# Patient Record
Sex: FEMALE | Race: WHITE | Employment: OTHER | URBAN - NONMETROPOLITAN AREA
[De-identification: names, ages, dates, MRNs, and addresses within clinical notes are randomized per-mention and may not be internally consistent; named-entity substitution may affect disease eponyms.]

---

## 2017-01-21 ENCOUNTER — HOSPITAL ENCOUNTER (OUTPATIENT)
Dept: LAB | Age: 82
Discharge: HOME | End: 2017-01-21
Attending: FAMILY MEDICINE | Admitting: FAMILY MEDICINE

## 2017-01-21 LAB
ALBUMIN SERPL-MCNC: 4.2 GM/DL (ref 3.4–5)
ALP BLD-CCNC: 53 IU/L (ref 40–129)
ALT SERPL-CCNC: 9 U/L (ref 10–40)
ANION GAP SERPL CALCULATED.3IONS-SCNC: 15 MMOL/L (ref 4–16)
AST SERPL-CCNC: 11 IU/L (ref 15–37)
BILIRUB SERPL-MCNC: 0.4 MG/DL (ref 0–1)
BUN BLDV-MCNC: 36 MG/DL (ref 6–23)
CALCIUM SERPL-MCNC: 10.2 MG/DL (ref 8.3–10.6)
CHLORIDE BLD-SCNC: 100 MMOL/L (ref 99–110)
CO2: 24 MMOL/L (ref 21–32)
CREAT SERPL-MCNC: 1.4 MG/DL (ref 0.6–1.1)
GFR AFRICAN AMERICAN: 44 ML/MIN/1.73M2
GFR NON-AFRICAN AMERICAN: 36 ML/MIN/1.73M2
GLUCOSE FASTING: 103 MG/DL (ref 70–99)
POTASSIUM SERPL-SCNC: 4.6 MMOL/L (ref 3.5–5.1)
SODIUM BLD-SCNC: 139 MMOL/L (ref 135–145)
TOTAL PROTEIN: 7.4 GM/DL (ref 6.4–8.2)

## 2017-01-22 LAB
ESTIMATED AVERAGE GLUCOSE: 103 MG/DL
FOLATE: 15.3 NG/ML (ref 3.1–17.5)
HBA1C MFR BLD: 5.2 % (ref 4.2–6.3)
VITAMIN B-12: 208.3 PG/ML (ref 211–911)

## 2017-03-08 PROBLEM — D63.8 ANEMIA OF CHRONIC DISEASE: Chronic | Status: ACTIVE | Noted: 2017-03-08

## 2017-03-08 PROBLEM — N18.30 CHRONIC RENAL IMPAIRMENT, STAGE 3 (MODERATE) (HCC): Chronic | Status: ACTIVE | Noted: 2017-03-08

## 2017-04-04 ENCOUNTER — HOSPITAL ENCOUNTER (OUTPATIENT)
Dept: LAB | Age: 82
Discharge: HOME | End: 2017-04-04
Attending: FAMILY MEDICINE | Admitting: FAMILY MEDICINE

## 2017-04-04 LAB
ALBUMIN SERPL-MCNC: 3.8 GM/DL (ref 3.4–5)
ALP BLD-CCNC: 51 IU/L (ref 40–129)
ALT SERPL-CCNC: 8 U/L (ref 10–40)
ANION GAP SERPL CALCULATED.3IONS-SCNC: 12 MMOL/L (ref 4–16)
AST SERPL-CCNC: 15 IU/L (ref 15–37)
BILIRUB SERPL-MCNC: 0.4 MG/DL (ref 0–1)
BUN BLDV-MCNC: 24 MG/DL (ref 6–23)
CALCIUM SERPL-MCNC: 9.6 MG/DL (ref 8.3–10.6)
CHLORIDE BLD-SCNC: 101 MMOL/L (ref 99–110)
CO2: 27 MMOL/L (ref 21–32)
CREAT SERPL-MCNC: 1.2 MG/DL (ref 0.6–1.1)
ESTIMATED AVERAGE GLUCOSE: 117 MG/DL
GFR AFRICAN AMERICAN: 52 ML/MIN/1.73M2
GFR NON-AFRICAN AMERICAN: 43 ML/MIN/1.73M2
GLUCOSE BLD-MCNC: 107 MG/DL (ref 70–140)
HBA1C MFR BLD: 5.7 % (ref 4.2–6.3)
POTASSIUM SERPL-SCNC: 4.2 MMOL/L (ref 3.5–5.1)
SODIUM BLD-SCNC: 140 MMOL/L (ref 135–145)
TOTAL PROTEIN: 6.9 GM/DL (ref 6.4–8.2)

## 2017-04-26 PROBLEM — E11.9 DM (DIABETES MELLITUS) (HCC): Status: ACTIVE | Noted: 2017-04-26

## 2017-04-26 PROBLEM — I27.81 COR PULMONALE (CHRONIC) (HCC): Status: ACTIVE | Noted: 2017-04-26

## 2017-04-26 PROBLEM — N18.31 CHRONIC KIDNEY DISEASE (CKD) STAGE G3A/A2, MODERATELY DECREASED GLOMERULAR FILTRATION RATE (GFR) BETWEEN 45-59 ML/MIN/1.73 SQUARE METER AND ALBUMINURIA CREATININE RATIO BETWEEN 30-299 MG/G (HCC): Status: ACTIVE | Noted: 2017-04-26

## 2017-06-28 ENCOUNTER — HOSPITAL ENCOUNTER (OUTPATIENT)
Dept: LAB | Age: 82
Discharge: HOME | End: 2017-06-28
Attending: FAMILY MEDICINE | Admitting: FAMILY MEDICINE

## 2017-06-28 LAB
ALBUMIN SERPL-MCNC: 4.1 GM/DL (ref 3.4–5)
ALP BLD-CCNC: 56 IU/L (ref 40–129)
ALT SERPL-CCNC: 7 U/L (ref 10–40)
ANION GAP SERPL CALCULATED.3IONS-SCNC: 11 MMOL/L (ref 4–16)
AST SERPL-CCNC: 13 IU/L (ref 15–37)
BILIRUB SERPL-MCNC: 0.6 MG/DL (ref 0–1)
BUN BLDV-MCNC: 22 MG/DL (ref 6–23)
CALCIUM SERPL-MCNC: 9.9 MG/DL (ref 8.3–10.6)
CHLORIDE BLD-SCNC: 99 MMOL/L (ref 99–110)
CHOLESTEROL, FASTING: 169 MG/DL
CO2: 27 MMOL/L (ref 21–32)
CREAT SERPL-MCNC: 1 MG/DL (ref 0.6–1.1)
ESTIMATED AVERAGE GLUCOSE: 140 MG/DL
GFR AFRICAN AMERICAN: >60 ML/MIN/1.73M2
GFR NON-AFRICAN AMERICAN: 53 ML/MIN/1.73M2
GLUCOSE FASTING: 124 MG/DL (ref 70–99)
HBA1C MFR BLD: 6.5 % (ref 4.2–6.3)
HDLC SERPL-MCNC: 50 MG/DL
LDL CHOLESTEROL DIRECT: 87 MG/DL
POTASSIUM SERPL-SCNC: 3.9 MMOL/L (ref 3.5–5.1)
SODIUM BLD-SCNC: 137 MMOL/L (ref 135–145)
TOTAL PROTEIN: 7 GM/DL (ref 6.4–8.2)
TRIGLYCERIDE, FASTING: 255 MG/DL
TSH HIGH SENSITIVITY: 2.27 UIU/ML (ref 0.27–4.2)

## 2017-07-18 PROBLEM — R07.2 PRECORDIAL CHEST PAIN: Status: ACTIVE | Noted: 2017-07-18

## 2017-07-20 PROBLEM — R07.2 PRECORDIAL CHEST PAIN: Status: RESOLVED | Noted: 2017-07-18 | Resolved: 2017-07-20

## 2017-08-30 PROBLEM — E87.70 FLUID OVERLOAD: Status: ACTIVE | Noted: 2017-08-30

## 2017-08-30 PROBLEM — I10 HTN (HYPERTENSION): Status: ACTIVE | Noted: 2017-08-30

## 2017-08-30 PROBLEM — N18.2 CHRONIC KIDNEY DISEASE (CKD) STAGE G2/A2, MILDLY DECREASED GLOMERULAR FILTRATION RATE (GFR) BETWEEN 60-89 ML/MIN/1.73 SQUARE METER AND ALBUMINURIA CREATININE RATIO BETWEEN 30-299 MG/G: Status: ACTIVE | Noted: 2017-08-30

## 2017-09-15 ENCOUNTER — HOSPITAL ENCOUNTER (OUTPATIENT)
Dept: LAB | Age: 82
Discharge: HOME | End: 2017-09-15
Attending: FAMILY MEDICINE | Admitting: FAMILY MEDICINE

## 2017-09-15 LAB
ALBUMIN SERPL-MCNC: 4 GM/DL (ref 3.4–5)
ALP BLD-CCNC: 70 IU/L (ref 40–129)
ALT SERPL-CCNC: 10 U/L (ref 10–40)
ANION GAP SERPL CALCULATED.3IONS-SCNC: 9 MMOL/L (ref 4–16)
AST SERPL-CCNC: 16 IU/L (ref 15–37)
BILIRUB SERPL-MCNC: 0.6 MG/DL (ref 0–1)
BUN BLDV-MCNC: 26 MG/DL (ref 6–23)
CALCIUM SERPL-MCNC: 9.3 MG/DL (ref 8.3–10.6)
CHLORIDE BLD-SCNC: 96 MMOL/L (ref 99–110)
CO2: 31 MMOL/L (ref 21–32)
CREAT SERPL-MCNC: 1.1 MG/DL (ref 0.6–1.1)
ESTIMATED AVERAGE GLUCOSE: 131 MG/DL
GFR AFRICAN AMERICAN: 58 ML/MIN/1.73M2
GFR NON-AFRICAN AMERICAN: 48 ML/MIN/1.73M2
GLUCOSE FASTING: 137 MG/DL (ref 70–99)
HBA1C MFR BLD: 6.2 % (ref 4.2–6.3)
POTASSIUM SERPL-SCNC: 3.3 MMOL/L (ref 3.5–5.1)
SODIUM BLD-SCNC: 136 MMOL/L (ref 135–145)
TOTAL PROTEIN: 7.2 GM/DL (ref 6.4–8.2)

## 2017-10-03 ENCOUNTER — HOSPITAL ENCOUNTER (OUTPATIENT)
Dept: LAB | Age: 82
Discharge: HOME | End: 2017-10-03
Attending: FAMILY MEDICINE | Admitting: FAMILY MEDICINE

## 2017-10-03 LAB
ANION GAP SERPL CALCULATED.3IONS-SCNC: 17 MMOL/L (ref 4–16)
BUN BLDV-MCNC: 28 MG/DL (ref 6–23)
CALCIUM SERPL-MCNC: 9.3 MG/DL (ref 8.3–10.6)
CHLORIDE BLD-SCNC: 97 MMOL/L (ref 99–110)
CO2: 26 MMOL/L (ref 21–32)
CREAT SERPL-MCNC: 1.2 MG/DL (ref 0.6–1.1)
GFR AFRICAN AMERICAN: 52 ML/MIN/1.73M2
GFR NON-AFRICAN AMERICAN: 43 ML/MIN/1.73M2
GLUCOSE BLD-MCNC: 233 MG/DL (ref 70–140)
POTASSIUM SERPL-SCNC: 4.2 MMOL/L (ref 3.5–5.1)
SODIUM BLD-SCNC: 140 MMOL/L (ref 135–145)

## 2017-10-27 ENCOUNTER — HOSPITAL ENCOUNTER (OUTPATIENT)
Dept: LAB | Age: 82
Discharge: HOME | End: 2017-10-27
Attending: NURSE PRACTITIONER | Admitting: NURSE PRACTITIONER

## 2017-10-27 LAB
ALBUMIN SERPL-MCNC: 4 GM/DL (ref 3.4–5)
ALP BLD-CCNC: 72 IU/L (ref 40–129)
ALT SERPL-CCNC: 11 U/L (ref 10–40)
ANION GAP SERPL CALCULATED.3IONS-SCNC: 11 MMOL/L (ref 4–16)
AST SERPL-CCNC: 12 IU/L (ref 15–37)
BILIRUB SERPL-MCNC: 0.4 MG/DL (ref 0–1)
BUN BLDV-MCNC: 29 MG/DL (ref 6–23)
CALCIUM SERPL-MCNC: 9.4 MG/DL (ref 8.3–10.6)
CHLORIDE BLD-SCNC: 96 MMOL/L (ref 99–110)
CO2: 36 MMOL/L (ref 21–32)
CREAT SERPL-MCNC: 1.2 MG/DL (ref 0.6–1.1)
ESTIMATED AVERAGE GLUCOSE: 157 MG/DL
GFR AFRICAN AMERICAN: 52 ML/MIN/1.73M2
GFR NON-AFRICAN AMERICAN: 43 ML/MIN/1.73M2
GLUCOSE FASTING: 183 MG/DL (ref 70–99)
HBA1C MFR BLD: 7.1 % (ref 4.2–6.3)
POTASSIUM SERPL-SCNC: 3.7 MMOL/L (ref 3.5–5.1)
SODIUM BLD-SCNC: 143 MMOL/L (ref 135–145)
TOTAL PROTEIN: 6.9 GM/DL (ref 6.4–8.2)

## 2017-12-29 ENCOUNTER — HOSPITAL ENCOUNTER (OUTPATIENT)
Dept: LAB | Age: 82
Discharge: HOME | End: 2017-12-29
Attending: INTERNAL MEDICINE | Admitting: INTERNAL MEDICINE

## 2017-12-29 LAB
ANION GAP SERPL CALCULATED.3IONS-SCNC: 12 MMOL/L (ref 4–16)
BUN BLDV-MCNC: 50 MG/DL (ref 6–23)
CALCIUM SERPL-MCNC: 9.9 MG/DL (ref 8.3–10.6)
CHLORIDE BLD-SCNC: 88 MMOL/L (ref 99–110)
CO2: 36 MMOL/L (ref 21–32)
CREAT SERPL-MCNC: 1.6 MG/DL (ref 0.6–1.1)
GFR AFRICAN AMERICAN: 38 ML/MIN/1.73M2
GFR NON-AFRICAN AMERICAN: 31 ML/MIN/1.73M2
GLUCOSE BLD-MCNC: 179 MG/DL (ref 70–99)
MAGNESIUM: 2.1 MG/DL (ref 1.8–2.4)
POTASSIUM SERPL-SCNC: 4 MMOL/L (ref 3.5–5.1)
SODIUM BLD-SCNC: 136 MMOL/L (ref 135–145)

## 2018-01-10 PROBLEM — N17.9 ACUTE KIDNEY INJURY (HCC): Status: ACTIVE | Noted: 2018-01-10

## 2018-03-17 ENCOUNTER — HOSPITAL ENCOUNTER (OUTPATIENT)
Dept: LAB | Age: 83
Discharge: HOME | End: 2018-03-17
Attending: INTERNAL MEDICINE | Admitting: INTERNAL MEDICINE

## 2018-03-17 LAB
ANION GAP SERPL CALCULATED.3IONS-SCNC: 10 MMOL/L (ref 4–16)
BUN BLDV-MCNC: 36 MG/DL (ref 6–23)
CALCIUM SERPL-MCNC: 9.7 MG/DL (ref 8.3–10.6)
CHLORIDE BLD-SCNC: 91 MMOL/L (ref 99–110)
CO2: 36 MMOL/L (ref 21–32)
CREAT SERPL-MCNC: 1.4 MG/DL (ref 0.6–1.1)
GFR AFRICAN AMERICAN: 44 ML/MIN/1.73M2
GFR NON-AFRICAN AMERICAN: 36 ML/MIN/1.73M2
GLUCOSE BLD-MCNC: 218 MG/DL (ref 70–99)
MAGNESIUM: 2.2 MG/DL (ref 1.8–2.4)
POTASSIUM SERPL-SCNC: 4 MMOL/L (ref 3.5–5.1)
SODIUM BLD-SCNC: 137 MMOL/L (ref 135–145)

## 2018-04-04 PROBLEM — M10.9 ACUTE GOUT: Status: ACTIVE | Noted: 2018-04-04

## 2018-04-17 ENCOUNTER — HOSPITAL ENCOUNTER (OUTPATIENT)
Dept: LAB | Age: 83
Discharge: HOME | End: 2018-04-17
Attending: INTERNAL MEDICINE | Admitting: INTERNAL MEDICINE

## 2018-04-17 LAB
ANION GAP SERPL CALCULATED.3IONS-SCNC: 9 MMOL/L (ref 4–16)
BUN BLDV-MCNC: 22 MG/DL (ref 6–23)
CALCIUM SERPL-MCNC: 9 MG/DL (ref 8.3–10.6)
CHLORIDE BLD-SCNC: 93 MMOL/L (ref 99–110)
CO2: 36 MMOL/L (ref 21–32)
CREAT SERPL-MCNC: 1.1 MG/DL (ref 0.6–1.1)
GFR AFRICAN AMERICAN: 58 ML/MIN/1.73M2
GFR NON-AFRICAN AMERICAN: 48 ML/MIN/1.73M2
GLUCOSE BLD-MCNC: 210 MG/DL (ref 70–99)
PHOSPHORUS: 3.7 MG/DL (ref 2.5–4.9)
POTASSIUM SERPL-SCNC: 3.6 MMOL/L (ref 3.5–5.1)
SODIUM BLD-SCNC: 138 MMOL/L (ref 135–145)
URIC ACID: 7.8 MG/DL (ref 2.6–6)

## 2018-08-21 ENCOUNTER — HOSPITAL ENCOUNTER (OUTPATIENT)
Dept: LAB | Age: 83
Discharge: HOME | End: 2018-08-21
Attending: INTERNAL MEDICINE | Admitting: INTERNAL MEDICINE

## 2018-08-21 LAB
ANION GAP SERPL CALCULATED.3IONS-SCNC: 22 MMOL/L (ref 4–16)
BACTERIA: ABNORMAL /HPF
BILIRUBIN URINE: NEGATIVE MG/DL
BLOOD, URINE: NEGATIVE
BUN BLDV-MCNC: 77 MG/DL (ref 6–23)
CALCIUM SERPL-MCNC: 9.8 MG/DL (ref 8.3–10.6)
CAST TYPE: ABNORMAL /HPF
CHLORIDE BLD-SCNC: 93 MMOL/L (ref 99–110)
CLARITY: CLEAR
CO2: 22 MMOL/L (ref 21–32)
COLOR: YELLOW
CREAT SERPL-MCNC: 2.6 MG/DL (ref 0.6–1.1)
CREATININE URINE: 27.7 MG/DL (ref 28–217)
CRYSTAL TYPE: ABNORMAL /HPF
EPITHELIAL CELLS, UA: ABNORMAL /HPF
GFR AFRICAN AMERICAN: 21 ML/MIN/1.73M2
GFR NON-AFRICAN AMERICAN: 18 ML/MIN/1.73M2
GLUCOSE BLD-MCNC: 166 MG/DL (ref 70–99)
GLUCOSE, URINE: NEGATIVE MG/DL
KETONES, URINE: NEGATIVE MG/DL
LEUKOCYTE ESTERASE, URINE: ABNORMAL
MAGNESIUM: 2.6 MG/DL (ref 1.8–2.4)
MUCUS: NEGATIVE HPF
NITRITE URINE, QUANTITATIVE: NEGATIVE
PH, URINE: 7 (ref 5–8)
POTASSIUM SERPL-SCNC: 4.8 MMOL/L (ref 3.5–5.1)
PROT/CREAT RATIO, UR: 0.1
PROTEIN UA: NEGATIVE MG/DL
RBC URINE: ABNORMAL /HPF (ref 0–6)
SODIUM BLD-SCNC: 137 MMOL/L (ref 135–145)
SPECIFIC GRAVITY UA: 1.01 (ref 1–1.03)
URINE TOTAL PROTEIN: 4 MG/DL
UROBILINOGEN, URINE: 0.2 MG/DL (ref 0.2–1)
VOLUME, (UVOL): 12 ML (ref 10–12)
WBC UA: ABNORMAL /HPF (ref 0–5)

## 2018-08-29 ENCOUNTER — HOSPITAL ENCOUNTER (OUTPATIENT)
Dept: LAB | Age: 83
Discharge: HOME | End: 2018-08-29
Attending: INTERNAL MEDICINE | Admitting: INTERNAL MEDICINE

## 2018-08-29 PROBLEM — N17.9 ACUTE KIDNEY INJURY (HCC): Status: ACTIVE | Noted: 2018-08-29

## 2018-08-29 LAB
ANION GAP SERPL CALCULATED.3IONS-SCNC: 9 MMOL/L (ref 4–16)
BUN BLDV-MCNC: 78 MG/DL (ref 6–23)
CALCIUM SERPL-MCNC: 9.9 MG/DL (ref 8.3–10.6)
CHLORIDE BLD-SCNC: 93 MMOL/L (ref 99–110)
CO2: 35 MMOL/L (ref 21–32)
CREAT SERPL-MCNC: 2.3 MG/DL (ref 0.6–1.1)
GFR AFRICAN AMERICAN: 25 ML/MIN/1.73M2
GFR NON-AFRICAN AMERICAN: 20 ML/MIN/1.73M2
GLUCOSE BLD-MCNC: 128 MG/DL (ref 70–99)
POTASSIUM SERPL-SCNC: 5 MMOL/L (ref 3.5–5.1)
SODIUM BLD-SCNC: 137 MMOL/L (ref 135–145)

## 2018-11-29 ENCOUNTER — HOSPITAL ENCOUNTER (OUTPATIENT)
Age: 83
Discharge: HOME | End: 2018-11-29
Payer: MEDICARE

## 2018-11-29 DIAGNOSIS — N17.9 ACUTE KIDNEY INJURY (HCC): ICD-10-CM

## 2018-11-29 LAB
ANION GAP SERPL CALCULATED.3IONS-SCNC: 12 MMOL/L (ref 4–16)
BUN BLDV-MCNC: 42 MG/DL (ref 6–23)
CALCIUM SERPL-MCNC: 9.9 MG/DL (ref 8.3–10.6)
CHLORIDE BLD-SCNC: 94 MMOL/L (ref 99–110)
CO2: 32 MMOL/L (ref 21–32)
CREAT SERPL-MCNC: 1.7 MG/DL (ref 0.6–1.1)
GFR AFRICAN AMERICAN: 35 ML/MIN/1.73M2
GFR NON-AFRICAN AMERICAN: 29 ML/MIN/1.73M2
GLUCOSE FASTING: 167 MG/DL (ref 70–99)
POTASSIUM SERPL-SCNC: 4 MMOL/L (ref 3.5–5.1)
SODIUM BLD-SCNC: 138 MMOL/L (ref 135–145)

## 2018-11-29 PROCEDURE — 80048 BASIC METABOLIC PNL TOTAL CA: CPT

## 2018-11-29 PROCEDURE — 36415 COLL VENOUS BLD VENIPUNCTURE: CPT

## 2019-02-16 ENCOUNTER — HOSPITAL ENCOUNTER (OUTPATIENT)
Age: 84
Discharge: HOME | End: 2019-02-16
Payer: MEDICARE

## 2019-02-16 DIAGNOSIS — N18.30 CHRONIC KIDNEY DISEASE, STAGE III (MODERATE) (HCC): ICD-10-CM

## 2019-02-16 LAB
ANION GAP SERPL CALCULATED.3IONS-SCNC: 10 MMOL/L (ref 4–16)
BUN BLDV-MCNC: 72 MG/DL (ref 6–23)
CALCIUM SERPL-MCNC: 9.6 MG/DL (ref 8.3–10.6)
CHLORIDE BLD-SCNC: 92 MMOL/L (ref 99–110)
CO2: 32 MMOL/L (ref 21–32)
CREAT SERPL-MCNC: 2.5 MG/DL (ref 0.6–1.1)
GFR AFRICAN AMERICAN: 22 ML/MIN/1.73M2
GFR NON-AFRICAN AMERICAN: 18 ML/MIN/1.73M2
GLUCOSE BLD-MCNC: 140 MG/DL (ref 70–99)
POTASSIUM SERPL-SCNC: 5.3 MMOL/L (ref 3.5–5.1)
SODIUM BLD-SCNC: 134 MMOL/L (ref 135–145)

## 2019-02-16 PROCEDURE — 36415 COLL VENOUS BLD VENIPUNCTURE: CPT

## 2019-02-16 PROCEDURE — 80048 BASIC METABOLIC PNL TOTAL CA: CPT

## 2019-03-26 ENCOUNTER — HOSPITAL ENCOUNTER (OUTPATIENT)
Age: 84
Discharge: HOME | End: 2019-03-26
Payer: MEDICARE

## 2019-03-26 LAB
CREATININE URINE: 31.4 MG/DL (ref 28–217)
PROT/CREAT RATIO, UR: NORMAL
URINE TOTAL PROTEIN: 4 MG/DL

## 2019-03-26 PROCEDURE — 84156 ASSAY OF PROTEIN URINE: CPT

## 2019-03-26 PROCEDURE — 82570 ASSAY OF URINE CREATININE: CPT

## 2019-03-27 ENCOUNTER — HOSPITAL ENCOUNTER (OUTPATIENT)
Age: 84
Discharge: HOME | End: 2019-03-27
Payer: MEDICARE

## 2019-03-27 LAB
ANION GAP SERPL CALCULATED.3IONS-SCNC: 12 MMOL/L (ref 4–16)
BACTERIA: ABNORMAL /HPF
BILIRUBIN URINE: NEGATIVE MG/DL
BLOOD, URINE: NEGATIVE
BUN BLDV-MCNC: 123 MG/DL (ref 6–23)
CALCIUM SERPL-MCNC: 9.5 MG/DL (ref 8.3–10.6)
CAST TYPE: NEGATIVE /HPF
CHLORIDE BLD-SCNC: 87 MMOL/L (ref 99–110)
CLARITY: ABNORMAL
CO2: 35 MMOL/L (ref 21–32)
COLOR: YELLOW
CREAT SERPL-MCNC: 3.6 MG/DL (ref 0.6–1.1)
CRYSTAL TYPE: NEGATIVE /HPF
EPITHELIAL CELLS, UA: ABNORMAL /HPF
GFR AFRICAN AMERICAN: 15 ML/MIN/1.73M2
GFR NON-AFRICAN AMERICAN: 12 ML/MIN/1.73M2
GLUCOSE BLD-MCNC: 132 MG/DL (ref 70–99)
GLUCOSE, URINE: NEGATIVE MG/DL
KETONES, URINE: NEGATIVE MG/DL
LEUKOCYTE ESTERASE, URINE: ABNORMAL
MAGNESIUM: 2.4 MG/DL (ref 1.8–2.4)
NITRITE URINE, QUANTITATIVE: NEGATIVE
PH, URINE: 7 (ref 5–8)
PHOSPHORUS: 5.6 MG/DL (ref 2.5–4.9)
POTASSIUM SERPL-SCNC: 4.6 MMOL/L (ref 3.5–5.1)
PROTEIN UA: NEGATIVE MG/DL
RBC URINE: NEGATIVE /HPF (ref 0–6)
SODIUM BLD-SCNC: 134 MMOL/L (ref 135–145)
SPECIFIC GRAVITY UA: 1.01 (ref 1–1.03)
UROBILINOGEN, URINE: 0.2 MG/DL (ref 0.2–1)
WBC UA: ABNORMAL /HPF (ref 0–5)

## 2019-03-27 PROCEDURE — 87086 URINE CULTURE/COLONY COUNT: CPT

## 2019-03-27 PROCEDURE — 84100 ASSAY OF PHOSPHORUS: CPT

## 2019-03-27 PROCEDURE — 81001 URINALYSIS AUTO W/SCOPE: CPT

## 2019-03-27 PROCEDURE — 83735 ASSAY OF MAGNESIUM: CPT

## 2019-03-27 PROCEDURE — 36415 COLL VENOUS BLD VENIPUNCTURE: CPT

## 2019-03-27 PROCEDURE — 80048 BASIC METABOLIC PNL TOTAL CA: CPT

## 2019-03-29 LAB
CULTURE: NORMAL
Lab: NORMAL
SPECIMEN: NORMAL

## 2019-04-03 PROBLEM — N17.9 ACUTE KIDNEY INJURY SUPERIMPOSED ON CHRONIC KIDNEY DISEASE (HCC): Status: ACTIVE | Noted: 2019-04-03

## 2019-04-03 PROBLEM — J06.9 URI (UPPER RESPIRATORY INFECTION): Status: ACTIVE | Noted: 2019-04-03

## 2019-04-03 PROBLEM — N18.9 ACUTE KIDNEY INJURY SUPERIMPOSED ON CHRONIC KIDNEY DISEASE (HCC): Status: ACTIVE | Noted: 2019-04-03

## 2019-04-08 ENCOUNTER — HOSPITAL ENCOUNTER (OUTPATIENT)
Age: 84
Discharge: HOME | End: 2019-04-08
Payer: MEDICARE

## 2019-04-08 LAB
ANION GAP SERPL CALCULATED.3IONS-SCNC: 22 MMOL/L (ref 4–16)
BUN BLDV-MCNC: 121 MG/DL (ref 6–23)
CALCIUM SERPL-MCNC: 9.2 MG/DL (ref 8.3–10.6)
CHLORIDE BLD-SCNC: 89 MMOL/L (ref 99–110)
CO2: 26 MMOL/L (ref 21–32)
CREAT SERPL-MCNC: 3.8 MG/DL (ref 0.6–1.1)
GFR AFRICAN AMERICAN: 14 ML/MIN/1.73M2
GFR NON-AFRICAN AMERICAN: 11 ML/MIN/1.73M2
GLUCOSE BLD-MCNC: 160 MG/DL (ref 70–99)
POTASSIUM SERPL-SCNC: 4.2 MMOL/L (ref 3.5–5.1)
SODIUM BLD-SCNC: 137 MMOL/L (ref 135–145)

## 2019-04-08 PROCEDURE — 36415 COLL VENOUS BLD VENIPUNCTURE: CPT

## 2019-04-08 PROCEDURE — 80048 BASIC METABOLIC PNL TOTAL CA: CPT

## 2019-04-15 ENCOUNTER — HOSPITAL ENCOUNTER (OUTPATIENT)
Age: 84
Discharge: HOME | End: 2019-04-15
Payer: MEDICARE

## 2019-04-15 LAB
ANION GAP SERPL CALCULATED.3IONS-SCNC: 9 MMOL/L (ref 4–16)
BUN BLDV-MCNC: 48 MG/DL (ref 6–23)
CALCIUM SERPL-MCNC: 9.8 MG/DL (ref 8.3–10.6)
CHLORIDE BLD-SCNC: 105 MMOL/L (ref 99–110)
CO2: 28 MMOL/L (ref 21–32)
CREAT SERPL-MCNC: 1.6 MG/DL (ref 0.6–1.1)
GFR AFRICAN AMERICAN: 37 ML/MIN/1.73M2
GFR NON-AFRICAN AMERICAN: 31 ML/MIN/1.73M2
GLUCOSE BLD-MCNC: 124 MG/DL (ref 70–99)
POTASSIUM SERPL-SCNC: 5 MMOL/L (ref 3.5–5.1)
SODIUM BLD-SCNC: 142 MMOL/L (ref 135–145)

## 2019-04-15 PROCEDURE — 80048 BASIC METABOLIC PNL TOTAL CA: CPT

## 2019-04-15 PROCEDURE — 36415 COLL VENOUS BLD VENIPUNCTURE: CPT

## 2019-05-03 PROBLEM — J06.9 URI (UPPER RESPIRATORY INFECTION): Status: RESOLVED | Noted: 2019-04-03 | Resolved: 2019-05-03

## 2019-07-12 ENCOUNTER — HOSPITAL ENCOUNTER (EMERGENCY)
Age: 84
Discharge: HOME | End: 2019-07-12
Attending: EMERGENCY MEDICINE
Payer: MEDICARE

## 2019-07-12 ENCOUNTER — APPOINTMENT (OUTPATIENT)
Dept: GENERAL RADIOLOGY | Age: 84
End: 2019-07-12
Payer: MEDICARE

## 2019-07-12 VITALS
RESPIRATION RATE: 20 BRPM | TEMPERATURE: 97.9 F | HEIGHT: 62 IN | WEIGHT: 200 LBS | DIASTOLIC BLOOD PRESSURE: 49 MMHG | OXYGEN SATURATION: 96 % | BODY MASS INDEX: 36.8 KG/M2 | SYSTOLIC BLOOD PRESSURE: 152 MMHG | HEART RATE: 79 BPM

## 2019-07-12 DIAGNOSIS — N28.9 RENAL INSUFFICIENCY: ICD-10-CM

## 2019-07-12 DIAGNOSIS — J44.1 COPD EXACERBATION (HCC): Primary | ICD-10-CM

## 2019-07-12 LAB
ALBUMIN SERPL-MCNC: 4.2 GM/DL (ref 3.4–5)
ALP BLD-CCNC: 71 IU/L (ref 40–129)
ALT SERPL-CCNC: 11 U/L (ref 10–40)
ANION GAP SERPL CALCULATED.3IONS-SCNC: 12 MMOL/L (ref 4–16)
AST SERPL-CCNC: 12 IU/L (ref 15–37)
BASOPHILS ABSOLUTE: 0 K/CU MM
BASOPHILS RELATIVE PERCENT: 0.2 % (ref 0–1)
BILIRUB SERPL-MCNC: 0.3 MG/DL (ref 0–1)
BUN BLDV-MCNC: 83 MG/DL (ref 6–23)
CALCIUM SERPL-MCNC: 10 MG/DL (ref 8.3–10.6)
CHLORIDE BLD-SCNC: 93 MMOL/L (ref 99–110)
CO2: 34 MMOL/L (ref 21–32)
CREAT SERPL-MCNC: 1.9 MG/DL (ref 0.6–1.1)
DIFFERENTIAL TYPE: ABNORMAL
EOSINOPHILS ABSOLUTE: 0.2 K/CU MM
EOSINOPHILS RELATIVE PERCENT: 2.4 % (ref 0–3)
GFR AFRICAN AMERICAN: 31 ML/MIN/1.73M2
GFR NON-AFRICAN AMERICAN: 25 ML/MIN/1.73M2
GLUCOSE BLD-MCNC: 170 MG/DL (ref 70–99)
HCT VFR BLD CALC: 36 % (ref 37–47)
HEMOGLOBIN: 12.1 GM/DL (ref 12.5–16)
IMMATURE NEUTROPHIL %: 0.4 % (ref 0–0.43)
LYMPHOCYTES ABSOLUTE: 1.7 K/CU MM
LYMPHOCYTES RELATIVE PERCENT: 17.5 % (ref 24–44)
MCH RBC QN AUTO: 32 PG (ref 27–31)
MCHC RBC AUTO-ENTMCNC: 33.6 % (ref 32–36)
MCV RBC AUTO: 95.2 FL (ref 78–100)
MONOCYTES ABSOLUTE: 0.9 K/CU MM
MONOCYTES RELATIVE PERCENT: 9.1 % (ref 0–4)
PDW BLD-RTO: 12 % (ref 11.7–14.9)
PLATELET # BLD: 308 K/CU MM (ref 140–440)
PMV BLD AUTO: 9.2 FL (ref 7.5–11.1)
POTASSIUM SERPL-SCNC: 3.9 MMOL/L (ref 3.5–5.1)
PRO-BNP: 232.1 PG/ML
RBC # BLD: 3.78 M/CU MM (ref 4.2–5.4)
SEGMENTED NEUTROPHILS ABSOLUTE COUNT: 7 K/CU MM
SEGMENTED NEUTROPHILS RELATIVE PERCENT: 70.4 % (ref 36–66)
SODIUM BLD-SCNC: 139 MMOL/L (ref 135–145)
TOTAL IMMATURE NEUTOROPHIL: 0.04 K/CU MM
TOTAL PROTEIN: 7 GM/DL (ref 6.4–8.2)
TROPONIN T: <0.01 NG/ML
WBC # BLD: 9.9 K/CU MM (ref 4–10.5)

## 2019-07-12 PROCEDURE — 6370000000 HC RX 637 (ALT 250 FOR IP): Performed by: EMERGENCY MEDICINE

## 2019-07-12 PROCEDURE — 94640 AIRWAY INHALATION TREATMENT: CPT

## 2019-07-12 PROCEDURE — 80053 COMPREHEN METABOLIC PANEL: CPT

## 2019-07-12 PROCEDURE — 71046 X-RAY EXAM CHEST 2 VIEWS: CPT

## 2019-07-12 PROCEDURE — 85025 COMPLETE CBC W/AUTO DIFF WBC: CPT

## 2019-07-12 PROCEDURE — 83880 ASSAY OF NATRIURETIC PEPTIDE: CPT

## 2019-07-12 PROCEDURE — 99285 EMERGENCY DEPT VISIT HI MDM: CPT

## 2019-07-12 PROCEDURE — 2700000000 HC OXYGEN THERAPY PER DAY

## 2019-07-12 PROCEDURE — 84484 ASSAY OF TROPONIN QUANT: CPT

## 2019-07-12 PROCEDURE — 93005 ELECTROCARDIOGRAM TRACING: CPT | Performed by: EMERGENCY MEDICINE

## 2019-07-12 RX ORDER — AZITHROMYCIN 250 MG/1
250 TABLET, FILM COATED ORAL SEE ADMIN INSTRUCTIONS
Qty: 6 TABLET | Refills: 0 | Status: SHIPPED | OUTPATIENT
Start: 2019-07-12 | End: 2019-07-17

## 2019-07-12 RX ORDER — IPRATROPIUM BROMIDE AND ALBUTEROL SULFATE 2.5; .5 MG/3ML; MG/3ML
1 SOLUTION RESPIRATORY (INHALATION) ONCE
Status: COMPLETED | OUTPATIENT
Start: 2019-07-12 | End: 2019-07-12

## 2019-07-12 RX ORDER — LEVOTHYROXINE SODIUM 0.05 MG/1
50 TABLET ORAL DAILY
COMMUNITY

## 2019-07-12 RX ADMIN — GUAIFENESIN, DEXTROMETHORPHAN HBR 1 TABLET: 600; 30 TABLET ORAL at 19:12

## 2019-07-12 RX ADMIN — IPRATROPIUM BROMIDE AND ALBUTEROL SULFATE 1 AMPULE: .5; 3 SOLUTION RESPIRATORY (INHALATION) at 18:53

## 2019-07-12 NOTE — ED PROVIDER NOTES
0 - 0.43 %    Total Immature Neutrophil 0.04 K/CU MM      Radiographs (if obtained):  [] The following radiograph was interpreted by myself in the absence of a radiologist:   [] Radiologist's Report Reviewed:  XR CHEST STANDARD (2 VW)    (Results Pending)   Pending on signout. EKG (if obtained): (All EKG's are interpreted by myself in the absence of a cardiologist)  12 lead EKG per my interpretation:  Pending on signout. Chart review shows recent radiographs:  No results found. MDM:  Pt presents as above. Emergent conditions considered. Presentation prompted initial labs, EKG and imaging. Patient arriving shortly before evening signout and work-up is pending at this time. Treatment was initiated with DuoNeb breathing treatment and Mucinex. Patient is hemodynamically stable on room air during initial assessment and more emergent interventions were not taken. Patient's labs, EKG and chest x-ray are pending and signed out to Dr. Judi Quinn and he will disposition patient. Clinical Impression:  1. COPD exacerbation ([address]    2. Renal insufficiency      Disposition referral (if applicable):  No follow-up provider specified. Disposition medications (if applicable):  New Prescriptions    No medications on file       Comment: Please note this report has been produced using speech recognition software and may contain errors related to that system including errors in grammar, punctuation, and spelling, as well as words and phrases that may be inappropriate. If there are any questions or concerns please feel free to contact the dictating provider for clarification.        Kristen Peralta MD  07/13/19 [id number]

## 2019-07-14 PROCEDURE — 93010 ELECTROCARDIOGRAM REPORT: CPT | Performed by: INTERNAL MEDICINE

## 2019-07-18 LAB
EKG ATRIAL RATE: 76 BPM
EKG DIAGNOSIS: NORMAL
EKG P AXIS: 49 DEGREES
EKG P-R INTERVAL: 216 MS
EKG Q-T INTERVAL: 436 MS
EKG QRS DURATION: 74 MS
EKG QTC CALCULATION (BAZETT): 490 MS
EKG R AXIS: 3 DEGREES
EKG T AXIS: 20 DEGREES
EKG VENTRICULAR RATE: 76 BPM

## 2019-08-22 ENCOUNTER — HOSPITAL ENCOUNTER (OUTPATIENT)
Age: 84
Discharge: HOME | End: 2019-08-22
Payer: MEDICARE

## 2019-08-22 LAB
ANION GAP SERPL CALCULATED.3IONS-SCNC: 16 MMOL/L (ref 4–16)
BUN BLDV-MCNC: 96 MG/DL (ref 6–23)
CALCIUM SERPL-MCNC: 9.6 MG/DL (ref 8.3–10.6)
CHLORIDE BLD-SCNC: 91 MMOL/L (ref 99–110)
CO2: 30 MMOL/L (ref 21–32)
CREAT SERPL-MCNC: 2.5 MG/DL (ref 0.6–1.1)
GFR AFRICAN AMERICAN: 22 ML/MIN/1.73M2
GFR NON-AFRICAN AMERICAN: 18 ML/MIN/1.73M2
GLUCOSE BLD-MCNC: 144 MG/DL (ref 70–99)
POTASSIUM SERPL-SCNC: 3.8 MMOL/L (ref 3.5–5.1)
SODIUM BLD-SCNC: 137 MMOL/L (ref 135–145)

## 2019-08-22 PROCEDURE — 36415 COLL VENOUS BLD VENIPUNCTURE: CPT

## 2019-08-22 PROCEDURE — 80048 BASIC METABOLIC PNL TOTAL CA: CPT

## 2019-09-11 ENCOUNTER — HOSPITAL ENCOUNTER (OUTPATIENT)
Age: 84
Discharge: HOME | End: 2019-09-11
Payer: MEDICARE

## 2019-09-11 LAB
ANION GAP SERPL CALCULATED.3IONS-SCNC: 13 MMOL/L (ref 4–16)
BUN BLDV-MCNC: 108 MG/DL (ref 6–23)
CALCIUM SERPL-MCNC: 9 MG/DL (ref 8.3–10.6)
CHLORIDE BLD-SCNC: 92 MMOL/L (ref 99–110)
CO2: 30 MMOL/L (ref 21–32)
CREAT SERPL-MCNC: 3.5 MG/DL (ref 0.6–1.1)
GFR AFRICAN AMERICAN: 15 ML/MIN/1.73M2
GFR NON-AFRICAN AMERICAN: 13 ML/MIN/1.73M2
GLUCOSE BLD-MCNC: 140 MG/DL (ref 70–99)
POTASSIUM SERPL-SCNC: 4.7 MMOL/L (ref 3.5–5.1)
SODIUM BLD-SCNC: 135 MMOL/L (ref 135–145)

## 2019-09-11 PROCEDURE — 80048 BASIC METABOLIC PNL TOTAL CA: CPT

## 2019-09-11 PROCEDURE — 36415 COLL VENOUS BLD VENIPUNCTURE: CPT

## 2019-09-25 ENCOUNTER — HOSPITAL ENCOUNTER (OUTPATIENT)
Age: 84
Discharge: HOME | End: 2019-09-25
Payer: MEDICARE

## 2019-09-25 DIAGNOSIS — N18.2 DIABETES MELLITUS DUE TO UNDERLYING CONDITION WITH STAGE 2 CHRONIC KIDNEY DISEASE, WITHOUT LONG-TERM CURRENT USE OF INSULIN (HCC): ICD-10-CM

## 2019-09-25 DIAGNOSIS — E08.22 DIABETES MELLITUS DUE TO UNDERLYING CONDITION WITH STAGE 2 CHRONIC KIDNEY DISEASE, WITHOUT LONG-TERM CURRENT USE OF INSULIN (HCC): ICD-10-CM

## 2019-09-25 DIAGNOSIS — N18.31 CHRONIC KIDNEY DISEASE (CKD) STAGE G3A/A2, MODERATELY DECREASED GLOMERULAR FILTRATION RATE (GFR) BETWEEN 45-59 ML/MIN/1.73 SQUARE METER AND ALBUMINURIA CREATININE RATIO BETWEEN 30-299 MG/G (HCC): ICD-10-CM

## 2019-09-25 DIAGNOSIS — I10 ESSENTIAL HYPERTENSION: ICD-10-CM

## 2019-09-25 LAB
ANION GAP SERPL CALCULATED.3IONS-SCNC: 13 MMOL/L (ref 4–16)
BUN BLDV-MCNC: 108 MG/DL (ref 6–23)
CALCIUM SERPL-MCNC: 8.7 MG/DL (ref 8.3–10.6)
CHLORIDE BLD-SCNC: 90 MMOL/L (ref 99–110)
CO2: 33 MMOL/L (ref 21–32)
CREAT SERPL-MCNC: 3.1 MG/DL (ref 0.6–1.1)
GFR AFRICAN AMERICAN: 17 ML/MIN/1.73M2
GFR NON-AFRICAN AMERICAN: 14 ML/MIN/1.73M2
GLUCOSE BLD-MCNC: 156 MG/DL (ref 70–99)
POTASSIUM SERPL-SCNC: 4.2 MMOL/L (ref 3.5–5.1)
SODIUM BLD-SCNC: 136 MMOL/L (ref 135–145)

## 2019-09-25 PROCEDURE — 36415 COLL VENOUS BLD VENIPUNCTURE: CPT

## 2019-09-25 PROCEDURE — 80048 BASIC METABOLIC PNL TOTAL CA: CPT

## 2019-10-09 ENCOUNTER — HOSPITAL ENCOUNTER (OUTPATIENT)
Age: 84
Discharge: HOME | End: 2019-10-09
Payer: MEDICARE

## 2019-10-09 DIAGNOSIS — N17.9 ACUTE KIDNEY INJURY (HCC): ICD-10-CM

## 2019-10-09 LAB
ANION GAP SERPL CALCULATED.3IONS-SCNC: 16 MMOL/L (ref 4–16)
BUN BLDV-MCNC: 104 MG/DL (ref 6–23)
CALCIUM SERPL-MCNC: 9.8 MG/DL (ref 8.3–10.6)
CHLORIDE BLD-SCNC: 86 MMOL/L (ref 99–110)
CO2: 35 MMOL/L (ref 21–32)
CREAT SERPL-MCNC: 2.7 MG/DL (ref 0.6–1.1)
GFR AFRICAN AMERICAN: 20 ML/MIN/1.73M2
GFR NON-AFRICAN AMERICAN: 17 ML/MIN/1.73M2
GLUCOSE BLD-MCNC: 183 MG/DL (ref 70–99)
POTASSIUM SERPL-SCNC: 3.3 MMOL/L (ref 3.5–5.1)
SODIUM BLD-SCNC: 137 MMOL/L (ref 135–145)

## 2019-10-09 PROCEDURE — 36415 COLL VENOUS BLD VENIPUNCTURE: CPT

## 2019-10-09 PROCEDURE — 80048 BASIC METABOLIC PNL TOTAL CA: CPT

## 2019-10-16 ENCOUNTER — HOSPITAL ENCOUNTER (OUTPATIENT)
Age: 84
Discharge: HOME | End: 2019-10-16
Payer: MEDICARE

## 2019-10-16 DIAGNOSIS — N18.2 CHRONIC KIDNEY DISEASE (CKD) STAGE G2/A2, MILDLY DECREASED GLOMERULAR FILTRATION RATE (GFR) BETWEEN 60-89 ML/MIN/1.73 SQUARE METER AND ALBUMINURIA CREATININE RATIO BETWEEN 30-299 MG/G: ICD-10-CM

## 2019-10-16 LAB
ANION GAP SERPL CALCULATED.3IONS-SCNC: 11 MMOL/L (ref 4–16)
BUN BLDV-MCNC: 89 MG/DL (ref 6–23)
CALCIUM SERPL-MCNC: 9.8 MG/DL (ref 8.3–10.6)
CHLORIDE BLD-SCNC: 84 MMOL/L (ref 99–110)
CO2: 39 MMOL/L (ref 21–32)
CREAT SERPL-MCNC: 2.4 MG/DL (ref 0.6–1.1)
GFR AFRICAN AMERICAN: 23 ML/MIN/1.73M2
GFR NON-AFRICAN AMERICAN: 19 ML/MIN/1.73M2
GLUCOSE BLD-MCNC: 179 MG/DL (ref 70–99)
POTASSIUM SERPL-SCNC: 3.1 MMOL/L (ref 3.5–5.1)
SODIUM BLD-SCNC: 134 MMOL/L (ref 135–145)

## 2019-10-16 PROCEDURE — 36415 COLL VENOUS BLD VENIPUNCTURE: CPT

## 2019-10-16 PROCEDURE — 80048 BASIC METABOLIC PNL TOTAL CA: CPT

## 2019-10-24 ENCOUNTER — HOSPITAL ENCOUNTER (OUTPATIENT)
Age: 84
Discharge: HOME | End: 2019-10-24
Payer: MEDICARE

## 2019-10-24 LAB
ANION GAP SERPL CALCULATED.3IONS-SCNC: 13 MMOL/L (ref 4–16)
BUN BLDV-MCNC: 75 MG/DL (ref 6–23)
CALCIUM SERPL-MCNC: 10 MG/DL (ref 8.3–10.6)
CHLORIDE BLD-SCNC: 90 MMOL/L (ref 99–110)
CO2: 35 MMOL/L (ref 21–32)
CREAT SERPL-MCNC: 2.1 MG/DL (ref 0.6–1.1)
GFR AFRICAN AMERICAN: 27 ML/MIN/1.73M2
GFR NON-AFRICAN AMERICAN: 23 ML/MIN/1.73M2
GLUCOSE BLD-MCNC: 144 MG/DL (ref 70–99)
POTASSIUM SERPL-SCNC: 3.7 MMOL/L (ref 3.5–5.1)
SODIUM BLD-SCNC: 138 MMOL/L (ref 135–145)

## 2019-10-24 PROCEDURE — 80048 BASIC METABOLIC PNL TOTAL CA: CPT

## 2019-10-24 PROCEDURE — 36415 COLL VENOUS BLD VENIPUNCTURE: CPT

## 2020-01-02 ENCOUNTER — HOSPITAL ENCOUNTER (OUTPATIENT)
Age: 85
Discharge: HOME | End: 2020-01-02
Payer: MEDICARE

## 2020-01-02 LAB
ANION GAP SERPL CALCULATED.3IONS-SCNC: 9 MMOL/L (ref 4–16)
BUN BLDV-MCNC: 87 MG/DL (ref 6–23)
CALCIUM SERPL-MCNC: 9.8 MG/DL (ref 8.3–10.6)
CHLORIDE BLD-SCNC: 88 MMOL/L (ref 99–110)
CO2: 40 MMOL/L (ref 21–32)
CREAT SERPL-MCNC: 2.1 MG/DL (ref 0.6–1.1)
GFR AFRICAN AMERICAN: 27 ML/MIN/1.73M2
GFR NON-AFRICAN AMERICAN: 23 ML/MIN/1.73M2
GLUCOSE BLD-MCNC: 158 MG/DL (ref 70–99)
POTASSIUM SERPL-SCNC: 3.1 MMOL/L (ref 3.5–5.1)
SODIUM BLD-SCNC: 137 MMOL/L (ref 135–145)

## 2020-01-02 PROCEDURE — 36415 COLL VENOUS BLD VENIPUNCTURE: CPT

## 2020-01-02 PROCEDURE — 80048 BASIC METABOLIC PNL TOTAL CA: CPT

## 2020-01-21 ENCOUNTER — HOSPITAL ENCOUNTER (OUTPATIENT)
Age: 85
Discharge: HOME | End: 2020-01-21
Payer: MEDICARE

## 2020-01-21 LAB
ANION GAP SERPL CALCULATED.3IONS-SCNC: 13 MMOL/L (ref 4–16)
BUN BLDV-MCNC: 76 MG/DL (ref 6–23)
CALCIUM SERPL-MCNC: 9.1 MG/DL (ref 8.3–10.6)
CHLORIDE BLD-SCNC: 88 MMOL/L (ref 99–110)
CO2: 36 MMOL/L (ref 21–32)
CREAT SERPL-MCNC: 1.9 MG/DL (ref 0.6–1.1)
GFR AFRICAN AMERICAN: 31 ML/MIN/1.73M2
GFR NON-AFRICAN AMERICAN: 25 ML/MIN/1.73M2
GLUCOSE BLD-MCNC: 256 MG/DL (ref 70–99)
POTASSIUM SERPL-SCNC: 3.5 MMOL/L (ref 3.5–5.1)
SODIUM BLD-SCNC: 137 MMOL/L (ref 135–145)

## 2020-01-21 PROCEDURE — 80048 BASIC METABOLIC PNL TOTAL CA: CPT

## 2020-01-21 PROCEDURE — 36415 COLL VENOUS BLD VENIPUNCTURE: CPT

## 2020-02-18 ENCOUNTER — HOSPITAL ENCOUNTER (OUTPATIENT)
Age: 85
Discharge: HOME | End: 2020-02-18
Payer: MEDICARE

## 2020-02-18 LAB
ANION GAP SERPL CALCULATED.3IONS-SCNC: 13 MMOL/L (ref 4–16)
BUN BLDV-MCNC: 98 MG/DL (ref 6–23)
CALCIUM SERPL-MCNC: 9.7 MG/DL (ref 8.3–10.6)
CHLORIDE BLD-SCNC: 86 MMOL/L (ref 99–110)
CO2: 34 MMOL/L (ref 21–32)
CREAT SERPL-MCNC: 2 MG/DL (ref 0.6–1.1)
GFR AFRICAN AMERICAN: 29 ML/MIN/1.73M2
GFR NON-AFRICAN AMERICAN: 24 ML/MIN/1.73M2
GLUCOSE BLD-MCNC: 214 MG/DL (ref 70–99)
POTASSIUM SERPL-SCNC: 3.5 MMOL/L (ref 3.5–5.1)
SODIUM BLD-SCNC: 133 MMOL/L (ref 135–145)

## 2020-02-18 PROCEDURE — 80048 BASIC METABOLIC PNL TOTAL CA: CPT

## 2020-02-18 PROCEDURE — 36415 COLL VENOUS BLD VENIPUNCTURE: CPT

## 2020-03-18 ENCOUNTER — HOSPITAL ENCOUNTER (OUTPATIENT)
Age: 85
Discharge: HOME | End: 2020-03-18
Payer: MEDICARE

## 2020-03-18 LAB
ANION GAP SERPL CALCULATED.3IONS-SCNC: 8 MMOL/L (ref 4–16)
BUN BLDV-MCNC: 91 MG/DL (ref 6–23)
CALCIUM SERPL-MCNC: 10 MG/DL (ref 8.3–10.6)
CHLORIDE BLD-SCNC: 83 MMOL/L (ref 99–110)
CO2: 42 MMOL/L (ref 21–32)
CREAT SERPL-MCNC: 2.1 MG/DL (ref 0.6–1.1)
GFR AFRICAN AMERICAN: 27 ML/MIN/1.73M2
GFR NON-AFRICAN AMERICAN: 23 ML/MIN/1.73M2
GLUCOSE BLD-MCNC: 192 MG/DL (ref 70–99)
POTASSIUM SERPL-SCNC: 3.3 MMOL/L (ref 3.5–5.1)
SODIUM BLD-SCNC: 133 MMOL/L (ref 135–145)

## 2020-03-18 PROCEDURE — 80048 BASIC METABOLIC PNL TOTAL CA: CPT

## 2020-03-18 PROCEDURE — 36415 COLL VENOUS BLD VENIPUNCTURE: CPT

## 2020-03-25 PROBLEM — N17.9 ACUTE KIDNEY INJURY (HCC): Status: RESOLVED | Noted: 2018-01-10 | Resolved: 2020-03-24

## 2020-04-15 ENCOUNTER — HOSPITAL ENCOUNTER (OUTPATIENT)
Age: 85
Discharge: HOME | End: 2020-04-15
Payer: MEDICARE

## 2020-04-15 LAB
ANION GAP SERPL CALCULATED.3IONS-SCNC: 14 MMOL/L (ref 4–16)
BUN BLDV-MCNC: 110 MG/DL (ref 6–23)
CALCIUM SERPL-MCNC: 9.7 MG/DL (ref 8.3–10.6)
CHLORIDE BLD-SCNC: 87 MMOL/L (ref 99–110)
CO2: 32 MMOL/L (ref 21–32)
CREAT SERPL-MCNC: 1.8 MG/DL (ref 0.6–1.1)
GFR AFRICAN AMERICAN: 33 ML/MIN/1.73M2
GFR NON-AFRICAN AMERICAN: 27 ML/MIN/1.73M2
GLUCOSE BLD-MCNC: 193 MG/DL (ref 70–99)
POTASSIUM SERPL-SCNC: 3.1 MMOL/L (ref 3.5–5.1)
SODIUM BLD-SCNC: 133 MMOL/L (ref 135–145)

## 2020-04-15 PROCEDURE — 36415 COLL VENOUS BLD VENIPUNCTURE: CPT

## 2020-04-15 PROCEDURE — 80048 BASIC METABOLIC PNL TOTAL CA: CPT

## 2020-04-29 ENCOUNTER — HOSPITAL ENCOUNTER (OUTPATIENT)
Age: 85
Discharge: HOME | End: 2020-04-29
Payer: MEDICARE

## 2020-04-29 LAB
ANION GAP SERPL CALCULATED.3IONS-SCNC: 18 MMOL/L (ref 4–16)
BACTERIA: ABNORMAL /HPF
BILIRUBIN URINE: NEGATIVE MG/DL
BLOOD, URINE: NEGATIVE
BUN BLDV-MCNC: 97 MG/DL (ref 6–23)
CALCIUM SERPL-MCNC: 9.4 MG/DL (ref 8.3–10.6)
CAST TYPE: NEGATIVE /HPF
CHLORIDE BLD-SCNC: 84 MMOL/L (ref 99–110)
CLARITY: ABNORMAL
CO2: 29 MMOL/L (ref 21–32)
COLOR: YELLOW
CREAT SERPL-MCNC: 2.2 MG/DL (ref 0.6–1.1)
CREATININE URINE: 37.3 MG/DL (ref 28–217)
CRYSTAL TYPE: NEGATIVE /HPF
EPITHELIAL CELLS, UA: ABNORMAL /HPF
GFR AFRICAN AMERICAN: 26 ML/MIN/1.73M2
GFR NON-AFRICAN AMERICAN: 21 ML/MIN/1.73M2
GLUCOSE BLD-MCNC: 170 MG/DL (ref 70–99)
GLUCOSE, URINE: NEGATIVE MG/DL
KETONES, URINE: NEGATIVE MG/DL
LEUKOCYTE ESTERASE, URINE: ABNORMAL
MAGNESIUM: 1.8 MG/DL (ref 1.8–2.4)
NITRITE URINE, QUANTITATIVE: NEGATIVE
PH, URINE: 7 (ref 5–8)
PHOSPHORUS: 5.7 MG/DL (ref 2.5–4.9)
POTASSIUM SERPL-SCNC: 3.6 MMOL/L (ref 3.5–5.1)
PROT/CREAT RATIO, UR: 0.2
PROTEIN UA: NEGATIVE MG/DL
RBC URINE: NEGATIVE /HPF (ref 0–6)
SODIUM BLD-SCNC: 131 MMOL/L (ref 135–145)
SPECIFIC GRAVITY UA: 1.01 (ref 1–1.03)
URINE TOTAL PROTEIN: 5.6 MG/DL
UROBILINOGEN, URINE: 0.2 MG/DL (ref 0.2–1)
WBC UA: ABNORMAL /HPF (ref 0–5)

## 2020-04-29 PROCEDURE — 84100 ASSAY OF PHOSPHORUS: CPT

## 2020-04-29 PROCEDURE — 80048 BASIC METABOLIC PNL TOTAL CA: CPT

## 2020-04-29 PROCEDURE — 84156 ASSAY OF PROTEIN URINE: CPT

## 2020-04-29 PROCEDURE — 83735 ASSAY OF MAGNESIUM: CPT

## 2020-04-29 PROCEDURE — 82570 ASSAY OF URINE CREATININE: CPT

## 2020-04-29 PROCEDURE — 36415 COLL VENOUS BLD VENIPUNCTURE: CPT

## 2020-04-29 PROCEDURE — 81001 URINALYSIS AUTO W/SCOPE: CPT

## 2020-08-19 ENCOUNTER — HOSPITAL ENCOUNTER (OUTPATIENT)
Age: 85
Discharge: HOME | End: 2020-08-19
Payer: MEDICARE

## 2020-08-19 LAB
ALBUMIN SERPL-MCNC: 4.3 GM/DL (ref 3.4–5)
ALP BLD-CCNC: 67 IU/L (ref 40–129)
ALT SERPL-CCNC: <5 U/L (ref 10–40)
ANION GAP SERPL CALCULATED.3IONS-SCNC: 18 MMOL/L (ref 4–16)
AST SERPL-CCNC: 10 IU/L (ref 15–37)
BACTERIA: ABNORMAL /HPF
BILIRUB SERPL-MCNC: 0.5 MG/DL (ref 0–1)
BILIRUBIN URINE: NEGATIVE MG/DL
BLOOD, URINE: NEGATIVE
BUN BLDV-MCNC: 102 MG/DL (ref 6–23)
CALCIUM SERPL-MCNC: 9.4 MG/DL (ref 8.3–10.6)
CAST TYPE: ABNORMAL /HPF
CHLORIDE BLD-SCNC: 92 MMOL/L (ref 99–110)
CLARITY: ABNORMAL
CO2: 29 MMOL/L (ref 21–32)
COLOR: YELLOW
CREAT SERPL-MCNC: 2.1 MG/DL (ref 0.6–1.1)
CRYSTAL TYPE: NEGATIVE /HPF
EPITHELIAL CELLS, UA: 3 /HPF
ESTIMATED AVERAGE GLUCOSE: 177 MG/DL
GFR AFRICAN AMERICAN: 27 ML/MIN/1.73M2
GFR NON-AFRICAN AMERICAN: 22 ML/MIN/1.73M2
GLUCOSE FASTING: 131 MG/DL (ref 70–99)
GLUCOSE, URINE: NEGATIVE MG/DL
HBA1C MFR BLD: 7.8 % (ref 4.2–6.3)
KETONES, URINE: NEGATIVE MG/DL
LEUKOCYTE ESTERASE, URINE: ABNORMAL
MAGNESIUM: 1.9 MG/DL (ref 1.8–2.4)
NITRITE URINE, QUANTITATIVE: NEGATIVE
PH, URINE: 7 (ref 5–8)
PHOSPHORUS: 4.3 MG/DL (ref 2.5–4.9)
POTASSIUM SERPL-SCNC: 3.4 MMOL/L (ref 3.5–5.1)
PROTEIN UA: NEGATIVE MG/DL
RBC URINE: 1 /HPF (ref 0–6)
SODIUM BLD-SCNC: 139 MMOL/L (ref 135–145)
SPECIFIC GRAVITY UA: 1.01 (ref 1–1.03)
TOTAL PROTEIN: 7.4 GM/DL (ref 6.4–8.2)
UROBILINOGEN, URINE: 0.2 MG/DL (ref 0.2–1)
WBC UA: 9 /HPF (ref 0–5)

## 2020-08-19 PROCEDURE — 80053 COMPREHEN METABOLIC PANEL: CPT

## 2020-08-19 PROCEDURE — 83036 HEMOGLOBIN GLYCOSYLATED A1C: CPT

## 2020-08-19 PROCEDURE — 36415 COLL VENOUS BLD VENIPUNCTURE: CPT

## 2020-08-19 PROCEDURE — 83735 ASSAY OF MAGNESIUM: CPT

## 2020-08-19 PROCEDURE — 81001 URINALYSIS AUTO W/SCOPE: CPT

## 2020-08-19 PROCEDURE — 84100 ASSAY OF PHOSPHORUS: CPT

## 2020-11-03 PROBLEM — I10 HYPERTENSION: Status: RESOLVED | Noted: 2020-11-03 | Resolved: 2020-11-03

## 2020-11-16 ENCOUNTER — APPOINTMENT (OUTPATIENT)
Dept: ULTRASOUND IMAGING | Age: 85
DRG: 242 | End: 2020-11-16
Attending: INTERNAL MEDICINE
Payer: MEDICARE

## 2020-11-16 ENCOUNTER — HOSPITAL ENCOUNTER (EMERGENCY)
Age: 85
Discharge: ANOTHER HOSPITAL | End: 2020-11-16
Attending: EMERGENCY MEDICINE
Payer: MEDICARE

## 2020-11-16 ENCOUNTER — HOSPITAL ENCOUNTER (INPATIENT)
Age: 85
LOS: 4 days | Discharge: NURSING, REHAB OR LONG-TERM CARE | DRG: 242 | End: 2020-11-20
Attending: INTERNAL MEDICINE | Admitting: INTERNAL MEDICINE
Payer: MEDICARE

## 2020-11-16 ENCOUNTER — APPOINTMENT (OUTPATIENT)
Dept: GENERAL RADIOLOGY | Age: 85
End: 2020-11-16
Payer: MEDICARE

## 2020-11-16 VITALS
TEMPERATURE: 98.4 F | OXYGEN SATURATION: 98 % | SYSTOLIC BLOOD PRESSURE: 136 MMHG | HEART RATE: 115 BPM | BODY MASS INDEX: 31.09 KG/M2 | RESPIRATION RATE: 20 BRPM | DIASTOLIC BLOOD PRESSURE: 70 MMHG | WEIGHT: 170 LBS

## 2020-11-16 PROBLEM — I44.2 THIRD DEGREE HEART BLOCK (HCC): Status: ACTIVE | Noted: 2020-11-16

## 2020-11-16 PROBLEM — R00.1 SINUS BRADYCARDIA: Status: ACTIVE | Noted: 2020-11-16

## 2020-11-16 LAB
ANION GAP SERPL CALCULATED.3IONS-SCNC: 18 MMOL/L (ref 4–16)
ANION GAP SERPL CALCULATED.3IONS-SCNC: 19 MMOL/L (ref 4–16)
APTT: 34.9 SECONDS (ref 25.1–37.1)
BACTERIA: ABNORMAL /HPF
BASE EXCESS MIXED: 3.2 (ref 0–2.3)
BASE EXCESS: ABNORMAL (ref 0–2.4)
BASOPHILS ABSOLUTE: 0 K/CU MM
BASOPHILS RELATIVE PERCENT: 0.3 % (ref 0–1)
BILIRUBIN URINE: NEGATIVE MG/DL
BLOOD, URINE: NEGATIVE
BUN BLDV-MCNC: 120 MG/DL (ref 6–23)
BUN BLDV-MCNC: 128 MG/DL (ref 6–23)
CALCIUM SERPL-MCNC: 8.4 MG/DL (ref 8.3–10.6)
CALCIUM SERPL-MCNC: 8.6 MG/DL (ref 8.3–10.6)
CHLORIDE BLD-SCNC: 88 MMOL/L (ref 99–110)
CHLORIDE BLD-SCNC: 88 MMOL/L (ref 99–110)
CLARITY: ABNORMAL
CO2: 20 MMOL/L (ref 21–32)
CO2: 21 MMOL/L (ref 21–32)
CO2: 24 MMOL/L (ref 21–32)
COLOR: ABNORMAL
CREAT SERPL-MCNC: 3.7 MG/DL (ref 0.6–1.1)
CREAT SERPL-MCNC: 3.9 MG/DL (ref 0.6–1.1)
CREATININE URINE: 88.9 MG/DL (ref 28–217)
DIFFERENTIAL TYPE: ABNORMAL
EOSINOPHILS ABSOLUTE: 0 K/CU MM
EOSINOPHILS RELATIVE PERCENT: 0.2 % (ref 0–3)
GFR AFRICAN AMERICAN: 13 ML/MIN/1.73M2
GFR AFRICAN AMERICAN: 14 ML/MIN/1.73M2
GFR NON-AFRICAN AMERICAN: 11 ML/MIN/1.73M2
GFR NON-AFRICAN AMERICAN: 12 ML/MIN/1.73M2
GLUCOSE BLD-MCNC: 156 MG/DL (ref 70–99)
GLUCOSE BLD-MCNC: 182 MG/DL (ref 70–99)
GLUCOSE BLD-MCNC: 223 MG/DL (ref 70–99)
GLUCOSE BLD-MCNC: 270 MG/DL (ref 70–99)
GLUCOSE, URINE: NEGATIVE MG/DL
HCO3 ARTERIAL: 19.7 MMOL/L (ref 18–23)
HCT VFR BLD CALC: 32.4 % (ref 37–47)
HEMOGLOBIN: 10.4 GM/DL (ref 12.5–16)
HYALINE CASTS: 0 /LPF
IMMATURE NEUTROPHIL %: 0.6 % (ref 0–0.43)
INR BLD: 1.04 INDEX
KETONES, URINE: NEGATIVE MG/DL
LEUKOCYTE ESTERASE, URINE: ABNORMAL
LYMPHOCYTES ABSOLUTE: 0.7 K/CU MM
LYMPHOCYTES RELATIVE PERCENT: 6.2 % (ref 24–44)
MAGNESIUM: 2.5 MG/DL (ref 1.8–2.4)
MCH RBC QN AUTO: 30.7 PG (ref 27–31)
MCHC RBC AUTO-ENTMCNC: 32.1 % (ref 32–36)
MCV RBC AUTO: 95.6 FL (ref 78–100)
MONOCYTES ABSOLUTE: 1 K/CU MM
MONOCYTES RELATIVE PERCENT: 8.9 % (ref 0–4)
NITRITE URINE, QUANTITATIVE: NEGATIVE
O2 SATURATION: 80.1 % (ref 96–97)
PCO2 ARTERIAL: 28.2 MMHG (ref 32–45)
PDW BLD-RTO: 12.2 % (ref 11.7–14.9)
PH BLOOD: 7.45 (ref 7.34–7.45)
PH, URINE: 5 (ref 5–8)
PLATELET # BLD: 243 K/CU MM (ref 140–440)
PMV BLD AUTO: 10.1 FL (ref 7.5–11.1)
PO2 ARTERIAL: 41.3 MMHG (ref 75–100)
POC CHLORIDE: 93 MMOL/L (ref 98–109)
POTASSIUM SERPL-SCNC: 3.8 MMOL/L (ref 3.5–4.5)
POTASSIUM SERPL-SCNC: 4.1 MMOL/L (ref 3.5–5.1)
POTASSIUM SERPL-SCNC: 4.2 MMOL/L (ref 3.5–5.1)
PRO-BNP: ABNORMAL PG/ML
PROT/CREAT RATIO, UR: 0.3
PROTEIN UA: NEGATIVE MG/DL
PROTHROMBIN TIME: 12.6 SECONDS (ref 11.7–14.5)
RBC # BLD: 3.39 M/CU MM (ref 4.2–5.4)
RBC URINE: ABNORMAL /HPF (ref 0–6)
SARS-COV-2, NAAT: NOT DETECTED
SEGMENTED NEUTROPHILS ABSOLUTE COUNT: 9.2 K/CU MM
SEGMENTED NEUTROPHILS RELATIVE PERCENT: 83.8 % (ref 36–66)
SODIUM BLD-SCNC: 124 MMOL/L (ref 138–146)
SODIUM BLD-SCNC: 127 MMOL/L (ref 135–145)
SODIUM BLD-SCNC: 130 MMOL/L (ref 135–145)
SODIUM URINE: 13 MMOL/L (ref 35–167)
SOURCE, BLOOD GAS: ABNORMAL
SOURCE: NORMAL
SPECIFIC GRAVITY UA: 1.01 (ref 1–1.03)
SQUAMOUS EPITHELIAL: 2 /HPF
T4 FREE: 1.35 NG/DL (ref 0.9–1.8)
TOTAL IMMATURE NEUTOROPHIL: 0.07 K/CU MM
TRICHOMONAS: ABNORMAL /HPF
TROPONIN T: 0.11 NG/ML
TROPONIN T: 0.16 NG/ML
TSH HIGH SENSITIVITY: 1.45 UIU/ML (ref 0.27–4.2)
URINE TOTAL PROTEIN: 26.3 MG/DL
UROBILINOGEN, URINE: NORMAL MG/DL (ref 0.2–1)
WBC # BLD: 11 K/CU MM (ref 4–10.5)
WBC UA: 18 /HPF (ref 0–5)

## 2020-11-16 PROCEDURE — 84484 ASSAY OF TROPONIN QUANT: CPT

## 2020-11-16 PROCEDURE — 96374 THER/PROPH/DIAG INJ IV PUSH: CPT

## 2020-11-16 PROCEDURE — 94770 HC ETCO2 MONITOR DAILY: CPT

## 2020-11-16 PROCEDURE — 82962 GLUCOSE BLOOD TEST: CPT

## 2020-11-16 PROCEDURE — 84443 ASSAY THYROID STIM HORMONE: CPT

## 2020-11-16 PROCEDURE — 85730 THROMBOPLASTIN TIME PARTIAL: CPT

## 2020-11-16 PROCEDURE — 93005 ELECTROCARDIOGRAM TRACING: CPT | Performed by: EMERGENCY MEDICINE

## 2020-11-16 PROCEDURE — 6370000000 HC RX 637 (ALT 250 FOR IP): Performed by: EMERGENCY MEDICINE

## 2020-11-16 PROCEDURE — 6360000002 HC RX W HCPCS: Performed by: EMERGENCY MEDICINE

## 2020-11-16 PROCEDURE — 36415 COLL VENOUS BLD VENIPUNCTURE: CPT

## 2020-11-16 PROCEDURE — 94761 N-INVAS EAR/PLS OXIMETRY MLT: CPT

## 2020-11-16 PROCEDURE — 85610 PROTHROMBIN TIME: CPT

## 2020-11-16 PROCEDURE — 2580000003 HC RX 258: Performed by: INTERNAL MEDICINE

## 2020-11-16 PROCEDURE — 85025 COMPLETE CBC W/AUTO DIFF WBC: CPT

## 2020-11-16 PROCEDURE — 2140000000 HC CCU INTERMEDIATE R&B

## 2020-11-16 PROCEDURE — 6360000002 HC RX W HCPCS: Performed by: INTERNAL MEDICINE

## 2020-11-16 PROCEDURE — 6370000000 HC RX 637 (ALT 250 FOR IP): Performed by: INTERNAL MEDICINE

## 2020-11-16 PROCEDURE — 71045 X-RAY EXAM CHEST 1 VIEW: CPT

## 2020-11-16 PROCEDURE — 81001 URINALYSIS AUTO W/SCOPE: CPT

## 2020-11-16 PROCEDURE — U0002 COVID-19 LAB TEST NON-CDC: HCPCS

## 2020-11-16 PROCEDURE — 2580000003 HC RX 258: Performed by: EMERGENCY MEDICINE

## 2020-11-16 PROCEDURE — 83735 ASSAY OF MAGNESIUM: CPT

## 2020-11-16 PROCEDURE — 2700000000 HC OXYGEN THERAPY PER DAY

## 2020-11-16 PROCEDURE — 84439 ASSAY OF FREE THYROXINE: CPT

## 2020-11-16 PROCEDURE — 82570 ASSAY OF URINE CREATININE: CPT

## 2020-11-16 PROCEDURE — 84300 ASSAY OF URINE SODIUM: CPT

## 2020-11-16 PROCEDURE — 80048 BASIC METABOLIC PNL TOTAL CA: CPT

## 2020-11-16 PROCEDURE — 99291 CRITICAL CARE FIRST HOUR: CPT

## 2020-11-16 PROCEDURE — 83880 ASSAY OF NATRIURETIC PEPTIDE: CPT

## 2020-11-16 PROCEDURE — 84156 ASSAY OF PROTEIN URINE: CPT

## 2020-11-16 PROCEDURE — 76775 US EXAM ABDO BACK WALL LIM: CPT

## 2020-11-16 RX ORDER — DOPAMINE HYDROCHLORIDE 160 MG/100ML
5 INJECTION, SOLUTION INTRAVENOUS CONTINUOUS
Status: DISCONTINUED | OUTPATIENT
Start: 2020-11-16 | End: 2020-11-18

## 2020-11-16 RX ORDER — CALCIUM CARBONATE 500(1250)
1 TABLET ORAL DAILY
Status: DISCONTINUED | OUTPATIENT
Start: 2020-11-16 | End: 2020-11-20 | Stop reason: HOSPADM

## 2020-11-16 RX ORDER — SODIUM CHLORIDE 9 MG/ML
1000 INJECTION, SOLUTION INTRAVENOUS CONTINUOUS
Status: DISCONTINUED | OUTPATIENT
Start: 2020-11-16 | End: 2020-11-16 | Stop reason: HOSPADM

## 2020-11-16 RX ORDER — SODIUM CHLORIDE 9 MG/ML
INJECTION, SOLUTION INTRAVENOUS CONTINUOUS
Status: DISCONTINUED | OUTPATIENT
Start: 2020-11-16 | End: 2020-11-17 | Stop reason: SDUPTHER

## 2020-11-16 RX ORDER — SODIUM CHLORIDE 0.9 % (FLUSH) 0.9 %
10 SYRINGE (ML) INJECTION EVERY 12 HOURS SCHEDULED
Status: DISCONTINUED | OUTPATIENT
Start: 2020-11-16 | End: 2020-11-17 | Stop reason: SDUPTHER

## 2020-11-16 RX ORDER — FERROUS SULFATE 325(65) MG
325 TABLET ORAL 2 TIMES DAILY WITH MEALS
Status: DISCONTINUED | OUTPATIENT
Start: 2020-11-16 | End: 2020-11-20 | Stop reason: HOSPADM

## 2020-11-16 RX ORDER — PROMETHAZINE HYDROCHLORIDE 25 MG/1
12.5 TABLET ORAL EVERY 6 HOURS PRN
Status: DISCONTINUED | OUTPATIENT
Start: 2020-11-16 | End: 2020-11-20 | Stop reason: HOSPADM

## 2020-11-16 RX ORDER — HEPARIN SODIUM 5000 [USP'U]/ML
5000 INJECTION, SOLUTION INTRAVENOUS; SUBCUTANEOUS 3 TIMES DAILY
Status: DISCONTINUED | OUTPATIENT
Start: 2020-11-16 | End: 2020-11-20 | Stop reason: HOSPADM

## 2020-11-16 RX ORDER — POTASSIUM CHLORIDE 7.45 MG/ML
10 INJECTION INTRAVENOUS PRN
Status: DISCONTINUED | OUTPATIENT
Start: 2020-11-16 | End: 2020-11-16

## 2020-11-16 RX ORDER — FEBUXOSTAT 40 MG/1
40 TABLET, FILM COATED ORAL DAILY
Status: DISCONTINUED | OUTPATIENT
Start: 2020-11-16 | End: 2020-11-16 | Stop reason: CLARIF

## 2020-11-16 RX ORDER — ZOLPIDEM TARTRATE 5 MG/1
5 TABLET ORAL NIGHTLY PRN
Status: DISCONTINUED | OUTPATIENT
Start: 2020-11-16 | End: 2020-11-18

## 2020-11-16 RX ORDER — ACETAMINOPHEN 650 MG/1
650 SUPPOSITORY RECTAL EVERY 6 HOURS PRN
Status: DISCONTINUED | OUTPATIENT
Start: 2020-11-16 | End: 2020-11-20 | Stop reason: HOSPADM

## 2020-11-16 RX ORDER — ONDANSETRON 2 MG/ML
4 INJECTION INTRAMUSCULAR; INTRAVENOUS EVERY 6 HOURS PRN
Status: DISCONTINUED | OUTPATIENT
Start: 2020-11-16 | End: 2020-11-20 | Stop reason: HOSPADM

## 2020-11-16 RX ORDER — NICOTINE POLACRILEX 4 MG
15 LOZENGE BUCCAL PRN
Status: DISCONTINUED | OUTPATIENT
Start: 2020-11-16 | End: 2020-11-20 | Stop reason: HOSPADM

## 2020-11-16 RX ORDER — DEXTROSE MONOHYDRATE 50 MG/ML
100 INJECTION, SOLUTION INTRAVENOUS PRN
Status: DISCONTINUED | OUTPATIENT
Start: 2020-11-16 | End: 2020-11-20 | Stop reason: HOSPADM

## 2020-11-16 RX ORDER — ACETAMINOPHEN 325 MG/1
650 TABLET ORAL EVERY 6 HOURS PRN
Status: DISCONTINUED | OUTPATIENT
Start: 2020-11-16 | End: 2020-11-20 | Stop reason: HOSPADM

## 2020-11-16 RX ORDER — DEXTROSE MONOHYDRATE 25 G/50ML
12.5 INJECTION, SOLUTION INTRAVENOUS PRN
Status: DISCONTINUED | OUTPATIENT
Start: 2020-11-16 | End: 2020-11-20 | Stop reason: HOSPADM

## 2020-11-16 RX ORDER — SODIUM CHLORIDE 0.9 % (FLUSH) 0.9 %
10 SYRINGE (ML) INJECTION PRN
Status: DISCONTINUED | OUTPATIENT
Start: 2020-11-16 | End: 2020-11-17 | Stop reason: SDUPTHER

## 2020-11-16 RX ORDER — LORAZEPAM 1 MG/1
1 TABLET ORAL EVERY 12 HOURS PRN
Status: DISCONTINUED | OUTPATIENT
Start: 2020-11-16 | End: 2020-11-18

## 2020-11-16 RX ORDER — FLUTICASONE PROPIONATE 50 MCG
2 SPRAY, SUSPENSION (ML) NASAL DAILY
Status: DISCONTINUED | OUTPATIENT
Start: 2020-11-16 | End: 2020-11-20 | Stop reason: HOSPADM

## 2020-11-16 RX ORDER — HYDROXYZINE HYDROCHLORIDE 25 MG/1
25 TABLET, FILM COATED ORAL 4 TIMES DAILY PRN
Status: DISCONTINUED | OUTPATIENT
Start: 2020-11-16 | End: 2020-11-18

## 2020-11-16 RX ORDER — ALBUTEROL SULFATE 2.5 MG/3ML
2.5 SOLUTION RESPIRATORY (INHALATION) EVERY 4 HOURS PRN
Status: DISCONTINUED | OUTPATIENT
Start: 2020-11-16 | End: 2020-11-18

## 2020-11-16 RX ORDER — ATROPINE SULFATE 0.1 MG/ML
1 INJECTION INTRAVENOUS ONCE
Status: COMPLETED | OUTPATIENT
Start: 2020-11-16 | End: 2020-11-16

## 2020-11-16 RX ORDER — POLYETHYLENE GLYCOL 3350 17 G/17G
17 POWDER, FOR SOLUTION ORAL DAILY PRN
Status: DISCONTINUED | OUTPATIENT
Start: 2020-11-16 | End: 2020-11-20 | Stop reason: HOSPADM

## 2020-11-16 RX ORDER — FEBUXOSTAT 40 MG/1
40 TABLET, FILM COATED ORAL DAILY
Status: DISCONTINUED | OUTPATIENT
Start: 2020-11-16 | End: 2020-11-20 | Stop reason: HOSPADM

## 2020-11-16 RX ORDER — LEVOTHYROXINE SODIUM 0.05 MG/1
50 TABLET ORAL DAILY
Status: DISCONTINUED | OUTPATIENT
Start: 2020-11-16 | End: 2020-11-20 | Stop reason: HOSPADM

## 2020-11-16 RX ORDER — ASPIRIN 81 MG/1
324 TABLET, CHEWABLE ORAL ONCE
Status: COMPLETED | OUTPATIENT
Start: 2020-11-16 | End: 2020-11-16

## 2020-11-16 RX ORDER — METHYLPREDNISOLONE SODIUM SUCCINATE 40 MG/ML
40 INJECTION, POWDER, LYOPHILIZED, FOR SOLUTION INTRAMUSCULAR; INTRAVENOUS EVERY 8 HOURS
Status: DISCONTINUED | OUTPATIENT
Start: 2020-11-16 | End: 2020-11-19

## 2020-11-16 RX ORDER — FLUTICASONE PROPIONATE 110 UG/1
1 AEROSOL, METERED RESPIRATORY (INHALATION) 2 TIMES DAILY
Status: DISCONTINUED | OUTPATIENT
Start: 2020-11-16 | End: 2020-11-20 | Stop reason: HOSPADM

## 2020-11-16 RX ADMIN — LORAZEPAM 1 MG: 1 TABLET ORAL at 22:49

## 2020-11-16 RX ADMIN — SODIUM CHLORIDE 1000 ML: 9 INJECTION, SOLUTION INTRAVENOUS at 13:33

## 2020-11-16 RX ADMIN — FERROUS SULFATE TAB 325 MG (65 MG ELEMENTAL FE) 325 MG: 325 (65 FE) TAB at 17:18

## 2020-11-16 RX ADMIN — ATROPINE SULFATE 1 MG: 0.1 INJECTION PARENTERAL at 11:45

## 2020-11-16 RX ADMIN — FLUTICASONE PROPIONATE 2 SPRAY: 50 SPRAY, METERED NASAL at 20:30

## 2020-11-16 RX ADMIN — INSULIN LISPRO 3 UNITS: 100 INJECTION, SOLUTION INTRAVENOUS; SUBCUTANEOUS at 18:29

## 2020-11-16 RX ADMIN — METHYLPREDNISOLONE SODIUM SUCCINATE 40 MG: 40 INJECTION, POWDER, FOR SOLUTION INTRAMUSCULAR; INTRAVENOUS at 18:54

## 2020-11-16 RX ADMIN — FEBUXOSTAT 40 MG: 40 TABLET ORAL at 22:49

## 2020-11-16 RX ADMIN — ASPIRIN 324 MG: 81 TABLET, CHEWABLE ORAL at 13:31

## 2020-11-16 RX ADMIN — CALCIUM 500 MG: 500 TABLET ORAL at 17:18

## 2020-11-16 RX ADMIN — DOPAMINE HYDROCHLORIDE 5 MCG/KG/MIN: 160 INJECTION, SOLUTION INTRAVENOUS at 17:18

## 2020-11-16 RX ADMIN — SODIUM CHLORIDE: 9 INJECTION, SOLUTION INTRAVENOUS at 17:20

## 2020-11-16 RX ADMIN — ZOLPIDEM TARTRATE 5 MG: 5 TABLET ORAL at 22:49

## 2020-11-16 ASSESSMENT — ENCOUNTER SYMPTOMS: SHORTNESS OF BREATH: 1

## 2020-11-16 NOTE — H&P
file     Minutes per session: Not on file    Stress: Not on file   Relationships    Social connections     Talks on phone: Not on file     Gets together: Not on file     Attends Sikhism service: Not on file     Active member of club or organization: Not on file     Attends meetings of clubs or organizations: Not on file     Relationship status: Not on file    Intimate partner violence     Fear of current or ex partner: Not on file     Emotionally abused: Not on file     Physically abused: Not on file     Forced sexual activity: Not on file   Other Topics Concern    Not on file   Social History Narrative    Not on file       Medications:   Medications:    Infusions:   PRN Meds:     Labs:     CBC:   Lab Results   Component Value Date    WBC 11.0 11/16/2020    RBC 3.39 11/16/2020    HGB 10.4 11/16/2020    HCT 32.4 11/16/2020     11/16/2020    MCV 95.6 11/16/2020     BMP:    Lab Results   Component Value Date     11/16/2020    K 4.2 11/16/2020    CL 88 11/16/2020    CO2 24 11/16/2020     11/16/2020    CREATININE 3.7 11/16/2020    GLUCOSE 223 11/16/2020    CALCIUM 8.6 11/16/2020     Hepatic Function Panel:    Lab Results   Component Value Date    ALKPHOS 67 08/19/2020    AST 10 08/19/2020    ALT <5 08/19/2020    PROT 7.4 08/19/2020    PROT 7.7 12/23/2010    LABALBU 4.3 08/19/2020    BILITOT 0.5 08/19/2020     Magnesium:    Lab Results   Component Value Date    MG 1.9 08/19/2020     Cardiac Enzymes:   Lab Results   Component Value Date    CKTOTAL 61 03/08/2017    CKTOTAL 52 08/11/2016    CKTOTAL 686 (H) 08/05/2016     LDH:  No results found for: LDH  PT/INR:    Lab Results   Component Value Date    PROTIME 11.1 06/11/2018    INR 0.97 06/11/2018     U/A:   Lab Results   Component Value Date    LEUKOCYTESUR SMALL NUMBER OR AMOUNT OBSERVED 08/19/2020    WBCUA 9 08/19/2020    RBCUA 1 08/19/2020    BACTERIA FEW 08/19/2020    SPECGRAV 1.015 08/19/2020    BLOODU NEGATIVE 08/19/2020     ABG:    Lab Results   Component Value Date    TJB3OPV 41.0 08/06/2016    PO2ART 108 08/06/2016    USI4UXP 29.2 08/06/2016     TSH:  No results found for: TSH  Cardiac Enzymes:   Lab Results   Component Value Date    CKTOTAL 61 03/08/2017    CKTOTAL 52 08/11/2016    CKTOTAL 686 (H) 08/05/2016       Assessment and Plan:   Michelle Mckeon is a 82 y.o.  female  who presents with Dizziness    Dizziness/Pre syncope  Symptomatic bradycardia  Third degree heart block  Currently BP is stable  Watch closely, if BP drops, needs Dopamine  Trend troponin  Cardiology consult placed  Last ECHO was is 7/2017 with LVH but preserved EF 50-55%, DD  ECHO ordered  Noted stress test in 2017, negative for ischemia    KAREEM on CKD stage IV  Likely pre renal/cardiogenic  Started on NS @ 75 cc/hr  Monitor BMP closely  Holding diuretics for now  Nephrology consult placed     Hypothyroidism  Continue Synthroid  TSH order noted    Parkinsonism  Holding Sinamet due to Bradycardia     Type 2 DM  Added low dose ss insulin  Hypoglycemia precautions    Anxiety  Ativan as needed     Chronic pain syndrome  Holding pain medications due to sena cardia    Leukocytosis  Likely reactive  Monitor for signs of infection  COVID test negative    Anemia of chronic disorders  Likely from renal disease    Diet Diet NPO, After Midnight  DIET CARDIAC;   DVT Prophylaxis [] Lovenox, [x]  Heparin, [] SCDs, [] Ambulation   GI Prophylaxis [] PPI,  [] H2 Blocker,  [] Carafate,  [x] Diet/Tube Feeds   Code Status Full Code   Disposition Patient requires continued admission due to Bradycardia/HB   MDM [] Low, [x] Moderate,[]  High       Electronically signed by Nelia Santillan MD on 11/16/2020 at 5:00 PM

## 2020-11-16 NOTE — ED PROVIDER NOTES
Triage Chief Complaint:   Shortness of Breath (pt has been sob with exertion pt has a 3rd degree heart block according to ems)    [place]:  Sienna Shelton is a 82 y.o. female that presents today with a concern of a heart block. Patient is a 82-year-old female past history of CKD not currently on dialysis had shortness of breath. She was found to have a bradycardic rhythm narrow complex. When I saw her she was awake and alert blood pressure is normal heart rate of 36. She denies any pain pressure tightness in her chest she denies being on Lanoxin. Denies being on potassium. She denies any fever chills cough or sputum production.     Past Medical History:   Diagnosis Date    Asthma     Chronic kidney disease     acute kidney failure    Chronic ulcer of left leg with fat layer exposed ([address] 3/7/2016    Chronic ulcer of right leg with fat layer exposed ([address] 3/7/2016    Diabetes type 2, controlled ([address]     History of asthma     Hypertension     Lymphedema of both lower extremities 3/7/2016    Osteoarthritis     Pneumonia     Thyroid disease     Venous stasis of both lower extremities 3/7/2016    WD-Non-pressure chronic ulcer right lower leg, 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 ([address] 4/21/2016     Past Surgical History:   Procedure Laterality Date    APPENDECTOMY      CHOLECYSTECTOMY      CYSTOSCOPY      EYE SURGERY      bilateral implants    HYSTERECTOMY      JOINT REPLACEMENT Right     knee    URETHRA SURGERY       Family History   Problem Relation Age of Onset    Heart Disease Father      Social History     Socioeconomic History    Marital status:      Spouse name: Not on file    Number of children: Not on file    Years of education: Not on file    Highest education level: Not on file   Occupational History    Not on file   Social Needs    Financial resource strain: Not on file    Food insecurity     Worry: Not on file     Inability: Not on file    Transportation needs     Medical: Not on file     Non-medical: Not on file   Tobacco Use    Smoking status: Never Smoker    Smokeless tobacco: Never Used   Substance and Sexual Activity    Alcohol use: No    Drug use: No    Sexual activity: Yes     Partners: Male   Lifestyle    Physical activity     Days per week: Not on file     Minutes per session: Not on file    Stress: Not on file   Relationships    Social connections     Talks on phone: Not on file     Gets together: Not on file     Attends Yarsani service: Not on file     Active member of club or organization: Not on file     Attends meetings of clubs or organizations: Not on file     Relationship status: Not on file    Intimate partner violence     Fear of current or ex partner: Not on file     Emotionally abused: Not on file     Physically abused: Not on file     Forced sexual activity: Not on file   Other Topics Concern    Not on file   Social History Narrative    Not on file     Current Facility-Administered Medications   Medication Dose Route Frequency Provider Last Rate Last Dose    0.9 % sodium chloride infusion  1,000 mL Intravenous Continuous Nicole Vikki,  mL/hr at 11/16/20 1333 1,000 mL at 11/16/20 1333     Current Outpatient Medications   Medication Sig Dispense Refill    silver sulfADIAZINE (SSD) 1 % cream APPLY  CREAM TOPICALLY DAILY 240 g 0    metOLazone (ZAROXOLYN) 5 MG tablet Take 1 tablet by mouth daily 30 tablet 3    febuxostat (ULORIC) 40 MG TABS tablet Take 1 tablet by mouth daily 30 tablet 5    famotidine (PEPCID) 20 MG tablet Take 1 tablet by mouth 2 times daily 60 tablet 5    acetaZOLAMIDE (DIAMOX) 250 MG tablet Take 1 tablet by mouth 2 times daily 90 tablet 3    levothyroxine (SYNTHROID) 50 MCG tablet Take 50 mcg by mouth Daily      Polyethylene Glycol 3350 (MIRALAX PO) Take by mouth      triamcinolone (NASACORT ALLERGY 24HR) 55 MCG/ACT nasal inhaler 2 sprays by Nasal route 2 times daily      fluticasone (FLONASE) 50 MCG/ACT nasal spray       Hernia: No hernia is present. Musculoskeletal: Normal range of motion. General: No tenderness or deformity. Right lower leg: She exhibits no tenderness. No edema. Left lower leg: She exhibits no tenderness. No edema. Lymphadenopathy:      Cervical: No cervical adenopathy. Skin:     General: Skin is warm and dry. Coloration: Skin is not pale. Findings: No erythema or rash. Neurological:      Mental Status: She is alert and oriented to person, place, and time. Cranial Nerves: No cranial nerve deficit. Sensory: No sensory deficit. Deep Tendon Reflexes: Reflexes are normal and symmetric. Reflexes normal.   Psychiatric:         Speech: Speech normal.         Behavior: Behavior normal.         Thought Content:  Thought content normal.         Judgment: Judgment normal.         I have reviewed and interpreted all of the currently available lab results from this visit (ifapplicable):  Results for orders placed or performed during the hospital encounter of 11/16/20   CBC Auto Differential   Result Value Ref Range    WBC 11.0 (H) 4.0 - 10.5 K/CU MM    RBC 3.39 (L) 4.2 - 5.4 M/CU MM    Hemoglobin 10.4 (L) 12.5 - 16.0 GM/DL    Hematocrit 32.4 (L) 37 - 47 %    MCV 95.6 78 - 100 FL    MCH 30.7 27 - 31 PG    MCHC 32.1 32.0 - 36.0 %    RDW 12.2 11.7 - 14.9 %    Platelets 284 670 - 656 K/CU MM    MPV 10.1 7.5 - 11.1 FL    Differential Type AUTOMATED DIFFERENTIAL     Segs Relative 83.8 (H) 36 - 66 %    Lymphocytes % 6.2 (L) 24 - 44 %    Monocytes % 8.9 (H) 0 - 4 %    Eosinophils % 0.2 0 - 3 %    Basophils % 0.3 0 - 1 %    Segs Absolute 9.2 K/CU MM    Lymphocytes Absolute 0.7 K/CU MM    Monocytes Absolute 1.0 K/CU MM    Eosinophils Absolute 0.0 K/CU MM    Basophils Absolute 0.0 K/CU MM    Immature Neutrophil % 0.6 (H) 0 - 0.43 %    Total Immature Neutrophil 0.07 K/CU MM   Basic Metabolic Panel w/ Reflex to MG   Result Value Ref Range    Sodium 130 (L) 135 - 145 MMOL/L    Potassium 4.2

## 2020-11-16 NOTE — CONSULTS
Will dictate full note  Discussed with nurse  Start on Dopamine for now     Dictated --[id number]  Cath and pacer in am  Keep on Dopamine and fluids for now\  Discussed with patient/ and Yvonne Toribio

## 2020-11-17 ENCOUNTER — ANESTHESIA (OUTPATIENT)
Dept: OPERATING ROOM | Age: 85
DRG: 242 | End: 2020-11-17
Payer: MEDICARE

## 2020-11-17 ENCOUNTER — APPOINTMENT (OUTPATIENT)
Dept: GENERAL RADIOLOGY | Age: 85
DRG: 242 | End: 2020-11-17
Attending: INTERNAL MEDICINE
Payer: MEDICARE

## 2020-11-17 ENCOUNTER — ANESTHESIA EVENT (OUTPATIENT)
Dept: OPERATING ROOM | Age: 85
DRG: 242 | End: 2020-11-17
Payer: MEDICARE

## 2020-11-17 VITALS
OXYGEN SATURATION: 99 % | DIASTOLIC BLOOD PRESSURE: 57 MMHG | RESPIRATION RATE: 18 BRPM | SYSTOLIC BLOOD PRESSURE: 104 MMHG

## 2020-11-17 LAB
ALBUMIN SERPL-MCNC: 3.4 GM/DL (ref 3.4–5)
ALP BLD-CCNC: 136 IU/L (ref 40–128)
ALT SERPL-CCNC: 13 U/L (ref 10–40)
ANION GAP SERPL CALCULATED.3IONS-SCNC: 18 MMOL/L (ref 4–16)
AST SERPL-CCNC: 26 IU/L (ref 15–37)
BASE EXCESS: 7 (ref 0–2.4)
BASOPHILS ABSOLUTE: 0 K/CU MM
BASOPHILS RELATIVE PERCENT: 0.1 % (ref 0–1)
BILIRUB SERPL-MCNC: 0.5 MG/DL (ref 0–1)
BUN BLDV-MCNC: 133 MG/DL (ref 6–23)
CALCIUM SERPL-MCNC: 8.7 MG/DL (ref 8.3–10.6)
CARBON MONOXIDE, BLOOD: 0.4 % (ref 0–5)
CHLORIDE BLD-SCNC: 91 MMOL/L (ref 99–110)
CO2 CONTENT: 18.1 MMOL/L (ref 19–24)
CO2: 20 MMOL/L (ref 21–32)
COMMENT: ABNORMAL
CREAT SERPL-MCNC: 3.6 MG/DL (ref 0.6–1.1)
DIFFERENTIAL TYPE: ABNORMAL
EOSINOPHILS ABSOLUTE: 0 K/CU MM
EOSINOPHILS RELATIVE PERCENT: 0 % (ref 0–3)
GFR AFRICAN AMERICAN: 15 ML/MIN/1.73M2
GFR NON-AFRICAN AMERICAN: 12 ML/MIN/1.73M2
GLUCOSE BLD-MCNC: 221 MG/DL (ref 70–99)
GLUCOSE BLD-MCNC: 221 MG/DL (ref 70–99)
GLUCOSE BLD-MCNC: 251 MG/DL (ref 70–99)
HCO3 ARTERIAL: 17.2 MMOL/L (ref 18–23)
HCT VFR BLD CALC: 32.7 % (ref 37–47)
HEMOGLOBIN: 10.4 GM/DL (ref 12.5–16)
IMMATURE NEUTROPHIL %: 0.7 % (ref 0–0.43)
LV EF: 53 %
LVEF MODALITY: NORMAL
LYMPHOCYTES ABSOLUTE: 0.5 K/CU MM
LYMPHOCYTES RELATIVE PERCENT: 5.3 % (ref 24–44)
MAGNESIUM: 2.7 MG/DL (ref 1.8–2.4)
MCH RBC QN AUTO: 31 PG (ref 27–31)
MCHC RBC AUTO-ENTMCNC: 31.8 % (ref 32–36)
MCV RBC AUTO: 97.6 FL (ref 78–100)
METHEMOGLOBIN ARTERIAL: 0.4 %
MONOCYTES ABSOLUTE: 0.4 K/CU MM
MONOCYTES RELATIVE PERCENT: 4.6 % (ref 0–4)
NUCLEATED RBC %: 0 %
O2 SATURATION: 92.6 % (ref 96–97)
PCO2 ARTERIAL: 29 MMHG (ref 32–45)
PDW BLD-RTO: 12.3 % (ref 11.7–14.9)
PH BLOOD: 7.38 (ref 7.34–7.45)
PHOSPHORUS: 6.8 MG/DL (ref 2.5–4.9)
PLATELET # BLD: 267 K/CU MM (ref 140–440)
PMV BLD AUTO: 10.1 FL (ref 7.5–11.1)
PO2 ARTERIAL: 66 MMHG (ref 75–100)
POTASSIUM SERPL-SCNC: 4.5 MMOL/L (ref 3.5–5.1)
RBC # BLD: 3.35 M/CU MM (ref 4.2–5.4)
SEGMENTED NEUTROPHILS ABSOLUTE COUNT: 8.5 K/CU MM
SEGMENTED NEUTROPHILS RELATIVE PERCENT: 89.3 % (ref 36–66)
SODIUM BLD-SCNC: 129 MMOL/L (ref 135–145)
TOTAL IMMATURE NEUTOROPHIL: 0.07 K/CU MM
TOTAL NUCLEATED RBC: 0 K/CU MM
TOTAL PROTEIN: 5.7 GM/DL (ref 6.4–8.2)
WBC # BLD: 9.5 K/CU MM (ref 4–10.5)

## 2020-11-17 PROCEDURE — 6370000000 HC RX 637 (ALT 250 FOR IP): Performed by: INTERNAL MEDICINE

## 2020-11-17 PROCEDURE — 2700000000 HC OXYGEN THERAPY PER DAY

## 2020-11-17 PROCEDURE — 2580000003 HC RX 258: Performed by: INTERNAL MEDICINE

## 2020-11-17 PROCEDURE — 6360000002 HC RX W HCPCS: Performed by: NURSE ANESTHETIST, CERTIFIED REGISTERED

## 2020-11-17 PROCEDURE — 3700000001 HC ADD 15 MINUTES (ANESTHESIA): Performed by: SURGERY

## 2020-11-17 PROCEDURE — 94640 AIRWAY INHALATION TREATMENT: CPT

## 2020-11-17 PROCEDURE — 6360000004 HC RX CONTRAST MEDICATION

## 2020-11-17 PROCEDURE — 3600000003 HC SURGERY LEVEL 3 BASE: Performed by: SURGERY

## 2020-11-17 PROCEDURE — 3700000000 HC ANESTHESIA ATTENDED CARE: Performed by: SURGERY

## 2020-11-17 PROCEDURE — 71045 X-RAY EXAM CHEST 1 VIEW: CPT

## 2020-11-17 PROCEDURE — 92953 TEMPORARY EXTERNAL PACING: CPT

## 2020-11-17 PROCEDURE — 0JH606Z INSERTION OF PACEMAKER, DUAL CHAMBER INTO CHEST SUBCUTANEOUS TISSUE AND FASCIA, OPEN APPROACH: ICD-10-PCS | Performed by: SURGERY

## 2020-11-17 PROCEDURE — C1894 INTRO/SHEATH, NON-LASER: HCPCS

## 2020-11-17 PROCEDURE — 2580000003 HC RX 258: Performed by: PHYSICIAN ASSISTANT

## 2020-11-17 PROCEDURE — 2720000010 HC SURG SUPPLY STERILE

## 2020-11-17 PROCEDURE — 3600000013 HC SURGERY LEVEL 3 ADDTL 15MIN: Performed by: SURGERY

## 2020-11-17 PROCEDURE — 36415 COLL VENOUS BLD VENIPUNCTURE: CPT

## 2020-11-17 PROCEDURE — C1892 INTRO/SHEATH,FIXED,PEEL-AWAY: HCPCS

## 2020-11-17 PROCEDURE — 83735 ASSAY OF MAGNESIUM: CPT

## 2020-11-17 PROCEDURE — 82803 BLOOD GASES ANY COMBINATION: CPT

## 2020-11-17 PROCEDURE — 93460 R&L HRT ART/VENTRICLE ANGIO: CPT

## 2020-11-17 PROCEDURE — C1892 INTRO/SHEATH,FIXED,PEEL-AWAY: HCPCS | Performed by: SURGERY

## 2020-11-17 PROCEDURE — 2000000000 HC ICU R&B

## 2020-11-17 PROCEDURE — 93306 TTE W/DOPPLER COMPLETE: CPT

## 2020-11-17 PROCEDURE — C1898 LEAD, PMKR, OTHER THAN TRANS: HCPCS | Performed by: SURGERY

## 2020-11-17 PROCEDURE — C1785 PMKR, DUAL, RATE-RESP: HCPCS | Performed by: SURGERY

## 2020-11-17 PROCEDURE — 84100 ASSAY OF PHOSPHORUS: CPT

## 2020-11-17 PROCEDURE — 80053 COMPREHEN METABOLIC PANEL: CPT

## 2020-11-17 PROCEDURE — 2709999900 HC NON-CHARGEABLE SUPPLY

## 2020-11-17 PROCEDURE — 76000 FLUOROSCOPY <1 HR PHYS/QHP: CPT

## 2020-11-17 PROCEDURE — 2580000003 HC RX 258: Performed by: SURGERY

## 2020-11-17 PROCEDURE — 4A023N8 MEASUREMENT OF CARDIAC SAMPLING AND PRESSURE, BILATERAL, PERCUTANEOUS APPROACH: ICD-10-PCS | Performed by: INTERNAL MEDICINE

## 2020-11-17 PROCEDURE — 2500000003 HC RX 250 WO HCPCS: Performed by: SURGERY

## 2020-11-17 PROCEDURE — 6360000002 HC RX W HCPCS: Performed by: PHYSICIAN ASSISTANT

## 2020-11-17 PROCEDURE — 6360000002 HC RX W HCPCS: Performed by: SURGERY

## 2020-11-17 PROCEDURE — 2709999900 HC NON-CHARGEABLE SUPPLY: Performed by: SURGERY

## 2020-11-17 PROCEDURE — 02H63JZ INSERTION OF PACEMAKER LEAD INTO RIGHT ATRIUM, PERCUTANEOUS APPROACH: ICD-10-PCS | Performed by: SURGERY

## 2020-11-17 PROCEDURE — B2111ZZ FLUOROSCOPY OF MULTIPLE CORONARY ARTERIES USING LOW OSMOLAR CONTRAST: ICD-10-PCS | Performed by: INTERNAL MEDICINE

## 2020-11-17 PROCEDURE — 6360000002 HC RX W HCPCS

## 2020-11-17 PROCEDURE — 6360000002 HC RX W HCPCS: Performed by: INTERNAL MEDICINE

## 2020-11-17 PROCEDURE — 94761 N-INVAS EAR/PLS OXIMETRY MLT: CPT

## 2020-11-17 PROCEDURE — B2151ZZ FLUOROSCOPY OF LEFT HEART USING LOW OSMOLAR CONTRAST: ICD-10-PCS | Performed by: INTERNAL MEDICINE

## 2020-11-17 PROCEDURE — 93010 ELECTROCARDIOGRAM REPORT: CPT | Performed by: INTERNAL MEDICINE

## 2020-11-17 PROCEDURE — 02HK3JZ INSERTION OF PACEMAKER LEAD INTO RIGHT VENTRICLE, PERCUTANEOUS APPROACH: ICD-10-PCS | Performed by: SURGERY

## 2020-11-17 PROCEDURE — 85025 COMPLETE CBC W/AUTO DIFF WBC: CPT

## 2020-11-17 PROCEDURE — 2500000003 HC RX 250 WO HCPCS

## 2020-11-17 PROCEDURE — 6370000000 HC RX 637 (ALT 250 FOR IP): Performed by: SURGERY

## 2020-11-17 DEVICE — PACEMAKER CARD 22.5GM W50.8XH46.6MM D7.4MM TI POLYUR SIL: Type: IMPLANTABLE DEVICE | Site: CHEST | Status: FUNCTIONAL

## 2020-11-17 DEVICE — IMPLANTABLE DEVICE: Type: IMPLANTABLE DEVICE | Site: CHEST | Status: FUNCTIONAL

## 2020-11-17 RX ORDER — ATROPINE SULFATE 0.4 MG/ML
0.5 AMPUL (ML) INJECTION
Status: ACTIVE | OUTPATIENT
Start: 2020-11-17 | End: 2020-11-17

## 2020-11-17 RX ORDER — ATROPINE SULFATE 0.4 MG/ML
0.5 AMPUL (ML) INJECTION
Status: DISCONTINUED | OUTPATIENT
Start: 2020-11-17 | End: 2020-11-17 | Stop reason: SDUPTHER

## 2020-11-17 RX ORDER — ONDANSETRON 2 MG/ML
INJECTION INTRAMUSCULAR; INTRAVENOUS PRN
Status: DISCONTINUED | OUTPATIENT
Start: 2020-11-17 | End: 2020-11-17 | Stop reason: SDUPTHER

## 2020-11-17 RX ORDER — SODIUM CHLORIDE 0.9 % (FLUSH) 0.9 %
10 SYRINGE (ML) INJECTION EVERY 12 HOURS SCHEDULED
Status: DISCONTINUED | OUTPATIENT
Start: 2020-11-17 | End: 2020-11-20 | Stop reason: HOSPADM

## 2020-11-17 RX ORDER — SODIUM CHLORIDE 0.9 % (FLUSH) 0.9 %
10 SYRINGE (ML) INJECTION PRN
Status: DISCONTINUED | OUTPATIENT
Start: 2020-11-17 | End: 2020-11-17 | Stop reason: SDUPTHER

## 2020-11-17 RX ORDER — MORPHINE SULFATE 2 MG/ML
1 INJECTION, SOLUTION INTRAMUSCULAR; INTRAVENOUS
Status: ACTIVE | OUTPATIENT
Start: 2020-11-17 | End: 2020-11-17

## 2020-11-17 RX ORDER — METOPROLOL SUCCINATE 25 MG/1
25 TABLET, EXTENDED RELEASE ORAL DAILY
Status: DISCONTINUED | OUTPATIENT
Start: 2020-11-17 | End: 2020-11-20 | Stop reason: HOSPADM

## 2020-11-17 RX ORDER — PROPOFOL 10 MG/ML
INJECTION, EMULSION INTRAVENOUS PRN
Status: DISCONTINUED | OUTPATIENT
Start: 2020-11-17 | End: 2020-11-17 | Stop reason: SDUPTHER

## 2020-11-17 RX ORDER — SODIUM CHLORIDE 0.9 % (FLUSH) 0.9 %
10 SYRINGE (ML) INJECTION EVERY 12 HOURS SCHEDULED
Status: DISCONTINUED | OUTPATIENT
Start: 2020-11-17 | End: 2020-11-17 | Stop reason: SDUPTHER

## 2020-11-17 RX ORDER — SODIUM CHLORIDE 0.9 % (FLUSH) 0.9 %
10 SYRINGE (ML) INJECTION PRN
Status: DISCONTINUED | OUTPATIENT
Start: 2020-11-17 | End: 2020-11-20 | Stop reason: HOSPADM

## 2020-11-17 RX ORDER — FENTANYL CITRATE 50 UG/ML
INJECTION, SOLUTION INTRAMUSCULAR; INTRAVENOUS PRN
Status: DISCONTINUED | OUTPATIENT
Start: 2020-11-17 | End: 2020-11-17 | Stop reason: SDUPTHER

## 2020-11-17 RX ORDER — SODIUM CHLORIDE 9 MG/ML
INJECTION, SOLUTION INTRAVENOUS CONTINUOUS
Status: DISCONTINUED | OUTPATIENT
Start: 2020-11-17 | End: 2020-11-19

## 2020-11-17 RX ORDER — ACETAMINOPHEN 325 MG/1
650 TABLET ORAL EVERY 4 HOURS PRN
Status: DISCONTINUED | OUTPATIENT
Start: 2020-11-17 | End: 2020-11-20 | Stop reason: HOSPADM

## 2020-11-17 RX ORDER — ALBUTEROL SULFATE 90 UG/1
4 AEROSOL, METERED RESPIRATORY (INHALATION) ONCE
Status: COMPLETED | OUTPATIENT
Start: 2020-11-17 | End: 2020-11-17

## 2020-11-17 RX ORDER — MORPHINE SULFATE 2 MG/ML
1 INJECTION, SOLUTION INTRAMUSCULAR; INTRAVENOUS
Status: DISCONTINUED | OUTPATIENT
Start: 2020-11-17 | End: 2020-11-17 | Stop reason: SDUPTHER

## 2020-11-17 RX ORDER — SODIUM CHLORIDE 9 MG/ML
INJECTION, SOLUTION INTRAVENOUS CONTINUOUS
Status: DISCONTINUED | OUTPATIENT
Start: 2020-11-17 | End: 2020-11-17 | Stop reason: SDUPTHER

## 2020-11-17 RX ORDER — ACETAMINOPHEN 325 MG/1
650 TABLET ORAL EVERY 4 HOURS PRN
Status: DISCONTINUED | OUTPATIENT
Start: 2020-11-17 | End: 2020-11-17 | Stop reason: SDUPTHER

## 2020-11-17 RX ORDER — LIDOCAINE HYDROCHLORIDE 20 MG/ML
INJECTION, SOLUTION INTRAVENOUS PRN
Status: DISCONTINUED | OUTPATIENT
Start: 2020-11-17 | End: 2020-11-17 | Stop reason: SDUPTHER

## 2020-11-17 RX ORDER — LIDOCAINE HYDROCHLORIDE 10 MG/ML
INJECTION, SOLUTION INFILTRATION; PERINEURAL
Status: COMPLETED | OUTPATIENT
Start: 2020-11-17 | End: 2020-11-17

## 2020-11-17 RX ORDER — DEXAMETHASONE SODIUM PHOSPHATE 4 MG/ML
INJECTION, SOLUTION INTRA-ARTICULAR; INTRALESIONAL; INTRAMUSCULAR; INTRAVENOUS; SOFT TISSUE PRN
Status: DISCONTINUED | OUTPATIENT
Start: 2020-11-17 | End: 2020-11-17 | Stop reason: SDUPTHER

## 2020-11-17 RX ORDER — DOPAMINE HYDROCHLORIDE 160 MG/100ML
INJECTION, SOLUTION INTRAVENOUS CONTINUOUS PRN
Status: DISCONTINUED | OUTPATIENT
Start: 2020-11-17 | End: 2020-11-17 | Stop reason: SDUPTHER

## 2020-11-17 RX ADMIN — FENTANYL CITRATE 25 MCG: 50 INJECTION INTRAMUSCULAR; INTRAVENOUS at 11:22

## 2020-11-17 RX ADMIN — Medication 1 PUFF: at 21:25

## 2020-11-17 RX ADMIN — PROPOFOL 20 MG: 10 INJECTION, EMULSION INTRAVENOUS at 11:19

## 2020-11-17 RX ADMIN — SODIUM CHLORIDE: 9 INJECTION, SOLUTION INTRAVENOUS at 14:52

## 2020-11-17 RX ADMIN — DEXTROSE MONOHYDRATE 2 G: 50 INJECTION, SOLUTION INTRAVENOUS at 11:24

## 2020-11-17 RX ADMIN — LORAZEPAM 1 MG: 1 TABLET ORAL at 23:21

## 2020-11-17 RX ADMIN — METHYLPREDNISOLONE SODIUM SUCCINATE 40 MG: 40 INJECTION, POWDER, FOR SOLUTION INTRAMUSCULAR; INTRAVENOUS at 03:03

## 2020-11-17 RX ADMIN — PROPOFOL 20 MG: 10 INJECTION, EMULSION INTRAVENOUS at 11:23

## 2020-11-17 RX ADMIN — FEBUXOSTAT 40 MG: 40 TABLET ORAL at 14:54

## 2020-11-17 RX ADMIN — PROPOFOL 20 MG: 10 INJECTION, EMULSION INTRAVENOUS at 11:26

## 2020-11-17 RX ADMIN — PROPOFOL 20 MG: 10 INJECTION, EMULSION INTRAVENOUS at 11:20

## 2020-11-17 RX ADMIN — DEXAMETHASONE SODIUM PHOSPHATE 8 MG: 4 INJECTION, SOLUTION INTRAMUSCULAR; INTRAVENOUS at 11:24

## 2020-11-17 RX ADMIN — SODIUM CHLORIDE, PRESERVATIVE FREE 10 ML: 5 INJECTION INTRAVENOUS at 14:49

## 2020-11-17 RX ADMIN — PROPOFOL 20 MG: 10 INJECTION, EMULSION INTRAVENOUS at 11:34

## 2020-11-17 RX ADMIN — ONDANSETRON 4 MG: 2 INJECTION INTRAMUSCULAR; INTRAVENOUS at 11:42

## 2020-11-17 RX ADMIN — FENTANYL CITRATE 25 MCG: 50 INJECTION INTRAMUSCULAR; INTRAVENOUS at 11:36

## 2020-11-17 RX ADMIN — HYDROXYZINE HYDROCHLORIDE 25 MG: 25 TABLET, FILM COATED ORAL at 14:48

## 2020-11-17 RX ADMIN — METHYLPREDNISOLONE SODIUM SUCCINATE 40 MG: 40 INJECTION, POWDER, FOR SOLUTION INTRAMUSCULAR; INTRAVENOUS at 19:05

## 2020-11-17 RX ADMIN — METHYLPREDNISOLONE SODIUM SUCCINATE 40 MG: 40 INJECTION, POWDER, FOR SOLUTION INTRAMUSCULAR; INTRAVENOUS at 14:47

## 2020-11-17 RX ADMIN — ALBUTEROL SULFATE 4 PUFF: 90 AEROSOL, METERED RESPIRATORY (INHALATION) at 10:30

## 2020-11-17 RX ADMIN — CEFAZOLIN 1 G: 1 INJECTION, POWDER, FOR SOLUTION INTRAMUSCULAR; INTRAVENOUS at 19:05

## 2020-11-17 RX ADMIN — PROPOFOL 20 MG: 10 INJECTION, EMULSION INTRAVENOUS at 11:30

## 2020-11-17 RX ADMIN — FENTANYL CITRATE 50 MCG: 50 INJECTION INTRAMUSCULAR; INTRAVENOUS at 11:18

## 2020-11-17 RX ADMIN — FLUTICASONE PROPIONATE 2 SPRAY: 50 SPRAY, METERED NASAL at 14:48

## 2020-11-17 RX ADMIN — METOPROLOL SUCCINATE 25 MG: 25 TABLET, EXTENDED RELEASE ORAL at 14:48

## 2020-11-17 RX ADMIN — HYDROXYZINE HYDROCHLORIDE 25 MG: 25 TABLET, FILM COATED ORAL at 21:50

## 2020-11-17 RX ADMIN — SODIUM CHLORIDE, PRESERVATIVE FREE 10 ML: 5 INJECTION INTRAVENOUS at 19:59

## 2020-11-17 RX ADMIN — SODIUM CHLORIDE, PRESERVATIVE FREE 10 ML: 5 INJECTION INTRAVENOUS at 20:00

## 2020-11-17 RX ADMIN — PROPOFOL 20 MG: 10 INJECTION, EMULSION INTRAVENOUS at 11:17

## 2020-11-17 RX ADMIN — SODIUM CHLORIDE: 9 INJECTION, SOLUTION INTRAVENOUS at 03:06

## 2020-11-17 RX ADMIN — LIDOCAINE HYDROCHLORIDE 60 MG: 20 INJECTION, SOLUTION INTRAVENOUS at 11:17

## 2020-11-17 RX ADMIN — ZOLPIDEM TARTRATE 5 MG: 5 TABLET ORAL at 20:05

## 2020-11-17 RX ADMIN — DOPAMINE HYDROCHLORIDE 5 MCG/KG/MIN: 160 INJECTION, SOLUTION INTRAVENOUS at 08:20

## 2020-11-17 RX ADMIN — SODIUM CHLORIDE: 9 INJECTION, SOLUTION INTRAVENOUS at 11:09

## 2020-11-17 RX ADMIN — DOPAMINE HYDROCHLORIDE 5 MCG/KG/MIN: 160 INJECTION, SOLUTION INTRAVENOUS at 11:09

## 2020-11-17 RX ADMIN — HEPARIN SODIUM 5000 UNITS: 5000 INJECTION INTRAVENOUS; SUBCUTANEOUS at 14:50

## 2020-11-17 RX ADMIN — HEPARIN SODIUM 5000 UNITS: 5000 INJECTION INTRAVENOUS; SUBCUTANEOUS at 20:05

## 2020-11-17 ASSESSMENT — PULMONARY FUNCTION TESTS
PIF_VALUE: 1

## 2020-11-17 NOTE — PROGRESS NOTES
Belongings received from [facility] include glasses, pants, underwear and shirt.

## 2020-11-17 NOTE — PROGRESS NOTES
Spouse called and updated. He states that he is aware of the 4 decubitus ulcers and has been trying for weeks to self treat them.

## 2020-11-17 NOTE — CONSULTS
Department of Cardiovascular & Thoracic Surgery   Consult Note    Reason for Consult: Severe bradycardia temporary pacer dependent  Patient seen upon I urgent request from cardiac Cath Lab for Dr. Ollie Amin at    Requesting Physician: Dr. Nicci Vazquez    Date of Consult: 11/17/20      History Obtained From:  Patient EMR Dr. Enriqueta Cuellar and patient's      HISTORY OF PRESENT ILLNESS:    The patient is a 82 y.o. female who presents with complaints of multiple dizzy spells and near loss of consciousness upon arrival and evaluation by cardiology including cardiac catheterization patient noted to be in heart block with bradycardia  Accordingly temporary pacemaker was placed patient has remained somewhat confused and moving around a lot  Cording request is made for consideration of urgent permanent pacemaker placement.       Past Medical History:        Diagnosis Date    Asthma     Chronic kidney disease     acute kidney failure    Chronic ulcer of left leg with fat layer exposed ([address] 3/7/2016    Chronic ulcer of right leg with fat layer exposed ([address] 3/7/2016    Diabetes type 2, controlled ([address]     History of asthma     Hypertension     Lymphedema of both lower extremities 3/7/2016    Osteoarthritis     Pneumonia     Thyroid disease     Venous stasis of both lower extremities 3/7/2016    WD-Non-pressure chronic ulcer right lower leg, 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 ([address] 4/21/2016     Past Surgical History:        Procedure Laterality Date    APPENDECTOMY      CHOLECYSTECTOMY      CYSTOSCOPY      EYE SURGERY      bilateral implants    HYSTERECTOMY      JOINT REPLACEMENT Right     knee    URETHRA SURGERY       Current Medications:   Current Facility-Administered Medications: 0.9 % sodium chloride infusion, , Intravenous, Continuous  sodium chloride flush 0.9 % injection 10 mL, 10 mL, Intravenous, 2 times per day  sodium chloride flush 0.9 % injection 10 mL, 10 mL, Intravenous, PRN  acetaminophen (TYLENOL) tablet 650 mg, 650 mg, Oral, Q4H PRN  atropine injection 0.5 mg, 0.5 mg, Intravenous, Once PRN  morphine (PF) injection 1 mg, 1 mg, Intravenous, Once PRN  0.9 % sodium chloride infusion, , Intravenous, Continuous  sodium chloride flush 0.9 % injection 10 mL, 10 mL, Intravenous, 2 times per day  sodium chloride flush 0.9 % injection 10 mL, 10 mL, Intravenous, PRN  acetaminophen (TYLENOL) tablet 650 mg, 650 mg, Oral, Q4H PRN  atropine injection 0.5 mg, 0.5 mg, Intravenous, Once PRN  morphine (PF) injection 1 mg, 1 mg, Intravenous, Once PRN  albuterol (PROVENTIL) nebulizer solution 2.5 mg, 2.5 mg, Nebulization, Q4H PRN  calcium elemental (OSCAL) tablet 500 mg, 1 tablet, Oral, Daily  ferrous sulfate (IRON 325) tablet 325 mg, 325 mg, Oral, BID WC  fluticasone (FLONASE) 50 MCG/ACT nasal spray 2 spray, 2 spray, Nasal, Daily  fluticasone (FLOVENT HFA) 110 MCG/ACT inhaler 1 puff, 1 puff, Inhalation, BID  hydrOXYzine (ATARAX) tablet 25 mg, 25 mg, Oral, 4x Daily PRN  levothyroxine (SYNTHROID) tablet 50 mcg, 50 mcg, Oral, Daily  LORazepam (ATIVAN) tablet 1 mg, 1 mg, Oral, Q12H PRN  zolpidem (AMBIEN) tablet 5 mg, 5 mg, Oral, Nightly PRN  sodium chloride flush 0.9 % injection 10 mL, 10 mL, Intravenous, 2 times per day  sodium chloride flush 0.9 % injection 10 mL, 10 mL, Intravenous, PRN  acetaminophen (TYLENOL) tablet 650 mg, 650 mg, Oral, Q6H PRN **OR** acetaminophen (TYLENOL) suppository 650 mg, 650 mg, Rectal, Q6H PRN  polyethylene glycol (GLYCOLAX) packet 17 g, 17 g, Oral, Daily PRN  promethazine (PHENERGAN) tablet 12.5 mg, 12.5 mg, Oral, Q6H PRN **OR** ondansetron (ZOFRAN) injection 4 mg, 4 mg, Intravenous, Q6H PRN  heparin (porcine) injection 5,000 Units, 5,000 Units, Subcutaneous, TID  0.9 % sodium chloride infusion, , Intravenous, Continuous  DOPamine (INTROPIN) 400 mg in dextrose 5 % 250 mL infusion, 5 mcg/kg/min, Intravenous, Continuous  insulin lispro (HUMALOG) injection vial 0-6 Units, 0-6 Units, Subcutaneous, TID WC  insulin lispro (HUMALOG) injection vial 0-3 Units, 0-3 Units, Subcutaneous, Nightly  glucose (GLUTOSE) 40 % oral gel 15 g, 15 g, Oral, PRN  dextrose 50 % IV solution, 12.5 g, Intravenous, PRN  glucagon (rDNA) injection 1 mg, 1 mg, Intramuscular, PRN  dextrose 5 % solution, 100 mL/hr, Intravenous, PRN  influenza quadrivalent split vaccine (FLUZONE;FLUARIX;FLULAVAL;AFLURIA) injection 0.5 mL, 0.5 mL, Intramuscular, Prior to discharge  methylPREDNISolone sodium (SOLU-MEDROL) injection 40 mg, 40 mg, Intravenous, Q8H  febuxostat (ULORIC) tablet 40 mg, 40 mg, Oral, Daily  Allergies:     Allergies   Allergen Reactions    Latex Rash    Dye [Iodides] Anaphylaxis    Iodine Anaphylaxis    Dyazide [Hydrochlorothiazide W-Triamterene] Rash    Lasix [Furosemide] Rash    Procardia [Nifedipine] Other (See Comments)     Not for sure if rash occurred or rash    Doxycycline Dermatitis    Edecrin [Ethacrynic Acid]     Levaquin [Levofloxacin] Other (See Comments)     unknown    Mobic [Meloxicam]     Vioxx [Rofecoxib]     Celebrex [Celecoxib] Rash    Lexapro [Escitalopram Oxalate] Rash    Sulfa Antibiotics Hives       Social History:   Social History     Socioeconomic History    Marital status:      Spouse name: Not on file    Number of children: Not on file    Years of education: Not on file    Highest education level: Not on file   Occupational History    Not on file   Social Needs    Financial resource strain: Not on file    Food insecurity     Worry: Not on file     Inability: Not on file    Transportation needs     Medical: Not on file     Non-medical: Not on file   Tobacco Use    Smoking status: Never Smoker    Smokeless tobacco: Never Used   Substance and Sexual Activity    Alcohol use: No    Drug use: No    Sexual activity: Yes     Partners: Male   Lifestyle    Physical activity     Days per week: Not on file     Minutes per session: Not on file    Stress: Not on file   Relationships    Social connections     Talks on phone: Not on file     Gets together: Not on file     Attends Caodaism service: Not on file     Active member of club or organization: Not on file     Attends meetings of clubs or organizations: Not on file     Relationship status: Not on file    Intimate partner violence     Fear of current or ex partner: Not on file     Emotionally abused: Not on file     Physically abused: Not on file     Forced sexual activity: Not on file   Other Topics Concern    Not on file   Social History Narrative    Not on file       Family History:        Problem Relation Age of Onset    Heart Disease Father        REVIEW OF SYSTEMS:  Constitutional: - fatigue, - fever, - chills, - night sweats  Eyes: - vision loss  Cardiovascular: -  chest pain, - palpitations, - leg swelling, - leg pain   Respiratory: - cough, - shortness of breath, - wheezing   GI: - nausea, - vomiting, - abdominal pain, - constipation, - diarrhea   : - dysuria   MSK: - joint pain, - muscle pain  Integument: - rash, - skin color change   Heme: - easy bruising or bleeding  Neurologic: - headache, - weakness, - dizziness, - paresthesias       EXAM:  Constitutional: Blood pressure 125/73, pulse 81, temperature 97.4 °F (36.3 °C), temperature source Oral, resp. rate 18, height 5' 2.01\" (1.575 m), weight 170 lb (77.1 kg), SpO2 97 %, not currently breastfeeding. Neurologic: Confused but follows commands, no focal weakness noted   Lungs: Good respiratory effort.  Clear to auscultation,     CV: Regular paced rhythm, no peripheral edema, feet warm and well perfused  GI: Soft, non-tender in all four quadrants, non-distended, + bowel sounds, liver and spleen no palpable masses  : bladder nondistended   MSK: no obvious deformity   Skin: warm, pink and dry   Left foot healing cellulitis and a healing ulcer  DATA:  Chest x-ray reveals's congestive features with small effusions  EKG rhythm strips reviewed findings are, multiple episodes heart block noted  Lab data BUN is 133 creatinine 3.6 and blood sugar is 220  Hemoglobin is at 10.4  IMPRESSION  Patient Active Problem List   Diagnosis    Diabetes type 2, controlled ([address]    Osteoarthritis    History of asthma    Cellulitis of right lower extremity    Sepsis ([address]    Gram positive sepsis (HCC)    Gram-positive bacteremia    Cellulitis of left lower extremity    Non-pressure chronic ulcer of left lower leg, limited to breakdown of skin ([address]    Chronic ulcer of right leg with fat layer exposed ([address]    Lymphedema of both lower extremities with cellulitis    WD-Idiopathic chronic venous hypertension of both lower extremities with ulcer and inflammation (HCC)    WD-Non-pressure chronic ulcer right lower leg, 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 ([address]    Rhabdomyolysis    Hyperkalemia    Morbid obesity with BMI of 45.0-49.9, adult ([address]    Encephalopathy in sepsis    Toxic shock syndrome (HCC)    Chronic renal impairment, stage 3 (moderate)    Anemia of chronic disease    Chronic kidney disease (CKD) stage G3a/A2, moderately decreased glomerular filtration rate (GFR) between 45-59 mL/min/1.73 square meter and albuminuria creatinine ratio between  mg/g    Cor pulmonale, chronic (HCC)    DM (diabetes mellitus) (HCC)    Fluid overload    HTN (hypertension)    Chronic kidney disease (CKD) stage G2/A2, mildly decreased glomerular filtration rate (GFR) between 60-89 mL/min/1.73 square meter and albuminuria creatinine ratio between  mg/g    Acute gout    Acute kidney injury ([address]    Acute kidney injury superimposed on chronic kidney disease ([address]    Third degree heart block (HCC)             RECOMMENDATIONS:  Per review and discussions with Dr. Aleksander Santillan agree with the patient needs urgent permanent pacemaker.   She is using a temporary placement all the time and she is confused and cooperative and very likely to pull that groin line where the pacer wire is  Accordingly discussed with the patient's  and discussed the procedure and expectations with him and consent has been obtained  Surgery time requested

## 2020-11-17 NOTE — CONSULTS
[address], [address] [facility]                                  CONSULTATION    PATIENT NAME: Arti Barrientos                    :        1935  MED REC NO:   [id number]                          ROOM:       3118  ACCOUNT NO:   [de-identified]                           ADMIT DATE: 2020  PROVIDER:     Cheryl Whitfield MD    CONSULT DATE:  2020    INDICATION:  Near syncopal episode with significant bradycardia. HISTORY OF PRESENT ILLNESS:  This is an 82-year-old female patient who  came in with having significant bradycardia. Heart rate was in 30s and  near syncopal episode present. I spoke to the  and the patient  herself. I have seen her in the past also. This is her third episode  of near syncopal episode in the last two to three weeks. She was doing  relatively well prior to that. Now she is found to have sinus  bradycardia with 2:1 or 3:1 block present at the rate of 30%. She is  also having chest pain and chest pressure present. PAST MEDICAL HISTORY:  The patient has a history of having hypertension  present; history of renal insufficiency stage IV, not on dialysis;  diabetes; hyperlipidemia; venous ulcers; and thyroid disease present. PAST SURGICAL HISTORY:  Appendectomy, gallbladder surgery, bilateral I  think cataract implants, and hysterectomy done. SOCIAL HISTORY:  Does not smoke, does not drink. ALLERGIES:  LATEX DYE, IVP DYE, IODINE, LASIX, PROCARDIA, DOXYCYCLINE,  and MOBIC. MEDICATIONS AT HOME:  She is on Zaroxolyn, _____ acid, Pepcid, and  Synthroid, but no beta blockers. See the rest of the list.    PHYSICAL EXAMINATION:  GENERAL:  The patient is awake, alert, and answering questions. VITAL SIGNS:  Temperature is afebrile, pulse is 60, blood pressure  124/93. HEENT:  Head is normocephalic and atraumatic. Pupils are equal and  reactive to light.   CHEST:  Equal expansion. LUNGS:  Clear to auscultation. No wheezing or rhonchi. HEART:  Regular rate and rhythm. ABDOMEN:  Soft and nontender. Bowel sounds are present. No  hepatosplenomegaly or guarding appreciated. EXTREMITIES:  No cyanosis or clubbing noted. NEUROLOGIC:  Cranial nerves II through XII are grossly intact. EKG shows sinus bradycardia with 3:1 block present. LABORATORY DATA:  Her BUN is 120, creatinine is 3.7. Troponin is  elevated. CBC:  Hemoglobin is 10.4 and platelet count is [phone]. The patient had a stress test done back in 2017. Stress test was  negative for ischemia. LV function was preserved at that time. IMPRESSION:  1. This is an 29-year-old female patient who comes with near syncopal  episode, significant bradycardia with 3:1 block with heart rate in the  30s present. Symptomatic bradycardia present. Plan is I think the  patient will need a permanent pacemaker. At this time, I will place her  on dopamine and we will control the heart rate and blood pressure. 2.  She has acute-on-chronic renal insufficiency present. BUN is  elevated. We will hydrate her. 3.  I spoke with her  and also Dr. Nick Scanlon the risk with contrast  needing dialysis. She has allergy to dye also. I will start her on  steroids. Overall prognosis is guarded. We will make further  recommendations based on the hospital course.         Yaritza Yeung MD    D: 11/16/2020 18:15:21       T: 11/16/2020 21:37:32     [email or web address]  Job#: [id number]     Doc#: [id number]    CC:

## 2020-11-17 NOTE — CONSULTS
Pt seen ,examined,interviewed and chart reviewed. Please see the dictated consult for details     Imp :   1. KAREEM- CKD stage 3b/4 - urine  does not have much active sediment and US ok  , likely from bradycardia induced low CO-->  low BP  Resulting in low renal perfusion - she  Had  been off diuretics for a week and minimal LE edema likely as she  Was not ambulating much   2. ? Heart block  and potential underlying CAD luba RCA  - I have d/w Dr Jacob Little - she likely will need pacer and cardiac cath  3. High pro  bnp- likely RHF as she elvin pulm htn/ and less likely pulm edema   4. underlying DM  5. Low na potential etiology relative IVV depletion / recent diureitcs - but she can easily flip in to hypervolemia     Plan:  1. Her BUN still very high - so watch for occult b;eeding - also watch for fluid  Status   2. She is still little confused - I will  d/w her    3. Pacer and cath potentially today   4. Will coordinate with rest of the  Team   5.  Follow clinically and bio chemically       Thanks for the consult    #[id number]

## 2020-11-17 NOTE — CONSULTS
[address] [facility]               [address] [facility], [address] [facility]                                  CONSULTATION    PATIENT NAME: Chiquis Lei                    :        1935  MED REC NO:   [id number]                          ROOM:       2130  ACCOUNT NO:   [de-identified]                           ADMIT DATE: 2020  PROVIDER:     Samuel Farnsworth MD    CONSULT DATE:  2020    CONSULT REQUESTED BY:  Carmine Drew MD    REASON FOR CONSULT:  Acute kidney injury with underlying CKD stage  IIIB/IV. BRIEF HISTORY:  The patient is an 21-year-old female who was sent to the  emergency room by my advice as she has had several symptoms which  include syncopal episode, fatigue, tiredness, unable to ambulate, etc.   Me and my office have been dealing with it for about a week or 10 days. I think her symptoms started about a week ago or so when she has  dizziness, lightheadedness, etc.  As she was on significant amount of  diuretics, we slowly weaned the diuretics off as she did not have much  lower extremity edema or pulmonary edema. Despite of that, her symptoms  did not improve. The plan was to see her in my office today in  [facility] since they are from [facility], but the  called us back  that she is so weak and tired that they could not get the blood work  done as that is what I wanted to make sure that she does not have any  kidney injury or other biochemical abnormality that might have explained  the symptoms. At that point, the squad was called and she was brought  to the emergency room in [facility] initially and subsequently transported  here for further evaluation. Looks like on ER presentation, she had bradycardia, potential heart  block, and increased BUN and creatinine, and high troponin. She was  subsequently transferred here for further evaluation.     Since transfer here, she was initially on dopamine, seen by Dr. Ashlyn Diaz  from probably need to do the cardiac  cath and pacemaker. I will coordinate with the rest of the team.  Watch  for any iatrogenic nosocomial complication. Follow clinically and  biochemically.         April MD Elizabeth    D: 11/17/2020 5:39:50       T: 11/17/2020 6:58:31     [email or web address]  Job#: [id number]     Doc#: [id number]    CC:

## 2020-11-17 NOTE — PROGRESS NOTES
Upon arrival to cath lab, patient is noted to have stage 4 pressure ulcer on right and left buttocks and and a stage 4 pressure ulcer on left upper thigh. All were open to air. Pt was also laying in stool. Manager made aware, Safe care to be placed.

## 2020-11-17 NOTE — PROGRESS NOTES
Pt arrived from OR and report received at bedside. Perm pacer on left chest DI. Perfect serve send to Dr. Cordell Desai regarding sheath pulls. Dr. Tl Chavez ordered for him to be called before removing them. Temp pacer out.

## 2020-11-17 NOTE — ANESTHESIA POSTPROCEDURE EVALUATION
Department of Anesthesiology  Postprocedure Note    Patient: Krissy Pham  MRN: [id number]  YOB: 1935  Date of evaluation: 11/17/2020  Time:  12:16 PM     Procedure Summary     Date:  11/17/20 Room / Location:  [place] [address] / [facility]    Anesthesia Start:  1109 Anesthesia Stop:      Procedure:  PACEMAKER INSERTION PERMANENT REMOVAL OF TEMPORARY PACEMAKER (N/A Chest) Diagnosis:  (sick sinus syndrome)    Surgeon:  Ashleigh Machuca MD Responsible Provider:  TREVON Rodriguez CRNA    Anesthesia Type:  MAC, general ASA Status:  4          Anesthesia Type: MAC, general    Gagan Phase I:      Gagan Phase II:      Last vitals: Reviewed and per EMR flowsheets.        Anesthesia Post Evaluation    Patient location during evaluation: bedside  Patient participation: complete - patient participated  Level of consciousness: sleepy but conscious  Airway patency: patent  Nausea & Vomiting: no vomiting  Complications: no  Cardiovascular status: blood pressure returned to baseline and hemodynamically stable  Respiratory status: acceptable, face mask and spontaneous ventilation  Hydration status: euvolemic

## 2020-11-17 NOTE — PROGRESS NOTES
Received report from Tip FAROOQ at bedside. Pt came from cath lab with 0.9 at 75 ml/hr and dopamine infusing at 5 mcg/kg/min. Temp pacer and art line in right groin. Permanent pacer in and set at 80 bpm. CVSS. Pt is very confused but will follow any commands noted. Unaware if this is baseline. Pacer to be done at 1030 am today. Doppler PP bilat. Full assessment to follow. Right groin wnl.

## 2020-11-17 NOTE — PROGRESS NOTES
Left groin arterial line DC'D and pressure held for 20 min. Pt tolerated well. Venous sheath then removed per order. Pt does not remember instructions. Many verbal prompts given to not pull at things and to be still. Pt is more oriented than this am but continues to be restless, irritable, and confused at times. Bed alarm on for safety.

## 2020-11-17 NOTE — PROGRESS NOTES
Daily Progress Note    Doing better post pacer  Paced rhythm  For PPM this am  Complete heart block   Renal insuffiencey     Pt. Awake, confused, in restraints  BP low but stable, paced rhythm in the 70s    Near syncope, CHB    Noted on EKG yesterday- rate was in the 30s    On dopamine drip    Taken for [facility] and temporary pacer today    [facility] neg. Currently paced all the time    CTS following now and to go for PPM today    Renal following for KAREEM on CKD  Echo pending today  Will cont. To follow    [facility]-11/17/20  LEFT MAIN PATENT  LAD/LCX AND RCA MILD DX  LVEDP 20-25  PA-61/29/28  RV-64/14/29  TEMP PACER PLACED  RATE OF 80-PACED  SHEATHS SUTURED IN PLACE  PLAN FOR A PERMANENT PACER  DISCUSSED WITH DR. Yang Hargrove     PAST MEDICAL HISTORY:  The patient has a history of having hypertension  present; history of renal insufficiency stage IV, not on dialysis;  diabetes; hyperlipidemia; venous ulcers; and thyroid disease present.     PAST SURGICAL HISTORY:  Appendectomy, gallbladder surgery, bilateral I  think cataract implants, and hysterectomy done.     SOCIAL HISTORY:  Does not smoke, does not drink.     ALLERGIES:  LATEX DYE, IVP DYE, IODINE, LASIX, PROCARDIA, DOXYCYCLINE,  and MOBIC.     MEDICATIONS AT HOME:  She is on Zaroxolyn, _____ acid, Pepcid, and  Synthroid, but no beta blockers.   See the rest of the list.    Objective:   BP 97/74   Pulse 75   Temp 97.4 °F (36.3 °C) (Oral)   Resp 22   Ht 5' 2.01\" (1.575 m)   Wt 170 lb (77.1 kg)   SpO2 98%   BMI 31.09 kg/m²   No intake or output data in the 24 hours ending 11/17/20 0911    Medications:   Scheduled Meds:   sodium chloride flush  10 mL Intravenous 2 times per day    calcium elemental  1 tablet Oral Daily    ferrous sulfate  325 mg Oral BID WC    fluticasone  2 spray Nasal Daily    fluticasone  1 puff Inhalation BID    levothyroxine  50 mcg Oral Daily    heparin (porcine)  5,000 Units Subcutaneous TID    insulin lispro  0-6 Units Subcutaneous TID WC chronic kidney disease ([address] 04/03/2019    Acute kidney injury ([address] 08/29/2018    Acute gout 04/04/2018    Fluid overload 08/30/2017    HTN (hypertension) 08/30/2017    Chronic kidney disease (CKD) stage G2/A2, mildly decreased glomerular filtration rate (GFR) between 60-89 mL/min/1.73 square meter and albuminuria creatinine ratio between  mg/g 08/30/2017    Chronic kidney disease (CKD) stage G3a/A2, moderately decreased glomerular filtration rate (GFR) between 45-59 mL/min/1.73 square meter and albuminuria creatinine ratio between  mg/g 04/26/2017    Cor pulmonale, chronic ([address] 04/26/2017    DM (diabetes mellitus) ([address] 04/26/2017    Chronic renal impairment, stage 3 (moderate) 03/08/2017    Anemia of chronic disease 03/08/2017    Toxic shock syndrome ([address] 08/14/2016    Rhabdomyolysis 08/01/2016    Hyperkalemia 08/01/2016    Morbid obesity with BMI of 45.0-49.9, adult ([address] 08/01/2016    Encephalopathy in sepsis 08/01/2016    WD-Non-pressure chronic ulcer right lower leg, 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 ([address] 04/21/2016    Non-pressure chronic ulcer of left lower leg, limited to breakdown of skin ([address] 03/07/2016    Chronic ulcer of right leg with fat layer exposed ([address] 03/07/2016    Lymphedema of both lower extremities with cellulitis 03/07/2016    WD-Idiopathic chronic venous hypertension of both lower extremities with ulcer and inflammation ([address] 03/07/2016    Cellulitis of left lower extremity 09/08/2014    Cellulitis of right lower extremity 09/05/2014    Diabetes type 2, controlled ([address]     Osteoarthritis     History of asthma        Electronically signed by Kavita Roman PA-C on 11/17/2020 at 9:11 AM

## 2020-11-17 NOTE — PROCEDURES
[address] [place] [address], [address] [place] [address]                            CARDIAC CATHETERIZATION    PATIENT NAME: Onur Lemus                    :        1935  MED REC NO:   [id number]                          ROOM:       2130  ACCOUNT NO:   [de-identified]                           ADMIT DATE: 2020  PROVIDER:     Bety Carrizales MD    DATE OF PROCEDURE:  2020    INDICATION:  Complete heart block and elevated troponin. DESCRIPTION OF PROCEDURE:  This is an 82-year-old female patient brought  to cath lab today. Informed consent was obtained from the patient. The  patient was prepped and draped in a sterile fashion. The patient was  injected with 20 mL of 2% lidocaine in the left femoral artery region. Using a micropuncture needle, left femoral artery was canalized and a  4-Tajik sheath was placed in the left femoral artery and a 6-Tajik  venous sheath was placed in the left femoral vein. Right heart catheterization was performed and left heart catheterization  was performed. A temporary pacer wire was placed. Using AR MOD catheter, right coronary angiogram was performed. Right  coronary angiogram revealed the right coronary artery is a dominant  vessel. It gives off the PD and PL branches. Right coronary artery has  mild disease present. Left main is patent. It divides into the LAD and circumflex artery. LAD is a medium-sized vessel, reaches and wraps the apex. Gives off  small diagonal branches, D1 and D2. There is mild disease noted. Circ  is a medium-sized vessel and gives off a medium-sized OM branch. There  is mild disease present. EDP is around 20 to 25 mmHg present. Right heart catheterization was performed. Aortic pressure is 145/59  with a mean of 96 present. LV is mean around 25 mmHg present. RV  pressure was 64/14 with a mean of 29. PA was 61/29 with a mean of 28  present.   Wedge was not measured. A multipurpose catheter was used. IMPRESSION:  1. Moderately elevated right heart pressure present. RV pressure was  64/14 with a mean of 29 and PA pressure was 61/29 with a mean of 28.  2.  EDP was around 20 to 25 mmHg present. 3.  Right coronary artery has mild disease noted. 4.  Left main is patent. LAD and circ has mild disease noted. The patient has nonobstructive coronary artery disease present. Moderately elevated right heart pressure present. The patient has complete heart block. A temporary pacer wire was placed  and a _____ in place. The patient is pacing at the rate of 80. The plan is for permanent pacemaker. The patient tolerated the  procedure well. No complications noted. Sheath was sutured in place.         Chantell Wan MD    D: 11/17/2020 7:47:16       T: 11/17/2020 8:32:43     [email or web address]  Job#: [id number]     Doc#: [id number]    CC:

## 2020-11-17 NOTE — ANESTHESIA PRE PROCEDURE
Department of Anesthesiology  Preprocedure Note       Name:  Michelle Mckeon   Age:  82 y.o.  :  1935                                          MRN:  [id number]         Date:  2020      Surgeon: Brandyn Walton):  Mc Gray MD    Procedure: Procedure(s):  PACEMAKER INSERTION PERMANENT    Medications prior to admission:   Prior to Admission medications    Medication Sig Start Date End Date Taking? Authorizing Provider   metOLazone (ZAROXOLYN) 5 MG tablet Take 1 tablet by mouth daily 20  Yes Eileen Castanon MD   febuxostat (ULORIC) 40 MG TABS tablet Take 1 tablet by mouth daily 20  Yes Eileen Castanon MD   famotidine (PEPCID) 20 MG tablet Take 1 tablet by mouth 2 times daily 19  Yes Eileen Castanon MD   levothyroxine (SYNTHROID) 50 MCG tablet Take 50 mcg by mouth Daily   Yes Historical Provider, MD   Polyethylene Glycol 3350 (MIRALAX PO) Take by mouth   Yes Historical Provider, MD   fluticasone [facility]) 50 MCG/ACT nasal spray  17  Yes Historical Provider, MD   LORazepam (ATIVAN) 1 MG tablet  3/13/17  Yes Historical Provider, MD   hydrOXYzine (ATARAX) 25 MG tablet Take 25 mg by mouth 4 times daily as needed for Itching   Yes Historical Provider, MD   latanoprost (XALATAN) 0.005 % ophthalmic solution Place 1 drop into both eyes nightly   Yes Historical Provider, MD   fluticasone (FLOVENT HFA) 110 MCG/ACT inhaler Inhale 1 puff into the lungs 2 times daily. Yes Historical Provider, MD   Multiple Vitamins-Minerals (ICAPS) CAPS Take  by mouth. Yes Historical Provider, MD   traMADol (ULTRAM) 50 MG tablet Take 50 mg by mouth every 6 hours as needed for Pain. Yes Historical Provider, MD   acetaminophen (TYLENOL) 500 MG tablet Take 500 mg by mouth every 6 hours as needed for Pain. Yes Historical Provider, MD   calcium carbonate 600 MG TABS tablet Take 1 tablet by mouth daily.    Yes Historical Provider, MD   silver sulfADIAZINE (SSD) 1 % cream APPLY  CREAM TOPICALLY DAILY 8/26/20   Nickie Gayle MD   acetaZOLAMIDE (DIAMOX) 250 MG tablet Take 1 tablet by mouth 2 times daily 10/2/19   Ncikie Gayle MD   triamcinolone (NASACORT ALLERGY 24HR) 55 MCG/ACT nasal inhaler 2 sprays by Nasal route 2 times daily    Historical Provider, MD   carbidopa-levodopa (SINEMET)  MG per tablet Take 2 tablets by mouth nightly 3/12/17   Kellen Cortés MD   clotrimazole-betamethasone (LOTRISONE) 1-0.05 % cream Apply topically 2 times daily.  3/12/17   Kellen Cortés MD   Loratadine (CLARITIN) 10 MG CAPS Take 10 mg by mouth daily    Historical Provider, MD   ferrous sulfate 325 (65 FE) MG tablet Take 1 tablet by mouth 2 times daily (with meals) 8/12/16   Dayanna Mcgraw MD   OXYGEN Inhale 2 L/min into the lungs nightly    Historical Provider, MD       Current medications:    Current Facility-Administered Medications   Medication Dose Route Frequency Provider Last Rate Last Dose    0.9 % sodium chloride infusion   Intravenous Continuous Daniela León MD        sodium chloride flush 0.9 % injection 10 mL  10 mL Intravenous 2 times per day Daniela León MD        sodium chloride flush 0.9 % injection 10 mL  10 mL Intravenous PRN Daniela León MD        acetaminophen (TYLENOL) tablet 650 mg  650 mg Oral Q4H PRN Daniela León MD        atropine injection 0.5 mg  0.5 mg Intravenous Once PRN Daniela León MD        morphine (PF) injection 1 mg  1 mg Intravenous Once PRN Daniela León MD        albuterol (PROVENTIL) nebulizer solution 2.5 mg  2.5 mg Nebulization Q4H PRN Daniela León MD        calcium elemental (OSCAL) tablet 500 mg  1 tablet Oral Daily Daniela León MD   500 mg at 11/16/20 1718    ferrous sulfate (IRON 325) tablet 325 mg  325 mg Oral BID  Maria Hardin MD   325 mg at 11/16/20 1718    fluticasone (FLONASE) 50 MCG/ACT nasal spray 2 spray  2 spray Nasal Daily Daniela León MD   2 spray at 11/16/20 2030    fluticasone (FLOVENT HFA) 110 MCG/ACT inhaler 1 puff 1 puff Inhalation BID Sonam John MD        hydrOXYzine (ATARAX) tablet 25 mg  25 mg Oral 4x Daily PRN Sonam John MD        levothyroxine (SYNTHROID) tablet 50 mcg  50 mcg Oral Daily Sonam John MD        LORazepam (ATIVAN) tablet 1 mg  1 mg Oral Q12H PRN Sonam John MD   1 mg at 11/16/20 2249    zolpidem (AMBIEN) tablet 5 mg  5 mg Oral Nightly PRN Sonam John MD   5 mg at 11/16/20 2249    acetaminophen (TYLENOL) tablet 650 mg  650 mg Oral Q6H PRN Sonam John MD        Or   Enriquez acetaminophen (TYLENOL) suppository 650 mg  650 mg Rectal Q6H PRN Sonam John MD        polyethylene glycol (GLYCOLAX) packet 17 g  17 g Oral Daily PRN Sonam John MD        promethazine (PHENERGAN) tablet 12.5 mg  12.5 mg Oral Q6H PRN Sonam John MD        Or    ondansetron (ZOFRAN) injection 4 mg  4 mg Intravenous Q6H PRN Sonam John MD        heparin (porcine) injection 5,000 Units  5,000 Units Subcutaneous TID Sonam John MD        DOPamine (INTROPIN) 400 mg in dextrose 5 % 250 mL infusion  5 mcg/kg/min Intravenous Continuous Sonam John MD 14.5 mL/hr at 11/17/20 0820 5 mcg/kg/min at 11/17/20 0820    insulin lispro (HUMALOG) injection vial 0-6 Units  0-6 Units Subcutaneous TID WC Sonam John MD   3 Units at 11/16/20 1829    insulin lispro (HUMALOG) injection vial 0-3 Units  0-3 Units Subcutaneous Nightly Sonam John MD        glucose (GLUTOSE) 40 % oral gel 15 g  15 g Oral PRN Sonam John MD        dextrose 50 % IV solution  12.5 g Intravenous PRN Sonam John MD        glucagon (rDNA) injection 1 mg  1 mg Intramuscular PRN Sonam John MD        dextrose 5 % solution  100 mL/hr Intravenous PRN Sonam John MD        influenza quadrivalent split vaccine (FLUZONE;FLUARIX;FLULAVAL;AFLURIA) injection 0.5 mL  0.5 mL Intramuscular Prior to discharge Sonam John MD        methylPREDNISolone sodium (SOLU-MEDROL) injection 40 mg  40 mg Intravenous Q8H Sonam John MD   40 mg at 11/17/20 9555 (+) pneumonia:  asthma:                           ROS comment: Home O2   Cardiovascular:  Exercise tolerance: poor (<4 METS),   (+) hypertension:, pacemaker: pacemaker,       ECG reviewed  Rhythm: irregular  Rate: normal           Beta Blocker:  Not on Beta Blocker      ROS comment:  Procedure Summary   [id number]   LEFT MAIN PATENT   LAD/LCX AND RCA MILD DX   LVEDP 20-25   PA-61/29/28   RV-64/14/29   TEMP PACER PLACED   RATE OF 80-PACED   SHEATHS SUTURED IN PLACE   PLAN FOR A PERMANENT PACER   DISCUSSED WITH DR. Gaby Ravi    Marked sinus bradycardia   Rightward axis   Incomplete right bundle branch block   Septal infarct , age undetermined   Abnormal ECG   When compared with ECG of 12-JUL-2019 20:40,   Vent. rate has decreased BY  40 BPM   Incomplete right bundle branch block is now present   Septal infarct is now present     Neuro/Psych:               GI/Hepatic/Renal:   (+) renal disease: CRI,           Endo/Other:    (+) DiabetesType II DM, using insulin, hypothyroidism, blood dyscrasia: anemia, arthritis:., electrolyte abnormalities, . Abdominal:   (+) obese,         Vascular:                                    Anesthesia Plan      MAC and general     ASA 4     (Chart review only.)  Induction: intravenous. MIPS: Prophylactic antiemetics administered. Anesthetic plan and risks discussed with patient. TREVON Guzman CRNA   11/17/2020    Pre Anesthesia Evaluation complete. Anesthesia plan, risks, benefits, alternatives, and personnel discussed with patient and/or legal guardian. Patient and/or legal guardian verbalized an understanding and agreed to proceed. Anesthesia plan discussed with care team members and agreed upon.   TREVON Kang CRNA  11/17/2020

## 2020-11-17 NOTE — PROGRESS NOTES
Pacer rep here. Third degree av block underlying temp pacer. Pt is more oriented at this time. mouth care done for comfort. MAGDALENA Morales Hair here and ordered pre-op antibiotic of Ancef 2 grams.

## 2020-11-17 NOTE — PROGRESS NOTES
[address]    Hospitalist Progress Note      Name:  Billie Morrison /Age/Sex: 1935  (82 y.o. female)   MRN & CSN:  [id number] & [id number] Admission Date/Time: 2020  3:50 PM   Location:  -A PCP: Cat Broussard 112 Day: 2    Assessment and Plan:   Billie Morrison is a 82 y.o.  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    Likely pre renal/cardiogenic, CR 3.7 on admit, baseline around 2  Continue with IV hydration, Monitor BMP closely  Holding diuretics for now  Nephrology consulted     Hypothyroidism - Continue Synthroid, TSH- 1.45    Parkinsonism -we will resume Sinamet       Type 2 DM  Added low dose ss insulin  Hypoglycemia precautions     Anxiety  Ativan as needed      Chronic pain syndrome  Holding pain medications due to sena cardia     Leukocytosis  Likely reactive  Monitor for signs of infection  COVID test negative     Anemia of chronic disorders  Likely from renal disease      Diet DIET CARDIAC;   DVT Prophylaxis [] Lovenox, []  Heparin, [] SCDs, []No VTE prophylaxis, patient ambulating   GI Prophylaxis [] PPI, [] H2 Blocker, [] No GI prophylaxis, patient is receiving diet/Tube Feeds   Code Status Full Code   Disposition Patient requires continued admission due to third-degree AV block   MDM [] Low, [] Moderate,[x]  High     History of Present Illness: Subjective     Patient Seen & Examined at the bedside        Ten point ROS reviewed negative, unless as noted above    Objective:        Intake/Output Summary (Last 24 hours) at 2020 1347  Last data filed at 2020 1239  Gross per 24 hour   Intake 200 ml   Output 520 ml   Net -320 ml      Vitals: Vitals:    11/17/20 1238   BP: (!) 142/54   Pulse: 87   Resp: 19   Temp:    SpO2: 98%     Physical Exam:    GEN Awake female, resting in bed in no apparent distress. Appears given age. HENT Mucous membranes are moist.   RESP Clear to auscultation, no wheezes, rales or rhonchi. CARDIO/VASC -S1/S2 auscultated. Regular rate without appreciable murmurs, rubs, or gallops. Peripheral pulses equal bilaterally and palpable. No peripheral edema. GI Abdomen is soft without significant tenderness, masses, or guarding. Bowel sounds are normoactive. Rectal exam deferred.  Raphael catheter is not present. MSK No gross joint deformities. Spontaneous movement of all extremities  SKIN Normal coloration, warm, dry.     Medications:   Medications:    sodium chloride flush  10 mL Intravenous 2 times per day    metoprolol succinate  25 mg Oral Daily    sodium chloride flush  10 mL Intravenous 2 times per day    ceFAZolin (ANCEF) IVPB  1 g Intravenous Q8H    calcium elemental  1 tablet Oral Daily    ferrous sulfate  325 mg Oral BID WC    fluticasone  2 spray Nasal Daily    fluticasone  1 puff Inhalation BID    levothyroxine  50 mcg Oral Daily    heparin (porcine)  5,000 Units Subcutaneous TID    insulin lispro  0-6 Units Subcutaneous TID WC    insulin lispro  0-3 Units Subcutaneous Nightly    influenza virus vaccine  0.5 mL Intramuscular Prior to discharge    methylPREDNISolone  40 mg Intravenous Q8H    febuxostat  40 mg Oral Daily      Infusions:    sodium chloride      DOPamine 3 mcg/kg/min (11/17/20 1316)    dextrose       PRN Meds: sodium chloride flush, 10 mL, PRN  acetaminophen, 650 mg, Q4H PRN  atropine, 0.5 mg, Once PRN  morphine, 1 mg, Once PRN  sodium chloride flush, 10 mL, PRN  albuterol, 2.5 mg, Q4H PRN  hydrOXYzine, 25 mg, 4x Daily PRN  LORazepam, 1 mg, Q12H PRN  zolpidem, 5 mg, Nightly PRN  acetaminophen, 650 mg, Q6H PRN    Or  acetaminophen, 650 mg, Q6H PRN  polyethylene glycol, 17 g, Daily PRN  promethazine, 12.5 mg, Q6H PRN    Or  ondansetron, 4 mg, Q6H PRN  glucose, 15 g, PRN  dextrose, 12.5 g, PRN  glucagon (rDNA), 1 mg, PRN  dextrose, 100 mL/hr, PRN          Electronically signed by Shannan Torres MD on 11/17/2020 at 1:47 PM

## 2020-11-18 ENCOUNTER — APPOINTMENT (OUTPATIENT)
Dept: CT IMAGING | Age: 85
DRG: 242 | End: 2020-11-18
Attending: INTERNAL MEDICINE
Payer: MEDICARE

## 2020-11-18 LAB
ANION GAP SERPL CALCULATED.3IONS-SCNC: 18 MMOL/L (ref 4–16)
BUN BLDV-MCNC: 104 MG/DL (ref 6–23)
CALCIUM SERPL-MCNC: 9 MG/DL (ref 8.3–10.6)
CHLORIDE BLD-SCNC: 103 MMOL/L (ref 99–110)
CO2: 19 MMOL/L (ref 21–32)
CREAT SERPL-MCNC: 2.7 MG/DL (ref 0.6–1.1)
EKG ATRIAL RATE: 93 BPM
EKG DIAGNOSIS: NORMAL
EKG P AXIS: 52 DEGREES
EKG P-R INTERVAL: 192 MS
EKG Q-T INTERVAL: 658 MS
EKG QRS DURATION: 102 MS
EKG QTC CALCULATION (BAZETT): 508 MS
EKG R AXIS: 94 DEGREES
EKG T AXIS: 34 DEGREES
EKG VENTRICULAR RATE: 36 BPM
GFR AFRICAN AMERICAN: 20 ML/MIN/1.73M2
GFR NON-AFRICAN AMERICAN: 17 ML/MIN/1.73M2
GLUCOSE BLD-MCNC: 180 MG/DL (ref 70–99)
GLUCOSE BLD-MCNC: 194 MG/DL (ref 70–99)
GLUCOSE BLD-MCNC: 201 MG/DL (ref 70–99)
GLUCOSE BLD-MCNC: 221 MG/DL (ref 70–99)
HCT VFR BLD CALC: 31.9 % (ref 37–47)
HEMOGLOBIN: 10.4 GM/DL (ref 12.5–16)
MCH RBC QN AUTO: 31.7 PG (ref 27–31)
MCHC RBC AUTO-ENTMCNC: 32.6 % (ref 32–36)
MCV RBC AUTO: 97.3 FL (ref 78–100)
PDW BLD-RTO: 12.2 % (ref 11.7–14.9)
PLATELET # BLD: 286 K/CU MM (ref 140–440)
PMV BLD AUTO: 9.4 FL (ref 7.5–11.1)
POTASSIUM SERPL-SCNC: 3.9 MMOL/L (ref 3.5–5.1)
RBC # BLD: 3.28 M/CU MM (ref 4.2–5.4)
SODIUM BLD-SCNC: 140 MMOL/L (ref 135–145)
WBC # BLD: 9.8 K/CU MM (ref 4–10.5)

## 2020-11-18 PROCEDURE — 97530 THERAPEUTIC ACTIVITIES: CPT

## 2020-11-18 PROCEDURE — 2580000003 HC RX 258: Performed by: INTERNAL MEDICINE

## 2020-11-18 PROCEDURE — 6370000000 HC RX 637 (ALT 250 FOR IP): Performed by: INTERNAL MEDICINE

## 2020-11-18 PROCEDURE — 6360000002 HC RX W HCPCS: Performed by: SURGERY

## 2020-11-18 PROCEDURE — 82962 GLUCOSE BLOOD TEST: CPT

## 2020-11-18 PROCEDURE — 85027 COMPLETE CBC AUTOMATED: CPT

## 2020-11-18 PROCEDURE — 70450 CT HEAD/BRAIN W/O DYE: CPT

## 2020-11-18 PROCEDURE — 80048 BASIC METABOLIC PNL TOTAL CA: CPT

## 2020-11-18 PROCEDURE — 97162 PT EVAL MOD COMPLEX 30 MIN: CPT

## 2020-11-18 PROCEDURE — 87086 URINE CULTURE/COLONY COUNT: CPT

## 2020-11-18 PROCEDURE — 2580000003 HC RX 258: Performed by: SURGERY

## 2020-11-18 PROCEDURE — 99213 OFFICE O/P EST LOW 20 MIN: CPT

## 2020-11-18 PROCEDURE — 97166 OT EVAL MOD COMPLEX 45 MIN: CPT

## 2020-11-18 PROCEDURE — 6360000002 HC RX W HCPCS: Performed by: INTERNAL MEDICINE

## 2020-11-18 PROCEDURE — 1200000000 HC SEMI PRIVATE

## 2020-11-18 PROCEDURE — 97116 GAIT TRAINING THERAPY: CPT

## 2020-11-18 PROCEDURE — 2500000003 HC RX 250 WO HCPCS: Performed by: INTERNAL MEDICINE

## 2020-11-18 PROCEDURE — 94640 AIRWAY INHALATION TREATMENT: CPT

## 2020-11-18 RX ORDER — ALBUTEROL SULFATE 90 UG/1
2 AEROSOL, METERED RESPIRATORY (INHALATION) EVERY 6 HOURS PRN
Status: DISCONTINUED | OUTPATIENT
Start: 2020-11-18 | End: 2020-11-20 | Stop reason: HOSPADM

## 2020-11-18 RX ADMIN — METHYLPREDNISOLONE SODIUM SUCCINATE 40 MG: 40 INJECTION, POWDER, FOR SOLUTION INTRAMUSCULAR; INTRAVENOUS at 02:37

## 2020-11-18 RX ADMIN — INSULIN LISPRO 1 UNITS: 100 INJECTION, SOLUTION INTRAVENOUS; SUBCUTANEOUS at 09:26

## 2020-11-18 RX ADMIN — CALCIUM 500 MG: 500 TABLET ORAL at 09:34

## 2020-11-18 RX ADMIN — HEPARIN SODIUM 5000 UNITS: 5000 INJECTION INTRAVENOUS; SUBCUTANEOUS at 15:01

## 2020-11-18 RX ADMIN — CEFAZOLIN 1 G: 1 INJECTION, POWDER, FOR SOLUTION INTRAMUSCULAR; INTRAVENOUS at 02:33

## 2020-11-18 RX ADMIN — LEVOTHYROXINE SODIUM 50 MCG: 50 TABLET ORAL at 06:19

## 2020-11-18 RX ADMIN — SODIUM CHLORIDE, PRESERVATIVE FREE 10 ML: 5 INJECTION INTRAVENOUS at 21:49

## 2020-11-18 RX ADMIN — MICONAZOLE NITRATE: 20 POWDER TOPICAL at 21:41

## 2020-11-18 RX ADMIN — METHYLPREDNISOLONE SODIUM SUCCINATE 40 MG: 40 INJECTION, POWDER, FOR SOLUTION INTRAMUSCULAR; INTRAVENOUS at 18:58

## 2020-11-18 RX ADMIN — ALBUTEROL SULFATE 2 PUFF: 90 AEROSOL, METERED RESPIRATORY (INHALATION) at 14:45

## 2020-11-18 RX ADMIN — SODIUM CHLORIDE: 9 INJECTION, SOLUTION INTRAVENOUS at 06:23

## 2020-11-18 RX ADMIN — HEPARIN SODIUM 5000 UNITS: 5000 INJECTION INTRAVENOUS; SUBCUTANEOUS at 21:36

## 2020-11-18 RX ADMIN — SODIUM CHLORIDE, PRESERVATIVE FREE 10 ML: 5 INJECTION INTRAVENOUS at 21:36

## 2020-11-18 RX ADMIN — FEBUXOSTAT 40 MG: 40 TABLET ORAL at 07:36

## 2020-11-18 RX ADMIN — METOPROLOL SUCCINATE 25 MG: 25 TABLET, EXTENDED RELEASE ORAL at 07:36

## 2020-11-18 RX ADMIN — CEFTRIAXONE 1 G: 1 INJECTION, POWDER, FOR SOLUTION INTRAMUSCULAR; INTRAVENOUS at 09:34

## 2020-11-18 RX ADMIN — METHYLPREDNISOLONE SODIUM SUCCINATE 40 MG: 40 INJECTION, POWDER, FOR SOLUTION INTRAMUSCULAR; INTRAVENOUS at 09:34

## 2020-11-18 RX ADMIN — Medication 1 PUFF: at 14:37

## 2020-11-18 RX ADMIN — INSULIN LISPRO 2 UNITS: 100 INJECTION, SOLUTION INTRAVENOUS; SUBCUTANEOUS at 17:30

## 2020-11-18 RX ADMIN — SODIUM CHLORIDE, PRESERVATIVE FREE 10 ML: 5 INJECTION INTRAVENOUS at 07:37

## 2020-11-18 RX ADMIN — HEPARIN SODIUM 5000 UNITS: 5000 INJECTION INTRAVENOUS; SUBCUTANEOUS at 07:37

## 2020-11-18 NOTE — PROGRESS NOTES
Pt continues to hallucinate, disoriented to place time and situation. Multiple attempts to reorient failed. Restraints loosened for pt comfort but still tied.   Will continue to monitor

## 2020-11-18 NOTE — CONSULTS
[address] [id number] Continence Nurse  Consult Note       Uziel Weathers  AGE: 82 y.o.    GENDER: female  : 1935  TODAY'S DATE:  2020    Subjective:     Reason for  Evaluation and Assessment: wound assessment      Uziel Weathers is a 82 y.o. female referred by:   [x] Physician  [] Nursing  [] Other:     Wound Identification:  Wound Type: venous, pressure and MASD-incontinence associated, possible candidiasis  Contributing Factors: diabetes, chronic pressure, decreased mobility, obesity and incontinence of stool        PAST MEDICAL HISTORY        Diagnosis Date    Asthma     Chronic kidney disease     acute kidney failure    Chronic ulcer of left leg with fat layer exposed ([address] 3/7/2016    Chronic ulcer of right leg with fat layer exposed ([address] 3/7/2016    Diabetes type 2, controlled ([address]     History of asthma     Hypertension     Lymphedema of both lower extremities 3/7/2016    Osteoarthritis     Pneumonia     Thyroid disease     Venous stasis of both lower extremities 3/7/2016    WD-Non-pressure chronic ulcer right lower leg, 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 ([address] 2016       PAST SURGICAL HISTORY    Past Surgical History:   Procedure Laterality Date    APPENDECTOMY      CHOLECYSTECTOMY      CYSTOSCOPY      EYE SURGERY      bilateral implants    HYSTERECTOMY      JOINT REPLACEMENT Right     knee    URETHRA SURGERY         FAMILY HISTORY    Family History   Problem Relation Age of Onset    Heart Disease Father        SOCIAL HISTORY    Social History     Tobacco Use    Smoking status: Never Smoker    Smokeless tobacco: Never Used   Substance Use Topics    Alcohol use: No    Drug use: No       ALLERGIES    Allergies   Allergen Reactions    Latex Rash    Dye [Iodides] Anaphylaxis    Iodine Anaphylaxis    Dyazide [Hydrochlorothiazide W-Triamterene] Rash    Lasix [Furosemide] Rash    Procardia [Nifedipine] Other (See Comments)     Not for sure if rash occurred or rash    Doxycycline Dermatitis    Edecrin [Ethacrynic Acid]     Levaquin [Levofloxacin] Other (See Comments)     unknown    Mobic [Meloxicam]     Vioxx [Rofecoxib]     Celebrex [Celecoxib] Rash    Lexapro [Escitalopram Oxalate] Rash    Sulfa Antibiotics Hives       MEDICATIONS    No current facility-administered medications on file prior to encounter. Current Outpatient Medications on File Prior to Encounter   Medication Sig Dispense Refill    metOLazone (ZAROXOLYN) 5 MG tablet Take 1 tablet by mouth daily 30 tablet 3    febuxostat (ULORIC) 40 MG TABS tablet Take 1 tablet by mouth daily 30 tablet 5    famotidine (PEPCID) 20 MG tablet Take 1 tablet by mouth 2 times daily 60 tablet 5    levothyroxine (SYNTHROID) 50 MCG tablet Take 50 mcg by mouth Daily      Polyethylene Glycol 3350 (MIRALAX PO) Take by mouth      fluticasone (FLONASE) 50 MCG/ACT nasal spray       LORazepam (ATIVAN) 1 MG tablet       hydrOXYzine (ATARAX) 25 MG tablet Take 25 mg by mouth 4 times daily as needed for Itching      latanoprost (XALATAN) 0.005 % ophthalmic solution Place 1 drop into both eyes nightly      fluticasone (FLOVENT HFA) 110 MCG/ACT inhaler Inhale 1 puff into the lungs 2 times daily.  Multiple Vitamins-Minerals (ICAPS) CAPS Take  by mouth.  traMADol (ULTRAM) 50 MG tablet Take 50 mg by mouth every 6 hours as needed for Pain.  acetaminophen (TYLENOL) 500 MG tablet Take 500 mg by mouth every 6 hours as needed for Pain.  calcium carbonate 600 MG TABS tablet Take 1 tablet by mouth daily.       silver sulfADIAZINE (SSD) 1 % cream APPLY  CREAM TOPICALLY DAILY 240 g 0    acetaZOLAMIDE (DIAMOX) 250 MG tablet Take 1 tablet by mouth 2 times daily 90 tablet 3    triamcinolone (NASACORT ALLERGY 24HR) 55 MCG/ACT nasal inhaler 2 sprays by Nasal route 2 times daily      carbidopa-levodopa (SINEMET)  MG per tablet Take 2 tablets by mouth nightly 60 tablet 0    clotrimazole-betamethasone (LOTRISONE) 1-0.05 cellulitis    WD-Idiopathic chronic venous hypertension of both lower extremities with ulcer and inflammation (HCC)    WD-Non-pressure chronic ulcer right lower leg, 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 (HCC)    Rhabdomyolysis    Hyperkalemia    Morbid obesity with BMI of 45.0-49.9, adult (HCC)    Encephalopathy in sepsis    Toxic shock syndrome (HCC)    Chronic renal impairment, stage 3 (moderate)    Anemia of chronic disease    Chronic kidney disease (CKD) stage G3a/A2, moderately decreased glomerular filtration rate (GFR) between 45-59 mL/min/1.73 square meter and albuminuria creatinine ratio between  mg/g    Cor pulmonale, chronic (HCC)    DM (diabetes mellitus) (HCC)    Fluid overload    HTN (hypertension)    Chronic kidney disease (CKD) stage G2/A2, mildly decreased glomerular filtration rate (GFR) between 60-89 mL/min/1.73 square meter and albuminuria creatinine ratio between  mg/g    Acute gout    Acute kidney injury [facility]    Acute kidney injury superimposed on chronic kidney disease (HCC)    Third degree heart block ([facility])       Measurements:  Wound 11/18/20 Buttocks Right (Active)   Wound Etiology Pressure Stage  2 11/18/20 0930   Dressing Status New dressing applied 11/18/20 0930   Wound Cleansed Cleansed with saline 11/18/20 0930   Dressing/Treatment Collagen;Silicone border 04/57/16 0930   Wound Length (cm) 2 cm 11/18/20 0930   Wound Width (cm) 1.8 cm 11/18/20 0930   Wound Depth (cm) 0.1 cm 11/18/20 0930   Wound Surface Area (cm^2) 3.6 cm^2 11/18/20 0930   Wound Volume (cm^3) 0.36 cm^3 11/18/20 0930   Distance Tunneling (cm) 0 cm 11/18/20 0930   Tunneling Position ___ O'Clock 0 11/18/20 0930   Undermining Starts ___ O'Clock 0 11/18/20 0930   Undermining Ends___ O'Clock 0 11/18/20 0930   Undermining Maxium Distance (cm) 0 11/18/20 0930   Wound Assessment Pink/red 11/18/20 0930   Drainage Amount Scant 11/18/20 0930   Drainage Description Serous 11/18/20 0930   Odor None 11/18/20 0930 Leatha-wound Assessment Blanchable erythema 11/18/20 0930   Margins Defined edges 11/18/20 0930   Wound Thickness Description not for Pressure Injury Partial thickness 11/18/20 0930   Number of days: 0       Wound 11/18/20 Buttocks Left (Active)   Wound Etiology Pressure Stage  2 11/18/20 0930   Dressing Status New dressing applied 11/18/20 0930   Wound Cleansed Cleansed with saline 11/18/20 0930   Dressing/Treatment Collagen;Silicone border 02/21/87 0930   Wound Length (cm) 3 cm 11/18/20 0930   Wound Width (cm) 1.5 cm 11/18/20 0930   Wound Depth (cm) 0.1 cm 11/18/20 0930   Wound Surface Area (cm^2) 4.5 cm^2 11/18/20 0930   Wound Volume (cm^3) 0.45 cm^3 11/18/20 0930   Distance Tunneling (cm) 0 cm 11/18/20 0930   Tunneling Position ___ O'Clock 0 11/18/20 0930   Undermining Starts ___ O'Clock 0 11/18/20 0930   Undermining Ends___ O'Clock 00 11/18/20 0930   Undermining Maxium Distance (cm) 0 11/18/20 0930   Wound Assessment Pink/red 11/18/20 0930   Drainage Amount Scant 11/18/20 0930   Drainage Description Serous 11/18/20 0930   Odor None 11/18/20 0930   Leatha-wound Assessment Blanchable erythema 11/18/20 0930   Margins Defined edges 11/18/20 0930   Wound Thickness Description not for Pressure Injury Partial thickness 11/18/20 0930   Number of days: 0       Wound 11/18/20 Thigh Proximal;Right;Posterior (Active)   Wound Etiology Pressure Unstageable 11/18/20 0930   Dressing Status New dressing applied 11/18/20 0930   Wound Cleansed Cleansed with saline 11/18/20 0930   Dressing/Treatment Silicone border 62/14/84 0930   Wound Length (cm) 1 cm 11/18/20 0930   Wound Width (cm) 1 cm 11/18/20 0930   Wound Depth (cm) 0.1 cm 11/18/20 0930   Wound Surface Area (cm^2) 1 cm^2 11/18/20 0930   Wound Volume (cm^3) 0.1 cm^3 11/18/20 0930   Distance Tunneling (cm) 0 cm 11/18/20 0930   Tunneling Position ___ O'Clock 0 11/18/20 0930   Undermining Starts ___ O'Clock 0 11/18/20 0930   Undermining Ends___ O'Clock 0 11/18/20 0930 Undermining Maxium Distance (cm) 0 11/18/20 0930   Wound Assessment Dry;Eschar dry 11/18/20 0930   Drainage Amount None 11/18/20 0930   Odor None 11/18/20 0930   Leatha-wound Assessment Intact 11/18/20 0930   Margins Attached edges 11/18/20 0930   Wound Thickness Description not for Pressure Injury Partial thickness 11/18/20 0930   Number of days: 0       Wound 11/18/20 Thigh Left;Proximal;Posterior (Active)   Wound Etiology Pressure Stage  2 11/18/20 0930   Dressing Status New dressing applied 11/18/20 0930   Wound Cleansed Cleansed with saline 11/18/20 0930   Dressing/Treatment Collagen;Silicone border 93/78/44 0930   Wound Length (cm) 1 cm 11/18/20 0930   Wound Width (cm) 1 cm 11/18/20 0930   Wound Depth (cm) 0.1 cm 11/18/20 0930   Wound Surface Area (cm^2) 1 cm^2 11/18/20 0930   Wound Volume (cm^3) 0.1 cm^3 11/18/20 0930   Distance Tunneling (cm) 0 cm 11/18/20 0930   Tunneling Position ___ O'Clock 0 11/18/20 0930   Undermining Starts ___ O'Clock 0 11/18/20 0930   Undermining Ends___ O'Clock 0 11/18/20 0930   Undermining Maxium Distance (cm) 0 11/18/20 0930   Wound Assessment Pink/red 11/18/20 0930   Drainage Amount Scant 11/18/20 0930   Drainage Description Serous 11/18/20 0930   Odor None 11/18/20 0930   Leatha-wound Assessment Blanchable erythema 11/18/20 0930   Margins Defined edges 11/18/20 0930   Wound Thickness Description not for Pressure Injury Partial thickness 11/18/20 0930   Number of days: 0       Wound 11/18/20 Tibial Right;Posterior (Active)   Wound Etiology Venous 11/18/20 0930   Dressing Status New dressing applied 11/18/20 0930   Wound Cleansed Cleansed with saline 11/18/20 0930   Dressing/Treatment Collagen;Silicone border 18/77/13 0930   Wound Length (cm) 1 cm 11/18/20 0930   Wound Width (cm) 0.6 cm 11/18/20 0930   Wound Depth (cm) 0.1 cm 11/18/20 0930   Wound Surface Area (cm^2) 0.6 cm^2 11/18/20 0930   Wound Volume (cm^3) 0.06 cm^3 11/18/20 0930   Distance Tunneling (cm) 0 cm 11/18/20 0930 Tunneling Position ___ O'Clock 0 11/18/20 0930   Undermining Starts ___ O'Clock 0 11/18/20 0930   Undermining Ends___ O'Clock 0 11/18/20 0930   Undermining Maxium Distance (cm) 0 11/18/20 0930   Wound Assessment Pink/red 11/18/20 0930   Drainage Amount Small 11/18/20 0930   Drainage Description Serosanguinous 11/18/20 0930   Odor None 11/18/20 0930   Leatha-wound Assessment Dry/flaky 11/18/20 0930   Margins Defined edges 11/18/20 0930   Wound Thickness Description not for Pressure Injury Full thickness 11/18/20 0930   Number of days: 0       Wound 11/18/20 Sacrum intergluteal cleft (Active)   Wound Etiology Pressure Stage  2 11/18/20 0930   Dressing Status Other (Comment) 11/18/20 0930   Wound Cleansed Cleansed with saline 11/18/20 0930   Wound Length (cm) 3 cm 11/18/20 0930   Wound Width (cm) 0.2 cm 11/18/20 0930   Wound Depth (cm) 0.1 cm 11/18/20 0930   Wound Surface Area (cm^2) 0.6 cm^2 11/18/20 0930   Wound Volume (cm^3) 0.06 cm^3 11/18/20 0930   Distance Tunneling (cm) 0 cm 11/18/20 0930   Tunneling Position ___ O'Clock 0 11/18/20 0930   Undermining Starts ___ O'Clock 0 11/18/20 0930   Undermining Ends___ O'Clock 0 11/18/20 0930   Undermining Maxium Distance (cm) 0 11/18/20 0930   Wound Assessment Pink/red 11/18/20 0930   Drainage Amount Small 11/18/20 0930   Drainage Description Serous 11/18/20 0930   Odor None 11/18/20 0930   Leatha-wound Assessment Blanchable erythema 11/18/20 0930   Margins Defined edges 11/18/20 0930   Wound Thickness Description not for Pressure Injury Partial thickness 11/18/20 0930   Number of days: 0       Response to treatment:  With complaints of pain.      Pain Assessment:  Severity:  mild  Quality of pain: sharp  Wound Pain Timing/Severity: legs prn repositioning  Premedicated: no    Plan:     Plan of Care: Wound 11/18/20 Buttocks Right-Dressing/Treatment: Collagen, Silicone border  Wound 11/18/20 Buttocks Left-Dressing/Treatment: Collagen, Silicone border  Wound 11/18/20 Thigh Proximal;Right;Posterior-Dressing/Treatment: Silicone border  Wound 11/18/20 Thigh Left;Proximal;Posterior-Dressing/Treatment: Collagen, Silicone border  Wound 11/18/20 Tibial Right;Posterior-Dressing/Treatment: Collagen, Silicone border  Wound 11/18/20 Sacrum intergluteal cleft-Dressing/Treatment: (calazime)     Pt in bed. Nurse in room. Had frequent incontinent stools last night and unable to maintain dressing. Per nurse report, sat in recliner for significant period of time at home. Pt agreeable to wound assessment. Wounds noted to bilateral buttock/bilateral posterior thighs/intergluteal cleft. Pressure/MASD-incontinence associated. Pictures and measurements taken. Collagen/silicone border dressings applied. If unable to maintain dressings due to incontinence, to apply moisture barrier. Heels intact. Bilateral lower legs dry. Possibly venous wound noted to posterior lower right leg. Picture and measurements taken. Treatment applied as above. Dry eschar noted to several toes. Right abdominal fold with erythema with macular papular lesions probable candidiasis. Recommend Micotin powder. Relayed to Dr. Kiera Otto. Bed pad changed under pt. Positioned to right side with heels floated with pillow support. Pt is at mild risk for skin breakdown AEB Elier. Follow Elier orders. Specialty Bed Required : yes  [] Low Air Loss   [x] Pressure Redistribution  [] Fluid Immersion  [] Bariatric  [] Total Pressure Relief  [] Other:     Discharge Plan:  Placement for patient upon discharge: tbd  Hospice Care: no  Patient appropriate for Outpatient [address]: yes-referral sent    Patient/Caregiver Teaching:  Level of patient/caregiver understanding able to:   No evidence of learning.         Electronically signed by Justyna Sumner RN,  on 11/18/2020 at 11:15 AM

## 2020-11-18 NOTE — PROGRESS NOTES
Nephrology Progress Note  11/18/2020 7:00 AM        Subjective:   Admit Date: 11/16/2020  PCP: Tuan Fuller, APRN - CNP    Interval History: underwent cath and [pacer - Lt chest wall     Diet: none     ROS:  Remains  delirious , excellent UOP , dopamine     Data:     Current meds:    sodium chloride flush  10 mL Intravenous 2 times per day    metoprolol succinate  25 mg Oral Daily    sodium chloride flush  10 mL Intravenous 2 times per day    calcium elemental  1 tablet Oral Daily    ferrous sulfate  325 mg Oral BID WC    fluticasone  2 spray Nasal Daily    fluticasone  1 puff Inhalation BID    levothyroxine  50 mcg Oral Daily    heparin (porcine)  5,000 Units Subcutaneous TID    insulin lispro  0-6 Units Subcutaneous TID WC    insulin lispro  0-3 Units Subcutaneous Nightly    influenza virus vaccine  0.5 mL Intramuscular Prior to discharge    methylPREDNISolone  40 mg Intravenous Q8H    febuxostat  40 mg Oral Daily      sodium chloride 75 mL/hr at 11/18/20 0623    DOPamine 1 mcg/kg/min (11/17/20 1905)    dextrose           I/O last 3 completed shifts:   In: 529 [P.O.:90; I.V.:439]  Out: 1270 [Urine:1250; Blood:20]    CBC:   Recent Labs     11/16/20  1120 11/17/20  0346 11/18/20  0340   WBC 11.0* 9.5 9.8   HGB 10.4* 10.4* 10.4*    267 286          Recent Labs     11/16/20  1705 11/17/20  0346 11/18/20  0340   * 129* 140   K 4.1 4.5 3.9   CL 88* 91* 103   CO2 20* 20* 19*   * 133* 104*   CREATININE 3.9* 3.6* 2.7*   GLUCOSE 156* 221* 194*       Lab Results   Component Value Date    CALCIUM 9.0 11/18/2020    PHOS 6.8 (H) 11/17/2020       Objective:     Vitals: BP (!) 125/95   Pulse 86   Temp 97.4 °F (36.3 °C) (Oral)   Resp 24   Ht 5' 2.01\" (1.575 m)   Wt 170 lb (77.1 kg)   SpO2 96%   BMI 31.09 kg/m²     General appearance:  Delirious -   HEENT:  No conj pallor, with O2 per NC  Neck:  supple  Lungs:  Limited exam , few rhonchi , no gross crackles  Heart:  Seems RRR   Abdomen:

## 2020-11-18 NOTE — PROGRESS NOTES
Pt Oriented but still restless and hallucinating. Dr. Jenkins Rust here and updated. Orders received and read back to do a CT head, wound care consult for ulcers, and for PT/OT. Pt unable to walk at home per the spouse Alejandro Pierson. Pt will not keep pulse ox on.

## 2020-11-18 NOTE — CONSULTS
[address] [facility] 1935, 2130/2130-A, 11/18/2020    Discharge Recommendation: [facility]      History:  [place]:  There were no encounter diagnoses. Subjective:  Patient states: Agreeable to therapy. Pain: Pt dreports pain at surgical site and in BLE, does not give numerical rating. Communication with other providers: PT, RN  Restrictions: General Precautions, Fall Risk, Pacemaker Precautions, Restraints non-violent or non-self destructive    Home Setup/Prior level of function:  Social/Functional History  Lives With: Spouse  Type of Home: House  Home Layout: Two level, Able to Live on Main level with bedroom/bathroom  Bathroom Shower/Tub: Tub/Shower unit, Shower chair with back  Bathroom Accessibility: Accessible  Home Equipment: Lift chair, Rolling walker  Receives Help From: Family  ADL Assistance: Independent  Homemaking Assistance: Independent  Homemaking Responsibilities: Yes  Ambulation Assistance: Independent  Transfer Assistance: Independent  Active : Yes  Mode of Transportation: Car    Examination:  · Observation: Supine in bed upon arrival  · Vision: Wears glasses. · Hearing: [employer]  · Vitals: Stable vitals throughout session    Body Systems and functions:  · ROM: WFL   · Strength: WFL  · Sensation: WFL  · Tone: Normal  · Coordination: WFL  · Perception: WNL    Activities of Daily Living (ADLs):  · Feeding: Pura (setup and increased time)  · Grooming: Pamella (anticipate pt could complete in standing with assist for balancing, intermittent rest breaks d/t decreased activity tolerance). · UB bathing: Pamella   · LB bathing: Pamella  · UB dressing: Pamella (assist d/t pacemaker precautions limiting use of LUE). · LB dressing: Pamella  · Toileting: Pamella (anticipate assist with toilet transfers, micha care, and LB clothing manipulation in standing).      Cognitive and Psychosocial Functioning:  · Overall cognitive status: placement, awareness, and balance. Activities performed today included bed mobility training, sup-sit, sit-stand, ambulation. Safety Measures: Gait belt used, Left in chair, Alarm in place    Assessment:  Assessment  Performance deficits / Impairments: Decreased functional mobility , Decreased balance, Decreased high-level IADLs, Decreased ADL status, Decreased strength  Treatment Diagnosis: Third degree heart block  Prognosis: Good  Decision Making: Medium Complexity  REQUIRES OT FOLLOW UP: Yes  Discharge Recommendations: [address]    Pt is an 82year old F admitted for third degree heart block. Pt underwent placement of permanent pacemaker on 11/17/2020. Pt reports that prior to admission she was independent in ADLs, IADLs, and Pura for functional mobility. This date, pt demo decreased strength, endurance, and activity tolerance impacting ADL status. Pt is currently functioning below baseline and would benefit from skilled OT services at SNF. Plan:  Plan  Times per week: 2+  Times per day: Daily      Goals:  1. Pt will complete all aspects of bed mobility for EOB/OOB ADLs with Pamella. 2. Pt will complete UB/LB bathing with SBA and AE as needed. 3. Pt will complete all aspects of LB dressing with SBA and AE as needed. 4. Pt will complete all functional transfers to and from bed, chair, toilet, shower chair with CGA and AD as needed. 5. Pt will ambulate HH distance to bathroom for toileting with CGA and AD as needed. 6. Pt will complete all aspects of toileting task with CGA. 7. Pt will complete oral hygiene/grooming routine in standing at sink with CGA demo good dynamic standing balance for approx 8 minutes. 8. Pt will complete ther ex/ther act with focus on UB strengthening. Time:   Time in: 1047  Time out: 1120  Timed treatment minutes: 23  Total time: 33      Electronically signed by:       ADRYAN Gilbert/L, [place], [id number]

## 2020-11-18 NOTE — PROGRESS NOTES
Pt helped back to bed with 2 person assist. Pt tearful with moving. No c/o pain though. Daughter called and updated. Awaiting PT/OT notes. Pt may need rehab and daughter informed. Pt eating fair only. Pt sleeping a lot today since she was up all night last night. support given to pt and bed alarm set for safety.

## 2020-11-18 NOTE — CONSULTS
[facility], 1935, 2130/2130-A, 11/18/2020    History  [place]:  There were no encounter diagnoses. 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 Patient  has a past surgical history that includes Urethra surgery; Appendectomy; Hysterectomy; Cholecystectomy; Cystoscopy; eye surgery; and joint replacement (Right). Subjective:  Patient states:  \"What's happening? \". Pain:  No pain behaviors noted. Communication with other providers:  Handoff to RN, co-eval with OT. Restrictions: Fall risk, pacer precautions. Home Setup/Prior level of function  Patient is a poor historian, per CM charting was from home with spouse and ambulatory, per patient she ambulates with RW. Examination of body systems (includes body structures/functions, activity/participation limitations):  · Observation:  Supine in bed upon arrival  · [place]  [facility] [place]  · [facility]  · Cardiopulmonary:  No O2 needs  · Cognition: impaired, confused, see OT/SLP note for further evaluation. Musculoskeletal  · ROM R/L:  WFL. · Strength R/L:  4/5, weakness in function and endurance. · Neuro:  No focal deficits noted    · Gait pattern: decreased stride and olivier with mod instability. Mobility:  · Supine to sit: Max A  · Transfers: Min A  · Sitting balance:    Mod-min A.    · Standing balance:  Min A.    · Gait: Min-mod A    [facility] 6 Clicks Inpatient Mobility:  AM-PAC Inpatient Mobility Raw Score : 11    Treatment:  Provided tactile cues throughout to maintain pacer precautions, patient unable to comprehend or follow precautions. Sup to sit: patient unable to follow commands initially, max A for LE management and trunk boost, provided verbal and tactile cues throughout. Sitting balance: retropulsive with min-mod A for balance, cues for anterior weight shift and attempted to encourage WB through feet, patient remained minimally retropulsive. Transfers: STS from bed and chair (elevated seat height with blankets), performed with HHA min-mod A with instability in standing. Standing balance: mod instability with ankle sway, cues for balance. Gait: ambulated x10ft, increased time to complete, poor stride, mod instability with min-mod A for balance. Safety: patient left in chair with chair alarm, call light within reach, RN notified, gait belt used. Assessment:  Patient is a 83 yo female who presents with PPM 11/17. Patient demonstrates impaired mobility and cognition, unable to understand or maintain pacer precautions and requiring min-mod A to transfer and ambulate x10ft. Patient would benefit from d/c to SNF. Complexity: Moderate  Prognosis: Good, no significant barriers to participation at this time. Plan Times per week: 3-4/week, 1 week,      Equipment: TBD    Goals:  Short term goals  Time Frame for Short term goals: 1 week  Short term goal 1: Patient will perform supine to sit with mod A. Short term goal 2: Patient will perform STS with CGA while maintaining precautions. Short term goal 3: Patient will ambulate x25ft with CGA while maintaining precautions. Treatment plan:  Bed mobility, transfers, balance, gait, TA, TX.     Recommendations for NURSING mobility:  Stand pivot, 2 person assist.    Time:   Time in: 1045  Time out: 1120  Timed treatment minutes: 23  Total time: 35    Electronically signed by:    Jack Biggs, PT  11/18/2020, 4:12 PM

## 2020-11-18 NOTE — PROGRESS NOTES
Fellow RN called this nurse to notify of pt legs over siderail, gown, tele wires, BP cuff off, pt attempting to \"go home\". This nurse arrived as fellow RN was attempting to clean up pt who was covered in BM. Pt cleaned up, attempted to reorient, pt cursing at this nurse. Restraints reapplied for pt safety. Bed alarm on call light in lap. Will continue to monitor.

## 2020-11-18 NOTE — PROGRESS NOTES
Spoke with spouse Hitesh Mayorga again today and updated on CT results, hallucinating, and he stated that the last 2 weeks she has been hallucinating also at home. PT/OT helped pt to the chair with 2 person assist. Chair alarm on for safety and chair reclined. When hospitalist rounds will ask for transfer order. VSS and on RA.

## 2020-11-18 NOTE — PROGRESS NOTES
[address]    Hospitalist Progress Note      Name:  Edin Salazar /Age/Sex: 1935  (82 y.o. female)   MRN & CSN:  [id number] & [id number] Admission Date/Time: 2020  3:50 PM   Location:  -A PCP: Cat Bullock [facility] Day: 3    Assessment and Plan:   Edin Salazar is a 82 y.o.  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    Likely pre renal/cardiogenic, CR 3.7 on admit, baseline around 2  Continue with IV hydration, Monitor BMP closely  Holding diuretics for now  Nephrology consulted    Acute metabolic encephalopathy     Baseline dementia - worsened by UTI  Treat UTI - IV rocephin pending culture, off restrains today       Type 2 DM - low dose ss insulin, Hypoglycemia precautions     Chronic medical conditions :    Anxiety  Chronic pain syndrome  Anemia of chronic disorders  Parkinsonism   Hypothyroidism   Stage II pressure ulcer - sacral     Diet DIET CARDIAC;   DVT Prophylaxis [] Lovenox, []  Heparin, [] SCDs, []No VTE prophylaxis, patient ambulating   GI Prophylaxis [] PPI, [] H2 Blocker, [] No GI prophylaxis, patient is receiving diet/Tube Feeds   Code Status Full Code   Disposition Patient requires continued admission due to third-degree AV block   MDM [] Low, [] Moderate,[x]  High     History of Present Illness: Subjective     Patient Seen & Examined at the bedside      Sleepy but wakes up easily  More coherent and able to tell where she is and why she came   Afebrile     Ten point ROS reviewed negative, unless as noted above    Objective:        Intake/Output Summary (Last 24 hours) at 2020 1700  Last data filed at 2020 1451  Gross per 24 hour   Intake 240 ml   Output 2575 ml   Net -2335 ml      Vitals:   Vitals:    11/18/20 1602   BP: (!) 132/52   Pulse: 70   Resp: 16   Temp:    SpO2:      Physical Exam:    GEN Awake female, resting in bed in no apparent distress. Appears given age. HENT Mucous membranes are moist.   RESP Clear to auscultation, no wheezes, rales or rhonchi. CARDIO/VASC -S1/S2 auscultated. Regular rate without appreciable murmurs, rubs, or gallops. Peripheral pulses equal bilaterally and palpable. No peripheral edema. GI Abdomen is soft without significant tenderness, masses, or guarding. Bowel sounds are normoactive. Rectal exam deferred.  Raphael catheter is not present.     Medications:   Medications:    cefTRIAXone (ROCEPHIN) IV  1 g Intravenous Q24H    miconazole   Topical BID    sodium chloride flush  10 mL Intravenous 2 times per day    metoprolol succinate  25 mg Oral Daily    sodium chloride flush  10 mL Intravenous 2 times per day    calcium elemental  1 tablet Oral Daily    ferrous sulfate  325 mg Oral BID WC    fluticasone  2 spray Nasal Daily    fluticasone  1 puff Inhalation BID    levothyroxine  50 mcg Oral Daily    heparin (porcine)  5,000 Units Subcutaneous TID    insulin lispro  0-6 Units Subcutaneous TID WC    insulin lispro  0-3 Units Subcutaneous Nightly    influenza virus vaccine  0.5 mL Intramuscular Prior to discharge    methylPREDNISolone  40 mg Intravenous Q8H    febuxostat  40 mg Oral Daily      Infusions:    sodium chloride 75 mL/hr at 11/18/20 0623    dextrose       PRN Meds: albuterol sulfate HFA, 2 puff, Q6H PRN  sodium chloride flush, 10 mL, PRN  acetaminophen, 650 mg, Q4H PRN  sodium chloride flush, 10 mL, PRN  acetaminophen, 650 mg, Q6H PRN    Or  acetaminophen, 650 mg, Q6H PRN  polyethylene glycol, 17 g, Daily PRN  promethazine, 12.5 mg, Q6H PRN    Or  ondansetron, 4 mg, Q6H PRN  glucose, 15 g, PRN  dextrose, 12.5 g, PRN  glucagon (rDNA), 1 mg, PRN  dextrose, 100 mL/hr, PRN          Electronically signed by Andrzej Diaz MD on 11/18/2020 at 5:00 PM

## 2020-11-18 NOTE — PROGRESS NOTES
Pt has started hallucinating, tells this nurse that her grandson is in the room and wants to take a bag of candy with him. When asked neruo assessment questions pt is alert to self, believes she is in the ER at [facility] and the date is November 16th. Then pt goes back to talking to someone in the corner of room. Pt increasingly restless. Restraints are on wrists but not tied, pt is not pulling at devices but it constantly pinching gown, scratching head and arms. Hydroxyzine given pr MAR. Will continue to monitor.

## 2020-11-18 NOTE — PROGRESS NOTES
PATIENT NAME: Artur Harding DATE: 11/18/20    SUBJECTIVE:    Pt is POD # 1 s/p PPM placment. Pt has been hallucinating. She denies pain. OBJECTIVE:   VITALS:    Vitals:    11/18/20 0935   BP: 97/72   Pulse: 75   Resp: 20   Temp:    SpO2: 90%     INTAKE/OUTPUT:    Date 11/18/20 0000 - 11/18/20 2359   Shift 2912-4259 9889-3856 5655-3794 24 Hour Total   INTAKE   P.O. 60   60   Shift Total(mL/kg) 60(0.8)   60(0.8)   OUTPUT   Urine(mL/kg/hr) 8807(8.2) 100  2175   Shift Total(mL/kg) 1656(93.0) 100(1.3)  0953(37.6)   Weight (kg) 77.1 77.1 77.1 77.1      Patient Vitals for the past 96 hrs (Last 3 readings):   Weight   11/16/20 1630 170 lb (77.1 kg)       EXAM:  Blood pressure 97/72, pulse 75, temperature 97.4 °F (36.3 °C), temperature source Oral, resp. rate 20, height 5' 2.01\" (1.575 m), weight 170 lb (77.1 kg), SpO2 90 %, not currently breastfeeding. General appearance: No apparent distress, appears stated age and cooperative. Skin: unremarkable  HEENT Normocephalic, atraumatic without obvious deformity. Neck: Supple, Trachea midline   Lungs: Good respiratory effort.  Clear to auscultation, bilaterally  Heart: Regular rate/ rhythm inc c/d/i no hematoma noted  Abdomen: Soft, non-tender or non-distended   Extremities: min edema warm well perfused  Neurologic: Alert, grossly intact  Mental status: normal affect      Data:  CBC:   Recent Labs     11/16/20  1120 11/17/20  0346 11/18/20  0340   WBC 11.0* 9.5 9.8   HGB 10.4* 10.4* 10.4*   HCT 32.4* 32.7* 31.9*    267 286     BMP:    Recent Labs     11/16/20  1705 11/17/20  0346 11/18/20  0340   * 129* 140   K 4.1 4.5 3.9   CL 88* 91* 103   CO2 20* 20* 19*   * 133* 104*   CREATININE 3.9* 3.6* 2.7*   GLUCOSE 156* 221* 194*     Hepatic:   Recent Labs     11/17/20  0346   AST 26   ALT 13   BILITOT 0.5   ALKPHOS 136*     Mag:      Recent Labs     11/16/20  1705 11/17/20  0346   MG 2.5* 2.7*      Phos:     Recent Labs     11/17/20  0346   PHOS 6.8*      INR:   Recent Labs     11/16/20  1705   INR 1.04       Radiology Review:  CXR leads in good position, no PTX      ASSESSMENT AND PLAN:    Patient Active Problem List   Diagnosis    Diabetes type 2, controlled ([address]    Osteoarthritis    History of asthma    Cellulitis of right lower extremity    Sepsis ([address]    Gram positive sepsis ([address]    Gram-positive bacteremia    Cellulitis of left lower extremity    Non-pressure chronic ulcer of left lower leg, limited to breakdown of skin ([address]    Chronic ulcer of right leg with fat layer exposed ([address]    Lymphedema of both lower extremities with cellulitis    WD-Idiopathic chronic venous hypertension of both lower extremities with ulcer and inflammation ([address]    WD-Non-pressure chronic ulcer right lower leg, 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 ([address]    Rhabdomyolysis    Hyperkalemia    Morbid obesity with BMI of 45.0-49.9, adult ([address]    Encephalopathy in sepsis    Toxic shock syndrome (HCC)    Chronic renal impairment, stage 3 (moderate)    Anemia of chronic disease    Chronic kidney disease (CKD) stage G3a/A2, moderately decreased glomerular filtration rate (GFR) between 45-59 mL/min/1.73 square meter and albuminuria creatinine ratio between  mg/g    Cor pulmonale, chronic (HCC)    DM (diabetes mellitus) (HCC)    Fluid overload    HTN (hypertension)    Chronic kidney disease (CKD) stage G2/A2, mildly decreased glomerular filtration rate (GFR) between 60-89 mL/min/1.73 square meter and albuminuria creatinine ratio between  mg/g    Acute gout    Acute kidney injury ([address]    Acute kidney injury superimposed on chronic kidney disease ([address]    Third degree heart block ([address]       S/P PPM placement     Pacer check okay. Incision C/D/I without hematoma. Going for CT head. F/u 1 mo for wound check.      Shahbaz Gaines PA-C

## 2020-11-19 LAB
ALBUMIN SERPL-MCNC: 3.1 GM/DL (ref 3.4–5)
ALP BLD-CCNC: 90 IU/L (ref 40–129)
ALT SERPL-CCNC: 10 U/L (ref 10–40)
ANION GAP SERPL CALCULATED.3IONS-SCNC: 15 MMOL/L (ref 4–16)
AST SERPL-CCNC: 14 IU/L (ref 15–37)
BASOPHILS ABSOLUTE: 0 K/CU MM
BASOPHILS RELATIVE PERCENT: 0 % (ref 0–1)
BILIRUB SERPL-MCNC: 0.3 MG/DL (ref 0–1)
BUN BLDV-MCNC: 81 MG/DL (ref 6–23)
CALCIUM SERPL-MCNC: 8.9 MG/DL (ref 8.3–10.6)
CHLORIDE BLD-SCNC: 108 MMOL/L (ref 99–110)
CO2: 24 MMOL/L (ref 21–32)
CREAT SERPL-MCNC: 1.9 MG/DL (ref 0.6–1.1)
CULTURE: NORMAL
DIFFERENTIAL TYPE: ABNORMAL
EOSINOPHILS ABSOLUTE: 0 K/CU MM
EOSINOPHILS RELATIVE PERCENT: 0 % (ref 0–3)
GFR AFRICAN AMERICAN: 31 ML/MIN/1.73M2
GFR NON-AFRICAN AMERICAN: 25 ML/MIN/1.73M2
GLUCOSE BLD-MCNC: 179 MG/DL (ref 70–99)
GLUCOSE BLD-MCNC: 199 MG/DL (ref 70–99)
GLUCOSE BLD-MCNC: 227 MG/DL (ref 70–99)
GLUCOSE BLD-MCNC: 250 MG/DL (ref 70–99)
GLUCOSE BLD-MCNC: 284 MG/DL (ref 70–99)
HCT VFR BLD CALC: 34.5 % (ref 37–47)
HEMOGLOBIN: 10.5 GM/DL (ref 12.5–16)
IMMATURE NEUTROPHIL %: 1.6 % (ref 0–0.43)
LYMPHOCYTES ABSOLUTE: 0.4 K/CU MM
LYMPHOCYTES RELATIVE PERCENT: 5.2 % (ref 24–44)
Lab: NORMAL
MAGNESIUM: 2.6 MG/DL (ref 1.8–2.4)
MCH RBC QN AUTO: 30.3 PG (ref 27–31)
MCHC RBC AUTO-ENTMCNC: 30.4 % (ref 32–36)
MCV RBC AUTO: 99.4 FL (ref 78–100)
MONOCYTES ABSOLUTE: 0.4 K/CU MM
MONOCYTES RELATIVE PERCENT: 4.9 % (ref 0–4)
NUCLEATED RBC %: 0 %
PDW BLD-RTO: 12.6 % (ref 11.7–14.9)
PHOSPHORUS: 3.2 MG/DL (ref 2.5–4.9)
PLATELET # BLD: 288 K/CU MM (ref 140–440)
PMV BLD AUTO: 9.1 FL (ref 7.5–11.1)
POTASSIUM SERPL-SCNC: 3.8 MMOL/L (ref 3.5–5.1)
PRO-BNP: ABNORMAL PG/ML
RBC # BLD: 3.47 M/CU MM (ref 4.2–5.4)
SEGMENTED NEUTROPHILS ABSOLUTE COUNT: 6.5 K/CU MM
SEGMENTED NEUTROPHILS RELATIVE PERCENT: 88.3 % (ref 36–66)
SODIUM BLD-SCNC: 147 MMOL/L (ref 135–145)
SPECIMEN: NORMAL
TOTAL IMMATURE NEUTOROPHIL: 0.12 K/CU MM
TOTAL NUCLEATED RBC: 0 K/CU MM
TOTAL PROTEIN: 5.2 GM/DL (ref 6.4–8.2)
WBC # BLD: 7.4 K/CU MM (ref 4–10.5)

## 2020-11-19 PROCEDURE — 6370000000 HC RX 637 (ALT 250 FOR IP): Performed by: INTERNAL MEDICINE

## 2020-11-19 PROCEDURE — 97530 THERAPEUTIC ACTIVITIES: CPT

## 2020-11-19 PROCEDURE — 6360000002 HC RX W HCPCS: Performed by: INTERNAL MEDICINE

## 2020-11-19 PROCEDURE — 85025 COMPLETE CBC W/AUTO DIFF WBC: CPT

## 2020-11-19 PROCEDURE — 36415 COLL VENOUS BLD VENIPUNCTURE: CPT

## 2020-11-19 PROCEDURE — 83880 ASSAY OF NATRIURETIC PEPTIDE: CPT

## 2020-11-19 PROCEDURE — 80053 COMPREHEN METABOLIC PANEL: CPT

## 2020-11-19 PROCEDURE — 2580000003 HC RX 258: Performed by: INTERNAL MEDICINE

## 2020-11-19 PROCEDURE — 83735 ASSAY OF MAGNESIUM: CPT

## 2020-11-19 PROCEDURE — 84100 ASSAY OF PHOSPHORUS: CPT

## 2020-11-19 PROCEDURE — 82962 GLUCOSE BLOOD TEST: CPT

## 2020-11-19 PROCEDURE — 94640 AIRWAY INHALATION TREATMENT: CPT

## 2020-11-19 PROCEDURE — 2580000003 HC RX 258: Performed by: SURGERY

## 2020-11-19 PROCEDURE — 94761 N-INVAS EAR/PLS OXIMETRY MLT: CPT

## 2020-11-19 PROCEDURE — 2700000000 HC OXYGEN THERAPY PER DAY

## 2020-11-19 PROCEDURE — 92610 EVALUATE SWALLOWING FUNCTION: CPT

## 2020-11-19 PROCEDURE — 1200000000 HC SEMI PRIVATE

## 2020-11-19 RX ORDER — TRAMADOL HYDROCHLORIDE 50 MG/1
50 TABLET ORAL EVERY 6 HOURS PRN
Status: DISCONTINUED | OUTPATIENT
Start: 2020-11-19 | End: 2020-11-20 | Stop reason: HOSPADM

## 2020-11-19 RX ORDER — ACETAZOLAMIDE 250 MG/1
250 TABLET ORAL DAILY
Status: DISCONTINUED | OUTPATIENT
Start: 2020-11-19 | End: 2020-11-20

## 2020-11-19 RX ORDER — METOLAZONE 5 MG/1
5 TABLET ORAL DAILY
Status: DISCONTINUED | OUTPATIENT
Start: 2020-11-19 | End: 2020-11-20

## 2020-11-19 RX ADMIN — FERROUS SULFATE TAB 325 MG (65 MG ELEMENTAL FE) 325 MG: 325 (65 FE) TAB at 09:29

## 2020-11-19 RX ADMIN — MICONAZOLE NITRATE: 20 POWDER TOPICAL at 22:39

## 2020-11-19 RX ADMIN — HEPARIN SODIUM 5000 UNITS: 5000 INJECTION INTRAVENOUS; SUBCUTANEOUS at 09:23

## 2020-11-19 RX ADMIN — CALCIUM 500 MG: 500 TABLET ORAL at 09:29

## 2020-11-19 RX ADMIN — FEBUXOSTAT 40 MG: 40 TABLET ORAL at 09:30

## 2020-11-19 RX ADMIN — INSULIN LISPRO 2 UNITS: 100 INJECTION, SOLUTION INTRAVENOUS; SUBCUTANEOUS at 17:10

## 2020-11-19 RX ADMIN — FLUTICASONE PROPIONATE 2 SPRAY: 50 SPRAY, METERED NASAL at 09:31

## 2020-11-19 RX ADMIN — METOLAZONE 5 MG: 5 TABLET ORAL at 17:13

## 2020-11-19 RX ADMIN — ACETAMINOPHEN 650 MG: 325 TABLET ORAL at 09:30

## 2020-11-19 RX ADMIN — FERROUS SULFATE TAB 325 MG (65 MG ELEMENTAL FE) 325 MG: 325 (65 FE) TAB at 17:10

## 2020-11-19 RX ADMIN — TRAMADOL HYDROCHLORIDE 50 MG: 50 TABLET, FILM COATED ORAL at 17:10

## 2020-11-19 RX ADMIN — METHYLPREDNISOLONE SODIUM SUCCINATE 40 MG: 40 INJECTION, POWDER, FOR SOLUTION INTRAMUSCULAR; INTRAVENOUS at 04:30

## 2020-11-19 RX ADMIN — CARBIDOPA AND LEVODOPA 2 TABLET: 10; 100 TABLET ORAL at 22:40

## 2020-11-19 RX ADMIN — METOPROLOL SUCCINATE 25 MG: 25 TABLET, EXTENDED RELEASE ORAL at 09:30

## 2020-11-19 RX ADMIN — Medication 1 PUFF: at 19:49

## 2020-11-19 RX ADMIN — HEPARIN SODIUM 5000 UNITS: 5000 INJECTION INTRAVENOUS; SUBCUTANEOUS at 22:40

## 2020-11-19 RX ADMIN — INSULIN LISPRO 3 UNITS: 100 INJECTION, SOLUTION INTRAVENOUS; SUBCUTANEOUS at 12:08

## 2020-11-19 RX ADMIN — Medication 1 PUFF: at 12:32

## 2020-11-19 RX ADMIN — HEPARIN SODIUM 5000 UNITS: 5000 INJECTION INTRAVENOUS; SUBCUTANEOUS at 14:16

## 2020-11-19 RX ADMIN — INSULIN LISPRO 1 UNITS: 100 INJECTION, SOLUTION INTRAVENOUS; SUBCUTANEOUS at 09:37

## 2020-11-19 RX ADMIN — SODIUM CHLORIDE, PRESERVATIVE FREE 10 ML: 5 INJECTION INTRAVENOUS at 09:30

## 2020-11-19 RX ADMIN — MICONAZOLE NITRATE: 20 POWDER TOPICAL at 09:31

## 2020-11-19 RX ADMIN — ACETAMINOPHEN 650 MG: 325 TABLET ORAL at 22:46

## 2020-11-19 RX ADMIN — ACETAZOLAMIDE 250 MG: 250 TABLET ORAL at 17:13

## 2020-11-19 RX ADMIN — CEFTRIAXONE 1 G: 1 INJECTION, POWDER, FOR SOLUTION INTRAMUSCULAR; INTRAVENOUS at 09:30

## 2020-11-19 RX ADMIN — LEVOTHYROXINE SODIUM 50 MCG: 50 TABLET ORAL at 06:24

## 2020-11-19 ASSESSMENT — PAIN DESCRIPTION - LOCATION
LOCATION: COCCYX
LOCATION: GENERALIZED
LOCATION: COCCYX

## 2020-11-19 ASSESSMENT — PAIN SCALES - GENERAL
PAINLEVEL_OUTOF10: 9
PAINLEVEL_OUTOF10: 8
PAINLEVEL_OUTOF10: 9

## 2020-11-19 ASSESSMENT — PAIN DESCRIPTION - ONSET
ONSET: ON-GOING

## 2020-11-19 ASSESSMENT — PAIN DESCRIPTION - FREQUENCY
FREQUENCY: CONTINUOUS

## 2020-11-19 ASSESSMENT — PAIN DESCRIPTION - PAIN TYPE
TYPE: ACUTE PAIN

## 2020-11-19 ASSESSMENT — PAIN DESCRIPTION - PROGRESSION
CLINICAL_PROGRESSION: NOT CHANGED
CLINICAL_PROGRESSION: NOT CHANGED
CLINICAL_PROGRESSION: GRADUALLY WORSENING

## 2020-11-19 ASSESSMENT — PAIN DESCRIPTION - DESCRIPTORS
DESCRIPTORS: BURNING
DESCRIPTORS: ACHING;SORE
DESCRIPTORS: ACHING

## 2020-11-19 ASSESSMENT — PAIN - FUNCTIONAL ASSESSMENT: PAIN_FUNCTIONAL_ASSESSMENT: PREVENTS OR INTERFERES SOME ACTIVE ACTIVITIES AND ADLS

## 2020-11-19 NOTE — PLAN OF CARE
Problem: Falls - Risk of:  Goal: Will remain free from falls  Description: Will remain free from falls  11/19/2020 1606 by Chris Larsen RN  Outcome: Met This Shift  11/19/2020 0431 by Andreas Patel RN  Outcome: Ongoing  Goal: Absence of physical injury  Description: Absence of physical injury  11/19/2020 1606 by Chris Larsen RN  Outcome: Met This Shift  11/19/2020 0431 by Andreas Patel RN  Outcome: Ongoing

## 2020-11-19 NOTE — PROGRESS NOTES
Speech Language Pathology  Facility/Department: [address] [place] [address]   CLINICAL BEDSIDE SWALLOW EVALUATION    NAME: Sienna Shelton  : 1935  MRN: [id number]    ADMISSION DATE: 2020  ADMITTING DIAGNOSIS: has Diabetes type 2, controlled ([address] Osteoarthritis; History of asthma; Cellulitis of right lower extremity; Sepsis ([address] Gram positive sepsis ([address] Gram-positive bacteremia; Cellulitis of left lower extremity; Non-pressure chronic ulcer of left lower leg, limited to breakdown of skin ([address] Chronic ulcer of right leg with fat layer exposed ([address] Lymphedema of both lower extremities with cellulitis; WD-Idiopathic chronic venous hypertension of both lower extremities with ulcer and inflammation ([address] WD-Non-pressure chronic ulcer right lower leg, 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 ([address] Rhabdomyolysis; Hyperkalemia; Morbid obesity with BMI of 45.0-49.9, adult ([address] Encephalopathy in sepsis; Toxic shock syndrome ([address] Chronic renal impairment, stage 3 (moderate); Anemia of chronic disease; Chronic kidney disease (CKD) stage G3a/A2, moderately decreased glomerular filtration rate (GFR) between 45-59 mL/min/1.73 square meter and albuminuria creatinine ratio between  mg/g; Cor pulmonale, chronic (HCC); DM (diabetes mellitus) ([address] Fluid overload; HTN (hypertension); Chronic kidney disease (CKD) stage G2/A2, mildly decreased glomerular filtration rate (GFR) between 60-89 mL/min/1.73 square meter and albuminuria creatinine ratio between  mg/g; Acute gout; Acute kidney injury ([address] Acute kidney injury superimposed on chronic kidney disease ([address] and Third degree heart block ([address] on their problem list.  ONSET DATE: this admission    Impression: Sienna Shelton was seen for a bedside swallowing evaluation after being admitted to [facility] with concerns of a heart block. Relevant medical hx includes thyroid disease, diabetes, and asthma. Pt was asleep upon entering the room.  She was woken up and positioned Treatment Plan  Requires SLP Intervention: No  Duration/Frequency of Treatment: N/A          Recommended Diet and Intervention  Diet Solids Recommendation: Regular  Liquid Consistency Recommendation: Thin  Recommended Form of Meds: PO          Compensatory Swallowing Strategies  Compensatory Swallowing Strategies: Upright as possible for all oral intake;Small bites/sips    Treatment/Goals       General  Chart Reviewed: Yes  Behavior/Cognition: Cooperative;Lethargic  Respiratory Status: Room air  O2 Device: None (Room air)  Communication Observation: Dysarthria  Follows Directions: Simple  Dentition: Dentures top  Patient Positioning: Upright in bed  Baseline Vocal Quality: Normal  Consistencies Administered: Reg solid; Thin - cup; Thin - straw           Vision/Hearing       Oral Motor Deficits  Oral/Motor  Oral Motor: Within functional limits    Oral Phase Dysfunction  Oral Phase  Oral Phase: Exceptions  Oral Phase Dysfunction  Impaired Mastication: Reg Solid  Lingual/Palatal Residue: Reg solid     Indicators of Pharyngeal Phase Dysfunction   Pharyngeal Phase  Pharyngeal Phase: WFL    Prognosis  Prognosis  Prognosis for safe diet advancement: good  Individuals consulted  Consulted and agree with results and recommendations: Patient    Education  Patient Education: Recommendations/POC  Patient Education Response: Demonstrated understanding  Safety Devices in place: Yes  Type of devices:  All fall risk precautions in place       Therapy Time  SLP Individual Minutes  Time In: 1140  Time Out: 1200  Minutes: 525 [facility] Speech Therapy Student      Student supervisor: Edith Zaragoza 87, CCC-SLP, 11/19/2020

## 2020-11-19 NOTE — PROGRESS NOTES
Daily Progress Note    Doing much better  Heart rate and BP stable  S/p PPM--pacer is stable -site and check  Complete heart block leading to syncope  Mild CAD  D/c when ok with primary team  Labs pending   Keep on low dose Toprol       Pt. awake, more alert today  BP stable, paced rhythm in the 70s     Near syncope, CHB    Noted on EKG- rate was in the 35s    Taken for LHC and temporary pacer  [facility] neg.    S/p PPM     CXR stable, PPM check stable     Renal following for KAREEM on CKD  Echo reassuring  Hallucinations so CT head done but non acute-has UTI and treated by primary  Decubitus ulcer  Will cont. To follow      Echo-11/17/20  Summary   Technically difficult examination.   Left ventricular systolic function is normal.   Ejection fraction is visually estimated at 50-55%.   Sclerotic, but non-stenotic aortic valve.   Moderate mitral regurgitation.   Mild tricuspid regurgitation.  RVSP is 52 mmHg.   No evidence of any pericardial effusion.   Temporary pacing wire is visualized within the IVC and extends to the apex   of the right heart.     LHC-11/17/20  LEFT MAIN PATENT  LAD/LCX AND RCA MILD DX  LVEDP 20-25  PA-61/29/28  RV-64/14/29  TEMP PACER PLACED  RATE OF 80-PACED  SHEATHS SUTURED IN PLACE  PLAN FOR A PERMANENT PACER  DISCUSSED WITH DR. Richelle Ratliff     PAST MEDICAL HISTORY:  The patient has a history of having hypertension  present; history of renal insufficiency stage IV, not on dialysis;  diabetes; hyperlipidemia; venous ulcers; and thyroid disease present.     PAST SURGICAL HISTORY:  Appendectomy, gallbladder surgery, bilateral I  think cataract implants, and hysterectomy done.     SOCIAL HISTORY:  Does not smoke, does not drink.     ALLERGIES:  LATEX DYE, IVP DYE, IODINE, LASIX, PROCARDIA, DOXYCYCLINE,  and MOBIC.     MEDICATIONS AT HOME:  She is on Zaroxolyn, _____ acid, Pepcid, and  Synthroid, but no beta blockers.  See the rest of the list.      Objective:   BP (!) 137/53   Pulse 70   Temp 98.2 °F (36.8 °C) (Oral)   Resp 16   Ht 5' 2\" (1.575 m)   Wt 181 lb 8 oz (82.3 kg)   SpO2 100%   BMI 33.20 kg/m²       Intake/Output Summary (Last 24 hours) at 11/19/2020 0911  Last data filed at 11/19/2020 2582  Gross per 24 hour   Intake 2301.5 ml   Output 1100 ml   Net 1201.5 ml       Medications:   Scheduled Meds:   cefTRIAXone (ROCEPHIN) IV  1 g Intravenous Q24H    miconazole   Topical BID    sodium chloride flush  10 mL Intravenous 2 times per day    metoprolol succinate  25 mg Oral Daily    sodium chloride flush  10 mL Intravenous 2 times per day    calcium elemental  1 tablet Oral Daily    ferrous sulfate  325 mg Oral BID WC    fluticasone  2 spray Nasal Daily    fluticasone  1 puff Inhalation BID    levothyroxine  50 mcg Oral Daily    heparin (porcine)  5,000 Units Subcutaneous TID    insulin lispro  0-6 Units Subcutaneous TID WC    insulin lispro  0-3 Units Subcutaneous Nightly    influenza virus vaccine  0.5 mL Intramuscular Prior to discharge    febuxostat  40 mg Oral Daily      Infusions:   dextrose        PRN Meds:  albuterol sulfate HFA, sodium chloride flush, acetaminophen, sodium chloride flush, acetaminophen **OR** acetaminophen, polyethylene glycol, promethazine **OR** ondansetron, glucose, dextrose, glucagon (rDNA), dextrose       Physical Exam:  Vitals:    11/19/20 0845   BP: (!) 137/53   Pulse: 70   Resp: 16   Temp: 98.2 °F (36.8 °C)   SpO2: 100%        General: AAO, NAD  Chest: Nontender  Cardiac: First and Second Heart Sounds are Normal, No Murmurs or Gallops noted  Lungs:Clear to auscultation and percussion. Abdomen: Soft, NT, ND, +BS  Extremities: No clubbing, no edema  Vascular:  Equal 2+ peripheral pulses.         Lab Data:  CBC:   Recent Labs     11/16/20  1120 11/17/20  0346 11/18/20  0340   WBC 11.0* 9.5 9.8   HGB 10.4* 10.4* 10.4*   HCT 32.4* 32.7* 31.9*   MCV 95.6 97.6 97.3    267 286     BMP:   Recent Labs     11/16/20  1705 11/17/20  0346 11/18/20  0340   NA 127* 129* 140   K 4.1 4.5 3.9   CL 88* 91* 103   CO2 20* 20* 19*   PHOS  --  6.8*  --    * 133* 104*   CREATININE 3.9* 3.6* 2.7*     LIVER PROFILE:   Recent Labs     11/17/20  0346   AST 26   ALT 13   BILITOT 0.5   ALKPHOS 136*     PT/INR:   Recent Labs     11/16/20  1705   PROTIME 12.6   INR 1.04     APTT:   Recent Labs     11/16/20  1705   APTT 34.9     BNP:  No results for input(s): BNP in the last 72 hours.       Assessment:  Patient Active Problem List    Diagnosis Date Noted    Gram positive sepsis ([facility] 09/06/2014     Priority: High    Gram-positive bacteremia 09/06/2014     Priority: High    Sepsis ([facility] 09/05/2014     Priority: High    Third degree heart block ([facility] 11/16/2020    Acute kidney injury superimposed on chronic kidney disease ([facility] 04/03/2019    Acute kidney injury ([facility] 08/29/2018    Acute gout 04/04/2018    Fluid overload 08/30/2017    HTN (hypertension) 08/30/2017    Chronic kidney disease (CKD) stage G2/A2, mildly decreased glomerular filtration rate (GFR) between 60-89 mL/min/1.73 square meter and albuminuria creatinine ratio between  mg/g 08/30/2017    Chronic kidney disease (CKD) stage G3a/A2, moderately decreased glomerular filtration rate (GFR) between 45-59 mL/min/1.73 square meter and albuminuria creatinine ratio between  mg/g 04/26/2017    Cor pulmonale, chronic ([facility] 04/26/2017    DM (diabetes mellitus) ([facility] 04/26/2017    Chronic renal impairment, stage 3 (moderate) 03/08/2017    Anemia of chronic disease 03/08/2017    Toxic shock syndrome ([facility] 08/14/2016    Rhabdomyolysis 08/01/2016    Hyperkalemia 08/01/2016    Morbid obesity with BMI of 45.0-49.9, adult ([facility] 08/01/2016    Encephalopathy in sepsis 08/01/2016    WD-Non-pressure chronic ulcer right lower leg, 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 ([facility] 04/21/2016    Non-pressure chronic ulcer of left lower leg, limited to breakdown of skin ([facility] 03/07/2016    Chronic ulcer of right leg with fat layer

## 2020-11-19 NOTE — CARE COORDINATION
Spoke with patient for discharge planning to discuss SNF and she she requested CM speak with her daughter/Lyly. CM called [facility]/[phone] to discuss SNF and explained importance of rehab to both she and her father on speakerphone. They requested [facility]. Called [facility] and spoke with Bi Gonzalez who states they are not accepting admissions at this time. Called [facility] back and they are agreeable to referral to [facility]. Called referral to [facility] with message left with referral information.

## 2020-11-19 NOTE — PROGRESS NOTES
chest wall subcutaneous cardiac devoce - tender   Heart:  Seems RRR  Abdomen: soft  Extremities:  No edema   Some bruises and toe lesion       Problem List :         Impression :     1. KAREEM- CKD stage  3 B/ 4 A1- UOP dropped a bit - recovering from ATI  2. delirium likely from med's / ac illness/ ? Infection  etc - much better   3. No significant  CAD- heart block and S/p pacer  4. Underlying DM / pulmo HTN and likely cor pulmonale     Recommendation/Plan  :     1. D/C IVF - she has no over  sob- watch for sx   2. Po food / fluid   3. ? D/C steroid- was for ? contrast allergy   4. Avoid any non essential med's  5. Follow clinically and bio chemically  6.  She has pressures spore- need wound care and ? therapy      Jus Craft MD

## 2020-11-19 NOTE — PROGRESS NOTES
[address]    Hospitalist Progress Note      Name:  Jennifer Chery /Age/Sex: 1935  (82 y.o. female)   MRN & CSN:  [id number] & [id number] Admission Date/Time: 2020  3:50 PM   Location:  [address] [place] [address] PCP: Cat Turcios Day: 4    Assessment and Plan:   Jennifer Chery is a 82 y.o.  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    Likely pre renal/cardiogenic, CR 3.7 on admit, >2.7>1.9   Continue with IV hydration, Monitor BMP closely  Holding diuretics for now  Nephrology following     Acute metabolic encephalopathy -resolving    Baseline dementia - worsened by UTI  Continue with IV rocephin pending culture, off restrains today and more alert and oriented     Type 2 DM - low dose ss insulin, Hypoglycemia precautions     Chronic medical conditions :    Anxiety  Chronic pain syndrome  Anemia of chronic disorders  Parkinsonism   Hypothyroidism   Stage II pressure ulcer - sacral     Diet DIET CARB CONTROL;   Dietary Nutrition Supplements: Wound Healing Oral Supplement  Dietary Nutrition Supplements: Low Calorie High Protein Supplement   DVT Prophylaxis [] Lovenox, []  Heparin, [] SCDs, []No VTE prophylaxis, patient ambulating   GI Prophylaxis [] PPI, [] H2 Blocker, [] No GI prophylaxis, patient is receiving diet/Tube Feeds   Code Status Full Code   Disposition Patient requires continued admission due to third-degree AV block   MDM [] Low, [] Moderate,[x]  High     History of Present Illness: Subjective     Patient Seen & Examined at the bedside      Patient is resting in bed with no distress while on room air  She is awake, alert and fully oriented     Ten point ROS reviewed PRN  glucagon (rDNA), 1 mg, PRN  dextrose, 100 mL/hr, PRN          Electronically signed by Makenzie Daniel MD on 11/19/2020 at 1:21 PM

## 2020-11-19 NOTE — PROGRESS NOTES
SCDs not placed on patient due to wounds and swelling to bilateral lower ext. Patient on heparin subq.

## 2020-11-19 NOTE — CONSULTS
Comprehensive Nutrition Assessment    Type and Reason for Visit:  Consult, Initial(Nutrition Assessment for Elier Score)    Nutrition Recommendations/Plan:   · Modify Cardiac Diet to Carb Control 4 2/2 mild CAD, A1C 7.8 with wounds and sub optimal intake here  · Begin wound healing oral nutrition supplement bid  · Begin low sherri high protein oral nutrition supplement daily to optimize intake    Nutrition Assessment:  Pt is POD 1 s/p PPM placment. Fair intake on Cardiac Diet, consuming less than half of meals. Is more alert today. Has lost some wt over years per history, recent actual wt history n/a. Has stage II pressure injury present. Will order oral supplement to optimize intake for healing dos. Malnutrition Assessment:  Malnutrition Status: At risk for malnutrition   Context:  Chronic Illness       Estimated Daily Nutrient Needs:  Energy (kcal):  3397-1323 ([place]-St Jeor); Weight Used for Energy Requirements:  Current     Protein (g):   (1.2-1.4 g/kg); Weight Used for Protein Requirements:  Current        Fluid (ml/day):  5393-9300 (1 ml/kcal); Method Used for Fluid Requirements:  1 ml/kcal      Nutrition Related Findings:  Elier nutrition score 3, A1C 7.8      Wounds:  Multiple, Pressure Injury, Stage II, Wound Consult Pending       Current Nutrition Therapies:    DIET CARDIAC    Anthropometric Measures:  · Height: 5' 2\" (157.5 cm)  · Current Body Weight: 181 lb 8 oz (82.3 kg)   · Admission Body Weight: 186 lb 5 oz (84.5 kg)    · Usual Body Weight: (n/a)     · Ideal Body Weight: 110 lbs; % Ideal Body Weight 165 %   · BMI: 33.2  · BMI Categories: Obese Class 1 (BMI 30.0-34. 9)       Nutrition Diagnosis:   · Inadequate energy intake related to increase demand for energy/nutrients as evidenced by intake 0-25%, intake 26-50%, wounds    Nutrition Interventions:   Food and/or Nutrient Delivery:  Continue Current Diet, Start Oral Nutrition Supplement  Nutrition Education/Counseling:  No recommendation at this time   Coordination of Nutrition Care:  Continue to monitor while inpatient    Goals:  Pt will consume greater than half of her meals and supplements       Nutrition Monitoring and Evaluation:   Behavioral-Environmental Outcomes:  None Identified   Food/Nutrient Intake Outcomes:  Food and Nutrient Intake, Supplement Intake  Physical Signs/Symptoms Outcomes:  Biochemical Data, Skin, Weight     Discharge Planning:     Too soon to determine     Electronically signed by Bhaskar Blackburn RD, LD on 11/19/20 at 10:53 AM EST    Contact: [phone]

## 2020-11-19 NOTE — PROGRESS NOTES
Physical Therapy    Physical Therapy Treatment Note  Name: Daisy Davidson MRN: [id number] :   1935   Date:  2020   Admission Date: 2020 Room:  [address] [place], [place] [address]   Restrictions/Precautions:        pt has wounds on inner thighs and sacrom  Communication with other providers:  Per nurse ok to tx and called during tx due to pt incontinent of BM and bandages were very soiled. Per nurse ok to remove them and she would redress later. Also pt rating pain 10. Subjective:  Patient states:  Pt agreeable to tx and stated she was unable to feel that she was having BM. Pain:   Location, Type, Intensity (0/10 to 10/10):  Rates wound pain 10  Objective:    Observation:  Alert and oriented to self  Treatment, including education/measures:  Sup to sit max assist and cues using bed rail with HOB up needing increased time and effort. Pt needing mod assist when first up in sitting but then able to scoot to get feet on floor with max assist and sitting balance improved sba. Sit<=>stand from bed min assist and cues form chair mod assist and cues. Pt was able to take 6 steps to chair with rw and min assist. Pt incontinent of BM. Turned chair to face bed rail and pt was able to stand at rail x 4 minutes with cga and cues. Safety  Patient left safely in the chair, with call light/phone in reach with alarm applied. Gait belt and mask were used for transfers and gait. Assessment / Impression:       Patient's tolerance of treatment:  fair  Adverse Reaction:na  Significant change in status and impact:  na  Barriers to improvement:  Pain in wounds, incontinent of BM with activity, strength, and safety  Plan for Next Session:    Cont.  POC  Time in:  1430  Time out:  1515  Timed treatment minutes:  45  Total treatment time:  39    Previously filed items:  Social/Functional History  Lives With: Spouse  Type of Home: House  Home Layout: Two level, Able to Live on Main level with bedroom/bathroom  Bathroom Shower/Tub: Tub/Shower unit, Shower chair with back  Bathroom Accessibility: Accessible  Home Equipment: Lift chair, Rolling walker  Receives Help From: Family  ADL Assistance: Independent  Homemaking Assistance: Independent  Homemaking Responsibilities: Yes  Ambulation Assistance: Independent  Transfer Assistance: Independent  Active : Yes  Mode of Transportation: Car  Short term goals  Time Frame for Short term goals: 1 week  Short term goal 1: Patient will perform supine to sit with mod A. Short term goal 2: Patient will perform STS with CGA while maintaining precautions. Short term goal 3: Patient will ambulate x25ft with CGA while maintaining precautions.        Electronically signed by:    Jodee Gomez PTA  11/19/2020, 8:33 AM

## 2020-11-20 ENCOUNTER — HOSPITAL ENCOUNTER (INPATIENT)
Age: 85
LOS: 11 days | Discharge: HOME HEALTH | DRG: 947 | End: 2020-12-01
Attending: INTERNAL MEDICINE | Admitting: INTERNAL MEDICINE
Payer: MEDICARE

## 2020-11-20 VITALS
BODY MASS INDEX: 33.4 KG/M2 | DIASTOLIC BLOOD PRESSURE: 52 MMHG | SYSTOLIC BLOOD PRESSURE: 137 MMHG | HEART RATE: 70 BPM | OXYGEN SATURATION: 96 % | RESPIRATION RATE: 17 BRPM | TEMPERATURE: 97.9 F | HEIGHT: 62 IN | WEIGHT: 181.5 LBS

## 2020-11-20 PROBLEM — R53.1 WEAKNESS: Status: ACTIVE | Noted: 2020-11-20

## 2020-11-20 LAB
GLUCOSE BLD-MCNC: 142 MG/DL (ref 70–99)
GLUCOSE BLD-MCNC: 144 MG/DL (ref 70–99)
GLUCOSE BLD-MCNC: 163 MG/DL (ref 70–99)
GLUCOSE BLD-MCNC: 195 MG/DL (ref 70–99)
SARS-COV-2, NAAT: NOT DETECTED
SOURCE: NORMAL

## 2020-11-20 PROCEDURE — 6370000000 HC RX 637 (ALT 250 FOR IP): Performed by: INTERNAL MEDICINE

## 2020-11-20 PROCEDURE — 6360000002 HC RX W HCPCS: Performed by: INTERNAL MEDICINE

## 2020-11-20 PROCEDURE — G0008 ADMIN INFLUENZA VIRUS VAC: HCPCS | Performed by: INTERNAL MEDICINE

## 2020-11-20 PROCEDURE — 82962 GLUCOSE BLOOD TEST: CPT

## 2020-11-20 PROCEDURE — 94761 N-INVAS EAR/PLS OXIMETRY MLT: CPT

## 2020-11-20 PROCEDURE — 2500000003 HC RX 250 WO HCPCS: Performed by: INTERNAL MEDICINE

## 2020-11-20 PROCEDURE — 2580000003 HC RX 258: Performed by: INTERNAL MEDICINE

## 2020-11-20 PROCEDURE — U0002 COVID-19 LAB TEST NON-CDC: HCPCS

## 2020-11-20 PROCEDURE — 97530 THERAPEUTIC ACTIVITIES: CPT

## 2020-11-20 PROCEDURE — 90686 IIV4 VACC NO PRSV 0.5 ML IM: CPT | Performed by: INTERNAL MEDICINE

## 2020-11-20 PROCEDURE — 1200000002 HC SEMI PRIVATE SWING BED

## 2020-11-20 PROCEDURE — 97116 GAIT TRAINING THERAPY: CPT

## 2020-11-20 PROCEDURE — 97110 THERAPEUTIC EXERCISES: CPT

## 2020-11-20 RX ORDER — ACETAMINOPHEN 650 MG/1
650 SUPPOSITORY RECTAL EVERY 6 HOURS PRN
Status: DISCONTINUED | OUTPATIENT
Start: 2020-11-20 | End: 2020-12-01 | Stop reason: HOSPADM

## 2020-11-20 RX ORDER — SODIUM CHLORIDE 0.9 % (FLUSH) 0.9 %
10 SYRINGE (ML) INJECTION EVERY 12 HOURS SCHEDULED
Status: DISCONTINUED | OUTPATIENT
Start: 2020-11-20 | End: 2020-11-25

## 2020-11-20 RX ORDER — NICOTINE POLACRILEX 4 MG
15 LOZENGE BUCCAL PRN
Status: DISCONTINUED | OUTPATIENT
Start: 2020-11-20 | End: 2020-11-20 | Stop reason: SDUPTHER

## 2020-11-20 RX ORDER — FLUTICASONE PROPIONATE 50 MCG
2 SPRAY, SUSPENSION (ML) NASAL DAILY
Status: CANCELLED | OUTPATIENT
Start: 2020-11-21

## 2020-11-20 RX ORDER — FLUTICASONE PROPIONATE 50 MCG
2 SPRAY, SUSPENSION (ML) NASAL DAILY
Status: DISCONTINUED | OUTPATIENT
Start: 2020-11-21 | End: 2020-12-01 | Stop reason: HOSPADM

## 2020-11-20 RX ORDER — CALCIUM CARBONATE 500(1250)
1 TABLET ORAL DAILY
Status: CANCELLED | OUTPATIENT
Start: 2020-11-21

## 2020-11-20 RX ORDER — HEPARIN SODIUM 5000 [USP'U]/ML
5000 INJECTION, SOLUTION INTRAVENOUS; SUBCUTANEOUS 3 TIMES DAILY
Status: DISCONTINUED | OUTPATIENT
Start: 2020-11-20 | End: 2020-12-01 | Stop reason: HOSPADM

## 2020-11-20 RX ORDER — CEFDINIR 300 MG/1
300 CAPSULE ORAL DAILY
Status: DISPENSED | OUTPATIENT
Start: 2020-11-20 | End: 2020-11-25

## 2020-11-20 RX ORDER — FERROUS SULFATE 325(65) MG
325 TABLET ORAL 2 TIMES DAILY WITH MEALS
Status: CANCELLED | OUTPATIENT
Start: 2020-11-20

## 2020-11-20 RX ORDER — ACETAMINOPHEN 325 MG/1
650 TABLET ORAL EVERY 6 HOURS PRN
Status: CANCELLED | OUTPATIENT
Start: 2020-11-20

## 2020-11-20 RX ORDER — ONDANSETRON 2 MG/ML
4 INJECTION INTRAMUSCULAR; INTRAVENOUS EVERY 6 HOURS PRN
Status: DISCONTINUED | OUTPATIENT
Start: 2020-11-20 | End: 2020-11-21 | Stop reason: ALTCHOICE

## 2020-11-20 RX ORDER — ROPINIROLE 0.25 MG/1
0.5 TABLET, FILM COATED ORAL 3 TIMES DAILY
Status: DISCONTINUED | OUTPATIENT
Start: 2020-11-20 | End: 2020-12-01 | Stop reason: HOSPADM

## 2020-11-20 RX ORDER — ACETAMINOPHEN 325 MG/1
650 TABLET ORAL EVERY 6 HOURS PRN
Status: DISCONTINUED | OUTPATIENT
Start: 2020-11-20 | End: 2020-12-01 | Stop reason: HOSPADM

## 2020-11-20 RX ORDER — LEVOTHYROXINE SODIUM 0.05 MG/1
50 TABLET ORAL DAILY
Status: CANCELLED | OUTPATIENT
Start: 2020-11-21

## 2020-11-20 RX ORDER — ACETAMINOPHEN 325 MG/1
650 TABLET ORAL EVERY 4 HOURS PRN
Status: DISCONTINUED | OUTPATIENT
Start: 2020-11-20 | End: 2020-11-20 | Stop reason: SDUPTHER

## 2020-11-20 RX ORDER — DEXTROSE MONOHYDRATE 25 G/50ML
12.5 INJECTION, SOLUTION INTRAVENOUS PRN
Status: DISCONTINUED | OUTPATIENT
Start: 2020-11-20 | End: 2020-11-20 | Stop reason: SDUPTHER

## 2020-11-20 RX ORDER — CEFDINIR 300 MG/1
300 CAPSULE ORAL DAILY
Qty: 5 CAPSULE | Refills: 0 | Status: ON HOLD | OUTPATIENT
Start: 2020-11-20 | End: 2020-12-01 | Stop reason: HOSPADM

## 2020-11-20 RX ORDER — DEXTROSE MONOHYDRATE 25 G/50ML
12.5 INJECTION, SOLUTION INTRAVENOUS PRN
Status: DISCONTINUED | OUTPATIENT
Start: 2020-11-20 | End: 2020-11-25

## 2020-11-20 RX ORDER — DEXTROSE MONOHYDRATE 50 MG/ML
100 INJECTION, SOLUTION INTRAVENOUS PRN
Status: DISCONTINUED | OUTPATIENT
Start: 2020-11-20 | End: 2020-11-20 | Stop reason: SDUPTHER

## 2020-11-20 RX ORDER — ALBUTEROL SULFATE 90 UG/1
2 AEROSOL, METERED RESPIRATORY (INHALATION) EVERY 6 HOURS PRN
Status: DISCONTINUED | OUTPATIENT
Start: 2020-11-20 | End: 2020-12-01 | Stop reason: HOSPADM

## 2020-11-20 RX ORDER — DEXTROSE MONOHYDRATE 25 G/50ML
12.5 INJECTION, SOLUTION INTRAVENOUS PRN
Status: CANCELLED | OUTPATIENT
Start: 2020-11-20

## 2020-11-20 RX ORDER — LEVOTHYROXINE SODIUM 0.05 MG/1
50 TABLET ORAL DAILY
Status: DISCONTINUED | OUTPATIENT
Start: 2020-11-21 | End: 2020-12-01 | Stop reason: HOSPADM

## 2020-11-20 RX ORDER — FERROUS SULFATE 325(65) MG
325 TABLET ORAL 2 TIMES DAILY WITH MEALS
Status: DISCONTINUED | OUTPATIENT
Start: 2020-11-20 | End: 2020-12-01 | Stop reason: HOSPADM

## 2020-11-20 RX ORDER — NICOTINE POLACRILEX 4 MG
15 LOZENGE BUCCAL PRN
Status: CANCELLED | OUTPATIENT
Start: 2020-11-20

## 2020-11-20 RX ORDER — ONDANSETRON 2 MG/ML
4 INJECTION INTRAMUSCULAR; INTRAVENOUS EVERY 6 HOURS PRN
Status: CANCELLED | OUTPATIENT
Start: 2020-11-20

## 2020-11-20 RX ORDER — FLUTICASONE PROPIONATE 110 UG/1
1 AEROSOL, METERED RESPIRATORY (INHALATION) 2 TIMES DAILY
Status: CANCELLED | OUTPATIENT
Start: 2020-11-20

## 2020-11-20 RX ORDER — ACETAMINOPHEN 325 MG/1
650 TABLET ORAL EVERY 4 HOURS PRN
Status: CANCELLED | OUTPATIENT
Start: 2020-11-20

## 2020-11-20 RX ORDER — BUMETANIDE 1 MG/1
1 TABLET ORAL 2 TIMES DAILY
Status: DISCONTINUED | OUTPATIENT
Start: 2020-11-20 | End: 2020-11-20 | Stop reason: HOSPADM

## 2020-11-20 RX ORDER — SODIUM CHLORIDE 0.9 % (FLUSH) 0.9 %
10 SYRINGE (ML) INJECTION PRN
Status: DISCONTINUED | OUTPATIENT
Start: 2020-11-20 | End: 2020-11-25

## 2020-11-20 RX ORDER — TRAMADOL HYDROCHLORIDE 50 MG/1
50 TABLET ORAL EVERY 6 HOURS PRN
Qty: 10 TABLET | Refills: 0 | Status: ON HOLD | OUTPATIENT
Start: 2020-11-20 | End: 2020-12-01 | Stop reason: HOSPADM

## 2020-11-20 RX ORDER — BUMETANIDE 1 MG/1
1 TABLET ORAL 2 TIMES DAILY
Status: CANCELLED | OUTPATIENT
Start: 2020-11-20

## 2020-11-20 RX ORDER — ALBUTEROL SULFATE 90 UG/1
2 AEROSOL, METERED RESPIRATORY (INHALATION) EVERY 6 HOURS PRN
Status: CANCELLED | OUTPATIENT
Start: 2020-11-20

## 2020-11-20 RX ORDER — FEBUXOSTAT 40 MG/1
40 TABLET, FILM COATED ORAL DAILY
Status: DISCONTINUED | OUTPATIENT
Start: 2020-11-21 | End: 2020-12-01 | Stop reason: HOSPADM

## 2020-11-20 RX ORDER — FEBUXOSTAT 40 MG/1
40 TABLET, FILM COATED ORAL DAILY
Status: CANCELLED | OUTPATIENT
Start: 2020-11-21

## 2020-11-20 RX ORDER — CALCIUM CARBONATE 500(1250)
1 TABLET ORAL DAILY
Status: DISCONTINUED | OUTPATIENT
Start: 2020-11-21 | End: 2020-12-01 | Stop reason: HOSPADM

## 2020-11-20 RX ORDER — PROMETHAZINE HYDROCHLORIDE 12.5 MG/1
12.5 TABLET ORAL EVERY 6 HOURS PRN
Status: DISCONTINUED | OUTPATIENT
Start: 2020-11-20 | End: 2020-12-01 | Stop reason: HOSPADM

## 2020-11-20 RX ORDER — METOPROLOL SUCCINATE 25 MG/1
25 TABLET, EXTENDED RELEASE ORAL DAILY
Status: DISCONTINUED | OUTPATIENT
Start: 2020-11-21 | End: 2020-12-01 | Stop reason: HOSPADM

## 2020-11-20 RX ORDER — ACETAMINOPHEN 650 MG/1
650 SUPPOSITORY RECTAL EVERY 6 HOURS PRN
Status: CANCELLED | OUTPATIENT
Start: 2020-11-20

## 2020-11-20 RX ORDER — PROMETHAZINE HYDROCHLORIDE 25 MG/1
12.5 TABLET ORAL EVERY 6 HOURS PRN
Status: CANCELLED | OUTPATIENT
Start: 2020-11-20

## 2020-11-20 RX ORDER — CEFDINIR 300 MG/1
300 CAPSULE ORAL DAILY
Status: CANCELLED | OUTPATIENT
Start: 2020-11-20 | End: 2020-11-25

## 2020-11-20 RX ORDER — TRAMADOL HYDROCHLORIDE 50 MG/1
50 TABLET ORAL EVERY 6 HOURS PRN
Status: CANCELLED | OUTPATIENT
Start: 2020-11-20

## 2020-11-20 RX ORDER — SODIUM CHLORIDE 0.9 % (FLUSH) 0.9 %
10 SYRINGE (ML) INJECTION EVERY 12 HOURS SCHEDULED
Status: CANCELLED | OUTPATIENT
Start: 2020-11-20

## 2020-11-20 RX ORDER — CILOSTAZOL 50 MG/1
50 TABLET ORAL 2 TIMES DAILY
Status: DISCONTINUED | OUTPATIENT
Start: 2020-11-20 | End: 2020-12-01 | Stop reason: HOSPADM

## 2020-11-20 RX ORDER — DEXTROSE MONOHYDRATE 50 MG/ML
100 INJECTION, SOLUTION INTRAVENOUS PRN
Status: DISCONTINUED | OUTPATIENT
Start: 2020-11-20 | End: 2020-11-25

## 2020-11-20 RX ORDER — METOPROLOL SUCCINATE 25 MG/1
25 TABLET, EXTENDED RELEASE ORAL DAILY
Status: CANCELLED | OUTPATIENT
Start: 2020-11-21

## 2020-11-20 RX ORDER — NICOTINE POLACRILEX 4 MG
15 LOZENGE BUCCAL PRN
Status: DISCONTINUED | OUTPATIENT
Start: 2020-11-20 | End: 2020-12-01 | Stop reason: HOSPADM

## 2020-11-20 RX ORDER — BUMETANIDE 1 MG/1
1 TABLET ORAL 2 TIMES DAILY
Status: DISCONTINUED | OUTPATIENT
Start: 2020-11-20 | End: 2020-12-01 | Stop reason: HOSPADM

## 2020-11-20 RX ORDER — CEPHALEXIN 250 MG/1
250 CAPSULE ORAL EVERY 12 HOURS SCHEDULED
Status: DISCONTINUED | OUTPATIENT
Start: 2020-11-20 | End: 2020-11-20

## 2020-11-20 RX ORDER — FLUTICASONE PROPIONATE 110 UG/1
1 AEROSOL, METERED RESPIRATORY (INHALATION) 2 TIMES DAILY
Status: DISCONTINUED | OUTPATIENT
Start: 2020-11-20 | End: 2020-11-21

## 2020-11-20 RX ORDER — DEXTROSE MONOHYDRATE 50 MG/ML
100 INJECTION, SOLUTION INTRAVENOUS PRN
Status: CANCELLED | OUTPATIENT
Start: 2020-11-20

## 2020-11-20 RX ORDER — SODIUM CHLORIDE 0.9 % (FLUSH) 0.9 %
10 SYRINGE (ML) INJECTION PRN
Status: CANCELLED | OUTPATIENT
Start: 2020-11-20

## 2020-11-20 RX ORDER — TRAMADOL HYDROCHLORIDE 50 MG/1
50 TABLET ORAL EVERY 6 HOURS PRN
Status: DISCONTINUED | OUTPATIENT
Start: 2020-11-20 | End: 2020-12-01 | Stop reason: HOSPADM

## 2020-11-20 RX ORDER — METOPROLOL SUCCINATE 25 MG/1
25 TABLET, EXTENDED RELEASE ORAL DAILY
Qty: 30 TABLET | Refills: 3 | Status: SHIPPED | OUTPATIENT
Start: 2020-11-21 | End: 2022-03-18

## 2020-11-20 RX ORDER — HEPARIN SODIUM 5000 [USP'U]/ML
5000 INJECTION, SOLUTION INTRAVENOUS; SUBCUTANEOUS 3 TIMES DAILY
Status: CANCELLED | OUTPATIENT
Start: 2020-11-20

## 2020-11-20 RX ADMIN — CILOSTAZOL 50 MG: 50 TABLET ORAL at 20:53

## 2020-11-20 RX ADMIN — MICONAZOLE NITRATE: 20 POWDER TOPICAL at 08:29

## 2020-11-20 RX ADMIN — CARBIDOPA AND LEVODOPA 2 TABLET: 10; 100 TABLET ORAL at 20:53

## 2020-11-20 RX ADMIN — FERROUS SULFATE TAB 325 MG (65 MG ELEMENTAL FE) 325 MG: 325 (65 FE) TAB at 08:17

## 2020-11-20 RX ADMIN — FERROUS SULFATE TAB 325 MG (65 MG ELEMENTAL FE) 325 MG: 325 (65 FE) TAB at 17:42

## 2020-11-20 RX ADMIN — FLUTICASONE PROPIONATE 1 PUFF: 110 AEROSOL, METERED RESPIRATORY (INHALATION) at 20:52

## 2020-11-20 RX ADMIN — INSULIN LISPRO 1 UNITS: 100 INJECTION, SOLUTION INTRAVENOUS; SUBCUTANEOUS at 11:51

## 2020-11-20 RX ADMIN — ROPINIROLE HYDROCHLORIDE 0.5 MG: 0.25 TABLET, FILM COATED ORAL at 20:53

## 2020-11-20 RX ADMIN — HEPARIN SODIUM 5000 UNITS: 5000 INJECTION INTRAVENOUS; SUBCUTANEOUS at 08:17

## 2020-11-20 RX ADMIN — TRAMADOL HYDROCHLORIDE 50 MG: 50 TABLET, FILM COATED ORAL at 05:59

## 2020-11-20 RX ADMIN — LEVOTHYROXINE SODIUM 50 MCG: 50 TABLET ORAL at 05:59

## 2020-11-20 RX ADMIN — CEFTRIAXONE 1 G: 1 INJECTION, POWDER, FOR SOLUTION INTRAMUSCULAR; INTRAVENOUS at 08:17

## 2020-11-20 RX ADMIN — METOLAZONE 5 MG: 5 TABLET ORAL at 08:17

## 2020-11-20 RX ADMIN — INSULIN LISPRO 1 UNITS: 100 INJECTION, SOLUTION INTRAVENOUS; SUBCUTANEOUS at 17:47

## 2020-11-20 RX ADMIN — BUMETANIDE 1 MG: 1 TABLET ORAL at 20:53

## 2020-11-20 RX ADMIN — TRAMADOL HYDROCHLORIDE 50 MG: 50 TABLET ORAL at 17:42

## 2020-11-20 RX ADMIN — METOPROLOL SUCCINATE 25 MG: 25 TABLET, EXTENDED RELEASE ORAL at 08:17

## 2020-11-20 RX ADMIN — ANTI-FUNGAL POWDER MICONAZOLE NITRATE TALC FREE: 1.42 POWDER TOPICAL at 20:52

## 2020-11-20 RX ADMIN — INFLUENZA A VIRUS A/VICTORIA/2454/2019 IVR-207 (H1N1) ANTIGEN (PROPIOLACTONE INACTIVATED), INFLUENZA A VIRUS A/HONG KONG/2671/2019 IVR-208 (H3N2) ANTIGEN (PROPIOLACTONE INACTIVATED), INFLUENZA B VIRUS B/VICTORIA/705/2018 BVR-11 ANTIGEN (PROPIOLACTONE INACTIVATED), INFLUENZA B VIRUS B/PHUKET/3073/2013 BVR-1B ANTIGEN (PROPIOLACTONE INACTIVATED) 0.5 ML: 15; 15; 15; 15 INJECTION, SUSPENSION INTRAMUSCULAR at 11:40

## 2020-11-20 RX ADMIN — CALCIUM 500 MG: 500 TABLET ORAL at 08:17

## 2020-11-20 RX ADMIN — HEPARIN SODIUM 5000 UNITS: 5000 INJECTION INTRAVENOUS; SUBCUTANEOUS at 20:53

## 2020-11-20 ASSESSMENT — PAIN DESCRIPTION - FREQUENCY: FREQUENCY: CONTINUOUS

## 2020-11-20 ASSESSMENT — PAIN DESCRIPTION - LOCATION: LOCATION: BUTTOCKS;GENERALIZED

## 2020-11-20 ASSESSMENT — PAIN DESCRIPTION - DESCRIPTORS: DESCRIPTORS: BURNING;ACHING

## 2020-11-20 ASSESSMENT — PAIN SCALES - GENERAL
PAINLEVEL_OUTOF10: 5
PAINLEVEL_OUTOF10: 8
PAINLEVEL_OUTOF10: 0
PAINLEVEL_OUTOF10: 0

## 2020-11-20 ASSESSMENT — PAIN DESCRIPTION - PAIN TYPE: TYPE: ACUTE PAIN

## 2020-11-20 ASSESSMENT — PAIN DESCRIPTION - ONSET: ONSET: ON-GOING

## 2020-11-20 ASSESSMENT — PAIN - FUNCTIONAL ASSESSMENT: PAIN_FUNCTIONAL_ASSESSMENT: ACTIVITIES ARE NOT PREVENTED

## 2020-11-20 ASSESSMENT — PAIN DESCRIPTION - PROGRESSION: CLINICAL_PROGRESSION: GRADUALLY WORSENING

## 2020-11-20 NOTE — PROGRESS NOTES
Nephrology Progress Note  11/20/2020 12:49 PM        Subjective:   Admit Date: 11/16/2020  PCP: Keyonna Mendoza, APRN - CNP    Interval History:  Mentally better - back to nl     Diet: better    ROS:  No sob , but has leg edema  Now -     Data:     Current meds:    carbidopa-levodopa  2 tablet Oral Nightly    metOLazone  5 mg Oral Daily    cefTRIAXone (ROCEPHIN) IV  1 g Intravenous Q24H    miconazole   Topical BID    sodium chloride flush  10 mL Intravenous 2 times per day    metoprolol succinate  25 mg Oral Daily    sodium chloride flush  10 mL Intravenous 2 times per day    calcium elemental  1 tablet Oral Daily    ferrous sulfate  325 mg Oral BID WC    fluticasone  2 spray Nasal Daily    fluticasone  1 puff Inhalation BID    levothyroxine  50 mcg Oral Daily    heparin (porcine)  5,000 Units Subcutaneous TID    insulin lispro  0-6 Units Subcutaneous TID WC    insulin lispro  0-3 Units Subcutaneous Nightly    febuxostat  40 mg Oral Daily      dextrose           I/O last 3 completed shifts:  In: -   Out: 550 [Urine:550]    CBC:   Recent Labs     11/18/20  0340 11/19/20  0955   WBC 9.8 7.4   HGB 10.4* 10.5*    288          Recent Labs     11/18/20  0340 11/19/20  0955    147*   K 3.9 3.8    108   CO2 19* 24   * 81*   CREATININE 2.7* 1.9*   GLUCOSE 194* 250*       Lab Results   Component Value Date    CALCIUM 8.9 11/19/2020    PHOS 3.2 11/19/2020       Objective:     Vitals: BP (!) 137/52   Pulse 70   Temp 97.9 °F (36.6 °C) (Oral)   Resp 17   Ht 5' 2\" (1.575 m)   Wt 181 lb 8 oz (82.3 kg)   SpO2 96%   BMI 33.20 kg/m²     General appearance:  No ac ditress  HEENT:  + conj pallor  Neck:  Supple- Lt chest wall subcutaneous pacer - some surrounding edema   Lungs:  + adv Bs  Heart:  RRR  Abdomen: soft  Extremities:  + LE edema now     Problem List :         Impression :     1. KAREEM- CKD stage 4 - recovering   2.  Leg edema - she likely has cor pulmonale / pulm HTN- may need loop in the  Renal  future   3. Heart block s/p pacer underlying DM   4. Resolved delirium  5. Mild high K she is eating and drinking should go down     Recommendation/Plan  :     1. Start half of her home loop now - bumex 1 mg po BID and adjust slowly   2. Good glycemic control  3. Low salt  4. Daily wt  5.  She is waiting for placement   6. follow clinically and bio chemically       Evelio Treviño MD

## 2020-11-20 NOTE — PROGRESS NOTES
Daily Progress Note    More awake alert  AV paced rate is stable  Stable from cardiac stand  Keep on low dose Toprol       Pt. awake, more alert today  BP stable, paced rhythm in the 70s  No CP, SOB     Near syncope, CHB    Noted on EKG- rate was in the 30s    Taken for LHC and temporary pacer  [facility] neg.    S/p PPM     CXR stable, PPM check stable    Site healing well     HFpEF    EF 50-55%    BNP elevated yesterday    Started on diuretics per primary    Good UOP    Will follow BNP now    Renal following for KAREEM on CKD  Echo reassuring  Hallucinations so CT head done but non acute-has UTI and treated by primary  Decubitus ulcer  Will follow     Echo-11/17/20  Summary   Technically difficult examination.   Left ventricular systolic function is normal.   Ejection fraction is visually estimated at 50-55%.   Sclerotic, but non-stenotic aortic valve.   Moderate mitral regurgitation.   Mild tricuspid regurgitation.  RVSP is 52 mmHg.   No evidence of any pericardial effusion.   Temporary pacing wire is visualized within the IVC and extends to the apex   of the right heart.     LHC-11/17/20  LEFT MAIN PATENT  LAD/LCX AND RCA MILD DX  LVEDP 20-25  PA-61/29/28  RV-64/14/29  TEMP PACER PLACED  RATE OF 80-PACED  SHEATHS SUTURED IN PLACE  PLAN FOR A PERMANENT PACER  DISCUSSED WITH DR. Yolanda Lyle     PAST MEDICAL HISTORY:  The patient has a history of having hypertension  present; history of renal insufficiency stage IV, not on dialysis;  diabetes; hyperlipidemia; venous ulcers; and thyroid disease present.     PAST SURGICAL HISTORY:  Appendectomy, gallbladder surgery, bilateral I  think cataract implants, and hysterectomy done.     SOCIAL HISTORY:  Does not smoke, does not drink.     ALLERGIES:  LATEX DYE, IVP DYE, IODINE, LASIX, PROCARDIA, DOXYCYCLINE,  and MOBIC.     MEDICATIONS AT HOME:  She is on Zaroxolyn, _____ acid, Pepcid, and  Synthroid, but no beta blockers.  See the rest of the list.      Objective:   BP (!) 137/52 Pulse 70   Temp 97.9 °F (36.6 °C) (Oral)   Resp 17   Ht 5' 2\" (1.575 m)   Wt 181 lb 8 oz (82.3 kg)   SpO2 96%   BMI 33.20 kg/m²       Intake/Output Summary (Last 24 hours) at 11/20/2020 0843  Last data filed at 11/20/2020 0338  Gross per 24 hour   Intake --   Output 1250 ml   Net -1250 ml       Medications:   Scheduled Meds:   carbidopa-levodopa  2 tablet Oral Nightly    metOLazone  5 mg Oral Daily    cefTRIAXone (ROCEPHIN) IV  1 g Intravenous Q24H    miconazole   Topical BID    sodium chloride flush  10 mL Intravenous 2 times per day    metoprolol succinate  25 mg Oral Daily    sodium chloride flush  10 mL Intravenous 2 times per day    calcium elemental  1 tablet Oral Daily    ferrous sulfate  325 mg Oral BID WC    fluticasone  2 spray Nasal Daily    fluticasone  1 puff Inhalation BID    levothyroxine  50 mcg Oral Daily    heparin (porcine)  5,000 Units Subcutaneous TID    insulin lispro  0-6 Units Subcutaneous TID WC    insulin lispro  0-3 Units Subcutaneous Nightly    influenza virus vaccine  0.5 mL Intramuscular Prior to discharge    febuxostat  40 mg Oral Daily      Infusions:   dextrose        PRN Meds:  traMADol, albuterol sulfate HFA, sodium chloride flush, acetaminophen, sodium chloride flush, acetaminophen **OR** acetaminophen, polyethylene glycol, promethazine **OR** ondansetron, glucose, dextrose, glucagon (rDNA), dextrose       Physical Exam:  Vitals:    11/20/20 0600   BP: (!) 137/52   Pulse: 70   Resp: 17   Temp: 97.9 °F (36.6 °C)   SpO2: 96%        General: AAO, NAD  Chest: Nontender  Cardiac: First and Second Heart Sounds are Normal, No Murmurs or Gallops noted  Lungs:Clear to auscultation and percussion. Abdomen: Soft, NT, ND, +BS  Extremities: No clubbing, no edema  Vascular:  Equal 2+ peripheral pulses.         Lab Data:  CBC:   Recent Labs     11/18/20  0340 11/19/20  0955   WBC 9.8 7.4   HGB 10.4* 10.5*   HCT 31.9* 34.5*   MCV 97.3 99.4    288     BMP: Recent Labs     11/18/20  0340 11/19/20  0955    147*   K 3.9 3.8    108   CO2 19* 24   PHOS  --  3.2   * 81*   CREATININE 2.7* 1.9*     LIVER PROFILE:   Recent Labs     11/19/20  0955   AST 14*   ALT 10   BILITOT 0.3   ALKPHOS 90     PT/INR: No results for input(s): PROTIME, INR in the last 72 hours. APTT: No results for input(s): APTT in the last 72 hours. BNP:  No results for input(s): BNP in the last 72 hours.       Assessment:  Patient Active Problem List    Diagnosis Date Noted    Gram positive sepsis ([facility] 09/06/2014     Priority: High    Gram-positive bacteremia 09/06/2014     Priority: High    Sepsis ([facility] 09/05/2014     Priority: High    Third degree heart block ([facility] 11/16/2020    Acute kidney injury superimposed on chronic kidney disease ([facility] 04/03/2019    Acute kidney injury ([facility] 08/29/2018    Acute gout 04/04/2018    Fluid overload 08/30/2017    HTN (hypertension) 08/30/2017    Chronic kidney disease (CKD) stage G2/A2, mildly decreased glomerular filtration rate (GFR) between 60-89 mL/min/1.73 square meter and albuminuria creatinine ratio between  mg/g 08/30/2017    Chronic kidney disease (CKD) stage G3a/A2, moderately decreased glomerular filtration rate (GFR) between 45-59 mL/min/1.73 square meter and albuminuria creatinine ratio between  mg/g 04/26/2017    Cor pulmonale, chronic ([facility] 04/26/2017    DM (diabetes mellitus) ([facility] 04/26/2017    Chronic renal impairment, stage 3 (moderate) 03/08/2017    Anemia of chronic disease 03/08/2017    Toxic shock syndrome ([facility] 08/14/2016    Rhabdomyolysis 08/01/2016    Hyperkalemia 08/01/2016    Morbid obesity with BMI of 45.0-49.9, adult ([facility] 08/01/2016    Encephalopathy in sepsis 08/01/2016    WD-Non-pressure chronic ulcer right lower leg, 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 ([facility] 04/21/2016    Non-pressure chronic ulcer of left lower leg, limited to breakdown of skin ([facility] 03/07/2016    Chronic ulcer of right leg with fat layer exposed ([facility] 03/07/2016    Lymphedema of both lower extremities with cellulitis 03/07/2016    WD-Idiopathic chronic venous hypertension of both lower extremities with ulcer and inflammation ([facility] 03/07/2016    Cellulitis of left lower extremity 09/08/2014    Cellulitis of right lower extremity 09/05/2014    Diabetes type 2, controlled ([facility]     Osteoarthritis     History of asthma        Electronically signed by Kavita Roman PA-C on 11/20/2020 at 8:43 AM

## 2020-11-20 NOTE — H&P
History and Physical  Daria Roberts MD    11/20/2020  Vaughn Mahmood  1935    PCP: TREVON Winn - CNP    ASSESSMENT AND PLAN:      1. Weakness and deblity  - ptot    2. 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 KAREEM on CKD stage IV - KAREEM resolved   Likely pre renal/cardiogenic, CR 3.7 on admit, >2.7>1.9 (back to baseline)  Monitor BMP weekly , next on 11/25  bumex bid okayed by nephrology  Nephrology follow up as OP     4. Acute metabolic encephalopathy -resolved - now at baseline      Baseline dementia - worsened by UTI  IV rocephin switched to LANGE RIC on DC   Has 5 more days of cefdinir left     5. Type 2 DM - low dose ss insulin, Hypoglycemia precautions     6. Chronic medical conditions : resume as Prescribed unless contraindicated     Anxiety  Chronic pain syndrome  Anemia of chronic disorders  Parkinsonism   Hypothyroidism   Stage II pressure ulcer - sacral     Cc: weakness    HPI: Vaughn Mahmood is a 82 y.o.  female who presents with recently treated third degree AV block at [facility] who is now sp PPM 11/17. She presents now to [facility] for rehab needs following stay there where she has secondary weakness from being hospitalized with limited mobility. She has been working with [facility] and will need one more week of therapy. Patient has no light headedness sob cough chest pain nausea or vomiting.          PMHX:   Past Medical History:   Diagnosis Date    Asthma     Chronic kidney disease     acute kidney failure    Chronic ulcer of left leg with fat layer exposed ([address] 3/7/2016    Chronic ulcer of right leg with fat layer exposed ([address] 3/7/2016    Diabetes type 2, controlled ([address]     History of asthma     Hypertension     Lymphedema of both lower extremities 3/7/2016    Osteoarthritis     Pneumonia     Thyroid disease     Venous stasis of both lower extremities 3/7/2016    WD-Non-pressure chronic ulcer right lower leg, 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 ([address] 4/21/2016     PSHX:  has a past surgical history that includes Urethra surgery; Appendectomy; Hysterectomy; Cholecystectomy; Cystoscopy; eye surgery; and joint replacement (Right). Meds - list of home medications reviewed in electronic chart and confirmed  Allergies: Allergies   Allergen Reactions    Latex Rash    Dye [Iodides] Anaphylaxis    Iodine Anaphylaxis    Dyazide [Hydrochlorothiazide W-Triamterene] Rash    Lasix [Furosemide] Rash    Procardia [Nifedipine] Other (See Comments)     Not for sure if rash occurred or rash    Doxycycline Dermatitis    Edecrin [Ethacrynic Acid]     Levaquin [Levofloxacin] Other (See Comments)     unknown    Mobic [Meloxicam]     Vioxx [Rofecoxib]     Celebrex [Celecoxib] Rash    Lexapro [Escitalopram Oxalate] Rash    Sulfa Antibiotics Hives       FAM HX: family history includes Heart Disease in her father.   Soc HX:   Social History     Socioeconomic History    Marital status:      Spouse name: None    Number of children: None    Years of education: None    Highest education level: None   Occupational History    None   Social Needs    Financial resource strain: None    Food insecurity     Worry: None     Inability: None    Transportation needs     Medical: None     Non-medical: None   Tobacco Use    Smoking status: Never Smoker    Smokeless tobacco: Never Used   Substance and Sexual Activity    Alcohol use: No    Drug use: No    Sexual activity: Yes     Partners: Male   Lifestyle    Physical activity     Days per week: None     Minutes per session: None    Stress: None   Relationships    Social connections     Talks on phone: None     Gets together: None     Attends Jewish service: None     Active member of club or organization: None     Attends meetings of clubs or organizations: None     Relationship status: None    Intimate partner violence     Fear of current or ex partner: None     Emotionally abused: None     Physically abused: None     Forced sexual activity: None   Other Topics Concern    None   Social History Narrative    None       ROS:  A ten point ros with pertinent positives and negatives in hpi, otherwise negative. EXAM:  Blood pressure (!) 145/62, pulse 68, temperature 97.9 °F (36.6 °C), temperature source Oral, resp. rate 18, height 5' 2\" (1.575 m), weight 178 lb 14.4 oz (81.1 kg), SpO2 100 %, not currently breastfeeding. Gen:  awake, alert, cooperative, no apparent distress   EYES:  Lids and lashes normal, pupils equal, round and reactive to light, extra ocular muscles intact, sclera clear, conjunctiva normal  ENT:  Normocephalic, oral pharynx with moist mucus membranes, tonsils without erythema or exudates,  NECK:  Supple, symmetrical, trachea midline, no adenopathy,  LUNGS:  Clear to auscultate bilaterally, no rales ronchi or wheezing noted. CARDIOVASCULAR:  regular rate and rhythm, normal S1 and S2,no murmur/gallop/rub  ABDOMEN: Normal BS, Non tender, non distended, no HSM noted. MUSCULOSKELETAL:  ROM of all extremities grossly wnl  NEUROLOGIC: AOx 3,  Cranial nerves II-XII are grossly intact. Motor is 5 out of 5 bilaterally. Sensory is intact  SKIN:  Redness on legs; bruising on arm        LABS in ER reviewed    Ct Head Wo Contrast    Result Date: 11/18/2020  EXAMINATION: CT OF THE HEAD WITHOUT CONTRAST  11/18/2020 9:09 am TECHNIQUE: CT of the head was performed without the administration of intravenous contrast. Dose modulation, iterative reconstruction, and/or weight based adjustment of the mA/kV was utilized to reduce the radiation dose to as low as reasonably achievable. COMPARISON: None.  HISTORY: ORDERING SYSTEM PROVIDED HISTORY: rule out CVA TECHNOLOGIST PROVIDED HISTORY: Reason for exam:->rule out CVA Has a \"code stroke\" or \"stroke alert\" been called? ->No Reason for Exam: r/o CVA Acuity: Unknown Type of Exam: Unknown Confusion. FINDINGS: Evaluation is limited due to patient marked obliquity within the scanner. BRAIN/VENTRICLES: No gross evidence of an acute intracranial hemorrhage. There is no evidence of mass effect or midline shift. The gray-white differentiation appears grossly maintained. There are areas of hypoattenuation in the periventricular and subcortical white matter, which is nonspecific, but may represent chronic microvasvular ischemic change. There is prominence of the ventricles and sulci due to global parenchymal volume loss. Atherosclerosis of the intracranial vasculature is noted. ORBITS: The visualized portion of the orbits demonstrate no acute abnormality. SINUSES: The visualized paranasal sinuses and mastoid air cells demonstrate no acute abnormality. SOFT TISSUES/SKULL:  No acute abnormality of the visualized skull or soft tissues. 1. Evaluation is limited due to patient obliquity. 2. No gross evidence of an acute intracranial abnormality. 3. Global parenchymal volume loss with chronic microvascular ischemic changes. 4. Atherosclerosis of the intracranial vasculature. Fl Less Than 1 Hour    Result Date: 11/17/2020  Radiology exam is complete. No Radiologist dictation. Please follow up with ordering provider. Xr Chest Portable    Result Date: 11/18/2020  EXAMINATION: ONE XRAY VIEW OF THE CHEST 11/17/2020 1:22 pm COMPARISON: 11/16/2020 HISTORY: ORDERING SYSTEM PROVIDED HISTORY: Pacemaker placement and rule out pneumothorax TECHNOLOGIST PROVIDED HISTORY: Reason for exam:->Pacemaker placement and rule out pneumothorax Reason for Exam: Pacemaker placement and rule out pneumothorax FINDINGS: There is a left subclavian approach pacemaker with leads in the right atrial appendage and right ventricular apex. There is no pneumothorax. Increased interstitial markings throughout the lungs are suggestive of pulmonary edema. The cardiac size is unchanged. No pneumothorax. Possible pulmonary edema. Xr Chest Portable    Result Date: 11/16/2020  EXAMINATION: ONE XRAY VIEW OF THE CHEST 11/16/2020 11:41 am COMPARISON: 07/12/2019 HISTORY: ORDERING SYSTEM PROVIDED HISTORY: 3 rd degree AVB TECHNOLOGIST PROVIDED HISTORY: Reason for exam:->3 rd degree AVB Reason for Exam: sob Acuity: Acute Type of Exam: Initial Additional signs and symptoms: sob Relevant Medical/Surgical History: sob FINDINGS: The cardiac silhouette is enlarged. Vascular calcifications are noted along the aortic arch. There is pulmonary vascular congestion which has increased. There may be small bilateral pleural effusions with associated bibasilar atelectasis. No pneumothorax. The visualized osseous structures are unremarkable. 1. Pulmonary vascular congestion, slightly increased. There are probable small bilateral pleural effusions with associated bibasilar atelectasis. Us Retroperitoneal Limited    Result Date: 11/16/2020  EXAMINATION: ULTRASOUND OF THE KIDNEYS 11/16/2020 8:32 pm COMPARISON: Renal ultrasound 07/31/2016. HISTORY: ORDERING SYSTEM PROVIDED HISTORY: Renal failure TECHNOLOGIST PROVIDED HISTORY: Reason for exam:->Renal failure Reason for Exam: ARF; abnormal labs Type of Exam: Initial FINDINGS: The right kidney measures 9.9 cm in length and the left kidney measures 10.3 cm in length. Kidneys demonstrate normal cortical echogenicity. No hydronephrosis or intrarenal stones. No focal lesions.      Unremarkable ultrasound of the kidneys.   -  Patient Active Problem List   Diagnosis    Diabetes type 2, controlled ([address]    Osteoarthritis    History of asthma    Cellulitis of right lower extremity    Sepsis ([address]    Gram positive sepsis ([address]    Gram-positive bacteremia    Cellulitis of left lower extremity    Non-pressure chronic ulcer of left lower leg, limited to breakdown of skin ([address]    Chronic ulcer of right leg with fat layer exposed ([facility]    Lymphedema of both lower extremities with cellulitis    WD-Idiopathic chronic venous hypertension of both lower extremities with ulcer and inflammation (HCC)    WD-Non-pressure chronic ulcer right lower leg, 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 (HCC)    Rhabdomyolysis    Hyperkalemia    Morbid obesity with BMI of 45.0-49.9, adult (HCC)    Encephalopathy in sepsis    Toxic shock syndrome (HCC)    Chronic renal impairment, stage 3 (moderate)    Anemia of chronic disease    Chronic kidney disease (CKD) stage G3a/A2, moderately decreased glomerular filtration rate (GFR) between 45-59 mL/min/1.73 square meter and albuminuria creatinine ratio between  mg/g    Cor pulmonale, chronic (HCC)    DM (diabetes mellitus) ([facility])    Fluid overload    HTN (hypertension)    Chronic kidney disease (CKD) stage G2/A2, mildly decreased glomerular filtration rate (GFR) between 60-89 mL/min/1.73 square meter and albuminuria creatinine ratio between  mg/g    Acute gout    Acute kidney injury ([facility]    Acute kidney injury superimposed on chronic kidney disease ([facility]    Third degree heart block (HCC)    Weakness     ______________________________________________________________    Electronically signed by Irving Galvez MD on 11/20/2020 at 4:19 PM

## 2020-11-20 NOTE — CARE COORDINATION
Received message from Isi/admissions at [address] who states patient has been approved for admission today and needs a new COVID. PS to Dr. Jose Alejandro Prescott to update and updated Lemuel Navarro. 10:58 AM  Called and updated patient daughter/Lyly of acceptance in Swing Bed and on discharge for later today. Called [facility] with stretcher secured for 2:45pm and face sheet faxed. Updated patient, daughter/Lyly, Amber FAROOQ and Ev Healy with Swing Bed of transportation timing. Also pending COVID results.        Swing Bed report: [phone]

## 2020-11-20 NOTE — DISCHARGE SUMMARY
[address] Hospitalist     Discharge Summary    Name:  Ene Richardson /Age/Sex: 1935  (82 y.o. female)   MRN & CSN:  [id number] & [id number] Admission Date/Time: 2020  3:50 PM   Attending:  Krista Guaman MD Discharging Physician: Krista Guaman MD     HPI:     Please, see admission HPI in [address] and patient's hospital course below    Hospital Course:   Ene Richardson is a 82 y.o.  female  who presents with with dizziness, symptomatic sena and the following assessments reflect patient's hospital course      Thrid degree HB - S/p PPM        Predsented with Dizziness/Pre syncope  Was on temp pacer after admission    Last ECHO was is 2017 with LVH but preserved EF 50-55%, DD  [facility]  - left main patent - mild disease LAD/LCx, RCA  Cardiology, Cardio-thoracic follow up as OP       KAREEM on CKD stage IV - KAREEM resolved      Likely pre renal/cardiogenic, CR 3.7 on admit, >2.7>1.9 (back to baseline)  Monitor BMP weekly   Restarted diuretics on DC  Nephrology follow up as OP     Acute metabolic encephalopathy -resolved - now at baseline      Baseline dementia - worsened by UTI  IV rocephin switched to LANGE RIC on DC   Culture was no growth as it was taken after start of IV AB     Type 2 DM - low dose ss insulin, Hypoglycemia precautions     Chronic medical conditions : resume as Prescribed unless contraindicated     Anxiety  Chronic pain syndrome  Anemia of chronic disorders  Parkinsonism   Hypothyroidism   Stage II pressure ulcer - sacral      Patient is hemodynamically stable for DC to swing bed    Attempted to reach patient's daughter, Lissy Long, over the phone with [phone] on 2 separate occasions bed was unable to. Left voicemail message    The patient expressed appropriate understanding of and agreement with the discharge recommendations, medications, and plan.      Consults this admission:  IP CONSULT TO CARDIOLOGY  IP CONSULT TO NEPHROLOGY  IP CONSULT TO DIETITIAN    Discharge Instruction: sulfADIAZINE (SSD) 1 % cream  APPLY  CREAM TOPICALLY DAILY             traMADol (ULTRAM) 50 MG tablet  Take 1 tablet by mouth every 6 hours as needed for Pain for up to 3 days. triamcinolone (NASACORT ALLERGY 24HR) 55 MCG/ACT nasal inhaler  2 sprays by Nasal route 2 times daily                 Subjective _     Objective Findings at Discharge:   BP (!) 137/52   Pulse 70   Temp 97.9 °F (36.6 °C) (Oral)   Resp 17   Ht 5' 2\" (1.575 m)   Wt 181 lb 8 oz (82.3 kg)   SpO2 96%   BMI 33.20 kg/m²            PHYSICAL EXAM   GEN Awake female, resting in bed in no apparent distress. Appears given age. RESP Clear to auscultation, no wheezes, rales or rhonchi. CARDIO/VAS - S1/S2 auscultated. Regular rate without appreciable murmurs, rubs, or gallops. Peripheral pulses equal bilaterally and palpable. No peripheral edema. GI Abdomen is soft without significant tenderness, masses, or guarding. Bowel sounds are normoactive  MSK No gross joint deformities. Spontaneous movement of all extremities  SKIN Normal coloration, warm, dry. NEURO Cranial nerves appear grossly intact, normal speech, no lateralizing weakness.     BMP/CBC  Recent Labs     11/18/20  0340 11/19/20  0955    147*   K 3.9 3.8    108   CO2 19* 24   * 81*   CREATININE 2.7* 1.9*   WBC 9.8 7.4   HCT 31.9* 34.5*    288         Discharge Time of 36 minutes    Electronically signed by Selwyn Pulliam MD on 11/20/2020 at 10:50 AM

## 2020-11-20 NOTE — PROGRESS NOTES
Physical Therapy    Physical Therapy Treatment Note  Name: Mamadou Beard MRN: [id number] :   1935   Date:  2020   Admission Date: 2020 [facility]   Restrictions/Precautions:           pt has wounds on inner thighs and sacrom  Communication with other providers:  Per nurse ok to tx  Subjective:  Patient states:  Pt agreeable to tx. Pain:   Location, Type, Intensity (0/10 to 10/10):  Rates pain at end of tx 6  Objective:    Observation:  Alert and oriented  Treatment, including education/measures:  Sup to sit with HOB up and using bed rail max assist and cues. Pt was able to scoot to EOB with min assist and cues but needing increased time and effort. Sit<=>stand from bed and chair mod assist and cues for hand placement. Pt amb with rw cga and cues 6 steps x 1 bed to chair and then 10' x 1 followed with chair for safety. Ex in sup and sitting:  10 reps aps  10 reps quad and glut sets  10 reps heel slides  10 reps abd/add  10 reps x 2 saq  10 reps laqs  Safety  Patient left safely in the chair, with call light/phone in reach with alarm applied. Gait belt and mask were used for transfers and gait. Assessment / Impression:       Patient's tolerance of treatment:  good   Adverse Reaction: na  Significant change in status and impact:  na  Barriers to improvement:  Strength and safety  Plan for Next Session:    Cont.  POC  Time in:  1030  Time out: 1115  Timed treatment minutes:  45  Total treatment time:  39    Previously filed items:  Social/Functional History  Lives With: Spouse  Type of Home: House  Home Layout: Two level, Able to Live on Main level with bedroom/bathroom  Bathroom Shower/Tub: Tub/Shower unit, Shower chair with back  [place] Accessibility: Accessible  Home Equipment: Lift chair, Rolling walker  Receives Help From: Family  ADL Assistance: [facility]: Independent  Homemaking Responsibilities: Yes  Ambulation Assistance: Independent  Transfer Assistance: Independent  Active : Yes  Mode of Transportation: Car  Short term goals  Time Frame for Short term goals: 1 week  Short term goal 1: Patient will perform supine to sit with mod A. Short term goal 2: Patient will perform STS with CGA while maintaining precautions. Short term goal 3: Patient will ambulate x25ft with CGA while maintaining precautions.        Electronically signed by:    Naveed King PTA  11/20/2020, 8:31 AM

## 2020-11-20 NOTE — DISCHARGE INSTR - COC
Continuity of Care Form    Patient Name: Shaq Gibbs   :  1935  MRN:  [id number]    Admit date:  2020  Discharge date:  ***    Code Status Order: Full Code   Advance Directives:   [facility] Documentation       Date/Time Healthcare Directive Type of Healthcare Directive Copy in [facility] [address] Agent's Name Healthcare Agent's Phone Number    [phone]  Unknown, patient unable to respond due to medical condition -- -- -- -- --            Admitting Physician:  Francisca Sin MD  PCP: Tuan Fuller, APRN - CNP    Discharging Nurse: [facility] Unit/Room#: 3004/3004-A  Discharging Unit Phone Number: ***    Emergency Contact:   Extended Emergency Contact Information  Primary Emergency Contact: Jeni Palmer  Address: [address] Phone: [phone]  Mobile Phone: [phone]  Relation: Spouse  Secondary Emergency Contact: [address] [place] [address] Phone: [phone]  Mobile Phone: [phone]  Relation: Step Child    Past Surgical History:  Past Surgical History:   Procedure Laterality Date    APPENDECTOMY      CHOLECYSTECTOMY      CYSTOSCOPY      EYE SURGERY      bilateral implants    HYSTERECTOMY      JOINT REPLACEMENT Right     knee    URETHRA SURGERY         Immunization History:   Immunization History   Administered Date(s) Administered    Influenza Vaccine, unspecified formulation 10/15/2016    Pneumococcal Conjugate 13-valent Jus Peterson) 2014       Active Problems:  Patient Active Problem List   Diagnosis Code    Diabetes type 2, controlled ([address] E11.9    Osteoarthritis M19.90    History of asthma Z87.09    Cellulitis of right lower extremity L03.115    Sepsis ([address] A41.9    Gram positive sepsis (HCC) A41.89    Gram-positive bacteremia R78.81    Cellulitis of left lower extremity L03.116    Non-pressure chronic ulcer of left lower leg, limited to breakdown of skin ([address] L97.921 Chronic ulcer of right leg with fat layer exposed ([facility] L97.912    Lymphedema of both lower extremities with cellulitis I89.0    WD-Idiopathic chronic venous hypertension of both lower extremities with ulcer and inflammation ([facility]) I87.333, L97.919, L97.929    WD-Non-pressure chronic ulcer right lower leg, 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 ([facility] L97.911    Rhabdomyolysis M62.82    Hyperkalemia E87.5    Morbid obesity with BMI of 45.0-49.9, adult ([facility]) E66.01, Z68.42    Encephalopathy in sepsis G93.41    Toxic shock syndrome ([facility] A48. 3    Chronic renal impairment, stage 3 (moderate) N18.30    Anemia of chronic disease D63.8    Chronic kidney disease (CKD) stage G3a/A2, moderately decreased glomerular filtration rate (GFR) between 45-59 mL/min/1.73 square meter and albuminuria creatinine ratio between  mg/g N18.31    Cor pulmonale, chronic ([facility]) I27.81    DM (diabetes mellitus) ([facility]) E11.9    Fluid overload E87.70    HTN (hypertension) I10    Chronic kidney disease (CKD) stage G2/A2, mildly decreased glomerular filtration rate (GFR) between 60-89 mL/min/1.73 square meter and albuminuria creatinine ratio between  mg/g N18.2    Acute gout M10.9    Acute kidney injury ([facility] N17.9    Acute kidney injury superimposed on chronic kidney disease ([facility]) N17.9, N18.9    Third degree heart block ([facility]) I44.2       Isolation/Infection:   Isolation            No Isolation          Patient Infection Status       Infection Onset Added Last Indicated Last Indicated By Review Planned Expiration Resolved Resolved By    None active    Resolved    COVID-19 Rule Out 11/16/20 11/16/20 11/16/20 COVID-19 (Ordered)   11/16/20 Rule-Out Test Resulted            Nurse Assessment:  Last Vital Signs: BP (!) 137/52   Pulse 70   Temp 97.9 °F (36.6 °C) (Oral)   Resp 17   Ht 5' 2\" (1.575 m)   Wt 181 lb 8 oz (82.3 kg)   SpO2 96%   BMI 33.20 kg/m²     Last documented pain score (0-10 scale): Pain Level: 5  Last Weight:   Wt Readings from Last 1 Encounters:   11/19/20 181 lb 8 oz (82.3 kg)     Mental Status:  {IP PT MENTAL STATUS:20030:::0}    IV Access:  { WEI IV ACCESS:695845780:::0}    Nursing Mobility/ADLs:  Walking   {CHP DME ADLs:743350056:::0}  Transfer  {CHP DME ADLs:289255099:::0}  Bathing  {CHP DME ADLs:799629843:::0}  Dressing  {CHP DME ADLs:665623121:::0}  Toileting  {CHP DME ADLs:277637714:::0}  Feeding  {CHP DME ADLs:636113560:::0}  Med Admin  {CHP DME ADLs:373405796:::0}  Med Delivery   { WEI MED Delivery:340561823:::0}    Wound Care Documentation and Therapy:  Wound 11/18/20 Buttocks Right (Active)   Wound Etiology Pressure Stage  2 11/19/20 2300   Dressing Status New dressing applied 11/19/20 2300   Wound Cleansed Cleansed with saline 11/18/20 0930   Dressing/Treatment Other (comment) 11/19/20 2300   Wound Length (cm) 2 cm 11/18/20 0930   Wound Width (cm) 1.8 cm 11/18/20 0930   Wound Depth (cm) 0.1 cm 11/18/20 0930   Wound Surface Area (cm^2) 3.6 cm^2 11/18/20 0930   Wound Volume (cm^3) 0.36 cm^3 11/18/20 0930   Distance Tunneling (cm) 0 cm 11/18/20 0930   Tunneling Position ___ O'Clock 0 11/18/20 0930   Undermining Starts ___ O'Clock 0 11/18/20 0930   Undermining Ends___ O'Clock 0 11/18/20 0930   Undermining Maxium Distance (cm) 0 11/18/20 0930   Wound Assessment Pink/red 11/18/20 0930   Drainage Amount Scant 11/18/20 0930   Drainage Description Serous 11/18/20 0930   Odor None 11/18/20 0930   Leatha-wound Assessment Blanchable erythema 11/18/20 0930   Margins Defined edges 11/18/20 0930   Wound Thickness Description not for Pressure Injury Partial thickness 11/18/20 0930   Number of days: 2       Wound 11/18/20 Buttocks Left (Active)   Wound Etiology Pressure Stage  2 11/19/20 2300   Dressing Status New dressing applied 11/19/20 2300   Wound Cleansed Cleansed with saline 11/18/20 0930   Dressing/Treatment Other (comment) 11/19/20 2300   Wound Length (cm) 3 cm 11/18/20 0930   Wound Width (cm) 1.5 cm 11/18/20 0930   Wound Depth 0930   Dressing/Treatment Other (comment) 11/19/20 2300   Wound Length (cm) 1 cm 11/18/20 0930   Wound Width (cm) 1 cm 11/18/20 0930   Wound Depth (cm) 0.1 cm 11/18/20 0930   Wound Surface Area (cm^2) 1 cm^2 11/18/20 0930   Wound Volume (cm^3) 0.1 cm^3 11/18/20 0930   Distance Tunneling (cm) 0 cm 11/18/20 0930   Tunneling Position ___ O'Clock 0 11/18/20 0930   Undermining Starts ___ O'Clock 0 11/18/20 0930   Undermining Ends___ O'Clock 0 11/18/20 0930   Undermining Maxium Distance (cm) 0 11/18/20 0930   Wound Assessment Pink/red 11/18/20 0930   Drainage Amount Scant 11/18/20 0930   Drainage Description Serous 11/18/20 0930   Odor None 11/18/20 0930   Leatha-wound Assessment Blanchable erythema 11/18/20 0930   Margins Defined edges 11/18/20 0930   Wound Thickness Description not for Pressure Injury Partial thickness 11/18/20 0930   Number of days: 2       Wound 11/18/20 Tibial Right;Posterior (Active)   Wound Etiology Venous 11/19/20 0908   Dressing Status New dressing applied 11/18/20 0930   Wound Cleansed Cleansed with saline 11/18/20 0930   Dressing/Treatment Collagen;Silicone border 93/40/22 0930   Wound Length (cm) 1 cm 11/18/20 0930   Wound Width (cm) 0.6 cm 11/18/20 0930   Wound Depth (cm) 0.1 cm 11/18/20 0930   Wound Surface Area (cm^2) 0.6 cm^2 11/18/20 0930   Wound Volume (cm^3) 0.06 cm^3 11/18/20 0930   Distance Tunneling (cm) 0 cm 11/18/20 0930   Tunneling Position ___ O'Clock 0 11/18/20 0930   Undermining Starts ___ O'Clock 0 11/18/20 0930   Undermining Ends___ O'Clock 0 11/18/20 0930   Undermining Maxium Distance (cm) 0 11/18/20 0930   Wound Assessment Pink/red 11/18/20 0930   Drainage Amount Small 11/18/20 0930   Drainage Description Serosanguinous 11/18/20 0930   Odor None 11/18/20 0930   Leatha-wound Assessment Dry/flaky 11/18/20 0930   Margins Defined edges 11/18/20 0930   Wound Thickness Description not for Pressure Injury Full thickness 11/18/20 0930   Number of days: 2       Wound 11/18/20 Sacrum intergluteal cleft (Active)   Wound Etiology Pressure Stage  2 11/19/20 2300   Dressing Status Other (Comment) 11/19/20 2300   Wound Cleansed Cleansed with saline 11/19/20 2300   Wound Length (cm) 3 cm 11/18/20 0930   Wound Width (cm) 0.2 cm 11/18/20 0930   Wound Depth (cm) 0.1 cm 11/18/20 0930   Wound Surface Area (cm^2) 0.6 cm^2 11/18/20 0930   Wound Volume (cm^3) 0.06 cm^3 11/18/20 0930   Distance Tunneling (cm) 0 cm 11/18/20 0930   Tunneling Position ___ O'Clock 0 11/18/20 0930   Undermining Starts ___ O'Clock 0 11/18/20 0930   Undermining Ends___ O'Clock 0 11/18/20 0930   Undermining Maxium Distance (cm) 0 11/18/20 0930   Wound Assessment Pink/red 11/18/20 0930   Drainage Amount Small 11/18/20 0930   Drainage Description Serous 11/18/20 0930   Odor None 11/18/20 0930   Leatha-wound Assessment Blanchable erythema 11/18/20 0930   Margins Defined edges 11/18/20 0930   Wound Thickness Description not for Pressure Injury Partial thickness 11/18/20 0930   Number of days: 2        Elimination:  Continence:    Bowel: {YES / SI:03713}  Bladder: {YES / RC:65735}  Urinary Catheter: {Urinary Catheter:920928908:::0}   Colostomy/Ileostomy/Ileal Conduit: {YES / LM:47901}       Date of Last BM: ***    Intake/Output Summary (Last 24 hours) at 11/20/2020 1057  Last data filed at 11/20/2020 3987  Gross per 24 hour   Intake --   Output 1250 ml   Net -1250 ml     I/O last 3 completed shifts:  In: -   Out: 550 [Urine:550]    Safety Concerns:     [employer] Safety Concerns:498123228:::0}    Impairments/Disabilities:      [employer] Impairments/Disabilities:149969239:::0}    Nutrition Therapy:  Current Nutrition Therapy:   [employer] Diet List:343366841:::0}    Routes of Feeding: {CHP DME Other Feedings:261767223:::0}  Liquids: {Slp liquid thickness:37324}  Daily Fluid Restriction: {CHP DME Yes amt example:935283713:::0}  Last Modified Barium Swallow with Video (Video Swallowing Test): {Done Not Done QWCZ:375513714:::1}    Treatments at the Time

## 2020-11-20 NOTE — CARE COORDINATION
SWING BED PROGRAM PRE-ADMISSION ASSESSMENT  (TRADITIONAL MEDICARE PATIENTS)  Patient Name: Donnie Jaeger      : 1935  (66 y.o.) Gender: female   Inpatient Admission Dater:   2020  Room: 3004/3004-A MRN: [id number]    Home Phone #:  [phone]  Emergency Contact and Phone Number:        Inpatient Admission Date: 2020 Date & Time of Referral: 2020          Referred By: Alexandra Sims MD Referred from:  [] [place]   [x] Other:     # of Skilled Care Days Used in Last 60 days: 0 Insurance: [x]  Medicare                      []  Secondary - Type:     Present Condition/Diagnosis:    Sinus bradycardia [R00.1]      Previous Medical History:   Past Medical History:   Diagnosis Date    Asthma     Chronic kidney disease     acute kidney failure    Chronic ulcer of left leg with fat layer exposed ([address] 3/7/2016    Chronic ulcer of right leg with fat layer exposed ([address] 3/7/2016    Diabetes type 2, controlled ([address]     History of asthma     Hypertension     Lymphedema of both lower extremities 3/7/2016    Osteoarthritis     Pneumonia     Thyroid disease     Venous stasis of both lower extremities 3/7/2016    WD-Non-pressure chronic ulcer right lower leg, 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 ([address] 2016        COGNITIVE/BEHAVIORAL  Change in cognitive status in last 90 days:  ( )no change  ( ) improved  ( ) deteriorated  Behavioral changes in last seven days:  ( ) wandering  ( ) verbally abusive  ( )phys.  Abusive                                                                         ( ) socially inappropriate  ( ) resists care  ADL Performance Last Seven (7) Days (Please Score)   Patients performance over all shifts during last seven (7) days   0 = Independent - No help or oversight  1 = Supervision - Oversight, encouragement or cueing provided  2 = Limited Assist - Receives physical help in guided maneuvering of limbs or other non-weight bearing assistance  3 = Extensive Assist - Patient flat  [] Fever w/at least one (1) of the following: [] dehydration [] pneumonia [] vomiting [] weight loss  [] feeding tube  [] Septicemia  [] Coma (not awake & completely dependent in ADL)  [] Diabetes and injections seven (7) days and Dr. order change two (2) or more days.   [] Parenteral/IV feeding  [] respiratory therapy for seven (7) days   SPECIAL CARE LOW:   [] Respiratory Therapy  [] Ulcers (2+sites all stages) w/treatment  [] Multiple Sclerosis  [] Cerebral Palsy  [] Parkinsons Disease  [] Oxygen Therapy  [] Extensive care services w/ADL less than 6  [] Fever w/at least one (1) of the following: [] dehydration [] pneumonia [] vomiting [] weight loss  [] Foot Infection or open lesions on the foot with treatment  [] tube-feeding - calories greater than or equal to 51% or tube feeding with total calories greater than or equal to 26% and fluid parental or enteral intake of greater than or equal to 50 ml per day   CLINICALLY COMPLEX   CURRENTLY:  [] Pneumonia  [] Hemiplegics with ADL with ADL Sum greater than or equal to 10  [] IV medications delivered post admission in the SNF  [] Surgical wounds or open lesions w/treatment      EVALUATORS SIGNATURE:Isi Wooten DATE: 11/20/2020       [x] ACCEPTED FOR ADMISSION ON:  11/19/2020/                             JIMENA:  [x] 1-2wks  [] 2-3wks  [] 3-4wks   [] ADMISSION DENIED BASED ON:    [] [address] [place] [address] Rd COORDINATOR

## 2020-11-21 LAB
GLUCOSE BLD-MCNC: 107 MG/DL (ref 70–99)
GLUCOSE BLD-MCNC: 157 MG/DL (ref 70–99)
GLUCOSE BLD-MCNC: 210 MG/DL (ref 70–99)

## 2020-11-21 PROCEDURE — 94761 N-INVAS EAR/PLS OXIMETRY MLT: CPT

## 2020-11-21 PROCEDURE — 6370000000 HC RX 637 (ALT 250 FOR IP): Performed by: INTERNAL MEDICINE

## 2020-11-21 PROCEDURE — 1200000002 HC SEMI PRIVATE SWING BED

## 2020-11-21 PROCEDURE — 97116 GAIT TRAINING THERAPY: CPT

## 2020-11-21 PROCEDURE — 82962 GLUCOSE BLOOD TEST: CPT

## 2020-11-21 PROCEDURE — 6360000002 HC RX W HCPCS: Performed by: INTERNAL MEDICINE

## 2020-11-21 PROCEDURE — 94640 AIRWAY INHALATION TREATMENT: CPT

## 2020-11-21 PROCEDURE — 97162 PT EVAL MOD COMPLEX 30 MIN: CPT

## 2020-11-21 RX ORDER — FLUTICASONE PROPIONATE 110 UG/1
2 AEROSOL, METERED RESPIRATORY (INHALATION) 2 TIMES DAILY
Status: DISCONTINUED | OUTPATIENT
Start: 2020-11-21 | End: 2020-12-01 | Stop reason: HOSPADM

## 2020-11-21 RX ADMIN — CILOSTAZOL 50 MG: 50 TABLET ORAL at 20:24

## 2020-11-21 RX ADMIN — CALCIUM 500 MG: 500 TABLET ORAL at 10:33

## 2020-11-21 RX ADMIN — ROPINIROLE HYDROCHLORIDE 0.5 MG: 0.25 TABLET, FILM COATED ORAL at 10:33

## 2020-11-21 RX ADMIN — FLUTICASONE PROPIONATE 2 PUFF: 110 AEROSOL, METERED RESPIRATORY (INHALATION) at 10:10

## 2020-11-21 RX ADMIN — BUMETANIDE 1 MG: 1 TABLET ORAL at 10:32

## 2020-11-21 RX ADMIN — FLUTICASONE PROPIONATE 2 SPRAY: 50 SPRAY, METERED NASAL at 12:13

## 2020-11-21 RX ADMIN — HEPARIN SODIUM 5000 UNITS: 5000 INJECTION INTRAVENOUS; SUBCUTANEOUS at 17:56

## 2020-11-21 RX ADMIN — METOPROLOL SUCCINATE 25 MG: 25 TABLET, EXTENDED RELEASE ORAL at 10:32

## 2020-11-21 RX ADMIN — INSULIN LISPRO 1 UNITS: 100 INJECTION, SOLUTION INTRAVENOUS; SUBCUTANEOUS at 17:56

## 2020-11-21 RX ADMIN — ROPINIROLE HYDROCHLORIDE 0.5 MG: 0.25 TABLET, FILM COATED ORAL at 17:58

## 2020-11-21 RX ADMIN — FLUTICASONE PROPIONATE 2 PUFF: 110 AEROSOL, METERED RESPIRATORY (INHALATION) at 20:24

## 2020-11-21 RX ADMIN — FERROUS SULFATE TAB 325 MG (65 MG ELEMENTAL FE) 325 MG: 325 (65 FE) TAB at 10:32

## 2020-11-21 RX ADMIN — HEPARIN SODIUM 5000 UNITS: 5000 INJECTION INTRAVENOUS; SUBCUTANEOUS at 10:33

## 2020-11-21 RX ADMIN — ROPINIROLE HYDROCHLORIDE 0.5 MG: 0.25 TABLET, FILM COATED ORAL at 20:24

## 2020-11-21 RX ADMIN — TRAMADOL HYDROCHLORIDE 50 MG: 50 TABLET ORAL at 03:50

## 2020-11-21 RX ADMIN — HEPARIN SODIUM 5000 UNITS: 5000 INJECTION INTRAVENOUS; SUBCUTANEOUS at 20:24

## 2020-11-21 RX ADMIN — CARBIDOPA AND LEVODOPA 2 TABLET: 10; 100 TABLET ORAL at 20:24

## 2020-11-21 RX ADMIN — ANTI-FUNGAL POWDER MICONAZOLE NITRATE TALC FREE: 1.42 POWDER TOPICAL at 10:42

## 2020-11-21 RX ADMIN — CEFDINIR 300 MG: 300 CAPSULE ORAL at 10:32

## 2020-11-21 RX ADMIN — BUMETANIDE 1 MG: 1 TABLET ORAL at 17:58

## 2020-11-21 RX ADMIN — FEBUXOSTAT 40 MG: 40 TABLET, FILM COATED ORAL at 10:32

## 2020-11-21 RX ADMIN — FERROUS SULFATE TAB 325 MG (65 MG ELEMENTAL FE) 325 MG: 325 (65 FE) TAB at 17:58

## 2020-11-21 RX ADMIN — CILOSTAZOL 50 MG: 50 TABLET ORAL at 10:32

## 2020-11-21 RX ADMIN — TRAMADOL HYDROCHLORIDE 50 MG: 50 TABLET ORAL at 17:58

## 2020-11-21 RX ADMIN — LEVOTHYROXINE SODIUM 50 MCG: 50 TABLET ORAL at 06:06

## 2020-11-21 RX ADMIN — ANTI-FUNGAL POWDER MICONAZOLE NITRATE TALC FREE: 1.42 POWDER TOPICAL at 20:24

## 2020-11-21 ASSESSMENT — PAIN SCALES - GENERAL
PAINLEVEL_OUTOF10: 0
PAINLEVEL_OUTOF10: 7
PAINLEVEL_OUTOF10: 7
PAINLEVEL_OUTOF10: 0
PAINLEVEL_OUTOF10: 0
PAINLEVEL_OUTOF10: 2
PAINLEVEL_OUTOF10: 9
PAINLEVEL_OUTOF10: 0

## 2020-11-21 ASSESSMENT — PAIN DESCRIPTION - ONSET
ONSET: GRADUAL
ONSET: ON-GOING

## 2020-11-21 ASSESSMENT — PAIN - FUNCTIONAL ASSESSMENT
PAIN_FUNCTIONAL_ASSESSMENT: ACTIVITIES ARE NOT PREVENTED
PAIN_FUNCTIONAL_ASSESSMENT: ACTIVITIES ARE NOT PREVENTED

## 2020-11-21 ASSESSMENT — PAIN DESCRIPTION - PAIN TYPE
TYPE: CHRONIC PAIN
TYPE: CHRONIC PAIN

## 2020-11-21 ASSESSMENT — PAIN DESCRIPTION - LOCATION
LOCATION: COCCYX
LOCATION: GENERALIZED

## 2020-11-21 ASSESSMENT — PAIN DESCRIPTION - PROGRESSION
CLINICAL_PROGRESSION: GRADUALLY WORSENING
CLINICAL_PROGRESSION: GRADUALLY WORSENING

## 2020-11-21 ASSESSMENT — PAIN DESCRIPTION - FREQUENCY
FREQUENCY: CONTINUOUS
FREQUENCY: CONTINUOUS

## 2020-11-21 ASSESSMENT — PAIN DESCRIPTION - DESCRIPTORS
DESCRIPTORS: ACHING
DESCRIPTORS: BURNING

## 2020-11-21 NOTE — PLAN OF CARE
Problem: IP MOBILITY  Goal: LTG - patient will demonstrate kitchen mobility  Outcome: Ongoing  Goal: LTG - patient will ambulate community distance  Outcome: Ongoing  Goal: LTG - patient will ambulate household distance  Outcome: Ongoing  Goal: LTG - patient will demonstrate safe mobility requirements  Outcome: Ongoing  Goal: STG - Patient will ambulate  Outcome: Ongoing     Problem: Skin Integrity:  Goal: Will show no infection signs and symptoms  Description: Will show no infection signs and symptoms  11/21/2020 1054 by Tuan Nj RN  Outcome: Ongoing  11/20/2020 2236 by Sonia Ratliff RN  Outcome: Ongoing  Goal: Absence of new skin breakdown  Description: Absence of new skin breakdown  11/21/2020 1054 by Tuan Nj RN  Outcome: Ongoing  11/20/2020 2236 by Sonia Ratliff RN  Outcome: Ongoing     Problem: Falls - Risk of:  Goal: Will remain free from falls  Description: Will remain free from falls  11/21/2020 1054 by Tuan Nj RN  Outcome: Ongoing  11/20/2020 2236 by Sonia Ratliff RN  Outcome: Ongoing  Goal: Absence of physical injury  Description: Absence of physical injury  11/21/2020 1054 by Tuan Nj RN  Outcome: Ongoing  11/20/2020 2236 by Sonia Ratliff RN  Outcome: Ongoing     Problem: Pain:  Description: Pain management should include both nonpharmacologic and pharmacologic interventions.   Goal: Pain level will decrease  Description: Pain level will decrease  11/21/2020 1054 by Tuan Nj RN  Outcome: Ongoing  11/20/2020 2236 by Sonia Ratliff RN  Outcome: Ongoing  Goal: Control of acute pain  Description: Control of acute pain  11/21/2020 1054 by Tuan Nj RN  Outcome: Ongoing  11/20/2020 2236 by Sonia Ratliff RN  Outcome: Ongoing  Goal: Control of chronic pain  Description: Control of chronic pain  11/21/2020 1054 by Tuan Nj RN  Outcome: Ongoing  11/20/2020 2236 by Sonia Ratliff RN  Outcome: Ongoing

## 2020-11-21 NOTE — PROGRESS NOTES
Patient arrived to room 15 on stretcher by medics, skin assessment complete, bilateral legs red with small scabbed sores scattered, right calf with cluster of stage 2 pressure ulcer, pannus slightly red, under left breast slightly red, right breast extremely excoriated, right buttocks stage 2 cluster, right thigh stage 2 ulcer dry and scabbed, left buttocks large stage 2, left thigh stage 2, left chest incision with dressing, swollen and bruised, scattered bruises over upper extremities, oriented to room and call light, educated on hospital policies and bed alarm.

## 2020-11-21 NOTE — PROGRESS NOTES
From: Ashu Amato  To: Paresh Kohler MD  Sent: 2/23/2018 11:51 AM CST  Subject: Medical Records    I was wondering if you had information on my medical records concerning my treatments for Crohn's Disease and Depression, as well as a note of when my last infusion of Remicade was. I would need a paper copy sent to my address ([address] [address]). I'm not really sure who I should ask about this so I started here.   Physical Therapy    Facility/Department: [facility] UNIT  Initial Assessment    NAME: Kanika Rivers  : 1935  MRN: [id number]    Date of Service: 2020    Discharge Recommendations:  Home with assist PRN, Patient would benefit from continued therapy after discharge   PT Equipment Recommendations  Equipment Needed: No    Assessment   Body structures, Functions, Activity limitations: Decreased functional mobility ; Decreased strength;Decreased endurance;Decreased ADL status; Decreased high-level IADLs;Decreased balance  Assessment: Patient is pleasant 83 y/o female s/p surgery with deconditioning and limited functional mobility and strength limiting ability to return home with  and family assist safely. Patient would benefit from IP PT to address functional strength and mobility for return home safely with continued PT. Treatment Diagnosis: deconditioning. Specific instructions for Next Treatment: progress ambulation tolerance and B LE strength  Prognosis: Good  Decision Making: Medium Complexity  History: S/p surgery. Clinical Presentation: evolving  PT Education: Energy Conservation;Gait Training;Transfer Training  Barriers to Learning: none  REQUIRES PT FOLLOW UP: Yes  Activity Tolerance  Activity Tolerance: Patient Tolerated treatment well;Patient limited by fatigue       Patient Diagnosis(es): There were no encounter diagnoses. 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 has a past surgical history that includes Urethra surgery; Appendectomy; Hysterectomy;  Cholecystectomy; Cystoscopy; eye surgery; and joint replacement (Right). Restrictions     Vision/Hearing  Vision: Impaired  Vision Exceptions: Wears glasses for reading  Hearing: Within functional limits     Subjective  General  Patient assessed for rehabilitation services?: Yes  Response To Previous Treatment: Not applicable  Family / Caregiver Present: No  Referring Practitioner: Jose  Referral Date : 11/20/20  Diagnosis: weakness s/p surgery  Follows Commands: Within Functional Limits  Subjective  Subjective: Patient in bed upon arrival eating breakfast and agreeable to PT evaluation.   Pain Screening  Patient Currently in Pain: No  Pain Assessment  Pain Assessment: 0-10  Vital Signs  Patient Currently in Pain: No       Orientation  Orientation  Overall Orientation Status: Within Functional Limits  Social/Functional History  Social/Functional History  Lives With: Spouse  Type of Home: House  Home Layout: Two level, Able to Live on Main level with bedroom/bathroom  Bathroom Shower/Tub: Tub/Shower unit, Shower chair with back  Bathroom Accessibility: Accessible  Home Equipment: Lift chair, Rolling walker  Receives Help From: Family  ADL Assistance: Independent  Homemaking Assistance: Independent  Homemaking Responsibilities: Yes  Ambulation Assistance: Independent  Transfer Assistance: Independent  Active : Yes  Mode of Transportation: Car  Occupation: Retired  Type of occupation:   Cognition        Objective     Observation/Palpation  Palpation: pain upon palpation B lower legs  Edema: mild  Scar: multiple abrasions noted B LE    AROM RLE (degrees)  RLE AROM: WFL  AROM LLE (degrees)  LLE AROM : WFL  AROM RUE (degrees)  RUE AROM : WFL  AROM LUE (degrees)  LUE AROM : WFL  Strength RLE  Strength RLE: WFL  Strength LLE  Strength LLE: WFL  Tone RLE  RLE Tone: Normotonic  Tone LLE  LLE Tone: Normotonic  Motor Control  Gross Motor?: WFL     Bed mobility  Supine to Sit: Minimal assistance(with HOB elevated and use of HR)  Transfers  Sit to Stand: Contact guard assistance  Stand to sit: Contact guard assistance  Comment: cues for proper technique and hand placement  Ambulation  Ambulation?: Yes  WB Status: WBAT  Ambulation 1  Surface: level tile  Device: Rolling Walker  Assistance: Contact guard assistance  Gait Deviations: Slow Mariana; Shuffles;Decreased step length  Distance: 7ft +30ft with gait belt  Stairs/Curb  Stairs?: No        Exercises  Hip Flexion: seated marching 10x B  Hip Abduction: seated 10x B  Knee Long Arc Quad: 10x B  Ankle Pumps: 20x B     Plan   Plan  Times per week: 4+  Times per day: Daily  Plan weeks: 2 weeks or upon discharge  Specific instructions for Next Treatment: progress ambulation tolerance and B LE strength  Current Treatment Recommendations: Strengthening, Functional Mobility Training, Neuromuscular Re-education, Home Exercise Program, Transfer Training, Gait Training, Safety Education & Training, Balance Training, Endurance Training  Safety Devices  Type of devices: All fall risk precautions in place, Gait belt, Left in chair, Call light within reach    Goals  Short term goals  Time Frame for Short term goals: 2 weeks or upon discharge  Short term goal 1: Patient will be able to perform bed mobility and transfers mod I. Short term goal 2: Patient will be able to perform dynamic balance activities >5' without LOB to complete ADls. Short term goal 3: Patient will be able to ambulate >150 ft with FWW mod I. Short term goal 4: Patient will be able to negotiate ramp mod I with FWW for return home. Patient Goals   Patient goals :  To return home       Therapy Time   Individual Concurrent Group Co-treatment   Time In 0848         Time Out 0920         Minutes 32         Timed Code Treatment Minutes: [address], [address] [id number], LI, AT, ROSALIE, DAYAMI  11/21/2020  9:22 AM

## 2020-11-22 LAB
GLUCOSE BLD-MCNC: 119 MG/DL (ref 70–99)
GLUCOSE BLD-MCNC: 154 MG/DL (ref 70–99)
GLUCOSE BLD-MCNC: 157 MG/DL (ref 70–99)
GLUCOSE BLD-MCNC: 206 MG/DL (ref 70–99)

## 2020-11-22 PROCEDURE — 6370000000 HC RX 637 (ALT 250 FOR IP): Performed by: INTERNAL MEDICINE

## 2020-11-22 PROCEDURE — 94640 AIRWAY INHALATION TREATMENT: CPT

## 2020-11-22 PROCEDURE — 1200000002 HC SEMI PRIVATE SWING BED

## 2020-11-22 PROCEDURE — 82962 GLUCOSE BLOOD TEST: CPT

## 2020-11-22 PROCEDURE — 6360000002 HC RX W HCPCS: Performed by: INTERNAL MEDICINE

## 2020-11-22 RX ADMIN — ANTI-FUNGAL POWDER MICONAZOLE NITRATE TALC FREE: 1.42 POWDER TOPICAL at 20:41

## 2020-11-22 RX ADMIN — CALCIUM 500 MG: 500 TABLET ORAL at 08:57

## 2020-11-22 RX ADMIN — FLUTICASONE PROPIONATE 2 SPRAY: 50 SPRAY, METERED NASAL at 08:58

## 2020-11-22 RX ADMIN — FLUTICASONE PROPIONATE 2 PUFF: 110 AEROSOL, METERED RESPIRATORY (INHALATION) at 20:37

## 2020-11-22 RX ADMIN — ROPINIROLE HYDROCHLORIDE 0.5 MG: 0.25 TABLET, FILM COATED ORAL at 18:12

## 2020-11-22 RX ADMIN — FERROUS SULFATE TAB 325 MG (65 MG ELEMENTAL FE) 325 MG: 325 (65 FE) TAB at 08:56

## 2020-11-22 RX ADMIN — CEFDINIR 300 MG: 300 CAPSULE ORAL at 08:57

## 2020-11-22 RX ADMIN — FEBUXOSTAT 40 MG: 40 TABLET, FILM COATED ORAL at 08:56

## 2020-11-22 RX ADMIN — TRAMADOL HYDROCHLORIDE 50 MG: 50 TABLET ORAL at 04:09

## 2020-11-22 RX ADMIN — CILOSTAZOL 50 MG: 50 TABLET ORAL at 08:57

## 2020-11-22 RX ADMIN — TRAMADOL HYDROCHLORIDE 50 MG: 50 TABLET ORAL at 20:37

## 2020-11-22 RX ADMIN — LEVOTHYROXINE SODIUM 50 MCG: 50 TABLET ORAL at 05:33

## 2020-11-22 RX ADMIN — ANTI-FUNGAL POWDER MICONAZOLE NITRATE TALC FREE: 1.42 POWDER TOPICAL at 09:01

## 2020-11-22 RX ADMIN — BUMETANIDE 1 MG: 1 TABLET ORAL at 18:12

## 2020-11-22 RX ADMIN — INSULIN LISPRO 1 UNITS: 100 INJECTION, SOLUTION INTRAVENOUS; SUBCUTANEOUS at 18:13

## 2020-11-22 RX ADMIN — ROPINIROLE HYDROCHLORIDE 0.5 MG: 0.25 TABLET, FILM COATED ORAL at 20:36

## 2020-11-22 RX ADMIN — ROPINIROLE HYDROCHLORIDE 0.5 MG: 0.25 TABLET, FILM COATED ORAL at 08:57

## 2020-11-22 RX ADMIN — FLUTICASONE PROPIONATE 2 PUFF: 110 AEROSOL, METERED RESPIRATORY (INHALATION) at 07:53

## 2020-11-22 RX ADMIN — CILOSTAZOL 50 MG: 50 TABLET ORAL at 20:37

## 2020-11-22 RX ADMIN — BUMETANIDE 1 MG: 1 TABLET ORAL at 08:57

## 2020-11-22 RX ADMIN — INSULIN LISPRO 1 UNITS: 100 INJECTION, SOLUTION INTRAVENOUS; SUBCUTANEOUS at 12:08

## 2020-11-22 RX ADMIN — METOPROLOL SUCCINATE 25 MG: 25 TABLET, EXTENDED RELEASE ORAL at 08:57

## 2020-11-22 RX ADMIN — HEPARIN SODIUM 5000 UNITS: 5000 INJECTION INTRAVENOUS; SUBCUTANEOUS at 18:12

## 2020-11-22 RX ADMIN — FERROUS SULFATE TAB 325 MG (65 MG ELEMENTAL FE) 325 MG: 325 (65 FE) TAB at 18:11

## 2020-11-22 RX ADMIN — CARBIDOPA AND LEVODOPA 2 TABLET: 10; 100 TABLET ORAL at 20:36

## 2020-11-22 RX ADMIN — HEPARIN SODIUM 5000 UNITS: 5000 INJECTION INTRAVENOUS; SUBCUTANEOUS at 08:57

## 2020-11-22 RX ADMIN — FLUTICASONE PROPIONATE 2 PUFF: 110 AEROSOL, METERED RESPIRATORY (INHALATION) at 18:01

## 2020-11-22 ASSESSMENT — PAIN SCALES - GENERAL
PAINLEVEL_OUTOF10: 0
PAINLEVEL_OUTOF10: 4
PAINLEVEL_OUTOF10: 0
PAINLEVEL_OUTOF10: 4
PAINLEVEL_OUTOF10: 0
PAINLEVEL_OUTOF10: 0
PAINLEVEL_OUTOF10: 7
PAINLEVEL_OUTOF10: 0
PAINLEVEL_OUTOF10: 0
PAINLEVEL_OUTOF10: 5
PAINLEVEL_OUTOF10: 2
PAINLEVEL_OUTOF10: 0

## 2020-11-22 ASSESSMENT — PAIN DESCRIPTION - PAIN TYPE: TYPE: ACUTE PAIN

## 2020-11-22 NOTE — PLAN OF CARE
2213 by Aiden Herzog RN  Outcome: Ongoing  Goal: Control of acute pain  Description: Control of acute pain  11/22/2020 0933 by Nell Gunderson RN  Outcome: Ongoing  11/21/2020 2213 by Aiden Herzog RN  Outcome: Ongoing  Goal: Control of chronic pain  Description: Control of chronic pain  11/22/2020 0933 by Nell Gunderson RN  Outcome: Ongoing  11/21/2020 2213 by Aiden Herzog RN  Outcome: Ongoing

## 2020-11-22 NOTE — PLAN OF CARE
Problem: Pain:  Goal: Pain level will decrease  Description: Pain level will decrease  11/21/2020 2213 by Alexandra Stahl RN  Outcome: Ongoing  11/21/2020 1054 by Ariel Hodge RN  Outcome: Ongoing  Goal: Control of acute pain  Description: Control of acute pain  11/21/2020 2213 by Alexandra Stahl RN  Outcome: Ongoing  11/21/2020 1054 by Ariel Hodge RN  Outcome: Ongoing  Goal: Control of chronic pain  Description: Control of chronic pain  11/21/2020 2213 by Alexandra Stahl RN  Outcome: Ongoing  11/21/2020 1054 by Ariel Hodge RN  Outcome: Ongoing     Problem: Falls - Risk of:  Goal: Will remain free from falls  Description: Will remain free from falls  11/21/2020 2213 by Alexandra Stahl RN  Outcome: Ongoing  11/21/2020 1054 by Ariel Hodge RN  Outcome: Ongoing  Goal: Absence of physical injury  Description: Absence of physical injury  11/21/2020 2213 by Alexandra Stahl RN  Outcome: Ongoing  11/21/2020 1054 by Ariel Hodge RN  Outcome: Ongoing     Problem: Skin Integrity:  Goal: Will show no infection signs and symptoms  Description: Will show no infection signs and symptoms  11/21/2020 2213 by Alexandra Stahl RN  Outcome: Ongoing  11/21/2020 1054 by Ariel Hodge RN  Outcome: Ongoing  Goal: Absence of new skin breakdown  Description: Absence of new skin breakdown  11/21/2020 2213 by Alexandra Stahl RN  Outcome: Ongoing  11/21/2020 1054 by Ariel Hodge RN  Outcome: Ongoing     Problem: IP MOBILITY  Goal: LTG - patient will demonstrate kitchen mobility  11/21/2020 2213 by Alexandra Stahl RN  Outcome: Ongoing  11/21/2020 1054 by Ariel Hodge RN  Outcome: Ongoing  Goal: LTG - patient will ambulate community distance  11/21/2020 2213 by Alexandra Stahl RN  Outcome: Ongoing  11/21/2020 1054 by Ariel Hodge RN  Outcome: Ongoing  Goal: LTG - patient will ambulate household distance  11/21/2020 2213 by Alexandra Stahl RN  Outcome: Ongoing  11/21/2020 1054 by Ariel Hodge RN  Outcome: Ongoing  Goal: LTG - patient will demonstrate safe mobility requirements  11/21/2020 2213 by Jevon Figueroa RN  Outcome: Ongoing  11/21/2020 1054 by Rica Baum RN  Outcome: Ongoing  Goal: STG - Patient will ambulate  11/21/2020 2213 by Jevon Figueroa RN  Outcome: Ongoing  11/21/2020 1054 by Rica Baum RN  Outcome: Ongoing

## 2020-11-22 NOTE — PROGRESS NOTES
Patient got up from chair and went to toilet on his own, did not make it, had explosive diarrhea all over the floor and himself, patient and room cleaned up, returned to chair.

## 2020-11-22 NOTE — PROGRESS NOTES
Pt currently on oral supplement and tolerating. Reviewed menu selections with patient to increase protein along with high protein ensure. Will monitor tolerance with full assessment to follow.

## 2020-11-23 LAB
GLUCOSE BLD-MCNC: 119 MG/DL (ref 70–99)
GLUCOSE BLD-MCNC: 163 MG/DL (ref 70–99)
GLUCOSE BLD-MCNC: 179 MG/DL (ref 70–99)
GLUCOSE BLD-MCNC: 212 MG/DL (ref 70–99)

## 2020-11-23 PROCEDURE — 97166 OT EVAL MOD COMPLEX 45 MIN: CPT

## 2020-11-23 PROCEDURE — 94761 N-INVAS EAR/PLS OXIMETRY MLT: CPT

## 2020-11-23 PROCEDURE — 82962 GLUCOSE BLOOD TEST: CPT

## 2020-11-23 PROCEDURE — 99211 OFF/OP EST MAY X REQ PHY/QHP: CPT

## 2020-11-23 PROCEDURE — 2700000000 HC OXYGEN THERAPY PER DAY

## 2020-11-23 PROCEDURE — 6360000002 HC RX W HCPCS: Performed by: INTERNAL MEDICINE

## 2020-11-23 PROCEDURE — 97110 THERAPEUTIC EXERCISES: CPT

## 2020-11-23 PROCEDURE — 1200000002 HC SEMI PRIVATE SWING BED

## 2020-11-23 PROCEDURE — 6370000000 HC RX 637 (ALT 250 FOR IP): Performed by: INTERNAL MEDICINE

## 2020-11-23 PROCEDURE — 97116 GAIT TRAINING THERAPY: CPT

## 2020-11-23 PROCEDURE — 97535 SELF CARE MNGMENT TRAINING: CPT

## 2020-11-23 PROCEDURE — 94640 AIRWAY INHALATION TREATMENT: CPT

## 2020-11-23 RX ADMIN — HEPARIN SODIUM 5000 UNITS: 5000 INJECTION INTRAVENOUS; SUBCUTANEOUS at 08:38

## 2020-11-23 RX ADMIN — BUMETANIDE 1 MG: 1 TABLET ORAL at 17:15

## 2020-11-23 RX ADMIN — BUMETANIDE 1 MG: 1 TABLET ORAL at 08:38

## 2020-11-23 RX ADMIN — ANTI-FUNGAL POWDER MICONAZOLE NITRATE TALC FREE: 1.42 POWDER TOPICAL at 23:28

## 2020-11-23 RX ADMIN — LEVOTHYROXINE SODIUM 50 MCG: 50 TABLET ORAL at 06:14

## 2020-11-23 RX ADMIN — TRAMADOL HYDROCHLORIDE 50 MG: 50 TABLET ORAL at 04:34

## 2020-11-23 RX ADMIN — INSULIN LISPRO 1 UNITS: 100 INJECTION, SOLUTION INTRAVENOUS; SUBCUTANEOUS at 12:26

## 2020-11-23 RX ADMIN — ROPINIROLE HYDROCHLORIDE 0.5 MG: 0.25 TABLET, FILM COATED ORAL at 21:26

## 2020-11-23 RX ADMIN — FLUTICASONE PROPIONATE 2 PUFF: 110 AEROSOL, METERED RESPIRATORY (INHALATION) at 07:23

## 2020-11-23 RX ADMIN — ANTI-FUNGAL POWDER MICONAZOLE NITRATE TALC FREE: 1.42 POWDER TOPICAL at 08:39

## 2020-11-23 RX ADMIN — ROPINIROLE HYDROCHLORIDE 0.5 MG: 0.25 TABLET, FILM COATED ORAL at 14:32

## 2020-11-23 RX ADMIN — METOPROLOL SUCCINATE 25 MG: 25 TABLET, EXTENDED RELEASE ORAL at 08:38

## 2020-11-23 RX ADMIN — ROPINIROLE HYDROCHLORIDE 0.5 MG: 0.25 TABLET, FILM COATED ORAL at 08:37

## 2020-11-23 RX ADMIN — HEPARIN SODIUM 5000 UNITS: 5000 INJECTION INTRAVENOUS; SUBCUTANEOUS at 14:32

## 2020-11-23 RX ADMIN — CEFDINIR 300 MG: 300 CAPSULE ORAL at 08:37

## 2020-11-23 RX ADMIN — FLUTICASONE PROPIONATE 2 SPRAY: 50 SPRAY, METERED NASAL at 08:43

## 2020-11-23 RX ADMIN — INSULIN LISPRO 1 UNITS: 100 INJECTION, SOLUTION INTRAVENOUS; SUBCUTANEOUS at 17:14

## 2020-11-23 RX ADMIN — FERROUS SULFATE TAB 325 MG (65 MG ELEMENTAL FE) 325 MG: 325 (65 FE) TAB at 17:15

## 2020-11-23 RX ADMIN — CILOSTAZOL 50 MG: 50 TABLET ORAL at 08:37

## 2020-11-23 RX ADMIN — HEPARIN SODIUM 5000 UNITS: 5000 INJECTION INTRAVENOUS; SUBCUTANEOUS at 23:27

## 2020-11-23 RX ADMIN — CARBIDOPA AND LEVODOPA 2 TABLET: 10; 100 TABLET ORAL at 21:26

## 2020-11-23 RX ADMIN — FLUTICASONE PROPIONATE 2 PUFF: 110 AEROSOL, METERED RESPIRATORY (INHALATION) at 17:59

## 2020-11-23 RX ADMIN — FERROUS SULFATE TAB 325 MG (65 MG ELEMENTAL FE) 325 MG: 325 (65 FE) TAB at 08:37

## 2020-11-23 RX ADMIN — FEBUXOSTAT 40 MG: 40 TABLET, FILM COATED ORAL at 08:37

## 2020-11-23 RX ADMIN — CILOSTAZOL 50 MG: 50 TABLET ORAL at 21:26

## 2020-11-23 RX ADMIN — CALCIUM 500 MG: 500 TABLET ORAL at 08:38

## 2020-11-23 RX ADMIN — TRAMADOL HYDROCHLORIDE 50 MG: 50 TABLET ORAL at 20:17

## 2020-11-23 ASSESSMENT — PAIN SCALES - GENERAL
PAINLEVEL_OUTOF10: 2
PAINLEVEL_OUTOF10: 6
PAINLEVEL_OUTOF10: 2
PAINLEVEL_OUTOF10: 6
PAINLEVEL_OUTOF10: 0
PAINLEVEL_OUTOF10: 6
PAINLEVEL_OUTOF10: 6

## 2020-11-23 ASSESSMENT — PAIN DESCRIPTION - PAIN TYPE
TYPE: ACUTE PAIN
TYPE: ACUTE PAIN

## 2020-11-23 ASSESSMENT — PAIN DESCRIPTION - LOCATION: LOCATION: COCCYX

## 2020-11-23 NOTE — PROGRESS NOTES
Comprehensive Nutrition Assessment    Type and Reason for Visit:  Initial, Positive Nutrition Screen(Swing Bed)    Nutrition Recommendations/Plan: Rec: vitamin C, Zinc and MVI    Nutrition Assessment:  Pt is on a Carb controlled diet with A1c 7.8, intakes variable with c/o lack of appetite, no chewing or swallowing, no N/V/D, high protein ensure added for skin healing- will recommend MVI, Vitamin C and Zinc    Malnutrition Assessment:  Malnutrition Status:  Mild malnutrition    Context:  Chronic Illness     Findings of the 6 clinical characteristics of malnutrition:      Estimated Daily Nutrient Needs:  Energy (kcal):  8425-4938; Weight Used for Energy Requirements:  Ideal     Protein (g):  ; Weight Used for Protein Requirements:  Current(1.2-1.5)        Fluid (ml/day):  4958-5677; Method Used for Fluid Requirements:  1 ml/kcal      Nutrition Related Findings:  A1c 7.8, weight loss over the past month      Wounds:  Multiple, Pressure Injury, Stage II, Wound Consult Pending       Current Nutrition Therapies:    DIET CARB CONTROL; Dietary Nutrition Supplements: Low Calorie High Protein Supplement    Anthropometric Measures:  · Height: 5' 2\" (157.5 cm)  · Current Body Weight: 172 lb (78 kg)   · Admission Body Weight: 178 lb (80.7 kg)    · Usual Body Weight: 186 lb (84.4 kg)(prior encounter)     · Ideal Body Weight: 110 lbs; % Ideal Body Weight 156.4 %   · BMI: 31.5  · Adjusted Body Weight:  ; No Adjustment   · BMI Categories: Obese Class 1 (BMI 30.0-34. 9)       Nutrition Diagnosis:   · Inadequate protein-energy intake, In context of acute illness or injury related to increase demand for energy/nutrients as evidenced by intake 26-50%, intake 51-75%, wounds      Nutrition Interventions:   Food and/or Nutrient Delivery:  Continue Current Diet, Continue Oral Nutrition Supplement, Mineral Supplement, Vitamin Supplement  Nutrition Education/Counseling:  Education initiated   Coordination of Nutrition Care: Continue to monitor while inpatient    Goals:  Pt's intakes will be % of meals and supplements during her stay       Nutrition Monitoring and Evaluation:   Behavioral-Environmental Outcomes:  None Identified   Food/Nutrient Intake Outcomes:  Food and Nutrient Intake, Supplement Intake  Physical Signs/Symptoms Outcomes:  Biochemical Data, Skin, Weight     Discharge Planning:    Continue Oral Nutrition Supplement, Continue current diet     Electronically signed by Chino Leone RD, KYM on 11/23/20 at 1:09 PM EST    Contact: [phone]

## 2020-11-23 NOTE — CONSULTS
Lasix [Furosemide] Rash    Procardia [Nifedipine] Other (See Comments)     Not for sure if rash occurred or rash    Doxycycline Dermatitis    Edecrin [Ethacrynic Acid]     Levaquin [Levofloxacin] Other (See Comments)     unknown    Mobic [Meloxicam]     Vioxx [Rofecoxib]     Celebrex [Celecoxib] Rash    Lexapro [Escitalopram Oxalate] Rash    Sulfa Antibiotics Hives       MEDICATIONS    No current facility-administered medications on file prior to encounter. Current Outpatient Medications on File Prior to Encounter   Medication Sig Dispense Refill    traMADol (ULTRAM) 50 MG tablet Take 1 tablet by mouth every 6 hours as needed for Pain for up to 3 days. 10 tablet 0    metoprolol succinate (TOPROL XL) 25 MG extended release tablet Take 1 tablet by mouth daily 30 tablet 3    cefdinir (OMNICEF) 300 MG capsule Take 1 capsule by mouth daily for 5 days 5 capsule 0    silver sulfADIAZINE (SSD) 1 % cream APPLY  CREAM TOPICALLY DAILY 240 g 0    metOLazone (ZAROXOLYN) 5 MG tablet Take 1 tablet by mouth daily 30 tablet 3    febuxostat (ULORIC) 40 MG TABS tablet Take 1 tablet by mouth daily 30 tablet 5    famotidine (PEPCID) 20 MG tablet Take 1 tablet by mouth 2 times daily 60 tablet 5    acetaZOLAMIDE (DIAMOX) 250 MG tablet Take 1 tablet by mouth 2 times daily 90 tablet 3    levothyroxine (SYNTHROID) 50 MCG tablet Take 50 mcg by mouth Daily      Polyethylene Glycol 3350 (MIRALAX PO) Take by mouth      triamcinolone (NASACORT ALLERGY 24HR) 55 MCG/ACT nasal inhaler 2 sprays by Nasal route 2 times daily      fluticasone (FLONASE) 50 MCG/ACT nasal spray       carbidopa-levodopa (SINEMET)  MG per tablet Take 2 tablets by mouth nightly 60 tablet 0    clotrimazole-betamethasone (LOTRISONE) 1-0.05 % cream Apply topically 2 times daily.  1 Tube 0    Loratadine (CLARITIN) 10 MG CAPS Take 10 mg by mouth daily      ferrous sulfate 325 (65 FE) MG tablet Take 1 tablet by mouth 2 times daily (with meals) 30 tablet 3    latanoprost (XALATAN) 0.005 % ophthalmic solution Place 1 drop into both eyes nightly      OXYGEN Inhale 2 L/min into the lungs nightly      Multiple Vitamins-Minerals (ICAPS) CAPS Take  by mouth.  acetaminophen (TYLENOL) 500 MG tablet Take 500 mg by mouth every 6 hours as needed for Pain.  calcium carbonate 600 MG TABS tablet Take 1 tablet by mouth daily.            Objective:      BP (!) 113/55   Pulse 70   Temp 96.2 °F (35.7 °C) (Oral)   Resp 16   Ht 5' 2\" (1.575 m)   Wt 172 lb (78 kg)   SpO2 96%   BMI 31.46 kg/m²   Elier Risk Score: Elier Scale Score: 19    LABS    CBC:   Lab Results   Component Value Date    WBC 7.4 11/19/2020    RBC 3.47 11/19/2020    HGB 10.5 11/19/2020    HCT 34.5 11/19/2020    MCV 99.4 11/19/2020    MCH 30.3 11/19/2020    MCHC 30.4 11/19/2020    RDW 12.6 11/19/2020     11/19/2020    MPV 9.1 11/19/2020     CMP:    Lab Results   Component Value Date     11/19/2020    K 3.8 11/19/2020     11/19/2020    CO2 24 11/19/2020    BUN 81 11/19/2020    CREATININE 1.9 11/19/2020    GFRAA 31 11/19/2020    LABGLOM 25 11/19/2020    GLUCOSE 250 11/19/2020    PROT 5.2 11/19/2020    PROT 7.7 12/23/2010    LABALBU 3.1 11/19/2020    CALCIUM 8.9 11/19/2020    BILITOT 0.3 11/19/2020    ALKPHOS 90 11/19/2020    AST 14 11/19/2020    ALT 10 11/19/2020     Albumin:    Lab Results   Component Value Date    LABALBU 3.1 11/19/2020     PT/INR:    Lab Results   Component Value Date    PROTIME 12.6 11/16/2020    INR 1.04 11/16/2020     HgBA1c:    Lab Results   Component Value Date    LABA1C 7.8 08/19/2020         Assessment:     Patient Active Problem List   Diagnosis    Diabetes type 2, controlled ([facility] [facility]    Osteoarthritis    History of asthma    Cellulitis of right lower extremity    Sepsis ([address]    [facility]    Gram-positive bacteremia    Cellulitis of left lower extremity    Non-pressure chronic ulcer of left lower leg, limited to breakdown of skin [facility]    Chronic ulcer of right leg with fat layer exposed ([facility]    Lymphedema of both lower extremities with cellulitis    WD-Idiopathic chronic venous hypertension of both lower extremities with ulcer and inflammation (HCC)    WD-Non-pressure chronic ulcer right lower leg, 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 ([facility] [address] [facility]    Rhabdomyolysis    Hyperkalemia    Morbid obesity with BMI of 45.0-49.9, adult ([facility])    Encephalopathy in sepsis    Toxic shock syndrome ([facility])    Chronic renal impairment, stage 3 (moderate)    Anemia of chronic disease    Chronic kidney disease (CKD) stage G3a/A2, moderately decreased glomerular filtration rate (GFR) between 45-59 mL/min/1.73 square meter and albuminuria creatinine ratio between  mg/g    Cor pulmonale, chronic (HCC)    DM (diabetes mellitus) ([facility])    Fluid overload    HTN (hypertension)    Chronic kidney disease (CKD) stage G2/A2, mildly decreased glomerular filtration rate (GFR) between 60-89 mL/min/1.73 square meter and albuminuria creatinine ratio between  mg/g    Acute gout    Acute kidney injury ([facility]    Acute kidney injury superimposed on chronic kidney disease ([facility])    Third degree heart block ([facility])    Weakness       Measurements:  Wound 11/18/20 Buttocks Right (Active)   Wound Etiology Pressure Stage  2 11/23/20 0800   Dressing Status New dressing applied 11/23/20 0800   Wound Cleansed Cleansed with saline 11/23/20 0800   Dressing/Treatment Collagen 11/23/20 0800   Dressing Change Due 11/23/20 11/20/20 1540   Wound Length (cm) 3.5 cm 11/23/20 0800   Wound Width (cm) 3.4 cm 11/23/20 0800   Wound Depth (cm) 0.1 cm 11/23/20 0800   Wound Surface Area (cm^2) 11.9 cm^2 11/23/20 0800   Change in Wound Size % (l*w) -230.56 11/23/20 0800   Wound Volume (cm^3) 1.19 cm^3 11/23/20 0800   Wound Healing % -231 11/23/20 0800   Distance Tunneling (cm) 0 cm 11/23/20 0800   Tunneling Position ___ O'Clock 0 11/23/20 0800   Undermining Starts ___ O'Clock 0 11/23/20 0800   Undermining Ends___ O'Clock 0 11/23/20 0800   Undermining Maxium Distance (cm) 0 11/23/20 0800   Wound Assessment Pink/red 11/23/20 0800   Drainage Amount Moderate 11/23/20 0800   Drainage Description Serosanguinous 11/23/20 0800   Odor None 11/23/20 0800   Leatha-wound Assessment Dry/flaky 11/23/20 0800   Margins Defined edges 11/23/20 0800   Wound Thickness Description not for Pressure Injury Full thickness 11/23/20 0800   Number of days: 5       Wound 11/18/20 Buttocks Left (Active)   Wound Etiology Pressure Stage  2 11/23/20 0800   Dressing Status New dressing applied 11/23/20 0800   Wound Cleansed Cleansed with saline 11/23/20 0800   Dressing/Treatment Collagen 11/23/20 0800   Wound Length (cm) 2.1 cm 11/23/20 0800   Wound Width (cm) 2 cm 11/23/20 0800   Wound Depth (cm) 0.1 cm 11/23/20 0800   Wound Surface Area (cm^2) 4.2 cm^2 11/23/20 0800   Change in Wound Size % (l*w) 6.67 11/23/20 0800   Wound Volume (cm^3) 0.42 cm^3 11/23/20 0800   Wound Healing % 7 11/23/20 0800   Distance Tunneling (cm) 0 cm 11/23/20 0800   Tunneling Position ___ O'Clock 0 11/23/20 0800   Undermining Starts ___ O'Clock 0 11/23/20 0800   Undermining Ends___ O'Clock 0 11/23/20 0800   Undermining Maxium Distance (cm) 0 11/23/20 0800   Wound Assessment Pink/red 11/23/20 0800   Drainage Amount Moderate 11/23/20 0800   Drainage Description Serosanguinous 11/23/20 0800   Odor None 11/23/20 0800   Leatha-wound Assessment Intact 11/23/20 0800   Margins Defined edges 11/23/20 0800   Wound Thickness Description not for Pressure Injury Full thickness 11/23/20 0800   Number of days: 5       Wound 11/18/20 Thigh Proximal;Right;Posterior (Active)   Wound Etiology Other 11/23/20 0800   Dressing Status New dressing applied 11/19/20 2300   Wound Cleansed Soap and water 11/21/20 0933   Dressing/Treatment Calmoseptine/zinc oxide with menthol 11/23/20 0437   Wound Length (cm) 0 cm 11/23/20 0800   Wound Width (cm) 0 cm 11/23/20 0800   Wound Depth (cm) 0 cm 11/23/20 0800   Wound Surface Area (cm^2) 0 cm^2 11/23/20 0800   Change in Wound Size % (l*w) 100 11/23/20 0800   Wound Volume (cm^3) 0 cm^3 11/23/20 0800   Wound Healing % 100 11/23/20 0800   Distance Tunneling (cm) 0 cm 11/18/20 0930   Tunneling Position ___ O'Clock 0 11/18/20 0930   Undermining Starts ___ O'Clock 0 11/18/20 0930   Undermining Ends___ O'Clock 0 11/18/20 0930   Undermining Maxium Distance (cm) 0 11/18/20 0930   Wound Assessment Dry;Slough 11/23/20 0437   Drainage Amount None 11/22/20 0758   Odor None 11/22/20 0758   Leatha-wound Assessment Intact 11/18/20 0930   Margins Attached edges 11/18/20 0930   Wound Thickness Description not for Pressure Injury Partial thickness 11/18/20 0930   Number of days: 5       Wound 11/18/20 Thigh Left;Proximal;Posterior (Active)   Wound Etiology Other 11/23/20 0800   Dressing Status New dressing applied 11/23/20 0800   Wound Cleansed Cleansed with saline 11/23/20 0800   Dressing/Treatment Collagen 11/23/20 0800   Wound Length (cm) 0.7 cm 11/23/20 0800   Wound Width (cm) 1 cm 11/23/20 0800   Wound Depth (cm) 0.1 cm 11/23/20 0800   Wound Surface Area (cm^2) 0.7 cm^2 11/23/20 0800   Change in Wound Size % (l*w) 30 11/23/20 0800   Wound Volume (cm^3) 0.07 cm^3 11/23/20 0800   Wound Healing % 30 11/23/20 0800   Distance Tunneling (cm) 0 cm 11/18/20 0930   Tunneling Position ___ O'Clock 0 11/23/20 0800   Undermining Starts ___ O'Clock 0 11/23/20 0800   Undermining Ends___ O'Clock 0 11/23/20 0800   Undermining Maxium Distance (cm) 0 11/23/20 0800   Wound Assessment Pink/red 11/23/20 0800   Drainage Amount Small 11/23/20 0800   Drainage Description Serosanguinous 11/23/20 0800   Odor None 11/23/20 0800   Leatha-wound Assessment Dry/flaky 11/23/20 0800   Margins Defined edges 11/23/20 0800   Wound Thickness Description not for Pressure Injury Full thickness 11/23/20 0800   Number of days: 5       Wound 11/18/20 Tibial Right;Posterior cluster (Active)   Wound Etiology Venous 11/23/20 0800   Dressing Status New dressing applied 11/23/20 0800   Wound Cleansed Cleansed with saline 11/23/20 0800   Dressing/Treatment Collagen 11/23/20 0800   Wound Length (cm) 7 cm 11/23/20 0800   Wound Width (cm) 9 cm 11/23/20 0800   Wound Depth (cm) 0.1 cm 11/23/20 0800   Wound Surface Area (cm^2) 63 cm^2 11/23/20 0800   Change in Wound Size % (l*w) -35955 11/23/20 0800   Wound Volume (cm^3) 6.3 cm^3 11/23/20 0800   Wound Healing % -78300 11/23/20 0800   Distance Tunneling (cm) 0 cm 11/23/20 0800   Tunneling Position ___ O'Clock 0 11/23/20 0800   Undermining Starts ___ O'Clock 0 11/23/20 0800   Undermining Ends___ O'Clock 0 11/23/20 0800   Undermining Maxium Distance (cm) 0 11/23/20 0800   Wound Assessment Pink/red 11/23/20 0800   Drainage Amount Moderate 11/23/20 0800   Drainage Description Serosanguinous; Yellow 11/23/20 0800   Odor None 11/23/20 0800   Leatha-wound Assessment Dry/flaky 11/23/20 0800   Margins Defined edges 11/23/20 0800   Wound Thickness Description not for Pressure Injury Full thickness 11/23/20 0800   Number of days: 5       Wound 11/18/20 Sacrum intergluteal cleft (Active)   Wound Etiology Pressure Stage  2 11/23/20 0800   Dressing Status New dressing applied 11/23/20 0800   Wound Cleansed Cleansed with saline 11/23/20 0800   Dressing/Treatment Collagen 11/23/20 0800   Wound Length (cm) 2.5 cm 11/23/20 0800   Wound Width (cm) 0.4 cm 11/23/20 0800   Wound Depth (cm) 0.1 cm 11/23/20 0800   Wound Surface Area (cm^2) 1 cm^2 11/23/20 0800   Change in Wound Size % (l*w) -66.67 11/23/20 0800   Wound Volume (cm^3) 0.1 cm^3 11/23/20 0800   Wound Healing % -67 11/23/20 0800   Distance Tunneling (cm) 0 cm 11/23/20 0800   Tunneling Position ___ O'Clock 0 11/23/20 0800   Undermining Starts ___ O'Clock 0 11/23/20 0800   Undermining Ends___ O'Clock 0 11/23/20 0800   Undermining Maxium Distance (cm) 0 11/23/20 0800   Wound Assessment Pink/red 11/23/20 0800   Drainage Amount Small 11/23/20 0800 Drainage Description Serosanguinous 11/23/20 0800   Odor None 11/23/20 0800   Leatha-wound Assessment Dry/flaky 11/23/20 0800   Margins Defined edges 11/23/20 0800   Wound Thickness Description not for Pressure Injury Full thickness 11/23/20 0800   Number of days: 5       Response to treatment:  With complaints of pain. Pain Assessment:  Severity:  Moderate   Quality of pain: sore  Wound Pain Timing/Severity: dressing change. Premedicated:  no    Plan:     Plan of Care: Wound 11/18/20 Buttocks Right-Dressing/Treatment: Collagen(sacral border)  Wound 11/18/20 Buttocks Left-Dressing/Treatment: Collagen(sacral border)  Wound 11/18/20 Thigh Proximal;Right;Posterior-Dressing/Treatment: Calmoseptine/zinc oxide with menthol  Wound 11/18/20 Thigh Left;Proximal;Posterior-Dressing/Treatment: Collagen(opt border)  Wound 11/18/20 Tibial Right;Posterior cluster-Dressing/Treatment: Collagen  Wound 11/18/20 Sacrum intergluteal cleft-Dressing/Treatment: Collagen(sacral border)     Pt sitting in chair agreeable to wound care eval. Assisted pt to bed for wound assessment with use of her walker. Rt buttocks, lt buttock, left posterior thigh and sacrum intergluteal wounds cleansed with NS measured and pictured. Dressings as above. Rt posterior thigh dry not open. Heels intact. Rt posterior leg wound cleansed with NS measured and pictured is now a cluster applied new dressing as above. Pt wanted to sit back in the chair again. Pt sat up and reported feeling dizzy advised pt to sit for a few minutes at the side of the bed and I let her nurse know above. Pt is at mild risk for skin breakdown AEB  maribel score. Observe maribel orders.      Specialty Bed Required :  yes  [] Low Air Loss   [x] Pressure Redistribution  [] Fluid Immersion  [] Bariatric  [] Total Pressure Relief  [] Other:     Discharge Plan:  Placement for patient upon discharge: tbd  Hospice Care: no  Patient appropriate for [address]: tbd    Patient/Caregiver Teaching:  Level of patient/caregiver understanding able to:  Cares explained as given. Electronically signed by Kika Campos RN,  on 11/23/2020 at 4:35 PM

## 2020-11-23 NOTE — PLAN OF CARE
Problem: Pain:  Goal: Pain level will decrease  Description: Pain level will decrease  Outcome: Ongoing  Goal: Control of acute pain  Description: Control of acute pain  Outcome: Ongoing  Goal: Control of chronic pain  Description: Control of chronic pain  Outcome: Ongoing     Problem: Falls - Risk of:  Goal: Will remain free from falls  Description: Will remain free from falls  Outcome: Ongoing  Goal: Absence of physical injury  Description: Absence of physical injury  Outcome: Ongoing     Problem: Skin Integrity:  Goal: Will show no infection signs and symptoms  Description: Will show no infection signs and symptoms  Outcome: Ongoing  Goal: Absence of new skin breakdown  Description: Absence of new skin breakdown  Outcome: Ongoing     Problem: IP MOBILITY  Goal: LTG - patient will demonstrate kitchen mobility  Outcome: Ongoing  Goal: LTG - patient will ambulate community distance  Outcome: Ongoing  Goal: LTG - patient will ambulate household distance  Outcome: Ongoing  Goal: LTG - patient will demonstrate safe mobility requirements  Outcome: Ongoing  Goal: STG - Patient will ambulate  Outcome: Ongoing

## 2020-11-23 NOTE — PROGRESS NOTES
Occupational Therapy   Occupational Therapy Initial Assessment  Date: 2020   Patient Name: Letty Beckham  MRN: [id number]     : 1935    Date of Service: 2020    Discharge Recommendations:  Home with assist PRN, Continue to assess pending progress  OT Equipment Recommendations  Equipment Needed: No    Assessment   Performance deficits / Impairments: Decreased functional mobility ; Decreased balance;Decreased high-level IADLs;Decreased ADL status; Decreased strength;Decreased endurance  Assessment: Patient is pleasant 83 y/o female s/p surgery with deconditioning who is admitted to [facility] unit for therapy. She will benefit from occupational therapy to address strengthening, improving endurance and maximize indep with ADLs. Treatment Diagnosis: Weakness  Prognosis: Good  Decision Making: Medium Complexity  OT Education: OT Role;Plan of Care;Precautions;Transfer Training  Barriers to Learning: None  REQUIRES OT FOLLOW UP: Yes  Activity Tolerance  Activity Tolerance: Patient Tolerated treatment well  Activity Tolerance: Pt reported she was tired following ADls  Safety Devices  Safety Devices in place: Yes  Type of devices: All fall risk precautions in place;Gait belt;Patient at risk for falls;Call light within reach; Left in chair;Nurse notified  Restraints  Initially in place: No           Patient Diagnosis(es): There were no encounter diagnoses. 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 has a past surgical history that includes Urethra surgery; Appendectomy; Hysterectomy;  Cholecystectomy; Cystoscopy; eye surgery; and joint replacement (Right).     Treatment Diagnosis: Weakness      Restrictions  Restrictions/Precautions  Restrictions/Precautions: Fall Risk, General Precautions  Required Braces or Orthoses?: No  Position Activity Restriction  Other position/activity restrictions: O2 at night    Subjective   General  Patient assessed for rehabilitation services?: Yes  Family / Caregiver Present: No  Subjective  Subjective: Pt reports she feels pretty good  General Comment  Comments: Pt presents awake supine in bed finishing breakfast  Patient Currently in Pain: No  Vital Signs  Patient Currently in Pain: No  Social/Functional History  Social/Functional History  Lives With: Spouse  Type of Home: House  Home Layout: Two level, Able to Live on Main level with bedroom/bathroom  Home Access: Ramped entrance  Bathroom Shower/Tub: Tub/Shower unit, Shower chair with back  Bathroom Toilet: Standard  Bathroom Equipment: Grab bars in shower, Hand-held shower, Toilet raiser, Grab bars around toilet  Bathroom Accessibility: Accessible  Home Equipment: Lift chair, Rolling walker, 4 wheeled walker, Oxygen, Reacher, Sock aid(Wears 2L of O2 at night)  Receives Help From: Family  ADL Assistance: Needs assistance(Pt reports sometimes her  helps her dress if they are in a hurry otherwise she does it herself)  Bath: Minimal assistance(Spouse helps her with places she can't reach)  Dressing: Modified independent  Grooming: Independent  Feeding: Independent  Homemaking Assistance: Independent  Homemaking Responsibilities: Yes  Meal Prep Responsibility: Secondary(Pt and spouse cook together)  Laundry Responsibility: Primary  Cleaning Responsibility: Secondary( does most cleaning or her daughter in law comes to clean)  Shopping Responsibility: Secondary(Goes to the store with spouse or he goes)  Ambulation Assistance: Needs assistance(Uses a rolator or 2WW walker around the house depending on how she feels)  Transfer Assistance: Independent  Active : No  Mode of Transportation: Car  Occupation: Retired  Type of occupation:   Leisure & Hobbies: Pt and spouse travel in their motorhome  Additional Comments: Pt reports she sleeps in her recliner       Objective   Vision: Impaired  Vision Exceptions: Wears glasses for reading  Hearing: Within functional limits    Orientation  Overall Orientation Status: Within Functional Limits  Observation/Palpation  Observation: Pt awae supine in bed HOB elevated  Balance  Sitting Balance: Independent  Standing Balance: Contact guard assistance(SBA-CGA)  Standing Balance  Activity: Pt amb from bed into bathroom, to sink then across room to sit in chair, using RW with SBA/CGA w/o LOB  Toilet Transfers  Toilet - Technique: Ambulating  Equipment Used: Raised toilet seat with rails  Toilet Transfer: Stand by assistance  ADL  Feeding: Modified independent ;Setup  Grooming: Supervision(Seated in chair at sink to comb hair)  UE Bathing: Minimal assistance(Assist with back)  LE Bathing:  Moderate assistance(assist with buttocks, lower portion of BLE, Pt stood up with CGA to wash micha)  UE Dressing: Stand by assistance  LE Dressing: Maximum assistance(max A socks)  Toileting: Stand by assistance  Additional Comments: Pt completed sponge bath seated at sink and stood up to wash micha, Pt amb into bathroom for toileting with SBA  Coordination  Movements Are Fluid And Coordinated: Yes     Bed mobility  Supine to Sit: Minimal assistance(HOB elevated, handrail)  Transfers  Stand Step Transfers: Contact guard assistance  Sit to stand: Contact guard assistance  Stand to sit: Contact guard assistance  Vision - Basic Assessment  Prior Vision: Wears glasses only for reading           Sensation  Overall Sensation Status: WNL(per Pt report)        LUE AROM (degrees)  LUE AROM : WFL  RUE AROM (degrees)  RUE AROM : WFL  LUE Strength  Gross LUE Strength: WFL  RUE Strength  Gross RUE Strength: WFL                   Plan   Plan  Times per week: 4+  Current Treatment Recommendations: Strengthening, Balance Training, Functional Mobility Training, Endurance Training, Patient/Caregiver Education & Training, Equipment Evaluation, Education, & procurement, Self-Care / ADL, Safety Education & Training, Home Management Training    G-Code     OutComes Score                                                  AM-PAC Score             Goals  Short term goals  Time Frame for Short term goals: Until DC or goals met  Short term goal 1: Pt will complete trfs mod I using walker  Short term goal 2: Pt will complete UE bathing/dressing mod I  Short term goal 3: Pt will complete LE bathing/dressing min A  Short term goal 4: Pt will perform 5+ min  dynamic standing/functional mobility to perform ADLs and simple homemaking tasks  Short term goal 5: Pt will complete light BUE ther ex 10+ min to increase strength/endurance for functional mobility and ADLs  Patient Goals   Patient goals : To get home       Therapy Time   Individual Concurrent Group Co-treatment   Time In 0910         Time Out 0957         Minutes 47         Timed Code Treatment Minutes: Carolin Barker [address]  Angle Sequeira, OTR/L [id number]

## 2020-11-23 NOTE — FLOWSHEET NOTE
Physical Therapy DailyTreatment Note   Date: 2020 Room: [unfilled]   Name: Edin Salazar : 1935   MRN: [id number]   Admission Date:2020        Rehabilitation Diagnosis: Debility [R53.81]  Weakness [R53.1]    Objective                                                                                    Goals:  (Update in navigator)  Short term goals  Time Frame for Short term goals: 2 weeks or upon discharge  Short term goal 1: Patient will be able to perform bed mobility and transfers mod I. Short term goal 2: Patient will be able to perform dynamic balance activities >5' without LOB to complete ADls. Short term goal 3: Patient will be able to ambulate >150 ft with FWW mod I. Short term goal 4: Patient will be able to negotiate ramp mod I with FWW for return home.:   :        Plan of Care                                                                              Times per week: 4+ days per week for a minimum of 15 minutes/day  Treatment to include Current Treatment Recommendations: Strengthening, Functional Mobility Training, Neuromuscular Re-education, Home Exercise Program, Transfer Training, Gait Training, Safety Education & Training, Balance Training, Endurance Training    Date  2020   TIMES IN/OUT      Restrictions/Precautions Restrictions/Precautions: Fall Risk, General Precautions         Current Diet/Swallowing Issues DIET CARB CONTROL; Dietary Nutrition Supplements: Low Calorie High Protein Supplement   Communication with other providers: [x] Ok to see per nursing at morning huddle  [] Medical hold and reason  [] Spoke with (team    member) regarding   Subjective observations: Patient states that she is feeling pretty good. Was dizzy when she stood up this morning, but not having any now.     [x]   Gait belt used during tx session   Pain level/location: Pre-tx 0/10  Post-tx 0/10   Bed Mobility   Not performed     Transfers Sit to stand CGA assist  Stand to sit CGA assist  Commode CGA assist   Standing Tolerance    Amb/Locomotion: AD/Distance/Assist Pt ambulated 60 feet with CGA assist using FWW            Steps (#)/Assist/Rails/AD Not today   Ramp:AD/Assist Not today   Curb:height AD/Assist Not today   Uneven:type AD/Assist Not today   W/C distance/Assist   N/A   Strategies that improved performance: Rest breaks   Barriers to progress/participation: Fatigue, pain   Alarm placed/where?   Fall Risk  [ ] left in bed  [x ] left in chair [x ] call light within reach  [ ] bed alarm on  [ ] personal alarm on [ ] [de-identified]  [ ] other staff present:   Discharge recommendations  Anticipated discharge date:  TBA   Destination: []home alone   []home alone with assist prn  []Continuous supervision  []SNF  [] Assisted living   Continued therapy: []HHC PT  []OUTPATIENT  PT   [] No Further PT  Equipment needs: x     Therapeutic activities/exercises completed this date: Patient performed toileting activities with CGA-SBA for safety    Modality/intervention used:   [x ] Therapeutic Exercise    [ ] Modalities:   [x ] Therapeutic Activity    [ ] Ultrasound   [ ] Elec Stim   [x ] Gait Training     [ ] Cervical Traction  [ ] Lumbar Traction   [ ] Neuromuscular Re-education   [ ] Cold/hotpack  [ ] Iontophoresis   [ ] Instruction in HEP     [ ] [place] Doll   [ ] Manual Therapy   [ ] Aquatic Therapy     Patient/Caregiver Education and Training:nothing new    Exercise/Equipment/Modalities this date   Ankle pumps x20   LAQ x 10   Marching x10              Treatment Plan for Next Session:Continue per POC    Assessment / Impression                                                          Treatment/Activity Tolerance:   [x] Tolerated Treatment well:     [] Patient limited by fatigue/pain:       [] Patient limited by medical complications:    [] Adverse Reaction to Tx:   [] Significant change in status    Plan:   [x ] Continue per plan of care [ ] Néstor Albert current plan   [ ] Plan of care initiated [ ] Hold pending MD visit [ ] Discharged    Billing:  Billed units: 1 gait, 1 TE      Signed: Jeramy Ang PTA  11/23/2020, 2:19 PM

## 2020-11-24 LAB
GLUCOSE BLD-MCNC: 109 MG/DL (ref 70–99)
GLUCOSE BLD-MCNC: 166 MG/DL (ref 70–99)
GLUCOSE BLD-MCNC: 168 MG/DL (ref 70–99)
GLUCOSE BLD-MCNC: 170 MG/DL (ref 70–99)

## 2020-11-24 PROCEDURE — 97530 THERAPEUTIC ACTIVITIES: CPT

## 2020-11-24 PROCEDURE — 82962 GLUCOSE BLOOD TEST: CPT

## 2020-11-24 PROCEDURE — 1200000002 HC SEMI PRIVATE SWING BED

## 2020-11-24 PROCEDURE — 6360000002 HC RX W HCPCS: Performed by: INTERNAL MEDICINE

## 2020-11-24 PROCEDURE — 2700000000 HC OXYGEN THERAPY PER DAY

## 2020-11-24 PROCEDURE — 94761 N-INVAS EAR/PLS OXIMETRY MLT: CPT

## 2020-11-24 PROCEDURE — 97535 SELF CARE MNGMENT TRAINING: CPT

## 2020-11-24 PROCEDURE — 94640 AIRWAY INHALATION TREATMENT: CPT

## 2020-11-24 PROCEDURE — 6370000000 HC RX 637 (ALT 250 FOR IP): Performed by: INTERNAL MEDICINE

## 2020-11-24 PROCEDURE — 97116 GAIT TRAINING THERAPY: CPT

## 2020-11-24 RX ORDER — BISACODYL 10 MG
10 SUPPOSITORY, RECTAL RECTAL DAILY PRN
Status: DISCONTINUED | OUTPATIENT
Start: 2020-11-24 | End: 2020-12-01 | Stop reason: HOSPADM

## 2020-11-24 RX ADMIN — ROPINIROLE HYDROCHLORIDE 0.5 MG: 0.25 TABLET, FILM COATED ORAL at 07:55

## 2020-11-24 RX ADMIN — CILOSTAZOL 50 MG: 50 TABLET ORAL at 07:54

## 2020-11-24 RX ADMIN — HEPARIN SODIUM 5000 UNITS: 5000 INJECTION INTRAVENOUS; SUBCUTANEOUS at 14:19

## 2020-11-24 RX ADMIN — FLUTICASONE PROPIONATE 2 SPRAY: 50 SPRAY, METERED NASAL at 07:55

## 2020-11-24 RX ADMIN — CILOSTAZOL 50 MG: 50 TABLET ORAL at 21:21

## 2020-11-24 RX ADMIN — FERROUS SULFATE TAB 325 MG (65 MG ELEMENTAL FE) 325 MG: 325 (65 FE) TAB at 16:55

## 2020-11-24 RX ADMIN — BUMETANIDE 1 MG: 1 TABLET ORAL at 16:55

## 2020-11-24 RX ADMIN — ROPINIROLE HYDROCHLORIDE 0.5 MG: 0.25 TABLET, FILM COATED ORAL at 14:19

## 2020-11-24 RX ADMIN — METOPROLOL SUCCINATE 25 MG: 25 TABLET, EXTENDED RELEASE ORAL at 07:55

## 2020-11-24 RX ADMIN — BUMETANIDE 1 MG: 1 TABLET ORAL at 07:54

## 2020-11-24 RX ADMIN — CARBIDOPA AND LEVODOPA 2 TABLET: 10; 100 TABLET ORAL at 21:21

## 2020-11-24 RX ADMIN — ALBUTEROL SULFATE 2 PUFF: 90 AEROSOL, METERED RESPIRATORY (INHALATION) at 21:03

## 2020-11-24 RX ADMIN — HEPARIN SODIUM 5000 UNITS: 5000 INJECTION INTRAVENOUS; SUBCUTANEOUS at 21:21

## 2020-11-24 RX ADMIN — FLUTICASONE PROPIONATE 2 PUFF: 110 AEROSOL, METERED RESPIRATORY (INHALATION) at 21:02

## 2020-11-24 RX ADMIN — HEPARIN SODIUM 5000 UNITS: 5000 INJECTION INTRAVENOUS; SUBCUTANEOUS at 07:59

## 2020-11-24 RX ADMIN — INSULIN LISPRO 1 UNITS: 100 INJECTION, SOLUTION INTRAVENOUS; SUBCUTANEOUS at 13:13

## 2020-11-24 RX ADMIN — FLUTICASONE PROPIONATE 2 PUFF: 110 AEROSOL, METERED RESPIRATORY (INHALATION) at 07:12

## 2020-11-24 RX ADMIN — ANTI-FUNGAL POWDER MICONAZOLE NITRATE TALC FREE: 1.42 POWDER TOPICAL at 21:34

## 2020-11-24 RX ADMIN — LEVOTHYROXINE SODIUM 50 MCG: 50 TABLET ORAL at 06:24

## 2020-11-24 RX ADMIN — FERROUS SULFATE TAB 325 MG (65 MG ELEMENTAL FE) 325 MG: 325 (65 FE) TAB at 07:55

## 2020-11-24 RX ADMIN — CALCIUM 500 MG: 500 TABLET ORAL at 07:55

## 2020-11-24 RX ADMIN — ANTI-FUNGAL POWDER MICONAZOLE NITRATE TALC FREE: 1.42 POWDER TOPICAL at 07:59

## 2020-11-24 RX ADMIN — ROPINIROLE HYDROCHLORIDE 0.5 MG: 0.25 TABLET, FILM COATED ORAL at 21:22

## 2020-11-24 RX ADMIN — CEFDINIR 300 MG: 300 CAPSULE ORAL at 07:55

## 2020-11-24 RX ADMIN — INSULIN LISPRO 1 UNITS: 100 INJECTION, SOLUTION INTRAVENOUS; SUBCUTANEOUS at 18:04

## 2020-11-24 RX ADMIN — TRAMADOL HYDROCHLORIDE 50 MG: 50 TABLET ORAL at 21:22

## 2020-11-24 RX ADMIN — FEBUXOSTAT 40 MG: 40 TABLET, FILM COATED ORAL at 07:55

## 2020-11-24 ASSESSMENT — PAIN SCALES - GENERAL
PAINLEVEL_OUTOF10: 7
PAINLEVEL_OUTOF10: 0

## 2020-11-24 NOTE — PROGRESS NOTES
Occupational Therapy    Occupational Therapy Treatment Note  Name: Marlee Guillen MRN: [id number] :   1935   Date:  2020   Admission Date: 2020 Room:  [address] [place] [address]-   Restrictions/Precautions:  Restrictions/Precautions  Restrictions/Precautions: Fall Risk, General Precautions  Required Braces or Orthoses?: No     Communication with other providers:   Cleared for treatment by RN. Subjective:  Patient states:  \"I feel a little dizzy\" when standing. Pain:   Location, Type, Intensity (0/10 to 10/10): None noted    Objective:    Observation:  Pt alert and oriented. Pt up in chair upon arrival to room    Treatment, including education:  Transfers    Sit to stand :SBA  Stand to sit :SBA  SPT:SBA    Toilet:SBA    Self Care Training:   Cues were given for safety, sequence, UE/LE placement, visual cues, and balance. Activities performed today included dressing, toileting, hand hygiene, and grooming. Toileting  Sup with clothing management and toilet hygiene. Grooming  Sup with brushing teeth and hair. Bathing   Min A for buttocks. UB Dress  Donned gown. LB Dress  Mod A required assistance to thread brief over feet and was able to pull up brief. Therapeutic Activity Training:   Therapeutic activity training was instructed today. Cues were given for safety, sequence, UE/LE placement, awareness, and balance. Activities performed today included bed mobility training, sup-sit, sit-stand, SPT. Pt completed functional mobility x 50' with RW with SBA. Safety Measures: Gait belt used, Left in bed, Pull/Bed Alarm activated and call light left in reach          Assessment / Impression:        Patient's tolerance of treatment: Good   Adverse Reaction: None  Significant change in status and impact:  None  Barriers to improvement:  None    Plan for Next Session:    Continue with POC.     Time in:  934  Time out:  1012  Timed treatment minutes:  38  Total treatment time: 38  Electronically signed by:    ROCHELLE Tate 1990 11/24/2020, 10:15 AM    Previously filed values:  Patient Goals   Patient goals :  To get home  Short term goals  Time Frame for Short term goals: Until DC or goals met  Short term goal 1: Pt will complete trfs mod I using walker  Short term goal 2: Pt will complete UE bathing/dressing mod I  Short term goal 3: Pt will complete LE bathing/dressing min A  Short term goal 4: Pt will perform 5+ min  dynamic standing/functional mobility to perform ADLs and simple homemaking tasks  Short term goal 5: Pt will complete light BUE ther ex 10+ min to increase strength/endurance for functional mobility and ADLs

## 2020-11-24 NOTE — CARE COORDINATION
CM met with the patient for discharge planning, patient sitting in recliner watching TV. Patient lives in a 2 story home (ramped entrance, stays on main level) with her , has insurance with Rx coverage & PCP, stated that she was mostly independent with ADL's prior to admission ( helped with baths prior to admission), and no longer drives. Patient stated that she uses a walker at home and wears oxygen at bedtime (2 l/nc) due to obstructive sleep apnea and inability to tolerate a CPAP mask. Patient feels that she is getting stronger and feels she may be ready to return home in a week or possibly less. Patient stated that she would be agreeable to [facility] upon discharge if recommended. Patient is unable to identify any other needs at this time. CM will follow.

## 2020-11-24 NOTE — PATIENT CARE CONFERENCE
SWING BED WEEKLY TEAM SHEET      Melba Banks   11/24/2020             WEEK# 1    PHYSICAL THERAPY       Ambulation: Distance:  79'    Device: RW     Assist:  CGA    Wt bearing:full      W/C skills:  Not performed   Stairs:  Not performed    Pain: none     Transfers:   Sit to stand: CGA   Stand to sit:   CGA            Bed Mobility:  :     Supine to sit:  Min A     Sit to supine:          Strength/ROM: WFL     Balance:     Static sitting    [] P  [] F-   [] F    [] F+    [] G               Dynamic Sitting           [] P  [] F-   [] F    [] F+    [] G                     Static standing            [] P  [] F-   [x] F    [] F+    [] G   []  With  [] Without device Dynamic standing       [] P  [] F-   [x] F    [] F+    [] G   []  With  [] Without device  (P= poor   F= fair   G= good)    Issues to be resolved before discharge gait, impaired activity tolerance    Goals of previous week  [x] 1st team   [] met   [] partially met    [] not met                                          Why the goals were not met fatigue    Goals:   Time Frame for Short term goals: 2 weeks or upon discharge  Short term goal 1: Patient will be able to perform bed mobility and transfers mod I. Short term goal 2: Patient will be able to perform dynamic balance activities >5' without LOB to complete ADls. Short term goal 3: Patient will be able to ambulate >150 ft with FWW mod I.   Short term goal 4: Patient will be able to negotiate ramp mod I with FWW for return home.:         Physical Therapy Signature:  Yovany Root PT

## 2020-11-24 NOTE — FLOWSHEET NOTE
Standing Tolerance    Amb/Locomotion: AD/Distance/Assist Pt ambulated 70 feet with CGA assist using FWW            Steps (#)/Assist/Rails/AD Not today   Ramp:AD/Assist Not today   Curb:height AD/Assist Not today   Uneven:type AD/Assist Not today   W/C distance/Assist   N/A   Strategies that improved performance: Rest breaks   Barriers to progress/participation: Fatigue, pain   Alarm placed/where? Fall Risk  [ ] left in bed  [x ] left in chair [x ] call light within reach  [ ] bed alarm on  [ ] personal alarm on [ ] [de-identified]  [ ] other staff present:   Discharge recommendations  Anticipated discharge date:  TBA   Destination: []home alone   []home alone with assist prn  []Continuous supervision  []SNF  [] Assisted living   Continued therapy: []HHC PT  []OUTPATIENT  PT   [] No Further PT  Equipment needs: x     Therapeutic activities/exercises completed this date: Patient performed toileting and bathing activities with CGA-SBA for safety    Modality/intervention used:   [x ] Therapeutic Exercise    [ ] Modalities:   [x ] Therapeutic Activity    [ ] Ultrasound   [ ] Elec Stim   [x ] Gait Training     [ ] Cervical Traction  [ ] Lumbar Traction   [ ] Neuromuscular Re-education   [ ] Cold/hotpack  [ ] Iontophoresis   [ ] Instruction in HEP     [ ] Lockhart Prima   [ ] Manual Therapy   [ ] Aquatic Therapy     Patient/Caregiver Education and Training:nothing new    Exercise/Equipment/Modalities this date                       Treatment Plan for Next Session:Continue per POC    Assessment / Impression   Patient reported increased dizziness upon first standing and requested to sit back down so that she would not fall.   Very mild dizziness noted with ambulation                                                       Treatment/Activity Tolerance:   [x] Tolerated Treatment well:     [] Patient limited by fatigue/pain:       [] Patient limited by medical complications:    [] Adverse Reaction to Tx:   [] Significant change in status    Plan:   [x ] Continue per plan of care [ ] Fabiana Hollingsworth current plan   [ ] Plan of care initiated [ ] Hold pending MD visit [ ] Discharged    Billing:  Billed units: 1 gait, 2 TA      Signed: Cherelle Poag, PTA  11/24/2020, 11:22 AM

## 2020-11-24 NOTE — CARE COORDINATION
SWING BED WEEKLY TEAM SHEET     Daisy Davidson   11/24/2020 WEEK #1    Care Management    Issues to be resolved before discharge: Increased strength, balance, and mobility    Family Education: Patient/staff to update     Discharge Plan: Home with [facility]  Patient/Family Adjustment: N/A     Goals of previous week:   [] 1st team   [] met   [] partially met    [x] not met             Why goals were not met: Continued weakness     Skilled Level of Care Remains   [x] yes   [] no    Estimated length of stay: 7-10 days   Patient/Family Concerns/input: None at this time    Patient/Family  Signature _________________  11/24/2020       Care Management Signature:  Herbert Paige RN

## 2020-11-25 LAB
ANION GAP SERPL CALCULATED.3IONS-SCNC: 12 MMOL/L (ref 4–16)
BUN BLDV-MCNC: 36 MG/DL (ref 6–23)
CALCIUM SERPL-MCNC: 9 MG/DL (ref 8.3–10.6)
CHLORIDE BLD-SCNC: 88 MMOL/L (ref 99–110)
CO2: 31 MMOL/L (ref 21–32)
CREAT SERPL-MCNC: 1.5 MG/DL (ref 0.6–1.1)
GFR AFRICAN AMERICAN: 40 ML/MIN/1.73M2
GFR NON-AFRICAN AMERICAN: 33 ML/MIN/1.73M2
GLUCOSE BLD-MCNC: 130 MG/DL (ref 70–99)
GLUCOSE BLD-MCNC: 130 MG/DL (ref 70–99)
GLUCOSE BLD-MCNC: 147 MG/DL (ref 70–99)
GLUCOSE BLD-MCNC: 152 MG/DL (ref 70–99)
GLUCOSE BLD-MCNC: 179 MG/DL (ref 70–99)
MAGNESIUM: 1.5 MG/DL (ref 1.8–2.4)
POTASSIUM SERPL-SCNC: ABNORMAL MMOL/L (ref 3.5–5.1)
SODIUM BLD-SCNC: 131 MMOL/L (ref 135–145)

## 2020-11-25 PROCEDURE — 82962 GLUCOSE BLOOD TEST: CPT

## 2020-11-25 PROCEDURE — 6370000000 HC RX 637 (ALT 250 FOR IP): Performed by: INTERNAL MEDICINE

## 2020-11-25 PROCEDURE — 6360000002 HC RX W HCPCS: Performed by: INTERNAL MEDICINE

## 2020-11-25 PROCEDURE — 36415 COLL VENOUS BLD VENIPUNCTURE: CPT

## 2020-11-25 PROCEDURE — 94640 AIRWAY INHALATION TREATMENT: CPT

## 2020-11-25 PROCEDURE — 80048 BASIC METABOLIC PNL TOTAL CA: CPT

## 2020-11-25 PROCEDURE — 97162 PT EVAL MOD COMPLEX 30 MIN: CPT

## 2020-11-25 PROCEDURE — 94761 N-INVAS EAR/PLS OXIMETRY MLT: CPT

## 2020-11-25 PROCEDURE — 97110 THERAPEUTIC EXERCISES: CPT

## 2020-11-25 PROCEDURE — 83735 ASSAY OF MAGNESIUM: CPT

## 2020-11-25 PROCEDURE — 6370000000 HC RX 637 (ALT 250 FOR IP): Performed by: NURSE PRACTITIONER

## 2020-11-25 PROCEDURE — 1200000002 HC SEMI PRIVATE SWING BED

## 2020-11-25 PROCEDURE — 97116 GAIT TRAINING THERAPY: CPT

## 2020-11-25 RX ORDER — POTASSIUM CHLORIDE 20 MEQ/1
40 TABLET, EXTENDED RELEASE ORAL 2 TIMES DAILY WITH MEALS
Status: DISCONTINUED | OUTPATIENT
Start: 2020-11-25 | End: 2020-12-01 | Stop reason: HOSPADM

## 2020-11-25 RX ORDER — LANOLIN ALCOHOL/MO/W.PET/CERES
400 CREAM (GRAM) TOPICAL DAILY
Status: DISCONTINUED | OUTPATIENT
Start: 2020-11-25 | End: 2020-11-30

## 2020-11-25 RX ADMIN — ALBUTEROL SULFATE 2 PUFF: 90 AEROSOL, METERED RESPIRATORY (INHALATION) at 14:45

## 2020-11-25 RX ADMIN — LEVOTHYROXINE SODIUM 50 MCG: 50 TABLET ORAL at 06:11

## 2020-11-25 RX ADMIN — METOPROLOL SUCCINATE 25 MG: 25 TABLET, EXTENDED RELEASE ORAL at 09:53

## 2020-11-25 RX ADMIN — INSULIN LISPRO 1 UNITS: 100 INJECTION, SOLUTION INTRAVENOUS; SUBCUTANEOUS at 17:15

## 2020-11-25 RX ADMIN — FEBUXOSTAT 40 MG: 40 TABLET, FILM COATED ORAL at 09:53

## 2020-11-25 RX ADMIN — FERROUS SULFATE TAB 325 MG (65 MG ELEMENTAL FE) 325 MG: 325 (65 FE) TAB at 17:15

## 2020-11-25 RX ADMIN — FLUTICASONE PROPIONATE 2 PUFF: 110 AEROSOL, METERED RESPIRATORY (INHALATION) at 19:51

## 2020-11-25 RX ADMIN — ALBUTEROL SULFATE 2 PUFF: 90 AEROSOL, METERED RESPIRATORY (INHALATION) at 19:52

## 2020-11-25 RX ADMIN — FERROUS SULFATE TAB 325 MG (65 MG ELEMENTAL FE) 325 MG: 325 (65 FE) TAB at 09:53

## 2020-11-25 RX ADMIN — CILOSTAZOL 50 MG: 50 TABLET ORAL at 09:53

## 2020-11-25 RX ADMIN — CILOSTAZOL 50 MG: 50 TABLET ORAL at 20:53

## 2020-11-25 RX ADMIN — ANTI-FUNGAL POWDER MICONAZOLE NITRATE TALC FREE: 1.42 POWDER TOPICAL at 20:53

## 2020-11-25 RX ADMIN — ROPINIROLE HYDROCHLORIDE 0.5 MG: 0.25 TABLET, FILM COATED ORAL at 09:53

## 2020-11-25 RX ADMIN — BUMETANIDE 1 MG: 1 TABLET ORAL at 17:15

## 2020-11-25 RX ADMIN — CALCIUM 500 MG: 500 TABLET ORAL at 09:53

## 2020-11-25 RX ADMIN — POTASSIUM CHLORIDE 40 MEQ: 1500 TABLET, EXTENDED RELEASE ORAL at 17:15

## 2020-11-25 RX ADMIN — ROPINIROLE HYDROCHLORIDE 0.5 MG: 0.25 TABLET, FILM COATED ORAL at 20:53

## 2020-11-25 RX ADMIN — Medication 2 MG: at 22:56

## 2020-11-25 RX ADMIN — ANTI-FUNGAL POWDER MICONAZOLE NITRATE TALC FREE: 1.42 POWDER TOPICAL at 10:00

## 2020-11-25 RX ADMIN — BUMETANIDE 1 MG: 1 TABLET ORAL at 09:53

## 2020-11-25 RX ADMIN — HEPARIN SODIUM 5000 UNITS: 5000 INJECTION INTRAVENOUS; SUBCUTANEOUS at 14:42

## 2020-11-25 RX ADMIN — FLUTICASONE PROPIONATE 2 PUFF: 110 AEROSOL, METERED RESPIRATORY (INHALATION) at 07:50

## 2020-11-25 RX ADMIN — TRAMADOL HYDROCHLORIDE 50 MG: 50 TABLET ORAL at 18:34

## 2020-11-25 RX ADMIN — TRAMADOL HYDROCHLORIDE 50 MG: 50 TABLET ORAL at 03:40

## 2020-11-25 RX ADMIN — CARBIDOPA AND LEVODOPA 2 TABLET: 10; 100 TABLET ORAL at 20:53

## 2020-11-25 RX ADMIN — HEPARIN SODIUM 5000 UNITS: 5000 INJECTION INTRAVENOUS; SUBCUTANEOUS at 09:52

## 2020-11-25 RX ADMIN — Medication 400 MG: at 18:34

## 2020-11-25 RX ADMIN — HEPARIN SODIUM 5000 UNITS: 5000 INJECTION INTRAVENOUS; SUBCUTANEOUS at 20:53

## 2020-11-25 RX ADMIN — ROPINIROLE HYDROCHLORIDE 0.5 MG: 0.25 TABLET, FILM COATED ORAL at 14:42

## 2020-11-25 RX ADMIN — FLUTICASONE PROPIONATE 2 SPRAY: 50 SPRAY, METERED NASAL at 09:53

## 2020-11-25 ASSESSMENT — PAIN SCALES - GENERAL
PAINLEVEL_OUTOF10: 0
PAINLEVEL_OUTOF10: 0
PAINLEVEL_OUTOF10: 3
PAINLEVEL_OUTOF10: 0
PAINLEVEL_OUTOF10: 7
PAINLEVEL_OUTOF10: 0
PAINLEVEL_OUTOF10: 0
PAINLEVEL_OUTOF10: 7
PAINLEVEL_OUTOF10: 7

## 2020-11-25 ASSESSMENT — PAIN DESCRIPTION - LOCATION: LOCATION: COCCYX;LEG

## 2020-11-25 ASSESSMENT — PAIN DESCRIPTION - PAIN TYPE: TYPE: ACUTE PAIN

## 2020-11-25 NOTE — PROGRESS NOTES
Physical Therapy    Physical Therapy Treatment Note  Name: Daisy Davidson MRN: [id number] :   1935   Date:  2020   Admission Date: 2020 Room:  [address] [place] [address]   Restrictions/Precautions:  Restrictions/Precautions  Restrictions/Precautions: Fall Risk, General Precautions  Required Braces or Orthoses?: No     Communication with other providers:  Updated progress with team  Subjective:  Patient states:  P agreeable to working with therapy  Pain:   Location, Type, Intensity (0/10 to 10/10):  denies  Objective:    Observation:  P sitting up in chair  Treatment, including education/measures:  Orthostatic BP assessment-p with no reports of symptoms and no significant change in BP with changing positions: sitting-125/56 HR 69; standing 122/48 HR 71  There. Ex: seated marching x 12 reps, LAQ x 2 sets x 10 with 1 lb weight, ankle pumps; standing lateral step out x 10 reps, backwards step x 10 reps, mini squat x 10 reps with cueing. P requires BUE support on RW to perform standing activity  Gait: P ambulates with RW x 15' x 2 bouts, 61' with CGA. P cued to increase step length  Transfers: sit <> stand to RW with CGA from chair  P educated on condition information and progress  P left sitting up in chair with chair alarm on and call light within reach  Assessment / Impression:    P with improved gait and LE strength.  Recommend continued skilled pT to address deficits and maximize functional potential.   Patient's tolerance of treatment:  good   Adverse Reaction: none  Significant change in status and impact:  Improved strength  Barriers to improvement:  none  Plan for Next Session:    Gait, balance  Time in:  1030  Time out:  1109  Timed treatment minutes:  39  Total treatment time:  39    Previously filed items:  Social/Functional History  Lives With: Spouse  Type of Home: House  Home Layout: Two level, Able to Live on Main level with bedroom/bathroom  Home Access: Ramped entrance  Bathroom Shower/Tub: Tub/Shower unit, Shower chair with back  [employer]: Standard  Bathroom Equipment: Grab bars in shower, Hand-held shower, Toilet raiser, Grab bars around toilet  Bathroom Accessibility: Accessible  Home Equipment: Lift chair, Rolling walker, 4 wheeled walker, Oxygen, Reacher, Sock aid(Wears 2L of O2 at night)  Receives Help From: Family  ADL Assistance: Needs assistance(Pt reports sometimes her  helps her dress if they are in a hurry otherwise she does it herself)  Bath: Minimal assistance(Spouse helps her with places she can't reach)  Dressing: Modified independent  Grooming: Independent  Feeding: Independent  Homemaking Assistance: Independent  Homemaking Responsibilities: Yes  Meal Prep Responsibility: Secondary(Pt and spouse cook together)  Laundry Responsibility: Primary  Cleaning Responsibility: Secondary( does most cleaning or her daughter in law comes to clean)  Shopping Responsibility: Secondary(Goes to the store with spouse or he goes)  Ambulation Assistance: Needs assistance(Uses a rolator or 2WW walker around the house depending on how she feels)  Transfer Assistance: Independent  Active : No  Mode of Transportation: Car  Occupation: Retired  Type of occupation:   Leisure & Hobbies: Pt and spouse travel in their motorhome  Additional Comments: Pt reports she sleeps in her recliner  Short term goals  Time Frame for Short term goals: 2 weeks or upon discharge  Short term goal 1: Patient will be able to perform bed mobility and transfers mod I. Short term goal 2: Patient will be able to perform dynamic balance activities >5' without LOB to complete ADls. Short term goal 3: Patient will be able to ambulate >150 ft with FWW mod I. Short term goal 4: Patient will be able to negotiate ramp mod I with FWW for return home.        Electronically signed by:    Maya Jimenez, PT  11/25/2020, 1:17 PM

## 2020-11-25 NOTE — PROGRESS NOTES
Hospitalist Progress Note      Name:  Shasta Sanchez /Age/Sex: 1935  (82 y.o. female)   MRN & CSN:  [id number] & [id number] Admission Date/Time: 2020  3:35 PM   Location:   PCP: Cat Wesley 112 Day: 6    Assessment and Plan:       1. Weakness and debility: Continue PT/OT. Patient continues to make reasonable gains. 2.  Third-degree heart block: Status post pacemaker placement . Patient not having dizziness or presyncopal episodes over last couple days reported. Previous echo with LVH preserved EF 50-55%. Left heart cath  left main patent mild disease LAD/LCX, RCA. Cardiothoracic follow-up as outpatient. No chest pain noted    3. KAREEM on CKD. Resolved. Creatinine 3.7 on admission. 1.5. Continue to monitor avoid nephrotoxic medication. 4.  Acute metabolic encephalopathy resolved currently at baseline. 5.  UTI: Worsened baseline dementia but improved today. Patient markedly improved discussing family missing Thanksgiving. Patient completed cefdinir total 5 days. 6.  DM type II: Low-dose sliding scale at this time. 7.  Sacral pressure ulcer: Stage II. Wound care managing appreciate same. 8.  Hypomagnesemia: 1.5. Will initiate oral supplementation at this time. Continue to monitor    Diet DIET CARB CONTROL; Dietary Nutrition Supplements: Low Calorie High Protein Supplement   DVT Prophylaxis [] Lovenox, []  Heparin, [] SCDs, []No VTE prophylaxis, patient ambulating   GI Prophylaxis [] PPI, [] H2 Blocker, [] No GI prophylaxis, patient is receiving diet/Tube Feeds   Code Status Full Code   Disposition Patient requires continued admission due to continued swing bed   MDM [] Low, [x] Moderate,[]  High  Patient's risk as above due to as above     History of Present Illness:     Pt S&E. No acute events overnight. Patient resting company. Denies any headache blurred vision dizziness.   Denies any chest pain palpitation shortness of breath. [place] any fever chills nausea or vomiting or abdominal pain. Patient mildly depressed when discussing family. Patient saddened because she will not be at home for Thanksgiving with family. However, after continued discussion of life in general patient improved disposition smiling. Just missing family at this time. 10-14 point ROS reviewed negative, unless as noted above    Objective: Intake/Output Summary (Last 24 hours) at 11/25/2020 1758  Last data filed at 11/25/2020 1432  Gross per 24 hour   Intake 600 ml   Output --   Net 600 ml      Vitals:   Vitals:    11/25/20 0750   BP:    Pulse:    Resp:    Temp:    SpO2: 97%     Physical Exam:    GEN Awake female, sitting upright in bed in no apparent distress. Appears given age. EYES Pupils are equally round. No scleral erythema, discharge, or conjunctivitis. HENT Mucous membranes are moist.   NECK No apparent thyromegaly or masses. RESP Clear to auscultation, no wheezes, rales or rhonchi. Symmetric chest movement while on room air. CARDIO/VASC S1/S2 auscultated. Regular rate without appreciable murmurs, rubs, or gallops. Peripheral pulses equal bilaterally and palpable. No peripheral edema. GI Abdomen is soft without significant tenderness, masses, or guarding. Bowel sounds are normoactive. Rectal exam deferred.  Raphael catheter is not present. HEME/LYMPH No petechiae or ecchymoses. MSK No gross joint deformities. Spontaneous movement of all extremities  SKIN Normal coloration, warm, dry. Pacemaker insertion site secured bandage over without erythema calor or drainage or tenderness. NEURO Cranial nerves appear grossly intact, normal speech, no lateralizing weakness. PSYCH Awake, alert, oriented x 4. Affect appropriate.     Medications:   Medications:    potassium chloride  40 mEq Oral BID WC    fluticasone  2 puff Inhalation BID    bumetanide  1 mg Oral BID    calcium elemental  1 tablet Oral Daily   

## 2020-11-25 NOTE — PROGRESS NOTES
Occupational Therapy    Occupational Therapy Treatment Note  Name: Mayo Reyna MRN: [id number] :   1935   Date:  2020   Admission Date: 2020 Room:  015/015-01   Restrictions/Precautions:  Restrictions/Precautions  Restrictions/Precautions: Fall Risk, General Precautions  Required Braces or Orthoses?: No     Communication with other providers:   Cleared for treatment by RN. Informed nursing pt was tearful. Subjective:  Patient states:  \"I'm alright\" pt was tearful upon entering the room. Pain:   Location, Type, Intensity (0/10 to 10/10):  0/10    Objective:    Observation:  Pt alert and oriented. Treatment, including education:      Therapeutic Exercise:  Cues were given for technique, safety, recruitment, and rationale. Cues were verbal and/or tactile. For BUE strengthening for ADL & functional mobility Indep pt performed BUE strengthening HEP c 1# hand weights x 10 reps x 6 exercises with Minimal difficulty  For BUE strengthening for ADL & functional mobility Indep pt performed BUE strengthening HEP using Medium strength theraband  X 5 exer for R/L UE x 15 repsc minimal rest breaks. Therapeutic Activity Training:   Therapeutic activity training was instructed today. Cues were given for safety, sequence, UE/LE placement, awareness, and balance. Activities performed today included bed mobility training, sup-sit, sit-stand, SPT. Safety Measures: Gait belt used, Left in bed, Pull/Bed Alarm activated and call light left in reach          Assessment / Impression:        Patient's tolerance of treatment: Good   Adverse Reaction: None  Significant change in status and impact:  None  Barriers to improvement:  Dec strength and endurance. Plan for Next Session:    Continue with POC.     Time in: 1457  Time out:  1521  Timed treatment minutes:  24  Total treatment time:  24  Electronically signed by:    Diana [address]    2020, 3:27 PM    Previously filed values:  Patient Goals   Patient goals :  To get home  Short term goals  Time Frame for Short term goals: Until DC or goals met  Short term goal 1: Pt will complete trfs mod I using walker  Short term goal 2: Pt will complete UE bathing/dressing mod I  Short term goal 3: Pt will complete LE bathing/dressing min A  Short term goal 4: Pt will perform 5+ min  dynamic standing/functional mobility to perform ADLs and simple homemaking tasks  Short term goal 5: Pt will complete light BUE ther ex 10+ min to increase strength/endurance for functional mobility and ADLs

## 2020-11-26 LAB
GLUCOSE BLD-MCNC: 122 MG/DL (ref 70–99)
GLUCOSE BLD-MCNC: 168 MG/DL (ref 70–99)
GLUCOSE BLD-MCNC: 186 MG/DL (ref 70–99)
GLUCOSE BLD-MCNC: 205 MG/DL (ref 70–99)

## 2020-11-26 PROCEDURE — 1200000002 HC SEMI PRIVATE SWING BED

## 2020-11-26 PROCEDURE — 94761 N-INVAS EAR/PLS OXIMETRY MLT: CPT

## 2020-11-26 PROCEDURE — 6370000000 HC RX 637 (ALT 250 FOR IP): Performed by: INTERNAL MEDICINE

## 2020-11-26 PROCEDURE — 6360000002 HC RX W HCPCS: Performed by: INTERNAL MEDICINE

## 2020-11-26 PROCEDURE — 97116 GAIT TRAINING THERAPY: CPT

## 2020-11-26 PROCEDURE — 82962 GLUCOSE BLOOD TEST: CPT

## 2020-11-26 PROCEDURE — 97110 THERAPEUTIC EXERCISES: CPT

## 2020-11-26 PROCEDURE — 94640 AIRWAY INHALATION TREATMENT: CPT

## 2020-11-26 RX ADMIN — BUMETANIDE 1 MG: 1 TABLET ORAL at 09:24

## 2020-11-26 RX ADMIN — CILOSTAZOL 50 MG: 50 TABLET ORAL at 09:24

## 2020-11-26 RX ADMIN — FERROUS SULFATE TAB 325 MG (65 MG ELEMENTAL FE) 325 MG: 325 (65 FE) TAB at 17:30

## 2020-11-26 RX ADMIN — ANTI-FUNGAL POWDER MICONAZOLE NITRATE TALC FREE: 1.42 POWDER TOPICAL at 20:49

## 2020-11-26 RX ADMIN — INSULIN LISPRO 2 UNITS: 100 INJECTION, SOLUTION INTRAVENOUS; SUBCUTANEOUS at 12:22

## 2020-11-26 RX ADMIN — FLUTICASONE PROPIONATE 2 PUFF: 110 AEROSOL, METERED RESPIRATORY (INHALATION) at 07:35

## 2020-11-26 RX ADMIN — POTASSIUM CHLORIDE 40 MEQ: 1500 TABLET, EXTENDED RELEASE ORAL at 17:30

## 2020-11-26 RX ADMIN — CILOSTAZOL 50 MG: 50 TABLET ORAL at 20:45

## 2020-11-26 RX ADMIN — ROPINIROLE HYDROCHLORIDE 0.5 MG: 0.25 TABLET, FILM COATED ORAL at 20:44

## 2020-11-26 RX ADMIN — LEVOTHYROXINE SODIUM 50 MCG: 50 TABLET ORAL at 06:09

## 2020-11-26 RX ADMIN — CARBIDOPA AND LEVODOPA 2 TABLET: 10; 100 TABLET ORAL at 20:44

## 2020-11-26 RX ADMIN — ROPINIROLE HYDROCHLORIDE 0.5 MG: 0.25 TABLET, FILM COATED ORAL at 14:10

## 2020-11-26 RX ADMIN — ALBUTEROL SULFATE 2 PUFF: 90 AEROSOL, METERED RESPIRATORY (INHALATION) at 07:40

## 2020-11-26 RX ADMIN — METOPROLOL SUCCINATE 25 MG: 25 TABLET, EXTENDED RELEASE ORAL at 09:24

## 2020-11-26 RX ADMIN — FERROUS SULFATE TAB 325 MG (65 MG ELEMENTAL FE) 325 MG: 325 (65 FE) TAB at 09:28

## 2020-11-26 RX ADMIN — FEBUXOSTAT 40 MG: 40 TABLET, FILM COATED ORAL at 09:24

## 2020-11-26 RX ADMIN — Medication 400 MG: at 09:24

## 2020-11-26 RX ADMIN — POTASSIUM CHLORIDE 40 MEQ: 1500 TABLET, EXTENDED RELEASE ORAL at 09:28

## 2020-11-26 RX ADMIN — HEPARIN SODIUM 5000 UNITS: 5000 INJECTION INTRAVENOUS; SUBCUTANEOUS at 14:09

## 2020-11-26 RX ADMIN — ALBUTEROL SULFATE 2 PUFF: 90 AEROSOL, METERED RESPIRATORY (INHALATION) at 20:20

## 2020-11-26 RX ADMIN — FLUTICASONE PROPIONATE 2 SPRAY: 50 SPRAY, METERED NASAL at 09:24

## 2020-11-26 RX ADMIN — ROPINIROLE HYDROCHLORIDE 0.5 MG: 0.25 TABLET, FILM COATED ORAL at 09:24

## 2020-11-26 RX ADMIN — CALCIUM 500 MG: 500 TABLET ORAL at 09:24

## 2020-11-26 RX ADMIN — INSULIN LISPRO 1 UNITS: 100 INJECTION, SOLUTION INTRAVENOUS; SUBCUTANEOUS at 17:29

## 2020-11-26 RX ADMIN — HEPARIN SODIUM 5000 UNITS: 5000 INJECTION INTRAVENOUS; SUBCUTANEOUS at 20:45

## 2020-11-26 RX ADMIN — BUMETANIDE 1 MG: 1 TABLET ORAL at 17:30

## 2020-11-26 RX ADMIN — FLUTICASONE PROPIONATE 2 PUFF: 110 AEROSOL, METERED RESPIRATORY (INHALATION) at 20:20

## 2020-11-26 RX ADMIN — HEPARIN SODIUM 5000 UNITS: 5000 INJECTION INTRAVENOUS; SUBCUTANEOUS at 09:28

## 2020-11-26 ASSESSMENT — PAIN SCALES - GENERAL
PAINLEVEL_OUTOF10: 0
PAINLEVEL_OUTOF10: 2
PAINLEVEL_OUTOF10: 0
PAINLEVEL_OUTOF10: 0

## 2020-11-26 NOTE — PLAN OF CARE
Problem: Skin Integrity:  Goal: Will show no infection signs and symptoms  Description: Will show no infection signs and symptoms  Outcome: Met This Shift  Goal: Absence of new skin breakdown  Description: Absence of new skin breakdown  Outcome: Met This Shift     Problem: Pain:  Goal: Pain level will decrease  Description: Pain level will decrease  Outcome: Ongoing  Goal: Control of acute pain  Description: Control of acute pain  Outcome: Ongoing  Goal: Control of chronic pain  Description: Control of chronic pain  Outcome: Ongoing     Problem: Falls - Risk of:  Goal: Will remain free from falls  Description: Will remain free from falls  Outcome: Ongoing  Goal: Absence of physical injury  Description: Absence of physical injury  Outcome: Ongoing     Problem: IP MOBILITY  Goal: LTG - patient will demonstrate kitchen mobility  Outcome: Ongoing  Goal: LTG - patient will ambulate community distance  Outcome: Ongoing  Goal: LTG - patient will ambulate household distance  Outcome: Ongoing  Goal: LTG - patient will demonstrate safe mobility requirements  Outcome: Ongoing  Goal: STG - Patient will ambulate  Outcome: OngoinUp to chair with walker and gait belt

## 2020-11-26 NOTE — PROGRESS NOTES
Nutrition Assessment     Type and Reason for Visit: Reassess(Swing Bed)    Nutrition Recommendations/Plan: continue with supplements, recommend vitamin C and zinc     Nutrition Assessment:  Meal intakes remain variable with 0-75% of meals, tries to drink the ensure, encouraged good sources of protein. Malnutrition Assessment:  Malnutrition Status: Mild malnutrition    Estimated Daily Nutrient Needs:  Energy (kcal): 0601-6674; Weight Used for Energy Requirements:  Ideal     Protein (g): ; Weight Used for Protein Requirements:  Current(1.2-1.5)        Fluid (ml/day): 8871-9533; Weight Used for Fluid Requirements:  1 ml/kcal      Nutrition Related Findings: A1c 7.8, weight loss over the past month      Current Nutrition Therapies:    DIET CARB CONTROL;   Dietary Nutrition Supplements: Low Calorie High Protein Supplement    Anthropometric Measures:  · Height: 5' 2\" (157.5 cm)  · Current Body Wt: 167 lb (75.8 kg)   · BMI: 30.5    Nutrition Diagnosis:   · Inadequate protein-energy intake, In context of acute illness or injury related to increase demand for energy/nutrients as evidenced by intake 26-50%, intake 51-75%, wounds      Nutrition Interventions:   Food and/or Nutrient Delivery:  Continue Current Diet, Continue Oral Nutrition Supplement  Nutrition Education/Counseling:  Education initiated   Coordination of Nutrition Care:  Continue to monitor while inpatient    Goals:  Pt's intakes will be % of meals and supplements during her stay       Nutrition Monitoring and Evaluation:   Behavioral-Environmental Outcomes:  None Identified   Food/Nutrient Intake Outcomes:  Food and Nutrient Intake, Supplement Intake  Physical Signs/Symptoms Outcomes:  Biochemical Data, Skin, Weight     Discharge Planning:    Continue Oral Nutrition Supplement, Continue current diet     Electronically signed by Daily Olivera RD, LD on 11/26/20 at 1:56 PM EST    Contact: [phone]

## 2020-11-26 NOTE — PROGRESS NOTES
Physical Therapy    Physical Therapy Treatment Note  Name: Melba Banks MRN: [id number] :   1935   Date:  2020   Admission Date: 2020 Room:  [address] [place] [address]   Restrictions/Precautions:  Restrictions/Precautions  Restrictions/Precautions: Fall Risk, General Precautions  Required Braces or Orthoses?: No     Communication with other providers:  Updated progress with team  Subjective:  Patient states:  P agreeable to working with therapy  Pain:   Location, Type, Intensity (0/10 to 10/10):  denies  Objective:    Observation:  P sitting up in chair  Treatment, including education/measures:  OThere. Ex: seated marching x 15 reps, LAQ x 3sets x 10, ankle pumps; standing lateral step out x 10 reps, standing SLR x 10 reps,. P requires BUE support on RW to perform standing activity  Gait: P ambulates with RW  61' with CGA. P cued to increase step length  Transfers: sit <> stand to RW with CGA from chair  P educated on condition information and progress  P left sitting up in chair with chair alarm on and call light within reach  Assessment / Impression:    P with improved gait and LE strength.  Recommend continued skilled pT to address deficits and maximize functional potential.   Patient's tolerance of treatment:  good   Adverse Reaction: none  Significant change in status and impact:  Improved strength  Barriers to improvement:  none  Plan for Next Session:    Gait, balance  Time in:  1200  Time out:  1225  Timed treatment minutes:  25  Total treatment time:  25    Previously filed items:  Social/Functional History  Lives With: Spouse  Type of Home: House  Home Layout: Two level, Able to Live on Main level with bedroom/bathroom  Home Access: Ramped entrance  Bathroom Shower/Tub: Tub/Shower unit, Shower chair with back  [employer]: Standard  Bathroom Equipment: Grab bars in shower, Hand-held shower, Toilet raiser, Grab bars around toilet  Bathroom Accessibility: Accessible  Home Equipment: Lift chair, Rolling walker, 4 wheeled walker, Oxygen, Reacher, Sock aid(Wears 2L of O2 at night)  Receives Help From: Family  ADL Assistance: Needs assistance(Pt reports sometimes her  helps her dress if they are in a hurry otherwise she does it herself)  Bath: Minimal assistance(Spouse helps her with places she can't reach)  Dressing: Modified independent  Grooming: Independent  Feeding: Independent  Homemaking Assistance: Independent  Homemaking Responsibilities: Yes  Meal Prep Responsibility: Secondary(Pt and spouse cook together)  Laundry Responsibility: Primary  Cleaning Responsibility: Secondary( does most cleaning or her daughter in law comes to clean)  Shopping Responsibility: Secondary(Goes to the store with spouse or he goes)  Ambulation Assistance: Needs assistance(Uses a rolator or 2WW walker around the house depending on how she feels)  Transfer Assistance: Independent  Active : No  Mode of Transportation: Car  Occupation: Retired  Type of occupation:   Leisure & Hobbies: Pt and spouse travel in their motorhome  Additional Comments: Pt reports she sleeps in her recliner  Short term goals  Time Frame for Short term goals: 2 weeks or upon discharge  Short term goal 1: Patient will be able to perform bed mobility and transfers mod I. Short term goal 2: Patient will be able to perform dynamic balance activities >5' without LOB to complete ADls. Short term goal 3: Patient will be able to ambulate >150 ft with FWW mod I. Short term goal 4: Patient will be able to negotiate ramp mod I with FWW for return home.        Electronically signed by:    Sunil Guerrier PT  11/26/2020, 1:36 PM

## 2020-11-26 NOTE — PLAN OF CARE
Problem: Skin Integrity:  Goal: Will show no infection signs and symptoms  Description: Will show no infection signs and symptoms  11/26/2020 1610 by Irma Loera RN  Outcome: Ongoing  11/26/2020 1546 by Irma Loera RN  Outcome: Met This Shift  Goal: Absence of new skin breakdown  Description: Absence of new skin breakdown  11/26/2020 1610 by Irma Loera RN  Outcome: Ongoing  11/26/2020 1546 by Irma Loera RN  Outcome: Met This Shift     Problem: IP MOBILITY  Goal: LTG - patient will demonstrate kitchen mobility  11/26/2020 1610 by Irma Loera RN  Outcome: Ongoing  11/26/2020 1546 by Irma Loera RN  Outcome: Ongoing  Goal: LTG - patient will ambulate community distance  11/26/2020 1610 by Irma Loera RN  Outcome: Ongoing  11/26/2020 1546 by Irma Loera RN  Outcome: Ongoing  Goal: LTG - patient will ambulate household distance  11/26/2020 1610 by Irma Loera RN  Outcome: Ongoing  11/26/2020 1546 by Irma Loera RN  Outcome: Ongoing  Goal: LTG - patient will demonstrate safe mobility requirements  11/26/2020 1610 by Irma Loera RN  Outcome: Ongoing  11/26/2020 1546 by Irma Loera RN  Outcome: Ongoing  Goal: STG - Patient will ambulate  11/26/2020 1610 by Irma Loera RN  Outcome: Ongoing  11/26/2020 1546 by Irma Loera RN  Outcome: Ongoing  Up to chair, amb to bathroom with one assist and walker

## 2020-11-27 LAB
GLUCOSE BLD-MCNC: 133 MG/DL (ref 70–99)
GLUCOSE BLD-MCNC: 140 MG/DL (ref 70–99)
GLUCOSE BLD-MCNC: 146 MG/DL (ref 70–99)
GLUCOSE BLD-MCNC: 188 MG/DL (ref 70–99)

## 2020-11-27 PROCEDURE — 6370000000 HC RX 637 (ALT 250 FOR IP): Performed by: INTERNAL MEDICINE

## 2020-11-27 PROCEDURE — 6370000000 HC RX 637 (ALT 250 FOR IP): Performed by: NURSE PRACTITIONER

## 2020-11-27 PROCEDURE — 97535 SELF CARE MNGMENT TRAINING: CPT

## 2020-11-27 PROCEDURE — 1200000002 HC SEMI PRIVATE SWING BED

## 2020-11-27 PROCEDURE — 6360000002 HC RX W HCPCS: Performed by: INTERNAL MEDICINE

## 2020-11-27 PROCEDURE — 94761 N-INVAS EAR/PLS OXIMETRY MLT: CPT

## 2020-11-27 PROCEDURE — 82962 GLUCOSE BLOOD TEST: CPT

## 2020-11-27 PROCEDURE — 97110 THERAPEUTIC EXERCISES: CPT

## 2020-11-27 PROCEDURE — 97116 GAIT TRAINING THERAPY: CPT

## 2020-11-27 PROCEDURE — 94640 AIRWAY INHALATION TREATMENT: CPT

## 2020-11-27 RX ADMIN — FLUTICASONE PROPIONATE 2 PUFF: 110 AEROSOL, METERED RESPIRATORY (INHALATION) at 21:03

## 2020-11-27 RX ADMIN — FERROUS SULFATE TAB 325 MG (65 MG ELEMENTAL FE) 325 MG: 325 (65 FE) TAB at 16:20

## 2020-11-27 RX ADMIN — ROPINIROLE HYDROCHLORIDE 0.5 MG: 0.25 TABLET, FILM COATED ORAL at 15:06

## 2020-11-27 RX ADMIN — Medication 2 MG: at 22:47

## 2020-11-27 RX ADMIN — FERROUS SULFATE TAB 325 MG (65 MG ELEMENTAL FE) 325 MG: 325 (65 FE) TAB at 08:59

## 2020-11-27 RX ADMIN — POTASSIUM CHLORIDE 40 MEQ: 1500 TABLET, EXTENDED RELEASE ORAL at 16:19

## 2020-11-27 RX ADMIN — CARBIDOPA AND LEVODOPA 2 TABLET: 10; 100 TABLET ORAL at 21:02

## 2020-11-27 RX ADMIN — ANTI-FUNGAL POWDER MICONAZOLE NITRATE TALC FREE: 1.42 POWDER TOPICAL at 21:12

## 2020-11-27 RX ADMIN — ROPINIROLE HYDROCHLORIDE 0.5 MG: 0.25 TABLET, FILM COATED ORAL at 08:59

## 2020-11-27 RX ADMIN — HEPARIN SODIUM 5000 UNITS: 5000 INJECTION INTRAVENOUS; SUBCUTANEOUS at 21:03

## 2020-11-27 RX ADMIN — CALCIUM 500 MG: 500 TABLET ORAL at 09:00

## 2020-11-27 RX ADMIN — ANTI-FUNGAL POWDER MICONAZOLE NITRATE TALC FREE: 1.42 POWDER TOPICAL at 08:59

## 2020-11-27 RX ADMIN — FLUTICASONE PROPIONATE 2 SPRAY: 50 SPRAY, METERED NASAL at 09:02

## 2020-11-27 RX ADMIN — BUMETANIDE 1 MG: 1 TABLET ORAL at 09:00

## 2020-11-27 RX ADMIN — BUMETANIDE 1 MG: 1 TABLET ORAL at 16:19

## 2020-11-27 RX ADMIN — ROPINIROLE HYDROCHLORIDE 0.5 MG: 0.25 TABLET, FILM COATED ORAL at 21:02

## 2020-11-27 RX ADMIN — TRAMADOL HYDROCHLORIDE 50 MG: 50 TABLET ORAL at 16:20

## 2020-11-27 RX ADMIN — HEPARIN SODIUM 5000 UNITS: 5000 INJECTION INTRAVENOUS; SUBCUTANEOUS at 09:01

## 2020-11-27 RX ADMIN — Medication 400 MG: at 08:59

## 2020-11-27 RX ADMIN — CILOSTAZOL 50 MG: 50 TABLET ORAL at 21:02

## 2020-11-27 RX ADMIN — METOPROLOL SUCCINATE 25 MG: 25 TABLET, EXTENDED RELEASE ORAL at 09:01

## 2020-11-27 RX ADMIN — HEPARIN SODIUM 5000 UNITS: 5000 INJECTION INTRAVENOUS; SUBCUTANEOUS at 15:06

## 2020-11-27 RX ADMIN — POTASSIUM CHLORIDE 40 MEQ: 1500 TABLET, EXTENDED RELEASE ORAL at 09:01

## 2020-11-27 RX ADMIN — FLUTICASONE PROPIONATE 2 PUFF: 110 AEROSOL, METERED RESPIRATORY (INHALATION) at 07:35

## 2020-11-27 RX ADMIN — INSULIN LISPRO 1 UNITS: 100 INJECTION, SOLUTION INTRAVENOUS; SUBCUTANEOUS at 17:18

## 2020-11-27 RX ADMIN — INSULIN LISPRO 1 UNITS: 100 INJECTION, SOLUTION INTRAVENOUS; SUBCUTANEOUS at 12:53

## 2020-11-27 RX ADMIN — LEVOTHYROXINE SODIUM 50 MCG: 50 TABLET ORAL at 06:14

## 2020-11-27 RX ADMIN — TRAMADOL HYDROCHLORIDE 50 MG: 50 TABLET ORAL at 02:27

## 2020-11-27 RX ADMIN — FEBUXOSTAT 40 MG: 40 TABLET, FILM COATED ORAL at 09:00

## 2020-11-27 RX ADMIN — TRAMADOL HYDROCHLORIDE 50 MG: 50 TABLET ORAL at 22:47

## 2020-11-27 RX ADMIN — CILOSTAZOL 50 MG: 50 TABLET ORAL at 09:00

## 2020-11-27 ASSESSMENT — PAIN DESCRIPTION - DESCRIPTORS
DESCRIPTORS: ACHING
DESCRIPTORS: ACHING

## 2020-11-27 ASSESSMENT — PAIN SCALES - GENERAL
PAINLEVEL_OUTOF10: 0
PAINLEVEL_OUTOF10: 7
PAINLEVEL_OUTOF10: 4
PAINLEVEL_OUTOF10: 6

## 2020-11-27 ASSESSMENT — PAIN DESCRIPTION - PAIN TYPE
TYPE: CHRONIC PAIN
TYPE: CHRONIC PAIN

## 2020-11-27 ASSESSMENT — PAIN DESCRIPTION - LOCATION
LOCATION: GENERALIZED
LOCATION: GENERALIZED

## 2020-11-27 NOTE — PROGRESS NOTES
Dressing removed from pacemaker site. Site well approximated. Minimal ecchymosis to area. No drainage. Pt tolerated well.

## 2020-11-27 NOTE — PROGRESS NOTES
Physical Therapy    Physical Therapy Treatment Note  Name: Homer Salgado MRN: [id number] :   1935   Date:  2020   Admission Date: 2020 Room:  [address] [place] [address]   Restrictions/Precautions:  Restrictions/Precautions  Restrictions/Precautions: Fall Risk, General Precautions  Required Braces or Orthoses?: No     Communication with other providers:  Updated progress with team  Subjective:  Patient states:  P agreeable to working with therapy  Pain:   Location, Type, Intensity (0/10 to 10/10):  denies  Objective:    Observation:  P sitting up in chair  Treatment, including education/measures:  Gait: P ambulates with RW  150' with CGA. P cued to increase step length- ambulated up/down ramp on // bars x 3  Transfers: sit <> stand to RW with CGA from chair  P educated on condition information and progress  P left sitting up in chair with chair alarm on and call light within reach  Assessment / Impression:    P with improved gait and LE strength.  Recommend continued skilled pT to address deficits and maximize functional potential.   Patient's tolerance of treatment:  good   Adverse Reaction: none  Significant change in status and impact:  Improved gait endurance - safely ambulated up/down inclined surface  Barriers to improvement:  none  Plan for Next Session:    Gait, balance  Time in:  1425  Time out:  1440  Timed treatment minutes: 15  Total treatment time:  15    Previously filed items:  Social/Functional History  Lives With: Spouse  Type of Home: House  Home Layout: Two level, Able to Live on Main level with bedroom/bathroom  Home Access: Ramped entrance  Bathroom Shower/Tub: Tub/Shower unit, Shower chair with back  [employer]: Standard  Bathroom Equipment: Grab bars in shower, Hand-held shower, Toilet raiser, Grab bars around toilet  Bathroom Accessibility: Accessible  Home Equipment: Lift chair, Rolling walker, 4 wheeled walker, Oxygen, Reacher, Sock aid(Wears 2L of O2 at night)  Receives Help From: Family  ADL Assistance: Needs assistance(Pt reports sometimes her  helps her dress if they are in a hurry otherwise she does it herself)  Bath: Minimal assistance(Spouse helps her with places she can't reach)  Dressing: Modified independent  Grooming: Independent  Feeding: Independent  Homemaking Assistance: Independent  Homemaking Responsibilities: Yes  Meal Prep Responsibility: Secondary(Pt and spouse cook together)  Laundry Responsibility: Primary  Cleaning Responsibility: Secondary( does most cleaning or her daughter in law comes to clean)  Shopping Responsibility: Secondary(Goes to the store with spouse or he goes)  Ambulation Assistance: Needs assistance(Uses a rolator or 2WW walker around the house depending on how she feels)  Transfer Assistance: Independent  Active : No  Mode of Transportation: Car  Occupation: Retired  Type of occupation:   Leisure & Hobbies: Pt and spouse travel in their motorhome  Additional Comments: Pt reports she sleeps in her recliner  Short term goals  Time Frame for Short term goals: 2 weeks or upon discharge  Short term goal 1: Patient will be able to perform bed mobility and transfers mod I. Short term goal 2: Patient will be able to perform dynamic balance activities >5' without LOB to complete ADls. Short term goal 3: Patient will be able to ambulate >150 ft with FWW mod I. Short term goal 4: Patient will be able to negotiate ramp mod I with FWW for return home.        Electronically signed by:    Princess Up, PT  11/27/2020, 2:46 PM

## 2020-11-27 NOTE — PROGRESS NOTES
Occupational Therapy    Occupational Therapy Treatment Note  Name: Mariella Gibbs MRN: [id number] :   1935   Date:  2020   Admission Date: 2020 Room:  015/015-01   Restrictions/Precautions:  Restrictions/Precautions  Restrictions/Precautions: Fall Risk, General Precautions  Required Braces or Orthoses?: No     Communication with other providers:   Cleared for treatment by  RN. Subjective:  Patient states:  \"my daughter sent me some  Day food yesterday\"  Pain:   Location, Type, Intensity (0/10 to 10/10): None noted    Objective:    Observation:  Pt alert and oriented. Treatment, including education:  Transfers    Sit to stand :Sup  Stand to sit :SUp      Self Care Training:   Cues were given for safety, sequence, UE/LE placement, visual cues, and balance. Activities performed today included dressing, toileting, hand hygiene, and grooming. Grooming  Mod I to brush teeth and comb hair. Bathing   Min A for B feet and buttocks. UB Dress  Donned gown    LB Dress  Dep for socks, unable to wear brief or underpants due to wounds on buttocks. Therapeutic Exercise:  Cues were given for technique, safety, recruitment, and rationale. Cues were verbal and/or tactile. For BUE strengthening for ADL & functional mobility Indep pt performed BUE strengthening HEP c 1# hand weights x 15 reps x 6 exercises with Minimal difficulty. Therapeutic Activity Training:   Therapeutic activity training was instructed today. Cues were given for safety, sequence, UE/LE placement, awareness, and balance. Activities performed today included bed mobility training, sup-sit, sit-stand, SPT.       Safety Measures: Gait belt used, Left in bed, Pull/Bed Alarm activated and call light left in reach          Assessment / Impression:        Patient's tolerance of treatment: Good   Adverse Reaction: None  Significant change in status and impact:  None  Barriers to improvement:  Dec strength and endurance. Plan for Next Session:    Continue with POC. Time in:  4492   0006  Time out:  1973 0476  Timed treatment minutes:  30 + 23  Total treatment time:  48  Electronically signed by:    Porfirio Myles, [address]    11/27/2020, 8:29 AM    Previously filed values:  Patient Goals   Patient goals :  To get home  Short term goals  Time Frame for Short term goals: Until DC or goals met  Short term goal 1: Pt will complete trfs mod I using walker  Short term goal 2: Pt will complete UE bathing/dressing mod I  Short term goal 3: Pt will complete LE bathing/dressing min A  Short term goal 4: Pt will perform 5+ min  dynamic standing/functional mobility to perform ADLs and simple homemaking tasks  Short term goal 5: Pt will complete light BUE ther ex 10+ min to increase strength/endurance for functional mobility and ADLs

## 2020-11-27 NOTE — PLAN OF CARE
Problem: Pain:  Goal: Pain level will decrease  Description: Pain level will decrease  11/26/2020 2006 by Mathieu Dunham RN  Outcome: Ongoing  11/26/2020 1610 by Elridge Mcardle, RN  Outcome: Met This Shift  11/26/2020 1546 by Elridge Mcardle, RN  Outcome: Ongoing  Goal: Control of acute pain  Description: Control of acute pain  11/26/2020 2006 by Mathieu Dunham RN  Outcome: Ongoing  11/26/2020 1610 by Elridge Mcardle, RN  Outcome: Met This Shift  11/26/2020 1546 by Elridge Mcardle, RN  Outcome: Ongoing  Goal: Control of chronic pain  Description: Control of chronic pain  11/26/2020 2006 by Mathieu Dunham RN  Outcome: Ongoing  11/26/2020 1610 by Elridge Mcardle, RN  Outcome: Met This Shift  11/26/2020 1546 by Elridge Mcardle, RN  Outcome: Ongoing     Problem: Falls - Risk of:  Goal: Will remain free from falls  Description: Will remain free from falls  11/26/2020 2006 by Mathieu Dunham RN  Outcome: Ongoing  11/26/2020 1610 by Elridge Mcardle, RN  Outcome: Met This Shift  11/26/2020 1546 by Elridge Mcardle, RN  Outcome: Ongoing  Goal: Absence of physical injury  Description: Absence of physical injury  11/26/2020 2006 by Mathieu Dunham RN  Outcome: Ongoing  11/26/2020 1610 by Elridge Mcardle, RN  Outcome: Met This Shift  11/26/2020 1546 by Elridge Mcardle, RN  Outcome: Ongoing     Problem: Skin Integrity:  Goal: Will show no infection signs and symptoms  Description: Will show no infection signs and symptoms  11/26/2020 2006 by Mathieu Dunham RN  Outcome: Ongoing  11/26/2020 1610 by Elridge Mcardle, RN  Outcome: Ongoing  11/26/2020 1546 by Elridge Mcardle, RN  Outcome: Met This Shift  Goal: Absence of new skin breakdown  Description: Absence of new skin breakdown  11/26/2020 2006 by Mathieu Dunham RN  Outcome: Ongoing  11/26/2020 1610 by Elridge Mcardle, RN  Outcome: Ongoing  11/26/2020 1546 by Elridge Mcardle, RN  Outcome: Met This Shift     Problem: IP MOBILITY  Goal: LTG - patient will demonstrate kitchen mobility  11/26/2020 2006 by Kane Allen RN  Outcome: Ongoing  11/26/2020 1610 by Hilario Dao RN  Outcome: Ongoing  11/26/2020 1546 by Hilario Dao RN  Outcome: Ongoing  Goal: LTG - patient will ambulate community distance  11/26/2020 2006 by Kane Allen RN  Outcome: Ongoing  11/26/2020 1610 by Hilario Dao RN  Outcome: Ongoing  11/26/2020 1546 by Hilario Dao RN  Outcome: Ongoing  Goal: LTG - patient will ambulate household distance  11/26/2020 2006 by Kane Allen RN  Outcome: Ongoing  11/26/2020 1610 by Hilario Dao RN  Outcome: Ongoing  11/26/2020 1546 by Hilario Dao RN  Outcome: Ongoing  Goal: LTG - patient will demonstrate safe mobility requirements  11/26/2020 2006 by Kane Allen RN  Outcome: Ongoing  11/26/2020 1610 by Hilario Dao RN  Outcome: Ongoing  11/26/2020 1546 by Hilario Dao RN  Outcome: Ongoing  Goal: STG - Patient will ambulate  11/26/2020 2006 by Kane Allen RN  Outcome: Ongoing  11/26/2020 1610 by Hilario Dao RN  Outcome: Ongoing  11/26/2020 1546 by Hilario Dao RN  Outcome: Ongoing

## 2020-11-28 LAB
GLUCOSE BLD-MCNC: 120 MG/DL (ref 70–99)
GLUCOSE BLD-MCNC: 154 MG/DL (ref 70–99)
GLUCOSE BLD-MCNC: 164 MG/DL (ref 70–99)
GLUCOSE BLD-MCNC: 196 MG/DL (ref 70–99)

## 2020-11-28 PROCEDURE — 6360000002 HC RX W HCPCS: Performed by: INTERNAL MEDICINE

## 2020-11-28 PROCEDURE — 97116 GAIT TRAINING THERAPY: CPT

## 2020-11-28 PROCEDURE — 6370000000 HC RX 637 (ALT 250 FOR IP): Performed by: INTERNAL MEDICINE

## 2020-11-28 PROCEDURE — 1200000002 HC SEMI PRIVATE SWING BED

## 2020-11-28 PROCEDURE — 6370000000 HC RX 637 (ALT 250 FOR IP): Performed by: NURSE PRACTITIONER

## 2020-11-28 PROCEDURE — 94761 N-INVAS EAR/PLS OXIMETRY MLT: CPT

## 2020-11-28 PROCEDURE — 82962 GLUCOSE BLOOD TEST: CPT

## 2020-11-28 PROCEDURE — 94640 AIRWAY INHALATION TREATMENT: CPT

## 2020-11-28 RX ADMIN — CALCIUM 500 MG: 500 TABLET ORAL at 08:34

## 2020-11-28 RX ADMIN — BUMETANIDE 1 MG: 1 TABLET ORAL at 08:34

## 2020-11-28 RX ADMIN — HEPARIN SODIUM 5000 UNITS: 5000 INJECTION INTRAVENOUS; SUBCUTANEOUS at 20:33

## 2020-11-28 RX ADMIN — METOPROLOL SUCCINATE 25 MG: 25 TABLET, EXTENDED RELEASE ORAL at 08:34

## 2020-11-28 RX ADMIN — BUMETANIDE 1 MG: 1 TABLET ORAL at 16:59

## 2020-11-28 RX ADMIN — ANTI-FUNGAL POWDER MICONAZOLE NITRATE TALC FREE: 1.42 POWDER TOPICAL at 22:11

## 2020-11-28 RX ADMIN — ROPINIROLE HYDROCHLORIDE 0.5 MG: 0.25 TABLET, FILM COATED ORAL at 20:34

## 2020-11-28 RX ADMIN — HEPARIN SODIUM 5000 UNITS: 5000 INJECTION INTRAVENOUS; SUBCUTANEOUS at 14:48

## 2020-11-28 RX ADMIN — FLUTICASONE PROPIONATE 2 PUFF: 110 AEROSOL, METERED RESPIRATORY (INHALATION) at 18:24

## 2020-11-28 RX ADMIN — Medication 2 MG: at 22:47

## 2020-11-28 RX ADMIN — ROPINIROLE HYDROCHLORIDE 0.5 MG: 0.25 TABLET, FILM COATED ORAL at 08:34

## 2020-11-28 RX ADMIN — FERROUS SULFATE TAB 325 MG (65 MG ELEMENTAL FE) 325 MG: 325 (65 FE) TAB at 08:34

## 2020-11-28 RX ADMIN — CARBIDOPA AND LEVODOPA 2 TABLET: 10; 100 TABLET ORAL at 20:34

## 2020-11-28 RX ADMIN — LEVOTHYROXINE SODIUM 50 MCG: 50 TABLET ORAL at 06:36

## 2020-11-28 RX ADMIN — CILOSTAZOL 50 MG: 50 TABLET ORAL at 20:34

## 2020-11-28 RX ADMIN — TRAMADOL HYDROCHLORIDE 50 MG: 50 TABLET ORAL at 20:45

## 2020-11-28 RX ADMIN — FEBUXOSTAT 40 MG: 40 TABLET, FILM COATED ORAL at 08:34

## 2020-11-28 RX ADMIN — INSULIN LISPRO 1 UNITS: 100 INJECTION, SOLUTION INTRAVENOUS; SUBCUTANEOUS at 16:59

## 2020-11-28 RX ADMIN — ANTI-FUNGAL POWDER MICONAZOLE NITRATE TALC FREE: 1.42 POWDER TOPICAL at 08:33

## 2020-11-28 RX ADMIN — ROPINIROLE HYDROCHLORIDE 0.5 MG: 0.25 TABLET, FILM COATED ORAL at 14:48

## 2020-11-28 RX ADMIN — INSULIN LISPRO 1 UNITS: 100 INJECTION, SOLUTION INTRAVENOUS; SUBCUTANEOUS at 12:17

## 2020-11-28 RX ADMIN — FLUTICASONE PROPIONATE 2 SPRAY: 50 SPRAY, METERED NASAL at 08:33

## 2020-11-28 RX ADMIN — FLUTICASONE PROPIONATE 2 PUFF: 110 AEROSOL, METERED RESPIRATORY (INHALATION) at 08:49

## 2020-11-28 RX ADMIN — POTASSIUM CHLORIDE 40 MEQ: 1500 TABLET, EXTENDED RELEASE ORAL at 17:00

## 2020-11-28 RX ADMIN — POTASSIUM CHLORIDE 40 MEQ: 1500 TABLET, EXTENDED RELEASE ORAL at 08:34

## 2020-11-28 RX ADMIN — HEPARIN SODIUM 5000 UNITS: 5000 INJECTION INTRAVENOUS; SUBCUTANEOUS at 08:33

## 2020-11-28 RX ADMIN — Medication 400 MG: at 08:34

## 2020-11-28 RX ADMIN — FERROUS SULFATE TAB 325 MG (65 MG ELEMENTAL FE) 325 MG: 325 (65 FE) TAB at 17:00

## 2020-11-28 RX ADMIN — CILOSTAZOL 50 MG: 50 TABLET ORAL at 08:34

## 2020-11-28 ASSESSMENT — PAIN SCALES - GENERAL
PAINLEVEL_OUTOF10: 0
PAINLEVEL_OUTOF10: 1
PAINLEVEL_OUTOF10: 0
PAINLEVEL_OUTOF10: 5

## 2020-11-28 NOTE — PLAN OF CARE
Problem: Pain:  Goal: Pain level will decrease  Description: Pain level will decrease  11/28/2020 0048 by Chela Griffiths RN  Outcome: Ongoing  11/27/2020 1305 by Sunny Guston  Outcome: Ongoing  Goal: Control of acute pain  Description: Control of acute pain  11/28/2020 0048 by Chela Griffiths RN  Outcome: Ongoing  11/27/2020 1305 by Sunny Guston  Outcome: Ongoing  Goal: Control of chronic pain  Description: Control of chronic pain  11/28/2020 0048 by Chela Griffiths RN  Outcome: Ongoing  11/27/2020 1305 by Sunny Guston  Outcome: Ongoing     Problem: Falls - Risk of:  Goal: Will remain free from falls  Description: Will remain free from falls  11/28/2020 0048 by Chela Griffiths RN  Outcome: Ongoing  11/27/2020 1305 by Sunny Guston  Outcome: Ongoing  Goal: Absence of physical injury  Description: Absence of physical injury  11/28/2020 0048 by Chela Griffiths RN  Outcome: Ongoing  11/27/2020 1305 by Sunny Guston  Outcome: Ongoing     Problem: Skin Integrity:  Goal: Will show no infection signs and symptoms  Description: Will show no infection signs and symptoms  11/28/2020 0048 by hCela Griffiths RN  Outcome: Ongoing  11/27/2020 1305 by Sunny Guston  Outcome: Ongoing  Goal: Absence of new skin breakdown  Description: Absence of new skin breakdown  11/28/2020 0048 by Chela Griffiths RN  Outcome: Ongoing  11/27/2020 1305 by Sunny Guston  Outcome: Ongoing     Problem: IP MOBILITY  Goal: LTG - patient will demonstrate kitchen mobility  11/28/2020 0048 by Chela Griffiths RN  Outcome: Ongoing  11/27/2020 1305 by Sunny Guston  Outcome: Ongoing  Goal: LTG - patient will ambulate community distance  11/28/2020 0048 by Chela Griffiths RN  Outcome: Ongoing  11/27/2020 1305 by Sunny Guston  Outcome: Ongoing  Goal: LTG - patient will ambulate household distance  11/28/2020 0048 by Chela Griffiths RN  Outcome: Ongoing  11/27/2020 1305 by Sunny Guston  Outcome: Ongoing  Goal: LTG - patient will demonstrate safe mobility requirements  11/28/2020 0048 by Khalif Bates RN  Outcome: Ongoing  11/27/2020 1305 by Hiram Hutchison  Outcome: Ongoing  Goal: STG - Patient will ambulate  11/28/2020 0048 by Khalif Bates RN  Outcome: Ongoing  11/27/2020 1305 by Hiram Hutchison  Outcome: Ongoing

## 2020-11-28 NOTE — PROGRESS NOTES
Family  ADL Assistance: Needs assistance(Pt reports sometimes her  helps her dress if they are in a hurry otherwise she does it herself)  Bath: Minimal assistance(Spouse helps her with places she can't reach)  Dressing: Modified independent  Grooming: Independent  Feeding: Independent  Homemaking Assistance: Independent  Homemaking Responsibilities: Yes  Meal Prep Responsibility: Secondary(Pt and spouse cook together)  Laundry Responsibility: Primary  Cleaning Responsibility: Secondary( does most cleaning or her daughter in law comes to clean)  Shopping Responsibility: Secondary(Goes to the store with spouse or he goes)  Ambulation Assistance: Needs assistance(Uses a rolator or 2WW walker around the house depending on how she feels)  Transfer Assistance: Independent  Active : No  Mode of Transportation: Car  Occupation: Retired  Type of occupation:   Leisure & Hobbies: Pt and spouse travel in their motorhome  Additional Comments: Pt reports she sleeps in her recliner  Short term goals  Time Frame for Short term goals: 2 weeks or upon discharge  Short term goal 1: Patient will be able to perform bed mobility and transfers mod I. Short term goal 2: Patient will be able to perform dynamic balance activities >5' without LOB to complete ADls. Short term goal 3: Patient will be able to ambulate >150 ft with FWW mod I. Short term goal 4: Patient will be able to negotiate ramp mod I with FWW for return home.        Electronically signed by:    Paula Lu, PT  11/28/2020, 12:34 PM

## 2020-11-29 LAB
GLUCOSE BLD-MCNC: 127 MG/DL (ref 70–99)
GLUCOSE BLD-MCNC: 158 MG/DL (ref 70–99)
GLUCOSE BLD-MCNC: 215 MG/DL (ref 70–99)
GLUCOSE BLD-MCNC: 238 MG/DL (ref 70–99)

## 2020-11-29 PROCEDURE — 6360000002 HC RX W HCPCS: Performed by: INTERNAL MEDICINE

## 2020-11-29 PROCEDURE — 82962 GLUCOSE BLOOD TEST: CPT

## 2020-11-29 PROCEDURE — 2700000000 HC OXYGEN THERAPY PER DAY

## 2020-11-29 PROCEDURE — 6370000000 HC RX 637 (ALT 250 FOR IP): Performed by: INTERNAL MEDICINE

## 2020-11-29 PROCEDURE — 94640 AIRWAY INHALATION TREATMENT: CPT

## 2020-11-29 PROCEDURE — 1200000002 HC SEMI PRIVATE SWING BED

## 2020-11-29 PROCEDURE — 6370000000 HC RX 637 (ALT 250 FOR IP): Performed by: NURSE PRACTITIONER

## 2020-11-29 RX ADMIN — HEPARIN SODIUM 5000 UNITS: 5000 INJECTION INTRAVENOUS; SUBCUTANEOUS at 09:00

## 2020-11-29 RX ADMIN — HEPARIN SODIUM 5000 UNITS: 5000 INJECTION INTRAVENOUS; SUBCUTANEOUS at 14:14

## 2020-11-29 RX ADMIN — CILOSTAZOL 50 MG: 50 TABLET ORAL at 09:00

## 2020-11-29 RX ADMIN — ROPINIROLE HYDROCHLORIDE 0.5 MG: 0.25 TABLET, FILM COATED ORAL at 14:14

## 2020-11-29 RX ADMIN — FERROUS SULFATE TAB 325 MG (65 MG ELEMENTAL FE) 325 MG: 325 (65 FE) TAB at 09:00

## 2020-11-29 RX ADMIN — ROPINIROLE HYDROCHLORIDE 0.5 MG: 0.25 TABLET, FILM COATED ORAL at 20:35

## 2020-11-29 RX ADMIN — HEPARIN SODIUM 5000 UNITS: 5000 INJECTION INTRAVENOUS; SUBCUTANEOUS at 20:35

## 2020-11-29 RX ADMIN — Medication 2 MG: at 23:29

## 2020-11-29 RX ADMIN — METOPROLOL SUCCINATE 25 MG: 25 TABLET, EXTENDED RELEASE ORAL at 09:00

## 2020-11-29 RX ADMIN — ROPINIROLE HYDROCHLORIDE 0.5 MG: 0.25 TABLET, FILM COATED ORAL at 09:00

## 2020-11-29 RX ADMIN — FLUTICASONE PROPIONATE 2 PUFF: 110 AEROSOL, METERED RESPIRATORY (INHALATION) at 20:03

## 2020-11-29 RX ADMIN — BUMETANIDE 1 MG: 1 TABLET ORAL at 17:18

## 2020-11-29 RX ADMIN — POTASSIUM CHLORIDE 40 MEQ: 1500 TABLET, EXTENDED RELEASE ORAL at 09:00

## 2020-11-29 RX ADMIN — POTASSIUM CHLORIDE 40 MEQ: 1500 TABLET, EXTENDED RELEASE ORAL at 17:18

## 2020-11-29 RX ADMIN — INSULIN LISPRO 1 UNITS: 100 INJECTION, SOLUTION INTRAVENOUS; SUBCUTANEOUS at 17:18

## 2020-11-29 RX ADMIN — TRAMADOL HYDROCHLORIDE 50 MG: 50 TABLET ORAL at 20:35

## 2020-11-29 RX ADMIN — FLUTICASONE PROPIONATE 2 PUFF: 110 AEROSOL, METERED RESPIRATORY (INHALATION) at 07:35

## 2020-11-29 RX ADMIN — FERROUS SULFATE TAB 325 MG (65 MG ELEMENTAL FE) 325 MG: 325 (65 FE) TAB at 17:18

## 2020-11-29 RX ADMIN — LEVOTHYROXINE SODIUM 50 MCG: 50 TABLET ORAL at 05:42

## 2020-11-29 RX ADMIN — BUMETANIDE 1 MG: 1 TABLET ORAL at 09:00

## 2020-11-29 RX ADMIN — CILOSTAZOL 50 MG: 50 TABLET ORAL at 20:35

## 2020-11-29 RX ADMIN — ANTI-FUNGAL POWDER MICONAZOLE NITRATE TALC FREE: 1.42 POWDER TOPICAL at 20:35

## 2020-11-29 RX ADMIN — Medication 400 MG: at 09:00

## 2020-11-29 RX ADMIN — INSULIN LISPRO 2 UNITS: 100 INJECTION, SOLUTION INTRAVENOUS; SUBCUTANEOUS at 12:13

## 2020-11-29 RX ADMIN — FEBUXOSTAT 40 MG: 40 TABLET, FILM COATED ORAL at 09:00

## 2020-11-29 RX ADMIN — CALCIUM 500 MG: 500 TABLET ORAL at 09:00

## 2020-11-29 RX ADMIN — FLUTICASONE PROPIONATE 2 SPRAY: 50 SPRAY, METERED NASAL at 09:00

## 2020-11-29 RX ADMIN — CARBIDOPA AND LEVODOPA 2 TABLET: 10; 100 TABLET ORAL at 20:35

## 2020-11-29 ASSESSMENT — PAIN SCALES - GENERAL
PAINLEVEL_OUTOF10: 0
PAINLEVEL_OUTOF10: 4
PAINLEVEL_OUTOF10: 0
PAINLEVEL_OUTOF10: 1

## 2020-11-29 ASSESSMENT — PAIN DESCRIPTION - LOCATION: LOCATION: GENERALIZED

## 2020-11-29 ASSESSMENT — PAIN DESCRIPTION - PAIN TYPE: TYPE: CHRONIC PAIN

## 2020-11-29 NOTE — PLAN OF CARE
Problem: Falls - Risk of:  Goal: Will remain free from falls  Description: Will remain free from falls  11/29/2020 1005 by Trisha Guevara RN  Outcome: Ongoing  11/28/2020 2053 by Rod Herman RN  Outcome: Ongoing  Goal: Absence of physical injury  Description: Absence of physical injury  11/29/2020 1005 by Trisha Guevara RN  Outcome: Ongoing  11/28/2020 2053 by Rod Herman RN  Outcome: Ongoing     Problem: Skin Integrity:  Goal: Will show no infection signs and symptoms  Description: Will show no infection signs and symptoms  11/29/2020 1005 by Trisha Guevara RN  Outcome: Ongoing  11/28/2020 2053 by Rod Herman RN  Outcome: Ongoing  Goal: Absence of new skin breakdown  Description: Absence of new skin breakdown  11/29/2020 1005 by Trisha Guevara RN  Outcome: Ongoing  11/28/2020 2053 by Rod Herman RN  Outcome: Ongoing     Problem: IP MOBILITY  Goal: LTG - patient will demonstrate kitchen mobility  11/29/2020 1005 by Trisha Guevara RN  Outcome: Ongoing  11/28/2020 2053 by Rod Herman RN  Outcome: Ongoing  Goal: LTG - patient will ambulate community distance  11/29/2020 1005 by Trisha Guevara RN  Outcome: Ongoing  11/28/2020 2053 by Rod Herman RN  Outcome: Ongoing  Goal: LTG - patient will ambulate household distance  11/29/2020 1005 by Trisha Guevara RN  Outcome: Ongoing  11/28/2020 2053 by Rod Herman RN  Outcome: Ongoing  Goal: LTG - patient will demonstrate safe mobility requirements  11/29/2020 1005 by Trisha Guevara RN  Outcome: Ongoing  11/28/2020 2053 by Rod Herman RN  Outcome: Ongoing  Goal: STG - Patient will ambulate  11/29/2020 1005 by Trisha Guevara RN  Outcome: Ongoing  11/28/2020 2053 by Rod Herman RN  Outcome: Ongoing   Fall precautions in place. Denies pain or discomfort, care plan completed.  Continued dressing changes on stage 2 ulcers on bilateral posterior thighs

## 2020-11-29 NOTE — PROGRESS NOTES
Hospitalist Progress Note      Name:  Vaughn Mahmood /Age/Sex: 1935  (82 y.o. female)   MRN & CSN:  [id number] & [id number] Admission Date/Time: 2020  3:35 PM   Location:  015 PCP: Marcille Curling, [address] Day: 10    Assessment and Plan:     1. Weakness and debility: Patient recently had pacemaker placed in early November for third-degree heart block as well as left heart cath. Patient working with physical therapy gaining strength to return home. Making progress increased endurance. Asymptomatic from sitting to standing no longer dizzy. Continue to work with PT/OT until meeting goals for discharge. 2.  Third-degree heart block: Status post pacemaker placement . Asymptomatic at this time. Left heart cath on  with left main patent mild disease LAD/LCx, RCA. Most recent echo preserved EF 50 to 55% with LVH noted. Follow-up as an outpatient. No chest pain noted since admission. Continue bumetanide 1 mg twice daily and metoprolol succinate 25 mg daily    3. CKD 3B: Creatinine 1.5. Repeat BMP in the a.m. Avoiding nephrotoxic medications as appropriate. 4.  Insulin-dependent diabetes: Continue low-dose sliding scale at this time. Sugars relatively controlled. 5.  Sacral ulcer stage II: Wound care managing same. Also with lower extremity wounds per history. By review looks like possibly PAD noted. Patient currently on cilostazol. Continue to monitor    6. Hypomagnesemia/Hypokalemia: 1.5, 3.0 respectively, previously. Patient placed on magnesium oxide and potassium oral supplement daily. Repeat magnesium/BMP in a.m.    7.  Hypothyroidism: Stable continue levothyroxine 50 mcg daily    8. Restless leg: Continue ropinirole 0.5 mg 3 times daily    Diet DIET CARB CONTROL;   Dietary Nutrition Supplements: Low Calorie High Protein Supplement   DVT Prophylaxis [] Lovenox, []  Heparin, [] SCDs, []No VTE prophylaxis, patient ambulating   GI Prophylaxis [] PPI, [] H2 Blocker, [] No GI prophylaxis, patient is receiving diet/Tube Feeds   Code Status Full Code   Disposition Patient requires continued admission due to continue with physical therapy   MDM [] Low, [x] Moderate,[]  High  Patient's risk as above due to as above     History of Present Illness:     Pt S&E. No acute events overnight. Patient resting comfortably. Denies any headache blurred vision dizziness. [place] any chest pain palpitations or shortness of breath. Denies any fever chills nausea or vomiting. Patient states working with physical therapy walking farther daily with increased endurance. Denies any abdominal pain dysuria or pain with defecation. Denies any myalgias or joint pain. 10-14 point ROS reviewed negative, unless as noted above    Objective: Intake/Output Summary (Last 24 hours) at 11/29/2020 1759  Last data filed at 11/29/2020 1334  Gross per 24 hour   Intake 480 ml   Output --   Net 480 ml      Vitals:   Vitals:    11/29/20 0737   BP: (!) 145/63   Pulse: 68   Resp: 16   Temp: 97.1 °F (36.2 °C)   SpO2: 96%     Physical Exam:    GEN Awake female, sitting upright in bed in no apparent distress. Appears given age. EYES Pupils are equally round. No scleral erythema, discharge, or conjunctivitis. HENT Mucous membranes are moist.   NECK No apparent thyromegaly or masses. RESP Clear to auscultation, no wheezes, rales or rhonchi. Symmetric chest movement while on room air. CARDIO/VASC S1/S2 auscultated. Regular rate without appreciable murmurs, rubs, or gallops. Peripheral pulses equal bilaterally and palpable. No peripheral edema. GI Abdomen is soft without significant tenderness, masses, or guarding. Bowel sounds are normoactive. Rectal exam deferred.  Raphael catheter is not present. HEME/LYMPH No petechiae or ecchymoses. MSK No gross joint deformities. Spontaneous movement of all extremities  SKIN Normal coloration, warm, dry.   Pacemaker insertion site well-healed no drainage calor or erythema  NEURO Cranial nerves appear grossly intact, normal speech, no lateralizing weakness. PSYCH Awake, alert, oriented x 4. Affect appropriate.     Medications:   Medications:    potassium chloride  40 mEq Oral BID WC    magnesium oxide  400 mg Oral Daily    fluticasone  2 puff Inhalation BID    bumetanide  1 mg Oral BID    calcium elemental  1 tablet Oral Daily    carbidopa-levodopa  2 tablet Oral Nightly    febuxostat  40 mg Oral Daily    ferrous sulfate  325 mg Oral BID WC    fluticasone  2 spray Nasal Daily    heparin (porcine)  5,000 Units Subcutaneous TID    insulin lispro  0-6 Units Subcutaneous TID WC    insulin lispro  0-3 Units Subcutaneous Nightly    levothyroxine  50 mcg Oral Daily    metoprolol succinate  25 mg Oral Daily    miconazole   Topical BID    cilostazol  50 mg Oral BID    rOPINIRole  0.5 mg Oral TID      Infusions:   PRN Meds: melatonin ER, 2 mg, Nightly PRN  bisacodyl, 10 mg, Daily PRN  bisacodyl, 5 mg, Daily PRN  acetaminophen, 650 mg, Q6H PRN    Or  acetaminophen, 650 mg, Q6H PRN  promethazine, 12.5 mg, Q6H PRN  albuterol sulfate HFA, 2 puff, Q6H PRN  traMADol, 50 mg, Q6H PRN  glucose, 15 g, PRN  glucagon (rDNA), 1 mg, PRN          Electronically signed by Malgorzata Ritter MD on 11/29/2020 at 5:59 PM

## 2020-11-30 LAB
ANION GAP SERPL CALCULATED.3IONS-SCNC: 5 MMOL/L (ref 4–16)
BUN BLDV-MCNC: 31 MG/DL (ref 6–23)
CALCIUM SERPL-MCNC: 8.8 MG/DL (ref 8.3–10.6)
CHLORIDE BLD-SCNC: 97 MMOL/L (ref 99–110)
CO2: 34 MMOL/L (ref 21–32)
CREAT SERPL-MCNC: 1.4 MG/DL (ref 0.6–1.1)
GFR AFRICAN AMERICAN: 43 ML/MIN/1.73M2
GFR NON-AFRICAN AMERICAN: 36 ML/MIN/1.73M2
GLUCOSE BLD-MCNC: 122 MG/DL (ref 70–99)
GLUCOSE BLD-MCNC: 128 MG/DL (ref 70–99)
GLUCOSE BLD-MCNC: 129 MG/DL (ref 70–99)
GLUCOSE BLD-MCNC: 138 MG/DL (ref 70–99)
GLUCOSE BLD-MCNC: 192 MG/DL (ref 70–99)
MAGNESIUM: 1.3 MG/DL (ref 1.8–2.4)
POTASSIUM SERPL-SCNC: 4.3 MMOL/L (ref 3.5–5.1)
SODIUM BLD-SCNC: 136 MMOL/L (ref 135–145)

## 2020-11-30 PROCEDURE — 6370000000 HC RX 637 (ALT 250 FOR IP): Performed by: INTERNAL MEDICINE

## 2020-11-30 PROCEDURE — 97530 THERAPEUTIC ACTIVITIES: CPT

## 2020-11-30 PROCEDURE — 94640 AIRWAY INHALATION TREATMENT: CPT

## 2020-11-30 PROCEDURE — 36415 COLL VENOUS BLD VENIPUNCTURE: CPT

## 2020-11-30 PROCEDURE — 80048 BASIC METABOLIC PNL TOTAL CA: CPT

## 2020-11-30 PROCEDURE — 6360000002 HC RX W HCPCS: Performed by: INTERNAL MEDICINE

## 2020-11-30 PROCEDURE — 1200000002 HC SEMI PRIVATE SWING BED

## 2020-11-30 PROCEDURE — 97535 SELF CARE MNGMENT TRAINING: CPT

## 2020-11-30 PROCEDURE — 83735 ASSAY OF MAGNESIUM: CPT

## 2020-11-30 PROCEDURE — 82962 GLUCOSE BLOOD TEST: CPT

## 2020-11-30 PROCEDURE — 94761 N-INVAS EAR/PLS OXIMETRY MLT: CPT

## 2020-11-30 PROCEDURE — 6370000000 HC RX 637 (ALT 250 FOR IP): Performed by: NURSE PRACTITIONER

## 2020-11-30 RX ORDER — LATANOPROST 50 UG/ML
1 SOLUTION/ DROPS OPHTHALMIC NIGHTLY
Status: DISCONTINUED | OUTPATIENT
Start: 2020-11-30 | End: 2020-12-01 | Stop reason: HOSPADM

## 2020-11-30 RX ORDER — MAGNESIUM SULFATE IN WATER 40 MG/ML
4 INJECTION, SOLUTION INTRAVENOUS ONCE
Status: COMPLETED | OUTPATIENT
Start: 2020-11-30 | End: 2020-11-30

## 2020-11-30 RX ADMIN — TRAMADOL HYDROCHLORIDE 50 MG: 50 TABLET ORAL at 03:35

## 2020-11-30 RX ADMIN — FERROUS SULFATE TAB 325 MG (65 MG ELEMENTAL FE) 325 MG: 325 (65 FE) TAB at 10:18

## 2020-11-30 RX ADMIN — CARBIDOPA AND LEVODOPA 2 TABLET: 10; 100 TABLET ORAL at 21:03

## 2020-11-30 RX ADMIN — LEVOTHYROXINE SODIUM 50 MCG: 50 TABLET ORAL at 06:38

## 2020-11-30 RX ADMIN — FEBUXOSTAT 40 MG: 40 TABLET, FILM COATED ORAL at 10:17

## 2020-11-30 RX ADMIN — ROPINIROLE HYDROCHLORIDE 0.5 MG: 0.25 TABLET, FILM COATED ORAL at 10:17

## 2020-11-30 RX ADMIN — HEPARIN SODIUM 5000 UNITS: 5000 INJECTION INTRAVENOUS; SUBCUTANEOUS at 10:20

## 2020-11-30 RX ADMIN — POTASSIUM CHLORIDE 40 MEQ: 1500 TABLET, EXTENDED RELEASE ORAL at 10:18

## 2020-11-30 RX ADMIN — BUMETANIDE 1 MG: 1 TABLET ORAL at 17:53

## 2020-11-30 RX ADMIN — CALCIUM 500 MG: 500 TABLET ORAL at 10:18

## 2020-11-30 RX ADMIN — ANTI-FUNGAL POWDER MICONAZOLE NITRATE TALC FREE: 1.42 POWDER TOPICAL at 21:08

## 2020-11-30 RX ADMIN — TRAMADOL HYDROCHLORIDE 50 MG: 50 TABLET ORAL at 20:05

## 2020-11-30 RX ADMIN — FLUTICASONE PROPIONATE 2 PUFF: 110 AEROSOL, METERED RESPIRATORY (INHALATION) at 18:25

## 2020-11-30 RX ADMIN — BUMETANIDE 1 MG: 1 TABLET ORAL at 10:18

## 2020-11-30 RX ADMIN — CILOSTAZOL 50 MG: 50 TABLET ORAL at 10:18

## 2020-11-30 RX ADMIN — HEPARIN SODIUM 5000 UNITS: 5000 INJECTION INTRAVENOUS; SUBCUTANEOUS at 13:37

## 2020-11-30 RX ADMIN — ROPINIROLE HYDROCHLORIDE 0.5 MG: 0.25 TABLET, FILM COATED ORAL at 13:37

## 2020-11-30 RX ADMIN — ANTI-FUNGAL POWDER MICONAZOLE NITRATE TALC FREE: 1.42 POWDER TOPICAL at 10:20

## 2020-11-30 RX ADMIN — HEPARIN SODIUM 5000 UNITS: 5000 INJECTION INTRAVENOUS; SUBCUTANEOUS at 21:03

## 2020-11-30 RX ADMIN — FLUTICASONE PROPIONATE 2 PUFF: 110 AEROSOL, METERED RESPIRATORY (INHALATION) at 07:57

## 2020-11-30 RX ADMIN — POTASSIUM CHLORIDE 40 MEQ: 1500 TABLET, EXTENDED RELEASE ORAL at 17:53

## 2020-11-30 RX ADMIN — Medication 2 MG: at 23:12

## 2020-11-30 RX ADMIN — ROPINIROLE HYDROCHLORIDE 0.5 MG: 0.25 TABLET, FILM COATED ORAL at 21:03

## 2020-11-30 RX ADMIN — LATANOPROST 1 DROP: 50 SOLUTION OPHTHALMIC at 21:03

## 2020-11-30 RX ADMIN — CILOSTAZOL 50 MG: 50 TABLET ORAL at 21:03

## 2020-11-30 RX ADMIN — MAGNESIUM SULFATE IN WATER 4 G: 40 INJECTION, SOLUTION INTRAVENOUS at 10:36

## 2020-11-30 RX ADMIN — METOPROLOL SUCCINATE 25 MG: 25 TABLET, EXTENDED RELEASE ORAL at 10:18

## 2020-11-30 RX ADMIN — FERROUS SULFATE TAB 325 MG (65 MG ELEMENTAL FE) 325 MG: 325 (65 FE) TAB at 17:53

## 2020-11-30 ASSESSMENT — PAIN SCALES - GENERAL
PAINLEVEL_OUTOF10: 0
PAINLEVEL_OUTOF10: 6
PAINLEVEL_OUTOF10: 0
PAINLEVEL_OUTOF10: 5

## 2020-11-30 ASSESSMENT — PAIN DESCRIPTION - LOCATION: LOCATION: GENERALIZED

## 2020-11-30 ASSESSMENT — PAIN DESCRIPTION - PAIN TYPE: TYPE: CHRONIC PAIN

## 2020-11-30 NOTE — PLAN OF CARE
Patient able to ambulate household distance, will continue to work with therapy.    Problem: IP MOBILITY  Goal: LTG - patient will ambulate household distance  Outcome: Ongoing

## 2020-11-30 NOTE — PROGRESS NOTES
Occupational Therapy    Occupational Therapy Treatment Note  Name: Alice Myles MRN: [id number] :   1935   Date:  2020   Admission Date: 2020 Room:  015-01   Restrictions/Precautions:  Restrictions/Precautions  Restrictions/Precautions: Fall Risk, General Precautions  Required Braces or Orthoses?: No   Communication with other providers: ok to see per nurse  Subjective:  Patient states: I just walked  Pain:   Location, Type, Intensity (0/10 to 10/10):  0/10  Objective:    Observation:  Pt was up in chair  Objective Measures:  Pt see for transfers, toileting, LB dressing  Treatment, including education:  Pt was SBA sit to stand from chair, ambulated to bathroom SBA with rolling walker, SBA for toilet transfer and wiping. She then ambulated to sink with RW and washed her hands SBA. She then ambulated back to chair  With RW SBA. back to the chair. SBA stand to sit. Pt donned panties min A with use of reacher and doffed them min A .panties got stuck on the socks initially and pt had a little difficulty getting them over the heel of her foot. Pt donned socks with sock aide SBA. Pt was left in chair, feet elevated with call light  Assessment / Impression:        Patient's tolerance of treatment:  good  Adverse Reaction: no  Significant change in status and impact: improving  Barriers to improvement: fatigue  Plan for Next Session:    adls , transfers  Time in: 1455  Time out:  1519  Timed treatment minutes:24  Total treatment time:  24  Electronically signed by:    Adelaida Cedeño,   2020, 4:55 PM    Previously filed values:  Patient Goals   Patient goals :  To get home  Short term goals  Time Frame for Short term goals: Until DC or goals met  Short term goal 1: Pt will complete trfs mod I using walker  Short term goal 2: Pt will complete UE bathing/dressing mod I  Short term goal 3: Pt will complete LE bathing/dressing min A  Short term goal 4: Pt will perform 5+ min  dynamic standing/functional mobility to perform ADLs and simple homemaking tasks  Short term goal 5: Pt will complete light BUE ther ex 10+ min to increase strength/endurance for functional mobility and ADLs

## 2020-11-30 NOTE — PROGRESS NOTES
Physical Therapy    Physical Therapy Treatment Note  Name: Uziel Weathers MRN: [id number] :   1935   Date:  2020   Admission Date: 2020 Room:  [address] [place] [address]   Restrictions/Precautions:  Restrictions/Precautions  Restrictions/Precautions: Fall Risk, General Precautions  Required Braces or Orthoses?: No      Communication with other providers:  Spoke to OT regarding progress  Subjective:  Patient states: P agreeable to working with therapy  Pain:   Location, Type, Intensity (0/10 to 10/10): Does not report  Objective:    Observation:  P sitting up in chair  Treatment, including education/measures:  Transfers: sit <> stand to RW with supervision  Gait: Ambulates with RW x 70' x 2 bouts with CGA progressing to supervision. P cued to increase step height/length on RLE. P ascends/descends ramp with 1\" lip x 10' x 2 bouts with supervision and RW. There. Ex: standing step taps to 3 balance stones x 6 reps each LE progress to double tap x 2 reps. Mini-squats x 10 reps, hip abd x 10 reps with BUE support, heel raises x 10 reps  P with improved standing balance with 1 to no UE support with supervision. P reports she has trouble getting in/out of bed still, but sleeps in a recliner at home. P's  wants her to try her hospital bed and plan to work on techniques at next session  P left sitting up in chair with call light within reach  Assessment / Impression:    P with improved balance and gait. p continues to demonstrate deficits in gait and strength. Recommend continued skilled PT with discharge home with home health within 1 week.    Patient's tolerance of treatment:  Good    Adverse Reaction: none  Significant change in status and impact:  Improved gait  Barriers to improvement:  weakness  Plan for Next Session:    Bed mobility  Time in:  1432  Time out:  1453  Timed treatment minutes:  21  Total treatment time:  21    Previously filed items:  Social/Functional History  Lives With: Spouse  Type of Home: House  Home Layout: Two level, Able to Live on Main level with bedroom/bathroom  Home Access: Ramped entrance  Bathroom Shower/Tub: Tub/Shower unit, Shower chair with back  Bathroom Toilet: Standard  Bathroom Equipment: Grab bars in shower, Hand-held shower, Toilet raiser, Grab bars around toilet  Bathroom Accessibility: Jefferson Melvin: Lift chair, Rolling walker, 4 wheeled walker, Oxygen, Reacher, Sock aid(Wears 2L of O2 at night)  Receives Help From: Family  ADL Assistance: Needs assistance(Pt reports sometimes her  helps her dress if they are in a hurry otherwise she does it herself)  Bath: Minimal assistance(Spouse helps her with places she can't reach)  Dressing: Modified independent  Grooming: Independent  Feeding: Independent  Homemaking Assistance: Independent  Homemaking Responsibilities: Yes  Meal Prep Responsibility: Secondary(Pt and spouse cook together)  Laundry Responsibility: Primary  Cleaning Responsibility: Secondary( does most cleaning or her daughter in law comes to clean)  Shopping Responsibility: Secondary(Goes to the store with spouse or he goes)  Ambulation Assistance: Needs assistance(Uses a rolator or 2WW walker around the house depending on how she feels)  Transfer Assistance: Independent  Active : No  Mode of Transportation: Car  Occupation: Retired  Type of occupation:   Leisure & Hobbies: Pt and spouse travel in their motorhome  Additional Comments: Pt reports she sleeps in her recliner  Short term goals  Time Frame for Short term goals: 2 weeks or upon discharge  Short term goal 1: Patient will be able to perform bed mobility and transfers mod I. Short term goal 2: Patient will be able to perform dynamic balance activities >5' without LOB to complete ADls. Short term goal 3: Patient will be able to ambulate >150 ft with FWW mod I. Short term goal 4: Patient will be able to negotiate ramp mod I with FWW for return home. Electronically signed by:    Maya Jimenez, PT  11/30/2020, 3:00 PM

## 2020-11-30 NOTE — PLAN OF CARE
Problem: Falls - Risk of:  Goal: Will remain free from falls  Description: Will remain free from falls  11/29/2020 2218 by Gabriella Austin RN  Outcome: Ongoing  11/29/2020 1005 by Varun Gutierrez RN  Outcome: Ongoing  Goal: Absence of physical injury  Description: Absence of physical injury  11/29/2020 2218 by Gabriella Austin RN  Outcome: Ongoing  11/29/2020 1005 by Varun Gutierrez RN  Outcome: Ongoing     Problem: Skin Integrity:  Goal: Will show no infection signs and symptoms  Description: Will show no infection signs and symptoms  11/29/2020 2218 by Gabriella Austin RN  Outcome: Ongoing  11/29/2020 1005 by Varun Gutierrez RN  Outcome: Ongoing  Goal: Absence of new skin breakdown  Description: Absence of new skin breakdown  11/29/2020 2218 by Gabriella Austin RN  Outcome: Ongoing  11/29/2020 1005 by Varun Gutierrez RN  Outcome: Ongoing     Problem: IP MOBILITY  Goal: LTG - patient will demonstrate kitchen mobility  11/29/2020 2218 by Gabriella Austin RN  Outcome: Ongoing  11/29/2020 1005 by Varun Gutierrez RN  Outcome: Ongoing  Goal: LTG - patient will ambulate community distance  11/29/2020 2218 by Gabriella Austin RN  Outcome: Ongoing  11/29/2020 1005 by Varun Gutierrez RN  Outcome: Ongoing  Goal: LTG - patient will ambulate household distance  11/29/2020 2218 by Gabriella Austin RN  Outcome: Ongoing  11/29/2020 1005 by Varun Gutierrez RN  Outcome: Ongoing  Goal: LTG - patient will demonstrate safe mobility requirements  11/29/2020 2218 by Gabriella Austin RN  Outcome: Ongoing  11/29/2020 1005 by Varun Gutierrez RN  Outcome: Ongoing  Goal: STG - Patient will ambulate  11/29/2020 2218 by Gabriella Austin RN  Outcome: Ongoing  11/29/2020 1005 by Varun Gutierrez RN  Outcome: Ongoing

## 2020-12-01 VITALS
HEART RATE: 69 BPM | RESPIRATION RATE: 16 BRPM | OXYGEN SATURATION: 97 % | TEMPERATURE: 96 F | WEIGHT: 169.4 LBS | HEIGHT: 62 IN | SYSTOLIC BLOOD PRESSURE: 109 MMHG | BODY MASS INDEX: 31.17 KG/M2 | DIASTOLIC BLOOD PRESSURE: 52 MMHG

## 2020-12-01 LAB
ANION GAP SERPL CALCULATED.3IONS-SCNC: 9 MMOL/L (ref 4–16)
BUN BLDV-MCNC: 33 MG/DL (ref 6–23)
CALCIUM SERPL-MCNC: 9.1 MG/DL (ref 8.3–10.6)
CHLORIDE BLD-SCNC: 97 MMOL/L (ref 99–110)
CO2: 30 MMOL/L (ref 21–32)
CREAT SERPL-MCNC: 1.5 MG/DL (ref 0.6–1.1)
GFR AFRICAN AMERICAN: 40 ML/MIN/1.73M2
GFR NON-AFRICAN AMERICAN: 33 ML/MIN/1.73M2
GLUCOSE BLD-MCNC: 119 MG/DL (ref 70–99)
GLUCOSE BLD-MCNC: 130 MG/DL (ref 70–99)
GLUCOSE BLD-MCNC: 151 MG/DL (ref 70–99)
MAGNESIUM: 2 MG/DL (ref 1.8–2.4)
POTASSIUM SERPL-SCNC: 4.3 MMOL/L (ref 3.5–5.1)
SODIUM BLD-SCNC: 136 MMOL/L (ref 135–145)

## 2020-12-01 PROCEDURE — 80048 BASIC METABOLIC PNL TOTAL CA: CPT

## 2020-12-01 PROCEDURE — 36415 COLL VENOUS BLD VENIPUNCTURE: CPT

## 2020-12-01 PROCEDURE — 6370000000 HC RX 637 (ALT 250 FOR IP): Performed by: INTERNAL MEDICINE

## 2020-12-01 PROCEDURE — 83735 ASSAY OF MAGNESIUM: CPT

## 2020-12-01 PROCEDURE — 6360000002 HC RX W HCPCS: Performed by: INTERNAL MEDICINE

## 2020-12-01 PROCEDURE — 97110 THERAPEUTIC EXERCISES: CPT

## 2020-12-01 PROCEDURE — 82962 GLUCOSE BLOOD TEST: CPT

## 2020-12-01 PROCEDURE — 94640 AIRWAY INHALATION TREATMENT: CPT

## 2020-12-01 PROCEDURE — 94761 N-INVAS EAR/PLS OXIMETRY MLT: CPT

## 2020-12-01 PROCEDURE — 97535 SELF CARE MNGMENT TRAINING: CPT

## 2020-12-01 RX ORDER — POTASSIUM CHLORIDE 20 MEQ/1
20 TABLET, EXTENDED RELEASE ORAL 2 TIMES DAILY
Qty: 180 TABLET | Refills: 0 | Status: SHIPPED | OUTPATIENT
Start: 2020-12-01 | End: 2021-06-23

## 2020-12-01 RX ORDER — CILOSTAZOL 50 MG/1
50 TABLET ORAL 2 TIMES DAILY
Qty: 60 TABLET | Refills: 3 | Status: SHIPPED | OUTPATIENT
Start: 2020-12-01 | End: 2022-07-29

## 2020-12-01 RX ORDER — BUMETANIDE 1 MG/1
1 TABLET ORAL 2 TIMES DAILY
Qty: 30 TABLET | Refills: 3 | Status: SHIPPED | OUTPATIENT
Start: 2020-12-01 | End: 2021-06-07 | Stop reason: SDUPTHER

## 2020-12-01 RX ORDER — ROPINIROLE 0.5 MG/1
0.5 TABLET, FILM COATED ORAL 3 TIMES DAILY
Qty: 90 TABLET | Refills: 3 | Status: SHIPPED | OUTPATIENT
Start: 2020-12-01

## 2020-12-01 RX ADMIN — CALCIUM 500 MG: 500 TABLET ORAL at 09:40

## 2020-12-01 RX ADMIN — FEBUXOSTAT 40 MG: 40 TABLET, FILM COATED ORAL at 09:40

## 2020-12-01 RX ADMIN — FLUTICASONE PROPIONATE 2 PUFF: 110 AEROSOL, METERED RESPIRATORY (INHALATION) at 07:35

## 2020-12-01 RX ADMIN — LEVOTHYROXINE SODIUM 50 MCG: 50 TABLET ORAL at 05:57

## 2020-12-01 RX ADMIN — METOPROLOL SUCCINATE 25 MG: 25 TABLET, EXTENDED RELEASE ORAL at 09:40

## 2020-12-01 RX ADMIN — HEPARIN SODIUM 5000 UNITS: 5000 INJECTION INTRAVENOUS; SUBCUTANEOUS at 09:40

## 2020-12-01 RX ADMIN — ANTI-FUNGAL POWDER MICONAZOLE NITRATE TALC FREE: 1.42 POWDER TOPICAL at 11:55

## 2020-12-01 RX ADMIN — CILOSTAZOL 50 MG: 50 TABLET ORAL at 09:40

## 2020-12-01 RX ADMIN — BUMETANIDE 1 MG: 1 TABLET ORAL at 09:40

## 2020-12-01 RX ADMIN — FERROUS SULFATE TAB 325 MG (65 MG ELEMENTAL FE) 325 MG: 325 (65 FE) TAB at 09:40

## 2020-12-01 RX ADMIN — ROPINIROLE HYDROCHLORIDE 0.5 MG: 0.25 TABLET, FILM COATED ORAL at 14:36

## 2020-12-01 RX ADMIN — INSULIN LISPRO 1 UNITS: 100 INJECTION, SOLUTION INTRAVENOUS; SUBCUTANEOUS at 11:57

## 2020-12-01 RX ADMIN — ROPINIROLE HYDROCHLORIDE 0.5 MG: 0.25 TABLET, FILM COATED ORAL at 09:40

## 2020-12-01 RX ADMIN — FLUTICASONE PROPIONATE 2 SPRAY: 50 SPRAY, METERED NASAL at 11:55

## 2020-12-01 RX ADMIN — POTASSIUM CHLORIDE 40 MEQ: 1500 TABLET, EXTENDED RELEASE ORAL at 09:40

## 2020-12-01 ASSESSMENT — PAIN SCALES - GENERAL: PAINLEVEL_OUTOF10: 0

## 2020-12-01 NOTE — PROGRESS NOTES
Occupational Therapy    Occupational Therapy Treatment Note  Name: Fran Shields MRN: [id number] :   1935   Date:  2020   Admission Date: 2020 Room:  015/015-01   Restrictions/Precautions:  Restrictions/Precautions  Restrictions/Precautions: Fall Risk, General Precautions  Required Braces or Orthoses?: No     Communication with other providers:   Cleared for treatment by RN. Subjective:  Patient states:  \"I would love to get washed up today\"  Pain:   Location, Type, Intensity (0/10 to 10/10):  0/10    Objective:    Observation:  Pt alert and oriented. Treatment, including education:  Transfers    Sit to stand :Sup  Stand to sit :SUP      Self Care Training:   Cues were given for safety, sequence, UE/LE placement, visual cues, and balance. Activities performed today included dressing, hand hygiene, and grooming. Grooming  Mod I with combing hair and wash/dry face. Pt reports \"already brushed dentures today\"    Bathing  Min A for B feet and buttocks. UB Dress  Donned gown. LB Dress  Dep for socks. Therapeutic Exercise:  Cues were given for technique, safety, recruitment, and rationale. Cues were verbal and/or tactile. For BUE strengthening for ADL & functional mobility Indep pt performed BUE strengthening HEP c 2# hand weights x 10 reps x 6 exercises with Minimal difficulty. Therapeutic Activity Training:   Therapeutic activity training was instructed today. Cues were given for safety, sequence, UE/LE placement, awareness, and balance. Activities performed today included bed mobility training, sup-sit, sit-stand, SPT. Safety Measures: Gait belt used, Left in bed, Pull/Bed Alarm activated and call light left in reach          Assessment / Impression:        Patient's tolerance of treatment: Good   Adverse Reaction: None  Significant change in status and impact:  None  Barriers to improvement:  Dec strength and endurance.     Plan for Next Session:    Continue with POC. Time in: 0900  Time out:  0940  Timed treatment minutes:  40  Total treatment time:  40  Electronically signed by:    Yulisa Gonzalez, [address]    12/1/2020, 9:44 AM    Previously filed values:  Patient Goals   Patient goals :  To get home  Short term goals  Time Frame for Short term goals: Until DC or goals met  Short term goal 1: Pt will complete trfs mod I using walker  Short term goal 2: Pt will complete UE bathing/dressing mod I  Short term goal 3: Pt will complete LE bathing/dressing min A  Short term goal 4: Pt will perform 5+ min  dynamic standing/functional mobility to perform ADLs and simple homemaking tasks  Short term goal 5: Pt will complete light BUE ther ex 10+ min to increase strength/endurance for functional mobility and ADLs

## 2020-12-01 NOTE — PROGRESS NOTES
Pt discharged with skin issues to bilateral folds of buttocks and to L lower leg. Documents in [address] [place] [address]. Pt will receive HHC for these wounds. Patient verbalized that she will call Dr. Gulshan Wade on discharge and will schedule follow-up appointment.

## 2020-12-01 NOTE — CARE COORDINATION
Patient states she is ready to go home. She states she does not have a preference in [address] so information was faxed to [address] she has wounds on her leg and bottom that needs changed every other day. She states that she lives at home with her spouse and she has teachable family that can help with the wounds. [facility]. [facility] Nurse updated.

## 2020-12-01 NOTE — DISCHARGE SUMMARY
Flo Brown 1935 [id number]  PCP:  TREVON Zhou - CNP    Admit date: 11/20/2020  Admitting Physician: Marilu Sawant MD    Discharge date: 12/1/2020 Discharge Physician: Javier Ghotra MD         Discharge Diagnoses. As per below    Hospital Course:  History of present illness at admission: As per H&P  Subsequent Hospital Course:     1. Weakness and debility: Patient recently had pacemaker placed in early November for third-degree   heart block as well as left heart cath. Patient improved physically with PT/OT support.     2. Third-degree heart block: Status post pacemaker placement 11/17. Asymptomatic at this time. Left heart cath on 11/17 with left main patent mild disease LAD/LCx, RCA. Most recent echo preserved EF 50 to 55% with LVH noted. Follow-up as an outpatient. No chest pain noted since admission. Continue bumetanide 1 mg twice daily and metoprolol succinate 25 mg daily     3. CKD 3B: Creatinine 1.5. Stable renal function throughout     4. Insulin-dependent diabetes: Stable blood sugars.     5. Sacral ulcer stage II: Wound care managing same. Also with lower extremity wounds per history. By review looks like possibly PAD noted. Patient currently on cilostazol.       6. Hypomagnesemia/Hypokalemia: 1.5, 3.0 respectively, previously. Patient placed on magnesium oxide and potassium oral supplement daily.      7.  Hypothyroidism: Stable continue levothyroxine 50 mcg daily     8. Restless leg: Continue ropinirole 0.5 mg 3 times daily    On the day of discharge, pt felt better. No new complaints.     Pertinent Exam Findings on Day of Discharge:  General Appearance:    Alert, cooperative, no distress, appears stated age  Head:    Normocephalic, without obvious abnormality, atraumatic  Eyes:    PERRL, conjunctiva/corneas clear, EOM's intact  Lungs:    Clear to auscultation bilaterally, respirations unlabored   Heart:    Regular rate and rhythm, S1 and S2 normal, no murmur, rub or gallop  Abdomen:     Soft, non-tender, bowel sounds active, no masses, no organomegaly  Extremities:   Extremities normal, atraumatic, no cyanosis or edema    Consults:  IP CONSULT TO HOME CARE NEEDS    Patient Instructions:   Richa Olivo   Home Medication Instructions [id number]    Printed on:12/01/20 5955   Medication Information                      acetaminophen (TYLENOL) 500 MG tablet  Take 500 mg by mouth every 6 hours as needed for Pain. bumetanide (BUMEX) 1 MG tablet  Take 1 tablet by mouth 2 times daily             calcium carbonate 600 MG TABS tablet  Take 1 tablet by mouth daily. carbidopa-levodopa (SINEMET)  MG per tablet  Take 2 tablets by mouth nightly             cilostazol (PLETAL) 50 MG tablet  Take 1 tablet by mouth 2 times daily             clotrimazole-betamethasone (LOTRISONE) 1-0.05 % cream  Apply topically 2 times daily. famotidine (PEPCID) 20 MG tablet  Take 1 tablet by mouth 2 times daily             febuxostat (ULORIC) 40 MG TABS tablet  Take 1 tablet by mouth daily             ferrous sulfate 325 (65 FE) MG tablet  Take 1 tablet by mouth 2 times daily (with meals)             fluticasone (FLONASE) 50 MCG/ACT nasal spray               latanoprost (XALATAN) 0.005 % ophthalmic solution  Place 1 drop into both eyes nightly             levothyroxine (SYNTHROID) 50 MCG tablet  Take 50 mcg by mouth Daily             Loratadine (CLARITIN) 10 MG CAPS  Take 10 mg by mouth daily             metoprolol succinate (TOPROL XL) 25 MG extended release tablet  Take 1 tablet by mouth daily             Multiple Vitamins-Minerals (ICAPS) CAPS  Take  by mouth.              OXYGEN  Inhale 2 L/min into the lungs nightly             Polyethylene Glycol 3350 (MIRALAX PO)  Take by mouth             potassium chloride (KLOR-CON M) 20 MEQ extended release tablet  Take 1 tablet by mouth 2 times daily             rOPINIRole (REQUIP) 0.5 MG tablet  Take 1 tablet by mouth 3 times daily             silver sulfADIAZINE (SSD) 1 % cream  APPLY  CREAM TOPICALLY DAILY             triamcinolone (NASACORT ALLERGY 24HR) 55 MCG/ACT nasal inhaler  2 sprays by Nasal route 2 times daily                   Diet:  DIET CARB CONTROL;   Dietary Nutrition Supplements: Low Calorie High Protein Supplement    Activity:   activity as tolerated     Discharge Condition:   good    Disposition:   home with home care    Follow-up    TREVON Harrington - CNP  Schedule an appointment as soon as possible for a visit  Medical Management  [address] [place] [address]   Alina Cordova MD  Go to  Medical Management of kidney disease  [facility] [address] [facility]     Nikhil Cooley MD  Go in 2 weeks  Follow-up for cardiac issues  [address] [facility], [address] [place] [address]  [phone]     Total time spent 35 minutes      Discharge Physician Signed: Alix To

## 2020-12-01 NOTE — PLAN OF CARE
Problem: Falls - Risk of:  Goal: Will remain free from falls  Description: Will remain free from falls  11/30/2020 2306 by Matilda Henao RN  Outcome: Ongoing  11/30/2020 1528 by Nelly Cheatham RN  Outcome: Ongoing  Goal: Absence of physical injury  Description: Absence of physical injury  11/30/2020 2306 by Matilda Henao RN  Outcome: Ongoing  11/30/2020 1528 by Nelly Cheatham RN  Outcome: Ongoing     Problem: Skin Integrity:  Goal: Will show no infection signs and symptoms  Description: Will show no infection signs and symptoms  11/30/2020 2306 by Matilda Henao RN  Outcome: Ongoing  11/30/2020 1528 by Nelly Cheatham RN  Outcome: Ongoing  Goal: Absence of new skin breakdown  Description: Absence of new skin breakdown  11/30/2020 2306 by Matilda Henao RN  Outcome: Ongoing  11/30/2020 1528 by Nelly Cheatham RN  Outcome: Ongoing     Problem: IP MOBILITY  Goal: LTG - patient will demonstrate kitchen mobility  11/30/2020 2306 by Matilda Henao RN  Outcome: Ongoing  11/30/2020 1528 by Nlely Cheatham RN  Outcome: Ongoing  Goal: LTG - patient will ambulate community distance  11/30/2020 2306 by Matilda Henao RN  Outcome: Ongoing  11/30/2020 1528 by Nelly Cheatham RN  Outcome: Ongoing  Goal: LTG - patient will ambulate household distance  11/30/2020 2306 by Matilda Henao RN  Outcome: Ongoing  11/30/2020 1528 by Nelly Cheatham RN  Outcome: Ongoing  Goal: LTG - patient will demonstrate safe mobility requirements  11/30/2020 2306 by Matilda Henao RN  Outcome: Ongoing  11/30/2020 1528 by Nelly Cheatham RN  Outcome: Ongoing  Goal: STG - Patient will ambulate  11/30/2020 2306 by Matilda Henao RN  Outcome: Ongoing  11/30/2020 1528 by Nelly Cheatham RN  Outcome: Ongoing

## 2020-12-01 NOTE — DISCHARGE SUMMARY
daily (with meals)             fluticasone (FLONASE) 50 MCG/ACT nasal spray               latanoprost (XALATAN) 0.005 % ophthalmic solution  Place 1 drop into both eyes nightly             levothyroxine (SYNTHROID) 50 MCG tablet  Take 50 mcg by mouth Daily             Loratadine (CLARITIN) 10 MG CAPS  Take 10 mg by mouth daily             metoprolol succinate (TOPROL XL) 25 MG extended release tablet  Take 1 tablet by mouth daily             Multiple Vitamins-Minerals (ICAPS) CAPS  Take  by mouth. OXYGEN  Inhale 2 L/min into the lungs nightly             Polyethylene Glycol 3350 (MIRALAX PO)  Take by mouth             silver sulfADIAZINE (SSD) 1 % cream  APPLY  CREAM TOPICALLY DAILY             triamcinolone (NASACORT ALLERGY 24HR) 55 MCG/ACT nasal inhaler  2 sprays by Nasal route 2 times daily                   Diet:  DIET CARB CONTROL;   Dietary Nutrition Supplements: Low Calorie High Protein Supplement    Activity:   {discharge activity:33519}     Discharge Condition:   {condition:32787}    Disposition:   {disposition:88473}    Follow-up      Time spent on discharge in the examination, evaluation, counseling and review of medications and discharge plan: >30 minutes      Discharge Physician Signed: Juvencio Cowan

## 2020-12-02 ENCOUNTER — FOLLOWUP TELEPHONE ENCOUNTER (OUTPATIENT)
Dept: CASE MANAGEMENT | Age: 85
End: 2020-12-02

## 2020-12-02 NOTE — CARE COORDINATION
Received a call from the patient's  who is worried he either hung up or missed the call from Marbin Zhang. CM gave the  [facility]  phone number and this CM called them and they will call the patient.

## 2020-12-08 ENCOUNTER — HOSPITAL ENCOUNTER (OUTPATIENT)
Dept: WOUND CARE | Age: 85
Discharge: HOME | End: 2020-12-08

## 2020-12-12 ENCOUNTER — HOSPITAL ENCOUNTER (OUTPATIENT)
Age: 85
Setting detail: SPECIMEN
Discharge: HOME | End: 2020-12-12
Payer: MEDICARE

## 2020-12-12 LAB
ALBUMIN SERPL-MCNC: 3.5 GM/DL (ref 3.4–5)
ALP BLD-CCNC: 79 IU/L (ref 40–129)
ALT SERPL-CCNC: 14 U/L (ref 10–40)
ANION GAP SERPL CALCULATED.3IONS-SCNC: 10 MMOL/L (ref 4–16)
AST SERPL-CCNC: 14 IU/L (ref 15–37)
BILIRUB SERPL-MCNC: 0.5 MG/DL (ref 0–1)
BUN BLDV-MCNC: 24 MG/DL (ref 6–23)
CALCIUM SERPL-MCNC: 8.3 MG/DL (ref 8.3–10.6)
CHLORIDE BLD-SCNC: 95 MMOL/L (ref 99–110)
CO2: 32 MMOL/L (ref 21–32)
CREAT SERPL-MCNC: 1.5 MG/DL (ref 0.6–1.1)
GFR AFRICAN AMERICAN: 40 ML/MIN/1.73M2
GFR NON-AFRICAN AMERICAN: 33 ML/MIN/1.73M2
GLUCOSE BLD-MCNC: 159 MG/DL (ref 70–99)
POTASSIUM SERPL-SCNC: 3.7 MMOL/L (ref 3.5–5.1)
PRO-BNP: 1084 PG/ML
SODIUM BLD-SCNC: 137 MMOL/L (ref 135–145)
TOTAL PROTEIN: 5.9 GM/DL (ref 6.4–8.2)

## 2020-12-12 PROCEDURE — 80053 COMPREHEN METABOLIC PANEL: CPT

## 2020-12-12 PROCEDURE — 83880 ASSAY OF NATRIURETIC PEPTIDE: CPT

## 2020-12-22 ENCOUNTER — HOSPITAL ENCOUNTER (OUTPATIENT)
Dept: WOUND CARE | Age: 85
Discharge: HOME | End: 2020-12-22
Payer: MEDICARE

## 2020-12-22 VITALS
HEART RATE: 91 BPM | TEMPERATURE: 97.1 F | RESPIRATION RATE: 16 BRPM | DIASTOLIC BLOOD PRESSURE: 64 MMHG | SYSTOLIC BLOOD PRESSURE: 132 MMHG

## 2020-12-22 PROBLEM — I73.9 PVD (PERIPHERAL VASCULAR DISEASE) (HCC): Status: ACTIVE | Noted: 2020-12-22

## 2020-12-22 PROBLEM — I87.303 VENOUS HYPERTENSION OF BOTH LOWER EXTREMITIES: Status: ACTIVE | Noted: 2020-12-22

## 2020-12-22 PROCEDURE — 87075 CULTR BACTERIA EXCEPT BLOOD: CPT

## 2020-12-22 PROCEDURE — 11042 DBRDMT SUBQ TIS 1ST 20SQCM/<: CPT | Performed by: NURSE PRACTITIONER

## 2020-12-22 PROCEDURE — 11042 DBRDMT SUBQ TIS 1ST 20SQCM/<: CPT

## 2020-12-22 PROCEDURE — 87186 SC STD MICRODIL/AGAR DIL: CPT

## 2020-12-22 PROCEDURE — 87070 CULTURE OTHR SPECIMN AEROBIC: CPT

## 2020-12-22 PROCEDURE — 99213 OFFICE O/P EST LOW 20 MIN: CPT

## 2020-12-22 PROCEDURE — 87077 CULTURE AEROBIC IDENTIFY: CPT

## 2020-12-22 RX ORDER — LIDOCAINE HYDROCHLORIDE 20 MG/ML
JELLY TOPICAL ONCE
Status: CANCELLED | OUTPATIENT
Start: 2020-12-22 | End: 2020-12-22

## 2020-12-22 RX ORDER — CLOBETASOL PROPIONATE 0.5 MG/G
OINTMENT TOPICAL ONCE
Status: CANCELLED | OUTPATIENT
Start: 2020-12-22 | End: 2020-12-22

## 2020-12-22 RX ORDER — LIDOCAINE 40 MG/G
CREAM TOPICAL ONCE
Status: CANCELLED | OUTPATIENT
Start: 2020-12-22 | End: 2020-12-22

## 2020-12-22 RX ORDER — GINSENG 100 MG
CAPSULE ORAL ONCE
Status: CANCELLED | OUTPATIENT
Start: 2020-12-22 | End: 2020-12-22

## 2020-12-22 RX ORDER — BACITRACIN, NEOMYCIN, POLYMYXIN B 400; 3.5; 5 [USP'U]/G; MG/G; [USP'U]/G
OINTMENT TOPICAL ONCE
Status: CANCELLED | OUTPATIENT
Start: 2020-12-22 | End: 2020-12-22

## 2020-12-22 RX ORDER — GENTAMICIN SULFATE 1 MG/G
OINTMENT TOPICAL
Qty: 30 G | Refills: 1 | Status: SHIPPED | OUTPATIENT
Start: 2020-12-22 | End: 2020-12-29

## 2020-12-22 RX ORDER — BETAMETHASONE DIPROPIONATE 0.05 %
OINTMENT (GRAM) TOPICAL ONCE
Status: CANCELLED | OUTPATIENT
Start: 2020-12-22 | End: 2020-12-22

## 2020-12-22 RX ORDER — BACITRACIN ZINC AND POLYMYXIN B SULFATE 500; 1000 [USP'U]/G; [USP'U]/G
OINTMENT TOPICAL ONCE
Status: CANCELLED | OUTPATIENT
Start: 2020-12-22 | End: 2020-12-22

## 2020-12-22 RX ORDER — LIDOCAINE 50 MG/G
OINTMENT TOPICAL ONCE
Status: CANCELLED | OUTPATIENT
Start: 2020-12-22 | End: 2020-12-22

## 2020-12-22 RX ORDER — GENTAMICIN SULFATE 1 MG/G
OINTMENT TOPICAL ONCE
Status: DISCONTINUED | OUTPATIENT
Start: 2020-12-22 | End: 2020-12-23 | Stop reason: HOSPADM

## 2020-12-22 RX ORDER — LIDOCAINE HYDROCHLORIDE 40 MG/ML
SOLUTION TOPICAL ONCE
Status: CANCELLED | OUTPATIENT
Start: 2020-12-22 | End: 2020-12-22

## 2020-12-22 RX ORDER — GENTAMICIN SULFATE 1 MG/G
OINTMENT TOPICAL ONCE
Status: CANCELLED | OUTPATIENT
Start: 2020-12-22 | End: 2020-12-22

## 2020-12-22 NOTE — PROGRESS NOTES
 History of blood transfusion 07/2015    Hypertension     Lymphedema of both lower extremities 3/7/2016    Osteoarthritis     Pneumonia     Thyroid disease     Venous stasis of both lower extremities 3/7/2016    WD-Non-pressure chronic ulcer right lower leg, 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 ([address] 4/21/2016       PAST SURGICAL HISTORY    Past Surgical History:   Procedure Laterality Date    APPENDECTOMY      CHOLECYSTECTOMY      CYSTOSCOPY      EYE SURGERY      bilateral implants    HYSTERECTOMY      JOINT REPLACEMENT Right     knee    PACEMAKER INSERTION N/A 11/17/2020    PACEMAKER INSERTION PERMANENT REMOVAL OF TEMPORARY PACEMAKER performed by Radha Keen MD at [facility]    Family History   Problem Relation Age of Onset    Heart Disease Father        SOCIAL HISTORY    Social History     Tobacco Use    Smoking status: Never Smoker    Smokeless tobacco: Never Used   Substance Use Topics    Alcohol use: No    Drug use: No       ALLERGIES    Allergies   Allergen Reactions    Latex Rash    Dye [Iodides] Anaphylaxis    Iodine Anaphylaxis    Dyazide [Hydrochlorothiazide W-Triamterene] Rash    Lasix [Furosemide] Rash    Procardia [Nifedipine] Other (See Comments)     Not for sure if rash occurred or rash    Doxycycline Dermatitis    Edecrin [Ethacrynic Acid]     Levaquin [Levofloxacin] Other (See Comments)     unknown    Mobic [Meloxicam]     Vioxx [Rofecoxib]     Celebrex [Celecoxib] Rash    Lexapro [Escitalopram Oxalate] Rash    Sulfa Antibiotics Hives       MEDICATIONS    Current Outpatient Medications on File Prior to Encounter   Medication Sig Dispense Refill    cephALEXin (KEFLEX) 500 MG capsule Take 1 capsule by mouth 2 times daily 20 capsule 0    docusate sodium (COLACE) 100 MG capsule Take 1 capsule by mouth daily 30 capsule 3    torsemide (DEMADEX) 20 MG tablet Take 1 tablet by mouth 2 times daily 60 tablet 3  bumetanide (BUMEX) 1 MG tablet Take 1 tablet by mouth 2 times daily 30 tablet 3    cilostazol (PLETAL) 50 MG tablet Take 1 tablet by mouth 2 times daily 60 tablet 3    rOPINIRole (REQUIP) 0.5 MG tablet Take 1 tablet by mouth 3 times daily 90 tablet 3    potassium chloride (KLOR-CON M) 20 MEQ extended release tablet Take 1 tablet by mouth 2 times daily 180 tablet 0    metoprolol succinate (TOPROL XL) 25 MG extended release tablet Take 1 tablet by mouth daily 30 tablet 3    silver sulfADIAZINE (SSD) 1 % cream APPLY  CREAM TOPICALLY DAILY 240 g 0    febuxostat (ULORIC) 40 MG TABS tablet Take 1 tablet by mouth daily 30 tablet 5    famotidine (PEPCID) 20 MG tablet Take 1 tablet by mouth 2 times daily 60 tablet 5    levothyroxine (SYNTHROID) 50 MCG tablet Take 50 mcg by mouth Daily      Polyethylene Glycol 3350 (MIRALAX PO) Take by mouth      triamcinolone (NASACORT ALLERGY 24HR) 55 MCG/ACT nasal inhaler 2 sprays by Nasal route 2 times daily      fluticasone (FLONASE) 50 MCG/ACT nasal spray       Loratadine (CLARITIN) 10 MG CAPS Take 10 mg by mouth daily      ferrous sulfate 325 (65 FE) MG tablet Take 1 tablet by mouth 2 times daily (with meals) 30 tablet 3    latanoprost (XALATAN) 0.005 % ophthalmic solution Place 1 drop into both eyes nightly      OXYGEN Inhale 2 L/min into the lungs nightly      Multiple Vitamins-Minerals (ICAPS) CAPS Take  by mouth.  acetaminophen (TYLENOL) 500 MG tablet Take 500 mg by mouth every 6 hours as needed for Pain.  calcium carbonate 600 MG TABS tablet Take 1 tablet by mouth daily.  clotrimazole-betamethasone (LOTRISONE) 1-0.05 % cream Apply topically 2 times daily. 1 Tube 0     No current facility-administered medications on file prior to encounter.         PROBLEM LIST    Patient Active Problem List   Diagnosis    Diabetes type 2, controlled ([address]    Osteoarthritis    History of asthma    Cellulitis of right lower extremity    Sepsis ([address]  Gram positive sepsis (HCC)    Gram-positive bacteremia    Cellulitis of left lower extremity    Non-pressure chronic ulcer of left lower leg, limited to breakdown of skin (HCC)    Chronic ulcer of right leg with fat layer exposed [facility]    Lymphedema of both lower extremities with cellulitis    WD-Idiopathic chronic venous hypertension of both lower extremities with ulcer and inflammation (HCC)    WD-Non-pressure chronic ulcer right lower leg, 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 ([facility])    Rhabdomyolysis    Hyperkalemia    Morbid obesity with BMI of 45.0-49.9, adult ([facility])    Encephalopathy in sepsis    Toxic shock syndrome ([facility])    Chronic renal impairment, stage 3 (moderate)    Anemia of chronic disease    Chronic kidney disease (CKD) stage G3a/A2, moderately decreased glomerular filtration rate (GFR) between 45-59 mL/min/1.73 square meter and albuminuria creatinine ratio between  mg/g    Cor pulmonale, chronic (HCC)    DM (diabetes mellitus) ([facility])    Fluid overload    HTN (hypertension)    Chronic kidney disease (CKD) stage G2/A2, mildly decreased glomerular filtration rate (GFR) between 60-89 mL/min/1.73 square meter and albuminuria creatinine ratio between  mg/g    Acute gout    Acute kidney injury ([facility]    Acute kidney injury superimposed on chronic kidney disease ([facility])    Third degree heart block ([facility])    Weakness    WD-Arterial insufficiency of lower extremity ([facility])    WD-Venous hypertension of both lower extremities    WD-PVD (peripheral vascular disease) ([facility])       REVIEW OF SYSTEMS    Constitutional: negative for anorexia, chills, fatigue, fevers, malaise, sweats and weight loss  Respiratory: negative for cough, hemoptysis, pneumonia, shortness of breath, sputum, stridor and wheezing  Cardiovascular: negative for chest pain, irregular heart beat, near-syncope, palpitations, syncope and tachypnea  Integument/breast: positive for skin lesion(s)      Objective: /64   Pulse 91   Temp 97.1 °F (36.2 °C) (Temporal)   Resp 16     PHYSICAL EXAM  General Appearance: alert and oriented to person, place and time, well-developed and well-nourished, in no acute distress  Pulmonary/Chest: clear to auscultation bilaterally- no wheezes, rales or rhonchi, normal air movement, no respiratory distress  Cardiovascular: normal rate, normal S1 and S2, no gallops, intact distal pulses and no carotid bruits  Dermatologic exam: Visual inspection of the periwound reveals the skin to be moist, hot and edematous  Wound exam: see wound description below in procedure note      Assessment:       Jassi Mendez  appears to have a non-healing wound of the lower extremities and left buttock. The etiology of the wound is felt to be venous, arterial, diabetic, pressure and lymphedema. There are multiple complicating factors including edema, venous stasis, diabetes, chronic pressure, decreased mobility, obesity and arterial insufficiency. A comprehensive wound management program would be helpful to heal this wound. Assessments completed include fall risk and nutritional, functional,and psychological status. At this time appropriate care would include: periodic debridement and wound care as below. Problem List Items Addressed This Visit     WD-Idiopathic chronic venous hypertension of both lower extremities with ulcer and inflammation ([address] - Primary    WD-Arterial insufficiency of lower extremity ([address]    WD-Venous hypertension of both lower extremities            Procedure Note    Indications:  Based on my examination of this patient's wound(s) today, sharp excision into necrotic subcutaneous tissue is required to promote healing and evaluate the extent of previous healing.     Performed by: TREVON Jones - CNP    Consent obtained: Yes    Time out taken:  Yes    Pain Control: N/A       Debridement:Excisional Debridement Using curette the wound(s) was/were sharply debrided down through and including the removal of subcutaneous tissue. Devitalized Tissue Debrided:  fibrin, biofilm, slough, exudate and callus    Pre Debridement Measurements:  Are located in the Wound Documentation Flow Sheet    All active wounds listed below with today's date are evaluated  Wound(s)    debrided this date include # : 2 and 3     Post  Debridement Measurements:  Wound 12/22/20 Buttocks Left;Medial #1 (onset 2 months) Left Medial Buttock (Active)   Wound Cleansed Cleansed with saline 12/22/20 1557   Wound Length (cm) 1 cm 12/22/20 1557   Wound Width (cm) 0.9 cm 12/22/20 1557   Wound Depth (cm) 0.1 cm 12/22/20 1557   Wound Surface Area (cm^2) 0.9 cm^2 12/22/20 1557   Wound Volume (cm^3) 0.09 cm^3 12/22/20 1557   Distance Tunneling (cm) 0 cm 12/22/20 1557   Tunneling Position ___ O'Clock 0 12/22/20 1557   Undermining Starts ___ O'Clock 0 12/22/20 1557   Undermining Ends___ O'Clock 0 12/22/20 1557   Undermining Maxium Distance (cm) 0 12/22/20 1557   Wound Assessment Granulation tissue;Pink/red 12/22/20 1557   Drainage Amount Large 12/22/20 1557   Drainage Description Yellow;Green 12/22/20 1557   Odor None 12/22/20 1557   Leatha-wound Assessment Fragile 12/22/20 1557   Margins Defined edges; Unattached edges 12/22/20 1557   Wound Thickness Description not for Pressure Injury Full thickness 12/22/20 1557   Number of days: 0       Wound 12/22/20 Leg Right; Lower #2 (onset 6 weeks) Right Lower Leg (Active)   Wound Image   12/22/20 1557   Wound Cleansed Soap and water 12/22/20 1557   Wound Length (cm) 0.3 cm 12/22/20 1557   Wound Width (cm) 0.3 cm 12/22/20 1557   Wound Depth (cm) 0.1 cm 12/22/20 1557   Wound Surface Area (cm^2) 0.09 cm^2 12/22/20 1557   Wound Volume (cm^3) 0.01 cm^3 12/22/20 1557   Post-Procedure Length (cm) 0.3 cm 12/22/20 1613   Post-Procedure Width (cm) 0.3 cm 12/22/20 1613   Post-Procedure Depth (cm) 0.1 cm 12/22/20 1613 Post-Procedure Surface Area (cm^2) 0.09 cm^2 12/22/20 1613   Post-Procedure Volume (cm^3) 0.01 cm^3 12/22/20 1613   Distance Tunneling (cm) 0 cm 12/22/20 1557   Tunneling Position ___ O'Clock 0 12/22/20 1557   Undermining Starts ___ O'Clock 0 12/22/20 1557   Undermining Ends___ O'Clock 0 12/22/20 1557   Undermining Maxium Distance (cm) 0 12/22/20 1557   Wound Assessment Pink/red 12/22/20 1557   Drainage Amount Other (Comment) 12/22/20 1557   Drainage Description Other (Comment) 12/22/20 1557   Odor None 12/22/20 1557   Leatha-wound Assessment Edematous 12/22/20 1557   Margins Defined edges; Unattached edges 12/22/20 1557   Wound Thickness Description not for Pressure Injury Full thickness 12/22/20 1557   Number of days: 0       Wound 12/22/20 Leg Left; Lower #3 (onset 6 weeks) Left Lower Leg (Active)   Wound Image   12/22/20 1557   Wound Cleansed Soap and water 12/22/20 1557   Wound Length (cm) 6.5 cm 12/22/20 1557   Wound Width (cm) 6.5 cm 12/22/20 1557   Wound Depth (cm) 0.1 cm 12/22/20 1557   Wound Surface Area (cm^2) 42.25 cm^2 12/22/20 1557   Wound Volume (cm^3) 4.22 cm^3 12/22/20 1557   Post-Procedure Length (cm) 6.5 cm 12/22/20 1613   Post-Procedure Width (cm) 6.5 cm 12/22/20 1613   Post-Procedure Depth (cm) 0.1 cm 12/22/20 1613   Post-Procedure Surface Area (cm^2) 42.25 cm^2 12/22/20 1613   Post-Procedure Volume (cm^3) 4.22 cm^3 12/22/20 1613   Distance Tunneling (cm) 0 cm 12/22/20 1557   Tunneling Position ___ O'Clock 0 12/22/20 1557   Undermining Starts ___ O'Clock 0 12/22/20 1557   Undermining Ends___ O'Clock 0 12/22/20 1557   Undermining Maxium Distance (cm) 0 12/22/20 1557   Wound Assessment Pink/red 12/22/20 1557   Drainage Amount Large 12/22/20 1557   Drainage Description Clear 12/22/20 1557   Odor None 12/22/20 1557   Leatha-wound Assessment Hyperpigmented 12/22/20 1557   Margins Unattached edges; Undefined edges 12/22/20 1557 Wound Thickness Description not for Pressure Injury Full thickness 12/22/20 1557   Number of days: 0           Total  Area  Debrided:  10 sq cm     Bleeding:  Minimal    Hemostasis Achieved:  by pressure    Procedural Pain:  0  / 10     Post Procedural Pain:  0 / 10     Response to treatment:  Well tolerated by patient. We will focus on keeping wounds clean and dry, and aim to have patient worked up for venous and arterial studies. We will first order a VL scan on nino LEs, so we can work her up for compression or surgical interventions. She is known to Dr. Griggs Wheeler office. Left buttock wound is stable and clean. This is from a previous admission and is almost healed. Instructed to continue to cover with a border and encouraged offloading techniques. Culture obtained. We will contact patient with results if needed. Eileen Necessary and bactroban ointments to open lesions for infection prevention, add absorptive dressings and Tubi  for now. Patient and  educated on anatomy, POC, and all questions answered. Plan:     Discharge Instructions       PHYSICIAN ORDERS AND DISCHARGE INSTRUCTIONS    NOTE: Upon discharge from the [address], you will receive a patient experience survey. We would be grateful if you would take the time to fill this survey out. Wound care order history:     CYNTHIA's   Right       Left    Date    Vascular studies:  Ordered 12/22/20    Cultures:  12/22/20              Antibiotics:                HbA1c:                 Compression/Lymph Pumps:              Grafts:       Continuing wound care orders and information:              Residence:               Continue home health care with: [address] [place] [address]   Your wound-care supplies will be provided by: . [facility]       Wound cleansing:      Do not scrub or use excessive force. Wash hands with soap and water before and after dressing changes.     Prior to applying a clean dressing, cleanse wound with normal saline,

## 2020-12-28 LAB
CULTURE: ABNORMAL
CULTURE: ABNORMAL
Lab: ABNORMAL
SPECIMEN: ABNORMAL

## 2021-01-12 ENCOUNTER — HOSPITAL ENCOUNTER (OUTPATIENT)
Dept: WOUND CARE | Age: 86
Discharge: HOME | End: 2021-01-12
Payer: MEDICARE

## 2021-01-12 VITALS
HEART RATE: 86 BPM | TEMPERATURE: 96.1 F | DIASTOLIC BLOOD PRESSURE: 71 MMHG | RESPIRATION RATE: 16 BRPM | SYSTOLIC BLOOD PRESSURE: 150 MMHG

## 2021-01-12 DIAGNOSIS — L97.929 IDIOPATHIC CHRONIC VENOUS HYPERTENSION OF BOTH LOWER EXTREMITIES WITH ULCER AND INFLAMMATION (HCC): ICD-10-CM

## 2021-01-12 DIAGNOSIS — L97.919 IDIOPATHIC CHRONIC VENOUS HYPERTENSION OF BOTH LOWER EXTREMITIES WITH ULCER AND INFLAMMATION (HCC): ICD-10-CM

## 2021-01-12 DIAGNOSIS — I73.9 PVD (PERIPHERAL VASCULAR DISEASE) (HCC): ICD-10-CM

## 2021-01-12 DIAGNOSIS — I73.9 ARTERIAL INSUFFICIENCY OF LOWER EXTREMITY (HCC): Primary | ICD-10-CM

## 2021-01-12 DIAGNOSIS — I87.303 VENOUS HYPERTENSION OF BOTH LOWER EXTREMITIES: ICD-10-CM

## 2021-01-12 DIAGNOSIS — I87.333 IDIOPATHIC CHRONIC VENOUS HYPERTENSION OF BOTH LOWER EXTREMITIES WITH ULCER AND INFLAMMATION (HCC): ICD-10-CM

## 2021-01-12 DIAGNOSIS — L97.921 NON-PRESSURE CHRONIC ULCER OF LEFT LOWER LEG, LIMITED TO BREAKDOWN OF SKIN (HCC): ICD-10-CM

## 2021-01-12 DIAGNOSIS — L97.911 NON-PRESSURE CHRONIC ULCER RIGHT LOWER LEG, LIMITED TO BREAKDOWN SKIN (HCC): ICD-10-CM

## 2021-01-12 DIAGNOSIS — I73.9 ARTERIAL INSUFFICIENCY OF LOWER EXTREMITY (HCC): ICD-10-CM

## 2021-01-12 PROCEDURE — 99213 OFFICE O/P EST LOW 20 MIN: CPT | Performed by: NURSE PRACTITIONER

## 2021-01-12 PROCEDURE — 87077 CULTURE AEROBIC IDENTIFY: CPT

## 2021-01-12 PROCEDURE — 87186 SC STD MICRODIL/AGAR DIL: CPT

## 2021-01-12 PROCEDURE — 87070 CULTURE OTHR SPECIMN AEROBIC: CPT

## 2021-01-12 PROCEDURE — 87075 CULTR BACTERIA EXCEPT BLOOD: CPT

## 2021-01-12 PROCEDURE — 99213 OFFICE O/P EST LOW 20 MIN: CPT

## 2021-01-12 RX ORDER — LIDOCAINE HYDROCHLORIDE 40 MG/ML
SOLUTION TOPICAL ONCE
Status: CANCELLED | OUTPATIENT
Start: 2021-01-12 | End: 2021-01-12

## 2021-01-12 RX ORDER — GENTAMICIN SULFATE 1 MG/G
OINTMENT TOPICAL ONCE
Status: CANCELLED | OUTPATIENT
Start: 2021-01-12 | End: 2021-01-12

## 2021-01-12 RX ORDER — CLOBETASOL PROPIONATE 0.5 MG/G
OINTMENT TOPICAL ONCE
Status: CANCELLED | OUTPATIENT
Start: 2021-01-12 | End: 2021-01-12

## 2021-01-12 RX ORDER — LIDOCAINE 40 MG/G
CREAM TOPICAL ONCE
Status: CANCELLED | OUTPATIENT
Start: 2021-01-12 | End: 2021-01-12

## 2021-01-12 RX ORDER — LIDOCAINE 50 MG/G
OINTMENT TOPICAL ONCE
Status: CANCELLED | OUTPATIENT
Start: 2021-01-12 | End: 2021-01-12

## 2021-01-12 RX ORDER — GENTAMICIN SULFATE 1 MG/G
OINTMENT TOPICAL ONCE
Status: DISCONTINUED | OUTPATIENT
Start: 2021-01-12 | End: 2021-01-13 | Stop reason: HOSPADM

## 2021-01-12 RX ORDER — LIDOCAINE HYDROCHLORIDE 40 MG/ML
SOLUTION TOPICAL ONCE
Status: DISCONTINUED | OUTPATIENT
Start: 2021-01-12 | End: 2021-01-13 | Stop reason: HOSPADM

## 2021-01-12 RX ORDER — GINSENG 100 MG
CAPSULE ORAL ONCE
Status: CANCELLED | OUTPATIENT
Start: 2021-01-12 | End: 2021-01-12

## 2021-01-12 RX ORDER — LIDOCAINE HYDROCHLORIDE 20 MG/ML
JELLY TOPICAL ONCE
Status: CANCELLED | OUTPATIENT
Start: 2021-01-12 | End: 2021-01-12

## 2021-01-12 RX ORDER — BACITRACIN ZINC AND POLYMYXIN B SULFATE 500; 1000 [USP'U]/G; [USP'U]/G
OINTMENT TOPICAL ONCE
Status: CANCELLED | OUTPATIENT
Start: 2021-01-12 | End: 2021-01-12

## 2021-01-12 RX ORDER — BACITRACIN, NEOMYCIN, POLYMYXIN B 400; 3.5; 5 [USP'U]/G; MG/G; [USP'U]/G
OINTMENT TOPICAL ONCE
Status: CANCELLED | OUTPATIENT
Start: 2021-01-12 | End: 2021-01-12

## 2021-01-12 RX ORDER — BETAMETHASONE DIPROPIONATE 0.05 %
OINTMENT (GRAM) TOPICAL ONCE
Status: CANCELLED | OUTPATIENT
Start: 2021-01-12 | End: 2021-01-12

## 2021-01-12 ASSESSMENT — PAIN SCALES - GENERAL: PAINLEVEL_OUTOF10: 5

## 2021-01-12 NOTE — PROGRESS NOTES
Wound Care Center Progress Note       Karen Reyna  AGE: 82 y.o. GENDER: female  : 1935  TODAY'S DATE:  2021        Subjective:     Chief Complaint   Patient presents with    Wound Check     BLE          HISTORY of PRESENT ILLNESS     Karen Reyna is a 82 y.o. female who presents to the [address] [place] [address] for a visit for evaluation and treatment of Chronic venous, arterial, pressure and lymphedema  ulcer(s) of  R lower leg anterior, L lower leg lateral, Buttocks L gluteal.  The condition is of moderate severity. The ulcer has been present for 2 years. The underlying cause is thought to be PVD, pressure. The patients care to date has included home wound care with ointments, padding, and wraps. This was ordered by the hospital. The patient has significant underlying medical conditions as below. Patient has significant renal and heart history, and has had swelling and wounds on legs for 2 years. She is a diabetic, but takes no medication and does not check her BG. She only watches her diet. She is not on a blood thinner, and she is not a smoker. She currently gets home wound care through [address] [place] [address]. She has a history of cellulitis. She is currently on a 10 day keflex course, and finishes tomorrow. They report improvement with abx.    Wound Pain Timing/Severity: waxing and waning  Quality of pain: aching, burning, tender, pressure  Severity of pain:   10   Modifying Factors: edema, venous stasis, lymphedema, diabetes, chronic pressure, decreased mobility, obesity and arterial insufficiency  Associated Signs/Symptoms: edema, erythema, drainage and pain        PAST MEDICAL HISTORY        Diagnosis Date    Asthma     Chronic kidney disease     acute kidney failure    Chronic ulcer of left leg with fat layer exposed ([address] 3/7/2016    Chronic ulcer of right leg with fat layer exposed ([address] 3/7/2016    Diabetes type 2, controlled ([address]     History of asthma     History of blood transfusion 2015  Hypertension     Lymphedema of both lower extremities 3/7/2016    Osteoarthritis     Pneumonia     Thyroid disease     Venous stasis of both lower extremities 3/7/2016    WD-Non-pressure chronic ulcer right lower leg, 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 ([address] 4/21/2016       PAST SURGICAL HISTORY    Past Surgical History:   Procedure Laterality Date    APPENDECTOMY      CHOLECYSTECTOMY      CYSTOSCOPY      EYE SURGERY      bilateral implants    HYSTERECTOMY      JOINT REPLACEMENT Right     knee    PACEMAKER INSERTION N/A 11/17/2020    PACEMAKER INSERTION PERMANENT REMOVAL OF TEMPORARY PACEMAKER performed by Zeyad Caicedo MD at [address]    Family History   Problem Relation Age of Onset    Heart Disease Father        SOCIAL HISTORY    Social History     Tobacco Use    Smoking status: Never Smoker    Smokeless tobacco: Never Used   Substance Use Topics    Alcohol use: No    Drug use: No       ALLERGIES    Allergies   Allergen Reactions    Latex Rash    Dye [Iodides] Anaphylaxis    Iodine Anaphylaxis    Dyazide [Hydrochlorothiazide W-Triamterene] Rash    Lasix [Furosemide] Rash    Procardia [Nifedipine] Other (See Comments)     Not for sure if rash occurred or rash    Doxycycline Dermatitis    Edecrin [Ethacrynic Acid]     Levaquin [Levofloxacin] Other (See Comments)     unknown    Mobic [Meloxicam]     Vioxx [Rofecoxib]     Celebrex [Celecoxib] Rash    Lexapro [Escitalopram Oxalate] Rash    Sulfa Antibiotics Hives       MEDICATIONS    Current Outpatient Medications on File Prior to Encounter   Medication Sig Dispense Refill    febuxostat (ULORIC) 40 MG TABS tablet Take 1 tablet by mouth daily 30 tablet 5    torsemide (DEMADEX) 20 MG tablet Take 1 tablet by mouth 3 times daily Pt to take 40 mg in am and 20 mg in the afternoon 90 tablet 3    cephALEXin (KEFLEX) 500 MG capsule Take 1 capsule by mouth 2 times daily 20 capsule 0  docusate sodium (COLACE) 100 MG capsule Take 1 capsule by mouth daily 30 capsule 3    bumetanide (BUMEX) 1 MG tablet Take 1 tablet by mouth 2 times daily 30 tablet 3    cilostazol (PLETAL) 50 MG tablet Take 1 tablet by mouth 2 times daily 60 tablet 3    rOPINIRole (REQUIP) 0.5 MG tablet Take 1 tablet by mouth 3 times daily 90 tablet 3    potassium chloride (KLOR-CON M) 20 MEQ extended release tablet Take 1 tablet by mouth 2 times daily 180 tablet 0    metoprolol succinate (TOPROL XL) 25 MG extended release tablet Take 1 tablet by mouth daily 30 tablet 3    silver sulfADIAZINE (SSD) 1 % cream APPLY  CREAM TOPICALLY DAILY 240 g 0    famotidine (PEPCID) 20 MG tablet Take 1 tablet by mouth 2 times daily 60 tablet 5    levothyroxine (SYNTHROID) 50 MCG tablet Take 50 mcg by mouth Daily      Polyethylene Glycol 3350 (MIRALAX PO) Take by mouth      triamcinolone (NASACORT ALLERGY 24HR) 55 MCG/ACT nasal inhaler 2 sprays by Nasal route 2 times daily      fluticasone (FLONASE) 50 MCG/ACT nasal spray       clotrimazole-betamethasone (LOTRISONE) 1-0.05 % cream Apply topically 2 times daily. 1 Tube 0    Loratadine (CLARITIN) 10 MG CAPS Take 10 mg by mouth daily      ferrous sulfate 325 (65 FE) MG tablet Take 1 tablet by mouth 2 times daily (with meals) 30 tablet 3    latanoprost (XALATAN) 0.005 % ophthalmic solution Place 1 drop into both eyes nightly      OXYGEN Inhale 2 L/min into the lungs nightly      Multiple Vitamins-Minerals (ICAPS) CAPS Take  by mouth.  acetaminophen (TYLENOL) 500 MG tablet Take 500 mg by mouth every 6 hours as needed for Pain.  calcium carbonate 600 MG TABS tablet Take 1 tablet by mouth daily. No current facility-administered medications on file prior to encounter. REVIEW OF SYSTEMS    Pertinent items are noted in HPI. Constitutional: Negative for systemic symptoms including fever, chills and malaise.     Objective: BP (!) 150/71   Pulse 86   Temp 96.1 °F (35.6 °C) (Temporal)   Resp 16     PHYSICAL EXAM      General: The patient is in no acute distress. Mental status:  Patient is appropriate, is  oriented to place and plan of care. Dermatologic exam: Visual inspection of the periwound reveals the skin to be dry, coarse, scaly and edematous. Wound exam:  see wound description below     All active wounds listed below with today's date are evaluated      Wound 12/22/20 Buttocks Left;Medial #1 (onset 2 months) Left Medial Buttock (Active)   Wound Image   01/12/21 1358   Dressing Status New dressing applied 12/22/20 1632   Wound Cleansed Cleansed with saline 12/22/20 1557   Wound Length (cm) 1.3 cm 01/12/21 1358   Wound Width (cm) 1 cm 01/12/21 1358   Wound Depth (cm) 0.1 cm 01/12/21 1358   Wound Surface Area (cm^2) 1.3 cm^2 01/12/21 1358   Change in Wound Size % (l*w) -44.44 01/12/21 1358   Wound Volume (cm^3) 0.13 cm^3 01/12/21 1358   Wound Healing % -44 01/12/21 1358   Distance Tunneling (cm) 0 cm 01/12/21 1358   Tunneling Position ___ O'Clock 0 01/12/21 1358   Undermining Starts ___ O'Clock 0 01/12/21 1358   Undermining Ends___ O'Clock 0 01/12/21 1358   Undermining Maxium Distance (cm) 0 01/12/21 1358   Wound Assessment Granulation tissue;Pink/red 01/12/21 1358   Drainage Amount Large 01/12/21 1358   Drainage Description Serosanguinous 01/12/21 1358   Odor None 01/12/21 1358   Leatha-wound Assessment Fragile 01/12/21 1358   Margins Defined edges; Unattached edges 01/12/21 1358   Wound Thickness Description not for Pressure Injury Full thickness 01/12/21 1358   Number of days: 20       Wound 12/22/20 Leg Right; Lower #2 (onset 6 weeks) Right Lower Leg (Active)   Wound Image   01/12/21 1358   Dressing Status New dressing applied 12/22/20 1632   Wound Cleansed Soap and water 01/12/21 1358   Wound Length (cm) 1.8 cm 01/12/21 1358   Wound Width (cm) 1.2 cm 01/12/21 1358   Wound Depth (cm) 0.1 cm 01/12/21 1358 Wound Surface Area (cm^2) 2.16 cm^2 01/12/21 1358   Change in Wound Size % (l*w) -2300 01/12/21 1358   Wound Volume (cm^3) 0.22 cm^3 01/12/21 1358   Wound Healing % -2100 01/12/21 1358   Post-Procedure Length (cm) 0.3 cm 12/22/20 1613   Post-Procedure Width (cm) 0.3 cm 12/22/20 1613   Post-Procedure Depth (cm) 0.1 cm 12/22/20 1613   Post-Procedure Surface Area (cm^2) 0.09 cm^2 12/22/20 1613   Post-Procedure Volume (cm^3) 0.01 cm^3 12/22/20 1613   Distance Tunneling (cm) 0 cm 01/12/21 1358   Tunneling Position ___ O'Clock 0 01/12/21 1358   Undermining Starts ___ O'Clock 0 01/12/21 1358   Undermining Ends___ O'Clock 0 01/12/21 1358   Undermining Maxium Distance (cm) 0 01/12/21 1358   Wound Assessment Pink/red 01/12/21 1358   Drainage Amount Moderate 01/12/21 1358   Drainage Description Green;Serosanguinous 01/12/21 1358   Odor None 01/12/21 1358   Leatha-wound Assessment Edematous 01/12/21 1358   Margins Defined edges; Unattached edges 01/12/21 1358   Wound Thickness Description not for Pressure Injury Full thickness 01/12/21 1358   Number of days: 20       Wound 12/22/20 Leg Left; Lower #3 (onset 6 weeks) Left Lower Leg (Active)   Wound Image   01/12/21 1358   Dressing Status New dressing applied 12/22/20 1632   Wound Cleansed Soap and water 01/12/21 1358   Wound Length (cm) 10 cm 01/12/21 1358   Wound Width (cm) 8.9 cm 01/12/21 1358   Wound Depth (cm) 0.1 cm 01/12/21 1358   Wound Surface Area (cm^2) 89 cm^2 01/12/21 1358   Change in Wound Size % (l*w) -110.65 01/12/21 1358   Wound Volume (cm^3) 8.9 cm^3 01/12/21 1358   Wound Healing % -111 01/12/21 1358   Post-Procedure Length (cm) 10 cm 01/12/21 1433   Post-Procedure Width (cm) 8.9 cm 01/12/21 1433   Post-Procedure Depth (cm) 0.1 cm 01/12/21 1433   Post-Procedure Surface Area (cm^2) 89 cm^2 01/12/21 1433   Post-Procedure Volume (cm^3) 8.9 cm^3 01/12/21 1433   Distance Tunneling (cm) 0 cm 01/12/21 1358   Tunneling Position ___ O'Clock 0 01/12/21 1358 Undermining Starts ___ O'Clock 0 01/12/21 1358   Undermining Ends___ O'Clock 0 01/12/21 1358   Undermining Maxium Distance (cm) 0 01/12/21 1358   Wound Assessment Pink/red 01/12/21 1358   Drainage Amount Large 01/12/21 1358   Drainage Description Clear 01/12/21 1358   Odor None 01/12/21 1358   Leatha-wound Assessment Hyperpigmented 01/12/21 1358   Margins Unattached edges; Undefined edges 01/12/21 1358   Wound Thickness Description not for Pressure Injury Full thickness 01/12/21 1358   Number of days: 20       Assessment:       Problem List Items Addressed This Visit     WD-Idiopathic chronic venous hypertension of both lower extremities with ulcer and inflammation (HCC)    Relevant Medications    lidocaine (XYLOCAINE) 4 % external solution    gentamicin (GARAMYCIN) 0.1 % ointment (Start on 1/12/2021  3:00 PM)    mupirocin (BACTROBAN) 2 % ointment (Start on 1/12/2021  3:00 PM)    Other Relevant Orders    Supply: Wound Cleanser    Supply: Wound Dressings    Supply: Edema Control    WD-Non-pressure chronic ulcer right lower leg, 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 (HCC)    Relevant Medications    lidocaine (XYLOCAINE) 4 % external solution    gentamicin (GARAMYCIN) 0.1 % ointment (Start on 1/12/2021  3:00 PM)    mupirocin (BACTROBAN) 2 % ointment (Start on 1/12/2021  3:00 PM)    Other Relevant Orders    Supply: Wound Cleanser    Supply: Wound Dressings    Supply: Edema Control    WD-Arterial insufficiency of lower extremity (HCC) - Primary    Relevant Medications    lidocaine (XYLOCAINE) 4 % external solution    gentamicin (GARAMYCIN) 0.1 % ointment (Start on 1/12/2021  3:00 PM)    mupirocin (BACTROBAN) 2 % ointment (Start on 1/12/2021  3:00 PM)    Other Relevant Orders    Supply: Wound Cleanser    Supply: Wound Dressings    Supply: Edema Control    WD-Venous hypertension of both lower extremities    Relevant Medications    lidocaine (XYLOCAINE) 4 % external solution Continuing wound care orders and information:              Residence:               Continue home health care with: [facility] [place] [facility]              Your wound-care supplies will be provided by: . [facility]                            Wound cleansing:                           Do not scrub or use excessive force. Wash hands with soap and water before and after dressing changes. Prior to applying a clean dressing, cleanse wound with normal saline,                          wound cleanser, or mild soap and water. Ask your physician or nurse before getting the wound(s) wet in the shower. Daily Wound management:                          Keep weight off wounds and reposition every 2 hours. Avoid standing for long periods of time. Apply wraps/stockings in AM and remove at bedtime. Elevate legs to the level of the heart or above for 30 minutes 4-5 times a day and/or when sitting. When taking antibiotics take entire prescription as ordered by MD do not stop taking until medicine is all gone.                                                                 Orders for this week (1/12/21): Bilateral lower legs- Wash with mild soap and water. Wash with alpha bath A&D to intact skin. Apply bactroban and gentamycin to wound bed. Cover draining areas with calcium alginate, and Sofsorb, wrap with conform and secure with tape. Wrap with ace bandages from toes to knees. .  Change Every other day. Buttock wound- Wash with mild soap and water. Apply Mepilex border, change every 3 days. Or as needed.      Oral antibiotic sent to [facility]- please  and begin taking as soon as possible.     Follow up with Jenny/Robbie for arterial and venous workup as scheduled    Follow up with Bharati Valentine CNP in 1 weeks in the wound care center  Call [phone] for any questions or concerns. Date__________   Time____________        Treatment Note Wound 12/22/20 Leg Right; Lower #2 (onset 6 weeks) Right Lower Leg-Dressing/Treatment: (a&d int.skin,bact/gent to bed, ca alg,softsorb,conform,ace )  Wound 12/22/20 Leg Left; Lower #3 (onset 6 weeks) Left Lower Leg-Dressing/Treatment: (a&d int.skin,bact/gent to bed, ca alg,softsorb,conform,ace )  Wound 12/22/20 Buttocks Left;Medial #1 (onset 2 months) Left Medial Buttock-Dressing/Treatment: (meplix border )    Written Patient Dismissal Instructions Given            Electronically signed by TREVON Pearl CNP on 1/12/2021 at 2:57 PM

## 2021-01-17 LAB
CULTURE: ABNORMAL
CULTURE: ABNORMAL
Lab: ABNORMAL
SPECIMEN: ABNORMAL

## 2021-01-19 ENCOUNTER — HOSPITAL ENCOUNTER (OUTPATIENT)
Dept: WOUND CARE | Age: 86
Discharge: HOME | End: 2021-01-19
Payer: MEDICARE

## 2021-01-19 VITALS
SYSTOLIC BLOOD PRESSURE: 126 MMHG | TEMPERATURE: 96.5 F | RESPIRATION RATE: 15 BRPM | DIASTOLIC BLOOD PRESSURE: 51 MMHG | HEART RATE: 89 BPM

## 2021-01-19 DIAGNOSIS — I73.9 PVD (PERIPHERAL VASCULAR DISEASE) (HCC): ICD-10-CM

## 2021-01-19 DIAGNOSIS — L97.929 IDIOPATHIC CHRONIC VENOUS HYPERTENSION OF BOTH LOWER EXTREMITIES WITH ULCER AND INFLAMMATION (HCC): ICD-10-CM

## 2021-01-19 DIAGNOSIS — I87.303 VENOUS HYPERTENSION OF BOTH LOWER EXTREMITIES: Primary | ICD-10-CM

## 2021-01-19 DIAGNOSIS — L97.911 NON-PRESSURE CHRONIC ULCER RIGHT LOWER LEG, LIMITED TO BREAKDOWN SKIN (HCC): ICD-10-CM

## 2021-01-19 DIAGNOSIS — I87.333 IDIOPATHIC CHRONIC VENOUS HYPERTENSION OF BOTH LOWER EXTREMITIES WITH ULCER AND INFLAMMATION (HCC): ICD-10-CM

## 2021-01-19 DIAGNOSIS — L97.919 IDIOPATHIC CHRONIC VENOUS HYPERTENSION OF BOTH LOWER EXTREMITIES WITH ULCER AND INFLAMMATION (HCC): ICD-10-CM

## 2021-01-19 PROCEDURE — 11042 DBRDMT SUBQ TIS 1ST 20SQCM/<: CPT

## 2021-01-19 PROCEDURE — 11042 DBRDMT SUBQ TIS 1ST 20SQCM/<: CPT | Performed by: NURSE PRACTITIONER

## 2021-01-19 RX ORDER — LIDOCAINE HYDROCHLORIDE 20 MG/ML
JELLY TOPICAL ONCE
Status: CANCELLED | OUTPATIENT
Start: 2021-01-19 | End: 2021-01-19

## 2021-01-19 RX ORDER — LIDOCAINE 50 MG/G
OINTMENT TOPICAL ONCE
Status: CANCELLED | OUTPATIENT
Start: 2021-01-19 | End: 2021-01-19

## 2021-01-19 RX ORDER — LIDOCAINE 40 MG/G
CREAM TOPICAL ONCE
Status: CANCELLED | OUTPATIENT
Start: 2021-01-19 | End: 2021-01-19

## 2021-01-19 RX ORDER — GINSENG 100 MG
CAPSULE ORAL ONCE
Status: CANCELLED | OUTPATIENT
Start: 2021-01-19 | End: 2021-01-19

## 2021-01-19 RX ORDER — BETAMETHASONE DIPROPIONATE 0.05 %
OINTMENT (GRAM) TOPICAL ONCE
Status: CANCELLED | OUTPATIENT
Start: 2021-01-19 | End: 2021-01-19

## 2021-01-19 RX ORDER — GENTAMICIN SULFATE 1 MG/G
OINTMENT TOPICAL ONCE
Status: DISCONTINUED | OUTPATIENT
Start: 2021-01-19 | End: 2021-01-20 | Stop reason: HOSPADM

## 2021-01-19 RX ORDER — BACITRACIN ZINC AND POLYMYXIN B SULFATE 500; 1000 [USP'U]/G; [USP'U]/G
OINTMENT TOPICAL ONCE
Status: CANCELLED | OUTPATIENT
Start: 2021-01-19 | End: 2021-01-19

## 2021-01-19 RX ORDER — CLOBETASOL PROPIONATE 0.5 MG/G
OINTMENT TOPICAL ONCE
Status: CANCELLED | OUTPATIENT
Start: 2021-01-19 | End: 2021-01-19

## 2021-01-19 RX ORDER — GENTAMICIN SULFATE 1 MG/G
OINTMENT TOPICAL ONCE
Status: CANCELLED | OUTPATIENT
Start: 2021-01-19 | End: 2021-01-19

## 2021-01-19 RX ORDER — LIDOCAINE HYDROCHLORIDE 40 MG/ML
SOLUTION TOPICAL ONCE
Status: CANCELLED | OUTPATIENT
Start: 2021-01-19 | End: 2021-01-19

## 2021-01-19 RX ORDER — BACITRACIN, NEOMYCIN, POLYMYXIN B 400; 3.5; 5 [USP'U]/G; MG/G; [USP'U]/G
OINTMENT TOPICAL ONCE
Status: CANCELLED | OUTPATIENT
Start: 2021-01-19 | End: 2021-01-19

## 2021-01-19 RX ORDER — LIDOCAINE HYDROCHLORIDE 40 MG/ML
SOLUTION TOPICAL ONCE
Status: DISCONTINUED | OUTPATIENT
Start: 2021-01-19 | End: 2021-01-20 | Stop reason: HOSPADM

## 2021-01-19 ASSESSMENT — PAIN DESCRIPTION - ORIENTATION: ORIENTATION: RIGHT;LEFT

## 2021-01-19 ASSESSMENT — PAIN DESCRIPTION - LOCATION: LOCATION: LEG

## 2021-01-19 NOTE — PROGRESS NOTES
Wound Care Center Progress Note With Procedure    Bharathi Rodas  AGE: 82 y.o. GENDER: female  : 1935  EPISODE DATE:  2021     Subjective:     Chief Complaint   Patient presents with    Wound Check         HISTORY of PRESENT ILLNESS         Nicci varela 82 y. o. female who presents to the [facility] for a visit for evaluation and treatment of Chronic venous, arterial, pressure and lymphedema  ulcer(s) of  R lower leg anterior, L lower leg lateral, Buttocks L gluteal.  The condition is of moderate severity. The ulcer has been present for 2 years. Pattie Cedeño underlying cause is thought to be PVD, pressure.  The patients care to date has included home wound care with ointments, padding, and wraps. This was ordered by the hospital. The patient has significant underlying medical conditions as below.   Patient has significant renal and heart history, and has had swelling and wounds on legs for 2 years. She is a diabetic, but takes no medication and does not check her BG. She only watches her diet. She is not on a blood thinner, and she is not a smoker. She currently gets home wound care through [employer]. She has a history of cellulitis. She is currently on a 10 day keflex course, and finishes tomorrow.  They report improvement with abx.   Wound Pain Timing/Severity: waxing and waning  Quality of pain: aching, burning, tender, pressure  Severity of pain:  2 / 10   Modifying Factors: edema, venous stasis, lymphedema, diabetes, chronic pressure, decreased mobility, obesity and arterial insufficiency  Associated Signs/Symptoms: edema, erythema, drainage and pain        PAST MEDICAL HISTORY        Diagnosis Date    Asthma     Chronic kidney disease     acute kidney failure    Chronic ulcer of left leg with fat layer exposed ([address] 3/7/2016    Chronic ulcer of right leg with fat layer exposed ([address] 3/7/2016    Diabetes type 2, controlled ([address]     History of asthma  History of blood transfusion 07/2015    Hypertension     Lymphedema of both lower extremities 3/7/2016    Osteoarthritis     Pneumonia     Thyroid disease     Venous stasis of both lower extremities 3/7/2016    WD-Non-pressure chronic ulcer right lower leg, 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 ([address] 4/21/2016       PAST SURGICAL HISTORY    Past Surgical History:   Procedure Laterality Date    APPENDECTOMY      CHOLECYSTECTOMY      CYSTOSCOPY      EYE SURGERY      bilateral implants    HYSTERECTOMY      JOINT REPLACEMENT Right     knee    PACEMAKER INSERTION N/A 11/17/2020    PACEMAKER INSERTION PERMANENT REMOVAL OF TEMPORARY PACEMAKER performed by Calista Loera MD at [address]    Family History   Problem Relation Age of Onset    Heart Disease Father        SOCIAL HISTORY    Social History     Tobacco Use    Smoking status: Never Smoker    Smokeless tobacco: Never Used   Substance Use Topics    Alcohol use: No    Drug use: No       ALLERGIES    Allergies   Allergen Reactions    Latex Rash    Dye [Iodides] Anaphylaxis    Iodine Anaphylaxis    Dyazide [Hydrochlorothiazide W-Triamterene] Rash    Lasix [Furosemide] Rash    Procardia [Nifedipine] Other (See Comments)     Not for sure if rash occurred or rash    Doxycycline Dermatitis    Edecrin [Ethacrynic Acid]     Levaquin [Levofloxacin] Other (See Comments)     unknown    Mobic [Meloxicam]     Vioxx [Rofecoxib]     Celebrex [Celecoxib] Rash    Lexapro [Escitalopram Oxalate] Rash    Sulfa Antibiotics Hives       MEDICATIONS    Current Outpatient Medications on File Prior to Encounter   Medication Sig Dispense Refill    febuxostat (ULORIC) 40 MG TABS tablet Take 1 tablet by mouth daily 30 tablet 5    torsemide (DEMADEX) 20 MG tablet Take 1 tablet by mouth 3 times daily Pt to take 40 mg in am and 20 mg in the afternoon 90 tablet 3  docusate sodium (COLACE) 100 MG capsule Take 1 capsule by mouth daily 30 capsule 3    bumetanide (BUMEX) 1 MG tablet Take 1 tablet by mouth 2 times daily 30 tablet 3    cilostazol (PLETAL) 50 MG tablet Take 1 tablet by mouth 2 times daily 60 tablet 3    rOPINIRole (REQUIP) 0.5 MG tablet Take 1 tablet by mouth 3 times daily 90 tablet 3    potassium chloride (KLOR-CON M) 20 MEQ extended release tablet Take 1 tablet by mouth 2 times daily 180 tablet 0    metoprolol succinate (TOPROL XL) 25 MG extended release tablet Take 1 tablet by mouth daily 30 tablet 3    silver sulfADIAZINE (SSD) 1 % cream APPLY  CREAM TOPICALLY DAILY 240 g 0    famotidine (PEPCID) 20 MG tablet Take 1 tablet by mouth 2 times daily 60 tablet 5    levothyroxine (SYNTHROID) 50 MCG tablet Take 50 mcg by mouth Daily      Polyethylene Glycol 3350 (MIRALAX PO) Take by mouth      triamcinolone (NASACORT ALLERGY 24HR) 55 MCG/ACT nasal inhaler 2 sprays by Nasal route 2 times daily      fluticasone (FLONASE) 50 MCG/ACT nasal spray       clotrimazole-betamethasone (LOTRISONE) 1-0.05 % cream Apply topically 2 times daily. 1 Tube 0    Loratadine (CLARITIN) 10 MG CAPS Take 10 mg by mouth daily      ferrous sulfate 325 (65 FE) MG tablet Take 1 tablet by mouth 2 times daily (with meals) 30 tablet 3    latanoprost (XALATAN) 0.005 % ophthalmic solution Place 1 drop into both eyes nightly      OXYGEN Inhale 2 L/min into the lungs nightly      Multiple Vitamins-Minerals (ICAPS) CAPS Take  by mouth.  acetaminophen (TYLENOL) 500 MG tablet Take 500 mg by mouth every 6 hours as needed for Pain.  calcium carbonate 600 MG TABS tablet Take 1 tablet by mouth daily. No current facility-administered medications on file prior to encounter. REVIEW OF SYSTEMS    Pertinent items are noted in HPI. Constitutional: Negative for systemic symptoms including fever, chills and malaise.       Objective: BP (!) 126/51   Pulse 89   Temp 96.5 °F (35.8 °C) (Temporal)   Resp 15     PHYSICAL EXAM      General: The patient is in no acute distress. Mental status:  Patient is appropriate, is  oriented to place and plan of care.   Dermatologic exam: Visual inspection of the periwound reveals the skin to be moist, clammy and edematous  Wound exam: see wound description below in procedure note      Assessment:     Problem List Items Addressed This Visit     WD-Idiopathic chronic venous hypertension of both lower extremities with ulcer and inflammation (HCC)    Relevant Medications    lidocaine (XYLOCAINE) 4 % external solution    gentamicin (GARAMYCIN) 0.1 % ointment (Start on 1/19/2021  3:00 PM)    Other Relevant Orders    Supply: Wound Cleanser    Supply: Wound Dressings    Supply: Cover and Secure    Supply: Edema Control    WD-Non-pressure chronic ulcer right lower leg, 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 (HCC)    Relevant Medications    lidocaine (XYLOCAINE) 4 % external solution    gentamicin (GARAMYCIN) 0.1 % ointment (Start on 1/19/2021  3:00 PM)    Other Relevant Orders    Supply: Wound Cleanser    Supply: Wound Dressings    Supply: Cover and Secure    Supply: Edema Control    WD-Arterial insufficiency of lower extremity (HCC) - Primary    Relevant Medications    lidocaine (XYLOCAINE) 4 % external solution    gentamicin (GARAMYCIN) 0.1 % ointment (Start on 1/19/2021  3:00 PM)    Other Relevant Orders    Supply: Wound Cleanser    Supply: Wound Dressings    Supply: Cover and Secure    Supply: Edema Control    WD-Venous hypertension of both lower extremities    Relevant Medications    lidocaine (XYLOCAINE) 4 % external solution    gentamicin (GARAMYCIN) 0.1 % ointment (Start on 1/19/2021  3:00 PM)    Other Relevant Orders    Supply: Wound Cleanser    Supply: Wound Dressings    Supply: Cover and Secure    Supply: Edema Control    WD-PVD (peripheral vascular disease) (HCC)    Relevant Medications Drainage Description Serosanguinous 01/12/21 1358   Odor None 01/12/21 1358   Leatha-wound Assessment Fragile 01/12/21 1358   Margins Defined edges; Unattached edges 01/12/21 1358   Wound Thickness Description not for Pressure Injury Full thickness 01/12/21 1358   Number of days: 27       Wound 12/22/20 Leg Right; Lower #2 (onset 6 weeks) Right Lower Leg (Active)   Wound Image   01/12/21 1358   Dressing Status New dressing applied 12/22/20 1632   Wound Cleansed Soap and water 01/19/21 1419   Wound Length (cm) 2.7 cm 01/19/21 1419   Wound Width (cm) 1.2 cm 01/19/21 1419   Wound Depth (cm) 0.1 cm 01/19/21 1419   Wound Surface Area (cm^2) 3.24 cm^2 01/19/21 1419   Change in Wound Size % (l*w) -3500 01/19/21 1419   Wound Volume (cm^3) 0.32 cm^3 01/19/21 1419   Wound Healing % -3100 01/19/21 1419   Post-Procedure Length (cm) 2.7 cm 01/19/21 1439   Post-Procedure Width (cm) 1.2 cm 01/19/21 1439   Post-Procedure Depth (cm) 0.1 cm 01/19/21 1439   Post-Procedure Surface Area (cm^2) 3.24 cm^2 01/19/21 1439   Post-Procedure Volume (cm^3) 0.32 cm^3 01/19/21 1439   Distance Tunneling (cm) 0 cm 01/19/21 1419   Tunneling Position ___ O'Clock 0 01/19/21 1419   Undermining Starts ___ O'Clock 0 01/19/21 1419   Undermining Ends___ O'Clock 0 01/19/21 1419   Undermining Maxium Distance (cm) 0 01/19/21 1419   Wound Assessment Pink/red 01/19/21 1419   Drainage Amount Moderate 01/19/21 1419   Drainage Description Serosanguinous 01/19/21 1419   Odor None 01/19/21 1419   Leatha-wound Assessment Edematous 01/19/21 1419   Margins Defined edges 01/19/21 1419   Wound Thickness Description not for Pressure Injury Full thickness 01/19/21 1419   Number of days: 27       Wound 12/22/20 Leg Left; Lower #3 (onset 6 weeks) Left Lower Leg (Active)   Wound Image   01/12/21 1358   Dressing Status New dressing applied 12/22/20 1632   Wound Cleansed Soap and water 01/19/21 1419   Wound Length (cm) 5.5 cm 01/19/21 1419   Wound Width (cm) 5 cm 01/19/21 1419 Wound Depth (cm) 0.1 cm 01/19/21 1419   Wound Surface Area (cm^2) 27.5 cm^2 01/19/21 1419   Change in Wound Size % (l*w) 34.91 01/19/21 1419   Wound Volume (cm^3) 2.75 cm^3 01/19/21 1419   Wound Healing % 35 01/19/21 1419   Post-Procedure Length (cm) 5.5 cm 01/19/21 1439   Post-Procedure Width (cm) 5 cm 01/19/21 1439   Post-Procedure Depth (cm) 0.1 cm 01/19/21 1439   Post-Procedure Surface Area (cm^2) 27.5 cm^2 01/19/21 1439   Post-Procedure Volume (cm^3) 2.75 cm^3 01/19/21 1439   Distance Tunneling (cm) 0 cm 01/19/21 1419   Tunneling Position ___ O'Clock 0 01/19/21 1419   Undermining Starts ___ O'Clock 0 01/19/21 1419   Undermining Ends___ O'Clock 0 01/19/21 1419   Undermining Maxium Distance (cm) 0 01/19/21 1419   Wound Assessment Pink/red 01/19/21 1419   Drainage Amount Large 01/19/21 1419   Drainage Description Clear;Green 01/19/21 1419   Odor None 01/19/21 1419   Leatha-wound Assessment Hyperpigmented 01/19/21 1419   Margins Defined edges 01/19/21 1419   Wound Thickness Description not for Pressure Injury Full thickness 01/19/21 1419   Number of days: 27         Total  Area  Debrided:  20 sq cm     Bleeding:  Minimal    Hemostasis Achieved:  by pressure    Procedural Pain:  0  / 10     Post Procedural Pain:  0 / 10     Response to treatment:  Well tolerated by patient. Status of wound progress and description from last visit:   Improving. Patient has had both her venous and arterial scans. She will see Dr. Grabiel Miranda here in clinic next week for possible venous interventions. Based on her arterail scans, which were unremarkable, we can start her on Coban lites, with a nurse visit Thursday. If she can tolerate, these will stay on until next Wednesday.                         YONAS taking antibiotics take entire prescription as ordered by MD do not stop taking until medicine is all gone.                                                                 Orders for this week (1/19/21):              Bilateral lower legs- Wash with mild soap and water. Wash with alpha bath   A&D to intact skin. Apply thickly  gentamycin to wound bed. Cover draining areas with calcium alginate, and kerrmax,   wrap with coban 2 lite and secure with tape. Wrap with ace bandages from toes to knees. .    Change Every other day.                  Buttock wound- Wash with mild soap and water. Apply Mepilex border,   change every 3 days or as needed. Rx: Roverto Sequeira    -Nurse Visit: 1/21/21  -Follow up Dieter at wound clinic Tuesday Jan 26th for arterial  workup as scheduled  -Follow up with Heidy Wray CNP in 2 weeks in the wound care center  Call [phone] for any questions or concerns.   Date__________   Time____________        Treatment Note      Written Patient Dismissal Instructions Given            Electronically signed by TREVON Chairez CNP on 1/19/2021 at 2:51 PM

## 2021-01-19 NOTE — PROGRESS NOTES
Multilayer Compression Wrap   (Not Unna) Below the Knee    NAME:  Ki Lim OF BIRTH:  1935  MEDICAL RECORD NUMBER:  [id number]  DATE:  1/19/2021    Multilayer compression wrap: Applied moisturizing agent to dry skin as needed. Applied primary and secondary dressing as ordered. Applied multilayered dressing below the knee to right lower leg. Applied multilayered dressing below the knee to left lower leg. Instructed patient/caregiver not to remove dressing and to keep it clean and dry. Instructed patient/caregiver on complications to report to provider, such as pain, numbness in toes, heavy drainage, and slippage of dressing. Instructed patient on purpose of compression dressing and on activity and exercise recommendations.       Electronically signed by Guido Little RN on 1/19/2021 at 3:00 PM

## 2021-01-26 ENCOUNTER — HOSPITAL ENCOUNTER (OUTPATIENT)
Dept: WOUND CARE | Age: 86
Discharge: HOME | End: 2021-01-26
Payer: MEDICARE

## 2021-01-26 VITALS
SYSTOLIC BLOOD PRESSURE: 167 MMHG | RESPIRATION RATE: 16 BRPM | TEMPERATURE: 96 F | DIASTOLIC BLOOD PRESSURE: 68 MMHG | HEART RATE: 87 BPM

## 2021-01-26 DIAGNOSIS — I73.9 PVD (PERIPHERAL VASCULAR DISEASE) (HCC): ICD-10-CM

## 2021-01-26 DIAGNOSIS — L97.919 IDIOPATHIC CHRONIC VENOUS HYPERTENSION OF BOTH LOWER EXTREMITIES WITH ULCER AND INFLAMMATION (HCC): ICD-10-CM

## 2021-01-26 DIAGNOSIS — I87.333 IDIOPATHIC CHRONIC VENOUS HYPERTENSION OF BOTH LOWER EXTREMITIES WITH ULCER AND INFLAMMATION (HCC): ICD-10-CM

## 2021-01-26 DIAGNOSIS — L97.929 IDIOPATHIC CHRONIC VENOUS HYPERTENSION OF BOTH LOWER EXTREMITIES WITH ULCER AND INFLAMMATION (HCC): ICD-10-CM

## 2021-01-26 DIAGNOSIS — I87.303 VENOUS HYPERTENSION OF BOTH LOWER EXTREMITIES: Primary | ICD-10-CM

## 2021-01-26 DIAGNOSIS — L97.911 NON-PRESSURE CHRONIC ULCER RIGHT LOWER LEG, LIMITED TO BREAKDOWN SKIN (HCC): ICD-10-CM

## 2021-01-26 PROCEDURE — 99213 OFFICE O/P EST LOW 20 MIN: CPT

## 2021-01-26 RX ORDER — GENTAMICIN SULFATE 1 MG/G
OINTMENT TOPICAL ONCE
Status: CANCELLED | OUTPATIENT
Start: 2021-01-26 | End: 2021-01-26

## 2021-01-26 RX ORDER — LIDOCAINE HYDROCHLORIDE 40 MG/ML
SOLUTION TOPICAL ONCE
Status: DISCONTINUED | OUTPATIENT
Start: 2021-01-26 | End: 2021-01-27 | Stop reason: HOSPADM

## 2021-01-26 RX ORDER — LIDOCAINE 50 MG/G
OINTMENT TOPICAL ONCE
Status: CANCELLED | OUTPATIENT
Start: 2021-01-26 | End: 2021-01-26

## 2021-01-26 RX ORDER — CLOBETASOL PROPIONATE 0.5 MG/G
OINTMENT TOPICAL ONCE
Status: CANCELLED | OUTPATIENT
Start: 2021-01-26 | End: 2021-01-26

## 2021-01-26 RX ORDER — LIDOCAINE HYDROCHLORIDE 20 MG/ML
JELLY TOPICAL ONCE
Status: CANCELLED | OUTPATIENT
Start: 2021-01-26 | End: 2021-01-26

## 2021-01-26 RX ORDER — GINSENG 100 MG
CAPSULE ORAL ONCE
Status: CANCELLED | OUTPATIENT
Start: 2021-01-26 | End: 2021-01-26

## 2021-01-26 RX ORDER — BETAMETHASONE DIPROPIONATE 0.05 %
OINTMENT (GRAM) TOPICAL ONCE
Status: CANCELLED | OUTPATIENT
Start: 2021-01-26 | End: 2021-01-26

## 2021-01-26 RX ORDER — LIDOCAINE HYDROCHLORIDE 40 MG/ML
SOLUTION TOPICAL ONCE
Status: CANCELLED | OUTPATIENT
Start: 2021-01-26 | End: 2021-01-26

## 2021-01-26 RX ORDER — LIDOCAINE 40 MG/G
CREAM TOPICAL ONCE
Status: CANCELLED | OUTPATIENT
Start: 2021-01-26 | End: 2021-01-26

## 2021-01-26 RX ORDER — BACITRACIN ZINC AND POLYMYXIN B SULFATE 500; 1000 [USP'U]/G; [USP'U]/G
OINTMENT TOPICAL ONCE
Status: CANCELLED | OUTPATIENT
Start: 2021-01-26 | End: 2021-01-26

## 2021-01-26 RX ORDER — BACITRACIN, NEOMYCIN, POLYMYXIN B 400; 3.5; 5 [USP'U]/G; MG/G; [USP'U]/G
OINTMENT TOPICAL ONCE
Status: CANCELLED | OUTPATIENT
Start: 2021-01-26 | End: 2021-01-26

## 2021-01-26 ASSESSMENT — PAIN DESCRIPTION - DESCRIPTORS: DESCRIPTORS: THROBBING

## 2021-01-26 ASSESSMENT — PAIN SCALES - GENERAL: PAINLEVEL_OUTOF10: 7

## 2021-01-26 ASSESSMENT — PAIN DESCRIPTION - ONSET: ONSET: ON-GOING

## 2021-01-26 ASSESSMENT — PAIN DESCRIPTION - FREQUENCY: FREQUENCY: CONTINUOUS

## 2021-01-26 NOTE — PLAN OF CARE
Problem: Pain:  Goal: Pain level will decrease  Description: Pain level will decrease  Outcome: Ongoing  Goal: Control of acute pain  Description: Control of acute pain  Outcome: Ongoing  Goal: Control of chronic pain  Description: Control of chronic pain  Outcome: Ongoing     Problem: Wound:  Goal: Will show signs of wound healing; wound closure and no evidence of infection  Description: Will show signs of wound healing; wound closure and no evidence of infection  Outcome: Ongoing     Problem: Wound:  Intervention: Assess ankle, calf, or foot circumference blilaterally  Note: See flowsheet  Intervention: Assess pain status  Note: See flowsheet  Intervention: Assess wound size, appearance and drainage  Note: See flowsheet  Intervention: Assess pedal pulses bilaterally if patient has a foot or leg ulcer  Note: See flowsheet  Intervention: Doppler if unable to palpate pedal pulse  Note: See flowsheet

## 2021-01-26 NOTE — PROGRESS NOTES
Wound Care Center Progress Note       Cristhian Ochoa  AGE: 82 y.o. GENDER: female  : 1935  TODAY'S DATE:  2021        Subjective:     Chief Complaint   Patient presents with    Wound Check         HISTORY of PRESENT ILLNESS     Cristhian Ochoa is a 82 y.o. female who presents today for wound evaluation of Chronic lymphedema ulcer(s) of bilateral lower legs. The ulcer is of mild severity. The underlying cause of the wound is lymphedema. Arterial and venous U/S were negative for PAD and CVI. Ulcerations have decreased in size.   Wound Pain Timing/Severity: intermittent, moderate  Quality of pain: tender, pressure  Severity of pain:  3 / 10   Modifying Factors: lymphedema and obesity  Associated Signs/Symptoms: edema, erythema, drainage and pain        PAST MEDICAL HISTORY        Diagnosis Date    Asthma     Chronic kidney disease     acute kidney failure    Chronic ulcer of left leg with fat layer exposed ([address] 3/7/2016    Chronic ulcer of right leg with fat layer exposed ([address] 3/7/2016    Diabetes type 2, controlled ([address]     History of asthma     History of blood transfusion 2015    Hypertension     Lymphedema of both lower extremities 3/7/2016    Osteoarthritis     Pneumonia     Thyroid disease     Venous stasis of both lower extremities 3/7/2016    WD-Non-pressure chronic ulcer right lower leg, 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 ([address] 2016       PAST SURGICAL HISTORY    Past Surgical History:   Procedure Laterality Date    APPENDECTOMY      CHOLECYSTECTOMY      CYSTOSCOPY      EYE SURGERY      bilateral implants    HYSTERECTOMY      JOINT REPLACEMENT Right     knee    PACEMAKER INSERTION N/A 2020    PACEMAKER INSERTION PERMANENT REMOVAL OF TEMPORARY PACEMAKER performed by Mahendra Mayorga MD at [address]    Family History   Problem Relation Age of Onset    Heart Disease Father        SOCIAL HISTORY    Social History Tobacco Use    Smoking status: Never Smoker    Smokeless tobacco: Never Used   Substance Use Topics    Alcohol use: No    Drug use: No       ALLERGIES    Allergies   Allergen Reactions    Latex Rash    Dye [Iodides] Anaphylaxis    Iodine Anaphylaxis    Dyazide [Hydrochlorothiazide W-Triamterene] Rash    Lasix [Furosemide] Rash    Procardia [Nifedipine] Other (See Comments)     Not for sure if rash occurred or rash    Doxycycline Dermatitis    Edecrin [Ethacrynic Acid]     Levaquin [Levofloxacin] Other (See Comments)     unknown    Mobic [Meloxicam]     Vioxx [Rofecoxib]     Celebrex [Celecoxib] Rash    Lexapro [Escitalopram Oxalate] Rash    Sulfa Antibiotics Hives       MEDICATIONS    Current Outpatient Medications on File Prior to Encounter   Medication Sig Dispense Refill    febuxostat (ULORIC) 40 MG TABS tablet Take 1 tablet by mouth daily 30 tablet 5    torsemide (DEMADEX) 20 MG tablet Take 1 tablet by mouth 3 times daily Pt to take 40 mg in am and 20 mg in the afternoon 90 tablet 3    docusate sodium (COLACE) 100 MG capsule Take 1 capsule by mouth daily 30 capsule 3    bumetanide (BUMEX) 1 MG tablet Take 1 tablet by mouth 2 times daily 30 tablet 3    cilostazol (PLETAL) 50 MG tablet Take 1 tablet by mouth 2 times daily 60 tablet 3    rOPINIRole (REQUIP) 0.5 MG tablet Take 1 tablet by mouth 3 times daily 90 tablet 3    potassium chloride (KLOR-CON M) 20 MEQ extended release tablet Take 1 tablet by mouth 2 times daily 180 tablet 0    metoprolol succinate (TOPROL XL) 25 MG extended release tablet Take 1 tablet by mouth daily 30 tablet 3    silver sulfADIAZINE (SSD) 1 % cream APPLY  CREAM TOPICALLY DAILY 240 g 0    famotidine (PEPCID) 20 MG tablet Take 1 tablet by mouth 2 times daily 60 tablet 5    levothyroxine (SYNTHROID) 50 MCG tablet Take 50 mcg by mouth Daily      Polyethylene Glycol 3350 (MIRALAX PO) Take by mouth  triamcinolone (NASACORT ALLERGY 24HR) 55 MCG/ACT nasal inhaler 2 sprays by Nasal route 2 times daily      fluticasone (FLONASE) 50 MCG/ACT nasal spray       clotrimazole-betamethasone (LOTRISONE) 1-0.05 % cream Apply topically 2 times daily. 1 Tube 0    Loratadine (CLARITIN) 10 MG CAPS Take 10 mg by mouth daily      ferrous sulfate 325 (65 FE) MG tablet Take 1 tablet by mouth 2 times daily (with meals) 30 tablet 3    latanoprost (XALATAN) 0.005 % ophthalmic solution Place 1 drop into both eyes nightly      OXYGEN Inhale 2 L/min into the lungs nightly      Multiple Vitamins-Minerals (ICAPS) CAPS Take  by mouth.  acetaminophen (TYLENOL) 500 MG tablet Take 500 mg by mouth every 6 hours as needed for Pain.  calcium carbonate 600 MG TABS tablet Take 1 tablet by mouth daily. No current facility-administered medications on file prior to encounter. REVIEW OF SYSTEMS    Pertinent items are noted in HPI. Constitutional: Negative for systemic symptoms including fever, chills and malaise. Objective:      BP (!) 167/68   Pulse 87   Temp 96 °F (35.6 °C) (Temporal)   Resp 16     PHYSICAL EXAM      General: The patient is in no acute distress. Mental status:  Patient is appropriate, is  oriented to place and plan of care. Dermatologic exam: Visual inspection of the periwound reveals the skin to be moist and edematous.   Wound exam:  see wound description below     All active wounds listed below with today's date are evaluated      Wound 12/22/20 Buttocks Left;Medial #1 (onset 2 months) Left Medial Buttock (Active)   Wound Image   01/12/21 1358   Dressing Status New dressing applied 12/22/20 1632   Wound Cleansed Cleansed with saline 12/22/20 1557   Wound Length (cm) 1.3 cm 01/12/21 1358   Wound Width (cm) 1 cm 01/12/21 1358   Wound Depth (cm) 0.1 cm 01/12/21 1358   Wound Surface Area (cm^2) 1.3 cm^2 01/12/21 1358   Change in Wound Size % (l*w) -44.44 01/12/21 1358 Wound Volume (cm^3) 0.13 cm^3 01/12/21 1358   Wound Healing % -44 01/12/21 1358   Distance Tunneling (cm) 0 cm 01/12/21 1358   Tunneling Position ___ O'Clock 0 01/12/21 1358   Undermining Starts ___ O'Clock 0 01/12/21 1358   Undermining Ends___ O'Clock 0 01/12/21 1358   Undermining Maxium Distance (cm) 0 01/12/21 1358   Wound Assessment Granulation tissue;Pink/red 01/12/21 1358   Drainage Amount Large 01/12/21 1358   Drainage Description Serosanguinous 01/12/21 1358   Odor None 01/12/21 1358   Leatha-wound Assessment Fragile 01/12/21 1358   Margins Defined edges; Unattached edges 01/12/21 1358   Wound Thickness Description not for Pressure Injury Full thickness 01/12/21 1358   Number of days: 34       Wound 12/22/20 Leg Right; Lower #2 (onset 6 weeks) Right Lower Leg (Active)   Wound Image   01/26/21 1117   Dressing Status Clean;Dry; Intact; New dressing applied 01/26/21 1146   Wound Cleansed Soap and water 01/26/21 1117   Dressing/Treatment Alginate;Moisturizing cream 01/26/21 1146   Wound Length (cm) 0 cm 01/26/21 1117   Wound Width (cm) 0 cm 01/26/21 1117   Wound Depth (cm) 0 cm 01/26/21 1117   Wound Surface Area (cm^2) 0 cm^2 01/26/21 1117   Change in Wound Size % (l*w) 100 01/26/21 1117   Wound Volume (cm^3) 0 cm^3 01/26/21 1117   Wound Healing % 100 01/26/21 1117   Post-Procedure Length (cm) 2.7 cm 01/19/21 1439   Post-Procedure Width (cm) 1.2 cm 01/19/21 1439   Post-Procedure Depth (cm) 0.1 cm 01/19/21 1439   Post-Procedure Surface Area (cm^2) 3.24 cm^2 01/19/21 1439   Post-Procedure Volume (cm^3) 0.32 cm^3 01/19/21 1439   Distance Tunneling (cm) 0 cm 01/26/21 1117   Tunneling Position ___ O'Clock 0 01/26/21 1117   Undermining Starts ___ O'Clock 0 01/26/21 1117   Undermining Ends___ O'Clock 0 01/26/21 1117   Undermining Maxium Distance (cm) 0 01/26/21 1117   Wound Assessment Pink/red 01/26/21 1117   Drainage Amount None 01/26/21 1117   Drainage Description Serosanguinous 01/19/21 1419   Odor None 01/26/21 1117 Leatha-wound Assessment Edematous 01/26/21 1117   Margins Defined edges 01/19/21 1419   Wound Thickness Description not for Pressure Injury Full thickness 01/19/21 1419   Number of days: 34       Wound 12/22/20 Leg Left; Lower #3 (onset 6 weeks) Left Lower Leg (Active)   Wound Image   01/26/21 1117   Dressing Status Clean;Dry; Intact; New dressing applied 01/26/21 1146   Wound Cleansed Soap and water 01/26/21 1117   Dressing/Treatment Alginate;Moisturizing cream 01/26/21 1146   Wound Length (cm) 5.5 cm 01/26/21 1117   Wound Width (cm) 4 cm 01/26/21 1117   Wound Depth (cm) 0.1 cm 01/26/21 1117   Wound Surface Area (cm^2) 22 cm^2 01/26/21 1117   Change in Wound Size % (l*w) 47.93 01/26/21 1117   Wound Volume (cm^3) 2.2 cm^3 01/26/21 1117   Wound Healing % 48 01/26/21 1117   Post-Procedure Length (cm) 5.5 cm 01/26/21 1134   Post-Procedure Width (cm) 4 cm 01/26/21 1134   Post-Procedure Depth (cm) 0.1 cm 01/26/21 1134   Post-Procedure Surface Area (cm^2) 22 cm^2 01/26/21 1134   Post-Procedure Volume (cm^3) 2.2 cm^3 01/26/21 1134   Distance Tunneling (cm) 0 cm 01/26/21 1117   Tunneling Position ___ O'Clock 0 01/26/21 1117   Undermining Starts ___ O'Clock 0 01/26/21 1117   Undermining Ends___ O'Clock 0 01/26/21 1117   Undermining Maxium Distance (cm) 0 01/26/21 1117   Wound Assessment Pink/red 01/26/21 1117   Drainage Amount Large 01/26/21 1117   Drainage Description Yellow;Green 01/26/21 1117   Odor None 01/26/21 1117   Leatha-wound Assessment Hyperpigmented 01/26/21 1117   Margins Defined edges 01/26/21 1117   Wound Thickness Description not for Pressure Injury Full thickness 01/26/21 1117   Number of days: 34       Assessment:       Problem List Items Addressed This Visit     WD-Idiopathic chronic venous hypertension of both lower extremities with ulcer and inflammation (HCC)    Relevant Medications    lidocaine (XYLOCAINE) 4 % external solution    Other Relevant Orders    Supply: Wound Cleanser    Supply: Leatha Wound Supply: Pack Wound    Supply: Cover and Secure    Supply: Edema Control    WD-Non-pressure chronic ulcer right lower leg, 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 ([facility])    Relevant Medications    lidocaine (XYLOCAINE) 4 % external solution    Other Relevant Orders    Supply: Wound Cleanser    Supply: Leatha Wound    Supply: Pack Wound    Supply: Cover and Secure    Supply: Edema Control    WD-Venous hypertension of both lower extremities - Primary    Relevant Medications    lidocaine (XYLOCAINE) 4 % external solution    Other Relevant Orders    Supply: Wound Cleanser    Supply: Leatha Wound    Supply: Pack Wound    Supply: Cover and Secure    Supply: Edema Control    WD-PVD (peripheral vascular disease) ([facility])    Relevant Medications    lidocaine (XYLOCAINE) 4 % external solution    Other Relevant Orders    Supply: Wound Cleanser    Supply: Leatha Wound    Supply: Pack Wound    Supply: Cover and Secure    Supply: Edema Control          Status of wound progress and description from last visit:   Moderately improved. Plan:     Discharge Instructions       PHYSICIAN ORDERS AND DISCHARGE INSTRUCTIONS     NOTE: Upon discharge from the [address], you will receive a patient experience survey. We would be grateful if you would take the time to fill this survey out.     Wound care order history:                 CYNTHIA's   Right       Left    Date               Vascular studies:  1/13/21 Arterial were okay, Venous- possibly needs intervention (reffered to [facility])              Cultures:  12/22/20              Antibiotics:                  HbA1c:                 Compression/Lymph Pumps:              Grafts:                  Continuing wound care orders and information:              Residence: Private              [facility] home health care with: [address]              Your wound-care supplies will be provided by: . [address]                            HNIKL cleansing:                           PV not scrub or use excessive force.                         WRSX hands with soap and water before and after dressing changes.                         Prior to applying a clean dressing, cleanse wound with normal saline,                          wound cleanser, or mild soap and water.                           Ask your physician or nurse before getting the wound(s) wet in the shower.              Daily Wound management:                          Keep weight off wounds and reposition every 2 hours.                          Avoid standing for long periods of time.                          Apply wraps/stockings in AM and remove at bedtime.                          Elevate legs to the level of the heart or above for 30 minutes 4-5 times a day and/or when sitting.                                               When taking antibiotics take entire prescription as ordered by MD do not stop taking until medicine is all gone.                              Orders for this week (1/19/21)   Bilateral lower legs- Wash with mild soap and water. Wash with alpha bath   A&D to intact skin. Apply thickly  gentamycin to wound bed. Cover draining areas with calcium alginate, and kerrmax, and secure with tape  wrap with tubi         [facility] to Change Every other day.       Rx: Shannon Elmore     -Nurse Visit: No  -Follow up with Emilee Guerrero CNP in 1 weeks in the wound care center  Call [phone] for any questions or concerns. Date__________   Time____________           Treatment Note Wound 12/22/20 Leg Left; Lower #3 (onset 6 weeks) Left Lower Leg-Dressing/Treatment: Alginate, Moisturizing cream(A&D, gentamicin, sherri alg, kerramax, conform, tape, tubi F,)  Wound 12/22/20 Leg Right; Lower #2 (onset 6 weeks) Right Lower Leg-Dressing/Treatment: Alginate, Moisturizing cream(A&D, gentamicin, sherri alg, kerramax, conform, tape, tubi F,)    Written Patient Dismissal Instructions Given            Electronically signed by Gary Oviedo MD on 1/26/2021 at 3:37 PM

## 2021-02-02 ENCOUNTER — HOSPITAL ENCOUNTER (OUTPATIENT)
Dept: WOUND CARE | Age: 86
Discharge: HOME | End: 2021-02-02
Payer: MEDICARE

## 2021-02-02 DIAGNOSIS — I89.0 LYMPHEDEMA OF BOTH LOWER EXTREMITIES: Primary | ICD-10-CM

## 2021-02-02 DIAGNOSIS — L89.323 PRESSURE INJURY OF LEFT BUTTOCK, STAGE 3 (HCC): ICD-10-CM

## 2021-02-02 PROCEDURE — 99213 OFFICE O/P EST LOW 20 MIN: CPT

## 2021-02-02 PROCEDURE — 99213 OFFICE O/P EST LOW 20 MIN: CPT | Performed by: NURSE PRACTITIONER

## 2021-02-02 RX ORDER — LIDOCAINE 40 MG/G
CREAM TOPICAL ONCE
Status: CANCELLED | OUTPATIENT
Start: 2021-02-02 | End: 2021-02-02

## 2021-02-02 RX ORDER — CLOTRIMAZOLE AND BETAMETHASONE DIPROPIONATE 10; .64 MG/G; MG/G
CREAM TOPICAL
Qty: 45 G | Refills: 1 | Status: SHIPPED | OUTPATIENT
Start: 2021-02-02 | End: 2021-08-06

## 2021-02-02 RX ORDER — BACITRACIN ZINC AND POLYMYXIN B SULFATE 500; 1000 [USP'U]/G; [USP'U]/G
OINTMENT TOPICAL ONCE
Status: CANCELLED | OUTPATIENT
Start: 2021-02-02 | End: 2021-02-02

## 2021-02-02 RX ORDER — LIDOCAINE HYDROCHLORIDE 40 MG/ML
SOLUTION TOPICAL ONCE
Status: CANCELLED | OUTPATIENT
Start: 2021-02-02 | End: 2021-02-02

## 2021-02-02 RX ORDER — LIDOCAINE 50 MG/G
OINTMENT TOPICAL ONCE
Status: CANCELLED | OUTPATIENT
Start: 2021-02-02 | End: 2021-02-02

## 2021-02-02 RX ORDER — GENTAMICIN SULFATE 1 MG/G
OINTMENT TOPICAL ONCE
Status: CANCELLED | OUTPATIENT
Start: 2021-02-02 | End: 2021-02-02

## 2021-02-02 RX ORDER — GENTAMICIN SULFATE 1 MG/G
OINTMENT TOPICAL ONCE
Status: DISCONTINUED | OUTPATIENT
Start: 2021-02-02 | End: 2021-02-03 | Stop reason: HOSPADM

## 2021-02-02 RX ORDER — CLOBETASOL PROPIONATE 0.5 MG/G
OINTMENT TOPICAL ONCE
Status: CANCELLED | OUTPATIENT
Start: 2021-02-02 | End: 2021-02-02

## 2021-02-02 RX ORDER — CLOBETASOL PROPIONATE 0.5 MG/G
OINTMENT TOPICAL ONCE
Status: DISCONTINUED | OUTPATIENT
Start: 2021-02-02 | End: 2021-02-03 | Stop reason: HOSPADM

## 2021-02-02 RX ORDER — GENTAMICIN SULFATE 1 MG/G
OINTMENT TOPICAL
Qty: 30 G | Refills: 1 | Status: SHIPPED | OUTPATIENT
Start: 2021-02-02 | End: 2021-02-09

## 2021-02-02 RX ORDER — BACITRACIN, NEOMYCIN, POLYMYXIN B 400; 3.5; 5 [USP'U]/G; MG/G; [USP'U]/G
OINTMENT TOPICAL ONCE
Status: CANCELLED | OUTPATIENT
Start: 2021-02-02 | End: 2021-02-02

## 2021-02-02 RX ORDER — BETAMETHASONE DIPROPIONATE 0.05 %
OINTMENT (GRAM) TOPICAL ONCE
Status: CANCELLED | OUTPATIENT
Start: 2021-02-02 | End: 2021-02-02

## 2021-02-02 RX ORDER — GINSENG 100 MG
CAPSULE ORAL ONCE
Status: CANCELLED | OUTPATIENT
Start: 2021-02-02 | End: 2021-02-02

## 2021-02-02 RX ORDER — LIDOCAINE HYDROCHLORIDE 20 MG/ML
JELLY TOPICAL ONCE
Status: CANCELLED | OUTPATIENT
Start: 2021-02-02 | End: 2021-02-02

## 2021-02-02 RX ORDER — LIDOCAINE HYDROCHLORIDE 40 MG/ML
SOLUTION TOPICAL ONCE
Status: DISCONTINUED | OUTPATIENT
Start: 2021-02-02 | End: 2021-02-03 | Stop reason: HOSPADM

## 2021-02-02 NOTE — PROGRESS NOTES
Wound Care Center Progress Note       Сергей Perry  AGE: 82 y.o. GENDER: female  : 1935  TODAY'S DATE:  2021        Subjective:     Chief Complaint   Patient presents with    Wound Check     BLE          HISTORY of PRESENT ILLNESS    Сергей Perry is a 82 y.o. female who presents today for wound evaluation of Chronic lymphedema ulcer(s) of bilateral lower legs. The ulcer is of mild severity. The underlying cause of the wound is lymphedema. Arterial and venous U/S were negative for PAD and CVI. Ulcerations have decreased in size.   Wound Pain Timing/Severity: intermittent, moderate  Quality of pain: tender, pressure  Severity of pain:  3 / 10   Modifying Factors: lymphedema and obesity  Associated Signs/Symptoms: edema, erythema, drainage and pain        PAST MEDICAL HISTORY        Diagnosis Date    Asthma     Chronic kidney disease     acute kidney failure    Chronic ulcer of left leg with fat layer exposed ([address] 3/7/2016    Chronic ulcer of right leg with fat layer exposed ([address] 3/7/2016    Diabetes type 2, controlled ([address]     History of asthma     History of blood transfusion 2015    Hypertension     Lymphedema of both lower extremities 3/7/2016    Osteoarthritis     Pneumonia     Thyroid disease     Venous stasis of both lower extremities 3/7/2016    WD-Non-pressure chronic ulcer right lower leg, 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 ([address] 2016       PAST SURGICAL HISTORY    Past Surgical History:   Procedure Laterality Date    APPENDECTOMY      CHOLECYSTECTOMY      CYSTOSCOPY      EYE SURGERY      bilateral implants    HYSTERECTOMY      JOINT REPLACEMENT Right     knee    PACEMAKER INSERTION N/A 2020    PACEMAKER INSERTION PERMANENT REMOVAL OF TEMPORARY PACEMAKER performed by Irina Beltre MD at [address]    Family History   Problem Relation Age of Onset    Heart Disease Father        SOCIAL HISTORY Social History     Tobacco Use    Smoking status: Never Smoker    Smokeless tobacco: Never Used   Substance Use Topics    Alcohol use: No    Drug use: No       ALLERGIES    Allergies   Allergen Reactions    Latex Rash    Dye [Iodides] Anaphylaxis    Iodine Anaphylaxis    Dyazide [Hydrochlorothiazide W-Triamterene] Rash    Lasix [Furosemide] Rash    Procardia [Nifedipine] Other (See Comments)     Not for sure if rash occurred or rash    Doxycycline Dermatitis    Edecrin [Ethacrynic Acid]     Levaquin [Levofloxacin] Other (See Comments)     unknown    Mobic [Meloxicam]     Vioxx [Rofecoxib]     Celebrex [Celecoxib] Rash    Lexapro [Escitalopram Oxalate] Rash    Sulfa Antibiotics Hives       MEDICATIONS    Current Outpatient Medications on File Prior to Encounter   Medication Sig Dispense Refill    febuxostat (ULORIC) 40 MG TABS tablet Take 1 tablet by mouth daily 30 tablet 5    torsemide (DEMADEX) 20 MG tablet Take 1 tablet by mouth 3 times daily Pt to take 40 mg in am and 20 mg in the afternoon 90 tablet 3    docusate sodium (COLACE) 100 MG capsule Take 1 capsule by mouth daily 30 capsule 3    bumetanide (BUMEX) 1 MG tablet Take 1 tablet by mouth 2 times daily 30 tablet 3    cilostazol (PLETAL) 50 MG tablet Take 1 tablet by mouth 2 times daily 60 tablet 3    rOPINIRole (REQUIP) 0.5 MG tablet Take 1 tablet by mouth 3 times daily 90 tablet 3    potassium chloride (KLOR-CON M) 20 MEQ extended release tablet Take 1 tablet by mouth 2 times daily 180 tablet 0    metoprolol succinate (TOPROL XL) 25 MG extended release tablet Take 1 tablet by mouth daily 30 tablet 3    silver sulfADIAZINE (SSD) 1 % cream APPLY  CREAM TOPICALLY DAILY 240 g 0    famotidine (PEPCID) 20 MG tablet Take 1 tablet by mouth 2 times daily 60 tablet 5    levothyroxine (SYNTHROID) 50 MCG tablet Take 50 mcg by mouth Daily      Polyethylene Glycol 3350 (MIRALAX PO) Take by mouth  triamcinolone (NASACORT ALLERGY 24HR) 55 MCG/ACT nasal inhaler 2 sprays by Nasal route 2 times daily      fluticasone (FLONASE) 50 MCG/ACT nasal spray       clotrimazole-betamethasone (LOTRISONE) 1-0.05 % cream Apply topically 2 times daily. 1 Tube 0    Loratadine (CLARITIN) 10 MG CAPS Take 10 mg by mouth daily      ferrous sulfate 325 (65 FE) MG tablet Take 1 tablet by mouth 2 times daily (with meals) 30 tablet 3    latanoprost (XALATAN) 0.005 % ophthalmic solution Place 1 drop into both eyes nightly      OXYGEN Inhale 2 L/min into the lungs nightly      Multiple Vitamins-Minerals (ICAPS) CAPS Take  by mouth.  acetaminophen (TYLENOL) 500 MG tablet Take 500 mg by mouth every 6 hours as needed for Pain.  calcium carbonate 600 MG TABS tablet Take 1 tablet by mouth daily. No current facility-administered medications on file prior to encounter. REVIEW OF SYSTEMS    Pertinent items are noted in HPI. Constitutional: Negative for systemic symptoms including fever, chills and malaise. Objective: There were no vitals taken for this visit. PHYSICAL EXAM      General: The patient is in no acute distress. Mental status:  Patient is appropriate, is  oriented to place and plan of care. Dermatologic exam: Visual inspection of the periwound reveals the skin to be dry, coarse, sclerotic and edematous. Wound exam:  see wound description below     All active wounds listed below with today's date are evaluated      Wound 12/22/20 Leg Right; Lower #2 (onset 6 weeks) Right Lower Leg (Active)   Wound Image   01/26/21 1117   Dressing Status Clean;Dry; Intact; New dressing applied 01/26/21 1146   Wound Cleansed Soap and water 02/02/21 1321   Dressing/Treatment Alginate;Moisturizing cream 01/26/21 1146   Wound Length (cm) 5 cm 02/02/21 1321   Wound Width (cm) 6 cm 02/02/21 1321   Wound Depth (cm) 0.1 cm 02/02/21 1321   Wound Surface Area (cm^2) 30 cm^2 02/02/21 1321 Change in Wound Size % (l*w) -72428.33 02/02/21 1321   Wound Volume (cm^3) 3 cm^3 02/02/21 1321   Wound Healing % -31304 02/02/21 1321   Post-Procedure Length (cm) 2.7 cm 01/19/21 1439   Post-Procedure Width (cm) 1.2 cm 01/19/21 1439   Post-Procedure Depth (cm) 0.1 cm 01/19/21 1439   Post-Procedure Surface Area (cm^2) 3.24 cm^2 01/19/21 1439   Post-Procedure Volume (cm^3) 0.32 cm^3 01/19/21 1439   Distance Tunneling (cm) 0 cm 02/02/21 1321   Tunneling Position ___ O'Clock 0 02/02/21 1321   Undermining Starts ___ O'Clock 0 02/02/21 1321   Undermining Ends___ O'Clock 0 02/02/21 1321   Undermining Maxium Distance (cm) 0 02/02/21 1321   Wound Assessment Pink/red 02/02/21 1321   Drainage Amount None 02/02/21 1321   Drainage Description Green;Yellow 02/02/21 1321   Odor None 02/02/21 1321   Leatha-wound Assessment Edematous 02/02/21 1321   Margins Defined edges 01/19/21 1419   Wound Thickness Description not for Pressure Injury Full thickness 01/19/21 1419   Number of days: 41       Wound 12/22/20 Leg Left; Lower #3 (onset 6 weeks) Left Lower Leg (Active)   Wound Image   01/26/21 1117   Dressing Status Clean;Dry; Intact; New dressing applied 01/26/21 1146   Wound Cleansed Soap and water 02/02/21 1321   Dressing/Treatment Alginate;Moisturizing cream 01/26/21 1146   Wound Length (cm) 5.5 cm 02/02/21 1321   Wound Width (cm) 5.5 cm 02/02/21 1321   Wound Depth (cm) 0.1 cm 02/02/21 1321   Wound Surface Area (cm^2) 30.25 cm^2 02/02/21 1321   Change in Wound Size % (l*w) 28.4 02/02/21 1321   Wound Volume (cm^3) 3.02 cm^3 02/02/21 1321   Wound Healing % 28 02/02/21 1321   Post-Procedure Length (cm) 5.5 cm 01/26/21 1134   Post-Procedure Width (cm) 4 cm 01/26/21 1134   Post-Procedure Depth (cm) 0.1 cm 01/26/21 1134   Post-Procedure Surface Area (cm^2) 22 cm^2 01/26/21 1134   Post-Procedure Volume (cm^3) 2.2 cm^3 01/26/21 1134   Distance Tunneling (cm) 0 cm 02/02/21 1321   Tunneling Position ___ O'Clock 0 02/02/21 1321 Undermining Starts ___ O'Clock 0 02/02/21 1321   Undermining Ends___ O'Clock 0 02/02/21 1321   Undermining Maxium Distance (cm) 0 02/02/21 1321   Wound Assessment Pale granulation tissue 02/02/21 1321   Drainage Amount Large 02/02/21 1321   Drainage Description Yellow;Green 02/02/21 1321   Odor None 02/02/21 1321   Leatha-wound Assessment Hyperpigmented 02/02/21 1321   Margins Defined edges 02/02/21 1321   Wound Thickness Description not for Pressure Injury Full thickness 02/02/21 1321   Number of days: 41       Assessment:       Problem List Items Addressed This Visit     WD-Lymphedema of both lower extremities with cellulitis - Primary    Relevant Medications    lidocaine (XYLOCAINE) 4 % external solution (Start on 2/2/2021  2:00 PM)    clobetasol (TEMOVATE) 0.05 % ointment (Start on 2/2/2021  2:00 PM)    gentamicin (GARAMYCIN) 0.1 % ointment (Start on 2/2/2021  2:00 PM)    Other Relevant Orders    Supply: Wound Cleanser    Supply: Wound Dressings    Supply: Cover and Secure    Supply: Edema Control    WD-Pressure injury of left buttock, stage 3 (HCC)    Relevant Medications    lidocaine (XYLOCAINE) 4 % external solution (Start on 2/2/2021  2:00 PM)    clobetasol (TEMOVATE) 0.05 % ointment (Start on 2/2/2021  2:00 PM)    gentamicin (GARAMYCIN) 0.1 % ointment (Start on 2/2/2021  2:00 PM)    Other Relevant Orders    Supply: Wound Cleanser    Supply: Wound Dressings    Supply: Cover and Secure    Supply: Edema Control          Status of wound progress and description from last visit:   Stable. Wounds beds are improving, but legs are edemetous and reddened. Patient reports itching and burning. Patient has seen Dr. Garrett Benitez and has been ruled out for venous and arterial insufficiency. 4-5 times a day and/or when sitting.                                               When taking antibiotics take entire prescription as ordered by                           MD do not stop taking until medicine is all gone.                              Orders for this week (2/2//21)  Bilateral lower legs- Wash with mild soap and water. Wash with alpha bath   Lotrisone to intact skin. Apply thickly  gentamycin to wound bed. Cover draining areas with calcium alginate, and kerrmax, and wrap with conform secure with tape  Apply tubi        [facility] to Change Every other day.       Rx: [facility] gentamycin and lotrisone ordered. Please  and take as directed. Refferals: Lympedema clinic in [place].     -Nurse Visit: No  -Follow up with Rusty Duenas CNP in 1 weeks in the wound care center  Call [phone] for any questions or concerns.   Date__________   Time____________                Treatment Note      Written Patient Dismissal Instructions Given            Electronically signed by TREVON Lundy CNP on 2/2/2021 at 1:40 PM

## 2021-02-09 ENCOUNTER — HOSPITAL ENCOUNTER (OUTPATIENT)
Dept: WOUND CARE | Age: 86
Discharge: HOME | End: 2021-02-09
Payer: MEDICARE

## 2021-02-09 VITALS — TEMPERATURE: 96.5 F

## 2021-02-09 DIAGNOSIS — I89.0 LYMPHEDEMA OF BOTH LOWER EXTREMITIES: ICD-10-CM

## 2021-02-09 DIAGNOSIS — L89.323 PRESSURE INJURY OF LEFT BUTTOCK, STAGE 3 (HCC): Primary | ICD-10-CM

## 2021-02-09 DIAGNOSIS — L97.911 NON-PRESSURE CHRONIC ULCER RIGHT LOWER LEG, LIMITED TO BREAKDOWN SKIN (HCC): ICD-10-CM

## 2021-02-09 PROCEDURE — 11042 DBRDMT SUBQ TIS 1ST 20SQCM/<: CPT

## 2021-02-09 PROCEDURE — 11045 DBRDMT SUBQ TISS EACH ADDL: CPT | Performed by: NURSE PRACTITIONER

## 2021-02-09 PROCEDURE — 11045 DBRDMT SUBQ TISS EACH ADDL: CPT

## 2021-02-09 PROCEDURE — 11042 DBRDMT SUBQ TIS 1ST 20SQCM/<: CPT | Performed by: NURSE PRACTITIONER

## 2021-02-09 RX ORDER — BETAMETHASONE DIPROPIONATE 0.05 %
OINTMENT (GRAM) TOPICAL ONCE
Status: CANCELLED | OUTPATIENT
Start: 2021-02-09 | End: 2021-02-09

## 2021-02-09 RX ORDER — LIDOCAINE HYDROCHLORIDE 20 MG/ML
JELLY TOPICAL ONCE
Status: CANCELLED | OUTPATIENT
Start: 2021-02-09 | End: 2021-02-09

## 2021-02-09 RX ORDER — BACITRACIN ZINC AND POLYMYXIN B SULFATE 500; 1000 [USP'U]/G; [USP'U]/G
OINTMENT TOPICAL ONCE
Status: CANCELLED | OUTPATIENT
Start: 2021-02-09 | End: 2021-02-09

## 2021-02-09 RX ORDER — GINSENG 100 MG
CAPSULE ORAL ONCE
Status: CANCELLED | OUTPATIENT
Start: 2021-02-09 | End: 2021-02-09

## 2021-02-09 RX ORDER — GENTAMICIN SULFATE 1 MG/G
OINTMENT TOPICAL ONCE
Status: CANCELLED | OUTPATIENT
Start: 2021-02-09 | End: 2021-02-09

## 2021-02-09 RX ORDER — LIDOCAINE 40 MG/G
CREAM TOPICAL ONCE
Status: CANCELLED | OUTPATIENT
Start: 2021-02-09 | End: 2021-02-09

## 2021-02-09 RX ORDER — BACITRACIN, NEOMYCIN, POLYMYXIN B 400; 3.5; 5 [USP'U]/G; MG/G; [USP'U]/G
OINTMENT TOPICAL ONCE
Status: CANCELLED | OUTPATIENT
Start: 2021-02-09 | End: 2021-02-09

## 2021-02-09 RX ORDER — LIDOCAINE 50 MG/G
OINTMENT TOPICAL ONCE
Status: CANCELLED | OUTPATIENT
Start: 2021-02-09 | End: 2021-02-09

## 2021-02-09 RX ORDER — CLOBETASOL PROPIONATE 0.5 MG/G
OINTMENT TOPICAL ONCE
Status: CANCELLED | OUTPATIENT
Start: 2021-02-09 | End: 2021-02-09

## 2021-02-09 RX ORDER — CLOTRIMAZOLE AND BETAMETHASONE DIPROPIONATE 10; .64 MG/G; MG/G
CREAM TOPICAL
Qty: 45 G | Refills: 2 | Status: SHIPPED | OUTPATIENT
Start: 2021-02-09 | End: 2021-03-03

## 2021-02-09 RX ORDER — LIDOCAINE HYDROCHLORIDE 40 MG/ML
SOLUTION TOPICAL ONCE
Status: DISCONTINUED | OUTPATIENT
Start: 2021-02-09 | End: 2021-02-10 | Stop reason: HOSPADM

## 2021-02-09 RX ORDER — LIDOCAINE HYDROCHLORIDE 40 MG/ML
SOLUTION TOPICAL ONCE
Status: CANCELLED | OUTPATIENT
Start: 2021-02-09 | End: 2021-02-09

## 2021-02-09 NOTE — PROGRESS NOTES
Wound Care Center Progress Note With Procedure    Сергей Perry  AGE: 82 y.o. GENDER: female  : 1935  EPISODE DATE:  2021     Subjective:     Chief Complaint   Patient presents with    Wound Check     buttock, bilateral lower extermity         HISTORY of PRESENT ILLNESS     Nurys Carmona a 82 y. o. female who presents today for wound evaluation of Chronic lymphedema ulcer(s) of bilateral lower legs.  The ulcer is of mild severity.  The underlying cause of the wound is lymphedema.  Arterial and venous U/S were negative for PAD and CVI.  Ulcerations have decreased in size.   Wound Pain Timing/Severity: intermittent, moderate  Quality of pain: tender, pressure  Severity of pain:  3 / 10   Modifying Factors: lymphedema and obesity  Associated Signs/Symptoms: edema, erythema, drainage and pain        PAST MEDICAL HISTORY        Diagnosis Date    Asthma     Chronic kidney disease     acute kidney failure    Chronic ulcer of left leg with fat layer exposed ([address] 3/7/2016    Chronic ulcer of right leg with fat layer exposed ([address] 3/7/2016    Diabetes type 2, controlled ([address]     History of asthma     History of blood transfusion 2015    Hypertension     Lymphedema of both lower extremities 3/7/2016    Osteoarthritis     Pneumonia     Thyroid disease     Venous stasis of both lower extremities 3/7/2016    WD-Non-pressure chronic ulcer right lower leg, 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 ([address] 2016       PAST SURGICAL HISTORY    Past Surgical History:   Procedure Laterality Date    APPENDECTOMY      CHOLECYSTECTOMY      CYSTOSCOPY      EYE SURGERY      bilateral implants    HYSTERECTOMY      JOINT REPLACEMENT Right     knee    PACEMAKER INSERTION N/A 2020    PACEMAKER INSERTION PERMANENT REMOVAL OF TEMPORARY PACEMAKER performed by Irina Beltre MD at [facility]    Family History   Problem Relation Age of Onset  Heart Disease Father        SOCIAL HISTORY    Social History     Tobacco Use    Smoking status: Never Smoker    Smokeless tobacco: Never Used   Substance Use Topics    Alcohol use: No    Drug use: No       ALLERGIES    Allergies   Allergen Reactions    Latex Rash    Dye [Iodides] Anaphylaxis    Iodine Anaphylaxis    Dyazide [Hydrochlorothiazide W-Triamterene] Rash    Lasix [Furosemide] Rash    Procardia [Nifedipine] Other (See Comments)     Not for sure if rash occurred or rash    Doxycycline Dermatitis    Edecrin [Ethacrynic Acid]     Levaquin [Levofloxacin] Other (See Comments)     unknown    Mobic [Meloxicam]     Vioxx [Rofecoxib]     Celebrex [Celecoxib] Rash    Lexapro [Escitalopram Oxalate] Rash    Sulfa Antibiotics Hives       MEDICATIONS    Current Outpatient Medications on File Prior to Encounter   Medication Sig Dispense Refill    clotrimazole-betamethasone (LOTRISONE) 1-0.05 % cream Apply to legs and wound beds with dressing changes and as needed. 45 g 1    gentamicin (GARAMYCIN) 0.1 % ointment Apply to wound beds with dressing changes and as needed.  30 g 1    febuxostat (ULORIC) 40 MG TABS tablet Take 1 tablet by mouth daily 30 tablet 5    torsemide (DEMADEX) 20 MG tablet Take 1 tablet by mouth 3 times daily Pt to take 40 mg in am and 20 mg in the afternoon 90 tablet 3    docusate sodium (COLACE) 100 MG capsule Take 1 capsule by mouth daily 30 capsule 3    bumetanide (BUMEX) 1 MG tablet Take 1 tablet by mouth 2 times daily 30 tablet 3    cilostazol (PLETAL) 50 MG tablet Take 1 tablet by mouth 2 times daily 60 tablet 3    rOPINIRole (REQUIP) 0.5 MG tablet Take 1 tablet by mouth 3 times daily 90 tablet 3    potassium chloride (KLOR-CON M) 20 MEQ extended release tablet Take 1 tablet by mouth 2 times daily 180 tablet 0    metoprolol succinate (TOPROL XL) 25 MG extended release tablet Take 1 tablet by mouth daily 30 tablet 3  silver sulfADIAZINE (SSD) 1 % cream APPLY  CREAM TOPICALLY DAILY 240 g 0    famotidine (PEPCID) 20 MG tablet Take 1 tablet by mouth 2 times daily 60 tablet 5    levothyroxine (SYNTHROID) 50 MCG tablet Take 50 mcg by mouth Daily      Polyethylene Glycol 3350 (MIRALAX PO) Take by mouth      triamcinolone (NASACORT ALLERGY 24HR) 55 MCG/ACT nasal inhaler 2 sprays by Nasal route 2 times daily      fluticasone (FLONASE) 50 MCG/ACT nasal spray       clotrimazole-betamethasone (LOTRISONE) 1-0.05 % cream Apply topically 2 times daily. 1 Tube 0    Loratadine (CLARITIN) 10 MG CAPS Take 10 mg by mouth daily      ferrous sulfate 325 (65 FE) MG tablet Take 1 tablet by mouth 2 times daily (with meals) 30 tablet 3    latanoprost (XALATAN) 0.005 % ophthalmic solution Place 1 drop into both eyes nightly      OXYGEN Inhale 2 L/min into the lungs nightly      Multiple Vitamins-Minerals (ICAPS) CAPS Take  by mouth.  acetaminophen (TYLENOL) 500 MG tablet Take 500 mg by mouth every 6 hours as needed for Pain.  calcium carbonate 600 MG TABS tablet Take 1 tablet by mouth daily. No current facility-administered medications on file prior to encounter. REVIEW OF SYSTEMS    Pertinent items are noted in HPI. Constitutional: Negative for systemic symptoms including fever, chills and malaise. Objective:      Temp 96.5 °F (35.8 °C)     PHYSICAL EXAM      General: The patient is in no acute distress. Mental status:  Patient is appropriate, is  oriented to place and plan of care.   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 Performed by: Nallely Pereira APRN - CNP    Consent obtained: Yes    Time out taken:  Yes    Pain Control: Anesthetic  Anesthetic: 4% Lidocaine Liquid Topical     Debridement:Excisional Debridement    Using curette the wound(s) was/were sharply debrided down through and including the removal of subcutaneous tissue. Devitalized Tissue Debrided:  fibrin, biofilm, slough and exudate    Pre Debridement Measurements:  Are located in the Wound Documentation Flow Sheet    All active wounds listed below with today's date are evaluated  Wound(s)    debrided this date include # : 2 and 3     Post  Debridement Measurements:  Wound 12/22/20 Leg Right; Lower #2 (onset 6 weeks) Right Lower Leg (Active)   Wound Image   02/09/21 1324   Wound Etiology Other 02/02/21 1321   Dressing Status New dressing applied 02/02/21 1345   Wound Cleansed Soap and water 02/09/21 1324   Dressing/Treatment Alginate;Moisturizing cream 01/26/21 1146   Offloading for Diabetic Foot Ulcers No offloading required 02/09/21 1324   Wound Length (cm) 0 cm 02/09/21 1324   Wound Width (cm) 0 cm 02/09/21 1324   Wound Depth (cm) 0 cm 02/09/21 1324   Wound Surface Area (cm^2) 0 cm^2 02/09/21 1324   Change in Wound Size % (l*w) 100 02/09/21 1324   Wound Volume (cm^3) 0 cm^3 02/09/21 1324 Wound Healing % 100 02/09/21 1324   Post-Procedure Length (cm) 2.7 cm 01/19/21 1439   Post-Procedure Width (cm) 1.2 cm 01/19/21 1439   Post-Procedure Depth (cm) 0.1 cm 01/19/21 1439   Post-Procedure Surface Area (cm^2) 3.24 cm^2 01/19/21 1439   Post-Procedure Volume (cm^3) 0.32 cm^3 01/19/21 1439   Distance Tunneling (cm) 0 cm 02/09/21 1324   Tunneling Position ___ O'Clock 0 02/09/21 1324   Undermining Starts ___ O'Clock 0 02/09/21 1324   Undermining Ends___ O'Clock 0 02/09/21 1324   Undermining Maxium Distance (cm) 0 02/09/21 1324   Wound Assessment Pale granulation tissue;Pink/red 02/09/21 1324   Drainage Amount Moderate 02/09/21 1324   Drainage Description Yellow;Serosanguinous 02/09/21 1324   Odor None 02/09/21 1324   Leatha-wound Assessment Edematous 02/09/21 1324   Margins Defined edges 02/09/21 1324   Wound Thickness Description not for Pressure Injury Full thickness 02/09/21 1324   Number of days: 48       Wound 12/22/20 Leg Left; Lower #3 (onset 6 weeks) Left Lower Leg (Active)   Wound Image   02/09/21 1324   Wound Etiology Other 02/02/21 1321   Dressing Status New dressing applied 02/02/21 1345   Wound Cleansed Soap and water 02/09/21 1324   Dressing/Treatment Alginate;Moisturizing cream 01/26/21 1146   Offloading for Diabetic Foot Ulcers No offloading required 02/09/21 1324   Wound Length (cm) 5.5 cm 02/09/21 1324   Wound Width (cm) 4.5 cm 02/09/21 1324   Wound Depth (cm) 0.1 cm 02/09/21 1324   Wound Surface Area (cm^2) 24.75 cm^2 02/09/21 1324   Change in Wound Size % (l*w) 41.42 02/09/21 1324   Wound Volume (cm^3) 2.48 cm^3 02/09/21 1324   Wound Healing % 41 02/09/21 1324   Post-Procedure Length (cm) 5.5 cm 02/09/21 1340   Post-Procedure Width (cm) 4.5 cm 02/09/21 1340   Post-Procedure Depth (cm) 0.1 cm 02/09/21 1340   Post-Procedure Surface Area (cm^2) 24.75 cm^2 02/09/21 1340   Post-Procedure Volume (cm^3) 2.48 cm^3 02/09/21 1340   Distance Tunneling (cm) 0 cm 02/09/21 1324 Tunneling Position ___ O'Clock 0 02/09/21 1324   Undermining Starts ___ O'Clock 0 02/09/21 1324   Undermining Ends___ O'Clock 0 02/09/21 1324   Undermining Maxium Distance (cm) 0 02/09/21 1324   Wound Assessment Pale granulation tissue 02/09/21 1324   Drainage Amount Moderate 02/09/21 1324   Drainage Description Yellow;Serosanguinous 02/09/21 1324   Odor None 02/09/21 1324   Leatha-wound Assessment Fragile 02/09/21 1324   Margins Defined edges 02/09/21 1324   Wound Thickness Description not for Pressure Injury Full thickness 02/09/21 1324   Number of days: 48       Wound 02/09/21 Buttocks Right #3 Right Buttock (Active)   Wound Image   02/09/21 1324   Wound Cleansed Cleansed with saline 02/09/21 1324   Wound Length (cm) 0.3 cm 02/09/21 1324   Wound Width (cm) 0.7 cm 02/09/21 1324   Wound Depth (cm) 0.1 cm 02/09/21 1324   Wound Surface Area (cm^2) 0.21 cm^2 02/09/21 1324   Wound Volume (cm^3) 0.02 cm^3 02/09/21 1324   Distance Tunneling (cm) 0 cm 02/09/21 1324   Tunneling Position ___ O'Clock 0 02/09/21 1324   Undermining Starts ___ O'Clock 0 02/09/21 1324   Undermining Ends___ O'Clock 0 02/09/21 1324   Undermining Maxium Distance (cm) 0 02/09/21 1324   Wound Assessment Slough 02/09/21 1324   Drainage Amount Moderate 02/09/21 1324   Drainage Description Serosanguinous 02/09/21 1324   Odor None 02/09/21 1324   Leatha-wound Assessment Blanchable erythema 02/09/21 1324   Margins Defined edges 02/09/21 1324   Wound Thickness Description not for Pressure Injury Full thickness 02/09/21 1324   Number of days: 0       Percent of Wound(s) Debrided: approximately 100%    Total  Area  Debrided:  27.99 sq cm     Bleeding:  Minimal    Hemostasis Achieved:  by pressure    Procedural Pain:  0  / 10     Post Procedural Pain:  0 / 10     Response to treatment:  Well tolerated by patient.

## 2021-02-16 ENCOUNTER — HOSPITAL ENCOUNTER (OUTPATIENT)
Dept: WOUND CARE | Age: 86
Discharge: HOME | End: 2021-02-16

## 2021-02-23 ENCOUNTER — HOSPITAL ENCOUNTER (OUTPATIENT)
Dept: WOUND CARE | Age: 86
Discharge: HOME | End: 2021-02-23
Payer: MEDICARE

## 2021-02-23 VITALS
RESPIRATION RATE: 21 BRPM | SYSTOLIC BLOOD PRESSURE: 156 MMHG | HEART RATE: 76 BPM | DIASTOLIC BLOOD PRESSURE: 67 MMHG | TEMPERATURE: 95.9 F

## 2021-02-23 DIAGNOSIS — I89.0 LYMPHEDEMA OF BOTH LOWER EXTREMITIES: ICD-10-CM

## 2021-02-23 DIAGNOSIS — L89.323 PRESSURE INJURY OF LEFT BUTTOCK, STAGE 3 (HCC): Primary | ICD-10-CM

## 2021-02-23 PROCEDURE — 11042 DBRDMT SUBQ TIS 1ST 20SQCM/<: CPT | Performed by: NURSE PRACTITIONER

## 2021-02-23 PROCEDURE — 11042 DBRDMT SUBQ TIS 1ST 20SQCM/<: CPT

## 2021-02-23 RX ORDER — BACITRACIN ZINC AND POLYMYXIN B SULFATE 500; 1000 [USP'U]/G; [USP'U]/G
OINTMENT TOPICAL ONCE
Status: CANCELLED | OUTPATIENT
Start: 2021-02-23 | End: 2021-02-23

## 2021-02-23 RX ORDER — LIDOCAINE HYDROCHLORIDE 40 MG/ML
SOLUTION TOPICAL ONCE
Status: CANCELLED | OUTPATIENT
Start: 2021-02-23 | End: 2021-02-23

## 2021-02-23 RX ORDER — BETAMETHASONE DIPROPIONATE 0.05 %
OINTMENT (GRAM) TOPICAL ONCE
Status: CANCELLED | OUTPATIENT
Start: 2021-02-23 | End: 2021-02-23

## 2021-02-23 RX ORDER — LIDOCAINE HYDROCHLORIDE 20 MG/ML
JELLY TOPICAL ONCE
Status: CANCELLED | OUTPATIENT
Start: 2021-02-23 | End: 2021-02-23

## 2021-02-23 RX ORDER — LIDOCAINE 50 MG/G
OINTMENT TOPICAL ONCE
Status: CANCELLED | OUTPATIENT
Start: 2021-02-23 | End: 2021-02-23

## 2021-02-23 RX ORDER — GINSENG 100 MG
CAPSULE ORAL ONCE
Status: CANCELLED | OUTPATIENT
Start: 2021-02-23 | End: 2021-02-23

## 2021-02-23 RX ORDER — GENTAMICIN SULFATE 1 MG/G
OINTMENT TOPICAL ONCE
Status: CANCELLED | OUTPATIENT
Start: 2021-02-23 | End: 2021-02-23

## 2021-02-23 RX ORDER — LIDOCAINE HYDROCHLORIDE 40 MG/ML
SOLUTION TOPICAL ONCE
Status: DISCONTINUED | OUTPATIENT
Start: 2021-02-23 | End: 2021-02-24 | Stop reason: HOSPADM

## 2021-02-23 RX ORDER — CLOBETASOL PROPIONATE 0.5 MG/G
OINTMENT TOPICAL ONCE
Status: CANCELLED | OUTPATIENT
Start: 2021-02-23 | End: 2021-02-23

## 2021-02-23 RX ORDER — LIDOCAINE 40 MG/G
CREAM TOPICAL ONCE
Status: CANCELLED | OUTPATIENT
Start: 2021-02-23 | End: 2021-02-23

## 2021-02-23 RX ORDER — BETAMETHASONE DIPROPIONATE 0.05 %
OINTMENT (GRAM) TOPICAL ONCE
Status: DISCONTINUED | OUTPATIENT
Start: 2021-02-23 | End: 2021-02-24 | Stop reason: HOSPADM

## 2021-02-23 RX ORDER — BACITRACIN, NEOMYCIN, POLYMYXIN B 400; 3.5; 5 [USP'U]/G; MG/G; [USP'U]/G
OINTMENT TOPICAL ONCE
Status: CANCELLED | OUTPATIENT
Start: 2021-02-23 | End: 2021-02-23

## 2021-02-23 RX ORDER — CLOBETASOL PROPIONATE 0.5 MG/G
OINTMENT TOPICAL ONCE
Status: DISCONTINUED | OUTPATIENT
Start: 2021-02-23 | End: 2021-02-24 | Stop reason: HOSPADM

## 2021-02-25 ENCOUNTER — HOSPITAL ENCOUNTER (OUTPATIENT)
Age: 86
Setting detail: SPECIMEN
Discharge: HOME | End: 2021-02-25
Payer: MEDICARE

## 2021-02-25 LAB
ANION GAP SERPL CALCULATED.3IONS-SCNC: 12 MMOL/L (ref 4–16)
BACTERIA: ABNORMAL /HPF
BASOPHILS ABSOLUTE: 0 K/CU MM
BASOPHILS RELATIVE PERCENT: 0.3 % (ref 0–1)
BILIRUBIN URINE: NEGATIVE MG/DL
BLOOD, URINE: NEGATIVE
BUN BLDV-MCNC: 48 MG/DL (ref 6–23)
CALCIUM SERPL-MCNC: 9.8 MG/DL (ref 8.3–10.6)
CAST TYPE: ABNORMAL /HPF
CHLORIDE BLD-SCNC: 95 MMOL/L (ref 99–110)
CLARITY: CLEAR
CO2: 32 MMOL/L (ref 21–32)
COLOR: YELLOW
CREAT SERPL-MCNC: 1.6 MG/DL (ref 0.6–1.1)
CRYSTAL TYPE: ABNORMAL /HPF
DIFFERENTIAL TYPE: ABNORMAL
EOSINOPHILS ABSOLUTE: 0.3 K/CU MM
EOSINOPHILS RELATIVE PERCENT: 3.8 % (ref 0–3)
EPITHELIAL CELLS, UA: ABNORMAL /HPF
GFR AFRICAN AMERICAN: 37 ML/MIN/1.73M2
GFR NON-AFRICAN AMERICAN: 31 ML/MIN/1.73M2
GLUCOSE BLD-MCNC: 140 MG/DL (ref 70–99)
GLUCOSE, URINE: NEGATIVE MG/DL
HCT VFR BLD CALC: 33.8 % (ref 37–47)
HEMOGLOBIN: 10.6 GM/DL (ref 12.5–16)
IMMATURE NEUTROPHIL %: 0.3 % (ref 0–0.43)
KETONES, URINE: NEGATIVE MG/DL
LEUKOCYTE ESTERASE, URINE: ABNORMAL
LYMPHOCYTES ABSOLUTE: 2.1 K/CU MM
LYMPHOCYTES RELATIVE PERCENT: 26.4 % (ref 24–44)
MAGNESIUM: 2 MG/DL (ref 1.8–2.4)
MCH RBC QN AUTO: 30.4 PG (ref 27–31)
MCHC RBC AUTO-ENTMCNC: 31.4 % (ref 32–36)
MCV RBC AUTO: 96.8 FL (ref 78–100)
MONOCYTES ABSOLUTE: 0.8 K/CU MM
MONOCYTES RELATIVE PERCENT: 9.7 % (ref 0–4)
MUCUS: NEGATIVE HPF
NITRITE URINE, QUANTITATIVE: NEGATIVE
PDW BLD-RTO: 15.3 % (ref 11.7–14.9)
PH, URINE: 7 (ref 5–8)
PHOSPHORUS: 3.8 MG/DL (ref 2.5–4.9)
PLATELET # BLD: 252 K/CU MM (ref 140–440)
PMV BLD AUTO: 9.8 FL (ref 7.5–11.1)
POTASSIUM SERPL-SCNC: 3.8 MMOL/L (ref 3.5–5.1)
PROTEIN UA: NEGATIVE MG/DL
RBC # BLD: 3.49 M/CU MM (ref 4.2–5.4)
RBC URINE: ABNORMAL /HPF (ref 0–6)
SEGMENTED NEUTROPHILS ABSOLUTE COUNT: 4.7 K/CU MM
SEGMENTED NEUTROPHILS RELATIVE PERCENT: 59.5 % (ref 36–66)
SODIUM BLD-SCNC: 139 MMOL/L (ref 135–145)
SPECIFIC GRAVITY UA: 1.01 (ref 1–1.03)
TOTAL IMMATURE NEUTOROPHIL: 0.02 K/CU MM
UROBILINOGEN, URINE: 0.2 MG/DL (ref 0.2–1)
VOLUME, (UVOL): 12 ML (ref 10–12)
WBC # BLD: 8 K/CU MM (ref 4–10.5)
WBC UA: ABNORMAL /HPF (ref 0–5)

## 2021-02-25 PROCEDURE — 83735 ASSAY OF MAGNESIUM: CPT

## 2021-02-25 PROCEDURE — 81001 URINALYSIS AUTO W/SCOPE: CPT

## 2021-02-25 PROCEDURE — 80048 BASIC METABOLIC PNL TOTAL CA: CPT

## 2021-02-25 PROCEDURE — 84100 ASSAY OF PHOSPHORUS: CPT

## 2021-02-25 PROCEDURE — 84156 ASSAY OF PROTEIN URINE: CPT

## 2021-02-25 PROCEDURE — 82570 ASSAY OF URINE CREATININE: CPT

## 2021-02-25 PROCEDURE — 85025 COMPLETE CBC W/AUTO DIFF WBC: CPT

## 2021-02-26 LAB
CREATININE URINE: 15.8 MG/DL (ref 28–217)
PROT/CREAT RATIO, UR: 0.4
URINE TOTAL PROTEIN: 7.1 MG/DL

## 2021-03-02 ENCOUNTER — HOSPITAL ENCOUNTER (OUTPATIENT)
Dept: WOUND CARE | Age: 86
Discharge: HOME | End: 2021-03-02
Payer: MEDICARE

## 2021-03-02 VITALS
HEART RATE: 69 BPM | RESPIRATION RATE: 16 BRPM | DIASTOLIC BLOOD PRESSURE: 65 MMHG | TEMPERATURE: 97.3 F | SYSTOLIC BLOOD PRESSURE: 136 MMHG

## 2021-03-02 DIAGNOSIS — L97.911 NON-PRESSURE CHRONIC ULCER RIGHT LOWER LEG, LIMITED TO BREAKDOWN SKIN (HCC): ICD-10-CM

## 2021-03-02 DIAGNOSIS — I89.0 LYMPHEDEMA OF BOTH LOWER EXTREMITIES: Primary | ICD-10-CM

## 2021-03-02 DIAGNOSIS — L89.323 PRESSURE INJURY OF LEFT BUTTOCK, STAGE 3 (HCC): ICD-10-CM

## 2021-03-02 PROCEDURE — 11042 DBRDMT SUBQ TIS 1ST 20SQCM/<: CPT

## 2021-03-02 PROCEDURE — 11042 DBRDMT SUBQ TIS 1ST 20SQCM/<: CPT | Performed by: NURSE PRACTITIONER

## 2021-03-02 RX ORDER — LIDOCAINE HYDROCHLORIDE 20 MG/ML
JELLY TOPICAL ONCE
Status: CANCELLED | OUTPATIENT
Start: 2021-03-02 | End: 2021-03-02

## 2021-03-02 RX ORDER — BETAMETHASONE DIPROPIONATE 0.05 %
OINTMENT (GRAM) TOPICAL ONCE
Status: CANCELLED | OUTPATIENT
Start: 2021-03-02 | End: 2021-03-02

## 2021-03-02 RX ORDER — LIDOCAINE 40 MG/G
CREAM TOPICAL ONCE
Status: CANCELLED | OUTPATIENT
Start: 2021-03-02 | End: 2021-03-02

## 2021-03-02 RX ORDER — CLOBETASOL PROPIONATE 0.5 MG/G
OINTMENT TOPICAL ONCE
Status: CANCELLED | OUTPATIENT
Start: 2021-03-02 | End: 2021-03-02

## 2021-03-02 RX ORDER — LIDOCAINE HYDROCHLORIDE 40 MG/ML
SOLUTION TOPICAL ONCE
Status: CANCELLED | OUTPATIENT
Start: 2021-03-02 | End: 2021-03-02

## 2021-03-02 RX ORDER — BETAMETHASONE DIPROPIONATE 0.05 %
OINTMENT (GRAM) TOPICAL ONCE
Status: DISCONTINUED | OUTPATIENT
Start: 2021-03-02 | End: 2021-03-03 | Stop reason: HOSPADM

## 2021-03-02 RX ORDER — LIDOCAINE HYDROCHLORIDE 40 MG/ML
SOLUTION TOPICAL ONCE
Status: DISCONTINUED | OUTPATIENT
Start: 2021-03-02 | End: 2021-03-03 | Stop reason: HOSPADM

## 2021-03-02 RX ORDER — BACITRACIN ZINC AND POLYMYXIN B SULFATE 500; 1000 [USP'U]/G; [USP'U]/G
OINTMENT TOPICAL ONCE
Status: CANCELLED | OUTPATIENT
Start: 2021-03-02 | End: 2021-03-02

## 2021-03-02 RX ORDER — CLOBETASOL PROPIONATE 0.5 MG/G
OINTMENT TOPICAL ONCE
Status: DISCONTINUED | OUTPATIENT
Start: 2021-03-02 | End: 2021-03-03 | Stop reason: HOSPADM

## 2021-03-02 RX ORDER — GENTAMICIN SULFATE 1 MG/G
OINTMENT TOPICAL ONCE
Status: CANCELLED | OUTPATIENT
Start: 2021-03-02 | End: 2021-03-02

## 2021-03-02 RX ORDER — GINSENG 100 MG
CAPSULE ORAL ONCE
Status: CANCELLED | OUTPATIENT
Start: 2021-03-02 | End: 2021-03-02

## 2021-03-02 RX ORDER — BACITRACIN, NEOMYCIN, POLYMYXIN B 400; 3.5; 5 [USP'U]/G; MG/G; [USP'U]/G
OINTMENT TOPICAL ONCE
Status: CANCELLED | OUTPATIENT
Start: 2021-03-02 | End: 2021-03-02

## 2021-03-02 RX ORDER — LIDOCAINE 50 MG/G
OINTMENT TOPICAL ONCE
Status: CANCELLED | OUTPATIENT
Start: 2021-03-02 | End: 2021-03-02

## 2021-03-02 NOTE — PROGRESS NOTES
Wound Care Center Progress Note With Procedure    Lorelei Engel  AGE: 82 y.o. GENDER: female  : 1935  EPISODE DATE:  3/2/2021     Subjective:     Chief Complaint   Patient presents with    Wound Check     BLE         HISTORY of PRESENT ILLNESS     Elsa Espino a 82 y. o. female who presents today for wound evaluation of Chronic lymphedema ulcer(s) of bilateral lower legs.  The ulcer is of mild severity.  The underlying cause of the wound is lymphedema.  Arterial and venous U/S were negative for PAD and CVI.  Ulcerations have decreased in size.   Wound Pain Timing/Severity: intermittent, moderate  Quality of pain: tender, pressure  Severity of pain:  3 / 10   Modifying Factors: lymphedema and obesity  Associated Signs/Symptoms: edema, erythema, drainage and pain        PAST MEDICAL HISTORY        Diagnosis Date    Asthma     Chronic kidney disease     acute kidney failure    Chronic ulcer of left leg with fat layer exposed ([address] 3/7/2016    Chronic ulcer of right leg with fat layer exposed ([address] 3/7/2016    Diabetes type 2, controlled ([address]     History of asthma     History of blood transfusion 2015    Hypertension     Lymphedema of both lower extremities 3/7/2016    Osteoarthritis     Pneumonia     Thyroid disease     Venous stasis of both lower extremities 3/7/2016    WD-Non-pressure chronic ulcer right lower leg, 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 ([address] 2016       PAST SURGICAL HISTORY    Past Surgical History:   Procedure Laterality Date    APPENDECTOMY      CHOLECYSTECTOMY      CYSTOSCOPY      EYE SURGERY      bilateral implants    HYSTERECTOMY      JOINT REPLACEMENT Right     knee    PACEMAKER INSERTION N/A 2020    PACEMAKER INSERTION PERMANENT REMOVAL OF TEMPORARY PACEMAKER performed by David Busby MD at [address]    Family History   Problem Relation Age of Onset    Heart Disease Father        SOCIAL HISTORY Social History     Tobacco Use    Smoking status: Never Smoker    Smokeless tobacco: Never Used   Substance Use Topics    Alcohol use: No    Drug use: No       ALLERGIES    Allergies   Allergen Reactions    Latex Rash    Dye [Iodides] Anaphylaxis    Iodine Anaphylaxis    Dyazide [Hydrochlorothiazide W-Triamterene] Rash    Lasix [Furosemide] Rash    Procardia [Nifedipine] Other (See Comments)     Not for sure if rash occurred or rash    Doxycycline Dermatitis    Edecrin [Ethacrynic Acid]     Levaquin [Levofloxacin] Other (See Comments)     unknown    Mobic [Meloxicam]     Vioxx [Rofecoxib]     Celebrex [Celecoxib] Rash    Lexapro [Escitalopram Oxalate] Rash    Sulfa Antibiotics Hives       MEDICATIONS    Current Outpatient Medications on File Prior to Encounter   Medication Sig Dispense Refill    metOLazone (ZAROXOLYN) 5 MG tablet Take 1 tablet by mouth daily for 3 days 3 tablet 0    clotrimazole-betamethasone (LOTRISONE) 1-0.05 % cream Apply to legs daily for redness and itching 45 g 2    clotrimazole-betamethasone (LOTRISONE) 1-0.05 % cream Apply to legs and wound beds with dressing changes and as needed.  45 g 1    febuxostat (ULORIC) 40 MG TABS tablet Take 1 tablet by mouth daily 30 tablet 5    torsemide (DEMADEX) 20 MG tablet Take 1 tablet by mouth 3 times daily Pt to take 40 mg in am and 20 mg in the afternoon 90 tablet 3    docusate sodium (COLACE) 100 MG capsule Take 1 capsule by mouth daily 30 capsule 3    bumetanide (BUMEX) 1 MG tablet Take 1 tablet by mouth 2 times daily 30 tablet 3    cilostazol (PLETAL) 50 MG tablet Take 1 tablet by mouth 2 times daily 60 tablet 3    rOPINIRole (REQUIP) 0.5 MG tablet Take 1 tablet by mouth 3 times daily 90 tablet 3    potassium chloride (KLOR-CON M) 20 MEQ extended release tablet Take 1 tablet by mouth 2 times daily 180 tablet 0    metoprolol succinate (TOPROL XL) 25 MG extended release tablet Take 1 tablet by mouth daily 30 tablet 3 betamethasone dipropionate (DIPROLENE) 0.05 % ointment (Start on 3/2/2021  2:00 PM)    clobetasol (TEMOVATE) 0.05 % ointment (Start on 3/2/2021  2:00 PM)    Other Relevant Orders    Supply: Wound Cleanser    Supply: Leatha Wound    Supply: Wound Dressings    Supply: Cover and Secure    Supply: Edema Control    WD-Non-pressure chronic ulcer right lower leg, 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 [facility])    WD-Pressure injury of left buttock, stage 3 (HCC)    Relevant Medications    lidocaine (XYLOCAINE) 4 % external solution    betamethasone dipropionate (DIPROLENE) 0.05 % ointment (Start on 3/2/2021  2:00 PM)    clobetasol (TEMOVATE) 0.05 % ointment (Start on 3/2/2021  2:00 PM)    Other Relevant Orders    Supply: Wound Cleanser    Supply: Leatha Wound    Supply: Wound Dressings    Supply: Cover and Secure    Supply: Edema Control        Procedure Note    Indications:  Based on my examination of this patient's wound(s) today, sharp excision into necrotic subcutaneous tissue is required to promote healing and evaluate the extent of previous healing. Performed by: TREVON Lewis CNP    Consent obtained: Yes    Time out taken:  Yes    Pain Control: Anesthetic  Anesthetic: 4% Lidocaine Liquid Topical     Debridement:Excisional Debridement    Using curette the wound(s) was/were sharply debrided down through and including the removal of subcutaneous tissue. Devitalized Tissue Debrided:  fibrin and exudate    Pre Debridement Measurements:  Are located in the Wound Documentation Flow Sheet    All active wounds listed below with today's date are evaluated  Wound(s)    debrided this date include # : 2 and 3     Post  Debridement Measurements:  Wound 12/22/20 Leg Right; Lower #2 (onset 6 weeks) Right Lower Leg (Active)   Wound Image   02/23/21 1303   Wound Etiology Other 02/02/21 1321   Dressing Status New dressing applied 02/02/21 1345   Wound Cleansed Soap and water;Irrigated with saline 03/02/21 1321 Dressing/Treatment Alginate;Moisturizing cream 01/26/21 1146   Offloading for Diabetic Foot Ulcers No offloading required 03/02/21 1321   Wound Length (cm) 1.1 cm 03/02/21 1321   Wound Width (cm) 0.5 cm 03/02/21 1321   Wound Depth (cm) 0.1 cm 03/02/21 1321   Wound Surface Area (cm^2) 0.55 cm^2 03/02/21 1321   Change in Wound Size % (l*w) -511.11 03/02/21 1321   Wound Volume (cm^3) 0.06 cm^3 03/02/21 1321   Wound Healing % -500 03/02/21 1321   Post-Procedure Length (cm) 1.1 cm 03/02/21 1341   Post-Procedure Width (cm) 0.5 cm 03/02/21 1341   Post-Procedure Depth (cm) 0.1 cm 03/02/21 1341   Post-Procedure Surface Area (cm^2) 0.55 cm^2 03/02/21 1341   Post-Procedure Volume (cm^3) 0.06 cm^3 03/02/21 1341   Distance Tunneling (cm) 0 cm 03/02/21 1321   Tunneling Position ___ O'Clock 0 03/02/21 1321   Undermining Starts ___ O'Clock 0 03/02/21 1321   Undermining Ends___ O'Clock 0 03/02/21 1321   Undermining Maxium Distance (cm) 0 03/02/21 1321   Wound Assessment Eschar dry 03/02/21 1321   Drainage Amount None 03/02/21 1321   Drainage Description Yellow;Serosanguinous 02/23/21 1303   Odor None 03/02/21 1321   Leatha-wound Assessment Dry/flaky; Hyperpigmented 03/02/21 1321   Margins Attached edges 03/02/21 1321   Wound Thickness Description not for Pressure Injury Partial thickness 03/02/21 1321   Number of days: 69       Wound 12/22/20 Leg Left; Lower #3 (onset 6 weeks) Left Lower Leg (Active)   Wound Image   02/23/21 1303   Wound Etiology Other 02/02/21 1321   Dressing Status New dressing applied 02/02/21 1345   Wound Cleansed Soap and water;Irrigated with saline 03/02/21 1321   Dressing/Treatment Alginate;Moisturizing cream 01/26/21 1146   Offloading for Diabetic Foot Ulcers No offloading required 03/02/21 1321   Wound Length (cm) 1 cm 03/02/21 1321   Wound Width (cm) 1 cm 03/02/21 1321   Wound Depth (cm) 0.1 cm 03/02/21 1321   Wound Surface Area (cm^2) 1 cm^2 03/02/21 1321   Change in Wound Size % (l*w) 97.63 03/02/21 1321 Wound Volume (cm^3) 0.1 cm^3 03/02/21 1321   Wound Healing % 98 03/02/21 1321   Post-Procedure Length (cm) 1 cm 03/02/21 1341   Post-Procedure Width (cm) 1 cm 03/02/21 1341   Post-Procedure Depth (cm) 0.1 cm 03/02/21 1341   Post-Procedure Surface Area (cm^2) 1 cm^2 03/02/21 1341   Post-Procedure Volume (cm^3) 0.1 cm^3 03/02/21 1341   Distance Tunneling (cm) 0 cm 03/02/21 1321   Tunneling Position ___ O'Clock 0 03/02/21 1321   Undermining Starts ___ O'Clock 0 03/02/21 1321   Undermining Ends___ O'Clock 0 03/02/21 1321   Undermining Maxium Distance (cm) 0 03/02/21 1321   Wound Assessment Eschar dry 03/02/21 1321   Drainage Amount None 03/02/21 1321   Drainage Description Serosanguinous 02/23/21 1303   Odor None 03/02/21 1321   Leatha-wound Assessment Dry/flaky; Hyperpigmented 03/02/21 1321   Margins Attached edges 03/02/21 1321   Wound Thickness Description not for Pressure Injury Partial thickness 03/02/21 1321   Number of days: 69       Wound 02/09/21 Buttocks #3 Buttocks Cluster (Active)   Wound Image   02/09/21 1324   Wound Etiology Pressure Stage  3 03/02/21 1321   Wound Cleansed Cleansed with saline 03/02/21 1321   Wound Length (cm) 4.8 cm 03/02/21 1321   Wound Width (cm) 4.8 cm 03/02/21 1321   Wound Depth (cm) 0.1 cm 03/02/21 1321   Wound Surface Area (cm^2) 23.04 cm^2 03/02/21 1321   Change in Wound Size % (l*w) -34529.43 03/02/21 1321   Wound Volume (cm^3) 2.3 cm^3 03/02/21 1321   Wound Healing % -39582 03/02/21 1321   Distance Tunneling (cm) 0 cm 03/02/21 1321   Tunneling Position ___ O'Clock 0 03/02/21 1321   Undermining Starts ___ O'Clock 0 03/02/21 1321   Undermining Ends___ O'Clock 0 03/02/21 1321   Undermining Maxium Distance (cm) 0 03/02/21 1321   Wound Assessment Slough 03/02/21 1321   Drainage Amount Moderate 03/02/21 1321   Drainage Description Yellow 03/02/21 1321   Odor None 03/02/21 1321   Leatha-wound Assessment Blanchable erythema 03/02/21 1321 Margins Unattached edges; Undefined edges 03/02/21 1321   Wound Thickness Description not for Pressure Injury Full thickness 03/02/21 1321   Number of days: 21       Percent of Wound(s) Debrided: approximately 100%    Total  Area  Debrided:  1.55 sq cm     Bleeding:  Minimal    Hemostasis Achieved:  by pressure    Procedural Pain:  0  / 10     Post Procedural Pain:  0 / 10     Response to treatment:  Well tolerated by patient. Status of wound progress and description from last visit:   Improved. Patient was able to tolerate tubi and ACE wrap some of the time this week. Legs are less swollen and reddened and painful. She was prescribed Metolazone by the new renal specialist she is seeing tomorrow. This will benefit her swelling as well. Continued issues with coccyx wound. This is a pressure issue. Continue POC and provided instruction on pressure reduction techniques. Plan:       Discharge Instructions       PHYSICIAN ORDERS AND DISCHARGE INSTRUCTIONS     NOTE: Upon discharge from the [address], you will receive a patient experience survey. We would be grateful if you would take the time to fill this survey out.     Wound care order history:                 CYNTHIA's   Right       Left    Date               Vascular studies:  1/13/21 Arterial were okay, Venous- possibly needs intervention (reffered to [facility])              Cultures:  12/22/20              Antibiotics:                  Compression/Lymph Pumps: referral made to Lymphedema clinic. Pt unable to tolerate compression wraps. Tubi  applied               Grafts:                  Continuing wound care orders and information:              Residence: Private              Continue home health care with: [id number] wound-care supplies will be provided by: . [id number]                            ICTJP cleansing:                           CC not scrub or use excessive force.                         SPEY hands with soap and water before and after dressing changes.                         Prior to applying a clean dressing, cleanse wound with normal saline,                          wound cleanser, or mild soap and water.                           Ask your physician or nurse before getting the wound(s) wet in the shower.              Daily Wound management:                          Keep weight off wounds and reposition every 2 hours.                          WCSKQ standing for long periods of time.                          Apply wraps/stockings in AM and remove at bedtime.                          Elevate legs to the level of the heart or above for 30 minutes                          4-5 times a day and/or when sitting.                                               When taking antibiotics take entire prescription as ordered by                           MD do not stop taking until medicine is all gone.                              Orders for this week (3/2/21)  Bilateral lower legs- Wash with mild soap and water. Wash with alpha bath   Lotrisone and A&D to intact skin.    Apply saline dampened Marianna to wound bed  Cover draining areas with calcium alginate, and ABD    Wrap with conform secure with tape  Apply tubi  F    Wrap with Ace wrap  Change Tuesday, thursday and Saturday     Buttocks: Apply lotrisone to micha wound  Marianna to wound bed  Mepilex border      Rx: Erlinda Sanchez   Refferals: Lympedema clinic in [place]     -Nurse Visit: No  -Follow up with Sara Turcios CNP in 1 weeks in the wound care center  Call [phone] for any questions or concerns        Treatment Note      Written Patient Dismissal Instructions Given            Electronically signed by TREVON Tovar CNP on 3/2/2021 at 1:46 PM

## 2021-03-09 ENCOUNTER — HOSPITAL ENCOUNTER (OUTPATIENT)
Dept: WOUND CARE | Age: 86
Discharge: HOME | End: 2021-03-09
Payer: MEDICARE

## 2021-03-09 VITALS — TEMPERATURE: 97.2 F | DIASTOLIC BLOOD PRESSURE: 42 MMHG | HEART RATE: 77 BPM | SYSTOLIC BLOOD PRESSURE: 110 MMHG

## 2021-03-09 DIAGNOSIS — L89.323 PRESSURE INJURY OF LEFT BUTTOCK, STAGE 3 (HCC): Primary | ICD-10-CM

## 2021-03-09 DIAGNOSIS — I89.0 LYMPHEDEMA OF BOTH LOWER EXTREMITIES: ICD-10-CM

## 2021-03-09 PROCEDURE — 99213 OFFICE O/P EST LOW 20 MIN: CPT

## 2021-03-09 PROCEDURE — 99213 OFFICE O/P EST LOW 20 MIN: CPT | Performed by: NURSE PRACTITIONER

## 2021-03-09 RX ORDER — BACITRACIN ZINC AND POLYMYXIN B SULFATE 500; 1000 [USP'U]/G; [USP'U]/G
OINTMENT TOPICAL ONCE
Status: CANCELLED | OUTPATIENT
Start: 2021-03-09 | End: 2021-03-09

## 2021-03-09 RX ORDER — GENTAMICIN SULFATE 1 MG/G
OINTMENT TOPICAL ONCE
Status: CANCELLED | OUTPATIENT
Start: 2021-03-09 | End: 2021-03-09

## 2021-03-09 RX ORDER — BACITRACIN, NEOMYCIN, POLYMYXIN B 400; 3.5; 5 [USP'U]/G; MG/G; [USP'U]/G
OINTMENT TOPICAL ONCE
Status: CANCELLED | OUTPATIENT
Start: 2021-03-09 | End: 2021-03-09

## 2021-03-09 RX ORDER — LIDOCAINE 50 MG/G
OINTMENT TOPICAL ONCE
Status: CANCELLED | OUTPATIENT
Start: 2021-03-09 | End: 2021-03-09

## 2021-03-09 RX ORDER — CLOBETASOL PROPIONATE 0.5 MG/G
OINTMENT TOPICAL ONCE
Status: CANCELLED | OUTPATIENT
Start: 2021-03-09 | End: 2021-03-09

## 2021-03-09 RX ORDER — LIDOCAINE HYDROCHLORIDE 20 MG/ML
JELLY TOPICAL ONCE
Status: CANCELLED | OUTPATIENT
Start: 2021-03-09 | End: 2021-03-09

## 2021-03-09 RX ORDER — LIDOCAINE HYDROCHLORIDE 40 MG/ML
SOLUTION TOPICAL ONCE
Status: CANCELLED | OUTPATIENT
Start: 2021-03-09 | End: 2021-03-09

## 2021-03-09 RX ORDER — GINSENG 100 MG
CAPSULE ORAL ONCE
Status: CANCELLED | OUTPATIENT
Start: 2021-03-09 | End: 2021-03-09

## 2021-03-09 RX ORDER — BETAMETHASONE DIPROPIONATE 0.05 %
OINTMENT (GRAM) TOPICAL ONCE
Status: CANCELLED | OUTPATIENT
Start: 2021-03-09 | End: 2021-03-09

## 2021-03-09 RX ORDER — LIDOCAINE 40 MG/G
CREAM TOPICAL ONCE
Status: CANCELLED | OUTPATIENT
Start: 2021-03-09 | End: 2021-03-09

## 2021-03-09 NOTE — PROGRESS NOTES
Wound Care Center Progress Note       Nestora Round  AGE: 82 y.o. GENDER: female  : 1935  TODAY'S DATE:  3/9/2021        Subjective:     Chief Complaint   Patient presents with    Wound Check     BLE         HISTORY of PRESENT ILLNESS    Connie Gutierrez a 82 y. o. female who presents today for wound evaluation of Chronic lymphedema ulcer(s) of bilateral lower legs.  The ulcer is of mild severity.  The underlying cause of the wound is lymphedema.  Arterial and venous U/S were negative for PAD and CVI.  Ulcerations have decreased in size. Patient has had her renal meds adjusted and is now reporting improvement in swelling and pain in legs. She has been wrapping legs as tolerated.    Wound Pain Timing/Severity: intermittent, moderate  Quality of pain: tender, pressure  Severity of pain:  3 / 10   Modifying Factors: lymphedema and obesity  Associated Signs/Symptoms: edema, erythema, drainage and pain        PAST MEDICAL HISTORY        Diagnosis Date    Asthma     Chronic kidney disease     acute kidney failure    Chronic ulcer of left leg with fat layer exposed ([address] 3/7/2016    Chronic ulcer of right leg with fat layer exposed ([address] 3/7/2016    Diabetes type 2, controlled ([address]     History of asthma     History of blood transfusion 2015    Hypertension     Lymphedema of both lower extremities 3/7/2016    Osteoarthritis     Pneumonia     Thyroid disease     Venous stasis of both lower extremities 3/7/2016    WD-Non-pressure chronic ulcer right lower leg, 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 ([address] 2016       PAST SURGICAL HISTORY    Past Surgical History:   Procedure Laterality Date    APPENDECTOMY      CHOLECYSTECTOMY      CYSTOSCOPY      EYE SURGERY      bilateral implants    HYSTERECTOMY      JOINT REPLACEMENT Right     knee    PACEMAKER INSERTION N/A 2020    PACEMAKER INSERTION PERMANENT REMOVAL OF TEMPORARY PACEMAKER performed by Walker Fernandez MD at [address] [place] [address] SURGERY         FAMILY HISTORY    Family History   Problem Relation Age of Onset    Heart Disease Father        SOCIAL HISTORY    Social History     Tobacco Use    Smoking status: Never Smoker    Smokeless tobacco: Never Used   Substance Use Topics    Alcohol use: No    Drug use: No       ALLERGIES    Allergies   Allergen Reactions    Latex Rash    Dye [Iodides] Anaphylaxis    Iodine Anaphylaxis    Dyazide [Hydrochlorothiazide W-Triamterene] Rash    Lasix [Furosemide] Rash    Procardia [Nifedipine] Other (See Comments)     Not for sure if rash occurred or rash    Doxycycline Dermatitis    Edecrin [Ethacrynic Acid]     Levaquin [Levofloxacin] Other (See Comments)     unknown    Mobic [Meloxicam]     Vioxx [Rofecoxib]     Celebrex [Celecoxib] Rash    Lexapro [Escitalopram Oxalate] Rash    Sulfa Antibiotics Hives       MEDICATIONS    Current Outpatient Medications on File Prior to Encounter   Medication Sig Dispense Refill    spironolactone (ALDACTONE) 25 MG tablet Take 1 tablet by mouth daily 30 tablet 3    Fluticasone Propionate, Inhal, (FLOVENT IN) Inhale 2 puffs into the lungs 2 times daily      metOLazone (ZAROXOLYN) 5 MG tablet Take 1 tablet by mouth daily 30 tablet 5    silver sulfADIAZINE (SILVADENE) 1 % cream Apply topically daily. 240 g 5    clotrimazole-betamethasone (LOTRISONE) 1-0.05 % cream Apply to legs and wound beds with dressing changes and as needed.  45 g 1    febuxostat (ULORIC) 40 MG TABS tablet Take 1 tablet by mouth daily 30 tablet 5    torsemide (DEMADEX) 20 MG tablet Take 1 tablet by mouth 3 times daily Pt to take 40 mg in am and 20 mg in the afternoon 90 tablet 3    docusate sodium (COLACE) 100 MG capsule Take 1 capsule by mouth daily 30 capsule 3    bumetanide (BUMEX) 1 MG tablet Take 1 tablet by mouth 2 times daily 30 tablet 3    cilostazol (PLETAL) 50 MG tablet Take 1 tablet by mouth 2 times daily 60 tablet 3    rOPINIRole (REQUIP) 0.5 MG tablet Take 1 tablet by mouth 3 times daily 90 tablet 3    potassium chloride (KLOR-CON M) 20 MEQ extended release tablet Take 1 tablet by mouth 2 times daily 180 tablet 0    metoprolol succinate (TOPROL XL) 25 MG extended release tablet Take 1 tablet by mouth daily 30 tablet 3    famotidine (PEPCID) 20 MG tablet Take 1 tablet by mouth 2 times daily 60 tablet 5    levothyroxine (SYNTHROID) 50 MCG tablet Take 50 mcg by mouth Daily      Polyethylene Glycol 3350 (MIRALAX PO) Take by mouth      fluticasone (FLONASE) 50 MCG/ACT nasal spray 1 spray by Nasal route 2 times daily       Loratadine (CLARITIN) 10 MG CAPS Take 10 mg by mouth daily      ferrous sulfate 325 (65 FE) MG tablet Take 1 tablet by mouth 2 times daily (with meals) 30 tablet 3    latanoprost (XALATAN) 0.005 % ophthalmic solution Place 1 drop into both eyes nightly      OXYGEN Inhale 2 L/min into the lungs nightly      Multiple Vitamins-Minerals (ICAPS) CAPS Take  by mouth.  acetaminophen (TYLENOL) 500 MG tablet Take 500 mg by mouth every 6 hours as needed for Pain.  calcium carbonate 600 MG TABS tablet Take 1 tablet by mouth daily. No current facility-administered medications on file prior to encounter. REVIEW OF SYSTEMS    Pertinent items are noted in HPI. Constitutional: Negative for systemic symptoms including fever, chills and malaise. Objective:      BP (!) 110/42   Pulse 77   Temp 97.2 °F (36.2 °C)     PHYSICAL EXAM      General: The patient is in no acute distress. Mental status:  Patient is appropriate, is  oriented to place and plan of care. Dermatologic exam: Visual inspection of the periwound reveals the skin to be dry and edematous. Wound exam:  see wound description below     All active wounds listed below with today's date are evaluated      Wound 02/09/21 Buttocks #3 Buttocks Cluster (Active)   Wound Image   02/09/21 1324   Wound Etiology Pressure Stage  3 03/02/21 1321   Dressing Status Clean;Dry; Intact; New dressing applied 03/02/21 1418   Wound Cleansed Cleansed with saline 03/02/21 1321   Dressing/Treatment Silicone border;Silver dressing 03/02/21 1418   Wound Length (cm) 0.7 cm 03/09/21 1302   Wound Width (cm) 0.5 cm 03/09/21 1302   Wound Depth (cm) 0.1 cm 03/09/21 1302   Wound Surface Area (cm^2) 0.35 cm^2 03/09/21 1302   Change in Wound Size % (l*w) -66.67 03/09/21 1302   Wound Volume (cm^3) 0.04 cm^3 03/09/21 1302   Wound Healing % -100 03/09/21 1302   Distance Tunneling (cm) 0 cm 03/09/21 1302   Tunneling Position ___ O'Clock 0 03/09/21 1302   Undermining Starts ___ O'Clock 0 03/09/21 1302   Undermining Ends___ O'Clock 0 03/09/21 1302   Undermining Maxium Distance (cm) 0 03/09/21 1302   Wound Assessment Pale granulation tissue 03/09/21 1302   Drainage Amount Moderate 03/09/21 1302   Drainage Description Serosanguinous 03/09/21 1302   Odor None 03/09/21 1302   Leatha-wound Assessment Blanchable erythema 03/09/21 1302   Margins Defined edges 03/09/21 1302   Wound Thickness Description not for Pressure Injury Full thickness 03/09/21 1302   Number of days: 28       Assessment:       Problem List Items Addressed This Visit     WD-Lymphedema of both lower extremities with cellulitis    Relevant Orders    Supply: Wound Cleanser    WD-Pressure injury of left buttock, stage 3 ([facility] - Primary    Relevant Orders    Supply: Wound Cleanser          Status of wound progress and description from last visit:   Leg wounds are healed and remain closed. Encouraged moisturizing creams to lower legs and feet, as well as continued compression with tubis and ACE when able. Buttock wounds are much improved. Continue lotrisone and A&D, as well as padding, hopefully will be healed by next week. No refills needed. Plan:     Discharge Instructions       PHYSICIAN ORDERS AND DISCHARGE INSTRUCTIONS     NOTE: Upon discharge from the [address], you will receive a patient experience survey.  We would be grateful if you would take the time to fill this survey out.     Wound care order history:                 CYNTHIA's   Right       Left    Date               Vascular studies:  1/13/21 Arterial were okay, Venous- possibly needs intervention (reffered to [facility])              Cultures:  12/22/20              Antibiotics:                  Compression/Lymph Pumps: referral made to Lymphedema clinic. Pt unable to tolerate compression wraps. Tubi  applied               Grafts:                  Continuing wound care orders and information:              Residence: [facility] home health care [address] [place] [address]              Your wound-care supplies will be provided by: . [address] DELGADO cleansing:                           UG not scrub or use excessive force.                          Wash hands with soap and water before and after dressing changes.                         Prior to applying a clean dressing, cleanse wound with normal saline,                          wound cleanser, or mild soap and water.                           Ask your physician or nurse before getting the wound(s) wet in the shower.              Daily Wound management:                          Keep weight off wounds and reposition every 2 hours.                          BBQWS standing for long periods of time.                          Apply wraps/stockings in AM and remove at bedtime.                          Elevate legs to the level of the heart or above for 30 minutes                          4-5 times a day and/or when sitting.                                               When taking antibiotics take entire prescription as ordered by                           MD do not stop taking until medicine is all gone.                              Orders for this week (3/9/21)  Bilateral lower legs- Wash with mild soap and water. Wash with alpha bath   Lotrisone and A&D to intact skin.    Apply tubi  F    Wrap with Ace wrap  Change Tuesday, thursday and Saturday     Buttocks: Apply lotrisone to micha wound  Marianna to wound bed  Mepilex border      Rx: Roverto Sequeira   Refferals: Lympedema clinic in [place]     -Nurse Visit: No  -Follow up with Heidy Wray CNP in 1 weeks in the wound care center  Call [phone] for any questions or concerns        Treatment Note      Written Patient Dismissal Instructions Given            Electronically signed by TREVON Chairez CNP on 3/9/2021 at 1:32 PM

## 2021-03-16 ENCOUNTER — HOSPITAL ENCOUNTER (OUTPATIENT)
Dept: WOUND CARE | Age: 86
Discharge: HOME | End: 2021-03-16
Payer: MEDICARE

## 2021-03-16 VITALS
RESPIRATION RATE: 19 BRPM | TEMPERATURE: 97.2 F | SYSTOLIC BLOOD PRESSURE: 148 MMHG | HEART RATE: 78 BPM | DIASTOLIC BLOOD PRESSURE: 62 MMHG

## 2021-03-16 DIAGNOSIS — I89.0 LYMPHEDEMA OF BOTH LOWER EXTREMITIES: ICD-10-CM

## 2021-03-16 DIAGNOSIS — L89.323 PRESSURE INJURY OF LEFT BUTTOCK, STAGE 3 (HCC): Primary | ICD-10-CM

## 2021-03-16 PROCEDURE — 99213 OFFICE O/P EST LOW 20 MIN: CPT | Performed by: NURSE PRACTITIONER

## 2021-03-16 PROCEDURE — 99212 OFFICE O/P EST SF 10 MIN: CPT

## 2021-03-16 RX ORDER — LIDOCAINE HYDROCHLORIDE 20 MG/ML
JELLY TOPICAL ONCE
Status: CANCELLED | OUTPATIENT
Start: 2021-03-16 | End: 2021-03-16

## 2021-03-16 RX ORDER — GINSENG 100 MG
CAPSULE ORAL ONCE
Status: CANCELLED | OUTPATIENT
Start: 2021-03-16 | End: 2021-03-16

## 2021-03-16 RX ORDER — BACITRACIN ZINC AND POLYMYXIN B SULFATE 500; 1000 [USP'U]/G; [USP'U]/G
OINTMENT TOPICAL ONCE
Status: CANCELLED | OUTPATIENT
Start: 2021-03-16 | End: 2021-03-16

## 2021-03-16 RX ORDER — BETAMETHASONE DIPROPIONATE 0.05 %
OINTMENT (GRAM) TOPICAL ONCE
Status: CANCELLED | OUTPATIENT
Start: 2021-03-16 | End: 2021-03-16

## 2021-03-16 RX ORDER — GENTAMICIN SULFATE 1 MG/G
OINTMENT TOPICAL ONCE
Status: CANCELLED | OUTPATIENT
Start: 2021-03-16 | End: 2021-03-16

## 2021-03-16 RX ORDER — LIDOCAINE 50 MG/G
OINTMENT TOPICAL ONCE
Status: CANCELLED | OUTPATIENT
Start: 2021-03-16 | End: 2021-03-16

## 2021-03-16 RX ORDER — CLOBETASOL PROPIONATE 0.5 MG/G
OINTMENT TOPICAL ONCE
Status: CANCELLED | OUTPATIENT
Start: 2021-03-16 | End: 2021-03-16

## 2021-03-16 RX ORDER — BACITRACIN, NEOMYCIN, POLYMYXIN B 400; 3.5; 5 [USP'U]/G; MG/G; [USP'U]/G
OINTMENT TOPICAL ONCE
Status: CANCELLED | OUTPATIENT
Start: 2021-03-16 | End: 2021-03-16

## 2021-03-16 RX ORDER — LIDOCAINE 40 MG/G
CREAM TOPICAL ONCE
Status: CANCELLED | OUTPATIENT
Start: 2021-03-16 | End: 2021-03-16

## 2021-03-16 RX ORDER — LIDOCAINE HYDROCHLORIDE 40 MG/ML
SOLUTION TOPICAL ONCE
Status: CANCELLED | OUTPATIENT
Start: 2021-03-16 | End: 2021-03-16

## 2021-03-16 NOTE — PROGRESS NOTES
[facility]RA SURGERY         FAMILY HISTORY    Family History   Problem Relation Age of Onset    Heart Disease Father        SOCIAL HISTORY    Social History     Tobacco Use    Smoking status: Never Smoker    Smokeless tobacco: Never Used   Substance Use Topics    Alcohol use: No    Drug use: No       ALLERGIES    Allergies   Allergen Reactions    Latex Rash    Dye [Iodides] Anaphylaxis    Iodine Anaphylaxis    Dyazide [Hydrochlorothiazide W-Triamterene] Rash    Lasix [Furosemide] Rash    Procardia [Nifedipine] Other (See Comments)     Not for sure if rash occurred or rash    Doxycycline Dermatitis    Edecrin [Ethacrynic Acid]     Levaquin [Levofloxacin] Other (See Comments)     unknown    Mobic [Meloxicam]     Vioxx [Rofecoxib]     Celebrex [Celecoxib] Rash    Lexapro [Escitalopram Oxalate] Rash    Sulfa Antibiotics Hives       MEDICATIONS    Current Outpatient Medications on File Prior to Encounter   Medication Sig Dispense Refill    spironolactone (ALDACTONE) 25 MG tablet Take 1 tablet by mouth daily 30 tablet 3    Fluticasone Propionate, Inhal, (FLOVENT IN) Inhale 2 puffs into the lungs 2 times daily      metOLazone (ZAROXOLYN) 5 MG tablet Take 1 tablet by mouth daily 30 tablet 5    silver sulfADIAZINE (SILVADENE) 1 % cream Apply topically daily. 240 g 5    clotrimazole-betamethasone (LOTRISONE) 1-0.05 % cream Apply to legs and wound beds with dressing changes and as needed.  45 g 1    febuxostat (ULORIC) 40 MG TABS tablet Take 1 tablet by mouth daily 30 tablet 5    torsemide (DEMADEX) 20 MG tablet Take 1 tablet by mouth 3 times daily Pt to take 40 mg in am and 20 mg in the afternoon 90 tablet 3    docusate sodium (COLACE) 100 MG capsule Take 1 capsule by mouth daily 30 capsule 3    bumetanide (BUMEX) 1 MG tablet Take 1 tablet by mouth 2 times daily 30 tablet 3    cilostazol (PLETAL) 50 MG tablet Take 1 tablet by mouth 2 times daily 60 tablet 3    rOPINIRole (REQUIP) 0.5 MG tablet Take 1 tablet by mouth 3 times daily 90 tablet 3    potassium chloride (KLOR-CON M) 20 MEQ extended release tablet Take 1 tablet by mouth 2 times daily 180 tablet 0    metoprolol succinate (TOPROL XL) 25 MG extended release tablet Take 1 tablet by mouth daily 30 tablet 3    famotidine (PEPCID) 20 MG tablet Take 1 tablet by mouth 2 times daily 60 tablet 5    levothyroxine (SYNTHROID) 50 MCG tablet Take 50 mcg by mouth Daily      Polyethylene Glycol 3350 (MIRALAX PO) Take by mouth      fluticasone (FLONASE) 50 MCG/ACT nasal spray 1 spray by Nasal route 2 times daily       Loratadine (CLARITIN) 10 MG CAPS Take 10 mg by mouth daily      ferrous sulfate 325 (65 FE) MG tablet Take 1 tablet by mouth 2 times daily (with meals) 30 tablet 3    latanoprost (XALATAN) 0.005 % ophthalmic solution Place 1 drop into both eyes nightly      OXYGEN Inhale 2 L/min into the lungs nightly      Multiple Vitamins-Minerals (ICAPS) CAPS Take  by mouth.  acetaminophen (TYLENOL) 500 MG tablet Take 500 mg by mouth every 6 hours as needed for Pain.  calcium carbonate 600 MG TABS tablet Take 1 tablet by mouth daily. No current facility-administered medications on file prior to encounter. REVIEW OF SYSTEMS    Pertinent items are noted in HPI. Constitutional: Negative for systemic symptoms including fever, chills and malaise. Objective:      BP (!) 148/62   Pulse 78   Temp 97.2 °F (36.2 °C) (Temporal)   Resp 19     PHYSICAL EXAM      General: The patient is in no acute distress. Mental status:  Patient is appropriate, is  oriented to place and plan of care. Dermatologic exam: Visual inspection of the periwound reveals the skin to be normal in turgor and texture, dry and clammy.   Wound exam:  see wound description below     All active wounds listed below with today's date are evaluated           Assessment:       Problem List Items Addressed This Visit     WD-Lymphedema of both lower extremities with cellulitis    Relevant Orders    Supply: Wound Cleanser    Supply: Leatha Wound    Supply: Cover and Secure    WD-Pressure injury of left buttock, stage 3 (HCC) - Primary    Relevant Orders    Supply: Wound Cleanser    Supply: Leatha Wound    Supply: Cover and Secure          Status of wound progress and description from last visit:   Healed. Legs remain lesion free and wounds to bilat buttocks have healed. Encouraged patient to focus on moisturizing and compressing legs going forward as well as elevating, as she states when she is up and moving her legs turn bright red with dependent edema. Encouraged patient to continue to wear border gauze on buttocks as well, and focus on moisture control. Plan:     Discharge Instructions       PHYSICIAN ORDERS AND DISCHARGE INSTRUCTIONS     NOTE: Upon discharge from the [address], you will receive a patient experience survey. We would be grateful if you would take the time to fill this survey out.     Wound care order history:                 CYNTHIA's   Right       Left    Date               Vascular studies:  1/13/21 Arterial were okay, Venous- possibly needs intervention (reffered to [facility])              Cultures:  12/22/20              Antibiotics:                  Compression/Lymph Pumps: referral made to Lymphedema clinic. Pt unable to tolerate compression wraps. Tubi  applied               Grafts:                  Continuing wound care orders and information:              Residence: Private              [facility] [address] [place] [address]              Your wound-care supplies will be provided by: . [id number]                            TTEOZ cleansing:                           CD not scrub or use excessive force.                          Wash hands with soap and water before and after dressing changes.                         Prior to applying a clean dressing, cleanse wound with normal saline,                          wound cleanser, or mild soap and water.                         WGZ your physician or nurse before getting the wound(s) wet in the shower.              Daily Wound management:                          Keep weight off wounds and reposition every 2 hours.                          LZHRA standing for long periods of time.                          JNZZD wraps/stockings in AM and remove at bedtime.                          Elevate legs to the level of the heart or above for 30 minutes                          4-5 times a day and/or when sitting.                                               When taking antibiotics take entire prescription as ordered by                           MD do not stop taking until medicine is all gone.                              Orders for this week (3/9/21)  Bilateral lower legs- Wash with mild soap and water. Wash with alpha bath   Lotrisone and A&D to intact skin.    Apply tubi  F    Wrap with Ace wrap  Change Tuesday, thursday and Saturday     Buttocks: Apply lotrisone to micha wound  Mepilex border      Rx: Oletha Schwab   Refferals: Lympedema clinic in [place]     -Nurse Visit: No  -Follow up with Jose Young CNP in 1 weeks in the wound care center  Call [phone] for any questions or concerns           Treatment Note      Written Patient Dismissal Instructions Given            Electronically signed by TREVON Campbell CNP on 3/16/2021 at 1:12 PM

## 2021-03-20 ENCOUNTER — HOSPITAL ENCOUNTER (OUTPATIENT)
Age: 86
Discharge: HOME | End: 2021-03-20
Payer: MEDICARE

## 2021-03-20 LAB
BACTERIA: ABNORMAL /HPF
BILIRUBIN URINE: NEGATIVE MG/DL
BLOOD, URINE: ABNORMAL
CAST TYPE: NEGATIVE /HPF
CLARITY: ABNORMAL
COLOR: YELLOW
CRYSTAL TYPE: NEGATIVE /HPF
EPITHELIAL CELLS, UA: ABNORMAL /HPF
GLUCOSE, URINE: NEGATIVE MG/DL
KETONES, URINE: NEGATIVE MG/DL
LEUKOCYTE ESTERASE, URINE: ABNORMAL
NITRITE URINE, QUANTITATIVE: NEGATIVE
PH, URINE: 7.5 (ref 5–8)
PROTEIN UA: ABNORMAL MG/DL
RBC URINE: ABNORMAL /HPF (ref 0–6)
SPECIFIC GRAVITY UA: 1.01 (ref 1–1.03)
UROBILINOGEN, URINE: 0.2 MG/DL (ref 0.2–1)
WBC UA: ABNORMAL /HPF (ref 0–5)

## 2021-03-20 PROCEDURE — 87086 URINE CULTURE/COLONY COUNT: CPT

## 2021-03-20 PROCEDURE — 87077 CULTURE AEROBIC IDENTIFY: CPT

## 2021-03-20 PROCEDURE — 87186 SC STD MICRODIL/AGAR DIL: CPT

## 2021-03-20 PROCEDURE — 81001 URINALYSIS AUTO W/SCOPE: CPT

## 2021-03-23 LAB
CULTURE: ABNORMAL
Lab: ABNORMAL
SPECIMEN: ABNORMAL

## 2021-05-06 ENCOUNTER — HOSPITAL ENCOUNTER (OUTPATIENT)
Dept: GENERAL RADIOLOGY | Age: 86
Discharge: HOME | End: 2021-05-06
Payer: MEDICARE

## 2021-05-06 ENCOUNTER — HOSPITAL ENCOUNTER (OUTPATIENT)
Age: 86
Discharge: HOME | End: 2021-05-06
Payer: MEDICARE

## 2021-05-06 DIAGNOSIS — M19.91: ICD-10-CM

## 2021-05-06 PROCEDURE — 73564 X-RAY EXAM KNEE 4 OR MORE: CPT

## 2021-05-06 PROCEDURE — 73030 X-RAY EXAM OF SHOULDER: CPT

## 2021-06-02 ENCOUNTER — HOSPITAL ENCOUNTER (OUTPATIENT)
Age: 86
Discharge: HOME | End: 2021-06-02
Payer: MEDICARE

## 2021-06-02 LAB
ALT SERPL-CCNC: 8 U/L (ref 10–40)
ANION GAP SERPL CALCULATED.3IONS-SCNC: 8 MMOL/L (ref 4–16)
BASOPHILS ABSOLUTE: 0.1 K/CU MM
BASOPHILS RELATIVE PERCENT: 0.8 % (ref 0–1)
BUN BLDV-MCNC: 92 MG/DL (ref 6–23)
CALCIUM SERPL-MCNC: 10 MG/DL (ref 8.3–10.6)
CHLORIDE BLD-SCNC: 91 MMOL/L (ref 99–110)
CHOLESTEROL, FASTING: 154 MG/DL
CO2: 37 MMOL/L (ref 21–32)
CREAT SERPL-MCNC: 1.9 MG/DL (ref 0.6–1.1)
DIFFERENTIAL TYPE: ABNORMAL
EOSINOPHILS ABSOLUTE: 0.3 K/CU MM
EOSINOPHILS RELATIVE PERCENT: 4 % (ref 0–3)
GFR AFRICAN AMERICAN: 30 ML/MIN/1.73M2
GFR NON-AFRICAN AMERICAN: 25 ML/MIN/1.73M2
GLUCOSE BLD-MCNC: 148 MG/DL (ref 70–99)
HCT VFR BLD CALC: 37.5 % (ref 37–47)
HDLC SERPL-MCNC: 55 MG/DL
HEMOGLOBIN: 12.1 GM/DL (ref 12.5–16)
IMMATURE NEUTROPHIL %: 0.3 % (ref 0–0.43)
LDL CHOLESTEROL DIRECT: 97 MG/DL
LYMPHOCYTES ABSOLUTE: 1.4 K/CU MM
LYMPHOCYTES RELATIVE PERCENT: 22 % (ref 24–44)
MCH RBC QN AUTO: 30.8 PG (ref 27–31)
MCHC RBC AUTO-ENTMCNC: 32.3 % (ref 32–36)
MCV RBC AUTO: 95.4 FL (ref 78–100)
MONOCYTES ABSOLUTE: 0.8 K/CU MM
MONOCYTES RELATIVE PERCENT: 11.6 % (ref 0–4)
PDW BLD-RTO: 12.5 % (ref 11.7–14.9)
PLATELET # BLD: 261 K/CU MM (ref 140–440)
PMV BLD AUTO: 9 FL (ref 7.5–11.1)
POTASSIUM SERPL-SCNC: 2.7 MMOL/L (ref 3.5–5.1)
RBC # BLD: 3.93 M/CU MM (ref 4.2–5.4)
SEGMENTED NEUTROPHILS ABSOLUTE COUNT: 4 K/CU MM
SEGMENTED NEUTROPHILS RELATIVE PERCENT: 61.3 % (ref 36–66)
SODIUM BLD-SCNC: 136 MMOL/L (ref 135–145)
TOTAL IMMATURE NEUTOROPHIL: 0.02 K/CU MM
TRIGLYCERIDE, FASTING: 77 MG/DL
TSH HIGH SENSITIVITY: 2.97 UIU/ML (ref 0.27–4.2)
WBC # BLD: 6.6 K/CU MM (ref 4–10.5)

## 2021-06-02 PROCEDURE — 84443 ASSAY THYROID STIM HORMONE: CPT

## 2021-06-02 PROCEDURE — 84460 ALANINE AMINO (ALT) (SGPT): CPT

## 2021-06-02 PROCEDURE — 36415 COLL VENOUS BLD VENIPUNCTURE: CPT

## 2021-06-02 PROCEDURE — 80061 LIPID PANEL: CPT

## 2021-06-02 PROCEDURE — 85025 COMPLETE CBC W/AUTO DIFF WBC: CPT

## 2021-06-02 PROCEDURE — 80048 BASIC METABOLIC PNL TOTAL CA: CPT

## 2021-06-02 PROCEDURE — 82306 VITAMIN D 25 HYDROXY: CPT

## 2021-06-08 LAB
VITAMIN D 1,25-DIHYDROXY: 23 PG/ML (ref 19.9–79.3)
VITAMIN D 25-HYDROXY: 33 NG/ML

## 2021-06-16 ENCOUNTER — HOSPITAL ENCOUNTER (OUTPATIENT)
Dept: GENERAL RADIOLOGY | Age: 86
Discharge: HOME | End: 2021-06-16
Payer: MEDICARE

## 2021-06-16 ENCOUNTER — HOSPITAL ENCOUNTER (OUTPATIENT)
Age: 86
Discharge: HOME | End: 2021-06-16
Payer: MEDICARE

## 2021-06-16 DIAGNOSIS — J20.9 ACUTE BRONCHITIS, UNSPECIFIED ORGANISM: ICD-10-CM

## 2021-06-16 LAB
ALBUMIN SERPL-MCNC: 4.5 GM/DL (ref 3.4–5)
ALP BLD-CCNC: 70 IU/L (ref 40–129)
ALT SERPL-CCNC: 10 U/L (ref 10–40)
ANION GAP SERPL CALCULATED.3IONS-SCNC: 12 MMOL/L (ref 4–16)
AST SERPL-CCNC: 13 IU/L (ref 15–37)
BACTERIA: NEGATIVE /HPF
BILIRUB SERPL-MCNC: 0.5 MG/DL (ref 0–1)
BILIRUBIN URINE: NEGATIVE MG/DL
BLOOD, URINE: NEGATIVE
BUN BLDV-MCNC: 82 MG/DL (ref 6–23)
CALCIUM SERPL-MCNC: 9.8 MG/DL (ref 8.3–10.6)
CAST TYPE: ABNORMAL /HPF
CHLORIDE BLD-SCNC: 91 MMOL/L (ref 99–110)
CLARITY: CLEAR
CO2: 31 MMOL/L (ref 21–32)
COLOR: YELLOW
CREAT SERPL-MCNC: 2.1 MG/DL (ref 0.6–1.1)
CRYSTAL TYPE: NEGATIVE /HPF
EPITHELIAL CELLS, UA: 9 /HPF
GFR AFRICAN AMERICAN: 27 ML/MIN/1.73M2
GFR NON-AFRICAN AMERICAN: 22 ML/MIN/1.73M2
GLUCOSE FASTING: 118 MG/DL (ref 70–99)
GLUCOSE, URINE: NEGATIVE MG/DL
KETONES, URINE: NEGATIVE MG/DL
LEUKOCYTE ESTERASE, URINE: ABNORMAL
MAGNESIUM: 2.1 MG/DL (ref 1.8–2.4)
NITRITE URINE, QUANTITATIVE: NEGATIVE
PH, URINE: 7 (ref 5–8)
PHOSPHORUS: 4.7 MG/DL (ref 2.5–4.9)
POTASSIUM SERPL-SCNC: 3.3 MMOL/L (ref 3.5–5.1)
PROTEIN UA: NEGATIVE MG/DL
RBC URINE: ABNORMAL /HPF (ref 0–6)
SODIUM BLD-SCNC: 134 MMOL/L (ref 135–145)
SPECIFIC GRAVITY UA: 1.01 (ref 1–1.03)
TOTAL PROTEIN: 7.7 GM/DL (ref 6.4–8.2)
UROBILINOGEN, URINE: 0.2 MG/DL (ref 0.2–1)
WBC UA: 4 /HPF (ref 0–5)

## 2021-06-16 PROCEDURE — 80053 COMPREHEN METABOLIC PANEL: CPT

## 2021-06-16 PROCEDURE — 36415 COLL VENOUS BLD VENIPUNCTURE: CPT

## 2021-06-16 PROCEDURE — 83735 ASSAY OF MAGNESIUM: CPT

## 2021-06-16 PROCEDURE — 84100 ASSAY OF PHOSPHORUS: CPT

## 2021-06-16 PROCEDURE — 71046 X-RAY EXAM CHEST 2 VIEWS: CPT

## 2021-06-16 PROCEDURE — 81001 URINALYSIS AUTO W/SCOPE: CPT

## 2021-06-21 ENCOUNTER — HOSPITAL ENCOUNTER (OUTPATIENT)
Age: 86
Discharge: HOME | End: 2021-06-21
Payer: MEDICARE

## 2021-06-21 LAB
ANION GAP SERPL CALCULATED.3IONS-SCNC: 13 MMOL/L (ref 4–16)
BUN BLDV-MCNC: 78 MG/DL (ref 6–23)
CALCIUM SERPL-MCNC: 9.5 MG/DL (ref 8.3–10.6)
CHLORIDE BLD-SCNC: 90 MMOL/L (ref 99–110)
CO2: 33 MMOL/L (ref 21–32)
CREAT SERPL-MCNC: 2.1 MG/DL (ref 0.6–1.1)
GFR AFRICAN AMERICAN: 27 ML/MIN/1.73M2
GFR NON-AFRICAN AMERICAN: 22 ML/MIN/1.73M2
GLUCOSE FASTING: 148 MG/DL (ref 70–99)
POTASSIUM SERPL-SCNC: 3.4 MMOL/L (ref 3.5–5.1)
SODIUM BLD-SCNC: 136 MMOL/L (ref 135–145)

## 2021-06-21 PROCEDURE — 80048 BASIC METABOLIC PNL TOTAL CA: CPT

## 2021-06-21 PROCEDURE — 36415 COLL VENOUS BLD VENIPUNCTURE: CPT

## 2021-06-29 ENCOUNTER — HOSPITAL ENCOUNTER (OUTPATIENT)
Age: 86
Discharge: HOME | End: 2021-06-29
Payer: MEDICARE

## 2021-06-29 LAB
ANION GAP SERPL CALCULATED.3IONS-SCNC: 10 MMOL/L (ref 4–16)
BUN BLDV-MCNC: 78 MG/DL (ref 6–23)
CALCIUM SERPL-MCNC: 10.1 MG/DL (ref 8.3–10.6)
CHLORIDE BLD-SCNC: 91 MMOL/L (ref 99–110)
CO2: 34 MMOL/L (ref 21–32)
CREAT SERPL-MCNC: 2.5 MG/DL (ref 0.6–1.1)
GFR AFRICAN AMERICAN: 22 ML/MIN/1.73M2
GFR NON-AFRICAN AMERICAN: 18 ML/MIN/1.73M2
GLUCOSE BLD-MCNC: 178 MG/DL (ref 70–99)
POTASSIUM SERPL-SCNC: 4 MMOL/L (ref 3.5–5.1)
SODIUM BLD-SCNC: 135 MMOL/L (ref 135–145)

## 2021-06-29 PROCEDURE — 80048 BASIC METABOLIC PNL TOTAL CA: CPT

## 2021-06-29 PROCEDURE — 36415 COLL VENOUS BLD VENIPUNCTURE: CPT

## 2021-07-30 ENCOUNTER — HOSPITAL ENCOUNTER (OUTPATIENT)
Dept: WOUND CARE | Age: 86
Discharge: HOME | End: 2021-07-30
Payer: MEDICARE

## 2021-07-30 VITALS
HEART RATE: 80 BPM | SYSTOLIC BLOOD PRESSURE: 135 MMHG | RESPIRATION RATE: 21 BRPM | DIASTOLIC BLOOD PRESSURE: 67 MMHG | TEMPERATURE: 97.2 F

## 2021-07-30 DIAGNOSIS — L89.312 PRESSURE INJURY OF RIGHT BUTTOCK, STAGE 2 (HCC): ICD-10-CM

## 2021-07-30 DIAGNOSIS — L89.322 PRESSURE INJURY OF LEFT BUTTOCK, STAGE 2 (HCC): ICD-10-CM

## 2021-07-30 PROBLEM — L89.323 PRESSURE INJURY OF LEFT BUTTOCK, STAGE 3 (HCC): Status: RESOLVED | Noted: 2021-02-02 | Resolved: 2021-07-30

## 2021-07-30 PROCEDURE — 11042 DBRDMT SUBQ TIS 1ST 20SQCM/<: CPT | Performed by: NURSE PRACTITIONER

## 2021-07-30 PROCEDURE — 99213 OFFICE O/P EST LOW 20 MIN: CPT

## 2021-07-30 PROCEDURE — 11042 DBRDMT SUBQ TIS 1ST 20SQCM/<: CPT

## 2021-07-30 PROCEDURE — 99213 OFFICE O/P EST LOW 20 MIN: CPT | Performed by: NURSE PRACTITIONER

## 2021-07-30 RX ORDER — LIDOCAINE 40 MG/G
CREAM TOPICAL ONCE
Status: CANCELLED | OUTPATIENT
Start: 2021-07-30 | End: 2021-07-30

## 2021-07-30 RX ORDER — BACITRACIN, NEOMYCIN, POLYMYXIN B 400; 3.5; 5 [USP'U]/G; MG/G; [USP'U]/G
OINTMENT TOPICAL ONCE
Status: CANCELLED | OUTPATIENT
Start: 2021-07-30 | End: 2021-07-30

## 2021-07-30 RX ORDER — GENTAMICIN SULFATE 1 MG/G
OINTMENT TOPICAL ONCE
Status: CANCELLED | OUTPATIENT
Start: 2021-07-30 | End: 2021-07-30

## 2021-07-30 RX ORDER — GINSENG 100 MG
CAPSULE ORAL ONCE
Status: CANCELLED | OUTPATIENT
Start: 2021-07-30 | End: 2021-07-30

## 2021-07-30 RX ORDER — CLOBETASOL PROPIONATE 0.5 MG/G
OINTMENT TOPICAL ONCE
Status: CANCELLED | OUTPATIENT
Start: 2021-07-30 | End: 2021-07-30

## 2021-07-30 RX ORDER — BACITRACIN ZINC AND POLYMYXIN B SULFATE 500; 1000 [USP'U]/G; [USP'U]/G
OINTMENT TOPICAL ONCE
Status: CANCELLED | OUTPATIENT
Start: 2021-07-30 | End: 2021-07-30

## 2021-07-30 RX ORDER — CEPHALEXIN 500 MG/1
500 CAPSULE ORAL 3 TIMES DAILY
COMMUNITY
End: 2021-09-17 | Stop reason: ALTCHOICE

## 2021-07-30 RX ORDER — BETAMETHASONE DIPROPIONATE 0.05 %
OINTMENT (GRAM) TOPICAL ONCE
Status: CANCELLED | OUTPATIENT
Start: 2021-07-30 | End: 2021-07-30

## 2021-07-30 RX ORDER — LIDOCAINE HYDROCHLORIDE 40 MG/ML
SOLUTION TOPICAL ONCE
Status: CANCELLED | OUTPATIENT
Start: 2021-07-30 | End: 2021-07-30

## 2021-07-30 RX ORDER — LIDOCAINE 50 MG/G
OINTMENT TOPICAL ONCE
Status: CANCELLED | OUTPATIENT
Start: 2021-07-30 | End: 2021-07-30

## 2021-07-30 NOTE — PROGRESS NOTES
[facility] Initial Visit      Donnie Jaeger  AGE: 82 y.o. GENDER: female  : 1935  EPISODE DATE:  2021   Referred by: Self    Subjective:     CHIEF COMPLAINT BILATERAL BUTTOCK WOUND     HISTORY of PRESENT ILLNESS      Donnie Jaeger is a 82 y.o. female who presents to the [address] [place] [address] for an initial visit for evaluation and treatment of Acute pressure  ulcer(s) of the right and left buttock and sacral area . The condition is of moderate severity. The ulcer has been present for approximately 2 months. The underlying cause is thought to be pressure complicated by diabetes. The patients care to date has included Clobetasol and Lotrisone ointment for previous care at the wound clinic. The patient has has pressure areas buttock in the past, she is fairly sedentary and basically is in her lift chair when ever she is not ambulating to the bathroom or to get items, etc. She sleeps in lift chair because she can't breath when laying flat and when she tried a hospital bed she was unable to turn in it. She does have a pad for her chair. The patient has significant underlying medical conditions as below.      Wound Pain Timing/Severity: intermittent, moderate  Quality of pain: dull, aching, tender  Severity of pain:  3 / 10   Modifying Factors: diabetes, poor glucose control, chronic pressure, decreased mobility, shear force, obesity and incontinence of urine  Associated Signs/Symptoms: edema, erythema, drainage and pain        PAST MEDICAL HISTORY        Diagnosis Date    Asthma     Chronic kidney disease     acute kidney failure    Chronic ulcer of left leg with fat layer exposed ([address] 3/7/2016    Chronic ulcer of right leg with fat layer exposed ([address] 3/7/2016    Diabetes type 2, controlled ([address]     History of asthma     History of blood transfusion 2015    Hypertension     Lymphedema of both lower extremities 3/7/2016    Osteoarthritis     Pneumonia     Thyroid disease     Venous stasis of both lower extremities 3/7/2016    WD-Non-pressure chronic ulcer right lower leg, 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 ([address] 4/21/2016       PAST SURGICAL HISTORY    Past Surgical History:   Procedure Laterality Date    APPENDECTOMY      CHOLECYSTECTOMY      CYSTOSCOPY      EYE SURGERY      bilateral implants    HYSTERECTOMY      JOINT REPLACEMENT Right     knee    PACEMAKER INSERTION N/A 11/17/2020    PACEMAKER INSERTION PERMANENT REMOVAL OF TEMPORARY PACEMAKER performed by Jose Nguyen MD at [address]    Family History   Problem Relation Age of Onset    Heart Disease Father        SOCIAL HISTORY    Social History     Tobacco Use    Smoking status: Never Smoker    Smokeless tobacco: Never Used   Vaping Use    Vaping Use: Never used   Substance Use Topics    Alcohol use: No    Drug use: No       ALLERGIES    Allergies   Allergen Reactions    Latex Rash    Dye [Iodides] Anaphylaxis    Iodine Anaphylaxis    Dyazide [Hydrochlorothiazide W-Triamterene] Rash    Lasix [Furosemide] Rash    Procardia [Nifedipine] Other (See Comments)     Not for sure if rash occurred or rash    Doxycycline Dermatitis    Edecrin [Ethacrynic Acid]     Levaquin [Levofloxacin] Other (See Comments)     unknown    Mobic [Meloxicam]     Vioxx [Rofecoxib]     Celebrex [Celecoxib] Rash    Lexapro [Escitalopram Oxalate] Rash    Sulfa Antibiotics Hives       MEDICATIONS    Current Outpatient Medications on File Prior to Encounter   Medication Sig Dispense Refill    cephALEXin (KEFLEX) 500 MG capsule Take 500 mg by mouth three times daily      febuxostat (ULORIC) 40 MG TABS tablet Take 1 tablet by mouth daily 30 tablet 5    potassium chloride (KLOR-CON M) 20 MEQ extended release tablet Take 1 tablet by mouth daily 30 tablet 5    spironolactone (ALDACTONE) 50 MG tablet Take 1 tablet by mouth 2 times daily 60 tablet 3    bumetanide (BUMEX) 1 MG tablet Take 2 tablets by mouth 2 times daily 360 tablet 3    Fluticasone Propionate, Inhal, (FLOVENT IN) Inhale 2 puffs into the lungs 2 times daily      metOLazone (ZAROXOLYN) 5 MG tablet Take 1 tablet by mouth daily 30 tablet 5    silver sulfADIAZINE (SILVADENE) 1 % cream Apply topically daily. (Patient taking differently: as needed ) 240 g 5    clotrimazole-betamethasone (LOTRISONE) 1-0.05 % cream Apply to legs and wound beds with dressing changes and as needed. 45 g 1    cilostazol (PLETAL) 50 MG tablet Take 1 tablet by mouth 2 times daily 60 tablet 3    rOPINIRole (REQUIP) 0.5 MG tablet Take 1 tablet by mouth 3 times daily 90 tablet 3    metoprolol succinate (TOPROL XL) 25 MG extended release tablet Take 1 tablet by mouth daily 30 tablet 3    famotidine (PEPCID) 20 MG tablet Take 1 tablet by mouth 2 times daily 60 tablet 5    levothyroxine (SYNTHROID) 50 MCG tablet Take 50 mcg by mouth Daily      Polyethylene Glycol 3350 (MIRALAX PO) Take by mouth      fluticasone (FLONASE) 50 MCG/ACT nasal spray 1 spray by Nasal route 2 times daily       Loratadine (CLARITIN) 10 MG CAPS Take 10 mg by mouth daily      ferrous sulfate 325 (65 FE) MG tablet Take 1 tablet by mouth 2 times daily (with meals) 30 tablet 3    latanoprost (XALATAN) 0.005 % ophthalmic solution Place 1 drop into both eyes nightly      OXYGEN Inhale 2 L/min into the lungs nightly      Multiple Vitamins-Minerals (ICAPS) CAPS Take 1 tablet by mouth 2 times daily       acetaminophen (TYLENOL) 500 MG tablet Take 500 mg by mouth every 6 hours as needed for Pain.  calcium carbonate 600 MG TABS tablet Take 1 tablet by mouth daily. No current facility-administered medications on file prior to encounter.        PROBLEM LIST    Patient Active Problem List   Diagnosis    Diabetes type 2, controlled ([facility]    Osteoarthritis    History of asthma    Cellulitis of right lower extremity    Sepsis ([address]    Gram positive sepsis ([address]    Gram-positive bacteremia    Cellulitis of left lower extremity    Non-pressure chronic ulcer of left lower leg, limited to breakdown of skin (HCC)    Chronic ulcer of right leg with fat layer exposed ([facility]    WD-Lymphedema of both lower extremities with cellulitis    WD-Idiopathic chronic venous hypertension of both lower extremities with ulcer and inflammation (HCC)    WD-Non-pressure chronic ulcer right lower leg, 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 (HCC)    Rhabdomyolysis    Hyperkalemia    Morbid obesity with BMI of 45.0-49.9, adult (HCC)    Encephalopathy in sepsis    Toxic shock syndrome (HCC)    Chronic renal impairment, stage 3 (moderate) (HCC)    Anemia of chronic disease    Chronic kidney disease (CKD) stage G3a/A2, moderately decreased glomerular filtration rate (GFR) between 45-59 mL/min/1.73 square meter and albuminuria creatinine ratio between  mg/g ([facility]    Cor pulmonale, chronic (HCC)    DM (diabetes mellitus) ([facility])    Fluid overload    HTN (hypertension)    Chronic kidney disease (CKD) stage G2/A2, mildly decreased glomerular filtration rate (GFR) between 60-89 mL/min/1.73 square meter and albuminuria creatinine ratio between  mg/g    Acute gout    Acute kidney injury ([facility]    Acute kidney injury superimposed on chronic kidney disease (HCC)    Third degree heart block ([facility])    Weakness    WD-Arterial insufficiency of lower extremity (HCC)    WD-Venous hypertension of both lower extremities    WD-PVD (peripheral vascular disease) (HCC)    WD-Pressure injury of right buttock, stage 2 (HCC)    WD-Pressure injury of left buttock, stage 2 (HCC)       REVIEW OF SYSTEMS    Constitutional: negative for anorexia, chills, fatigue, fevers, malaise, sweats and weight loss  Respiratory: negative for cough, dyspnea on exertion, hemoptysis, pleurisy/chest pain, shortness of breath, sputum, stridor and wheezing  Cardiovascular: negative for chest pain, chest pressure/discomfort, dyspnea, exertional chest pressure/discomfort, near-syncope, orthopnea, syncope and tachypnea  Integument/breast: positive for skin lesion(s)      Objective:      /67   Pulse 80   Temp 97.2 °F (36.2 °C) (Temporal)   Resp 21     PHYSICAL EXAM  General Appearance: alert and oriented to person, place and time, well-developed and well-nourished, in no acute distress  Pulmonary/Chest: clear to auscultation bilaterally- no wheezes, rales or rhonchi, normal air movement, no respiratory distress  Cardiovascular: normal rate, normal S1 and S2, no gallops and intact distal pulses  Dermatologic exam: Visual inspection of the periwound reveals the skin to be edematous  Wound exam: see wound description below in procedure note      Assessment:       Letty Beckham  appears to have a non-healing wound of the bilateral buttock and sacrum. The etiology of the wound is felt to be pressure. There are multiple complicating factors including edema, diabetes, poor glucose control, chronic pressure, decreased mobility, shear force, obesity and incontinence of urine. A comprehensive wound management program would be helpful to heal this wound. Assessments completed include fall risk and nutritional, functional,and psychological status. At this time appropriate care would include: periodic debridement and wound care as below. Problem List Items Addressed This Visit     WD-Pressure injury of right buttock, stage 2 ([address]    WD-Pressure injury of left buttock, stage 2 ([address]            Procedure Note    Indications:  Based on my examination of this patient's wound(s) today, sharp excision into necrotic subcutaneous tissue is required to promote healing and evaluate the extent of previous healing.     Performed by: TREVON Best CNP    Consent obtained: Yes    Time out taken:  Yes    Pain Control: Anesthetic  Anesthetic: 4% Lidocaine Liquid Topical     Debridement:Excisional Debridement    Using curette the wound(s) was/were sharply debrided down through and including the removal of subcutaneous tissue.         Devitalized Tissue Debrided:  slough and exudate    Pre Debridement Measurements:  Are located in the Wound Documentation Flow Sheet    All active wounds listed below with today's date are evaluated  Wound(s)    debrided this date include # : 1, 2 and 3     Post  Debridement Measurements:  Wound 07/30/21 Buttocks Left #1 (Active)   Wound Image   07/30/21 0945   Dressing Status New dressing applied 07/30/21 1051   Wound Length (cm) 1.7 cm 07/30/21 0945   Wound Width (cm) 1.3 cm 07/30/21 0945   Wound Depth (cm) 0.1 cm 07/30/21 0945   Wound Surface Area (cm^2) 2.21 cm^2 07/30/21 0945   Wound Volume (cm^3) 0.221 cm^3 07/30/21 0945   Post-Procedure Length (cm) 1.7 cm 07/30/21 1013   Post-Procedure Width (cm) 1.3 cm 07/30/21 1013   Post-Procedure Depth (cm) 0.1 cm 07/30/21 1013   Post-Procedure Surface Area (cm^2) 2.21 cm^2 07/30/21 1013   Post-Procedure Volume (cm^3) 0.221 cm^3 07/30/21 1013   Distance Tunneling (cm) 0 cm 07/30/21 0945   Tunneling Position ___ O'Clock 0 07/30/21 0945   Undermining Starts ___ O'Clock 0 07/30/21 0945   Undermining Ends___ O'Clock 0 07/30/21 0945   Undermining Maxium Distance (cm) 0 07/30/21 0945   Wound Assessment Slough;Granulation tissue 07/30/21 0945   Drainage Amount Moderate 07/30/21 0945   Drainage Description Serosanguinous 07/30/21 0945   Odor None 07/30/21 0945   Leatha-wound Assessment Excoriated 07/30/21 0945   Margins Defined edges 07/30/21 0945   Wound Thickness Description not for Pressure Injury Full thickness 07/30/21 0945   Number of days: 0       Wound 07/30/21 Buttocks Right #2 (Active)   Wound Image   07/30/21 0945   Wound Length (cm) 1 cm 07/30/21 0945   Wound Width (cm) 1 cm 07/30/21 0945   Wound Depth (cm) 0.1 cm 07/30/21 0945   Wound Surface Area (cm^2) 1 cm^2 07/30/21 0945   Wound Volume (cm^3) 0.1 cm^3 07/30/21 0945   Post-Procedure Length (cm) 1 cm 07/30/21 1013   Post-Procedure Width (cm) 1 cm 07/30/21 1013   Post-Procedure Depth (cm) 0.1 cm 07/30/21 1013   Post-Procedure Surface Area (cm^2) 1 cm^2 07/30/21 1013   Post-Procedure Volume (cm^3) 0.1 cm^3 07/30/21 1013   Distance Tunneling (cm) 0 cm 07/30/21 0945   Tunneling Position ___ O'Clock 0 07/30/21 0945   Undermining Starts ___ O'Clock 0 07/30/21 0945   Undermining Ends___ O'Clock 0 07/30/21 0945   Undermining Maxium Distance (cm) 0 07/30/21 0945   Wound Assessment Granulation tissue 07/30/21 0945   Drainage Amount Moderate 07/30/21 0945   Drainage Description Serosanguinous 07/30/21 0945   Odor None 07/30/21 0945   Leatha-wound Assessment Excoriated 07/30/21 0945   Margins Defined edges 07/30/21 0945   Wound Thickness Description not for Pressure Injury Full thickness 07/30/21 0945   Number of days: 0       Wound 07/30/21 Sacrum #3 (Active)   Wound Image   07/30/21 0945   Wound Length (cm) 2.2 cm 07/30/21 0945   Wound Width (cm) 1.5 cm 07/30/21 0945   Wound Depth (cm) 0.1 cm 07/30/21 0945   Wound Surface Area (cm^2) 3.3 cm^2 07/30/21 0945   Wound Volume (cm^3) 0.33 cm^3 07/30/21 0945   Post-Procedure Length (cm) 2.2 cm 07/30/21 1013   Post-Procedure Width (cm) 1.5 cm 07/30/21 1013   Post-Procedure Depth (cm) 0.1 cm 07/30/21 1013   Post-Procedure Surface Area (cm^2) 3.3 cm^2 07/30/21 1013   Post-Procedure Volume (cm^3) 0.33 cm^3 07/30/21 1013   Distance Tunneling (cm) 0 cm 07/30/21 0945   Tunneling Position ___ O'Clock 0 07/30/21 0945   Undermining Starts ___ O'Clock 0 07/30/21 0945   Undermining Ends___ O'Clock 0 07/30/21 0945   Undermining Maxium Distance (cm) 0 07/30/21 0945   Wound Assessment Pink/red 07/30/21 0945   Drainage Amount Moderate 07/30/21 0945   Drainage Description Serosanguinous 07/30/21 0945   Odor None 07/30/21 0945   Leatha-wound Assessment Excoriated 07/30/21 0945   Margins Undefined edges 07/30/21 0945   Wound Thickness Description not for Pressure Injury Full thickness 07/30/21 0945   Number of days: 0       Percent of Wound(s) Debrided: 100%    Total  Area  Debrided:  6.51 sq cm     Bleeding:  Minimal    Hemostasis Achieved:  by pressure    Procedural Pain:  2  / 10     Post Procedural Pain:  0 / 10     Response to treatment:  Well tolerated by patient. Plan:     Discharge Instructions       PHYSICIAN ORDERS AND DISCHARGE INSTRUCTIONS    NOTE: Upon discharge from the [address], you will receive a patient experience survey. We would be grateful if you would take the time to fill this survey out. Wound care order history:     CYNTHIA's   Right       Left    Date    Vascular studies:  1/13/21 Arterial were okay, Venous- possibly needs intervention   Cultures:                Antibiotics: Keflex 7/30/21 (dr. David Garcia)              HbA1c:   7.8 8/19/20              Compression/Lymph Pumps:              Grafts:       Continuing wound care orders and information:              Residence: [facility] care with: [facility] [facility]   Your wound-care supplies will be provided by: . [facility]       Wound cleansing:      Do not scrub or use excessive force. Wash hands with soap and water before and after dressing changes. Prior to applying a clean dressing, cleanse wound with normal saline,    wound cleanser, or mild soap and water. Ask your physician or nurse before getting the wound(s) wet in the shower. Daily Wound management:    Keep weight off wounds and reposition every 2 hours. Avoid standing for long periods of time. Apply wraps/stockings in AM and remove at bedtime. Elevate legs to the level of the heart or above for 30 minutes 4-5 times a day and/or when sitting. When taking antibiotics take entire prescription as ordered by MD do not stop taking until medicine is all gone.            Orders for this week (7/30/21:  Buttock and Coccyx- Wash with mild soap and water, Rinse with saline, and pat dry with 4x4  Apply clobetasol and lotrisone mixed with stimulen powder to wound bed  Cover with Gentac border (May use mepilex in clinic)  Change Daily and as needed for soiling    Stay off of bottom as much as possible - Use pillows to shift weight from side to side    Follow up with David Mena CNP in 1 weeks in the wound care center  Call [phone] for any questions or concerns.   Date__________   Time____________                  Treatment Note Wound 07/30/21 Buttocks Left #1-Dressing/Treatment:  (lotrisone,clobetasol,stimulen,m mepilex border)    Written Patient Dismissal Instructions Given          Electronically signed by TREVON Shetty CNP on 7/30/2021 at 12:04 PM

## 2021-08-06 ENCOUNTER — HOSPITAL ENCOUNTER (OUTPATIENT)
Dept: WOUND CARE | Age: 86
Discharge: HOME | End: 2021-08-06
Payer: MEDICARE

## 2021-08-06 VITALS
TEMPERATURE: 97.6 F | SYSTOLIC BLOOD PRESSURE: 145 MMHG | RESPIRATION RATE: 20 BRPM | HEART RATE: 77 BPM | DIASTOLIC BLOOD PRESSURE: 68 MMHG

## 2021-08-06 DIAGNOSIS — L89.312 PRESSURE INJURY OF RIGHT BUTTOCK, STAGE 2 (HCC): Primary | ICD-10-CM

## 2021-08-06 DIAGNOSIS — L89.322 PRESSURE INJURY OF LEFT BUTTOCK, STAGE 2 (HCC): ICD-10-CM

## 2021-08-06 PROCEDURE — 11042 DBRDMT SUBQ TIS 1ST 20SQCM/<: CPT | Performed by: NURSE PRACTITIONER

## 2021-08-06 PROCEDURE — 11042 DBRDMT SUBQ TIS 1ST 20SQCM/<: CPT

## 2021-08-06 RX ORDER — BACITRACIN ZINC AND POLYMYXIN B SULFATE 500; 1000 [USP'U]/G; [USP'U]/G
OINTMENT TOPICAL ONCE
Status: CANCELLED | OUTPATIENT
Start: 2021-08-06 | End: 2021-08-06

## 2021-08-06 RX ORDER — GINSENG 100 MG
CAPSULE ORAL ONCE
Status: CANCELLED | OUTPATIENT
Start: 2021-08-06 | End: 2021-08-06

## 2021-08-06 RX ORDER — CLOBETASOL PROPIONATE 0.5 MG/G
OINTMENT TOPICAL ONCE
Status: DISCONTINUED | OUTPATIENT
Start: 2021-08-06 | End: 2021-08-07 | Stop reason: HOSPADM

## 2021-08-06 RX ORDER — LIDOCAINE 50 MG/G
OINTMENT TOPICAL ONCE
Status: CANCELLED | OUTPATIENT
Start: 2021-08-06 | End: 2021-08-06

## 2021-08-06 RX ORDER — GENTAMICIN SULFATE 1 MG/G
OINTMENT TOPICAL ONCE
Status: CANCELLED | OUTPATIENT
Start: 2021-08-06 | End: 2021-08-06

## 2021-08-06 RX ORDER — BACITRACIN, NEOMYCIN, POLYMYXIN B 400; 3.5; 5 [USP'U]/G; MG/G; [USP'U]/G
OINTMENT TOPICAL ONCE
Status: CANCELLED | OUTPATIENT
Start: 2021-08-06 | End: 2021-08-06

## 2021-08-06 RX ORDER — CLOBETASOL PROPIONATE 0.5 MG/G
OINTMENT TOPICAL ONCE
Status: CANCELLED | OUTPATIENT
Start: 2021-08-06 | End: 2021-08-06

## 2021-08-06 RX ORDER — BETAMETHASONE DIPROPIONATE 0.05 %
OINTMENT (GRAM) TOPICAL ONCE
Status: CANCELLED | OUTPATIENT
Start: 2021-08-06 | End: 2021-08-06

## 2021-08-06 RX ORDER — LIDOCAINE HYDROCHLORIDE 40 MG/ML
SOLUTION TOPICAL ONCE
Status: CANCELLED | OUTPATIENT
Start: 2021-08-06 | End: 2021-08-06

## 2021-08-06 RX ORDER — CLOBETASOL PROPIONATE 0.5 MG/G
OINTMENT TOPICAL
Qty: 120 G | Refills: 1 | Status: SHIPPED | OUTPATIENT
Start: 2021-08-06

## 2021-08-06 RX ORDER — LIDOCAINE 40 MG/G
CREAM TOPICAL ONCE
Status: CANCELLED | OUTPATIENT
Start: 2021-08-06 | End: 2021-08-06

## 2021-08-06 NOTE — PROGRESS NOTES
Wound Care Center Progress Note With Procedure    Uziel Weathers  AGE: 82 y.o. GENDER: female  : 1935  EPISODE DATE:  2021     Subjective:     Chief Complaint   Patient presents with    Wound Check     buttock, leg         HISTORY of PRESENT ILLNESS     Uziel Weathers is a 82 y.o. female who presents to the [address] [place] [address] for evaluation and treatment of Acute pressure ulcer(s) of the right and left buttock and sacral area. She has a new ulcer left lower leg that is venous in nature and developed since last visit a week ago. It is of mild severity. The condition is of moderate severity. The ulcer has been present for approximately 2 months. The underlying cause is thought to be pressure complicated by diabetes. The patients care to date has included Clobetasol and Lotrisone ointment for previous care at the wound clinic. The patient has has pressure areas buttock in the past, she is fairly sedentary and basically is in her lift chair when ever she is not ambulating to the bathroom or to get items, etc. She sleeps in lift chair because she can't breath when laying flat and when she tried a hospital bed she was unable to turn in it. She does have a pad for her chair. The patient has significant underlying medical conditions as below.      Wound Pain Timing/Severity: intermittent, moderate  Quality of pain: dull, aching, tender  Severity of pain:  3 / 10   Modifying Factors: diabetes, poor glucose control, chronic pressure, decreased mobility, shear force, obesity and incontinence of urine  Associated Signs/Symptoms: edema, erythema, drainage and pain        PAST MEDICAL HISTORY        Diagnosis Date    Asthma     Chronic kidney disease     acute kidney failure    Chronic ulcer of left leg with fat layer exposed ([address] 3/7/2016    Chronic ulcer of right leg with fat layer exposed ([address] 3/7/2016    Diabetes type 2, controlled ([address]     History of asthma     History of blood transfusion 2015    Hypertension     Lymphedema of both lower extremities 3/7/2016    Osteoarthritis     Pneumonia     Thyroid disease     Venous stasis of both lower extremities 3/7/2016    WD-Non-pressure chronic ulcer right lower leg, 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 ([address] 4/21/2016       PAST SURGICAL HISTORY    Past Surgical History:   Procedure Laterality Date    APPENDECTOMY      CHOLECYSTECTOMY      CYSTOSCOPY      EYE SURGERY      bilateral implants    HYSTERECTOMY      JOINT REPLACEMENT Right     knee    PACEMAKER INSERTION N/A 11/17/2020    PACEMAKER INSERTION PERMANENT REMOVAL OF TEMPORARY PACEMAKER performed by Bryan Gregory MD at [address]    Family History   Problem Relation Age of Onset    Heart Disease Father        SOCIAL HISTORY    Social History     Tobacco Use    Smoking status: Never Smoker    Smokeless tobacco: Never Used   Vaping Use    Vaping Use: Never used   Substance Use Topics    Alcohol use: No    Drug use: No       ALLERGIES    Allergies   Allergen Reactions    Latex Rash    Dye [Iodides] Anaphylaxis    Iodine Anaphylaxis    Dyazide [Hydrochlorothiazide W-Triamterene] Rash    Lasix [Furosemide] Rash    Procardia [Nifedipine] Other (See Comments)     Not for sure if rash occurred or rash    Doxycycline Dermatitis    Edecrin [Ethacrynic Acid]     Levaquin [Levofloxacin] Other (See Comments)     unknown    Mobic [Meloxicam]     Vioxx [Rofecoxib]     Celebrex [Celecoxib] Rash    Lexapro [Escitalopram Oxalate] Rash    Sulfa Antibiotics Hives       MEDICATIONS    Current Outpatient Medications on File Prior to Encounter   Medication Sig Dispense Refill    cephALEXin (KEFLEX) 500 MG capsule Take 500 mg by mouth three times daily      febuxostat (ULORIC) 40 MG TABS tablet Take 1 tablet by mouth daily 30 tablet 5    potassium chloride (KLOR-CON M) 20 MEQ extended release tablet Take 1 tablet by mouth daily 30 tablet 5    spironolactone (ALDACTONE) 50 MG tablet Take 1 tablet by mouth 2 times daily 60 tablet 3    bumetanide (BUMEX) 1 MG tablet Take 2 tablets by mouth 2 times daily 360 tablet 3    Fluticasone Propionate, Inhal, (FLOVENT IN) Inhale 2 puffs into the lungs 2 times daily      metOLazone (ZAROXOLYN) 5 MG tablet Take 1 tablet by mouth daily 30 tablet 5    silver sulfADIAZINE (SILVADENE) 1 % cream Apply topically daily. (Patient taking differently: as needed ) 240 g 5    clotrimazole-betamethasone (LOTRISONE) 1-0.05 % cream Apply to legs and wound beds with dressing changes and as needed. 45 g 1    cilostazol (PLETAL) 50 MG tablet Take 1 tablet by mouth 2 times daily 60 tablet 3    rOPINIRole (REQUIP) 0.5 MG tablet Take 1 tablet by mouth 3 times daily 90 tablet 3    metoprolol succinate (TOPROL XL) 25 MG extended release tablet Take 1 tablet by mouth daily 30 tablet 3    famotidine (PEPCID) 20 MG tablet Take 1 tablet by mouth 2 times daily 60 tablet 5    levothyroxine (SYNTHROID) 50 MCG tablet Take 50 mcg by mouth Daily      Polyethylene Glycol 3350 (MIRALAX PO) Take by mouth      fluticasone (FLONASE) 50 MCG/ACT nasal spray 1 spray by Nasal route 2 times daily       Loratadine (CLARITIN) 10 MG CAPS Take 10 mg by mouth daily      ferrous sulfate 325 (65 FE) MG tablet Take 1 tablet by mouth 2 times daily (with meals) 30 tablet 3    latanoprost (XALATAN) 0.005 % ophthalmic solution Place 1 drop into both eyes nightly      OXYGEN Inhale 2 L/min into the lungs nightly      Multiple Vitamins-Minerals (ICAPS) CAPS Take 1 tablet by mouth 2 times daily       acetaminophen (TYLENOL) 500 MG tablet Take 500 mg by mouth every 6 hours as needed for Pain.  calcium carbonate 600 MG TABS tablet Take 1 tablet by mouth daily. No current facility-administered medications on file prior to encounter. REVIEW OF SYSTEMS    Pertinent items are noted in HPI.     Constitutional: Negative for systemic symptoms including fever, chills and malaise. Objective:      BP (!) 145/68   Pulse 77   Temp 97.6 °F (36.4 °C) (Temporal)   Resp 20     PHYSICAL EXAM    General: The patient is in no acute distress. Mental status:  Patient is appropriate, is  oriented to place and plan of care. Dermatologic exam: Visual inspection of the periwound reveals the skin to be normal in turgor and texture  Wound exam: see wound description below in procedure note      Assessment:     Problem List Items Addressed This Visit     WD-Pressure injury of right buttock, stage 2 (HCC) - Primary    Relevant Medications    clobetasol (TEMOVATE) 0.05 % ointment (Start on 8/6/2021 10:30 AM)    Other Relevant Orders    Initiate Outpatient Wound Care Protocol    WD-Pressure injury of left buttock, stage 2 (HCC)    Relevant Medications    clobetasol (TEMOVATE) 0.05 % ointment (Start on 8/6/2021 10:30 AM)    Other Relevant Orders    Initiate Outpatient Wound Care Protocol        Procedure Note    Indications:  Based on my examination of this patient's wound(s) today, sharp excision into necrotic subcutaneous tissue is required to promote healing and evaluate the extent of previous healing. Performed by: TREVON Lazcano CNP    Consent obtained: Yes    Time out taken:  Yes    Pain Control: Anesthetic  Anesthetic: 4% Lidocaine Liquid Topical     Debridement:Excisional Debridement    Using curette the wound(s) was/were sharply debrided down through and including the removal of subcutaneous tissue.         Devitalized Tissue Debrided:  slough and exudate    Pre Debridement Measurements:  Are located in the Wound Documentation Flow Sheet    All active wounds listed below with today's date are evaluated  Wound(s)    debrided this date include # : 4    Post  Debridement Measurements:  Wound 07/30/21 Buttocks Left #1 (Active)   Wound Image   07/30/21 0945   Dressing Status Clean;Dry;New dressing applied 08/06/21 0954   Offloading for Diabetic Foot Ulcers No offloading required 08/06/21 0925   Wound Length (cm) 1.6 cm 08/06/21 0925   Wound Width (cm) 1.5 cm 08/06/21 0925   Wound Depth (cm) 0.1 cm 08/06/21 0925   Wound Surface Area (cm^2) 2.4 cm^2 08/06/21 0925   Change in Wound Size % (l*w) -8.6 08/06/21 0925   Wound Volume (cm^3) 0.24 cm^3 08/06/21 0925   Wound Healing % -9 08/06/21 0925   Post-Procedure Length (cm) 1.7 cm 07/30/21 1013   Post-Procedure Width (cm) 1.3 cm 07/30/21 1013   Post-Procedure Depth (cm) 0.1 cm 07/30/21 1013   Post-Procedure Surface Area (cm^2) 2.21 cm^2 07/30/21 1013   Post-Procedure Volume (cm^3) 0.221 cm^3 07/30/21 1013   Distance Tunneling (cm) 0 cm 08/06/21 0925   Tunneling Position ___ O'Clock 0 08/06/21 0925   Undermining Starts ___ O'Clock 0 08/06/21 0925   Undermining Ends___ O'Clock 0 08/06/21 0925   Undermining Maxium Distance (cm) 0 08/06/21 0925   Wound Assessment Slough 08/06/21 0925   Drainage Amount Moderate 08/06/21 0925   Drainage Description Serosanguinous 08/06/21 0925   Odor None 08/06/21 0925   Leatha-wound Assessment Excoriated 08/06/21 0925   Margins Defined edges 08/06/21 0925   Wound Thickness Description not for Pressure Injury Full thickness 08/06/21 0925   Number of days: 7       Wound 07/30/21 Buttocks Right #2 (Active)   Wound Image   07/30/21 0945   Dressing Status Clean;Dry;New dressing applied 08/06/21 0954   Offloading for Diabetic Foot Ulcers No offloading required 08/06/21 0925   Wound Length (cm) 0.7 cm 08/06/21 0925   Wound Width (cm) 0.7 cm 08/06/21 0925   Wound Depth (cm) 0.1 cm 08/06/21 0925   Wound Surface Area (cm^2) 0.49 cm^2 08/06/21 0925   Change in Wound Size % (l*w) 51 08/06/21 0925   Wound Volume (cm^3) 0.049 cm^3 08/06/21 0925   Wound Healing % 51 08/06/21 0925   Post-Procedure Length (cm) 1 cm 07/30/21 1013   Post-Procedure Width (cm) 1 cm 07/30/21 1013   Post-Procedure Depth (cm) 0.1 cm 07/30/21 1013   Post-Procedure Surface Area (cm^2) 1 cm^2 07/30/21 1013   Post-Procedure Volume (cm^3) 0.1 cm^3 07/30/21 1013   Distance Tunneling (cm) 0 cm 08/06/21 0925   Tunneling Position ___ O'Clock 0 08/06/21 0925   Undermining Starts ___ O'Clock 0 08/06/21 0925   Undermining Ends___ O'Clock 0 08/06/21 0925   Undermining Maxium Distance (cm) 0 08/06/21 0925   Wound Assessment Pink/red 08/06/21 0925   Drainage Amount Moderate 08/06/21 0925   Drainage Description Serosanguinous 08/06/21 0925   Odor None 08/06/21 0925   Leatha-wound Assessment Excoriated 08/06/21 0925   Margins Defined edges 08/06/21 0925   Wound Thickness Description not for Pressure Injury Full thickness 08/06/21 0925   Number of days: 7       Wound 07/30/21 Sacrum #3 (Active)   Wound Image   07/30/21 0945   Wound Cleansed Soap and water 08/06/21 0925   Offloading for Diabetic Foot Ulcers No offloading required 08/06/21 0925   Wound Length (cm) 2.2 cm 07/30/21 0945   Wound Width (cm) 1.5 cm 07/30/21 0945   Wound Depth (cm) 0.1 cm 07/30/21 0945   Wound Surface Area (cm^2) 3.3 cm^2 07/30/21 0945   Wound Volume (cm^3) 0.33 cm^3 07/30/21 0945   Post-Procedure Length (cm) 2.2 cm 07/30/21 1013   Post-Procedure Width (cm) 1.5 cm 07/30/21 1013   Post-Procedure Depth (cm) 0.1 cm 07/30/21 1013   Post-Procedure Surface Area (cm^2) 3.3 cm^2 07/30/21 1013   Post-Procedure Volume (cm^3) 0.33 cm^3 07/30/21 1013   Distance Tunneling (cm) 0 cm 08/06/21 0925   Tunneling Position ___ O'Clock 0 08/06/21 0925   Undermining Starts ___ O'Clock 0 08/06/21 0925   Undermining Ends___ O'Clock 0 08/06/21 0925   Undermining Maxium Distance (cm) 0 08/06/21 0925   Wound Assessment Pink/red 07/30/21 0945   Drainage Amount Moderate 08/06/21 0925   Drainage Description Serosanguinous 08/06/21 0925   Odor None 08/06/21 0925   Leatha-wound Assessment Excoriated 08/06/21 0925   Margins Undefined edges 08/06/21 0925   Wound Thickness Description not for Pressure Injury Full thickness 08/06/21 0925   Number of days: 7       Wound 08/06/21 Leg Left; Anterior #4 cluster (Active)   Wound Image   08/06/21 6043 Dressing Status Clean;Dry;New dressing applied 08/06/21 0954   Wound Length (cm) 5.2 cm 08/06/21 0925   Wound Width (cm) 1.4 cm 08/06/21 0925   Wound Depth (cm) 0.1 cm 08/06/21 0925   Wound Surface Area (cm^2) 7.28 cm^2 08/06/21 0925   Wound Volume (cm^3) 0.728 cm^3 08/06/21 0925   Post-Procedure Length (cm) 5.2 cm 08/06/21 0947   Post-Procedure Width (cm) 1.4 cm 08/06/21 0947   Post-Procedure Depth (cm) 0.1 cm 08/06/21 0947   Post-Procedure Surface Area (cm^2) 7.28 cm^2 08/06/21 0947   Post-Procedure Volume (cm^3) 0.728 cm^3 08/06/21 0947   Distance Tunneling (cm) 0 cm 08/06/21 0925   Tunneling Position ___ O'Clock 0 08/06/21 0925   Undermining Starts ___ O'Clock 0 08/06/21 0925   Undermining Ends___ O'Clock 0 08/06/21 0925   Undermining Maxium Distance (cm) 0 08/06/21 0925   Wound Assessment Pink/red 08/06/21 0925   Drainage Amount Moderate 08/06/21 0925   Drainage Description Serosanguinous 08/06/21 0925   Odor None 08/06/21 0925   Leatha-wound Assessment Fragile 08/06/21 0925   Margins Undefined edges 08/06/21 0925   Wound Thickness Description not for Pressure Injury Full thickness 08/06/21 0925   Number of days: 0       Total  Area  Debrided: 3 sq cm     Bleeding:  Minimal    Hemostasis Achieved:  by pressure    Procedural Pain:  0  / 10     Post Procedural Pain:  0 / 10     Response to treatment:  Well tolerated by patient. Status of wound progress and description from last visit: New wound left lower leg, will use collagen and Tubigrip for compression (she doesn't tolerate compression wraps). Left buttock improved, right buttock no change, gluteal crease looks . Continue regimen. Plan:       Discharge Instructions       PHYSICIAN ORDERS AND DISCHARGE INSTRUCTIONS     NOTE: [facility] will receive a patient experience survey.  We would be grateful if you would take the time to fill this survey out.     Wound care order history:                 CYNTHIA's   Right Left    Date               Vascular studies:  1/13/21 Arterial were okay, Venous- possibly needs intervention              Cultures:                Antibiotics: Keflex 7/30/21 (dr. Saulo Hollins)              HbA1c:   7.8 8/19/20              Compression/Lymph Pumps:              Grafts:                  Continuing wound care orders and information:              Residence: Private              Continue home health care with: [facility] [place]              Your wound-care supplies will be provided by: . [facility]                            Wound cleansing:                           Do not scrub or use excessive force. Wash hands with soap and water before and after dressing changes. Prior to applying a clean dressing, cleanse wound with normal saline,                          wound cleanser, or mild soap and water. Ask your physician or nurse before getting the wound(s) wet in the shower. Daily Wound management:                          Keep weight off wounds and reposition every 2 hours. Avoid standing for long periods of time. Apply wraps/stockings in AM and remove at bedtime. Elevate legs to the level of the heart or above for 30 minutes 4-5 times a day and/or when sitting. When taking antibiotics take entire prescription as ordered by MD do not stop taking until medicine is all gone.                                                                 Orders for this week (8/6/21):   Buttock and Coccyx- Wash with mild soap and water, Rinse with saline, and pat dry with 4x4  Apply clobetasol and lotrisone  Cover with [place]  Secure with Gentac borders (May use mepilex in clinic)  Change Daily and as needed for soiling     Bilateral lower legs- Wash with mild soap and water, Rinse with saline, pay dry with 4x4  Apply clobetasol to legs  Apply juliette to wound bed  Secure with Gentac border   Tubi  F  Change Daily    Stay off of bottom as much as possible - Use pillows to shift weight from side to side     Follow up with Rosas Liu CNP in 2 weeks in the wound care center  Call [phone] for any questions or concerns. Date__________   Time____________           Treatment Note Wound 07/30/21 Buttocks Left #1-Dressing/Treatment:  (clobetasol, lotrisone, stimulen powder, gentac border)  Wound 07/30/21 Buttocks Right #2-Dressing/Treatment:  (clobetasol, lotrisone, stimulen powder, gentac border)  Wound 08/06/21 Leg Left; Anterior #4 cluster-Dressing/Treatment:  (clobetasol, juliette, gentac border, tubi F )    Written Patient Dismissal Instructions Given            Electronically signed by TREVON Stein CNP on 8/6/2021 at 10:06 AM

## 2021-08-18 ENCOUNTER — HOSPITAL ENCOUNTER (OUTPATIENT)
Dept: WOUND CARE | Age: 86
Discharge: HOME | End: 2021-08-18
Payer: MEDICARE

## 2021-08-18 VITALS
RESPIRATION RATE: 20 BRPM | TEMPERATURE: 96.8 F | HEART RATE: 68 BPM | SYSTOLIC BLOOD PRESSURE: 111 MMHG | DIASTOLIC BLOOD PRESSURE: 44 MMHG

## 2021-08-18 DIAGNOSIS — I87.332 CHRONIC VENOUS HYPERTENSION WITH ULCER AND INFLAMMATION INVOLVING LEFT SIDE (HCC): ICD-10-CM

## 2021-08-18 DIAGNOSIS — L97.929 CHRONIC VENOUS HYPERTENSION WITH ULCER AND INFLAMMATION INVOLVING LEFT SIDE (HCC): ICD-10-CM

## 2021-08-18 DIAGNOSIS — L89.322 PRESSURE INJURY OF LEFT BUTTOCK, STAGE 2 (HCC): ICD-10-CM

## 2021-08-18 DIAGNOSIS — I89.0 LYMPHEDEMA OF BOTH LOWER EXTREMITIES: ICD-10-CM

## 2021-08-18 DIAGNOSIS — L89.312 PRESSURE INJURY OF RIGHT BUTTOCK, STAGE 2 (HCC): Primary | ICD-10-CM

## 2021-08-18 PROBLEM — I73.9 ARTERIAL INSUFFICIENCY OF LOWER EXTREMITY (HCC): Status: RESOLVED | Noted: 2020-12-22 | Resolved: 2021-08-18

## 2021-08-18 PROBLEM — I73.9 PVD (PERIPHERAL VASCULAR DISEASE) (HCC): Status: RESOLVED | Noted: 2020-12-22 | Resolved: 2021-08-18

## 2021-08-18 PROCEDURE — 99213 OFFICE O/P EST LOW 20 MIN: CPT | Performed by: NURSE PRACTITIONER

## 2021-08-18 PROCEDURE — 99213 OFFICE O/P EST LOW 20 MIN: CPT

## 2021-08-18 RX ORDER — LIDOCAINE 40 MG/G
CREAM TOPICAL ONCE
Status: CANCELLED | OUTPATIENT
Start: 2021-08-18 | End: 2021-08-18

## 2021-08-18 RX ORDER — BACITRACIN, NEOMYCIN, POLYMYXIN B 400; 3.5; 5 [USP'U]/G; MG/G; [USP'U]/G
OINTMENT TOPICAL ONCE
Status: CANCELLED | OUTPATIENT
Start: 2021-08-18 | End: 2021-08-18

## 2021-08-18 RX ORDER — LIDOCAINE HYDROCHLORIDE 40 MG/ML
SOLUTION TOPICAL ONCE
Status: CANCELLED | OUTPATIENT
Start: 2021-08-18 | End: 2021-08-18

## 2021-08-18 RX ORDER — GENTAMICIN SULFATE 1 MG/G
OINTMENT TOPICAL ONCE
Status: CANCELLED | OUTPATIENT
Start: 2021-08-18 | End: 2021-08-18

## 2021-08-18 RX ORDER — BACITRACIN ZINC AND POLYMYXIN B SULFATE 500; 1000 [USP'U]/G; [USP'U]/G
OINTMENT TOPICAL ONCE
Status: CANCELLED | OUTPATIENT
Start: 2021-08-18 | End: 2021-08-18

## 2021-08-18 RX ORDER — GINSENG 100 MG
CAPSULE ORAL ONCE
Status: CANCELLED | OUTPATIENT
Start: 2021-08-18 | End: 2021-08-18

## 2021-08-18 RX ORDER — LIDOCAINE 50 MG/G
OINTMENT TOPICAL ONCE
Status: CANCELLED | OUTPATIENT
Start: 2021-08-18 | End: 2021-08-18

## 2021-08-18 RX ORDER — LIDOCAINE 50 MG/G
OINTMENT TOPICAL ONCE
Status: DISCONTINUED | OUTPATIENT
Start: 2021-08-18 | End: 2021-08-19 | Stop reason: HOSPADM

## 2021-08-18 RX ORDER — CLOBETASOL PROPIONATE 0.5 MG/G
OINTMENT TOPICAL ONCE
Status: CANCELLED | OUTPATIENT
Start: 2021-08-18 | End: 2021-08-18

## 2021-08-18 RX ORDER — BETAMETHASONE DIPROPIONATE 0.05 %
OINTMENT (GRAM) TOPICAL ONCE
Status: CANCELLED | OUTPATIENT
Start: 2021-08-18 | End: 2021-08-18

## 2021-08-18 NOTE — PROGRESS NOTES
Wound Care Center Progress Note       Donnie Jaeger  AGE: 82 y.o. GENDER: female  : 1935  TODAY'S DATE:  2021        Subjective:     Chief Complaint   Patient presents with    Wound Check     buttocks/ LLE         HISTORY of PRESENT ILLNESS    Phoebe Araujo a 82 y. o. female who presents to the Wound Clinic for evaluation and treatment of Acute pressure ulcer(s) of the right and left buttock and sacral area. She has a new ulcer left lower leg that is venous in nature and developed over the past few weeks. It is of mild severity. The condition is of moderate severity. The pressure ulcers have been present for approximately 2 months.  The underlying cause is thought to be pressure complicated by diabetes.  The patients care to date has included Clobetasol and Lotrisone ointment for previous care at the wound clinic. The patient has has pressure areas buttock in the past, she is fairly sedentary and basically is in her lift chair when ever she is not ambulating to the bathroom or to get items, etc. She sleeps in lift chair because she can't breath when laying flat and when she tried a hospital bed she was unable to turn in it. Lor Ch does have a pad for her chair. The patient has significant underlying medical conditions as below.      Wound Pain Timing/Severity: intermittent, moderate  Quality of pain: dull, aching, tender  Severity of pain:  3 / 10   Modifying Factors: diabetes, poor glucose control, chronic pressure, decreased mobility, shear force, obesity and incontinence of urine  Associated Signs/Symptoms: edema, erythema, drainage and pain        PAST MEDICAL HISTORY        Diagnosis Date    Asthma     Chronic kidney disease     acute kidney failure    Chronic ulcer of left leg with fat layer exposed ([address] 3/7/2016    Chronic ulcer of right leg with fat layer exposed ([address] 3/7/2016    Diabetes type 2, controlled ([address]     History of asthma     History of blood transfusion 2015    Hypertension     Lymphedema of both lower extremities 3/7/2016    Osteoarthritis     Pneumonia     Thyroid disease     Venous stasis of both lower extremities 3/7/2016    WD-Non-pressure chronic ulcer right lower leg, 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 ([address] 4/21/2016       PAST SURGICAL HISTORY    Past Surgical History:   Procedure Laterality Date    APPENDECTOMY      CHOLECYSTECTOMY      CYSTOSCOPY      EYE SURGERY      bilateral implants    HYSTERECTOMY      JOINT REPLACEMENT Right     knee    PACEMAKER INSERTION N/A 11/17/2020    PACEMAKER INSERTION PERMANENT REMOVAL OF TEMPORARY PACEMAKER performed by Azael Cr MD at [address]    Family History   Problem Relation Age of Onset    Heart Disease Father        SOCIAL HISTORY    Social History     Tobacco Use    Smoking status: Never Smoker    Smokeless tobacco: Never Used   Vaping Use    Vaping Use: Never used   Substance Use Topics    Alcohol use: No    Drug use: No       ALLERGIES    Allergies   Allergen Reactions    Latex Rash    Dye [Iodides] Anaphylaxis    Iodine Anaphylaxis    Dyazide [Hydrochlorothiazide W-Triamterene] Rash    Lasix [Furosemide] Rash    Procardia [Nifedipine] Other (See Comments)     Not for sure if rash occurred or rash    Doxycycline Dermatitis    Edecrin [Ethacrynic Acid]     Levaquin [Levofloxacin] Other (See Comments)     unknown    Mobic [Meloxicam]     Vioxx [Rofecoxib]     Celebrex [Celecoxib] Rash    Lexapro [Escitalopram Oxalate] Rash    Sulfa Antibiotics Hives       MEDICATIONS    Current Outpatient Medications on File Prior to Encounter   Medication Sig Dispense Refill    clobetasol (TEMOVATE) 0.05 % ointment Apply topically 2 times daily.  120 g 1    cephALEXin (KEFLEX) 500 MG capsule Take 500 mg by mouth three times daily      febuxostat (ULORIC) 40 MG TABS tablet Take 1 tablet by mouth daily 30 tablet 5    potassium chloride (KLOR-CON M) 20 MEQ extended release tablet Take 1 tablet by mouth daily 30 tablet 5    spironolactone (ALDACTONE) 50 MG tablet Take 1 tablet by mouth 2 times daily 60 tablet 3    bumetanide (BUMEX) 1 MG tablet Take 2 tablets by mouth 2 times daily 360 tablet 3    Fluticasone Propionate, Inhal, (FLOVENT IN) Inhale 2 puffs into the lungs 2 times daily      metOLazone (ZAROXOLYN) 5 MG tablet Take 1 tablet by mouth daily 30 tablet 5    silver sulfADIAZINE (SILVADENE) 1 % cream Apply topically daily. (Patient taking differently: as needed ) 240 g 5    cilostazol (PLETAL) 50 MG tablet Take 1 tablet by mouth 2 times daily 60 tablet 3    rOPINIRole (REQUIP) 0.5 MG tablet Take 1 tablet by mouth 3 times daily 90 tablet 3    metoprolol succinate (TOPROL XL) 25 MG extended release tablet Take 1 tablet by mouth daily 30 tablet 3    famotidine (PEPCID) 20 MG tablet Take 1 tablet by mouth 2 times daily 60 tablet 5    levothyroxine (SYNTHROID) 50 MCG tablet Take 50 mcg by mouth Daily      Polyethylene Glycol 3350 (MIRALAX PO) Take by mouth      fluticasone (FLONASE) 50 MCG/ACT nasal spray 1 spray by Nasal route 2 times daily       Loratadine (CLARITIN) 10 MG CAPS Take 10 mg by mouth daily      ferrous sulfate 325 (65 FE) MG tablet Take 1 tablet by mouth 2 times daily (with meals) 30 tablet 3    latanoprost (XALATAN) 0.005 % ophthalmic solution Place 1 drop into both eyes nightly      OXYGEN Inhale 2 L/min into the lungs nightly      Multiple Vitamins-Minerals (ICAPS) CAPS Take 1 tablet by mouth 2 times daily       acetaminophen (TYLENOL) 500 MG tablet Take 500 mg by mouth every 6 hours as needed for Pain.  calcium carbonate 600 MG TABS tablet Take 1 tablet by mouth daily. No current facility-administered medications on file prior to encounter. REVIEW OF SYSTEMS    Pertinent items are noted in HPI. Constitutional: Negative for systemic symptoms including fever, chills and malaise.     Objective:      BP (!) 111/44 Pulse 68   Temp 96.8 °F (36 °C) (Temporal)   Resp 20     PHYSICAL EXAM    General: The patient is in no acute distress. Mental status:  Patient is appropriate, is  oriented to place and plan of care. Dermatologic exam: Visual inspection of the periwound reveals the skin to be edematous.   Wound exam:  see wound description below     All active wounds listed below with today's date are evaluated      Wound 07/30/21 Buttocks Left #1 (Active)   Wound Image   07/30/21 0945   Dressing Status Clean;Dry;New dressing applied 08/06/21 0954   Wound Cleansed Wound cleanser 08/18/21 0919   Offloading for Diabetic Foot Ulcers No offloading required 08/06/21 0925   Wound Length (cm) 1.6 cm 08/18/21 0919   Wound Width (cm) 1.1 cm 08/18/21 0919   Wound Depth (cm) 0.1 cm 08/18/21 0919   Wound Surface Area (cm^2) 1.76 cm^2 08/18/21 0919   Change in Wound Size % (l*w) 20.36 08/18/21 0919   Wound Volume (cm^3) 0.176 cm^3 08/18/21 0919   Wound Healing % 20 08/18/21 0919   Post-Procedure Length (cm) 1.7 cm 07/30/21 1013   Post-Procedure Width (cm) 1.3 cm 07/30/21 1013   Post-Procedure Depth (cm) 0.1 cm 07/30/21 1013   Post-Procedure Surface Area (cm^2) 2.21 cm^2 07/30/21 1013   Post-Procedure Volume (cm^3) 0.221 cm^3 07/30/21 1013   Distance Tunneling (cm) 0 cm 08/18/21 0919   Tunneling Position ___ O'Clock 0 08/18/21 0919   Undermining Starts ___ O'Clock 0 08/18/21 0919   Undermining Ends___ O'Clock 0 08/18/21 0919   Undermining Maxium Distance (cm) 0 08/18/21 0919   Wound Assessment Slough 08/18/21 0919   Drainage Amount Moderate 08/18/21 0919   Drainage Description Serosanguinous 08/18/21 0919   Odor None 08/18/21 0919   Leatha-wound Assessment Blanchable erythema 08/18/21 0919   Margins Defined edges 08/18/21 0919   Wound Thickness Description not for Pressure Injury Full thickness 08/18/21 0919   Number of days: 19       Wound 07/30/21 Buttocks Right #2 (Active)   Wound Image   07/30/21 0945   Dressing Status Clean;Dry;New (cm) 0.1 cm 08/18/21 0936   Post-Procedure Surface Area (cm^2) 3 cm^2 08/18/21 0936   Post-Procedure Volume (cm^3) 0.3 cm^3 08/18/21 0936   Distance Tunneling (cm) 0 cm 08/18/21 0919   Tunneling Position ___ O'Clock 0 08/18/21 0919   Undermining Starts ___ O'Clock 0 08/18/21 0919   Undermining Ends___ O'Clock 0 08/18/21 0919   Undermining Maxium Distance (cm) 0 08/18/21 0919   Wound Assessment Dry 08/18/21 0919   Drainage Amount None 08/18/21 0919   Drainage Description Serosanguinous 08/06/21 0925   Odor None 08/18/21 0919   Leatha-wound Assessment Fragile 08/18/21 0919   Margins Undefined edges 08/18/21 0919   Wound Thickness Description not for Pressure Injury Full thickness 08/18/21 0919   Number of days: 12       Assessment:       Problem List Items Addressed This Visit     WD-Lymphedema of both lower extremities with cellulitis    WD-Pressure injury of right buttock, stage 2 ([facility] - Primary    Relevant Medications    lidocaine (XYLOCAINE) 5 % ointment    Other Relevant Orders    Initiate Outpatient Wound Care Protocol    WD-Pressure injury of left buttock, stage 2 ([facility])    Relevant Medications    lidocaine (XYLOCAINE) 5 % ointment    Other Relevant Orders    Initiate Outpatient Wound Care Protocol    WD-Chronic venous hypertension with ulcer and inflammation involving left side ([address]          Status of wound progress and description from last visit: The wounds are improving, continue regimen. Plan:     Discharge Instructions       PHYSICIAN ORDERS AND DISCHARGE INSTRUCTIONS     NOTE: Upon discharge from the [address], you will receive a patient experience survey.  We would be grateful if you would take the time to fill this survey out.     Wound care order history:                 CYNTHIA's   Right       Left    Date               Vascular studies:  1/13/21 Arterial were okay, Venous- possibly needs intervention              Cultures:                Antibiotics: Keflex 7/30/21 (dr. Deshawn Gomez)              HbA1c:   7.8 with Tala Sánchez CNP in 2 weeks in the wound care center  Call [phone] for any questions or concerns.   Date__________   Time____________        Treatment Note      Written Patient Dismissal Instructions Given            Electronically signed by TREVON Paredes CNP on 8/18/2021 at 10:01 AM 21

## 2021-08-27 ENCOUNTER — HOSPITAL ENCOUNTER (OUTPATIENT)
Dept: WOUND CARE | Age: 86
Discharge: HOME | End: 2021-08-27
Payer: MEDICARE

## 2021-08-27 VITALS
DIASTOLIC BLOOD PRESSURE: 72 MMHG | TEMPERATURE: 98.1 F | RESPIRATION RATE: 18 BRPM | HEART RATE: 72 BPM | SYSTOLIC BLOOD PRESSURE: 137 MMHG

## 2021-08-27 DIAGNOSIS — L89.312 PRESSURE INJURY OF RIGHT BUTTOCK, STAGE 2 (HCC): Primary | ICD-10-CM

## 2021-08-27 DIAGNOSIS — L97.929 CHRONIC VENOUS HYPERTENSION WITH ULCER AND INFLAMMATION INVOLVING LEFT SIDE (HCC): ICD-10-CM

## 2021-08-27 DIAGNOSIS — L89.322 PRESSURE INJURY OF LEFT BUTTOCK, STAGE 2 (HCC): ICD-10-CM

## 2021-08-27 DIAGNOSIS — I87.332 CHRONIC VENOUS HYPERTENSION WITH ULCER AND INFLAMMATION INVOLVING LEFT SIDE (HCC): ICD-10-CM

## 2021-08-27 PROCEDURE — 11042 DBRDMT SUBQ TIS 1ST 20SQCM/<: CPT | Performed by: NURSE PRACTITIONER

## 2021-08-27 PROCEDURE — 11042 DBRDMT SUBQ TIS 1ST 20SQCM/<: CPT

## 2021-08-27 RX ORDER — CLOBETASOL PROPIONATE 0.5 MG/G
OINTMENT TOPICAL ONCE
Status: CANCELLED | OUTPATIENT
Start: 2021-08-27 | End: 2021-08-27

## 2021-08-27 RX ORDER — LIDOCAINE 50 MG/G
OINTMENT TOPICAL ONCE
Status: DISCONTINUED | OUTPATIENT
Start: 2021-08-27 | End: 2021-08-28 | Stop reason: HOSPADM

## 2021-08-27 RX ORDER — LIDOCAINE 50 MG/G
OINTMENT TOPICAL ONCE
Status: CANCELLED | OUTPATIENT
Start: 2021-08-27 | End: 2021-08-27

## 2021-08-27 RX ORDER — BACITRACIN ZINC AND POLYMYXIN B SULFATE 500; 1000 [USP'U]/G; [USP'U]/G
OINTMENT TOPICAL ONCE
Status: CANCELLED | OUTPATIENT
Start: 2021-08-27 | End: 2021-08-27

## 2021-08-27 RX ORDER — BETAMETHASONE DIPROPIONATE 0.05 %
OINTMENT (GRAM) TOPICAL ONCE
Status: CANCELLED | OUTPATIENT
Start: 2021-08-27 | End: 2021-08-27

## 2021-08-27 RX ORDER — LIDOCAINE 40 MG/G
CREAM TOPICAL ONCE
Status: CANCELLED | OUTPATIENT
Start: 2021-08-27 | End: 2021-08-27

## 2021-08-27 RX ORDER — LIDOCAINE HYDROCHLORIDE 40 MG/ML
SOLUTION TOPICAL ONCE
Status: CANCELLED | OUTPATIENT
Start: 2021-08-27 | End: 2021-08-27

## 2021-08-27 RX ORDER — BACITRACIN, NEOMYCIN, POLYMYXIN B 400; 3.5; 5 [USP'U]/G; MG/G; [USP'U]/G
OINTMENT TOPICAL ONCE
Status: CANCELLED | OUTPATIENT
Start: 2021-08-27 | End: 2021-08-27

## 2021-08-27 RX ORDER — GENTAMICIN SULFATE 1 MG/G
OINTMENT TOPICAL ONCE
Status: CANCELLED | OUTPATIENT
Start: 2021-08-27 | End: 2021-08-27

## 2021-08-27 RX ORDER — GINSENG 100 MG
CAPSULE ORAL ONCE
Status: CANCELLED | OUTPATIENT
Start: 2021-08-27 | End: 2021-08-27

## 2021-08-27 ASSESSMENT — PAIN DESCRIPTION - LOCATION: LOCATION: BUTTOCKS

## 2021-08-27 ASSESSMENT — PAIN DESCRIPTION - DESCRIPTORS: DESCRIPTORS: BURNING;TENDER

## 2021-08-27 NOTE — PROGRESS NOTES
Wound Care Center Progress Note With Procedure    Edin Salazar  AGE: 82 y.o. GENDER: female  : 1935  EPISODE DATE:  2021     Subjective:     No chief complaint on file. HISTORY of PRESENT ILLNESS     Nicci Baker a 82 y. o. female who presents to the Wound Clinic for evaluation and treatment of Acute pressure ulcer(s) of the right and left buttock. The sacral area is healed. She had a new ulcer left lower leg that is venous in nature and developed over the past few weeks that healed as of 21. The condition is of moderate severity. The pressure ulcers  have been present for approximately 2 months. The underlying cause is thought to be pressure complicated by diabetes. The patients care to date has included Clobetasol and Lotrisone ointment for previous care at the wound clinic. The patient has has pressure areas buttock in the past, she is fairly sedentary and basically is in her lift chair when ever she is not ambulating to the bathroom or to get items, etc. She sleeps in lift chair because she can't breath when laying flat and when she tried a hospital bed she was unable to turn in it. Bailey Nance does have a pad for her chair. The patient has significant underlying medical conditions as below.      Wound Pain Timing/Severity: intermittent, moderate  Quality of pain: dull, aching, tender  Severity of pain:   / 10   Modifying Factors: diabetes, poor glucose control, chronic pressure, decreased mobility, shear force, obesity and incontinence of urine  Associated Signs/Symptoms: edema, erythema, drainage and pain        PAST MEDICAL HISTORY        Diagnosis Date    Asthma     Chronic kidney disease     acute kidney failure    Chronic ulcer of left leg with fat layer exposed ([address] 3/7/2016    Chronic ulcer of right leg with fat layer exposed ([address] 3/7/2016    Diabetes type 2, controlled ([address]     History of asthma     History of blood transfusion 2015    Hypertension     Lymphedema of both lower extremities 3/7/2016    Osteoarthritis     Pneumonia     Thyroid disease     Venous stasis of both lower extremities 3/7/2016    WD-Non-pressure chronic ulcer right lower leg, 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 ([address] 4/21/2016       PAST SURGICAL HISTORY    Past Surgical History:   Procedure Laterality Date    APPENDECTOMY      CHOLECYSTECTOMY      CYSTOSCOPY      EYE SURGERY      bilateral implants    HYSTERECTOMY      JOINT REPLACEMENT Right     knee    PACEMAKER INSERTION N/A 11/17/2020    PACEMAKER INSERTION PERMANENT REMOVAL OF TEMPORARY PACEMAKER performed by Aggie Barbosa MD at [address]    Family History   Problem Relation Age of Onset    Heart Disease Father        SOCIAL HISTORY    Social History     Tobacco Use    Smoking status: Never Smoker    Smokeless tobacco: Never Used   Vaping Use    Vaping Use: Never used   Substance Use Topics    Alcohol use: No    Drug use: No       ALLERGIES    Allergies   Allergen Reactions    Latex Rash    Dye [Iodides] Anaphylaxis    Iodine Anaphylaxis    Dyazide [Hydrochlorothiazide W-Triamterene] Rash    Lasix [Furosemide] Rash    Procardia [Nifedipine] Other (See Comments)     Not for sure if rash occurred or rash    Doxycycline Dermatitis    Edecrin [Ethacrynic Acid]     Levaquin [Levofloxacin] Other (See Comments)     unknown    Mobic [Meloxicam]     Vioxx [Rofecoxib]     Celebrex [Celecoxib] Rash    Lexapro [Escitalopram Oxalate] Rash    Sulfa Antibiotics Hives       MEDICATIONS    Current Outpatient Medications on File Prior to Encounter   Medication Sig Dispense Refill    clobetasol (TEMOVATE) 0.05 % ointment Apply topically 2 times daily.  120 g 1    cephALEXin (KEFLEX) 500 MG capsule Take 500 mg by mouth three times daily      febuxostat (ULORIC) 40 MG TABS tablet Take 1 tablet by mouth daily 30 tablet 5    potassium chloride (KLOR-CON M) 20 MEQ extended release tablet Take 1 tablet by mouth daily 30 tablet 5    spironolactone (ALDACTONE) 50 MG tablet Take 1 tablet by mouth 2 times daily 60 tablet 3    bumetanide (BUMEX) 1 MG tablet Take 2 tablets by mouth 2 times daily 360 tablet 3    Fluticasone Propionate, Inhal, (FLOVENT IN) Inhale 2 puffs into the lungs 2 times daily      metOLazone (ZAROXOLYN) 5 MG tablet Take 1 tablet by mouth daily 30 tablet 5    silver sulfADIAZINE (SILVADENE) 1 % cream Apply topically daily. (Patient taking differently: as needed ) 240 g 5    cilostazol (PLETAL) 50 MG tablet Take 1 tablet by mouth 2 times daily 60 tablet 3    rOPINIRole (REQUIP) 0.5 MG tablet Take 1 tablet by mouth 3 times daily 90 tablet 3    metoprolol succinate (TOPROL XL) 25 MG extended release tablet Take 1 tablet by mouth daily 30 tablet 3    famotidine (PEPCID) 20 MG tablet Take 1 tablet by mouth 2 times daily 60 tablet 5    levothyroxine (SYNTHROID) 50 MCG tablet Take 50 mcg by mouth Daily      Polyethylene Glycol 3350 (MIRALAX PO) Take by mouth      fluticasone (FLONASE) 50 MCG/ACT nasal spray 1 spray by Nasal route 2 times daily       Loratadine (CLARITIN) 10 MG CAPS Take 10 mg by mouth daily      ferrous sulfate 325 (65 FE) MG tablet Take 1 tablet by mouth 2 times daily (with meals) 30 tablet 3    latanoprost (XALATAN) 0.005 % ophthalmic solution Place 1 drop into both eyes nightly      OXYGEN Inhale 2 L/min into the lungs nightly      Multiple Vitamins-Minerals (ICAPS) CAPS Take 1 tablet by mouth 2 times daily       acetaminophen (TYLENOL) 500 MG tablet Take 500 mg by mouth every 6 hours as needed for Pain.  calcium carbonate 600 MG TABS tablet Take 1 tablet by mouth daily. No current facility-administered medications on file prior to encounter. REVIEW OF SYSTEMS    Pertinent items are noted in HPI. Constitutional: Negative for systemic symptoms including fever, chills and malaise.       Objective:      /72   Pulse 72   Temp offloading required 08/06/21 0925   Wound Length (cm) 2 cm 08/27/21 0921   Wound Width (cm) 1.5 cm 08/27/21 0921   Wound Depth (cm) 0.1 cm 08/27/21 0921   Wound Surface Area (cm^2) 3 cm^2 08/27/21 0921   Change in Wound Size % (l*w) -35.75 08/27/21 0921   Wound Volume (cm^3) 0.3 cm^3 08/27/21 0921   Wound Healing % -36 08/27/21 0921   Post-Procedure Length (cm) 2 cm 08/27/21 0925   Post-Procedure Width (cm) 1.5 cm 08/27/21 0925   Post-Procedure Depth (cm) 0.1 cm 08/27/21 0925   Post-Procedure Surface Area (cm^2) 3 cm^2 08/27/21 0925   Post-Procedure Volume (cm^3) 0.3 cm^3 08/27/21 0925   Distance Tunneling (cm) 0 cm 08/27/21 0921   Tunneling Position ___ O'Clock 0 08/27/21 0921   Undermining Starts ___ O'Clock 0 08/27/21 0921   Undermining Ends___ O'Clock 0 08/27/21 0921   Undermining Maxium Distance (cm) 0 08/27/21 0921   Wound Assessment Slough 08/27/21 0921   Drainage Amount Small 08/27/21 0921   Drainage Description Serosanguinous 08/27/21 0921   Odor None 08/27/21 0921   Leatha-wound Assessment Hyperpigmented;Blanchable erythema 08/27/21 0921   Margins Defined edges 08/27/21 0921   Wound Thickness Description not for Pressure Injury Full thickness 08/27/21 0921   Number of days: 28       Wound 07/30/21 Buttocks Right #2 (Active)   Wound Image   08/18/21 0919   Dressing Status Clean;Dry;New dressing applied 08/27/21 0945   Wound Cleansed Wound cleanser;Cleansed with saline 08/27/21 0921   Offloading for Diabetic Foot Ulcers No offloading required 08/06/21 0925   Wound Length (cm) 1 cm 08/27/21 0921   Wound Width (cm) 1 cm 08/27/21 0921   Wound Depth (cm) 0.1 cm 08/27/21 0921   Wound Surface Area (cm^2) 1 cm^2 08/27/21 0921   Change in Wound Size % (l*w) 0 08/27/21 0921   Wound Volume (cm^3) 0.1 cm^3 08/27/21 0921   Wound Healing % 0 08/27/21 0921   Post-Procedure Length (cm) 1 cm 08/27/21 0925   Post-Procedure Width (cm) 1 cm 08/27/21 0925   Post-Procedure Depth (cm) 0.1 cm 08/27/21 0925   Post-Procedure Surface Area (cm^2) 1 cm^2 08/27/21 0925   Post-Procedure Volume (cm^3) 0.1 cm^3 08/27/21 0925   Distance Tunneling (cm) 0 cm 08/27/21 0921   Tunneling Position ___ O'Clock 0 08/27/21 0921   Undermining Starts ___ O'Clock 0 08/27/21 0921   Undermining Ends___ O'Clock 0 08/27/21 0921   Undermining Maxium Distance (cm) 0 08/27/21 0921   Wound Assessment Pink/red;Slough 08/27/21 0921   Drainage Amount Small 08/27/21 0921   Drainage Description Serosanguinous 08/27/21 0921   Odor None 08/27/21 0921   Leatha-wound Assessment Fragile;Blanchable erythema 08/27/21 0921   Margins Defined edges 08/27/21 0921   Wound Thickness Description not for Pressure Injury Full thickness 08/27/21 0921   Number of days: 28       Percent of Wound(s) Debrided: approximately 100%    Total  Area  Debrided:  4 sq cm     Bleeding:  Minimal    Hemostasis Achieved:  by pressure    Procedural Pain:  0  / 10     Post Procedural Pain:  0 / 10     Response to treatment:  Well tolerated by patient. Status of wound progress and description from last visit: All wounds healed, except bilateral buttock wounds which have gotten larger. Will use Desitin, Gent ointment and stimulin powder. Plan:       Discharge Instructions       PHYSICIAN ORDERS AND DISCHARGE INSTRUCTIONS     NOTE: Upon discharge from the [address], you will receive a patient experience survey.  We would be grateful if you would take the time to fill this survey out.     Wound care order history:                 CYNTHIA's   Right       Left    Date               Vascular studies:  1/13/21 Arterial were okay, Venous- possibly needs intervention              Cultures:                Antibiotics: Keflex 7/30/21 (dr. Rigo Mcknight)              YQS2R:   7.8 8/19/20              Compression/Lymph Pumps:              Grafts:                  Continuing wound care orders and information:              Residence: Private              Continue home health care with:               Your wound-care supplies will be

## 2021-09-10 ENCOUNTER — HOSPITAL ENCOUNTER (OUTPATIENT)
Dept: WOUND CARE | Age: 86
Discharge: HOME | End: 2021-09-10
Payer: MEDICARE

## 2021-09-10 VITALS
RESPIRATION RATE: 18 BRPM | DIASTOLIC BLOOD PRESSURE: 72 MMHG | TEMPERATURE: 97.1 F | SYSTOLIC BLOOD PRESSURE: 140 MMHG | HEART RATE: 75 BPM

## 2021-09-10 DIAGNOSIS — L89.322 PRESSURE INJURY OF LEFT BUTTOCK, STAGE 2 (HCC): ICD-10-CM

## 2021-09-10 DIAGNOSIS — L89.312 PRESSURE INJURY OF RIGHT BUTTOCK, STAGE 2 (HCC): Primary | ICD-10-CM

## 2021-09-10 PROCEDURE — 11042 DBRDMT SUBQ TIS 1ST 20SQCM/<: CPT

## 2021-09-10 PROCEDURE — 11042 DBRDMT SUBQ TIS 1ST 20SQCM/<: CPT | Performed by: SURGERY

## 2021-09-10 RX ORDER — BETAMETHASONE DIPROPIONATE 0.05 %
OINTMENT (GRAM) TOPICAL ONCE
Status: CANCELLED | OUTPATIENT
Start: 2021-09-10 | End: 2021-09-10

## 2021-09-10 RX ORDER — LIDOCAINE 50 MG/G
OINTMENT TOPICAL ONCE
Status: CANCELLED | OUTPATIENT
Start: 2021-09-10 | End: 2021-09-10

## 2021-09-10 RX ORDER — LIDOCAINE 50 MG/G
OINTMENT TOPICAL ONCE
Status: DISCONTINUED | OUTPATIENT
Start: 2021-09-10 | End: 2021-09-11 | Stop reason: HOSPADM

## 2021-09-10 RX ORDER — BACITRACIN ZINC AND POLYMYXIN B SULFATE 500; 1000 [USP'U]/G; [USP'U]/G
OINTMENT TOPICAL ONCE
Status: CANCELLED | OUTPATIENT
Start: 2021-09-10 | End: 2021-09-10

## 2021-09-10 RX ORDER — BACITRACIN, NEOMYCIN, POLYMYXIN B 400; 3.5; 5 [USP'U]/G; MG/G; [USP'U]/G
OINTMENT TOPICAL ONCE
Status: CANCELLED | OUTPATIENT
Start: 2021-09-10 | End: 2021-09-10

## 2021-09-10 RX ORDER — LIDOCAINE 40 MG/G
CREAM TOPICAL ONCE
Status: CANCELLED | OUTPATIENT
Start: 2021-09-10 | End: 2021-09-10

## 2021-09-10 RX ORDER — LIDOCAINE HYDROCHLORIDE 40 MG/ML
SOLUTION TOPICAL ONCE
Status: CANCELLED | OUTPATIENT
Start: 2021-09-10 | End: 2021-09-10

## 2021-09-10 RX ORDER — GINSENG 100 MG
CAPSULE ORAL ONCE
Status: CANCELLED | OUTPATIENT
Start: 2021-09-10 | End: 2021-09-10

## 2021-09-10 RX ORDER — GENTAMICIN SULFATE 1 MG/G
OINTMENT TOPICAL ONCE
Status: CANCELLED | OUTPATIENT
Start: 2021-09-10 | End: 2021-09-10

## 2021-09-10 RX ORDER — CLOBETASOL PROPIONATE 0.5 MG/G
OINTMENT TOPICAL ONCE
Status: CANCELLED | OUTPATIENT
Start: 2021-09-10 | End: 2021-09-10

## 2021-09-10 ASSESSMENT — PAIN SCALES - GENERAL: PAINLEVEL_OUTOF10: 10

## 2021-09-10 ASSESSMENT — PAIN DESCRIPTION - LOCATION: LOCATION: BUTTOCKS

## 2021-09-10 ASSESSMENT — PAIN DESCRIPTION - DESCRIPTORS: DESCRIPTORS: BURNING

## 2021-09-10 NOTE — PROGRESS NOTES
Wound Care Center Progress Note With Procedure    Rojelio Hampton  AGE: 82 y.o. GENDER: female  : 1935  EPISODE DATE:  9/10/2021     Subjective:     No chief complaint on file. HISTORY of PRESENT ILLNESS      Rojelio Hampton is a 82 y.o. female who presents today for wound evaluation of Acute pressure ulcer(s) of the left and right buttock. The ulcer is of mild severity. They have been present for ~2months. The underlying cause of the wound is pressure and decreased mobility. They have tried clobetasol and lotrisone.  reports enlargement in the wounds since last seen and a tape injury causing skin tear on the right. Condition is aggrevated by Montana Ortiz sleeping in a recliner due to shortness of breath if she were to try sleeping in bed or with less weight on her bottom.     Wound Pain Timing/Severity: moderate  Quality of pain: burning, pressure  Severity of pain:  3 / 10   Modifying Factors: edema, decreased mobility, shear force and obesity  Associated Signs/Symptoms: edema and pain        PAST MEDICAL HISTORY        Diagnosis Date    Asthma     Chronic kidney disease     acute kidney failure    Chronic ulcer of left leg with fat layer exposed ([address] 3/7/2016    Chronic ulcer of right leg with fat layer exposed ([address] 3/7/2016    Diabetes type 2, controlled ([address]     History of asthma     History of blood transfusion 2015    Hypertension     Lymphedema of both lower extremities 3/7/2016    Osteoarthritis     Pneumonia     Thyroid disease     Venous stasis of both lower extremities 3/7/2016    WD-Non-pressure chronic ulcer right lower leg, 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 ([address] 2016       PAST SURGICAL HISTORY    Past Surgical History:   Procedure Laterality Date    APPENDECTOMY      CHOLECYSTECTOMY      CYSTOSCOPY      EYE SURGERY      bilateral implants    HYSTERECTOMY      JOINT REPLACEMENT Right     knee    PACEMAKER INSERTION N/A 2020    PACEMAKER INSERTION PERMANENT REMOVAL OF TEMPORARY PACEMAKER performed by Martin Berman MD at [address]    Family History   Problem Relation Age of Onset    Heart Disease Father        SOCIAL HISTORY    Social History     Tobacco Use    Smoking status: Never Smoker    Smokeless tobacco: Never Used   Vaping Use    Vaping Use: Never used   Substance Use Topics    Alcohol use: No    Drug use: No       ALLERGIES    Allergies   Allergen Reactions    Latex Rash    Dye [Iodides] Anaphylaxis    Iodine Anaphylaxis    Dyazide [Hydrochlorothiazide W-Triamterene] Rash    Lasix [Furosemide] Rash    Procardia [Nifedipine] Other (See Comments)     Not for sure if rash occurred or rash    Doxycycline Dermatitis    Edecrin [Ethacrynic Acid]     Levaquin [Levofloxacin] Other (See Comments)     unknown    Mobic [Meloxicam]     Vioxx [Rofecoxib]     Celebrex [Celecoxib] Rash    Lexapro [Escitalopram Oxalate] Rash    Sulfa Antibiotics Hives       MEDICATIONS    Current Outpatient Medications on File Prior to Encounter   Medication Sig Dispense Refill    clobetasol (TEMOVATE) 0.05 % ointment Apply topically 2 times daily. 120 g 1    cephALEXin (KEFLEX) 500 MG capsule Take 500 mg by mouth three times daily      febuxostat (ULORIC) 40 MG TABS tablet Take 1 tablet by mouth daily 30 tablet 5    potassium chloride (KLOR-CON M) 20 MEQ extended release tablet Take 1 tablet by mouth daily 30 tablet 5    spironolactone (ALDACTONE) 50 MG tablet Take 1 tablet by mouth 2 times daily 60 tablet 3    bumetanide (BUMEX) 1 MG tablet Take 2 tablets by mouth 2 times daily 360 tablet 3    Fluticasone Propionate, Inhal, (FLOVENT IN) Inhale 2 puffs into the lungs 2 times daily      metOLazone (ZAROXOLYN) 5 MG tablet Take 1 tablet by mouth daily 30 tablet 5    silver sulfADIAZINE (SILVADENE) 1 % cream Apply topically daily.  (Patient taking differently: as needed ) 240 g 5    cilostazol (PLETAL) 50 MG tablet Take 1 tablet by mouth 2 times daily 60 tablet 3    rOPINIRole (REQUIP) 0.5 MG tablet Take 1 tablet by mouth 3 times daily 90 tablet 3    metoprolol succinate (TOPROL XL) 25 MG extended release tablet Take 1 tablet by mouth daily 30 tablet 3    famotidine (PEPCID) 20 MG tablet Take 1 tablet by mouth 2 times daily 60 tablet 5    levothyroxine (SYNTHROID) 50 MCG tablet Take 50 mcg by mouth Daily      Polyethylene Glycol 3350 (MIRALAX PO) Take by mouth      fluticasone (FLONASE) 50 MCG/ACT nasal spray 1 spray by Nasal route 2 times daily       Loratadine (CLARITIN) 10 MG CAPS Take 10 mg by mouth daily      ferrous sulfate 325 (65 FE) MG tablet Take 1 tablet by mouth 2 times daily (with meals) 30 tablet 3    latanoprost (XALATAN) 0.005 % ophthalmic solution Place 1 drop into both eyes nightly      OXYGEN Inhale 2 L/min into the lungs nightly      Multiple Vitamins-Minerals (ICAPS) CAPS Take 1 tablet by mouth 2 times daily       acetaminophen (TYLENOL) 500 MG tablet Take 500 mg by mouth every 6 hours as needed for Pain.  calcium carbonate 600 MG TABS tablet Take 1 tablet by mouth daily. No current facility-administered medications on file prior to encounter. REVIEW OF SYSTEMS    Pertinent items are noted in HPI. Constitutional: Negative for systemic symptoms including fever, chills and malaise. Objective:      BP (!) 140/72   Pulse 75   Temp 97.1 °F (36.2 °C) (Temporal)   Resp 18     PHYSICAL EXAM  General Appearance: alert and oriented to person, place and time, well-developed and well-nourished, in no acute distress    General: The patient is in no acute distress. Mental status:  Patient is appropriate, is  oriented to place and plan of care.   Dermatologic exam: Visual inspection of the periwound reveals the skin to be edematous  Wound exam: see wound description below in procedure note      Assessment:   82 y.o. female with pressure injury to bilateral Post-Procedure Depth (cm) 0.1 cm 09/10/21 0842   Post-Procedure Surface Area (cm^2) 5.06 cm^2 09/10/21 0842   Post-Procedure Volume (cm^3) 0.506 cm^3 09/10/21 0842   Distance Tunneling (cm) 0 cm 09/10/21 0833   Tunneling Position ___ O'Clock 0 09/10/21 0833   Undermining Starts ___ O'Clock 0 09/10/21 0833   Undermining Ends___ O'Clock 0 09/10/21 0833   Undermining Maxium Distance (cm) 0 09/10/21 0833   Wound Assessment Slough;Granulation tissue 09/10/21 0833   Drainage Amount Small 09/10/21 0833   Drainage Description Serosanguinous 09/10/21 0833   Odor None 09/10/21 0833   Leatha-wound Assessment Hyperpigmented;Blanchable erythema;Dry/flaky;Fragile 09/10/21 0833   Margins Defined edges 09/10/21 0833   Wound Thickness Description not for Pressure Injury Full thickness 09/10/21 0833   Number of days: 41       Wound 07/30/21 Buttocks Right #2 cluster  (Active)   Wound Image   09/10/21 0833   Dressing Status Clean;Dry;New dressing applied 08/27/21 0945   Wound Cleansed Wound cleanser;Cleansed with saline 09/10/21 0833   Offloading for Diabetic Foot Ulcers No offloading required 09/10/21 0833   Wound Length (cm) 2 cm 09/10/21 0833   Wound Width (cm) 3.2 cm 09/10/21 0833   Wound Depth (cm) 0.1 cm 09/10/21 0833   Wound Surface Area (cm^2) 6.4 cm^2 09/10/21 0833   Change in Wound Size % (l*w) -540 09/10/21 0833   Wound Volume (cm^3) 0.64 cm^3 09/10/21 0833   Wound Healing % -540 09/10/21 0833   Post-Procedure Length (cm) 2 cm 09/10/21 0842   Post-Procedure Width (cm) 3.2 cm 09/10/21 0842   Post-Procedure Depth (cm) 0.1 cm 09/10/21 0842   Post-Procedure Surface Area (cm^2) 6.4 cm^2 09/10/21 0842   Post-Procedure Volume (cm^3) 0.64 cm^3 09/10/21 0842   Distance Tunneling (cm) 0 cm 09/10/21 0833   Tunneling Position ___ O'Clock 0 09/10/21 0833   Undermining Starts ___ O'Clock 0 09/10/21 0833   Undermining Ends___ O'Clock 00 09/10/21 0833   Undermining Maxium Distance (cm) 0 09/10/21 0833   Wound Assessment Granulation tissue 09/10/21 0833   Drainage Amount Small 09/10/21 0833   Drainage Description Serosanguinous 09/10/21 0833   Odor None 09/10/21 0833   Leatha-wound Assessment Fragile;Blanchable erythema;Dry/flaky 09/10/21 0833   Margins Defined edges 09/10/21 0833   Wound Thickness Description not for Pressure Injury Full thickness 09/10/21 0833   Number of days: 41       Percent of Wound(s) Debrided: approximately 100%    Total  Area  Debrided:  11 sq cm     Bleeding:  Minimal    Hemostasis Achieved:  by pressure    Procedural Pain:  3  / 10     Post Procedural Pain:  1 / 10     Response to treatment:  Well tolerated by patient. Status of wound progress and description from last visit:   Wounds a little larger but clean based and shallow. Plan:       Discharge Instructions       PHYSICIAN ORDERS AND DISCHARGE INSTRUCTIONS     NOTE: Upon discharge from the [address], you will receive a patient experience survey. We would be grateful if you would take the time to fill this survey out.     Wound care order history:                 CYNTHIA's   Right       Left    Date               Vascular studies:  1/13/21 Arterial were okay, Venous- possibly needs intervention              Cultures:                Antibiotics: Keflex 7/30/21 (dr. Jayda Zhu)              TRJ9R:   7.8 8/19/20              Compression/Lymph Pumps:              Grafts:                  Continuing wound care orders and information:              Residence: Private              Continue home health care with:               Your wound-care supplies will be provided by: .                          RSEII cleansing:                           HL not scrub or use excessive force.                          Wash hands with soap and water before and after dressing changes.                           Prior to applying a clean dressing, cleanse wound with normal saline,                          wound cleanser, or mild soap and water.                           Ask your physician or nurse before getting the wound(s) wet in the shower.              Daily Wound management:                          Keep weight off wounds and reposition every 2 hours.                          KKXJS standing for long periods of time.                          DBJPT wraps/stockings in AM and remove at bedtime.                          Elevate legs to the level of the heart or above for 30 minutes 4-5 times a day and/or when sitting.                                               When taking antibiotics take entire prescription as ordered by MD do not stop taking until medicine is all gone.                             Orders for this week (9/9/21): Buttock and Coccyx- Wash with anasept spray   Apply desitin to micha wound area  Cover wound bed with puracol   Secure with optifoam borders  Change every 2 days and as needed for soiling     Stay off of bottom as much as possible - Use pillows to shift weight from side to side     Follow up with Rosemarie Casarez CNP in  1 weeks in the wound care center  Call [phone] for any questions or concerns.   Date__________   Time____________        Treatment Note      Written Patient Dismissal Instructions Given     - will switch to puracol to wounds with optifoam border for extra padding       Electronically signed by Darling Whitaker MD on 9/10/2021 at 9:14 AM

## 2021-09-17 ENCOUNTER — HOSPITAL ENCOUNTER (OUTPATIENT)
Dept: WOUND CARE | Age: 86
Discharge: HOME | End: 2021-09-17
Payer: MEDICARE

## 2021-09-17 VITALS
TEMPERATURE: 97.5 F | DIASTOLIC BLOOD PRESSURE: 68 MMHG | SYSTOLIC BLOOD PRESSURE: 149 MMHG | RESPIRATION RATE: 16 BRPM | HEART RATE: 69 BPM

## 2021-09-17 DIAGNOSIS — L89.312 PRESSURE INJURY OF RIGHT BUTTOCK, STAGE 2 (HCC): Primary | ICD-10-CM

## 2021-09-17 DIAGNOSIS — L89.322 PRESSURE INJURY OF LEFT BUTTOCK, STAGE 2 (HCC): ICD-10-CM

## 2021-09-17 PROCEDURE — 11042 DBRDMT SUBQ TIS 1ST 20SQCM/<: CPT

## 2021-09-17 PROCEDURE — 99213 OFFICE O/P EST LOW 20 MIN: CPT | Performed by: NURSE PRACTITIONER

## 2021-09-17 PROCEDURE — 99213 OFFICE O/P EST LOW 20 MIN: CPT

## 2021-09-17 RX ORDER — LIDOCAINE 50 MG/G
OINTMENT TOPICAL ONCE
Status: CANCELLED | OUTPATIENT
Start: 2021-09-17 | End: 2021-09-17

## 2021-09-17 RX ORDER — GENTAMICIN SULFATE 1 MG/G
OINTMENT TOPICAL ONCE
Status: CANCELLED | OUTPATIENT
Start: 2021-09-17 | End: 2021-09-17

## 2021-09-17 RX ORDER — LIDOCAINE HYDROCHLORIDE 40 MG/ML
SOLUTION TOPICAL ONCE
Status: CANCELLED | OUTPATIENT
Start: 2021-09-17 | End: 2021-09-17

## 2021-09-17 RX ORDER — BACITRACIN ZINC AND POLYMYXIN B SULFATE 500; 1000 [USP'U]/G; [USP'U]/G
OINTMENT TOPICAL ONCE
Status: CANCELLED | OUTPATIENT
Start: 2021-09-17 | End: 2021-09-17

## 2021-09-17 RX ORDER — GINSENG 100 MG
CAPSULE ORAL ONCE
Status: CANCELLED | OUTPATIENT
Start: 2021-09-17 | End: 2021-09-17

## 2021-09-17 RX ORDER — LIDOCAINE 40 MG/G
CREAM TOPICAL ONCE
Status: CANCELLED | OUTPATIENT
Start: 2021-09-17 | End: 2021-09-17

## 2021-09-17 RX ORDER — BACITRACIN, NEOMYCIN, POLYMYXIN B 400; 3.5; 5 [USP'U]/G; MG/G; [USP'U]/G
OINTMENT TOPICAL ONCE
Status: CANCELLED | OUTPATIENT
Start: 2021-09-17 | End: 2021-09-17

## 2021-09-17 RX ORDER — CLOBETASOL PROPIONATE 0.5 MG/G
OINTMENT TOPICAL ONCE
Status: CANCELLED | OUTPATIENT
Start: 2021-09-17 | End: 2021-09-17

## 2021-09-17 RX ORDER — BETAMETHASONE DIPROPIONATE 0.05 %
OINTMENT (GRAM) TOPICAL ONCE
Status: CANCELLED | OUTPATIENT
Start: 2021-09-17 | End: 2021-09-17

## 2021-09-17 ASSESSMENT — PAIN DESCRIPTION - FREQUENCY: FREQUENCY: INTERMITTENT

## 2021-09-17 ASSESSMENT — PAIN DESCRIPTION - ORIENTATION: ORIENTATION: RIGHT;LEFT

## 2021-09-17 ASSESSMENT — PAIN DESCRIPTION - PROGRESSION: CLINICAL_PROGRESSION: NOT CHANGED

## 2021-09-17 ASSESSMENT — PAIN DESCRIPTION - DESCRIPTORS: DESCRIPTORS: BURNING;SORE

## 2021-09-17 ASSESSMENT — PAIN DESCRIPTION - PAIN TYPE: TYPE: ACUTE PAIN

## 2021-09-17 ASSESSMENT — PAIN DESCRIPTION - LOCATION: LOCATION: BUTTOCKS

## 2021-09-17 ASSESSMENT — PAIN SCALES - GENERAL: PAINLEVEL_OUTOF10: 4

## 2021-09-17 ASSESSMENT — PAIN - FUNCTIONAL ASSESSMENT: PAIN_FUNCTIONAL_ASSESSMENT: PREVENTS OR INTERFERES SOME ACTIVE ACTIVITIES AND ADLS

## 2021-09-17 ASSESSMENT — PAIN DESCRIPTION - ONSET: ONSET: ON-GOING

## 2021-09-17 NOTE — PROGRESS NOTES
Wound Care Center Progress Note       Mine Justin  AGE: 82 y.o. GENDER: female  : 1935  TODAY'S DATE:  2021    Subjective:     Chief Complaint   Patient presents with    Wound Check     bilat buttocks         HISTORY of PRESENT ILLNESS    Shabana varela 82 y. o. female who presents to the Wound Clinic for evaluation and treatment of Acute pressure ulcer(s) of the right and left buttock. The sacral area and left lower leg is healed. The condition is of moderate severity. The pressure ulcers have been present for approximately 2 months. The underlying cause is thought to be pressure complicated by diabetes. The patients care to date has included Clobetasol and Lotrisone ointment for previous care at the wound clinic. The patient has has pressure areas buttock in the past, she is fairly sedentary and basically is in her lift chair when ever she is not ambulating to the bathroom or to get items, etc. She sleeps in lift chair because she can't breath when laying flat and when she tried a hospital bed she was unable to turn in it. Julian Peguero does have a pad for her chair. The patient has significant underlying medical conditions as below.      Wound Pain Timing/Severity: intermittent, moderate  Quality of pain: dull, aching, tender  Severity of pain:  2 / 10   Modifying Factors: diabetes, poor glucose control, chronic pressure, decreased mobility, shear force, obesity and incontinence of urine  Associated Signs/Symptoms: edema,  drainage and pain    Diabetes: The patient is not on medication, but it it documented in her history, last A1c found was 7.8 20  Smoking: No  Obesity: Yes  Anticoagulant therapy: No  Immunosuppression: No  Other History: CKD, followed by Dr. Ary Berumen, chronic Cor pulmonale on spironolactone, Bumex and zaroxolyn, oxygen at night.     Patient educated on the 6 essential components necessary for wound healing: Circulation, Debridements, Proper Dressings and Topical Wound Products, Infection Control, Edema Control and Offloading. Patient educated on those factors that negatively effect or impact wound healing: smoking, obesity, uncontrolled diabetes, anticoagulant and immunosuppressive regimens, inadequate nutrition, untreated arterial and venous disease if applicable and measures to manage edema.            PAST MEDICAL HISTORY        Diagnosis Date    Asthma     Chronic kidney disease     acute kidney failure    Chronic ulcer of left leg with fat layer exposed ([address] 3/7/2016    Chronic ulcer of right leg with fat layer exposed ([address] 3/7/2016    Diabetes type 2, controlled ([address]     History of asthma     History of blood transfusion 07/2015    Hypertension     Lymphedema of both lower extremities 3/7/2016    Osteoarthritis     Pneumonia     Thyroid disease     Venous stasis of both lower extremities 3/7/2016    WD-Non-pressure chronic ulcer right lower leg, 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 ([address] 4/21/2016       PAST SURGICAL HISTORY    Past Surgical History:   Procedure Laterality Date    APPENDECTOMY      CHOLECYSTECTOMY      CYSTOSCOPY      EYE SURGERY      bilateral implants    HYSTERECTOMY      JOINT REPLACEMENT Right     knee    PACEMAKER INSERTION N/A 11/17/2020    PACEMAKER INSERTION PERMANENT REMOVAL OF TEMPORARY PACEMAKER performed by Jose Dotson MD at [address]    Family History   Problem Relation Age of Onset    Heart Disease Father        SOCIAL HISTORY    Social History     Tobacco Use    Smoking status: Never Smoker    Smokeless tobacco: Never Used   Vaping Use    Vaping Use: Never used   Substance Use Topics    Alcohol use: No    Drug use: No       ALLERGIES    Allergies   Allergen Reactions    Latex Rash    Dye [Iodides] Anaphylaxis    Iodine Anaphylaxis    Dyazide [Hydrochlorothiazide W-Triamterene] Rash    Lasix [Furosemide] Rash    Procardia [Nifedipine] Other (See Comments)     Not for sure if rash occurred or rash    Doxycycline Dermatitis    Edecrin [Ethacrynic Acid]     Levaquin [Levofloxacin] Other (See Comments)     unknown    Mobic [Meloxicam]     Vioxx [Rofecoxib]     Celebrex [Celecoxib] Rash    Lexapro [Escitalopram Oxalate] Rash    Sulfa Antibiotics Hives       MEDICATIONS    Current Outpatient Medications on File Prior to Encounter   Medication Sig Dispense Refill    clobetasol (TEMOVATE) 0.05 % ointment Apply topically 2 times daily. 120 g 1    febuxostat (ULORIC) 40 MG TABS tablet Take 1 tablet by mouth daily 30 tablet 5    potassium chloride (KLOR-CON M) 20 MEQ extended release tablet Take 1 tablet by mouth daily 30 tablet 5    spironolactone (ALDACTONE) 50 MG tablet Take 1 tablet by mouth 2 times daily 60 tablet 3    bumetanide (BUMEX) 1 MG tablet Take 2 tablets by mouth 2 times daily 360 tablet 3    Fluticasone Propionate, Inhal, (FLOVENT IN) Inhale 2 puffs into the lungs 2 times daily      metOLazone (ZAROXOLYN) 5 MG tablet Take 1 tablet by mouth daily 30 tablet 5    silver sulfADIAZINE (SILVADENE) 1 % cream Apply topically daily.  (Patient taking differently: as needed ) 240 g 5    cilostazol (PLETAL) 50 MG tablet Take 1 tablet by mouth 2 times daily 60 tablet 3    rOPINIRole (REQUIP) 0.5 MG tablet Take 1 tablet by mouth 3 times daily 90 tablet 3    metoprolol succinate (TOPROL XL) 25 MG extended release tablet Take 1 tablet by mouth daily 30 tablet 3    famotidine (PEPCID) 20 MG tablet Take 1 tablet by mouth 2 times daily 60 tablet 5    levothyroxine (SYNTHROID) 50 MCG tablet Take 50 mcg by mouth Daily      Polyethylene Glycol 3350 (MIRALAX PO) Take by mouth      fluticasone (FLONASE) 50 MCG/ACT nasal spray 1 spray by Nasal route 2 times daily       Loratadine (CLARITIN) 10 MG CAPS Take 10 mg by mouth daily      ferrous sulfate 325 (65 FE) MG tablet Take 1 tablet by mouth 2 times daily (with meals) 30 tablet 3    latanoprost (XALATAN) 0.005 % ophthalmic solution Place 1 drop into both eyes nightly      OXYGEN Inhale 2 L/min into the lungs nightly      Multiple Vitamins-Minerals (ICAPS) CAPS Take 1 tablet by mouth 2 times daily       acetaminophen (TYLENOL) 500 MG tablet Take 500 mg by mouth every 6 hours as needed for Pain.  calcium carbonate 600 MG TABS tablet Take 1 tablet by mouth daily. No current facility-administered medications on file prior to encounter. REVIEW OF SYSTEMS    Pertinent items are noted in HPI. Constitutional: Negative for systemic symptoms including fever, chills and malaise. Objective:      BP (!) 149/68   Pulse 69   Temp 97.5 °F (36.4 °C) (Temporal)   Resp 16     PHYSICAL EXAM    General: The patient is in no acute distress. Mental status:  Patient is appropriate, is  oriented to place and plan of care. Dermatologic exam: Visual inspection of the periwound reveals the skin to be normal in turgor and texture.   Wound exam:  see wound description below     All active wounds listed below with today's date are evaluated      Wound 07/30/21 #1 Left Buttock (Active)   Wound Image   09/10/21 0833   Dressing Status Clean;New dressing applied 09/10/21 0920   Wound Cleansed Wound cleanser 09/17/21 1011   Offloading for Diabetic Foot Ulcers No offloading required 09/17/21 1011   Wound Length (cm) 2 cm 09/17/21 1011   Wound Width (cm) 2 cm 09/17/21 1011   Wound Depth (cm) 0.1 cm 09/17/21 1011   Wound Surface Area (cm^2) 4 cm^2 09/17/21 1011   Change in Wound Size % (l*w) -81 09/17/21 1011   Wound Volume (cm^3) 0.4 cm^3 09/17/21 1011   Wound Healing % -81 09/17/21 1011   Post-Procedure Length (cm) 2.2 cm 09/10/21 0842   Post-Procedure Width (cm) 2.3 cm 09/10/21 0842   Post-Procedure Depth (cm) 0.1 cm 09/10/21 0842   Post-Procedure Surface Area (cm^2) 5.06 cm^2 09/10/21 0842   Post-Procedure Volume (cm^3) 0.506 cm^3 09/10/21 0842   Distance Tunneling (cm) 0 cm 09/17/21 1011   Tunneling Position ___ O'Clock 0 09/17/21 1011 Undermining Starts ___ O'Clock 0 09/17/21 1011   Undermining Ends___ O'Clock 0 09/17/21 1011   Undermining Maxium Distance (cm) 0 09/17/21 1011   Wound Assessment Granulation tissue 09/17/21 1011   Drainage Amount Moderate 09/17/21 1011   Drainage Description Serosanguinous 09/17/21 1011   Odor None 09/17/21 1011   Leatha-wound Assessment Intact 09/17/21 1011   Margins Defined edges; Unattached edges 09/17/21 1011   Wound Thickness Description not for Pressure Injury Full thickness 09/17/21 1011   Number of days: 49       Wound 07/30/21 #2 Right Buttock Cluster  (Active)   Wound Image   09/10/21 0833   Dressing Status Clean;Dry; Intact; New dressing applied 09/17/21 1048   Wound Cleansed Wound cleanser 09/17/21 1011   Dressing/Treatment Collagen 09/17/21 1048   Offloading for Diabetic Foot Ulcers No offloading required 09/17/21 1011   Wound Length (cm) 0.7 cm 09/17/21 1011   Wound Width (cm) 0.7 cm 09/17/21 1011   Wound Depth (cm) 0.1 cm 09/17/21 1011   Wound Surface Area (cm^2) 0.49 cm^2 09/17/21 1011   Change in Wound Size % (l*w) 51 09/17/21 1011   Wound Volume (cm^3) 0.049 cm^3 09/17/21 1011   Wound Healing % 51 09/17/21 1011   Post-Procedure Length (cm) 2 cm 09/10/21 0842   Post-Procedure Width (cm) 3.2 cm 09/10/21 0842   Post-Procedure Depth (cm) 0.1 cm 09/10/21 0842   Post-Procedure Surface Area (cm^2) 6.4 cm^2 09/10/21 0842   Post-Procedure Volume (cm^3) 0.64 cm^3 09/10/21 0842   Distance Tunneling (cm) 0 cm 09/17/21 1011   Tunneling Position ___ O'Clock 0 09/17/21 1011   Undermining Starts ___ O'Clock 0 09/17/21 1011   Undermining Ends___ O'Clock 0 09/17/21 1011   Undermining Maxium Distance (cm) 0 09/17/21 1011   Wound Assessment Granulation tissue 09/17/21 1011   Drainage Amount Moderate 09/17/21 1011   Drainage Description Serosanguinous 09/17/21 1011   Odor None 09/17/21 1011   Leatha-wound Assessment Intact 09/17/21 1011   Margins Defined edges; Unattached edges 09/17/21 1011   Wound Thickness Description not for Pressure Injury Full thickness 09/17/21 1011   Number of days: 49       Assessment:       Problem List Items Addressed This Visit     WD-Pressure injury of right buttock, stage 2 ([facility] - Primary    Relevant Orders    Initiate Outpatient Wound Care Protocol    WD-Pressure injury of left buttock, stage 2 ([facility]    Relevant Orders    Initiate Outpatient Wound Care Protocol          Status of wound progress and description from last visit: Improving, continue regimen        Plan:     Discharge Instructions       PHYSICIAN ORDERS AND DISCHARGE INSTRUCTIONS     NOTE: Upon discharge from the [address], you will receive a patient experience survey. We would be grateful if you would take the time to fill this survey out.     Wound care order history:                 CYNTHIA's   Right       Left    Date               Vascular studies:  1/13/21 Arterial were okay, Venous- possibly needs intervention              Cultures:                Antibiotics: Keflex 7/30/21 (dr. Rigo Mcknight)              AYZ9A:   7.8 8/19/20              Compression/Lymph Pumps:              Grafts:                  Continuing wound care orders and information:              Residence: Private              Continue home health care with:               Your wound-care supplies will be provided by: .                          APEKL cleansing:                           VANDANA not scrub or use excessive force.                          Wash hands with soap and water before and after dressing changes.                           Prior to applying a clean dressing, cleanse wound with normal saline,                          wound cleanser, or mild soap and water.                           Ask your physician or nurse before getting the wound(s) wet in the shower.              Daily Wound management:                          Keep weight off wounds and reposition every 2 hours.                          AFKWT standing for long periods of time.                          MZWSG wraps/stockings in AM and remove at bedtime.                          Elevate legs to the level of the heart or above for 30 minutes 4-5 times a day and/or when sitting.                                               When taking antibiotics take entire prescription as ordered by MD do not stop taking until medicine is all gone.                             Orders for this week (9/17/21):     Buttock and Coccyx- Wash with anasept spray   Cover wound bed with puracol   Secure with optifoam borders  Change every 2 days and as needed for soiling     Stay off of bottom as much as possible - Use pillows to shift weight from side to side     Follow up with Brent Urias CNP in  1 weeks in the wound care center  Call [phone] for any questions or concerns.   Date__________   Time____________        Treatment Note Wound 07/30/21 #2 Right Buttock Cluster -Dressing/Treatment: Collagen (puracol, optifoam border,)    Written Patient Dismissal Instructions Given            Electronically signed by TREVON Castaneda CNP on 9/17/2021 at 11:14 AM

## 2021-10-01 ENCOUNTER — HOSPITAL ENCOUNTER (OUTPATIENT)
Dept: WOUND CARE | Age: 86
Discharge: HOME | End: 2021-10-01
Payer: MEDICARE

## 2021-10-01 VITALS
HEART RATE: 70 BPM | RESPIRATION RATE: 16 BRPM | TEMPERATURE: 98 F | SYSTOLIC BLOOD PRESSURE: 141 MMHG | DIASTOLIC BLOOD PRESSURE: 53 MMHG

## 2021-10-01 DIAGNOSIS — L89.312 PRESSURE INJURY OF RIGHT BUTTOCK, STAGE 2 (HCC): Primary | ICD-10-CM

## 2021-10-01 DIAGNOSIS — L89.322 PRESSURE INJURY OF LEFT BUTTOCK, STAGE 2 (HCC): ICD-10-CM

## 2021-10-01 PROCEDURE — 11042 DBRDMT SUBQ TIS 1ST 20SQCM/<: CPT

## 2021-10-01 PROCEDURE — 11042 DBRDMT SUBQ TIS 1ST 20SQCM/<: CPT | Performed by: NURSE PRACTITIONER

## 2021-10-01 RX ORDER — GINSENG 100 MG
CAPSULE ORAL ONCE
Status: CANCELLED | OUTPATIENT
Start: 2021-10-01 | End: 2021-10-01

## 2021-10-01 RX ORDER — BACITRACIN ZINC AND POLYMYXIN B SULFATE 500; 1000 [USP'U]/G; [USP'U]/G
OINTMENT TOPICAL ONCE
Status: CANCELLED | OUTPATIENT
Start: 2021-10-01 | End: 2021-10-01

## 2021-10-01 RX ORDER — LIDOCAINE 50 MG/G
OINTMENT TOPICAL ONCE
Status: CANCELLED | OUTPATIENT
Start: 2021-10-01 | End: 2021-10-01

## 2021-10-01 RX ORDER — BETAMETHASONE DIPROPIONATE 0.05 %
OINTMENT (GRAM) TOPICAL ONCE
Status: CANCELLED | OUTPATIENT
Start: 2021-10-01 | End: 2021-10-01

## 2021-10-01 RX ORDER — LIDOCAINE 40 MG/G
CREAM TOPICAL ONCE
Status: CANCELLED | OUTPATIENT
Start: 2021-10-01 | End: 2021-10-01

## 2021-10-01 RX ORDER — LIDOCAINE 50 MG/G
OINTMENT TOPICAL ONCE
Status: DISCONTINUED | OUTPATIENT
Start: 2021-10-01 | End: 2021-10-02 | Stop reason: HOSPADM

## 2021-10-01 RX ORDER — LIDOCAINE HYDROCHLORIDE 40 MG/ML
SOLUTION TOPICAL ONCE
Status: CANCELLED | OUTPATIENT
Start: 2021-10-01 | End: 2021-10-01

## 2021-10-01 RX ORDER — BACITRACIN, NEOMYCIN, POLYMYXIN B 400; 3.5; 5 [USP'U]/G; MG/G; [USP'U]/G
OINTMENT TOPICAL ONCE
Status: CANCELLED | OUTPATIENT
Start: 2021-10-01 | End: 2021-10-01

## 2021-10-01 RX ORDER — CLOBETASOL PROPIONATE 0.5 MG/G
OINTMENT TOPICAL ONCE
Status: CANCELLED | OUTPATIENT
Start: 2021-10-01 | End: 2021-10-01

## 2021-10-01 RX ORDER — GENTAMICIN SULFATE 1 MG/G
OINTMENT TOPICAL ONCE
Status: CANCELLED | OUTPATIENT
Start: 2021-10-01 | End: 2021-10-01

## 2021-10-01 ASSESSMENT — PAIN DESCRIPTION - DESCRIPTORS: DESCRIPTORS: BURNING

## 2021-10-01 ASSESSMENT — PAIN DESCRIPTION - LOCATION: LOCATION: BUTTOCKS

## 2021-10-01 ASSESSMENT — PAIN SCALES - GENERAL: PAINLEVEL_OUTOF10: 2

## 2021-10-01 NOTE — PROGRESS NOTES
Wound Care Center Progress Note With Procedure    Radhames Salazar  AGE: 82 y.o. GENDER: female  : 1935  EPISODE DATE:  10/1/2021     Subjective:     Chief Complaint   Patient presents with    Wound Check     bilat buttocks          HISTORY of PRESENT ILLNESS     Robert Scott a 82 y. o. female who presents to the Wound Clinic for evaluation and treatment of Acute pressure ulcer(s) of the right and left buttock. The sacral area and left lower leg is healed. The condition is of moderate severity. The pressure ulcers have been present for approximately 2 months. The underlying cause is thought to be pressure complicated by diabetes. The patients care to date has included Clobetasol and Lotrisone ointment for previous care at the wound clinic. The patient has has pressure areas buttock in the past, she is fairly sedentary and basically is in her lift chair when ever she is not ambulating to the bathroom or to get items, etc. She sleeps in lift chair because she can't breath when laying flat and when she tried a hospital bed she was unable to turn in it. Leticia Nielsen does have a pad for her chair. The patient has significant underlying medical conditions as below.      Wound Pain Timing/Severity: intermittent, moderate  Quality of pain: dull, aching, tender  Severity of pain:  2 / 10   Modifying Factors: diabetes, poor glucose control, chronic pressure, decreased mobility, shear force, obesity and incontinence of urine  Associated Signs/Symptoms: edema,  drainage and pain     Diabetes:  The patient is not on medication, but it it documented in her history, last A1c found was 7.8 20  Smoking: No  Obesity: Yes  Anticoagulant therapy: No  Immunosuppression: No  Other History: CKD, followed by Dr. Sakshi Bowling, chronic Cor pulmonale on spironolactone, Bumex and zaroxolyn, oxygen at night.     Patient educated on the 6 essential components necessary for wound healing: Circulation, Debridements, Proper Dressings and Topical Wound Products, Infection Control, Edema Control and Offloading.     Patient educated on those factors that negatively effect or impact wound healing: smoking, obesity, uncontrolled diabetes, anticoagulant and immunosuppressive regimens, inadequate nutrition, untreated arterial and venous disease if applicable and measures to manage edema.          PAST MEDICAL HISTORY        Diagnosis Date    Asthma     Chronic kidney disease     acute kidney failure    Chronic ulcer of left leg with fat layer exposed ([address] 3/7/2016    Chronic ulcer of right leg with fat layer exposed ([address] 3/7/2016    Diabetes type 2, controlled ([address]     History of asthma     History of blood transfusion 07/2015    Hypertension     Lymphedema of both lower extremities 3/7/2016    Osteoarthritis     Pneumonia     Thyroid disease     Venous stasis of both lower extremities 3/7/2016    WD-Non-pressure chronic ulcer right lower leg, 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 ([address] 4/21/2016       PAST SURGICAL HISTORY    Past Surgical History:   Procedure Laterality Date    APPENDECTOMY      CHOLECYSTECTOMY      CYSTOSCOPY      EYE SURGERY      bilateral implants    HYSTERECTOMY      JOINT REPLACEMENT Right     knee    PACEMAKER INSERTION N/A 11/17/2020    PACEMAKER INSERTION PERMANENT REMOVAL OF TEMPORARY PACEMAKER performed by Ronal Peguero MD at [facility]    Family History   Problem Relation Age of Onset    Heart Disease Father        SOCIAL HISTORY    Social History     Tobacco Use    Smoking status: Never Smoker    Smokeless tobacco: Never Used   Vaping Use    Vaping Use: Never used   Substance Use Topics    Alcohol use: No    Drug use: No       ALLERGIES    Allergies   Allergen Reactions    Latex Rash    Dye [Iodides] Anaphylaxis    Iodine Anaphylaxis    Dyazide [Hydrochlorothiazide W-Triamterene] Rash    Lasix [Furosemide] Rash    Procardia [Nifedipine] Other (See Comments) Not for sure if rash occurred or rash    Doxycycline Dermatitis    Edecrin [Ethacrynic Acid]     Levaquin [Levofloxacin] Other (See Comments)     unknown    Mobic [Meloxicam]     Vioxx [Rofecoxib]     Celebrex [Celecoxib] Rash    Lexapro [Escitalopram Oxalate] Rash    Sulfa Antibiotics Hives       MEDICATIONS    Current Outpatient Medications on File Prior to Encounter   Medication Sig Dispense Refill    clobetasol (TEMOVATE) 0.05 % ointment Apply topically 2 times daily. 120 g 1    febuxostat (ULORIC) 40 MG TABS tablet Take 1 tablet by mouth daily 30 tablet 5    potassium chloride (KLOR-CON M) 20 MEQ extended release tablet Take 1 tablet by mouth daily 30 tablet 5    spironolactone (ALDACTONE) 50 MG tablet Take 1 tablet by mouth 2 times daily 60 tablet 3    bumetanide (BUMEX) 1 MG tablet Take 2 tablets by mouth 2 times daily 360 tablet 3    Fluticasone Propionate, Inhal, (FLOVENT IN) Inhale 2 puffs into the lungs 2 times daily      metOLazone (ZAROXOLYN) 5 MG tablet Take 1 tablet by mouth daily 30 tablet 5    silver sulfADIAZINE (SILVADENE) 1 % cream Apply topically daily.  (Patient taking differently: as needed ) 240 g 5    cilostazol (PLETAL) 50 MG tablet Take 1 tablet by mouth 2 times daily 60 tablet 3    rOPINIRole (REQUIP) 0.5 MG tablet Take 1 tablet by mouth 3 times daily 90 tablet 3    metoprolol succinate (TOPROL XL) 25 MG extended release tablet Take 1 tablet by mouth daily 30 tablet 3    famotidine (PEPCID) 20 MG tablet Take 1 tablet by mouth 2 times daily 60 tablet 5    levothyroxine (SYNTHROID) 50 MCG tablet Take 50 mcg by mouth Daily      Polyethylene Glycol 3350 (MIRALAX PO) Take by mouth      fluticasone (FLONASE) 50 MCG/ACT nasal spray 1 spray by Nasal route 2 times daily       Loratadine (CLARITIN) 10 MG CAPS Take 10 mg by mouth daily      ferrous sulfate 325 (65 FE) MG tablet Take 1 tablet by mouth 2 times daily (with meals) 30 tablet 3    latanoprost (XALATAN) 0.005 % ophthalmic solution Place 1 drop into both eyes nightly      OXYGEN Inhale 2 L/min into the lungs nightly      Multiple Vitamins-Minerals (ICAPS) CAPS Take 1 tablet by mouth 2 times daily       acetaminophen (TYLENOL) 500 MG tablet Take 500 mg by mouth every 6 hours as needed for Pain.  calcium carbonate 600 MG TABS tablet Take 1 tablet by mouth daily. No current facility-administered medications on file prior to encounter. REVIEW OF SYSTEMS    Pertinent items are noted in HPI. Constitutional: Negative for systemic symptoms including fever, chills and malaise. Objective:      BP (!) 141/53   Pulse 70   Temp 98 °F (36.7 °C) (Temporal)   Resp 16     PHYSICAL EXAM    General: The patient is in no acute distress. Mental status:  Patient is appropriate, is  oriented to place and plan of care. Dermatologic exam: Visual inspection of the periwound reveals the skin to be normal in turgor and texture  Wound exam: see wound description below in procedure note      Assessment:     Problem List Items Addressed This Visit     WD-Pressure injury of right buttock, stage 2 (HCC) - Primary    Relevant Medications    lidocaine (XYLOCAINE) 5 % ointment    Other Relevant Orders    Initiate Outpatient Wound Care Protocol    WD-Pressure injury of left buttock, stage 2 (HCC)    Relevant Medications    lidocaine (XYLOCAINE) 5 % ointment    Other Relevant Orders    Initiate Outpatient Wound Care Protocol        Procedure Note    Indications:  Based on my examination of this patient's wound(s) today, sharp excision into necrotic subcutaneous tissue is required to promote healing and evaluate the extent of previous healing.     Performed by: TREVON Perez - CNP    Consent obtained: Yes    Time out taken:  Yes    Pain Control: Anesthetic  Anesthetic: 5% Lidocaine Ointment Topical     Debridement:Excisional Debridement    Using curette the wound(s) was/were sharply debrided down through and including the removal of subcutaneous tissue. Devitalized Tissue Debrided:  slough and exudate    Pre Debridement Measurements:  Are located in the Wound Documentation Flow Sheet    All active wounds listed below with today's date are evaluated  Wound(s)    debrided this date include # : 1     Post  Debridement Measurements:  Wound 07/30/21 #1 Left Buttock (Active)   Wound Image   10/01/21 0945   Dressing Status Clean;Dry;New dressing applied 10/01/21 1020   Wound Cleansed Wound cleanser;Cleansed with saline 10/01/21 0945   Offloading for Diabetic Foot Ulcers No offloading required 10/01/21 0945   Wound Length (cm) 0.7 cm 10/01/21 0945   Wound Width (cm) 0.7 cm 10/01/21 0945   Wound Depth (cm) 0.1 cm 10/01/21 0945   Wound Surface Area (cm^2) 0.49 cm^2 10/01/21 0945   Change in Wound Size % (l*w) 77.83 10/01/21 0945   Wound Volume (cm^3) 0.049 cm^3 10/01/21 0945   Wound Healing % 78 10/01/21 0945   Post-Procedure Length (cm) 0.7 cm 10/01/21 1002   Post-Procedure Width (cm) 0.7 cm 10/01/21 1002   Post-Procedure Depth (cm) 0.1 cm 10/01/21 1002   Post-Procedure Surface Area (cm^2) 0.49 cm^2 10/01/21 1002   Post-Procedure Volume (cm^3) 0.049 cm^3 10/01/21 1002   Distance Tunneling (cm) 0 cm 10/01/21 0945   Tunneling Position ___ O'Clock 0 10/01/21 0945   Undermining Starts ___ O'Clock 0 10/01/21 0945   Undermining Ends___ O'Clock 0 10/01/21 0945   Undermining Maxium Distance (cm) 0 10/01/21 0945   Wound Assessment Slough;Pale granulation tissue 10/01/21 0945   Drainage Amount Small 10/01/21 0945   Drainage Description Serosanguinous; Yellow 10/01/21 0945   Odor None 10/01/21 0945   Leatha-wound Assessment Intact;Fragile;Dry/flaky 10/01/21 0945   Margins Defined edges; Unattached edges 10/01/21 0945   Wound Thickness Description not for Pressure Injury Full thickness 10/01/21 0945   Number of days: 63       Wound 07/30/21 #2 Right Buttock Cluster  (Active)   Wound Image   10/01/21 0945   Dressing Status Clean;Dry;New dressing applied 10/01/21 1020   Wound Cleansed Wound cleanser;Cleansed with saline 10/01/21 0945   Dressing/Treatment Collagen 09/17/21 1048   Offloading for Diabetic Foot Ulcers No offloading required 10/01/21 0945   Wound Length (cm) 0.5 cm 10/01/21 0945   Wound Width (cm) 0.6 cm 10/01/21 0945   Wound Depth (cm) 0.1 cm 10/01/21 0945   Wound Surface Area (cm^2) 0.3 cm^2 10/01/21 0945   Change in Wound Size % (l*w) 70 10/01/21 0945   Wound Volume (cm^3) 0.03 cm^3 10/01/21 0945   Wound Healing % 70 10/01/21 0945   Post-Procedure Length (cm) 0.5 cm 10/01/21 1002   Post-Procedure Width (cm) 0.6 cm 10/01/21 1002   Post-Procedure Depth (cm) 0.1 cm 10/01/21 1002   Post-Procedure Surface Area (cm^2) 0.3 cm^2 10/01/21 1002   Post-Procedure Volume (cm^3) 0.03 cm^3 10/01/21 1002   Distance Tunneling (cm) 0 cm 10/01/21 0945   Tunneling Position ___ O'Clock 0 10/01/21 0945   Undermining Starts ___ O'Clock 0 10/01/21 0945   Undermining Ends___ O'Clock 0 10/01/21 0945   Undermining Maxium Distance (cm) 0 10/01/21 0945   Wound Assessment Dry;Slough 10/01/21 0945   Drainage Amount Small 10/01/21 0945   Drainage Description Serosanguinous; Yellow 10/01/21 0945   Odor None 10/01/21 0945   Leatha-wound Assessment Dry/flaky;Fragile 10/01/21 0945   Margins Defined edges; Unattached edges 10/01/21 0945   Wound Thickness Description not for Pressure Injury Full thickness 10/01/21 0945   Number of days: 63       Percent of Wound(s) Debrided: approximately 100%    Total  Area  Debrided:  0.79 sq cm     Bleeding:  Minimal    Hemostasis Achieved:  by pressure    Procedural Pain:  0  / 10     Post Procedural Pain:  0 / 10     Response to treatment:  Well tolerated by patient. Status of wound progress and description from last visit: Continues to get smaller each week, continue regimen.       Plan:       Discharge Instructions       PHYSICIAN ORDERS AND DISCHARGE INSTRUCTIONS     NOTE: Upon discharge from the [address], you will receive a patient experience survey. We would be grateful if you would take the time to fill this survey out.     Wound care order history:                 CYNTHIA's   Right       Left    Date               Vascular studies:  1/13/21 Arterial were okay, Venous- possibly needs intervention              Cultures:                Antibiotics: Keflex 7/30/21               HbA1c:   7.8 8/19/20              Compression/Lymph Pumps:              Grafts:                  Continuing wound care orders and information:              Residence: Private              Continue home health care with:               Your wound-care supplies will be provided by: .                          XTKKM cleansing:                           TS not scrub or use excessive force.                          Wash hands with soap and water before and after dressing changes.                           Prior to applying a clean dressing, cleanse wound with normal saline,                          wound cleanser, or mild soap and water.                           Ask your physician or nurse before getting the wound(s) wet in the shower.              Daily Wound management:                          Keep weight off wounds and reposition every 2 hours.                          RKTZO standing for long periods of time.                          Apply wraps/stockings in AM and remove at bedtime.                          Elevate legs to the level of the heart or above for 30 minutes 4-5 times a day and/or when sitting.                                               When taking antibiotics take entire prescription as ordered by MD do not stop taking until medicine is all gone.                             Orders for this week (10/1/21):     Buttock and Coccyx- Wash with anasept spray   Cover wound bed with puracol   Secure with optifoam borders  Change Monday, Wednesday and Friday     Stay off of bottom as much as possible - Use pillows to shift weight from side to side     Follow up with ROMI Dumont in  1 weeks in the wound care center  Call [phone] for any questions or concerns.   Date__________   Time____________        Treatment Note Wound 07/30/21 #1 Left Buttock-Dressing/Treatment:  (puracol, optifoam border )  Wound 07/30/21 #2 Right Buttock Cluster -Dressing/Treatment:  (puracol, optifoam border )    Written Patient Dismissal Instructions Given            Electronically signed by TREVON Molina CNP on 10/1/2021 at 10:39 AM

## 2021-10-08 ENCOUNTER — HOSPITAL ENCOUNTER (OUTPATIENT)
Dept: WOUND CARE | Age: 86
Discharge: HOME | End: 2021-10-08
Payer: MEDICARE

## 2021-10-08 VITALS
DIASTOLIC BLOOD PRESSURE: 56 MMHG | TEMPERATURE: 96.6 F | SYSTOLIC BLOOD PRESSURE: 134 MMHG | HEART RATE: 73 BPM | RESPIRATION RATE: 17 BRPM

## 2021-10-08 DIAGNOSIS — L89.312 PRESSURE INJURY OF RIGHT BUTTOCK, STAGE 2 (HCC): Primary | ICD-10-CM

## 2021-10-08 DIAGNOSIS — L89.322 PRESSURE INJURY OF LEFT BUTTOCK, STAGE 2 (HCC): ICD-10-CM

## 2021-10-08 PROCEDURE — 11042 DBRDMT SUBQ TIS 1ST 20SQCM/<: CPT | Performed by: NURSE PRACTITIONER

## 2021-10-08 PROCEDURE — 11042 DBRDMT SUBQ TIS 1ST 20SQCM/<: CPT

## 2021-10-08 RX ORDER — LIDOCAINE 40 MG/G
CREAM TOPICAL ONCE
Status: CANCELLED | OUTPATIENT
Start: 2021-10-08 | End: 2021-10-08

## 2021-10-08 RX ORDER — GENTAMICIN SULFATE 1 MG/G
OINTMENT TOPICAL ONCE
Status: CANCELLED | OUTPATIENT
Start: 2021-10-08 | End: 2021-10-08

## 2021-10-08 RX ORDER — LIDOCAINE HYDROCHLORIDE 40 MG/ML
SOLUTION TOPICAL ONCE
Status: CANCELLED | OUTPATIENT
Start: 2021-10-08 | End: 2021-10-08

## 2021-10-08 RX ORDER — LIDOCAINE HYDROCHLORIDE 40 MG/ML
SOLUTION TOPICAL ONCE
Status: DISCONTINUED | OUTPATIENT
Start: 2021-10-08 | End: 2021-10-09 | Stop reason: HOSPADM

## 2021-10-08 RX ORDER — CLOBETASOL PROPIONATE 0.5 MG/G
OINTMENT TOPICAL ONCE
Status: CANCELLED | OUTPATIENT
Start: 2021-10-08 | End: 2021-10-08

## 2021-10-08 RX ORDER — BETAMETHASONE DIPROPIONATE 0.05 %
OINTMENT (GRAM) TOPICAL ONCE
Status: CANCELLED | OUTPATIENT
Start: 2021-10-08 | End: 2021-10-08

## 2021-10-08 RX ORDER — LIDOCAINE 50 MG/G
OINTMENT TOPICAL ONCE
Status: CANCELLED | OUTPATIENT
Start: 2021-10-08 | End: 2021-10-08

## 2021-10-08 RX ORDER — BACITRACIN, NEOMYCIN, POLYMYXIN B 400; 3.5; 5 [USP'U]/G; MG/G; [USP'U]/G
OINTMENT TOPICAL ONCE
Status: CANCELLED | OUTPATIENT
Start: 2021-10-08 | End: 2021-10-08

## 2021-10-08 RX ORDER — GINSENG 100 MG
CAPSULE ORAL ONCE
Status: CANCELLED | OUTPATIENT
Start: 2021-10-08 | End: 2021-10-08

## 2021-10-08 RX ORDER — BACITRACIN ZINC AND POLYMYXIN B SULFATE 500; 1000 [USP'U]/G; [USP'U]/G
OINTMENT TOPICAL ONCE
Status: CANCELLED | OUTPATIENT
Start: 2021-10-08 | End: 2021-10-08

## 2021-10-08 ASSESSMENT — PAIN DESCRIPTION - PROGRESSION: CLINICAL_PROGRESSION: NOT CHANGED

## 2021-10-08 ASSESSMENT — PAIN DESCRIPTION - FREQUENCY: FREQUENCY: INTERMITTENT

## 2021-10-08 ASSESSMENT — PAIN DESCRIPTION - DESCRIPTORS: DESCRIPTORS: BURNING

## 2021-10-08 ASSESSMENT — PAIN SCALES - GENERAL: PAINLEVEL_OUTOF10: 2

## 2021-10-08 ASSESSMENT — PAIN DESCRIPTION - ONSET: ONSET: ON-GOING

## 2021-10-08 ASSESSMENT — PAIN DESCRIPTION - LOCATION: LOCATION: BUTTOCKS

## 2021-10-08 ASSESSMENT — PAIN DESCRIPTION - PAIN TYPE: TYPE: ACUTE PAIN

## 2021-10-08 NOTE — PROGRESS NOTES
Wound Care Center Progress Note With Procedure    Elizabet Abdullahi  AGE: 82 y.o. GENDER: female  : 1935  EPISODE DATE:  10/8/2021     Subjective:     Chief Complaint   Patient presents with    Wound Check     coccyx         HISTORY of PRESENT ILLNESS     Rashi varela 82 y. o. female who presents to the Wound Clinic for evaluation and treatment of Acute pressure ulcer(s) of the right and left buttock. The sacral area and left lower leg is healed. The condition is of moderate severity. The pressure ulcers have been present for approximately 2 months. The underlying cause is thought to be pressure complicated by diabetes. The patients care to date has included Clobetasol and Lotrisone ointment for previous care at the wound clinic. The patient has has pressure areas buttock in the past, she is fairly sedentary and basically is in her lift chair when ever she is not ambulating to the bathroom or to get items, etc. She sleeps in lift chair because she can't breath when laying flat and when she tried a hospital bed she was unable to turn in it. Rosangela Andrew does have a pad for her chair. The patient has significant underlying medical conditions as below.      Wound Pain Timing/Severity: intermittent, moderate  Quality of pain: dull, aching, tender  Severity of pain:  2 / 10   Modifying Factors: diabetes, poor glucose control, chronic pressure, decreased mobility, shear force, obesity and incontinence of urine  Associated Signs/Symptoms: edema,  drainage and pain     Diabetes: The patient is not on medication, but it it documented in her history, last A1c found was 7.8 20  Smoking: No  Obesity: Yes  Anticoagulant therapy: No  Immunosuppression: No  Other History: CKD, followed by Dr. Marlen Love, chronic Cor pulmonale on spironolactone, Bumex and zaroxolyn, oxygen at night.     Patient educated on the 6 essential components necessary for wound healing: Circulation, Debridements, Proper Dressings and Topical Wound Products, Infection Control, Edema Control and Offloading.     Patient educated on those factors that negatively effect or impact wound healing: smoking, obesity, uncontrolled diabetes, anticoagulant and immunosuppressive regimens, inadequate nutrition, untreated arterial and venous disease if applicable and measures to manage edema.         PAST MEDICAL HISTORY        Diagnosis Date    Asthma     Chronic kidney disease     acute kidney failure    Chronic ulcer of left leg with fat layer exposed ([address] 3/7/2016    Chronic ulcer of right leg with fat layer exposed ([address] 3/7/2016    Diabetes type 2, controlled ([address]     History of asthma     History of blood transfusion 07/2015    Hypertension     Lymphedema of both lower extremities 3/7/2016    Osteoarthritis     Pneumonia     Thyroid disease     Venous stasis of both lower extremities 3/7/2016    WD-Non-pressure chronic ulcer right lower leg, 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 ([address] 4/21/2016       PAST SURGICAL HISTORY    Past Surgical History:   Procedure Laterality Date    APPENDECTOMY      CHOLECYSTECTOMY      CYSTOSCOPY      EYE SURGERY      bilateral implants    HYSTERECTOMY      JOINT REPLACEMENT Right     knee    PACEMAKER INSERTION N/A 11/17/2020    PACEMAKER INSERTION PERMANENT REMOVAL OF TEMPORARY PACEMAKER performed by Milan Hickman MD at [address]    Family History   Problem Relation Age of Onset    Heart Disease Father        SOCIAL HISTORY    Social History     Tobacco Use    Smoking status: Never Smoker    Smokeless tobacco: Never Used   Vaping Use    Vaping Use: Never used   Substance Use Topics    Alcohol use: No    Drug use: No       ALLERGIES    Allergies   Allergen Reactions    Latex Rash    Dye [Iodides] Anaphylaxis    Iodine Anaphylaxis    Dyazide [Hydrochlorothiazide W-Triamterene] Rash    Lasix [Furosemide] Rash    Procardia [Nifedipine] Other (See Comments)     Not for solution Place 1 drop into both eyes nightly      OXYGEN Inhale 2 L/min into the lungs nightly      Multiple Vitamins-Minerals (ICAPS) CAPS Take 1 tablet by mouth 2 times daily       calcium carbonate 600 MG TABS tablet Take 1 tablet by mouth daily.  acetaminophen (TYLENOL) 500 MG tablet Take 500 mg by mouth every 6 hours as needed for Pain. No current facility-administered medications on file prior to encounter. REVIEW OF SYSTEMS    Pertinent items are noted in HPI. Constitutional: Negative for systemic symptoms including fever, chills and malaise. Objective:      BP (!) 134/56   Pulse 73   Temp 96.6 °F (35.9 °C) (Temporal)   Resp 17     PHYSICAL EXAM    General: The patient is in no acute distress. Mental status:  Patient is appropriate, is  oriented to place and plan of care. Dermatologic exam: Visual inspection of the periwound reveals the skin to be normal in turgor and texture  Wound exam: see wound description below in procedure note      Assessment:     Problem List Items Addressed This Visit     WD-Pressure injury of right buttock, stage 2 (HCC) - Primary    Relevant Medications    lidocaine (XYLOCAINE) 4 % external solution    Other Relevant Orders    Initiate Outpatient Wound Care Protocol    WD-Pressure injury of left buttock, stage 2 (HCC)    Relevant Medications    lidocaine (XYLOCAINE) 4 % external solution    Other Relevant Orders    Initiate Outpatient Wound Care Protocol        Procedure Note    Indications:  Based on my examination of this patient's wound(s) today, sharp excision into necrotic subcutaneous tissue is required to promote healing and evaluate the extent of previous healing.     Performed by: TREVON Fonseca - CNP    Consent obtained: Yes    Time out taken:  Yes    Pain Control: Anesthetic  Anesthetic: 4% Lidocaine Liquid Topical     Debridement:Excisional Debridement    Using curette the wound(s) was/were sharply debrided down through and including the removal of subcutaneous tissue. Devitalized Tissue Debrided:  slough and exudate    Pre Debridement Measurements:  Are located in the Wound Documentation Flow Sheet    All active wounds listed below with today's date are evaluated  Wound(s)    debrided this date include # : 1 and 2     Post  Debridement Measurements:  Wound 07/30/21 #1 Left Buttock (Active)   Wound Image   10/01/21 0945   Dressing Status Clean;Dry; Intact; New dressing applied 10/08/21 1015   Wound Cleansed Wound cleanser 10/08/21 0947   Dressing/Treatment Collagen 10/08/21 1015   Offloading for Diabetic Foot Ulcers Walker 10/08/21 0947   Wound Length (cm) 0.6 cm 10/08/21 0947   Wound Width (cm) 0.6 cm 10/08/21 0947   Wound Depth (cm) 0.1 cm 10/08/21 0947   Wound Surface Area (cm^2) 0.36 cm^2 10/08/21 0947   Change in Wound Size % (l*w) 83.71 10/08/21 0947   Wound Volume (cm^3) 0.036 cm^3 10/08/21 0947   Wound Healing % 84 10/08/21 0947   Post-Procedure Length (cm) 0.6 cm 10/08/21 0959   Post-Procedure Width (cm) 0.6 cm 10/08/21 0959   Post-Procedure Depth (cm) 0.1 cm 10/08/21 0959   Post-Procedure Surface Area (cm^2) 0.36 cm^2 10/08/21 0959   Post-Procedure Volume (cm^3) 0.036 cm^3 10/08/21 0959   Distance Tunneling (cm) 0 cm 10/08/21 0947   Tunneling Position ___ O'Clock 0 10/08/21 0947   Undermining Starts ___ O'Clock 0 10/08/21 0947   Undermining Ends___ O'Clock 0 10/08/21 0947   Undermining Maxium Distance (cm) 0 10/08/21 0947   Wound Assessment Slough;Pale granulation tissue 10/08/21 0947   Drainage Amount Small 10/08/21 0947   Drainage Description Serosanguinous; Yellow 10/08/21 0947   Odor None 10/08/21 0947   Leatha-wound Assessment Intact;Fragile;Dry/flaky 10/08/21 0947   Margins Defined edges; Unattached edges 10/08/21 0947   Wound Thickness Description not for Pressure Injury Full thickness 10/08/21 0947   Number of days: 70       Wound 07/30/21 #2 Right Buttock Cluster  (Active)   Wound Image   10/01/21 0945   Dressing Status Clean;Dry; Intact; New dressing applied 10/08/21 1015   Wound Cleansed Wound cleanser 10/08/21 0947   Dressing/Treatment Collagen 10/08/21 1015   Offloading for Diabetic Foot Ulcers Walker 10/08/21 0947   Wound Length (cm) 0.4 cm 10/08/21 0947   Wound Width (cm) 0.5 cm 10/08/21 0947   Wound Depth (cm) 0.1 cm 10/08/21 0947   Wound Surface Area (cm^2) 0.2 cm^2 10/08/21 0947   Change in Wound Size % (l*w) 80 10/08/21 0947   Wound Volume (cm^3) 0.02 cm^3 10/08/21 0947   Wound Healing % 80 10/08/21 0947   Post-Procedure Length (cm) 0.4 cm 10/08/21 0959   Post-Procedure Width (cm) 0.5 cm 10/08/21 0959   Post-Procedure Depth (cm) 0.1 cm 10/08/21 0959   Post-Procedure Surface Area (cm^2) 0.2 cm^2 10/08/21 0959   Post-Procedure Volume (cm^3) 0.02 cm^3 10/08/21 0959   Distance Tunneling (cm) 0 cm 10/08/21 0947   Tunneling Position ___ O'Clock 0 10/08/21 0947   Undermining Starts ___ O'Clock 0 10/08/21 0947   Undermining Ends___ O'Clock 0 10/08/21 0947   Undermining Maxium Distance (cm) 0 10/08/21 0947   Wound Assessment Dry;Slough 10/08/21 0947   Drainage Amount Small 10/08/21 0947   Drainage Description Serosanguinous; Yellow 10/08/21 0947   Odor None 10/08/21 0947   Leatha-wound Assessment Dry/flaky;Fragile 10/08/21 0947   Margins Defined edges; Unattached edges 10/08/21 0947   Wound Thickness Description not for Pressure Injury Full thickness 10/08/21 0947   Number of days: 70       Percent of Wound(s) Debrided: approximately 100%    Total  Area  Debrided:  0.56 sq cm     Bleeding:  Minimal    Hemostasis Achieved:  by pressure    Procedural Pain:  0  / 10     Post Procedural Pain:  0 / 10     Response to treatment:  Well tolerated by patient. Status of wound progress and description from last visit: Continues to improve, continue regimen, almost healed.       Plan:       Discharge Instructions       PHYSICIAN ORDERS AND DISCHARGE INSTRUCTIONS     NOTE: Upon discharge from the [address], you will receive a patient experience survey. We would be grateful if you would take the time to fill this survey out.     Wound care order history:                 CYNTHIA's   Right       Left    Date               Vascular studies:  1/13/21 Arterial were okay, Venous- possibly needs intervention              Cultures:                Antibiotics: Keflex 7/30/21               HbA1c:   7.8 8/19/20              Compression/Lymph Pumps:              Grafts:                  Continuing wound care orders and information:              Residence: Private              Continue home health care with:               Your wound-care supplies will be provided by: [facility] cleansing:                           FS not scrub or use excessive force.                          Wash hands with soap and water before and after dressing changes.                         Prior to applying a clean dressing, cleanse wound with normal saline,                          wound cleanser, or mild soap and water.                           Ask your physician or nurse before getting the wound(s) wet in the shower.              Daily Wound management:                          Keep weight off wounds and reposition every 2 hours.                          DNAWY standing for long periods of time.                          Apply wraps/stockings in AM and remove at bedtime.                          Elevate legs to the level of the heart or above for 30 minutes 4-5 times a day and/or when sitting.                                               When taking antibiotics take entire prescription as ordered by MD do not stop taking until medicine is all gone.                             Orders for this week (10/8/21):   Buttock and Coccyx- Wash with anasept spray   Cover wound bed with puracol   Secure with optifoam borders  Change Monday, Wednesday and Friday     Stay off of bottom as much as possible - Use pillows to shift weight from side to side     Follow up with ROMI Dumont in  1 weeks in the wound care center  Call [phone] for any questions or concerns.   Date__________   Time____________        Treatment Note Wound 07/30/21 #1 Left Buttock-Dressing/Treatment: Collagen (puracol, optifoam border )  Wound 07/30/21 #2 Right Buttock Cluster -Dressing/Treatment: Collagen (puracol, optifoam border )    Written Patient Dismissal Instructions Given            Electronically signed by TREVON Andujar CNP on 10/8/2021 at 10:36 AM

## 2021-10-22 ENCOUNTER — HOSPITAL ENCOUNTER (OUTPATIENT)
Dept: WOUND CARE | Age: 86
Discharge: HOME | End: 2021-10-22
Payer: MEDICARE

## 2021-10-22 VITALS
HEART RATE: 73 BPM | TEMPERATURE: 97.8 F | RESPIRATION RATE: 18 BRPM | SYSTOLIC BLOOD PRESSURE: 119 MMHG | DIASTOLIC BLOOD PRESSURE: 69 MMHG

## 2021-10-22 DIAGNOSIS — L89.312 PRESSURE INJURY OF RIGHT BUTTOCK, STAGE 2 (HCC): Primary | ICD-10-CM

## 2021-10-22 DIAGNOSIS — L89.322 PRESSURE INJURY OF LEFT BUTTOCK, STAGE 2 (HCC): ICD-10-CM

## 2021-10-22 PROCEDURE — 97597 DBRDMT OPN WND 1ST 20 CM/<: CPT

## 2021-10-22 PROCEDURE — 97597 DBRDMT OPN WND 1ST 20 CM/<: CPT | Performed by: NURSE PRACTITIONER

## 2021-10-22 RX ORDER — BACITRACIN ZINC AND POLYMYXIN B SULFATE 500; 1000 [USP'U]/G; [USP'U]/G
OINTMENT TOPICAL ONCE
Status: CANCELLED | OUTPATIENT
Start: 2021-10-22 | End: 2021-10-22

## 2021-10-22 RX ORDER — GINSENG 100 MG
CAPSULE ORAL ONCE
Status: CANCELLED | OUTPATIENT
Start: 2021-10-22 | End: 2021-10-22

## 2021-10-22 RX ORDER — LIDOCAINE HYDROCHLORIDE 40 MG/ML
SOLUTION TOPICAL ONCE
Status: CANCELLED | OUTPATIENT
Start: 2021-10-22 | End: 2021-10-22

## 2021-10-22 RX ORDER — BACITRACIN, NEOMYCIN, POLYMYXIN B 400; 3.5; 5 [USP'U]/G; MG/G; [USP'U]/G
OINTMENT TOPICAL ONCE
Status: CANCELLED | OUTPATIENT
Start: 2021-10-22 | End: 2021-10-22

## 2021-10-22 RX ORDER — LIDOCAINE 50 MG/G
OINTMENT TOPICAL ONCE
Status: DISCONTINUED | OUTPATIENT
Start: 2021-10-22 | End: 2021-10-23 | Stop reason: HOSPADM

## 2021-10-22 RX ORDER — LIDOCAINE 50 MG/G
OINTMENT TOPICAL ONCE
Status: CANCELLED | OUTPATIENT
Start: 2021-10-22 | End: 2021-10-22

## 2021-10-22 RX ORDER — BETAMETHASONE DIPROPIONATE 0.05 %
OINTMENT (GRAM) TOPICAL ONCE
Status: CANCELLED | OUTPATIENT
Start: 2021-10-22 | End: 2021-10-22

## 2021-10-22 RX ORDER — LIDOCAINE 40 MG/G
CREAM TOPICAL ONCE
Status: CANCELLED | OUTPATIENT
Start: 2021-10-22 | End: 2021-10-22

## 2021-10-22 RX ORDER — CLOBETASOL PROPIONATE 0.5 MG/G
OINTMENT TOPICAL ONCE
Status: CANCELLED | OUTPATIENT
Start: 2021-10-22 | End: 2021-10-22

## 2021-10-22 RX ORDER — GENTAMICIN SULFATE 1 MG/G
OINTMENT TOPICAL ONCE
Status: CANCELLED | OUTPATIENT
Start: 2021-10-22 | End: 2021-10-22

## 2021-10-22 NOTE — PROGRESS NOTES
Wound Care Center Progress Note       April Jarrett  AGE: 82 y.o. GENDER: female  : 1935  TODAY'S DATE:  10/22/2021        Subjective:     Chief Complaint   Patient presents with    Wound Check     bilat buttock          HISTORY of PRESENT ILLNESS     Aaron Gonzalez a 82 y. o. female who presents to the Wound Clinic for evaluation and treatment of Acute pressure ulcer(s) of the right buttock. The sacral area, left buttock and left lower leg is healed. The condition is of mild severity. The pressure ulcers have been present for approximately 2 months. The underlying cause is thought to be pressure complicated by diabetes. The patients care to date has included Clobetasol and Lotrisone ointment for previous care at the wound clinic. The patient has has pressure areas buttock in the past, she is fairly sedentary and basically is in her lift chair when ever she is not ambulating to the bathroom or to get items, etc. She sleeps in lift chair because she can't breath when laying flat and when she tried a hospital bed she was unable to turn in it. Kenyon Andino does have a pad for her chair. The patient has significant underlying medical conditions as below.      Wound Pain Timing/Severity: intermittent, moderate  Quality of pain: dull, aching, tender  Severity of pain:  1 / 10   Modifying Factors: diabetes, poor glucose control, chronic pressure, decreased mobility, shear force, obesity and incontinence of urine  Associated Signs/Symptoms: edema,  drainage and pain     Diabetes: The patient is not on medication, but it it documented in her history, last A1c found was 7.8 20  Smoking: No  Obesity: Yes  Anticoagulant therapy: No  Immunosuppression: No  Other History: CKD, followed by Dr. Manuel Thapa, chronic Cor pulmonale on spironolactone, Bumex and zaroxolyn, oxygen at night.     Patient educated on the 6 essential components necessary for wound healing: Circulation, Debridements, Proper Dressings and Topical Wound Products, Infection Control, Edema Control and Offloading.     Patient educated on those factors that negatively effect or impact wound healing: smoking, obesity, uncontrolled diabetes, anticoagulant and immunosuppressive regimens, inadequate nutrition, untreated arterial and venous disease if applicable and measures to manage edema.         PAST MEDICAL HISTORY        Diagnosis Date    Asthma     Chronic kidney disease     acute kidney failure    Chronic ulcer of left leg with fat layer exposed ([address] 3/7/2016    Chronic ulcer of right leg with fat layer exposed ([address] 3/7/2016    Diabetes type 2, controlled ([address]     History of asthma     History of blood transfusion 07/2015    Hypertension     Lymphedema of both lower extremities 3/7/2016    Osteoarthritis     Pneumonia     Thyroid disease     Venous stasis of both lower extremities 3/7/2016    WD-Non-pressure chronic ulcer right lower leg, 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 ([address] 4/21/2016       PAST SURGICAL HISTORY    Past Surgical History:   Procedure Laterality Date    APPENDECTOMY      CHOLECYSTECTOMY      CYSTOSCOPY      EYE SURGERY      bilateral implants    HYSTERECTOMY      JOINT REPLACEMENT Right     knee    PACEMAKER INSERTION N/A 11/17/2020    PACEMAKER INSERTION PERMANENT REMOVAL OF TEMPORARY PACEMAKER performed by Chandan Arango MD at [facility]    Family History   Problem Relation Age of Onset    Heart Disease Father        SOCIAL HISTORY    Social History     Tobacco Use    Smoking status: Never Smoker    Smokeless tobacco: Never Used   Vaping Use    Vaping Use: Never used   Substance Use Topics    Alcohol use: No    Drug use: No       ALLERGIES    Allergies   Allergen Reactions    Latex Rash    Dye [Iodides] Anaphylaxis    Iodine Anaphylaxis    Dyazide [Hydrochlorothiazide W-Triamterene] Rash    Lasix [Furosemide] Rash    Procardia [Nifedipine] Other (See Comments)     Not for sure if rash occurred or rash    Doxycycline Dermatitis    Edecrin [Ethacrynic Acid]     Levaquin [Levofloxacin] Other (See Comments)     unknown    Mobic [Meloxicam]     Vioxx [Rofecoxib]     Celebrex [Celecoxib] Rash    Lexapro [Escitalopram Oxalate] Rash    Sulfa Antibiotics Hives       MEDICATIONS    Current Outpatient Medications on File Prior to Encounter   Medication Sig Dispense Refill    metOLazone (ZAROXOLYN) 5 MG tablet Take 1 tablet by mouth daily 30 tablet 5    spironolactone (ALDACTONE) 50 MG tablet Take 1 tablet by mouth 2 times daily 60 tablet 5    clobetasol (TEMOVATE) 0.05 % ointment Apply topically 2 times daily. 120 g 1    febuxostat (ULORIC) 40 MG TABS tablet Take 1 tablet by mouth daily 30 tablet 5    potassium chloride (KLOR-CON M) 20 MEQ extended release tablet Take 1 tablet by mouth daily 30 tablet 5    bumetanide (BUMEX) 1 MG tablet Take 2 tablets by mouth 2 times daily 360 tablet 3    Fluticasone Propionate, Inhal, (FLOVENT IN) Inhale 2 puffs into the lungs 2 times daily      silver sulfADIAZINE (SILVADENE) 1 % cream Apply topically daily.  (Patient taking differently: as needed ) 240 g 5    cilostazol (PLETAL) 50 MG tablet Take 1 tablet by mouth 2 times daily 60 tablet 3    rOPINIRole (REQUIP) 0.5 MG tablet Take 1 tablet by mouth 3 times daily 90 tablet 3    metoprolol succinate (TOPROL XL) 25 MG extended release tablet Take 1 tablet by mouth daily 30 tablet 3    famotidine (PEPCID) 20 MG tablet Take 1 tablet by mouth 2 times daily 60 tablet 5    levothyroxine (SYNTHROID) 50 MCG tablet Take 50 mcg by mouth Daily      Polyethylene Glycol 3350 (MIRALAX PO) Take by mouth      fluticasone (FLONASE) 50 MCG/ACT nasal spray 1 spray by Nasal route 2 times daily       Loratadine (CLARITIN) 10 MG CAPS Take 10 mg by mouth daily      ferrous sulfate 325 (65 FE) MG tablet Take 1 tablet by mouth 2 times daily (with meals) 30 tablet 3    latanoprost (XALATAN) 0.005 % ophthalmic solution Place 1 drop into both eyes nightly      OXYGEN Inhale 2 L/min into the lungs nightly      Multiple Vitamins-Minerals (ICAPS) CAPS Take 1 tablet by mouth 2 times daily       acetaminophen (TYLENOL) 500 MG tablet Take 500 mg by mouth every 6 hours as needed for Pain.  calcium carbonate 600 MG TABS tablet Take 1 tablet by mouth daily. No current facility-administered medications on file prior to encounter. REVIEW OF SYSTEMS    Pertinent items are noted in HPI. Constitutional: Negative for systemic symptoms including fever, chills and malaise. Objective:      /69   Pulse 73   Temp 97.8 °F (36.6 °C) (Temporal)   Resp 18     PHYSICAL EXAM    General: The patient is in no acute distress. Mental status:  Patient is appropriate, is  oriented to place and plan of care. Dermatologic exam: Visual inspection of the periwound reveals the skin to be normal in turgor and texture. Wound exam:  see wound description below     All active wounds listed below with today's date are evaluated      Wound 07/30/21 #2 Right Buttock Cluster  (Active)   Wound Image   10/22/21 0937   Dressing Status Clean;Dry; Intact; New dressing applied 10/08/21 1015   Wound Cleansed Wound cleanser; Soap and water;Cleansed with saline 10/22/21 0937   Dressing/Treatment Collagen 10/22/21 1002   Offloading for Diabetic Foot Ulcers Walker 10/22/21 0937   Wound Length (cm) 0.2 cm 10/22/21 0937   Wound Width (cm) 0.2 cm 10/22/21 0937   Wound Depth (cm) 0.1 cm 10/22/21 0937   Wound Surface Area (cm^2) 0.04 cm^2 10/22/21 0937   Change in Wound Size % (l*w) 96 10/22/21 0937   Wound Volume (cm^3) 0.004 cm^3 10/22/21 0937   Wound Healing % 96 10/22/21 0937   Post-Procedure Length (cm) 0.2 cm 10/22/21 0948   Post-Procedure Width (cm) 0.2 cm 10/22/21 0948   Post-Procedure Depth (cm) 0.1 cm 10/22/21 0948   Post-Procedure Surface Area (cm^2) 0.04 cm^2 10/22/21 0948   Post-Procedure Volume (cm^3) 0.004 cm^3 10/22/21 0948 Distance Tunneling (cm) 0 cm 10/22/21 0937   Tunneling Position ___ O'Clock 0 10/22/21 0937   Undermining Starts ___ O'Clock 0 10/22/21 0937   Undermining Ends___ O'Clock 0 10/22/21 0937   Undermining Maxium Distance (cm) 0 10/22/21 0937   Wound Assessment Dry 10/22/21 0937   Drainage Amount Small 10/08/21 0947   Drainage Description Serosanguinous; Yellow 10/08/21 0947   Odor None 10/08/21 0947   Leatha-wound Assessment Dry/flaky;Fragile 10/22/21 0937   Margins Defined edges; Unattached edges 10/08/21 0947   Wound Thickness Description not for Pressure Injury Full thickness 10/08/21 0947   Number of days: 84     Procedure Note    Indications:  Based on my examination of this patient's wound(s) today, sharp excision into necrotic devitalized tissue is required to promote healing and evaluate the extent of previous healing. Performed by: TREVON Hughes CNP    Consent obtained: Yes    Time out taken:  Yes    Pain Control: Anesthetic  Anesthetic: 5% Lidocaine Ointment Topical     Debridement:Excisional Debridement    Using curette the wound(s) was/were sharply debrided down through and including the removal of devitalized tissue. Devitalized Tissue Debrided:  slough and exudate    Pre Debridement Measurements:  Are located in the Wound Documentation Flow Sheet    All active wounds listed below with today's date are evaluated  Wound(s)    debrided this date include # : 2     Post  Debridement Measurements:  Wound 07/30/21 #2 Right Buttock Cluster  (Active)   Wound Image   10/22/21 0937   Dressing Status Clean;Dry; Intact; New dressing applied 10/08/21 1015   Wound Cleansed Wound cleanser; Soap and water;Cleansed with saline 10/22/21 0937   Dressing/Treatment Collagen 10/22/21 1002   Offloading for Diabetic Foot Ulcers Walker 10/22/21 0937   Wound Length (cm) 0.2 cm 10/22/21 0937   Wound Width (cm) 0.2 cm 10/22/21 0937   Wound Depth (cm) 0.1 cm 10/22/21 0937   Wound Surface Area (cm^2) 0.04 cm^2 10/22/21 DISCHARGE INSTRUCTIONS     NOTE: Upon discharge from the [address], you will receive a patient experience survey. We would be grateful if you would take the time to fill this survey out.     Wound care order history:                 CYNTHIA's   Right       Left    Date               Vascular studies:  1/13/21 Arterial were okay, Venous- possibly needs intervention              Cultures:                Antibiotics: Keflex 7/30/21               HbA1c:   7.8 8/19/20              Compression/Lymph Pumps:              Grafts:                  Continuing wound care orders and information:              Residence: Private              Continue home health care with:               Your wound-care supplies will be provided by: .                          [facility]                           YR not scrub or use excessive force.                          Wash hands with soap and water before and after dressing changes.                         Prior to applying a clean dressing, cleanse wound with normal saline,                          wound cleanser, or mild soap and water.                           Ask your physician or nurse before getting the wound(s) wet in the shower.              Daily Wound management:                          Keep weight off wounds and reposition every 2 hours.                          FPYKO standing for long periods of time.                          Apply wraps/stockings in AM and remove at bedtime.                          Elevate legs to the level of the heart or above for 30 minutes 4-5 times a day and/or when sitting.                                               When taking antibiotics take entire prescription as ordered by MD do not stop taking until medicine is all gone.                             Orders for this week (10/8/21):   Buttock and Coccyx- Wash with anasept spray   Cover wound bed with puracol   Secure with optifoam borders  Change Monday, Wednesday and Friday     Stay off of bottom as much as possible - Use pillows to shift weight from side to side     Follow up with Krystin Ralph CNP in  1  weeks in the wound care center  Call [phone] for any questions or concerns.   Date__________   Time____________             Treatment Note Wound 07/30/21 #2 Right Buttock Cluster -Dressing/Treatment: Collagen (puracol, optifoam border )    Written Patient Dismissal Instructions Given            Electronically signed by TREVON Molina CNP on 10/22/2021 at 12:28 PM

## 2021-10-29 ENCOUNTER — HOSPITAL ENCOUNTER (OUTPATIENT)
Dept: WOUND CARE | Age: 86
Discharge: HOME | End: 2021-10-29
Payer: MEDICARE

## 2021-10-29 VITALS
RESPIRATION RATE: 18 BRPM | HEART RATE: 65 BPM | DIASTOLIC BLOOD PRESSURE: 70 MMHG | SYSTOLIC BLOOD PRESSURE: 139 MMHG | TEMPERATURE: 97.1 F

## 2021-10-29 DIAGNOSIS — S30.810A EXCORIATION OF BUTTOCK, INITIAL ENCOUNTER: ICD-10-CM

## 2021-10-29 PROCEDURE — 99213 OFFICE O/P EST LOW 20 MIN: CPT

## 2021-10-29 PROCEDURE — 99213 OFFICE O/P EST LOW 20 MIN: CPT | Performed by: NURSE PRACTITIONER

## 2021-10-29 NOTE — PROGRESS NOTES
Wound Care Center Progress Note       Pedro Nails  AGE: 82 y.o. GENDER: female  : 1935  TODAY'S DATE:  10/29/2021        Subjective:     Chief Complaint   Patient presents with    Wound Check     bilat buttocks          HISTORY of PRESENT ILLNESS    Terra varela 82 y. o. female who presents to the Wound Clinic for evaluation and treatment of Acute pressure ulcer(s) of the right buttock is now healed as well. The sacral area, left buttock and left lower leg is healed.  The pressure ulcers have been present for approximately 2 months at initial visit to the wound clinic. The underlying cause is thought to be pressure complicated by diabetes. The patients care to date has included Clobetasol and Lotrisone ointment for previous care at the wound clinic. The patient has has pressure areas buttock in the past, she is fairly sedentary and basically is in her lift chair when ever she is not ambulating to the bathroom or to get items, etc. She sleeps in lift chair because she can't breath when laying flat and when she tried a hospital bed she was unable to turn in it. Katherin Hansen does have a pad for her chair. The patient has significant underlying medical conditions as below.      10/29/21 presents today with excoriation buttock/gluteal crease, this is related to friction and moisture and is of mild severity.      Wound Pain Timing/Severity: intermittent, moderate  Quality of pain: dull, aching, tender  Severity of pain:   10   Modifying Factors: diabetes, poor glucose control, chronic pressure, decreased mobility, shear force, obesity and incontinence of urine  Associated Signs/Symptoms: edema,  drainage and pain     Diabetes: The patient is not on medication, but it it documented in her history, last A1c found was 7.8 20  Smoking: No  Obesity: Yes  Anticoagulant therapy: No  Immunosuppression: No  Other History: CKD, followed by Dr. Sundeep Huitron, chronic Cor pulmonale on spironolactone, Bumex and zaroxolyn, oxygen at night.     Patient educated on the 6 essential components necessary for wound healing: Circulation, Debridements, Proper Dressings and Topical Wound Products, Infection Control, Edema Control and Offloading.     Patient educated on those factors that negatively effect or impact wound healing: smoking, obesity, uncontrolled diabetes, anticoagulant and immunosuppressive regimens, inadequate nutrition, untreated arterial and venous disease if applicable and measures to manage edema.         PAST MEDICAL HISTORY        Diagnosis Date    Asthma     Chronic kidney disease     acute kidney failure    Chronic ulcer of left leg with fat layer exposed ([address] 3/7/2016    Chronic ulcer of right leg with fat layer exposed ([address] 3/7/2016    Diabetes type 2, controlled ([address]     History of asthma     History of blood transfusion 07/2015    Hypertension     Lymphedema of both lower extremities 3/7/2016    Osteoarthritis     Pneumonia     Thyroid disease     Venous stasis of both lower extremities 3/7/2016    WD-Non-pressure chronic ulcer right lower leg, 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 ([address] 4/21/2016       PAST SURGICAL HISTORY    Past Surgical History:   Procedure Laterality Date    APPENDECTOMY      CHOLECYSTECTOMY      CYSTOSCOPY      EYE SURGERY      bilateral implants    HYSTERECTOMY      JOINT REPLACEMENT Right     knee    PACEMAKER INSERTION N/A 11/17/2020    PACEMAKER INSERTION PERMANENT REMOVAL OF TEMPORARY PACEMAKER performed by Angela Lemons MD at [address]    Family History   Problem Relation Age of Onset    Heart Disease Father        SOCIAL HISTORY    Social History     Tobacco Use    Smoking status: Never Smoker    Smokeless tobacco: Never Used   Vaping Use    Vaping Use: Never used   Substance Use Topics    Alcohol use: No    Drug use: No       ALLERGIES    Allergies   Allergen Reactions    Latex Rash    Dye [Iodides] Anaphylaxis    Iodine Anaphylaxis    Dyazide [Hydrochlorothiazide W-Triamterene] Rash    Lasix [Furosemide] Rash    Procardia [Nifedipine] Other (See Comments)     Not for sure if rash occurred or rash    Doxycycline Dermatitis    Edecrin [Ethacrynic Acid]     Levaquin [Levofloxacin] Other (See Comments)     unknown    Mobic [Meloxicam]     Vioxx [Rofecoxib]     Celebrex [Celecoxib] Rash    Lexapro [Escitalopram Oxalate] Rash    Sulfa Antibiotics Hives       MEDICATIONS    Current Outpatient Medications on File Prior to Encounter   Medication Sig Dispense Refill    metOLazone (ZAROXOLYN) 5 MG tablet Take 1 tablet by mouth daily 30 tablet 5    spironolactone (ALDACTONE) 50 MG tablet Take 1 tablet by mouth 2 times daily 60 tablet 5    clobetasol (TEMOVATE) 0.05 % ointment Apply topically 2 times daily. 120 g 1    febuxostat (ULORIC) 40 MG TABS tablet Take 1 tablet by mouth daily 30 tablet 5    potassium chloride (KLOR-CON M) 20 MEQ extended release tablet Take 1 tablet by mouth daily 30 tablet 5    bumetanide (BUMEX) 1 MG tablet Take 2 tablets by mouth 2 times daily 360 tablet 3    Fluticasone Propionate, Inhal, (FLOVENT IN) Inhale 2 puffs into the lungs 2 times daily      silver sulfADIAZINE (SILVADENE) 1 % cream Apply topically daily.  (Patient taking differently: as needed ) 240 g 5    cilostazol (PLETAL) 50 MG tablet Take 1 tablet by mouth 2 times daily 60 tablet 3    rOPINIRole (REQUIP) 0.5 MG tablet Take 1 tablet by mouth 3 times daily 90 tablet 3    metoprolol succinate (TOPROL XL) 25 MG extended release tablet Take 1 tablet by mouth daily 30 tablet 3    famotidine (PEPCID) 20 MG tablet Take 1 tablet by mouth 2 times daily 60 tablet 5    levothyroxine (SYNTHROID) 50 MCG tablet Take 50 mcg by mouth Daily      Polyethylene Glycol 3350 (MIRALAX PO) Take by mouth      fluticasone (FLONASE) 50 MCG/ACT nasal spray 1 spray by Nasal route 2 times daily       Loratadine (CLARITIN) 10 MG CAPS Take 10 mg by mouth daily      ferrous sulfate 325 (65 FE) MG tablet Take 1 tablet by mouth 2 times daily (with meals) 30 tablet 3    latanoprost (XALATAN) 0.005 % ophthalmic solution Place 1 drop into both eyes nightly      OXYGEN Inhale 2 L/min into the lungs nightly      Multiple Vitamins-Minerals (ICAPS) CAPS Take 1 tablet by mouth 2 times daily       acetaminophen (TYLENOL) 500 MG tablet Take 500 mg by mouth every 6 hours as needed for Pain.  calcium carbonate 600 MG TABS tablet Take 1 tablet by mouth daily. No current facility-administered medications on file prior to encounter. REVIEW OF SYSTEMS    Pertinent items are noted in HPI. Constitutional: Negative for systemic symptoms including fever, chills and malaise. Objective:      /70   Pulse 65   Temp 97.1 °F (36.2 °C) (Temporal)   Resp 18     PHYSICAL EXAM    General: The patient is in no acute distress. Mental status:  Patient is appropriate, is  oriented to place and plan of care. Dermatologic exam: Visual inspection of the periwound reveals the skin to be moist.  Wound exam:  see wound description below     All active wounds listed below with today's date are evaluated  Assessment:       Problem List Items Addressed This Visit     WD-Excoriation of buttock crease          Status of wound progress and description from last visit: New excoriation gluteal crease, discussed moisture control and friction/pressure reduction. Will use collagen to the area and a bordered dressing. Plan:     Discharge Instructions         PHYSICIAN ORDERS AND DISCHARGE INSTRUCTIONS     NOTE: Upon discharge from the [address], you will receive a patient experience survey.  We would be grateful if you would take the time to fill this survey out.     Wound care order history:                 CYNTHIA's   Right       Left    Date               Vascular studies:  1/13/21 Arterial were okay, Venous- possibly needs intervention              Cultures:                Antibiotics: Keflex 7/30/21               HbA1c:   7.8 8/19/20              Compression/Lymph Pumps:              Grafts:                  Continuing wound care orders and information:              Residence: Private              Continue home health care with:               Your wound-care supplies will be provided by: .                          ODALIS cleansing:                           RG not scrub or use excessive force.                          Wash hands with soap and water before and after dressing changes.                         Prior to applying a clean dressing, cleanse wound with normal saline,                          wound cleanser, or mild soap and water.                           Ask your physician or nurse before getting the wound(s) wet in the shower.              Daily Wound management:                          Keep weight off wounds and reposition every 2 hours.                          BPZJV standing for long periods of time.                          Apply wraps/stockings in AM and remove at bedtime.                          Elevate legs to the level of the heart or above for 30 minutes 4-5 times a day and/or when sitting.                                               When taking antibiotics take entire prescription as ordered by MD do not stop taking until medicine is all gone.                             Orders for this week (10/29/21): Buttock crease Wash with anasept spray   Cover wound bed with puracol    Secure with optifoam borders  Change Monday, Wednesday and Friday     Stay off of bottom as much as possible - Use pillows to shift weight from side to side     Follow up with Varun Jerome CNP in  2  weeks in the wound care center  Call [phone] for any questions or concerns.   Date__________   Time____________              Treatment Note      Written Patient Dismissal Instructions Given            Electronically signed by TREVON Honeycutt CNP on 10/29/2021 at 9:45 AM

## 2021-11-01 ENCOUNTER — HOSPITAL ENCOUNTER (OUTPATIENT)
Age: 86
Discharge: HOME | End: 2021-11-01
Payer: MEDICARE

## 2021-11-01 LAB
ALBUMIN SERPL-MCNC: 4.1 GM/DL (ref 3.4–5)
ALP BLD-CCNC: 64 IU/L (ref 40–129)
ALT SERPL-CCNC: 9 U/L (ref 10–40)
ANION GAP SERPL CALCULATED.3IONS-SCNC: 6 MMOL/L (ref 4–16)
AST SERPL-CCNC: 9 IU/L (ref 15–37)
BACTERIA: NEGATIVE /HPF
BILIRUB SERPL-MCNC: 0.5 MG/DL (ref 0–1)
BILIRUBIN URINE: NEGATIVE MG/DL
BLOOD, URINE: NEGATIVE
BUN BLDV-MCNC: 71 MG/DL (ref 6–23)
CALCIUM SERPL-MCNC: 9.4 MG/DL (ref 8.3–10.6)
CAST TYPE: ABNORMAL /HPF
CHLORIDE BLD-SCNC: 90 MMOL/L (ref 99–110)
CLARITY: CLEAR
CO2: 37 MMOL/L (ref 21–32)
COLOR: ABNORMAL
CREAT SERPL-MCNC: 2.6 MG/DL (ref 0.6–1.1)
CRYSTAL TYPE: NEGATIVE /HPF
EPITHELIAL CELLS, UA: 2 /HPF
GFR AFRICAN AMERICAN: 21 ML/MIN/1.73M2
GFR NON-AFRICAN AMERICAN: 18 ML/MIN/1.73M2
GLUCOSE FASTING: 139 MG/DL (ref 70–99)
GLUCOSE, URINE: NEGATIVE MG/DL
KETONES, URINE: NEGATIVE MG/DL
LEUKOCYTE ESTERASE, URINE: ABNORMAL
MAGNESIUM: 1.9 MG/DL (ref 1.8–2.4)
NITRITE URINE, QUANTITATIVE: NEGATIVE
PH, URINE: 7 (ref 5–8)
PHOSPHORUS: 4.6 MG/DL (ref 2.5–4.9)
POTASSIUM SERPL-SCNC: 4 MMOL/L (ref 3.5–5.1)
PROTEIN UA: NEGATIVE MG/DL
RBC URINE: NEGATIVE /HPF (ref 0–6)
SODIUM BLD-SCNC: 133 MMOL/L (ref 135–145)
SPECIFIC GRAVITY UA: 1.01 (ref 1–1.03)
TOTAL PROTEIN: 7.1 GM/DL (ref 6.4–8.2)
UROBILINOGEN, URINE: 0.2 MG/DL (ref 0.2–1)
WBC UA: 8 /HPF (ref 0–5)

## 2021-11-01 PROCEDURE — 84100 ASSAY OF PHOSPHORUS: CPT

## 2021-11-01 PROCEDURE — 36415 COLL VENOUS BLD VENIPUNCTURE: CPT

## 2021-11-01 PROCEDURE — 80053 COMPREHEN METABOLIC PANEL: CPT

## 2021-11-01 PROCEDURE — 81001 URINALYSIS AUTO W/SCOPE: CPT

## 2021-11-01 PROCEDURE — 83735 ASSAY OF MAGNESIUM: CPT

## 2021-11-12 ENCOUNTER — HOSPITAL ENCOUNTER (OUTPATIENT)
Dept: WOUND CARE | Age: 86
Discharge: HOME | End: 2021-11-12
Payer: MEDICARE

## 2021-11-12 VITALS
RESPIRATION RATE: 18 BRPM | DIASTOLIC BLOOD PRESSURE: 60 MMHG | SYSTOLIC BLOOD PRESSURE: 143 MMHG | TEMPERATURE: 96.7 F | HEART RATE: 70 BPM

## 2021-11-12 DIAGNOSIS — L89.322 PRESSURE INJURY OF LEFT BUTTOCK, STAGE 2 (HCC): ICD-10-CM

## 2021-11-12 DIAGNOSIS — I87.303 VENOUS HYPERTENSION OF BOTH LOWER EXTREMITIES: ICD-10-CM

## 2021-11-12 DIAGNOSIS — L89.312 PRESSURE INJURY OF RIGHT BUTTOCK, STAGE 2 (HCC): Primary | ICD-10-CM

## 2021-11-12 PROCEDURE — 99213 OFFICE O/P EST LOW 20 MIN: CPT | Performed by: NURSE PRACTITIONER

## 2021-11-12 PROCEDURE — 99212 OFFICE O/P EST SF 10 MIN: CPT

## 2021-11-12 RX ORDER — CLOBETASOL PROPIONATE 0.5 MG/G
OINTMENT TOPICAL ONCE
Status: CANCELLED | OUTPATIENT
Start: 2021-11-12 | End: 2021-11-12

## 2021-11-12 RX ORDER — GINSENG 100 MG
CAPSULE ORAL ONCE
Status: CANCELLED | OUTPATIENT
Start: 2021-11-12 | End: 2021-11-12

## 2021-11-12 RX ORDER — LIDOCAINE HYDROCHLORIDE 40 MG/ML
SOLUTION TOPICAL ONCE
Status: CANCELLED | OUTPATIENT
Start: 2021-11-12 | End: 2021-11-12

## 2021-11-12 RX ORDER — LIDOCAINE 50 MG/G
OINTMENT TOPICAL ONCE
Status: CANCELLED | OUTPATIENT
Start: 2021-11-12 | End: 2021-11-12

## 2021-11-12 RX ORDER — LIDOCAINE 40 MG/G
CREAM TOPICAL ONCE
Status: CANCELLED | OUTPATIENT
Start: 2021-11-12 | End: 2021-11-12

## 2021-11-12 RX ORDER — BETAMETHASONE DIPROPIONATE 0.05 %
OINTMENT (GRAM) TOPICAL ONCE
Status: CANCELLED | OUTPATIENT
Start: 2021-11-12 | End: 2021-11-12

## 2021-11-12 RX ORDER — GENTAMICIN SULFATE 1 MG/G
OINTMENT TOPICAL ONCE
Status: CANCELLED | OUTPATIENT
Start: 2021-11-12 | End: 2021-11-12

## 2021-11-12 RX ORDER — BACITRACIN ZINC AND POLYMYXIN B SULFATE 500; 1000 [USP'U]/G; [USP'U]/G
OINTMENT TOPICAL ONCE
Status: CANCELLED | OUTPATIENT
Start: 2021-11-12 | End: 2021-11-12

## 2021-11-12 RX ORDER — BACITRACIN, NEOMYCIN, POLYMYXIN B 400; 3.5; 5 [USP'U]/G; MG/G; [USP'U]/G
OINTMENT TOPICAL ONCE
Status: CANCELLED | OUTPATIENT
Start: 2021-11-12 | End: 2021-11-12

## 2021-11-12 NOTE — PROGRESS NOTES
Wound Care Center Progress Note       Marizol Bess  AGE: 82 y.o. GENDER: female  : 1935  TODAY'S DATE:  2021        Subjective:     Chief Complaint   Patient presents with    Wound Check     buttocks         HISTORY of PRESENT ILLNESS    Kendall varela 82 y. o. female who presents to the Wound Clinic for evaluation and treatment of Acute pressure ulcer(s) of the right buttock is now healed as well. The sacral area, left buttock and left lower leg is healed.  The pressure ulcers have been present for approximately 2 months at initial visit to the wound clinic. The underlying cause is thought to be pressure complicated by diabetes. The patients care to date has included Clobetasol and Lotrisone ointment for previous care at the wound clinic. The patient has has pressure areas buttock in the past, she is fairly sedentary and basically is in her lift chair when ever she is not ambulating to the bathroom or to get items, etc. She sleeps in lift chair because she can't breath when laying flat and when she tried a hospital bed she was unable to turn in it. Andrea Ortega does have a pad for her chair. The patient has significant underlying medical conditions as below.      10/29/21 presents today with excoriation buttock/gluteal crease, this is related to friction and moisture and is of mild severity.      Wound Pain Timing/Severity: intermittent, moderate  Quality of pain: dull, aching, tender  Severity of pain:  1 / 10   Modifying Factors: diabetes, poor glucose control, chronic pressure, decreased mobility, shear force, obesity and incontinence of urine  Associated Signs/Symptoms: edema,  drainage and pain     Diabetes: The patient is not on medication, but it it documented in her history, last A1c found was 7.8 20  Smoking: No  Obesity: Yes  Anticoagulant therapy: No  Immunosuppression: No  Other History: CKD, followed by Dr. Imelda Nye, chronic Cor pulmonale on spironolactone, Bumex and zaroxolyn, Iodine Anaphylaxis    Dyazide [Hydrochlorothiazide W-Triamterene] Rash    Lasix [Furosemide] Rash    Procardia [Nifedipine] Other (See Comments)     Not for sure if rash occurred or rash    Doxycycline Dermatitis    Edecrin [Ethacrynic Acid]     Levaquin [Levofloxacin] Other (See Comments)     unknown    Mobic [Meloxicam]     Vioxx [Rofecoxib]     Celebrex [Celecoxib] Rash    Lexapro [Escitalopram Oxalate] Rash    Sulfa Antibiotics Hives       MEDICATIONS    Current Outpatient Medications on File Prior to Encounter   Medication Sig Dispense Refill    metOLazone (ZAROXOLYN) 5 MG tablet Take 1 tablet by mouth daily 30 tablet 5    spironolactone (ALDACTONE) 50 MG tablet Take 1 tablet by mouth 2 times daily 60 tablet 5    clobetasol (TEMOVATE) 0.05 % ointment Apply topically 2 times daily. 120 g 1    febuxostat (ULORIC) 40 MG TABS tablet Take 1 tablet by mouth daily 30 tablet 5    potassium chloride (KLOR-CON M) 20 MEQ extended release tablet Take 1 tablet by mouth daily 30 tablet 5    bumetanide (BUMEX) 1 MG tablet Take 2 tablets by mouth 2 times daily 360 tablet 3    Fluticasone Propionate, Inhal, (FLOVENT IN) Inhale 2 puffs into the lungs 2 times daily      silver sulfADIAZINE (SILVADENE) 1 % cream Apply topically daily.  (Patient taking differently: as needed ) 240 g 5    cilostazol (PLETAL) 50 MG tablet Take 1 tablet by mouth 2 times daily 60 tablet 3    rOPINIRole (REQUIP) 0.5 MG tablet Take 1 tablet by mouth 3 times daily 90 tablet 3    metoprolol succinate (TOPROL XL) 25 MG extended release tablet Take 1 tablet by mouth daily 30 tablet 3    famotidine (PEPCID) 20 MG tablet Take 1 tablet by mouth 2 times daily 60 tablet 5    levothyroxine (SYNTHROID) 50 MCG tablet Take 50 mcg by mouth Daily      Polyethylene Glycol 3350 (MIRALAX PO) Take by mouth      fluticasone (FLONASE) 50 MCG/ACT nasal spray 1 spray by Nasal route 2 times daily       Loratadine (CLARITIN) 10 MG CAPS Take 10 mg by mouth daily      ferrous sulfate 325 (65 FE) MG tablet Take 1 tablet by mouth 2 times daily (with meals) 30 tablet 3    latanoprost (XALATAN) 0.005 % ophthalmic solution Place 1 drop into both eyes nightly      OXYGEN Inhale 2 L/min into the lungs nightly      Multiple Vitamins-Minerals (ICAPS) CAPS Take 1 tablet by mouth 2 times daily       acetaminophen (TYLENOL) 500 MG tablet Take 500 mg by mouth every 6 hours as needed for Pain.  calcium carbonate 600 MG TABS tablet Take 1 tablet by mouth daily. No current facility-administered medications on file prior to encounter. REVIEW OF SYSTEMS    Pertinent items are noted in HPI. Constitutional: Negative for systemic symptoms including fever, chills and malaise. Objective:      BP (!) 143/60   Pulse 70   Temp 96.7 °F (35.9 °C)   Resp 18     PHYSICAL EXAM    General: The patient is in no acute distress. Mental status:  Patient is appropriate, is  oriented to place and plan of care. Dermatologic exam: Visual inspection of the periwound reveals the skin to be normal in turgor and texture. Wound exam:  see wound description below     All active wounds listed below with today's date are evaluated    Assessment:     Problem List Items Addressed This Visit     WD-Venous hypertension of both lower extremities    WD-Pressure injury of right buttock, stage 2 ([address] - Primary    Relevant Orders    Initiate Outpatient Wound Care Protocol    WD-Pressure injury of left buttock, stage 2 ([address]    Relevant Orders    Initiate Outpatient Wound Care Protocol          Status of wound progress and description from last visit: The wounds are healed. Reinforced good skin care with moisturizing and pressure reduction. She has also gained several pounds and was seen by the nephrology who gave diuretics for 5 days. Turn and change positions frequently, at least every two hours. Use cushion if sitting up in chair.  Encourage adequate nutritional intake and supplements. Keep skin clean and dry. Encourage patient to elevate legs throughout the day. Discussed local wound care and signs of infection. Edema Control Instructions:  -Whenever you are resting, raise your legs up. Try to keep the swollen area higher than the level of your heart.  -Take breaks from standing or sitting in one position.  -Walk around to increase the blood flow in your lower legs. -Move your feet and ankles often while you stand, or tighten and relax your leg muscles.  -Wear support stockings. Put them on in the morning, before swelling gets worse.  -Eat a balanced diet. Lose weight if you need to.  -Limit the amount of salt (sodium) in your diet. Salt holds fluid in the body and may increase swelling.  -Apply compression stocking(s) every morning as soon as you get up. Remove at bedtime unless instructed to wear day and night. Hand wash and line dry to prevent loss of elasticity. Replace every 3-4 months to ensure proper fit. Plan:     Discharge Instructions       PHYSICIAN ORDERS AND DISCHARGE INSTRUCTIONS     NOTE: Upon discharge from the [address], you will receive a patient experience survey. We would be grateful if you would take the time to fill this survey out.     Wound care order history:                 CYNTHIA's   Right       Left    Date               Vascular studies:  1/13/21 Arterial were okay, Venous- possibly needs intervention              Cultures:                Antibiotics: Keflex 7/30/21               HbA1c:   7.8 8/19/20              Compression/Lymph Pumps:              Grafts:                  Continuing wound care orders and information:              Residence: Private              Continue home health care with:               Your wound-care supplies will be provided by:  .                          CAMI cleansing:                           YV not scrub or use excessive force.                          Wash hands with soap and water before and after dressing changes.                         Prior to applying a clean dressing, cleanse wound with normal saline,                          wound cleanser, or mild soap and water.                           Ask your physician or nurse before getting the wound(s) wet in the shower.              Daily Wound management:                          Keep weight off wounds and reposition every 2 hours.                          NCQDE standing for long periods of time.                          Apply wraps/stockings in AM and remove at bedtime.                          Elevate legs to the level of the heart or above for 30 minutes 4-5 times a day and/or when sitting.                                               When taking antibiotics take entire prescription as ordered by MD do not stop taking until medicine is all gone.                             Orders for this week (11/12/21): Buttock crease Wash with anasept spray   Pad with optifoam border  Change Monday, Wednesday and Friday     Follow up with Kahlil Chacon CNP if needed  Call [phone] for any questions or concerns.   Date__________   Time____________        Treatment Note      Written Patient Dismissal Instructions Given            Electronically signed by TREVON Alexandre CNP on 11/12/2021 at 11:16 AM

## 2022-03-18 ENCOUNTER — HOSPITAL ENCOUNTER (OUTPATIENT)
Dept: WOUND CARE | Age: 87
Discharge: HOME | End: 2022-03-18
Payer: MEDICARE

## 2022-03-18 VITALS
RESPIRATION RATE: 16 BRPM | SYSTOLIC BLOOD PRESSURE: 130 MMHG | HEART RATE: 67 BPM | TEMPERATURE: 97.5 F | DIASTOLIC BLOOD PRESSURE: 55 MMHG

## 2022-03-18 DIAGNOSIS — L97.222 VENOUS STASIS ULCER OF LEFT CALF WITH FAT LAYER EXPOSED WITH VARICOSE VEINS (HCC): ICD-10-CM

## 2022-03-18 DIAGNOSIS — L89.312 PRESSURE INJURY OF RIGHT BUTTOCK, STAGE 2 (HCC): ICD-10-CM

## 2022-03-18 DIAGNOSIS — L89.322 PRESSURE INJURY OF LEFT BUTTOCK, STAGE 2 (HCC): ICD-10-CM

## 2022-03-18 DIAGNOSIS — L97.212 VENOUS STASIS ULCER OF RIGHT CALF WITH FAT LAYER EXPOSED WITH VARICOSE VEINS (HCC): ICD-10-CM

## 2022-03-18 DIAGNOSIS — I83.018 VENOUS STASIS ULCER OF OTHER PART OF RIGHT LOWER LEG WITH FAT LAYER EXPOSED WITH VARICOSE VEINS (HCC): ICD-10-CM

## 2022-03-18 DIAGNOSIS — L97.822 VENOUS STASIS ULCER OF OTHER PART OF LEFT LOWER LEG WITH FAT LAYER EXPOSED WITH VARICOSE VEINS (HCC): Primary | ICD-10-CM

## 2022-03-18 DIAGNOSIS — I83.028 VENOUS STASIS ULCER OF OTHER PART OF LEFT LOWER LEG WITH FAT LAYER EXPOSED WITH VARICOSE VEINS (HCC): Primary | ICD-10-CM

## 2022-03-18 DIAGNOSIS — I83.012 VENOUS STASIS ULCER OF RIGHT CALF WITH FAT LAYER EXPOSED WITH VARICOSE VEINS (HCC): ICD-10-CM

## 2022-03-18 DIAGNOSIS — L97.812 VENOUS STASIS ULCER OF OTHER PART OF RIGHT LOWER LEG WITH FAT LAYER EXPOSED WITH VARICOSE VEINS (HCC): ICD-10-CM

## 2022-03-18 DIAGNOSIS — I83.022 VENOUS STASIS ULCER OF LEFT CALF WITH FAT LAYER EXPOSED WITH VARICOSE VEINS (HCC): ICD-10-CM

## 2022-03-18 PROCEDURE — 87186 SC STD MICRODIL/AGAR DIL: CPT

## 2022-03-18 PROCEDURE — 87077 CULTURE AEROBIC IDENTIFY: CPT

## 2022-03-18 PROCEDURE — 99213 OFFICE O/P EST LOW 20 MIN: CPT

## 2022-03-18 PROCEDURE — 11042 DBRDMT SUBQ TIS 1ST 20SQCM/<: CPT | Performed by: NURSE PRACTITIONER

## 2022-03-18 PROCEDURE — 11042 DBRDMT SUBQ TIS 1ST 20SQCM/<: CPT

## 2022-03-18 PROCEDURE — 99213 OFFICE O/P EST LOW 20 MIN: CPT | Performed by: NURSE PRACTITIONER

## 2022-03-18 PROCEDURE — 87075 CULTR BACTERIA EXCEPT BLOOD: CPT

## 2022-03-18 PROCEDURE — 87070 CULTURE OTHR SPECIMN AEROBIC: CPT

## 2022-03-18 RX ORDER — ALBUTEROL SULFATE 90 UG/1
2 AEROSOL, METERED RESPIRATORY (INHALATION) EVERY 6 HOURS PRN
COMMUNITY

## 2022-03-18 ASSESSMENT — PAIN DESCRIPTION - DESCRIPTORS: DESCRIPTORS: SHARP

## 2022-03-18 ASSESSMENT — PAIN DESCRIPTION - FREQUENCY: FREQUENCY: CONTINUOUS

## 2022-03-18 ASSESSMENT — PAIN DESCRIPTION - ORIENTATION: ORIENTATION: RIGHT;LEFT

## 2022-03-18 ASSESSMENT — PAIN SCALES - GENERAL: PAINLEVEL_OUTOF10: 0

## 2022-03-18 ASSESSMENT — PAIN DESCRIPTION - LOCATION: LOCATION: LEG

## 2022-03-18 ASSESSMENT — PAIN DESCRIPTION - PAIN TYPE: TYPE: ACUTE PAIN

## 2022-03-18 NOTE — PROGRESS NOTES
[facility] Initial Visit With Procedure    Referred by: Self    CHIEF COMPLAINT LOWER LEG WOUNDS AND BUTTOCKS     HISTORY of PRESENT ILLNESS      Stefany Vazquez is a 82 y.o. female who presents to the [address] [place] [address] for an initial visit for evaluation and treatment of Acute on chronic venous and edema  ulcer(s) of bilateral lower legs. The condition is of moderate severity. The ulcer has been present for approximately 1.5 weeks. The underlying cause is thought to be venous and edema. She also has acute on chronic pressure ulcers bilateral buttocks that is pressure in nature and mild in severity. The patients care to date has included a dry dressing. The patient has significant underlying medical conditions as below. Wound Pain Timing/Severity: waxing and waning, mild  Quality of pain: dull, aching, tender  Severity of pain:  4 / 10   Modifying Factors: venous stasis, lymphedema, diabetes, poor glucose control, chronic pressure, decreased mobility, shear force and obesity  Associated Signs/Symptoms: edema, erythema, drainage and pain    Arterial evaluation: VL arteries 1/13/21 per Dr. Maldonado Sarah, no significant stenosis    Venous Evaluation: as needed if indicated based on the wound, location, assessment and healing. Wound infection: Both lower leg wounds cultured 3/18/22    Diabetes: Yes, no medication regimen at this time, diet control. Last AIC 7.8 as of 8/19/20  Smoking: Never smoker  Anticoagulant therapy: No  Immunosuppression: No  Obesity: Yes  Other History: Cor pulmonale on diuretic therapy, PAD on Pletal    Nutritional status: well nourished. Discussed need for increased protein and calories for wound healing and good sources of protein (just over 7 grams for every 20 pounds of body weight). Animal-based foods high in protein (meat, poultry, fish, eggs, and dairy foods). Plant based foods high in protein (tofu, lentils, beans, chickpeas, nuts, quinoa and everette seeds.       Patient educated on the 6 essential components necessary for wound healing: Circulation, Debridements, Proper Dressings and Topical Wound Products, Infection Control, Edema Control and Offloading. Patient educated on those factors that negatively effect or impact wound healing: smoking, obesity, uncontrolled diabetes, anticoagulant and immunosuppressive regimens, inadequate nutrition, untreated arterial and venous disease if applicable and measures to manage edema.           PAST MEDICAL HISTORY        Diagnosis Date    Asthma     Chronic kidney disease     acute kidney failure    Chronic ulcer of left leg with fat layer exposed ([address] 3/7/2016    Chronic ulcer of right leg with fat layer exposed ([address] 3/7/2016    Diabetes type 2, controlled ([address]     History of asthma     History of blood transfusion 07/2015    Hypertension     Lymphedema of both lower extremities 3/7/2016    Osteoarthritis     Pneumonia     Thyroid disease     Venous stasis of both lower extremities 3/7/2016    WD-Non-pressure chronic ulcer right lower leg, 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 ([address] 4/21/2016       PAST SURGICAL HISTORY    Past Surgical History:   Procedure Laterality Date    APPENDECTOMY      CHOLECYSTECTOMY      CYSTOSCOPY      EYE SURGERY      bilateral implants    HYSTERECTOMY      JOINT REPLACEMENT Right     knee    PACEMAKER INSERTION N/A 11/17/2020    PACEMAKER INSERTION PERMANENT REMOVAL OF TEMPORARY PACEMAKER performed by Thuan William MD at [facility]    Family History   Problem Relation Age of Onset    Heart Disease Father        SOCIAL HISTORY    Social History     Tobacco Use    Smoking status: Never Smoker    Smokeless tobacco: Never Used   Vaping Use    Vaping Use: Never used   Substance Use Topics    Alcohol use: No    Drug use: No       ALLERGIES    Allergies   Allergen Reactions    Latex Rash    Dye [Iodides] Anaphylaxis    Iodine Anaphylaxis    Dyazide [Hydrochlorothiazide W-Triamterene] Rash    Lasix [Furosemide] Rash    Procardia [Nifedipine] Other (See Comments)     Not for sure if rash occurred or rash    Doxycycline Dermatitis    Edecrin [Ethacrynic Acid]     Levaquin [Levofloxacin] Other (See Comments)     unknown    Mobic [Meloxicam]     Vioxx [Rofecoxib]     Celebrex [Celecoxib] Rash    Lexapro [Escitalopram Oxalate] Rash    Sulfa Antibiotics Hives       MEDICATIONS    Current Outpatient Medications on File Prior to Encounter   Medication Sig Dispense Refill    albuterol sulfate HFA (VENTOLIN HFA) 108 (90 Base) MCG/ACT inhaler Inhale 2 puffs into the lungs every 6 hours as needed for Wheezing      bumetanide (BUMEX) 1 MG tablet Take 2 tablets by mouth 3 times daily 540 tablet 3    febuxostat (ULORIC) 40 MG TABS tablet Take 1 tablet by mouth daily 30 tablet 5    metOLazone (ZAROXOLYN) 5 MG tablet Take 1 tablet by mouth daily 30 tablet 5    spironolactone (ALDACTONE) 50 MG tablet Take 1 tablet by mouth 2 times daily 60 tablet 5    clobetasol (TEMOVATE) 0.05 % ointment Apply topically 2 times daily. 120 g 1    Fluticasone Propionate, Inhal, (FLOVENT IN) Inhale 2 puffs into the lungs 2 times daily      silver sulfADIAZINE (SILVADENE) 1 % cream Apply topically daily.  (Patient taking differently: as needed ) 240 g 5    cilostazol (PLETAL) 50 MG tablet Take 1 tablet by mouth 2 times daily 60 tablet 3    rOPINIRole (REQUIP) 0.5 MG tablet Take 1 tablet by mouth 3 times daily 90 tablet 3    famotidine (PEPCID) 20 MG tablet Take 1 tablet by mouth 2 times daily 60 tablet 5    levothyroxine (SYNTHROID) 50 MCG tablet Take 50 mcg by mouth Daily      Polyethylene Glycol 3350 (MIRALAX PO) Take by mouth      fluticasone (FLONASE) 50 MCG/ACT nasal spray 1 spray by Nasal route 2 times daily       Loratadine (CLARITIN) 10 MG CAPS Take 10 mg by mouth daily      ferrous sulfate 325 (65 FE) MG tablet Take 1 tablet by mouth 2 times daily (with meals) 30 tablet 3    latanoprost (XALATAN) 0.005 % ophthalmic solution Place 1 drop into both eyes nightly      OXYGEN Inhale 2 L/min into the lungs nightly      Multiple Vitamins-Minerals (ICAPS) CAPS Take 1 tablet by mouth 2 times daily       acetaminophen (TYLENOL) 500 MG tablet Take 500 mg by mouth every 6 hours as needed for Pain.  calcium carbonate 600 MG TABS tablet Take 1 tablet by mouth daily. No current facility-administered medications on file prior to encounter.        PROBLEM LIST    Patient Active Problem List   Diagnosis    Diabetes type 2, controlled ([address]    Osteoarthritis    History of asthma    Cellulitis of right lower extremity    Sepsis ([address]    Gram positive sepsis ([address]    Gram-positive bacteremia    Cellulitis of left lower extremity    WD-Lymphedema of both lower extremities with cellulitis    Rhabdomyolysis    Hyperkalemia    Morbid obesity with BMI of 45.0-49.9, adult ([facility])    Encephalopathy in sepsis    Toxic shock syndrome (HCC)    Chronic renal impairment, stage 3 (moderate) ([facility])    Anemia of chronic disease    Chronic kidney disease (CKD) stage G3a/A2, moderately decreased glomerular filtration rate (GFR) between 45-59 mL/min/1.73 square meter and albuminuria creatinine ratio between  mg/g ([facility])    Cor pulmonale, chronic ([facility])    DM (diabetes mellitus) ([facility])    Fluid overload    HTN (hypertension)    Chronic kidney disease (CKD) stage G2/A2, mildly decreased glomerular filtration rate (GFR) between 60-89 mL/min/1.73 square meter and albuminuria creatinine ratio between  mg/g    Acute gout    Acute kidney injury ([address]    Acute kidney injury superimposed on chronic kidney disease ([address]    Third degree heart block ([address]    Weakness    WD-Venous hypertension of both lower extremities    WD-Pressure injury of right buttock, stage 2 ([address]    WD-Pressure injury of left buttock, stage 2 ([address]    WD-Chronic venous hypertension with ulcer and inflammation involving left side (HCC)    WD-Excoriation of buttock crease    WD-Venous stasis ulcer of right calf with fat layer exposed with varicose veins (HCC)    WD-Venous stasis ulcer of left calf with fat layer exposed with varicose veins (HCC)       REVIEW OF SYSTEMS    Constitutional: negative for anorexia, chills, fatigue, fevers, malaise, sweats and weight loss  Respiratory: negative for cough, dyspnea on exertion, hemoptysis, pleurisy/chest pain, shortness of breath, sputum, stridor and wheezing  Cardiovascular: positive for lower extremity edema, negative for chest pain, chest pressure/discomfort, dyspnea, exertional chest pressure/discomfort, near-syncope, orthopnea, palpitations, paroxysmal nocturnal dyspnea, syncope and tachypnea  Integument/breast: positive for skin lesion(s)      Objective:      BP (!) 130/55   Pulse 67   Temp 97.5 °F (36.4 °C) (Temporal)   Resp 16     PHYSICAL EXAM  General Appearance: alert and oriented to person, place and time, well-developed and well-nourished, in no acute distress  Pulmonary/Chest: clear to auscultation bilaterally- no wheezes, rales or rhonchi, normal air movement, no respiratory distress  Cardiovascular: normal rate, normal S1 and S2, no gallops and intact distal pulses  Dermatologic exam: Visual inspection of the periwound reveals the skin to be edematous  Wound exam: see wound description below in procedure note      Assessment:       Leonie Phelps  appears to have a non-healing wound of the bilateral lower legs and buttocks. The etiology of the wound is felt to be venous and pressure. There are multiple complicating factors including venous stasis, lymphedema, diabetes, poor glucose control, chronic pressure, decreased mobility, shear force and obesity. A comprehensive wound management program would be helpful to heal this wound. Assessments completed include fall risk and nutritional, functional,and psychological status.   At this time appropriate care would include: periodic debridement and wound care as below. Problem List Items Addressed This Visit     WD-Pressure injury of right buttock, stage 2 ([address]    WD-Pressure injury of left buttock, stage 2 (HCC)    WD-Venous stasis ulcer of right calf with fat layer exposed with varicose veins (HCC)    WD-Venous stasis ulcer of left calf with fat layer exposed with varicose veins ([address]      Other Visit Diagnoses     Venous stasis ulcer of other part of left lower leg with fat layer exposed with varicose veins ([address]    -  Primary    Relevant Orders    Culture, Wound    Culture, Wound    Venous stasis ulcer of other part of right lower leg with fat layer exposed with varicose veins ([address]                Procedure Note    Indications:  Based on my examination of this patient's wound(s) today, sharp excision into necrotic subcutaneous tissue is required to promote healing and evaluate the extent of previous healing. Performed by: TREVON Brown CNP    Consent obtained: Yes    Time out taken:  Yes    Pain Control: Anesthetic  Anesthetic: 4% Lidocaine Liquid Topical     Debridement:Excisional Debridement    Using curette the wound(s) was/were sharply debrided down through and including the removal of subcutaneous tissue.         Devitalized Tissue Debrided:  slough and exudate    Pre Debridement Measurements:  Are located in the Wound Documentation Flow Sheet    All active wounds listed below with today's date are evaluated  Wound(s)    debrided this date include # : 1, 2, 3 and 4     Post  Debridement Measurements:  Wound 03/18/22 #1 Left lateral leg (Active)   Wound Image   03/18/22 0939   Dressing Status New dressing applied 03/18/22 1059   Wound Cleansed Soap and water 03/18/22 0939   Offloading for Diabetic Foot Ulcers Offloading not required 03/18/22 1059   Wound Length (cm) 1.4 cm 03/18/22 0939   Wound Width (cm) 1 cm 03/18/22 0939   Wound Depth (cm) 0.1 cm 03/18/22 0939   Wound Surface Area (cm^2) 1.4 cm^2 03/18/22 0939   Wound Volume (cm^3) 0.14 cm^3 03/18/22 0939   Post-Procedure Length (cm) 1.4 cm 03/18/22 1028   Post-Procedure Width (cm) 1 cm 03/18/22 1028   Post-Procedure Depth (cm) 0.1 cm 03/18/22 1028   Post-Procedure Surface Area (cm^2) 1.4 cm^2 03/18/22 1028   Post-Procedure Volume (cm^3) 0.14 cm^3 03/18/22 1028   Distance Tunneling (cm) 0 cm 03/18/22 0939   Tunneling Position ___ O'Clock 0 03/18/22 0939   Undermining Starts ___ O'Clock 0 03/18/22 0939   Undermining Ends___ O'Clock 0 03/18/22 0939   Undermining Maxium Distance (cm) 0 03/18/22 0939   Wound Assessment Granulation tissue 03/18/22 0939   Drainage Amount Moderate 03/18/22 0939   Drainage Description Serosanguinous 03/18/22 0939   Odor Moderate 03/18/22 0939   Leatha-wound Assessment Intact 03/18/22 0939   Margins Defined edges 03/18/22 0939   Wound Thickness Description not for Pressure Injury Full thickness 03/18/22 0939   Number of days: 0       Wound 03/18/22 #2 Right medial leg (Active)   Wound Image   03/18/22 0939   Dressing Status New dressing applied 03/18/22 1059   Wound Cleansed Soap and water 03/18/22 0939   Offloading for Diabetic Foot Ulcers Offloading not required 03/18/22 1059   Wound Length (cm) 3 cm 03/18/22 0939   Wound Width (cm) 1.8 cm 03/18/22 0939   Wound Depth (cm) 0.1 cm 03/18/22 0939   Wound Surface Area (cm^2) 5.4 cm^2 03/18/22 0939   Wound Volume (cm^3) 0.54 cm^3 03/18/22 0939   Post-Procedure Length (cm) 3 cm 03/18/22 1028   Post-Procedure Width (cm) 1.8 cm 03/18/22 1028   Post-Procedure Depth (cm) 0.1 cm 03/18/22 1028   Post-Procedure Surface Area (cm^2) 5.4 cm^2 03/18/22 1028   Post-Procedure Volume (cm^3) 0.54 cm^3 03/18/22 1028   Distance Tunneling (cm) 0 cm 03/18/22 0939   Tunneling Position ___ O'Clock 0 03/18/22 0939   Undermining Starts ___ O'Clock 0 03/18/22 0939   Undermining Ends___ O'Clock 0 03/18/22 0939   Undermining Maxium Distance (cm) 0 03/18/22 0939   Wound Assessment [place]/red;Slough 03/18/22 0939   Drainage Amount Moderate 03/18/22 0939   Drainage Description Serosanguinous 03/18/22 0939   Odor None 03/18/22 0939   Leatha-wound Assessment Intact 03/18/22 0939   Margins Defined edges 03/18/22 0939   Wound Thickness Description not for Pressure Injury Full thickness 03/18/22 0939   Number of days: 0       Wound 03/18/22 #3 Coccyx (Active)   Wound Image   03/18/22 0939   Dressing Status New dressing applied 03/18/22 1059   Wound Cleansed Soap and water 03/18/22 0939   Offloading for Diabetic Foot Ulcers Offloading not required 03/18/22 1059   Wound Length (cm) 0.5 cm 03/18/22 0939   Wound Width (cm) 0.5 cm 03/18/22 0939   Wound Depth (cm) 0.1 cm 03/18/22 0939   Wound Surface Area (cm^2) 0.25 cm^2 03/18/22 0939   Wound Volume (cm^3) 0.025 cm^3 03/18/22 0939   Distance Tunneling (cm) 0 cm 03/18/22 0939   Tunneling Position ___ O'Clock 0 03/18/22 0939   Undermining Starts ___ O'Clock 0 03/18/22 0939   Undermining Ends___ O'Clock 0 03/18/22 0939   Undermining Maxium Distance (cm) 0 03/18/22 0939   Number of days: 0       Wound 03/18/22 #4 Left 2nd Toe (Active)   Wound Image   03/18/22 0939   Dressing Status New dressing applied 03/18/22 1059   Wound Cleansed Soap and water 03/18/22 0939   Offloading for Diabetic Foot Ulcers Offloading not required 03/18/22 1059   Wound Length (cm) 0.1 cm 03/18/22 0939   Wound Width (cm) 0.3 cm 03/18/22 0939   Wound Depth (cm) 0.1 cm 03/18/22 0939   Wound Surface Area (cm^2) 0.03 cm^2 03/18/22 0939   Wound Volume (cm^3) 0.003 cm^3 03/18/22 0939   Post-Procedure Length (cm) 0.1 cm 03/18/22 1028   Post-Procedure Width (cm) 0.3 cm 03/18/22 1028   Post-Procedure Depth (cm) 0.1 cm 03/18/22 1028   Post-Procedure Surface Area (cm^2) 0.03 cm^2 03/18/22 1028   Post-Procedure Volume (cm^3) 0.003 cm^3 03/18/22 1028   Distance Tunneling (cm) 0 cm 03/18/22 0939   Tunneling Position ___ O'Clock 0 03/18/22 0939   Undermining Starts ___ O'Clock 0 03/18/22 0939   Undermining Ends___ O'Clock 0 03/18/22 0939   Undermining Maxium Distance (cm) 0 03/18/22 0939   Wound Assessment Pale granulation tissue 03/18/22 0939   Drainage Amount Moderate 03/18/22 0939   Drainage Description Serosanguinous 03/18/22 0939   Odor None 03/18/22 0939   Leatha-wound Assessment Intact 03/18/22 0939   Margins Defined edges 03/18/22 0939   Wound Thickness Description not for Pressure Injury Full thickness 03/18/22 0939   Number of days: 0       Percent of Wound(s) Debrided: 100%    Total  Area  Debrided:  7.08 sq cm     Bleeding:  Minimal    Hemostasis Achieved:  by pressure    Procedural Pain:  0  / 10     Post Procedural Pain:  0 / 10     Response to treatment:  Well tolerated by patient. The leg wounds were cultured, will use topical Gent to minimize bio-burden and collagen to build tissue, along with compression. Will use Desitin, Clobetasol and stimulin to the buttocks. Keep skin clean and dry. Discussed local wound care and signs of infection. Discussed pressure reduction and edema management. Ensure to  turn and change positions frequently, at least every two hours. Use cushion if sitting up in chair. Keep skin clean and dry. Discussed local wound care and signs of infection. Edema Control Instructions:  -Whenever you are resting, raise your legs up. Try to keep the swollen area higher than the level of your heart.  -Take breaks from standing or sitting in one position.  -Walk around to increase the blood flow in your lower legs. -Move your feet and ankles often while you stand, or tighten and relax your leg muscles.  -Wear support stockings. Put them on in the morning, before swelling gets worse.  -Eat a balanced diet. Lose weight if you need to.  -Limit the amount of salt (sodium) in your diet. Salt holds fluid in the body and may increase swelling.  -Apply compression stocking(s) every morning as soon as you get up. Remove at bedtime unless instructed to wear day and night.  Hand wash and line dry to prevent loss of elasticity. Replace every 3-4 months to ensure proper fit. Plan:     Discharge Instructions       PHYSICIAN ORDERS AND DISCHARGE INSTRUCTIONS    NOTE: Upon discharge from the [address], you will receive a patient experience survey via E-mail. We would be grateful if you would take the time to fill this survey out. Wound care order history:     CYNTHIA's   Right  1.68     Left 1.65  Date 1/13/21   Vascular studies: . Cultures: .3/18/22               Antibiotics: . HbA1c:  . Compression/Lymph Pumps: [facility] Grafts: Marisa Gilbert: . Continuing wound care orders and information:              Residence: . Continue home health care with:. Your wound-care supplies will be provided by: [facility]: . Wound cleansing:      Do not scrub or use excessive force. Wash hands with soap and water before and after dressing changes. Prior to applying a clean dressing, cleanse wound with normal saline,    wound cleanser, or mild soap and water. Ask your physician or nurse before getting the wound(s) wet in the shower. Daily Wound management:    Keep weight off wounds and reposition every 2 hours. Avoid standing for long periods of time. Apply wraps/stockings in AM and remove at bedtime. Elevate legs to the level of the heart or above for 30 minutes 4-5 times a day and/or when sitting. When taking antibiotics take entire prescription as ordered by MD do not stop taking until medicine is all gone. Orders for this week (3/18/22):   Bilateral lower legs and left toe- Wash with mild soap and water, rinse with saline, pat dry with 4x4  Generous A&D to entire foot and intact skin on legs  Place calcium alginate between toes  Apply Gentamicin and stimulen to wound beds (do not use excessive gentamicin on toe please)  Cover with calcium alginate and sorbex   Wrap toe with conform and secure with tape  Wrap with Coban 2 lites and secure with tape (Please wrap toes in coban as well)  Leave in place till Monday    Sacrum-Wash with mild soap and water, rinse with saline, pat dry with 4x4  Apply Desitin, clobetasol, and stimulen powder to wound  Daily    Nurse visit Monday  Follow up with Queenie Paredes CNP in 1 weeks in the wound care center  Call [phone] for any questions or concerns.   Date__________   Time____________                  Treatment Note Wound 03/18/22 #1 Left lateral leg-Dressing/Treatment:  (A&D, gentamicin stimulen ca alg sorbex coban 2 lte )  Wound 03/18/22 #2 Right medial leg-Dressing/Treatment:  (A&D, gentamicin stimulen ca alg sorbex coban 2 lte )  Wound 03/18/22 #3 Coccyx-Dressing/Treatment:  (desitin, clobetasol stimulen powder. )  Wound 03/18/22 #4 Left 2nd Toe-Dressing/Treatment:  (A&D, gentamicin stimulen ca alg sorbex coban 2 lte )    Written Patient Dismissal Instructions Given          Electronically signed by TREVON Serrato CNP on 3/18/2022 at 12:52 PM

## 2022-03-21 ENCOUNTER — HOSPITAL ENCOUNTER (OUTPATIENT)
Dept: WOUND CARE | Age: 87
Discharge: HOME | End: 2022-03-21
Payer: MEDICARE

## 2022-03-21 VITALS
TEMPERATURE: 97.7 F | DIASTOLIC BLOOD PRESSURE: 51 MMHG | HEART RATE: 68 BPM | RESPIRATION RATE: 17 BRPM | SYSTOLIC BLOOD PRESSURE: 115 MMHG

## 2022-03-21 DIAGNOSIS — I83.022 VENOUS STASIS ULCER OF LEFT CALF WITH FAT LAYER EXPOSED WITH VARICOSE VEINS (HCC): Primary | ICD-10-CM

## 2022-03-21 DIAGNOSIS — L97.222 VENOUS STASIS ULCER OF LEFT CALF WITH FAT LAYER EXPOSED WITH VARICOSE VEINS (HCC): Primary | ICD-10-CM

## 2022-03-21 DIAGNOSIS — I83.012 VENOUS STASIS ULCER OF RIGHT CALF WITH FAT LAYER EXPOSED WITH VARICOSE VEINS (HCC): ICD-10-CM

## 2022-03-21 DIAGNOSIS — L97.212 VENOUS STASIS ULCER OF RIGHT CALF WITH FAT LAYER EXPOSED WITH VARICOSE VEINS (HCC): ICD-10-CM

## 2022-03-21 DIAGNOSIS — S30.810A EXCORIATION OF BUTTOCK, INITIAL ENCOUNTER: ICD-10-CM

## 2022-03-21 PROBLEM — L97.929 CHRONIC VENOUS HYPERTENSION WITH ULCER AND INFLAMMATION INVOLVING LEFT SIDE (HCC): Status: RESOLVED | Noted: 2021-08-18 | Resolved: 2022-03-21

## 2022-03-21 PROBLEM — I87.332 CHRONIC VENOUS HYPERTENSION WITH ULCER AND INFLAMMATION INVOLVING LEFT SIDE (HCC): Status: RESOLVED | Noted: 2021-08-18 | Resolved: 2022-03-21

## 2022-03-21 PROCEDURE — 11042 DBRDMT SUBQ TIS 1ST 20SQCM/<: CPT

## 2022-03-21 PROCEDURE — 11045 DBRDMT SUBQ TISS EACH ADDL: CPT | Performed by: NURSE PRACTITIONER

## 2022-03-21 PROCEDURE — 11042 DBRDMT SUBQ TIS 1ST 20SQCM/<: CPT | Performed by: NURSE PRACTITIONER

## 2022-03-21 RX ORDER — BETAMETHASONE DIPROPIONATE 0.05 %
OINTMENT (GRAM) TOPICAL ONCE
Status: CANCELLED | OUTPATIENT
Start: 2022-03-21 | End: 2022-03-21

## 2022-03-21 RX ORDER — CLOBETASOL PROPIONATE 0.5 MG/G
OINTMENT TOPICAL ONCE
Status: CANCELLED | OUTPATIENT
Start: 2022-03-21 | End: 2022-03-21

## 2022-03-21 RX ORDER — LIDOCAINE 50 MG/G
OINTMENT TOPICAL ONCE
Status: CANCELLED | OUTPATIENT
Start: 2022-03-21 | End: 2022-03-21

## 2022-03-21 RX ORDER — LIDOCAINE HYDROCHLORIDE 40 MG/ML
SOLUTION TOPICAL ONCE
Status: CANCELLED | OUTPATIENT
Start: 2022-03-21 | End: 2022-03-21

## 2022-03-21 RX ORDER — GINSENG 100 MG
CAPSULE ORAL ONCE
Status: CANCELLED | OUTPATIENT
Start: 2022-03-21 | End: 2022-03-21

## 2022-03-21 RX ORDER — BACITRACIN, NEOMYCIN, POLYMYXIN B 400; 3.5; 5 [USP'U]/G; MG/G; [USP'U]/G
OINTMENT TOPICAL ONCE
Status: CANCELLED | OUTPATIENT
Start: 2022-03-21 | End: 2022-03-21

## 2022-03-21 RX ORDER — GENTAMICIN SULFATE 1 MG/G
OINTMENT TOPICAL ONCE
Status: CANCELLED | OUTPATIENT
Start: 2022-03-21 | End: 2022-03-21

## 2022-03-21 RX ORDER — BACITRACIN ZINC AND POLYMYXIN B SULFATE 500; 1000 [USP'U]/G; [USP'U]/G
OINTMENT TOPICAL ONCE
Status: CANCELLED | OUTPATIENT
Start: 2022-03-21 | End: 2022-03-21

## 2022-03-21 ASSESSMENT — PAIN DESCRIPTION - ONSET: ONSET: ON-GOING

## 2022-03-21 ASSESSMENT — PAIN DESCRIPTION - FREQUENCY: FREQUENCY: CONTINUOUS

## 2022-03-21 ASSESSMENT — PAIN DESCRIPTION - LOCATION: LOCATION: LEG

## 2022-03-21 ASSESSMENT — PAIN DESCRIPTION - DESCRIPTORS: DESCRIPTORS: STABBING;SHARP

## 2022-03-21 ASSESSMENT — PAIN DESCRIPTION - ORIENTATION: ORIENTATION: RIGHT;LEFT

## 2022-03-21 ASSESSMENT — PAIN SCALES - GENERAL: PAINLEVEL_OUTOF10: 10

## 2022-03-21 NOTE — PLAN OF CARE
Problem: Pain:  Goal: Pain level will decrease  Description: Pain level will decrease  Outcome: Ongoing  Goal: Control of acute pain  Description: Control of acute pain  Outcome: Ongoing  Goal: Control of chronic pain  Description: Control of chronic pain  Outcome: Ongoing     Problem: Wound:  Goal: Will show signs of wound healing; wound closure and no evidence of infection  Description: Will show signs of wound healing; wound closure and no evidence of infection  Outcome: Ongoing     Problem: Wound:  Intervention: Assess ankle, calf, or foot circumference blilaterally  Note: See sofieheet  Intervention: Assess pain status  Note: See fonavarroheet  Intervention: Assess wound size, appearance and drainage  Note: See fonavarroheet  Intervention: Assess pedal pulses bilaterally if patient has a foot or leg ulcer  Note: See sofieheet  Intervention: Doppler if unable to palpate pedal pulse  Note: See fonavarroheet

## 2022-03-21 NOTE — PROGRESS NOTES
Wound Care Center Progress Note With Procedure    Marquis Mendoza  AGE: 82 y.o. GENDER: female  : 1935  EPISODE DATE:  3/21/2022     Subjective:     Chief Complaint   Patient presents with    Wound Check     BLE          HISTORY of PRESENT ILLNESS   Marquis Mendoza is a 82 y.o. female who presents to the [address] [place] [address] for evaluation and treatment of Acute on chronic venous and edema  ulcer(s) of bilateral lower legs. The condition is of moderate severity. The ulcer has been present for approximately 1.5 weeks. The underlying cause is thought to be venous and edema. She also has acute on chronic pressure ulcers bilateral buttocks that is pressure in nature and mild in severity. The patients care to date has included a dry dressing. The patient has significant underlying medical conditions as below.      Wound Pain Timing/Severity: waxing and waning, mild  Quality of pain: dull, aching, tender  Severity of pain:  3 / 10   Modifying Factors: venous stasis, lymphedema, diabetes, poor glucose control, chronic pressure, decreased mobility, shear force and obesity  Associated Signs/Symptoms: edema, erythema, drainage and pain     Arterial evaluation: VL arteries 21 per Dr. Esla Richards, no significant stenosis     Venous Evaluation: as needed if indicated based on the wound, location, assessment and healing.     Wound infection: Both lower leg wounds cultured 3/18/22     Diabetes: Yes, no medication regimen at this time, diet control. Last AIC 7.8 as of 20  Smoking: Never smoker  Anticoagulant therapy: No  Immunosuppression: No  Obesity: Yes  Other History: Cor pulmonale on diuretic therapy, PAD on Pletal     Nutritional status: well nourished. Discussed need for increased protein and calories for wound healing and good sources of protein (just over 7 grams for every 20 pounds of body weight). Animal-based foods high in protein (meat, poultry, fish, eggs, and dairy foods).  Plant based foods high in protein (tofu, lentils, beans, chickpeas, nuts, quinoa and everette seeds.        Patient educated on the 6 essential components necessary for wound healing: Circulation, Debridements, Proper Dressings and Topical Wound Products, Infection Control, Edema Control and Offloading.     Patient educated on those factors that negatively effect or impact wound healing: smoking, obesity, uncontrolled diabetes, anticoagulant and immunosuppressive regimens, inadequate nutrition, untreated arterial and venous disease if applicable and measures to manage edema.         PAST MEDICAL HISTORY        Diagnosis Date    Asthma     Chronic kidney disease     acute kidney failure    Chronic ulcer of left leg with fat layer exposed ([address] 3/7/2016    Chronic ulcer of right leg with fat layer exposed ([address] 3/7/2016    Diabetes type 2, controlled ([address]     History of asthma     History of blood transfusion 07/2015    Hypertension     Lymphedema of both lower extremities 3/7/2016    Osteoarthritis     Pneumonia     Thyroid disease     Venous stasis of both lower extremities 3/7/2016    WD-Non-pressure chronic ulcer right lower leg, 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 ([address] 4/21/2016       PAST SURGICAL HISTORY    Past Surgical History:   Procedure Laterality Date    APPENDECTOMY      CHOLECYSTECTOMY      CYSTOSCOPY      EYE SURGERY      bilateral implants    HYSTERECTOMY      JOINT REPLACEMENT Right     knee    PACEMAKER INSERTION N/A 11/17/2020    PACEMAKER INSERTION PERMANENT REMOVAL OF TEMPORARY PACEMAKER performed by Linda Rome MD at [address]    Family History   Problem Relation Age of Onset    Heart Disease Father        SOCIAL HISTORY    Social History     Tobacco Use    Smoking status: Never Smoker    Smokeless tobacco: Never Used   Vaping Use    Vaping Use: Never used   Substance Use Topics    Alcohol use: No    Drug use: No       ALLERGIES    Allergies   Allergen Reactions  Latex Rash    Dye [Iodides] Anaphylaxis    Iodine Anaphylaxis    Dyazide [Hydrochlorothiazide W-Triamterene] Rash    Lasix [Furosemide] Rash    Procardia [Nifedipine] Other (See Comments)     Not for sure if rash occurred or rash    Doxycycline Dermatitis    Edecrin [Ethacrynic Acid]     Levaquin [Levofloxacin] Other (See Comments)     unknown    Mobic [Meloxicam]     Vioxx [Rofecoxib]     Celebrex [Celecoxib] Rash    Lexapro [Escitalopram Oxalate] Rash    Sulfa Antibiotics Hives       MEDICATIONS    Current Outpatient Medications on File Prior to Encounter   Medication Sig Dispense Refill    albuterol sulfate HFA (VENTOLIN HFA) 108 (90 Base) MCG/ACT inhaler Inhale 2 puffs into the lungs every 6 hours as needed for Wheezing      bumetanide (BUMEX) 1 MG tablet Take 2 tablets by mouth 3 times daily 540 tablet 3    febuxostat (ULORIC) 40 MG TABS tablet Take 1 tablet by mouth daily 30 tablet 5    metOLazone (ZAROXOLYN) 5 MG tablet Take 1 tablet by mouth daily 30 tablet 5    spironolactone (ALDACTONE) 50 MG tablet Take 1 tablet by mouth 2 times daily 60 tablet 5    clobetasol (TEMOVATE) 0.05 % ointment Apply topically 2 times daily. 120 g 1    Fluticasone Propionate, Inhal, (FLOVENT IN) Inhale 2 puffs into the lungs 2 times daily      silver sulfADIAZINE (SILVADENE) 1 % cream Apply topically daily.  (Patient taking differently: as needed ) 240 g 5    cilostazol (PLETAL) 50 MG tablet Take 1 tablet by mouth 2 times daily 60 tablet 3    rOPINIRole (REQUIP) 0.5 MG tablet Take 1 tablet by mouth 3 times daily 90 tablet 3    famotidine (PEPCID) 20 MG tablet Take 1 tablet by mouth 2 times daily 60 tablet 5    levothyroxine (SYNTHROID) 50 MCG tablet Take 50 mcg by mouth Daily      Polyethylene Glycol 3350 (MIRALAX PO) Take by mouth      fluticasone (FLONASE) 50 MCG/ACT nasal spray 1 spray by Nasal route 2 times daily       Loratadine (CLARITIN) 10 MG CAPS Take 10 mg by mouth daily      ferrous Devitalized Tissue Debrided:  slough and exudate    Pre Debridement Measurements:  Are located in the Wound Documentation Flow Sheet    All active wounds listed below with today's date are evaluated  Wound(s)    debrided this date include # : 1     Post  Debridement Measurements:  Wound 03/18/22 #1 Left lateral leg (Active)   Wound Image   03/18/22 0939   Dressing Status New dressing applied 03/18/22 1059   Wound Cleansed Soap and water; Wound cleanser;Cleansed with saline 03/21/22 0934   Offloading for Diabetic Foot Ulcers Offloading not required 03/21/22 0934   Wound Length (cm) 1.1 cm 03/21/22 0934   Wound Width (cm) 1.5 cm 03/21/22 0934   Wound Depth (cm) 0.1 cm 03/21/22 0934   Wound Surface Area (cm^2) 1.65 cm^2 03/21/22 0934   Change in Wound Size % (l*w) -17.86 03/21/22 0934   Wound Volume (cm^3) 0.165 cm^3 03/21/22 0934   Wound Healing % -18 03/21/22 0934   Post-Procedure Length (cm) 1.1 cm 03/21/22 0945   Post-Procedure Width (cm) 1.5 cm 03/21/22 0945   Post-Procedure Depth (cm) 0.1 cm 03/21/22 0945   Post-Procedure Surface Area (cm^2) 1.65 cm^2 03/21/22 0945   Post-Procedure Volume (cm^3) 0.165 cm^3 03/21/22 0945   Distance Tunneling (cm) 0 cm 03/21/22 0934   Tunneling Position ___ O'Clock 0 03/21/22 0934   Undermining Starts ___ O'Clock 0 03/21/22 0934   Undermining Ends___ O'Clock 0 03/21/22 0934   Undermining Maxium Distance (cm) 0 03/21/22 0934   Wound Assessment Granulation tissue 03/21/22 0934   Drainage Amount Moderate 03/21/22 0934   Drainage Description Purulent;Yellow 03/21/22 0934   Odor Moderate 03/21/22 0934   Leatha-wound Assessment Intact;Fragile 03/21/22 0934   Margins Defined edges 03/21/22 0934   Wound Thickness Description not for Pressure Injury Full thickness 03/21/22 0934   Number of days: 3       Wound 03/18/22 #2 Right medial leg (Active)   Wound Image   03/18/22 0939   Dressing Status New dressing applied 03/18/22 1059   Wound Cleansed Soap and water; Wound cleanser;Cleansed with saline 03/21/22 0934   Offloading for Diabetic Foot Ulcers Offloading not required 03/21/22 0934   Wound Length (cm) 5.3 cm 03/21/22 0934   Wound Width (cm) 4.3 cm 03/21/22 0934   Wound Depth (cm) 0.1 cm 03/21/22 0934   Wound Surface Area (cm^2) 22.79 cm^2 03/21/22 0934   Change in Wound Size % (l*w) -322.04 03/21/22 0934   Wound Volume (cm^3) 2.279 cm^3 03/21/22 0934   Wound Healing % -322 03/21/22 0934   Post-Procedure Length (cm) 5.3 cm 03/21/22 0945   Post-Procedure Width (cm) 4.3 cm 03/21/22 0945   Post-Procedure Depth (cm) 0.1 cm 03/21/22 0945   Post-Procedure Surface Area (cm^2) 22.79 cm^2 03/21/22 0945   Post-Procedure Volume (cm^3) 2.279 cm^3 03/21/22 0945   Distance Tunneling (cm) 0 cm 03/21/22 0934   Tunneling Position ___ O'Clock 0 03/21/22 0934   Undermining Starts ___ O'Clock 0 03/21/22 0934   Undermining Ends___ O'Clock 0 03/21/22 0934   Undermining Maxium Distance (cm) 0 03/21/22 0934   Wound Assessment Pink/red 03/21/22 0934   Drainage Amount Moderate 03/21/22 0934   Drainage Description Yellow;Purulent 03/21/22 0934   Odor None 03/21/22 0934   Leatha-wound Assessment Intact;Fragile 03/21/22 0934   Margins Defined edges 03/21/22 0934   Wound Thickness Description not for Pressure Injury Full thickness 03/21/22 0934   Number of days: 3       Wound 03/18/22 #3 Coccyx (Active)   Wound Image   03/18/22 0939   Dressing Status New dressing applied 03/18/22 1059   Wound Cleansed Soap and water; Wound cleanser;Cleansed with saline 03/21/22 0934   Offloading for Diabetic Foot Ulcers Offloading not required 03/21/22 0934   Wound Length (cm) 0.5 cm 03/18/22 0939   Wound Width (cm) 0.5 cm 03/18/22 0939   Wound Depth (cm) 0.1 cm 03/18/22 0939   Wound Surface Area (cm^2) 0.25 cm^2 03/18/22 0939   Wound Volume (cm^3) 0.025 cm^3 03/18/22 0939   Distance Tunneling (cm) 0 cm 03/21/22 0934   Tunneling Position ___ O'Clock 0 03/21/22 0934   Undermining Starts ___ O'Clock 0 03/21/22 0934   Undermining Ends___ O'Clock 0 03/21/22 0934   Undermining Maxium Distance (cm) 0 03/21/22 0934   Number of days: 2       Wound 03/18/22 #4 Left 2nd Toe (Active)   Wound Image   03/18/22 0939   Dressing Status New dressing applied 03/18/22 1059   Wound Cleansed Soap and water; Wound cleanser;Cleansed with saline 03/21/22 0934   Offloading for Diabetic Foot Ulcers Offloading not required 03/21/22 0934   Wound Length (cm) 0.1 cm 03/21/22 0934   Wound Width (cm) 0.1 cm 03/21/22 0934   Wound Depth (cm) 0.1 cm 03/21/22 0934   Wound Surface Area (cm^2) 0.01 cm^2 03/21/22 0934   Change in Wound Size % (l*w) 66.67 03/21/22 0934   Wound Volume (cm^3) 0.001 cm^3 03/21/22 0934   Wound Healing % 67 03/21/22 0934   Post-Procedure Length (cm) 0.1 cm 03/21/22 0945   Post-Procedure Width (cm) 0.1 cm 03/21/22 0945   Post-Procedure Depth (cm) 0.1 cm 03/21/22 0945   Post-Procedure Surface Area (cm^2) 0.01 cm^2 03/21/22 0945   Post-Procedure Volume (cm^3) 0.001 cm^3 03/21/22 0945   Distance Tunneling (cm) 0 cm 03/21/22 0934   Tunneling Position ___ O'Clock 0 03/21/22 0934   Undermining Starts ___ O'Clock 0 03/21/22 0934   Undermining Ends___ O'Clock 0 03/21/22 0934   Undermining Maxium Distance (cm) 0 03/21/22 0934   Wound Assessment Dry 03/21/22 0934   Drainage Amount Moderate 03/18/22 0939   Drainage Description Serosanguinous 03/18/22 0939   Odor None 03/18/22 0939   Leatha-wound Assessment Intact 03/21/22 0934   Margins Defined edges 03/18/22 0939   Wound Thickness Description not for Pressure Injury Full thickness 03/18/22 0939   Number of days: 2       Percent of Wound(s) Debrided: approximately 100%    Total  Area  Debrided:  24.44 sq cm     Bleeding:  Minimal    Hemostasis Achieved:  by pressure    Procedural Pain:  0  / 10     Post Procedural Pain:  0 / 10     Response to treatment:  Well tolerated by patient.      Status of wound progress and description from last visit: Improving, she was here today for a dressing change and states she can't tolerate the compression wraps. The wounds have improved, will continue Gent topical with collagen and use Tubi  fpor compression. Plan:       Discharge Instructions       PHYSICIAN ORDERS AND DISCHARGE INSTRUCTIONS     NOTE: Upon discharge from the [address], you will receive a patient experience survey via E-mail. We would be grateful if you would take the time to fill this survey out.     Wound care order history:                 CYNTHIA's   Right  1.68     Left 1.65  Date 1/13/21              Vascular studies: . Cultures: .3/18/22               Antibiotics: . HbA1c:  . Compression/Lymph Pumps: . Coban 2 lites (did not tolerate), Double Tubi              Grafts: Cem President: . Continuing wound care orders and information:              Residence: . Private              Continue home health care with: [facility] Your wound-care supplies will be provided by: [facility]: . Wound cleansing:                           Do not scrub or use excessive force. Wash hands with soap and water before and after dressing changes. Prior to applying a clean dressing, cleanse wound with normal saline,                          wound cleanser, or mild soap and water. Ask your physician or nurse before getting the wound(s) wet in the shower. Daily Wound management:                          Keep weight off wounds and reposition every 2 hours. Avoid standing for long periods of time. Apply wraps/stockings in AM and remove at bedtime. Elevate legs to the level of the heart or above for 30 minutes 4-5 times a day and/or when sitting.                                                When taking antibiotics take entire prescription as ordered by MD do not stop taking until medicine is all gone.                                                                 Orders for this week (3/21/22): Bilateral lower legs and left toe- Wash with mild soap and water, rinse with saline, pat dry with 4x4  Generous A&D to entire foot and intact skin on legs  Place calcium alginate between toes  Apply Gentamicin and stimulen to wound beds (do not use excessive gentamicin on toe please)  Cover leg wounds with Gentac border   Wrap toe with conform and secure with tape  Double Tubi F  Leave in place till Monday     Sacrum-Wash with mild soap and water, rinse with saline, pat dry with 4x4  Apply Desitin, clobetasol, and stimulen powder to wound  Daily     Follow up with Dany Mendoza CNP in 1 weeks in the wound care center  Call [phone] for any questions or concerns.   Date__________   Time____________           Treatment Note      Written Patient Dismissal Instructions Given            Electronically signed by TREVON Fitzpatrick CNP on 3/21/2022 at 10:01 AM

## 2022-03-23 LAB
CULTURE: ABNORMAL
Lab: ABNORMAL
Lab: ABNORMAL
SPECIMEN: ABNORMAL
SPECIMEN: ABNORMAL

## 2022-03-25 ENCOUNTER — HOSPITAL ENCOUNTER (OUTPATIENT)
Dept: WOUND CARE | Age: 87
Discharge: HOME | End: 2022-03-25
Payer: MEDICARE

## 2022-03-25 VITALS
SYSTOLIC BLOOD PRESSURE: 152 MMHG | RESPIRATION RATE: 18 BRPM | HEART RATE: 73 BPM | TEMPERATURE: 96.4 F | DIASTOLIC BLOOD PRESSURE: 72 MMHG

## 2022-03-25 DIAGNOSIS — L97.212 VENOUS STASIS ULCER OF RIGHT CALF WITH FAT LAYER EXPOSED WITH VARICOSE VEINS (HCC): Primary | ICD-10-CM

## 2022-03-25 DIAGNOSIS — L97.222 VENOUS STASIS ULCER OF LEFT CALF WITH FAT LAYER EXPOSED WITH VARICOSE VEINS (HCC): ICD-10-CM

## 2022-03-25 DIAGNOSIS — S30.810A EXCORIATION OF BUTTOCK, INITIAL ENCOUNTER: ICD-10-CM

## 2022-03-25 DIAGNOSIS — I83.022 VENOUS STASIS ULCER OF LEFT CALF WITH FAT LAYER EXPOSED WITH VARICOSE VEINS (HCC): ICD-10-CM

## 2022-03-25 DIAGNOSIS — I83.012 VENOUS STASIS ULCER OF RIGHT CALF WITH FAT LAYER EXPOSED WITH VARICOSE VEINS (HCC): Primary | ICD-10-CM

## 2022-03-25 PROCEDURE — 6370000000 HC RX 637 (ALT 250 FOR IP): Performed by: NURSE PRACTITIONER

## 2022-03-25 PROCEDURE — 11042 DBRDMT SUBQ TIS 1ST 20SQCM/<: CPT | Performed by: NURSE PRACTITIONER

## 2022-03-25 PROCEDURE — 11042 DBRDMT SUBQ TIS 1ST 20SQCM/<: CPT

## 2022-03-25 RX ORDER — GENTAMICIN SULFATE 1 MG/G
OINTMENT TOPICAL ONCE
Status: DISCONTINUED | OUTPATIENT
Start: 2022-03-25 | End: 2022-03-26 | Stop reason: HOSPADM

## 2022-03-25 RX ORDER — CLOBETASOL PROPIONATE 0.5 MG/G
OINTMENT TOPICAL ONCE
Status: CANCELLED | OUTPATIENT
Start: 2022-03-25 | End: 2022-03-25

## 2022-03-25 RX ORDER — BACITRACIN, NEOMYCIN, POLYMYXIN B 400; 3.5; 5 [USP'U]/G; MG/G; [USP'U]/G
OINTMENT TOPICAL ONCE
Status: CANCELLED | OUTPATIENT
Start: 2022-03-25 | End: 2022-03-25

## 2022-03-25 RX ORDER — LIDOCAINE 50 MG/G
OINTMENT TOPICAL ONCE
Status: COMPLETED | OUTPATIENT
Start: 2022-03-25 | End: 2022-03-25

## 2022-03-25 RX ORDER — BACITRACIN ZINC AND POLYMYXIN B SULFATE 500; 1000 [USP'U]/G; [USP'U]/G
OINTMENT TOPICAL ONCE
Status: CANCELLED | OUTPATIENT
Start: 2022-03-25 | End: 2022-03-25

## 2022-03-25 RX ORDER — LIDOCAINE 50 MG/G
OINTMENT TOPICAL ONCE
Status: CANCELLED | OUTPATIENT
Start: 2022-03-25 | End: 2022-03-25

## 2022-03-25 RX ORDER — BETAMETHASONE DIPROPIONATE 0.05 %
OINTMENT (GRAM) TOPICAL ONCE
Status: CANCELLED | OUTPATIENT
Start: 2022-03-25 | End: 2022-03-25

## 2022-03-25 RX ORDER — GINSENG 100 MG
CAPSULE ORAL ONCE
Status: CANCELLED | OUTPATIENT
Start: 2022-03-25 | End: 2022-03-25

## 2022-03-25 RX ORDER — LIDOCAINE HYDROCHLORIDE 40 MG/ML
SOLUTION TOPICAL ONCE
Status: CANCELLED | OUTPATIENT
Start: 2022-03-25 | End: 2022-03-25

## 2022-03-25 RX ORDER — GENTAMICIN SULFATE 1 MG/G
OINTMENT TOPICAL ONCE
Status: CANCELLED | OUTPATIENT
Start: 2022-03-25 | End: 2022-03-25

## 2022-03-25 RX ADMIN — LIDOCAINE: 50 OINTMENT TOPICAL at 10:11

## 2022-03-25 ASSESSMENT — PAIN DESCRIPTION - DESCRIPTORS: DESCRIPTORS: SHARP

## 2022-03-25 ASSESSMENT — PAIN DESCRIPTION - ORIENTATION: ORIENTATION: RIGHT;LEFT

## 2022-03-25 ASSESSMENT — PAIN DESCRIPTION - LOCATION: LOCATION: LEG

## 2022-03-25 ASSESSMENT — PAIN DESCRIPTION - FREQUENCY: FREQUENCY: CONTINUOUS

## 2022-03-25 NOTE — PROGRESS NOTES
Wound Care Center Progress Note With Procedure    Rafal Ulloa  AGE: 82 y.o. GENDER: female  : 1935  EPISODE DATE:  3/25/2022     Subjective:     Chief Complaint   Patient presents with    Wound Check     BLE          HISTORY of PRESENT ILLNESS     Lisa varela 82 y. o. female who presents to the Wound Clinic for evaluation and treatment of Acute on chronic venous and edema  ulcer(s) of bilateral lower legs. The condition is of moderate severity. The ulcer has been present for approximately 1.5 weeks. The underlying cause is thought to be venous and edema.  She also has acute on chronic pressure ulcers bilateral buttocks that is pressure in nature and mild in severity. The patients care to date has included a dry dressing. The patient has significant underlying medical conditions as below.      Wound Pain Timing/Severity: waxing and waning, mild  Quality of pain: dull, aching, tender  Severity of pain:  3 / 10   Modifying Factors: venous stasis, lymphedema, diabetes, poor glucose control, chronic pressure, decreased mobility, shear force and obesity  Associated Signs/Symptoms: edema, erythema, drainage and pain     Arterial evaluation: VL arteries 21 per Dr. Mirta Gerard, no significant stenosis     Venous Evaluation: as needed if indicated based on the wound, location, assessment and healing.     Wound infection: Both lower leg wounds cultured 3/18/22     Diabetes: Yes, no medication regimen at this time, diet control. Last AIC 7.8 as of 20  Smoking: Never smoker  Anticoagulant therapy: No  Immunosuppression: No  Obesity: Yes  Other History: Cor pulmonale on diuretic therapy, PAD on Pletal     Nutritional status: well nourished. Discussed need for increased protein and calories for wound healing and good sources of protein (just over 7 grams for every 20 pounds of body weight). Animal-based foods high in protein (meat, poultry, fish, eggs, and dairy foods).  Plant based foods high in protein (tofu, lentils, beans, chickpeas, nuts, quinoa and everette seeds.     Patient educated on the 6 essential components necessary for wound healing: Circulation, Debridements, Proper Dressings and Topical Wound Products, Infection Control, Edema Control and Offloading.     Patient educated on those factors that negatively effect or impact wound healing: smoking, obesity, uncontrolled diabetes, anticoagulant and immunosuppressive regimens, inadequate nutrition, untreated arterial and venous disease if applicable and measures to manage edema.                                             PAST MEDICAL HISTORY        Diagnosis Date    Asthma     Chronic kidney disease     acute kidney failure    Chronic ulcer of left leg with fat layer exposed ([address] 3/7/2016    Chronic ulcer of right leg with fat layer exposed ([address] 3/7/2016    Diabetes type 2, controlled ([address]     History of asthma     History of blood transfusion 07/2015    Hypertension     Lymphedema of both lower extremities 3/7/2016    Osteoarthritis     Pneumonia     Thyroid disease     Venous stasis of both lower extremities 3/7/2016    WD-Non-pressure chronic ulcer right lower leg, 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 ([address] 4/21/2016       PAST SURGICAL HISTORY    Past Surgical History:   Procedure Laterality Date    APPENDECTOMY      CHOLECYSTECTOMY      CYSTOSCOPY      EYE SURGERY      bilateral implants    HYSTERECTOMY      JOINT REPLACEMENT Right     knee    PACEMAKER INSERTION N/A 11/17/2020    PACEMAKER INSERTION PERMANENT REMOVAL OF TEMPORARY PACEMAKER performed by Rogers Lobo MD at [address]    Family History   Problem Relation Age of Onset    Heart Disease Father        SOCIAL HISTORY    Social History     Tobacco Use    Smoking status: Never Smoker    Smokeless tobacco: Never Used   Vaping Use    Vaping Use: Never used   Substance Use Topics    Alcohol use: No    Drug use:  No ALLERGIES    Allergies   Allergen Reactions    Latex Rash    Dye [Iodides] Anaphylaxis    Iodine Anaphylaxis    Dyazide [Hydrochlorothiazide W-Triamterene] Rash    Lasix [Furosemide] Rash    Procardia [Nifedipine] Other (See Comments)     Not for sure if rash occurred or rash    Doxycycline Dermatitis    Edecrin [Ethacrynic Acid]     Levaquin [Levofloxacin] Other (See Comments)     unknown    Mobic [Meloxicam]     Vioxx [Rofecoxib]     Celebrex [Celecoxib] Rash    Lexapro [Escitalopram Oxalate] Rash    Sulfa Antibiotics Hives       MEDICATIONS    Current Outpatient Medications on File Prior to Encounter   Medication Sig Dispense Refill    albuterol sulfate HFA (VENTOLIN HFA) 108 (90 Base) MCG/ACT inhaler Inhale 2 puffs into the lungs every 6 hours as needed for Wheezing      bumetanide (BUMEX) 1 MG tablet Take 2 tablets by mouth 3 times daily 540 tablet 3    febuxostat (ULORIC) 40 MG TABS tablet Take 1 tablet by mouth daily 30 tablet 5    metOLazone (ZAROXOLYN) 5 MG tablet Take 1 tablet by mouth daily 30 tablet 5    spironolactone (ALDACTONE) 50 MG tablet Take 1 tablet by mouth 2 times daily 60 tablet 5    clobetasol (TEMOVATE) 0.05 % ointment Apply topically 2 times daily. 120 g 1    Fluticasone Propionate, Inhal, (FLOVENT IN) Inhale 2 puffs into the lungs 2 times daily      silver sulfADIAZINE (SILVADENE) 1 % cream Apply topically daily.  (Patient taking differently: as needed ) 240 g 5    cilostazol (PLETAL) 50 MG tablet Take 1 tablet by mouth 2 times daily 60 tablet 3    rOPINIRole (REQUIP) 0.5 MG tablet Take 1 tablet by mouth 3 times daily 90 tablet 3    famotidine (PEPCID) 20 MG tablet Take 1 tablet by mouth 2 times daily 60 tablet 5    levothyroxine (SYNTHROID) 50 MCG tablet Take 50 mcg by mouth Daily      Polyethylene Glycol 3350 (MIRALAX PO) Take by mouth      fluticasone (FLONASE) 50 MCG/ACT nasal spray 1 spray by Nasal route 2 times daily       Loratadine (CLARITIN) 10 MG CAPS Take 10 mg by mouth daily      ferrous sulfate 325 (65 FE) MG tablet Take 1 tablet by mouth 2 times daily (with meals) 30 tablet 3    latanoprost (XALATAN) 0.005 % ophthalmic solution Place 1 drop into both eyes nightly      OXYGEN Inhale 2 L/min into the lungs nightly      Multiple Vitamins-Minerals (ICAPS) CAPS Take 1 tablet by mouth 2 times daily       acetaminophen (TYLENOL) 500 MG tablet Take 500 mg by mouth every 6 hours as needed for Pain.  calcium carbonate 600 MG TABS tablet Take 1 tablet by mouth daily. No current facility-administered medications on file prior to encounter. REVIEW OF SYSTEMS    Pertinent items are noted in HPI. Constitutional: Negative for systemic symptoms including fever, chills and malaise. Objective:      BP (!) 152/72   Pulse 73   Temp 96.4 °F (35.8 °C) (Temporal)   Resp 18     PHYSICAL EXAM    General: The patient is in no acute distress. Mental status:  Patient is appropriate, is  oriented to place and plan of care.   Dermatologic exam: Visual inspection of the periwound reveals the skin to be edematous  Wound exam: see wound description below in procedure note    Assessment:     Problem List Items Addressed This Visit     WD-Excoriation of buttock crease    Relevant Medications    gentamicin (GARAMYCIN) 0.1 % ointment    Other Relevant Orders    Initiate Outpatient Wound Care Protocol    WD-Venous stasis ulcer of right calf with fat layer exposed with varicose veins (HCC) - Primary    Relevant Medications    gentamicin (GARAMYCIN) 0.1 % ointment    Other Relevant Orders    Initiate Outpatient Wound Care Protocol    WD-Venous stasis ulcer of left calf with fat layer exposed with varicose veins (HCC)    Relevant Medications    gentamicin (GARAMYCIN) 0.1 % ointment    Other Relevant Orders    Initiate Outpatient Wound Care Protocol        Procedure Note    Indications:  Based on my examination of this patient's wound(s) today, sharp excision into necrotic subcutaneous tissue is required to promote healing and evaluate the extent of previous healing. Performed by: TREVON Lyn CNP    Consent obtained: Yes    Time out taken:  Yes    Pain Control: Anesthetic  Anesthetic: 4% Lidocaine Liquid Topical        Debridement:Excisional Debridement    Using curette the wound(s) was/were sharply debrided down through and including the removal of subcutaneous tissue. Devitalized Tissue Debrided:  slough and exudate    Pre Debridement Measurements:  Are located in the Wound Documentation Flow Sheet    All active wounds listed below with today's date are evaluated  Wound(s)    debrided this date include # : 1, 2, 3 and 4     Post  Debridement Measurements:  Wound 03/18/22 #1 Left lateral leg (Active)   Wound Image   03/25/22 0955   Dressing Status Clean;Dry; Intact; New dressing applied 03/21/22 1021   Wound Cleansed Soap and water; Wound cleanser;Cleansed with saline 03/25/22 0955   Dressing/Treatment Moisturizing cream;Collagen;Silicone border 46/61/68 1021   Offloading for Diabetic Foot Ulcers Offloading not required 03/25/22 0955   Wound Length (cm) 0.4 cm 03/25/22 0955   Wound Width (cm) 0.4 cm 03/25/22 0955   Wound Depth (cm) 0.1 cm 03/25/22 0955   Wound Surface Area (cm^2) 0.16 cm^2 03/25/22 0955   Change in Wound Size % (l*w) 88.57 03/25/22 0955   Wound Volume (cm^3) 0.016 cm^3 03/25/22 0955   Wound Healing % 89 03/25/22 0955   Post-Procedure Length (cm) 0.4 cm 03/25/22 1019   Post-Procedure Width (cm) 0.4 cm 03/25/22 1019   Post-Procedure Depth (cm) 0.1 cm 03/25/22 1019   Post-Procedure Surface Area (cm^2) 0.16 cm^2 03/25/22 1019   Post-Procedure Volume (cm^3) 0.016 cm^3 03/25/22 1019   Distance Tunneling (cm) 0 cm 03/25/22 0955   Tunneling Position ___ O'Clock 0 03/25/22 0955   Undermining Starts ___ O'Clock 0 03/25/22 0955   Undermining Ends___ O'Clock 0 03/25/22 0955   Undermining Maxium Distance (cm) 0 03/25/22 0955   Wound Assessment Dressing Status New dressing applied 03/18/22 1059   Wound Cleansed Soap and water;Cleansed with saline 03/25/22 0955   Offloading for Diabetic Foot Ulcers Offloading not required 03/25/22 0955   Wound Length (cm) 0.5 cm 03/18/22 0939   Wound Width (cm) 0.5 cm 03/18/22 0939   Wound Depth (cm) 0.1 cm 03/18/22 0939   Wound Surface Area (cm^2) 0.25 cm^2 03/18/22 0939   Wound Volume (cm^3) 0.025 cm^3 03/18/22 0939   Distance Tunneling (cm) 0 cm 03/25/22 0955   Tunneling Position ___ O'Clock 0 03/25/22 0955   Undermining Starts ___ O'Clock 0 03/25/22 0955   Undermining Ends___ O'Clock 0 03/25/22 0955   Undermining Maxium Distance (cm) 0 03/25/22 0955   Number of days: 7       Wound 03/18/22 #4 Left 2nd Toe (Active)   Wound Image   03/25/22 0955   Dressing Status New dressing applied 03/18/22 1059   Wound Cleansed Soap and water;Cleansed with saline 03/25/22 0955   Offloading for Diabetic Foot Ulcers Offloading not required 03/25/22 0955   Wound Length (cm) 0 cm 03/25/22 0955   Wound Width (cm) 0 cm 03/25/22 0955   Wound Depth (cm) 0 cm 03/25/22 0955   Wound Surface Area (cm^2) 0 cm^2 03/25/22 0955   Change in Wound Size % (l*w) 100 03/25/22 0955   Wound Volume (cm^3) 0 cm^3 03/25/22 0955   Wound Healing % 100 03/25/22 0955   Post-Procedure Length (cm) 0.1 cm 03/21/22 0945   Post-Procedure Width (cm) 0.1 cm 03/21/22 0945   Post-Procedure Depth (cm) 0.1 cm 03/21/22 0945   Post-Procedure Surface Area (cm^2) 0.01 cm^2 03/21/22 0945   Post-Procedure Volume (cm^3) 0.001 cm^3 03/21/22 0945   Distance Tunneling (cm) 0 cm 03/25/22 0955   Tunneling Position ___ O'Clock 0 03/25/22 0955   Undermining Starts ___ O'Clock 0 03/25/22 0955   Undermining Ends___ O'Clock 0 03/25/22 0955   Undermining Maxium Distance (cm) 0 03/25/22 0955   Wound Assessment Dry 03/21/22 0934   Drainage Amount Moderate 03/18/22 0939   Drainage Description Serosanguinous 03/18/22 0939   Odor None 03/18/22 0939   Leatha-wound Assessment Intact 03/21/22 0934 Margins Defined edges 03/18/22 0939   Wound Thickness Description not for Pressure Injury Full thickness 03/18/22 0939   Number of days: 7       Percent of Wound(s) Debrided: approximately 100%    Total  Area  Debrided:  2.22 sq cm     Bleeding:  Minimal    Hemostasis Achieved:  by pressure    Procedural Pain:  0  / 10     Post Procedural Pain:  0 / 10     Response to treatment:  Well tolerated by patient. Status of wound progress and description from last visit: All wounds have improved in size and appearance, continue regimen. Plan:       Discharge Instructions         PHYSICIAN ORDERS AND DISCHARGE INSTRUCTIONS     NOTE: Upon discharge from the [address], you will receive a patient experience survey via E-mail. We would be grateful if you would take the time to fill this survey out.     Wound care order history:                 CYNTHIA's   Right  1.68     Left 1.65  Date 1/13/21              V[facility] studies: .[facility]              Cultures: .3/18/22               Antibiotics: .[facility]              HbA1c:  .               Compression/Lymph Pumps: . Coban 2 lites (did not tolerate), Double Tubi              Grafts:  .              XFKW: .                Continuing wound care orders and information:              Residence: .Private              [facility] home health care with:.               [facility] wound-care supplies will be provided by: .              Pharmacy: .                            NKAMM cleansing:                           NW not scrub or use excessive force.                          Wash hands with soap and water before and after dressing changes.                           Prior to applying a clean dressing, cleanse wound with normal saline,                          wound cleanser, or mild soap and water.                           Ask your physician or nurse before getting the wound(s) wet in the shower.              Daily Wound management:                          Keep weight off wounds and reposition every 2 hours.                          SQTFE standing for long periods of time.                          AONMS wraps/stockings in AM and remove at bedtime.                          Elevate legs to the level of the heart or above for 30 minutes 4-5 times a day and/or when sitting.                                               When taking antibiotics take entire prescription as ordered by MD do not stop taking until medicine is all gone.                                                                 Orders for this week (3/24/22): Bilateral lower legs and left toe- Wash with mild soap and water, rinse with saline, pat dry with 4x4  Generous A&D to entire foot and intact skin on legs  Place calcium alginate between toes  Apply Gentamicin and stimulen to wound beds (do not use excessive gentamicin on toe please)  Cover leg wounds with Gentac border   Wrap toe with conform and secure with tape  Double Tubi F  Leave in place until next week     Sacrum-Wash with mild soap and water, rinse with saline, pat dry with 4x4  Apply Desitin, clobetasol, and stimulen powder to wound  Daily     Follow up with Lisa Trinidad CNP in 1 weeks in the wound care center  Call [phone] for any questions or concerns.   Date__________   Time____________                      Treatment Note      Written Patient Dismissal Instructions Given            Electronically signed by TREVON Downey CNP on 3/25/2022 at 11:01 AM

## 2022-04-01 ENCOUNTER — HOSPITAL ENCOUNTER (OUTPATIENT)
Dept: WOUND CARE | Age: 87
Discharge: HOME | End: 2022-04-01
Payer: MEDICARE

## 2022-04-01 VITALS
HEART RATE: 68 BPM | SYSTOLIC BLOOD PRESSURE: 132 MMHG | RESPIRATION RATE: 18 BRPM | TEMPERATURE: 96.3 F | DIASTOLIC BLOOD PRESSURE: 57 MMHG

## 2022-04-01 DIAGNOSIS — S30.810A EXCORIATION OF BUTTOCK, INITIAL ENCOUNTER: ICD-10-CM

## 2022-04-01 DIAGNOSIS — L97.222 VENOUS STASIS ULCER OF LEFT CALF WITH FAT LAYER EXPOSED WITH VARICOSE VEINS (HCC): ICD-10-CM

## 2022-04-01 DIAGNOSIS — I83.022 VENOUS STASIS ULCER OF LEFT CALF WITH FAT LAYER EXPOSED WITH VARICOSE VEINS (HCC): ICD-10-CM

## 2022-04-01 DIAGNOSIS — I83.012 VENOUS STASIS ULCER OF RIGHT CALF WITH FAT LAYER EXPOSED WITH VARICOSE VEINS (HCC): Primary | ICD-10-CM

## 2022-04-01 DIAGNOSIS — L97.212 VENOUS STASIS ULCER OF RIGHT CALF WITH FAT LAYER EXPOSED WITH VARICOSE VEINS (HCC): Primary | ICD-10-CM

## 2022-04-01 PROCEDURE — 11042 DBRDMT SUBQ TIS 1ST 20SQCM/<: CPT

## 2022-04-01 PROCEDURE — 11042 DBRDMT SUBQ TIS 1ST 20SQCM/<: CPT | Performed by: NURSE PRACTITIONER

## 2022-04-01 RX ORDER — GENTAMICIN SULFATE 1 MG/G
OINTMENT TOPICAL ONCE
Status: CANCELLED | OUTPATIENT
Start: 2022-04-01 | End: 2022-04-01

## 2022-04-01 RX ORDER — LIDOCAINE HYDROCHLORIDE 40 MG/ML
SOLUTION TOPICAL ONCE
Status: CANCELLED | OUTPATIENT
Start: 2022-04-01 | End: 2022-04-01

## 2022-04-01 RX ORDER — CLOBETASOL PROPIONATE 0.5 MG/G
OINTMENT TOPICAL ONCE
Status: CANCELLED | OUTPATIENT
Start: 2022-04-01 | End: 2022-04-01

## 2022-04-01 RX ORDER — GINSENG 100 MG
CAPSULE ORAL ONCE
Status: CANCELLED | OUTPATIENT
Start: 2022-04-01 | End: 2022-04-01

## 2022-04-01 RX ORDER — BACITRACIN ZINC AND POLYMYXIN B SULFATE 500; 1000 [USP'U]/G; [USP'U]/G
OINTMENT TOPICAL ONCE
Status: CANCELLED | OUTPATIENT
Start: 2022-04-01 | End: 2022-04-01

## 2022-04-01 RX ORDER — GENTAMICIN SULFATE 1 MG/G
OINTMENT TOPICAL ONCE
Status: DISCONTINUED | OUTPATIENT
Start: 2022-04-01 | End: 2022-04-02 | Stop reason: HOSPADM

## 2022-04-01 RX ORDER — BETAMETHASONE DIPROPIONATE 0.05 %
OINTMENT (GRAM) TOPICAL ONCE
Status: CANCELLED | OUTPATIENT
Start: 2022-04-01 | End: 2022-04-01

## 2022-04-01 RX ORDER — BACITRACIN, NEOMYCIN, POLYMYXIN B 400; 3.5; 5 [USP'U]/G; MG/G; [USP'U]/G
OINTMENT TOPICAL ONCE
Status: CANCELLED | OUTPATIENT
Start: 2022-04-01 | End: 2022-04-01

## 2022-04-01 RX ORDER — LIDOCAINE 50 MG/G
OINTMENT TOPICAL ONCE
Status: CANCELLED | OUTPATIENT
Start: 2022-04-01 | End: 2022-04-01

## 2022-04-01 NOTE — PROGRESS NOTES
Wound Care Center Progress Note With Procedure    Edin Salazar  AGE: 82 y.o. GENDER: female  : 1935  EPISODE DATE:  2022     Subjective:     Chief Complaint   Patient presents with    Wound Check     BLE          HISTORY of PRESENT ILLNESS     Louise Raissa varela 82 y. o. female who presents to the Wound Clinic for evaluation and treatment of Acute on chronic venous and edema  ulcer(s) of bilateral lower legs. The condition is of moderate severity. The ulcer has been present for approximately 1.5 weeks. The underlying cause is thought to be venous and edema.  She also has acute on chronic pressure ulcers bilateral buttocks that is pressure in nature and mild in severity. The patients care to date has included a dry dressing. The patient has significant underlying medical conditions as below.      Wound Pain Timing/Severity: waxing and waning, mild  Quality of pain: dull, aching, tender  Severity of pain:  3 / 10   Modifying Factors: venous stasis, lymphedema, diabetes, poor glucose control, chronic pressure, decreased mobility, shear force and obesity  Associated Signs/Symptoms: edema, erythema, drainage and pain     Arterial evaluation: VL arteries 21 per Dr. Mannie Petersen, no significant stenosis     Venous Evaluation: as needed if indicated based on the wound, location, assessment and healing.     Wound infection: Both lower leg wounds cultured 3/18/22     Diabetes: Yes, no medication regimen at this time, diet control. Last AIC 7.8 as of 20  Smoking: Never smoker  Anticoagulant therapy: No  Immunosuppression: No  Obesity: Yes  Other History: Cor pulmonale on diuretic therapy, PAD on Pletal     Nutritional status: well nourished. Discussed need for increased protein and calories for wound healing and good sources of protein (just over 7 grams for every 20 pounds of body weight). Animal-based foods high in protein (meat, poultry, fish, eggs, and dairy foods).  Plant based foods high in protein (tofu, lentils, beans, chickpeas, nuts, quinoa and everette seeds.     Patient educated on the 6 essential components necessary for wound healing: Circulation, Debridements, Proper Dressings and Topical Wound Products, Infection Control, Edema Control and Offloading.     Patient educated on those factors that negatively effect or impact wound healing: smoking, obesity, uncontrolled diabetes, anticoagulant and immunosuppressive regimens, inadequate nutrition, untreated arterial and venous disease if applicable and measures to manage edema.         PAST MEDICAL HISTORY        Diagnosis Date    Asthma     Chronic kidney disease     acute kidney failure    Chronic ulcer of left leg with fat layer exposed ([address] 3/7/2016    Chronic ulcer of right leg with fat layer exposed ([address] 3/7/2016    Diabetes type 2, controlled ([address]     History of asthma     History of blood transfusion 07/2015    Hypertension     Lymphedema of both lower extremities 3/7/2016    Osteoarthritis     Pneumonia     Thyroid disease     Venous stasis of both lower extremities 3/7/2016    WD-Non-pressure chronic ulcer right lower leg, 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 ([address] 4/21/2016       PAST SURGICAL HISTORY    Past Surgical History:   Procedure Laterality Date    APPENDECTOMY      CHOLECYSTECTOMY      CYSTOSCOPY      EYE SURGERY      bilateral implants    HYSTERECTOMY      JOINT REPLACEMENT Right     knee    PACEMAKER INSERTION N/A 11/17/2020    PACEMAKER INSERTION PERMANENT REMOVAL OF TEMPORARY PACEMAKER performed by Mell Pacheco MD at [address]    Family History   Problem Relation Age of Onset    Heart Disease Father        SOCIAL HISTORY    Social History     Tobacco Use    Smoking status: Never Smoker    Smokeless tobacco: Never Used   Vaping Use    Vaping Use: Never used   Substance Use Topics    Alcohol use: No    Drug use: No       ALLERGIES    Allergies   Allergen Reactions    Latex Rash    Dye [Iodides] Anaphylaxis    Iodine Anaphylaxis    Dyazide [Hydrochlorothiazide W-Triamterene] Rash    Lasix [Furosemide] Rash    Procardia [Nifedipine] Other (See Comments)     Not for sure if rash occurred or rash    Doxycycline Dermatitis    Edecrin [Ethacrynic Acid]     Levaquin [Levofloxacin] Other (See Comments)     unknown    Mobic [Meloxicam]     Vioxx [Rofecoxib]     Celebrex [Celecoxib] Rash    Lexapro [Escitalopram Oxalate] Rash    Sulfa Antibiotics Hives       MEDICATIONS    Current Outpatient Medications on File Prior to Encounter   Medication Sig Dispense Refill    albuterol sulfate HFA (VENTOLIN HFA) 108 (90 Base) MCG/ACT inhaler Inhale 2 puffs into the lungs every 6 hours as needed for Wheezing      bumetanide (BUMEX) 1 MG tablet Take 2 tablets by mouth 3 times daily 540 tablet 3    febuxostat (ULORIC) 40 MG TABS tablet Take 1 tablet by mouth daily 30 tablet 5    metOLazone (ZAROXOLYN) 5 MG tablet Take 1 tablet by mouth daily 30 tablet 5    spironolactone (ALDACTONE) 50 MG tablet Take 1 tablet by mouth 2 times daily 60 tablet 5    clobetasol (TEMOVATE) 0.05 % ointment Apply topically 2 times daily. 120 g 1    Fluticasone Propionate, Inhal, (FLOVENT IN) Inhale 2 puffs into the lungs 2 times daily      silver sulfADIAZINE (SILVADENE) 1 % cream Apply topically daily.  (Patient taking differently: as needed ) 240 g 5    cilostazol (PLETAL) 50 MG tablet Take 1 tablet by mouth 2 times daily 60 tablet 3    rOPINIRole (REQUIP) 0.5 MG tablet Take 1 tablet by mouth 3 times daily 90 tablet 3    famotidine (PEPCID) 20 MG tablet Take 1 tablet by mouth 2 times daily 60 tablet 5    levothyroxine (SYNTHROID) 50 MCG tablet Take 50 mcg by mouth Daily      Polyethylene Glycol 3350 (MIRALAX PO) Take by mouth      fluticasone (FLONASE) 50 MCG/ACT nasal spray 1 spray by Nasal route 2 times daily       Loratadine (CLARITIN) 10 MG CAPS Take 10 mg by mouth daily      ferrous sulfate 325 (65 FE) MG tablet Take 1 tablet by mouth 2 times daily (with meals) 30 tablet 3    latanoprost (XALATAN) 0.005 % ophthalmic solution Place 1 drop into both eyes nightly      OXYGEN Inhale 2 L/min into the lungs nightly      Multiple Vitamins-Minerals (ICAPS) CAPS Take 1 tablet by mouth 2 times daily       acetaminophen (TYLENOL) 500 MG tablet Take 500 mg by mouth every 6 hours as needed for Pain.  calcium carbonate 600 MG TABS tablet Take 1 tablet by mouth daily. No current facility-administered medications on file prior to encounter. REVIEW OF SYSTEMS    Pertinent items are noted in HPI. Constitutional: Negative for systemic symptoms including fever, chills and malaise. Objective:      BP (!) 132/57   Pulse 68   Temp 96.3 °F (35.7 °C) (Temporal)   Resp 18     PHYSICAL EXAM    General: The patient is in no acute distress. Mental status:  Patient is appropriate, is  oriented to place and plan of care.   Dermatologic exam: Visual inspection of the periwound reveals the skin to be edematous  Wound exam: see wound description below in procedure note      Assessment:     Problem List Items Addressed This Visit     WD-Excoriation of buttock crease    Relevant Medications    gentamicin (GARAMYCIN) 0.1 % ointment    Other Relevant Orders    Initiate Outpatient Wound Care Protocol    WD-Venous stasis ulcer of right calf with fat layer exposed with varicose veins (HCC) - Primary    Relevant Medications    gentamicin (GARAMYCIN) 0.1 % ointment    Other Relevant Orders    Initiate Outpatient Wound Care Protocol    WD-Venous stasis ulcer of left calf with fat layer exposed with varicose veins (HCC)    Relevant Medications    gentamicin (GARAMYCIN) 0.1 % ointment    Other Relevant Orders    Initiate Outpatient Wound Care Protocol        Procedure Note    Indications:  Based on my examination of this patient's wound(s) today, sharp excision into necrotic subcutaneous tissue is required to promote healing and evaluate the extent of previous healing. Performed by: TREVON Hutchinson CNP    Consent obtained: Yes    Time out taken:  Yes    Pain Control: Anesthetic  Anesthetic: 4% Lidocaine Liquid Topical        Debridement:Excisional Debridement    Using curette the wound(s) was/were sharply debrided down through and including the removal of subcutaneous tissue. Devitalized Tissue Debrided:  slough and exudate    Pre Debridement Measurements:  Are located in the Wound Documentation Flow Sheet    All active wounds listed below with today's date are evaluated  Wound(s)    debrided this date include # : 1 & 2    Post  Debridement Measurements:  Wound 03/18/22 #1 Left lateral leg (Active)   Wound Image   03/25/22 0955   Dressing Status Clean;Dry; Intact; New dressing applied 03/25/22 1153   Wound Cleansed Soap and water; Wound cleanser;Cleansed with saline 04/01/22 1008   Dressing/Treatment Moisturizing cream;Collagen;Silicone border 64/39/41 1021   Offloading for Diabetic Foot Ulcers Offloading not required 04/01/22 1008   Wound Length (cm) 0.3 cm 04/01/22 1008   Wound Width (cm) 0.2 cm 04/01/22 1008   Wound Depth (cm) 0.1 cm 04/01/22 1008   Wound Surface Area (cm^2) 0.06 cm^2 04/01/22 1008   Change in Wound Size % (l*w) 95.71 04/01/22 1008   Wound Volume (cm^3) 0.006 cm^3 04/01/22 1008   Wound Healing % 96 04/01/22 1008   Post-Procedure Length (cm) 0.4 cm 03/25/22 1019   Post-Procedure Width (cm) 0.4 cm 03/25/22 1019   Post-Procedure Depth (cm) 0.1 cm 03/25/22 1019   Post-Procedure Surface Area (cm^2) 0.16 cm^2 03/25/22 1019   Post-Procedure Volume (cm^3) 0.016 cm^3 03/25/22 1019   Distance Tunneling (cm) 0 cm 04/01/22 1008   Tunneling Position ___ O'Clock 0 04/01/22 1008   Undermining Starts ___ O'Clock 0 04/01/22 1008   Undermining Ends___ O'Clock 0 04/01/22 1008   Undermining Maxium Distance (cm) 0 04/01/22 1008   Wound Assessment Granulation tissue 04/01/22 1008   Drainage Amount Small 04/01/22 1008   Drainage Description Yellow 04/01/22 1008   Odor Moderate 04/01/22 1008   Leatha-wound Assessment Edematous; Intact;Fragile 04/01/22 1008   Margins Defined edges 04/01/22 1008   Wound Thickness Description not for Pressure Injury Full thickness 04/01/22 1008   Number of days: 14       Wound 03/18/22 #2 Right medial leg (Active)   Wound Image   03/25/22 0955   Dressing Status Clean;Dry; Intact; New dressing applied 03/25/22 1153   Wound Cleansed Soap and water; Wound cleanser;Cleansed with saline 04/01/22 1008   Dressing/Treatment Moisturizing cream;Collagen;Silicone border 00/51/73 1021   Offloading for Diabetic Foot Ulcers Offloading not required 04/01/22 1008   Wound Length (cm) 0.7 cm 04/01/22 1008   Wound Width (cm) 0.4 cm 04/01/22 1008   Wound Depth (cm) 0.1 cm 04/01/22 1008   Wound Surface Area (cm^2) 0.28 cm^2 04/01/22 1008   Change in Wound Size % (l*w) 94.81 04/01/22 1008   Wound Volume (cm^3) 0.028 cm^3 04/01/22 1008   Wound Healing % 95 04/01/22 1008   Post-Procedure Length (cm) 1.8 cm 03/25/22 1019   Post-Procedure Width (cm) 1 cm 03/25/22 1019   Post-Procedure Depth (cm) 0.1 cm 03/25/22 1019   Post-Procedure Surface Area (cm^2) 1.8 cm^2 03/25/22 1019   Post-Procedure Volume (cm^3) 0.18 cm^3 03/25/22 1019   Distance Tunneling (cm) 0 cm 04/01/22 1008   Tunneling Position ___ O'Clock 0 04/01/22 1008   Undermining Starts ___ O'Clock 0 04/01/22 1008   Undermining Ends___ O'Clock 0 04/01/22 1008   Undermining Maxium Distance (cm) 0 04/01/22 1008   Wound Assessment Dry;Slough;Pink/red 04/01/22 1008   Drainage Amount Small 04/01/22 1008   Drainage Description Yellow 04/01/22 1008   Odor None 04/01/22 1008   Leatha-wound Assessment Intact;Fragile;Edematous 04/01/22 1008   Margins Defined edges 04/01/22 1008   Wound Thickness Description not for Pressure Injury Full thickness 04/01/22 1008   Number of days: 14       Percent of Wound(s) Debrided: approximately 100%    Total  Area  Debrided:  0.34 sq cm Bleeding:  Minimal    Hemostasis Achieved:  by pressure    Procedural Pain:  0  / 10     Post Procedural Pain:  0 / 10     Response to treatment:  Well tolerated by patient. Status of wound progress and description from last visit: Improving, continue regimen. Plan:       Discharge Instructions       PHYSICIAN ORDERS AND DISCHARGE INSTRUCTIONS     NOTE: Upon discharge from the [address], you will receive a patient experience survey via E-mail. We would be grateful if you would take the time to fill this survey out.     Wound care order history:                 CYNTHIA's   Right  1.68     Left 1.65  Date 1/13/21              Vascular studies: .Rosemarie Celaya              Cultures: .3/18/22               Antibiotics: .Rosemarie Celaya              HbA1c:  .               Compression/Lymph Pumps: . Coban 2 lites (did not tolerate), Double Tubi              Grafts:  .              WTQY: .                Continuing wound care orders and information:              Residence: .Private              Continue home health care with:.               [facility] wound-care supplies will be provided by: .              Pharmacy: .                            PO cleansing:                           BN not scrub or use excessive force.                          Wash hands with soap and water before and after dressing changes.                           Prior to applying a clean dressing, cleanse wound with normal saline,                          wound cleanser, or mild soap and water.                           Ask your physician or nurse before getting the wound(s) wet in the shower.              Daily Wound management:                          Keep weight off wounds and reposition every 2 hours.                          Avoid standing for long periods of time.                          Apply wraps/stockings in AM and remove at bedtime.                          Elevate legs to the level of the heart or above for 30 minutes 4-5 times a day and/or when sitting.                                               YUQN taking antibiotics take entire prescription as ordered by MD do not stop taking until medicine is all gone.                                                                 Orders for this week (4/1/22): Bilateral lower legs and left toe- Wash with mild soap and water, rinse with saline, pat dry with 4x4  Generous A&D to entire foot and intact skin on legs  Place calcium alginate between toes  Apply Gentamicin and stimulen to wound beds (do not use excessive gentamicin on toe please)  Cover leg wounds with Gentac border   Wrap toe with conform and secure with tape (May use silicone sleeve instead)  Double Tubi F  Leave in place until next week     Follow up with Shasta Crawford CNP in 1 weeks in the wound care center  Call [phone] for any questions or concerns.   Date__________   Time____________        Treatment Note Wound 03/18/22 #1 Left lateral leg-Dressing/Treatment:  (a&d,ca alg,gent,stim,gent border,conform,tubiF)  Wound 03/18/22 #2 Right medial leg-Dressing/Treatment:  (a&d,sherri alg,gent,stim,gentac border,conform,tubiF)    Written Patient Dismissal Instructions Given            Electronically signed by TREVON Patel CNP on 4/1/2022 at 2:35 PM

## 2022-04-06 RX ORDER — GENTAMICIN SULFATE 1 MG/G
OINTMENT TOPICAL
Qty: 30 G | Refills: 0 | Status: SHIPPED | OUTPATIENT
Start: 2022-04-06 | End: 2022-04-13

## 2022-04-15 ENCOUNTER — HOSPITAL ENCOUNTER (OUTPATIENT)
Dept: WOUND CARE | Age: 87
Discharge: HOME | End: 2022-04-15
Payer: MEDICARE

## 2022-04-15 VITALS
RESPIRATION RATE: 18 BRPM | HEART RATE: 69 BPM | TEMPERATURE: 97.1 F | SYSTOLIC BLOOD PRESSURE: 154 MMHG | DIASTOLIC BLOOD PRESSURE: 73 MMHG

## 2022-04-15 DIAGNOSIS — L97.222 VENOUS STASIS ULCER OF LEFT CALF WITH FAT LAYER EXPOSED WITH VARICOSE VEINS (HCC): ICD-10-CM

## 2022-04-15 DIAGNOSIS — S30.810A EXCORIATION OF BUTTOCK, INITIAL ENCOUNTER: ICD-10-CM

## 2022-04-15 DIAGNOSIS — I83.012 VENOUS STASIS ULCER OF RIGHT CALF WITH FAT LAYER EXPOSED WITH VARICOSE VEINS (HCC): Primary | ICD-10-CM

## 2022-04-15 DIAGNOSIS — L97.212 VENOUS STASIS ULCER OF RIGHT CALF WITH FAT LAYER EXPOSED WITH VARICOSE VEINS (HCC): Primary | ICD-10-CM

## 2022-04-15 DIAGNOSIS — I83.022 VENOUS STASIS ULCER OF LEFT CALF WITH FAT LAYER EXPOSED WITH VARICOSE VEINS (HCC): ICD-10-CM

## 2022-04-15 PROCEDURE — 99213 OFFICE O/P EST LOW 20 MIN: CPT | Performed by: NURSE PRACTITIONER

## 2022-04-15 PROCEDURE — 99212 OFFICE O/P EST SF 10 MIN: CPT

## 2022-04-15 RX ORDER — GENTAMICIN SULFATE 1 MG/G
OINTMENT TOPICAL ONCE
Status: CANCELLED | OUTPATIENT
Start: 2022-04-15 | End: 2022-04-15

## 2022-04-15 RX ORDER — AMOXICILLIN AND CLAVULANATE POTASSIUM 875; 125 MG/1; MG/1
1 TABLET, FILM COATED ORAL 2 TIMES DAILY
Qty: 20 TABLET | Refills: 0 | Status: SHIPPED | OUTPATIENT
Start: 2022-04-15 | End: 2022-04-25

## 2022-04-15 RX ORDER — BETAMETHASONE DIPROPIONATE 0.05 %
OINTMENT (GRAM) TOPICAL ONCE
Status: CANCELLED | OUTPATIENT
Start: 2022-04-15 | End: 2022-04-15

## 2022-04-15 RX ORDER — LIDOCAINE 50 MG/G
OINTMENT TOPICAL ONCE
Status: CANCELLED | OUTPATIENT
Start: 2022-04-15 | End: 2022-04-15

## 2022-04-15 RX ORDER — LIDOCAINE HYDROCHLORIDE 40 MG/ML
SOLUTION TOPICAL ONCE
Status: CANCELLED | OUTPATIENT
Start: 2022-04-15 | End: 2022-04-15

## 2022-04-15 RX ORDER — GINSENG 100 MG
CAPSULE ORAL ONCE
Status: CANCELLED | OUTPATIENT
Start: 2022-04-15 | End: 2022-04-15

## 2022-04-15 RX ORDER — BACITRACIN ZINC AND POLYMYXIN B SULFATE 500; 1000 [USP'U]/G; [USP'U]/G
OINTMENT TOPICAL ONCE
Status: CANCELLED | OUTPATIENT
Start: 2022-04-15 | End: 2022-04-15

## 2022-04-15 RX ORDER — CLOBETASOL PROPIONATE 0.5 MG/G
OINTMENT TOPICAL ONCE
Status: CANCELLED | OUTPATIENT
Start: 2022-04-15 | End: 2022-04-15

## 2022-04-15 RX ORDER — BACITRACIN, NEOMYCIN, POLYMYXIN B 400; 3.5; 5 [USP'U]/G; MG/G; [USP'U]/G
OINTMENT TOPICAL ONCE
Status: CANCELLED | OUTPATIENT
Start: 2022-04-15 | End: 2022-04-15

## 2022-04-15 ASSESSMENT — PAIN SCALES - GENERAL: PAINLEVEL_OUTOF10: 6

## 2022-04-15 ASSESSMENT — PAIN DESCRIPTION - FREQUENCY: FREQUENCY: INTERMITTENT

## 2022-04-15 ASSESSMENT — PAIN DESCRIPTION - PROGRESSION: CLINICAL_PROGRESSION: NOT CHANGED

## 2022-04-15 ASSESSMENT — PAIN DESCRIPTION - ORIENTATION: ORIENTATION: LEFT

## 2022-04-15 ASSESSMENT — PAIN DESCRIPTION - ONSET: ONSET: ON-GOING

## 2022-04-15 ASSESSMENT — PAIN DESCRIPTION - LOCATION: LOCATION: TOE (COMMENT WHICH ONE)

## 2022-04-15 ASSESSMENT — PAIN - FUNCTIONAL ASSESSMENT: PAIN_FUNCTIONAL_ASSESSMENT: ACTIVITIES ARE NOT PREVENTED

## 2022-04-15 ASSESSMENT — PAIN DESCRIPTION - DESCRIPTORS: DESCRIPTORS: SORE

## 2022-04-15 NOTE — PROGRESS NOTES
Wound Care Center Progress Note       Melba Banks  AGE: 82 y.o. GENDER: female  : 1935  TODAY'S DATE:  4/15/2022        Subjective:     Chief Complaint   Patient presents with    Wound Check     ble         HISTORY of PRESENT ILLNESS    Yanelis Mejia a 82 y. o. female who presents to the [facility] for evaluation and treatment of Acute on chronic venous and edema  ulcer(s) of bilateral lower legs. The wounds are now healed. The ulcer has been present for approximately 1.5 weeks. The underlying cause is thought to be venous and edema.  She also has acute on chronic pressure ulcers bilateral buttocks that is pressure in nature and mild in severity. The patients care to date has included a dry dressing. The patient has significant underlying medical conditions as below.      Wound Pain Timing/Severity: None  Quality of pain: N/A  Severity of pain:  0 / 10   Modifying Factors: venous stasis, lymphedema, diabetes, poor glucose control, chronic pressure, decreased mobility, shear force and obesity  Associated Signs/Symptoms: edema, erythema   Arterial evaluation: VL arteries 21 per Dr. Rina Moore, no significant stenosis     Venous Evaluation: as needed if indicated based on the wound, location, assessment and healing.     Wound infection: Both lower leg wounds cultured 3/18/22     Diabetes: Yes, no medication regimen at this time, diet control. Last AIC 7.8 as of 20  Smoking: Never smoker  Anticoagulant therapy: No  Immunosuppression: No  Obesity: Yes  Other History: Cor pulmonale on diuretic therapy, PAD on Pletal     Nutritional status: well nourished. Discussed need for increased protein and calories for wound healing and good sources of protein (just over 7 grams for every 20 pounds of body weight). Animal-based foods high in protein (meat, poultry, fish, eggs, and dairy foods).  Plant based foods high in protein (tofu, lentils, beans, chickpeas, nuts, quinoa and everette seeds.     Patient educated on the 6 essential components necessary for wound healing: Circulation, Debridements, Proper Dressings and Topical Wound Products, Infection Control, Edema Control and Offloading.     Patient educated on those factors that negatively effect or impact wound healing: smoking, obesity, uncontrolled diabetes, anticoagulant and immunosuppressive regimens, inadequate nutrition, untreated arterial and venous disease if applicable and measures to manage edema.         PAST MEDICAL HISTORY        Diagnosis Date    Asthma     Chronic kidney disease     acute kidney failure    Chronic ulcer of left leg with fat layer exposed ([address] 3/7/2016    Chronic ulcer of right leg with fat layer exposed ([address] 3/7/2016    Diabetes type 2, controlled ([address]     History of asthma     History of blood transfusion 07/2015    Hypertension     Lymphedema of both lower extremities 3/7/2016    Osteoarthritis     Pneumonia     Thyroid disease     Venous stasis of both lower extremities 3/7/2016    WD-Non-pressure chronic ulcer right lower leg, 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 ([address] 4/21/2016       PAST SURGICAL HISTORY    Past Surgical History:   Procedure Laterality Date    APPENDECTOMY      CHOLECYSTECTOMY      CYSTOSCOPY      EYE SURGERY      bilateral implants    HYSTERECTOMY      JOINT REPLACEMENT Right     knee    PACEMAKER INSERTION N/A 11/17/2020    PACEMAKER INSERTION PERMANENT REMOVAL OF TEMPORARY PACEMAKER performed by Duglas Bolanos MD at [address]    Family History   Problem Relation Age of Onset    Heart Disease Father        SOCIAL HISTORY    Social History     Tobacco Use    Smoking status: Never Smoker    Smokeless tobacco: Never Used   Vaping Use    Vaping Use: Never used   Substance Use Topics    Alcohol use: No    Drug use: No       ALLERGIES    Allergies   Allergen Reactions    Latex Rash    Dye [Iodides] Anaphylaxis    Iodine Anaphylaxis    Dyazide [Hydrochlorothiazide W-Triamterene] Rash    Lasix [Furosemide] Rash    Procardia [Nifedipine] Other (See Comments)     Not for sure if rash occurred or rash    Doxycycline Dermatitis    Edecrin [Ethacrynic Acid]     Levaquin [Levofloxacin] Other (See Comments)     unknown    Mobic [Meloxicam]     Vioxx [Rofecoxib]     Celebrex [Celecoxib] Rash    Lexapro [Escitalopram Oxalate] Rash    Sulfa Antibiotics Hives       MEDICATIONS    Current Outpatient Medications on File Prior to Encounter   Medication Sig Dispense Refill    metOLazone (ZAROXOLYN) 5 MG tablet Take 1 tablet by mouth daily 30 tablet 5    spironolactone (ALDACTONE) 50 MG tablet Take 1 tablet by mouth 2 times daily 60 tablet 5    albuterol sulfate HFA (VENTOLIN HFA) 108 (90 Base) MCG/ACT inhaler Inhale 2 puffs into the lungs every 6 hours as needed for Wheezing      bumetanide (BUMEX) 1 MG tablet Take 2 tablets by mouth 3 times daily 540 tablet 3    febuxostat (ULORIC) 40 MG TABS tablet Take 1 tablet by mouth daily 30 tablet 5    clobetasol (TEMOVATE) 0.05 % ointment Apply topically 2 times daily. 120 g 1    Fluticasone Propionate, Inhal, (FLOVENT IN) Inhale 2 puffs into the lungs 2 times daily      silver sulfADIAZINE (SILVADENE) 1 % cream Apply topically daily.  (Patient taking differently: as needed ) 240 g 5    cilostazol (PLETAL) 50 MG tablet Take 1 tablet by mouth 2 times daily 60 tablet 3    rOPINIRole (REQUIP) 0.5 MG tablet Take 1 tablet by mouth 3 times daily 90 tablet 3    famotidine (PEPCID) 20 MG tablet Take 1 tablet by mouth 2 times daily 60 tablet 5    levothyroxine (SYNTHROID) 50 MCG tablet Take 50 mcg by mouth Daily      Polyethylene Glycol 3350 (MIRALAX PO) Take by mouth      fluticasone (FLONASE) 50 MCG/ACT nasal spray 1 spray by Nasal route 2 times daily       Loratadine (CLARITIN) 10 MG CAPS Take 10 mg by mouth daily      ferrous sulfate 325 (65 FE) MG tablet Take 1 tablet by mouth 2 times daily (with meals) 30 tablet 3    latanoprost (XALATAN) 0.005 % ophthalmic solution Place 1 drop into both eyes nightly      OXYGEN Inhale 2 L/min into the lungs nightly      Multiple Vitamins-Minerals (ICAPS) CAPS Take 1 tablet by mouth 2 times daily       acetaminophen (TYLENOL) 500 MG tablet Take 500 mg by mouth every 6 hours as needed for Pain.  calcium carbonate 600 MG TABS tablet Take 1 tablet by mouth daily. No current facility-administered medications on file prior to encounter. REVIEW OF SYSTEMS    Pertinent items are noted in HPI. Constitutional: Negative for systemic symptoms including fever, chills and malaise. Objective:      BP (!) 154/73   Pulse 69   Temp 97.1 °F (36.2 °C) (Temporal)   Resp 18     PHYSICAL EXAM    General: The patient is in no acute distress. Mental status:  Patient is appropriate, is  oriented to place and plan of care. Dermatologic exam: Visual inspection of the periwound reveals the skin to be edematous. Wound exam:  see wound description below     All active wounds listed below with today's date are evaluated      Assessment:       Problem List Items Addressed This Visit     WD-Excoriation of buttock crease    Relevant Orders    Initiate Outpatient Wound Care Protocol    WD-Venous stasis ulcer of right calf with fat layer exposed with varicose veins ([address] - Primary    Relevant Orders    Initiate Outpatient Wound Care Protocol    WD-Venous stasis ulcer of left calf with fat layer exposed with varicose veins ([facility])    Relevant Orders    Initiate Outpatient Wound Care Protocol          Status of wound progress and description from last visit: All wounds healed at this time. Her legs are red and warm today will prescribe Augmentin for 10 days and use Tubi  for compression. Reinforced edema management. Edema Control Instructions:  -Whenever you are resting, raise your legs up.  Try to keep the swollen area higher than the level of your heart.  -Take breaks from standing or sitting in one position.  -Walk around to increase the blood flow in your lower legs. -Move your feet and ankles often while you stand, or tighten and relax your leg muscles.  -Wear support stockings. Put them on in the morning, before swelling gets worse.  -Eat a balanced diet. Lose weight if you need to.  -Limit the amount of salt (sodium) in your diet. Salt holds fluid in the body and may increase swelling.  -Apply compression stocking(s) every morning as soon as you get up. Remove at bedtime unless instructed to wear day and night. Hand wash and line dry to prevent loss of elasticity. Replace every 3-4 months to ensure proper fit. Plan:     Discharge Instructions         PHYSICIAN ORDERS AND DISCHARGE INSTRUCTIONS     NOTE: Upon discharge from the [address], you will receive a patient experience survey via E-mail. We would be grateful if you would take the time to fill this survey out.     Wound care order history:                 CYNTHIA's   Right  1.68     Left 1.65  Date 1/13/21              Vascular studies: .[facility]              Cultures: .3/18/22               Antibiotics: .[place] Cordial              HbA1c:  .               Compression/Lymph Pumps: . Coban 2 lites (did not tolerate), Double Tubi              Grafts:  .              EQTE: .                Continuing wound care orders and information:              Residence: .Private              Continue home health care with:.               [facility] wound-care supplies will be provided by: .              Pharmacy: .                            YXRFV cleansing:                           NL not scrub or use excessive force.                          Wash hands with soap and water before and after dressing changes.                           Prior to applying a clean dressing, cleanse wound with normal saline,                          wound cleanser, or mild soap and water.                           Ask your physician or nurse before getting the wound(s) wet in the shower.              Daily Wound management:                          Keep weight off wounds and reposition every 2 hours.                          TSJBU standing for long periods of time.                          JNEWE wraps/stockings in AM and remove at bedtime.                          Elevate legs to the level of the heart or above for 30 minutes 4-5 times a day and/or when sitting.                                               When taking antibiotics take entire prescription as ordered by MD do not stop taking until medicine is all gone.                                                                 Orders for this week (4/15/22): Bilateral lower legs and left toe- Wash with mild soap and water, rinse with saline, pat dry with 4x4  Generous A&D to entire foot and intact skin on legs  Tubi F  Leave in place until next week     Antibiotic sent to pharmacy    Follow up with Desmond Lloyd CNP if needed in the wound care center  Call [phone] for any questions or concerns.   Date__________   Time____________                   Treatment Note      Written Patient Dismissal Instructions Given            Electronically signed by TREVON Stack CNP on 4/15/2022 at 10:38 AM

## 2022-05-10 ENCOUNTER — HOSPITAL ENCOUNTER (OUTPATIENT)
Age: 87
Discharge: HOME | End: 2022-05-10
Payer: MEDICARE

## 2022-05-10 LAB
ALBUMIN SERPL-MCNC: 4.4 GM/DL (ref 3.4–5)
ALP BLD-CCNC: 68 IU/L (ref 40–129)
ALT SERPL-CCNC: 10 U/L (ref 10–40)
ANION GAP SERPL CALCULATED.3IONS-SCNC: 14 MMOL/L (ref 4–16)
AST SERPL-CCNC: 11 IU/L (ref 15–37)
BACTERIA: NEGATIVE /HPF
BILIRUB SERPL-MCNC: 0.6 MG/DL (ref 0–1)
BILIRUBIN URINE: NEGATIVE MG/DL
BLOOD, URINE: NEGATIVE
BUN BLDV-MCNC: 116 MG/DL (ref 6–23)
CALCIUM SERPL-MCNC: 9.5 MG/DL (ref 8.3–10.6)
CAST TYPE: NORMAL /HPF
CHLORIDE BLD-SCNC: 86 MMOL/L (ref 99–110)
CLARITY: CLEAR
CO2: 31 MMOL/L (ref 21–32)
COLOR: YELLOW
CREAT SERPL-MCNC: 3 MG/DL (ref 0.6–1.1)
CRYSTAL TYPE: NEGATIVE /HPF
EPITHELIAL CELLS, UA: NORMAL /HPF
GFR AFRICAN AMERICAN: 18 ML/MIN/1.73M2
GFR NON-AFRICAN AMERICAN: 15 ML/MIN/1.73M2
GLUCOSE FASTING: 190 MG/DL (ref 70–99)
GLUCOSE, URINE: NEGATIVE MG/DL
KETONES, URINE: NEGATIVE MG/DL
LEUKOCYTE ESTERASE, URINE: NEGATIVE
MAGNESIUM: 1.9 MG/DL (ref 1.8–2.4)
NITRITE URINE, QUANTITATIVE: NEGATIVE
PH, URINE: 7 (ref 5–8)
PHOSPHORUS: 5.3 MG/DL (ref 2.5–4.9)
POTASSIUM SERPL-SCNC: 3.3 MMOL/L (ref 3.5–5.1)
PROTEIN UA: NEGATIVE MG/DL
RBC URINE: NEGATIVE /HPF (ref 0–6)
SODIUM BLD-SCNC: 131 MMOL/L (ref 135–145)
SPECIFIC GRAVITY UA: 1.01 (ref 1–1.03)
TOTAL PROTEIN: 7.2 GM/DL (ref 6.4–8.2)
UROBILINOGEN, URINE: 0.2 MG/DL (ref 0.2–1)
WBC UA: NORMAL /HPF (ref 0–5)

## 2022-05-10 PROCEDURE — 80053 COMPREHEN METABOLIC PANEL: CPT

## 2022-05-10 PROCEDURE — 36415 COLL VENOUS BLD VENIPUNCTURE: CPT

## 2022-05-10 PROCEDURE — 83735 ASSAY OF MAGNESIUM: CPT

## 2022-05-10 PROCEDURE — 84100 ASSAY OF PHOSPHORUS: CPT

## 2022-05-10 PROCEDURE — 81001 URINALYSIS AUTO W/SCOPE: CPT

## 2022-06-10 ENCOUNTER — HOSPITAL ENCOUNTER (OUTPATIENT)
Dept: WOUND CARE | Age: 87
Discharge: HOME | End: 2022-06-10
Payer: MEDICARE

## 2022-06-10 VITALS
DIASTOLIC BLOOD PRESSURE: 64 MMHG | HEART RATE: 73 BPM | TEMPERATURE: 97.2 F | RESPIRATION RATE: 18 BRPM | SYSTOLIC BLOOD PRESSURE: 147 MMHG

## 2022-06-10 DIAGNOSIS — I83.022 VENOUS STASIS ULCER OF LEFT CALF WITH FAT LAYER EXPOSED WITH VARICOSE VEINS (HCC): ICD-10-CM

## 2022-06-10 DIAGNOSIS — L97.222 VENOUS STASIS ULCER OF LEFT CALF WITH FAT LAYER EXPOSED WITH VARICOSE VEINS (HCC): ICD-10-CM

## 2022-06-10 DIAGNOSIS — L97.212 VENOUS STASIS ULCER OF RIGHT CALF WITH FAT LAYER EXPOSED WITH VARICOSE VEINS (HCC): Primary | ICD-10-CM

## 2022-06-10 DIAGNOSIS — L97.522 DIABETIC ULCER OF TOE OF LEFT FOOT ASSOCIATED WITH TYPE 2 DIABETES MELLITUS, WITH FAT LAYER EXPOSED (HCC): ICD-10-CM

## 2022-06-10 DIAGNOSIS — L89.312 PRESSURE INJURY OF RIGHT BUTTOCK, STAGE 2 (HCC): ICD-10-CM

## 2022-06-10 DIAGNOSIS — E11.621 DIABETIC ULCER OF TOE OF LEFT FOOT ASSOCIATED WITH TYPE 2 DIABETES MELLITUS, WITH FAT LAYER EXPOSED (HCC): ICD-10-CM

## 2022-06-10 DIAGNOSIS — I83.012 VENOUS STASIS ULCER OF RIGHT CALF WITH FAT LAYER EXPOSED WITH VARICOSE VEINS (HCC): Primary | ICD-10-CM

## 2022-06-10 DIAGNOSIS — S30.810A EXCORIATION OF BUTTOCK, INITIAL ENCOUNTER: ICD-10-CM

## 2022-06-10 DIAGNOSIS — L84 CALLUS OF FOOT: ICD-10-CM

## 2022-06-10 PROCEDURE — 11042 DBRDMT SUBQ TIS 1ST 20SQCM/<: CPT | Performed by: NURSE PRACTITIONER

## 2022-06-10 PROCEDURE — 11042 DBRDMT SUBQ TIS 1ST 20SQCM/<: CPT

## 2022-06-10 RX ORDER — LIDOCAINE 50 MG/G
OINTMENT TOPICAL ONCE
Status: CANCELLED | OUTPATIENT
Start: 2022-06-10 | End: 2022-06-10

## 2022-06-10 RX ORDER — BACITRACIN ZINC AND POLYMYXIN B SULFATE 500; 1000 [USP'U]/G; [USP'U]/G
OINTMENT TOPICAL ONCE
Status: CANCELLED | OUTPATIENT
Start: 2022-06-10 | End: 2022-06-10

## 2022-06-10 RX ORDER — GENTAMICIN SULFATE 1 MG/G
OINTMENT TOPICAL ONCE
Status: CANCELLED | OUTPATIENT
Start: 2022-06-10 | End: 2022-06-10

## 2022-06-10 RX ORDER — GINSENG 100 MG
CAPSULE ORAL ONCE
Status: CANCELLED | OUTPATIENT
Start: 2022-06-10 | End: 2022-06-10

## 2022-06-10 RX ORDER — BACITRACIN, NEOMYCIN, POLYMYXIN B 400; 3.5; 5 [USP'U]/G; MG/G; [USP'U]/G
OINTMENT TOPICAL ONCE
Status: CANCELLED | OUTPATIENT
Start: 2022-06-10 | End: 2022-06-10

## 2022-06-10 RX ORDER — LIDOCAINE HYDROCHLORIDE 40 MG/ML
SOLUTION TOPICAL ONCE
Status: CANCELLED | OUTPATIENT
Start: 2022-06-10 | End: 2022-06-10

## 2022-06-10 RX ORDER — BETAMETHASONE DIPROPIONATE 0.05 %
OINTMENT (GRAM) TOPICAL ONCE
Status: CANCELLED | OUTPATIENT
Start: 2022-06-10 | End: 2022-06-10

## 2022-06-10 RX ORDER — CLOBETASOL PROPIONATE 0.5 MG/G
OINTMENT TOPICAL ONCE
Status: CANCELLED | OUTPATIENT
Start: 2022-06-10 | End: 2022-06-10

## 2022-06-10 RX ORDER — LIDOCAINE 40 MG/G
CREAM TOPICAL ONCE
Status: CANCELLED | OUTPATIENT
Start: 2022-06-10 | End: 2022-06-10

## 2022-06-10 ASSESSMENT — PAIN SCALES - GENERAL: PAINLEVEL_OUTOF10: 7

## 2022-06-10 ASSESSMENT — PAIN DESCRIPTION - DESCRIPTORS: DESCRIPTORS: TENDER

## 2022-06-10 NOTE — PROGRESS NOTES
Wound Care Center Progress Note       Alma Lopez  AGE: 82 y.o. GENDER: female  : 1935  TODAY'S DATE:  6/10/2022        Subjective:     Chief Complaint   Patient presents with    Wound Check     left second toe, right buttock          HISTORY of PRESENT ILLNESS    Nicci Metcalf a 82 y. o. female who presents to the [facility] for evaluation and treatment of Acute on pressure ulcer right buttock. The wound is of mild severity. The ulcer has been present for approximately 1 week. The underlying cause is thought to be pressure. The patients care to date has included a dry dressing. Also has acute on chronic left second toe of mild severity, covering with a Band-Aid. The patient has significant underlying medical conditions as below.      Wound Pain Timing/Severity: None  Quality of pain: N/A  Severity of pain:  0 / 10   Modifying Factors: venous stasis, lymphedema, diabetes, poor glucose control, chronic pressure, decreased mobility, shear force and obesity  Associated Signs/Symptoms: edema, erythema   Arterial evaluation: VL arteries 21 per Dr. Belgica Ngo, no significant stenosis     Venous Evaluation: as needed if indicated based on the wound, location, assessment and healing.     Wound infection: Both lower leg wounds cultured 3/18/22     Diabetes: Yes, no medication regimen at this time, diet control. Last AIC 7.8 as of 20  Smoking: Never smoker  Anticoagulant therapy: No  Immunosuppression: No  Obesity: Yes  Other History: Cor pulmonale on diuretic therapy, PAD on Pletal     Nutritional status: well nourished. Discussed need for increased protein and calories for wound healing and good sources of protein (just over 7 grams for every 20 pounds of body weight). Animal-based foods high in protein (meat, poultry, fish, eggs, and dairy foods).  Plant based foods high in protein (tofu, lentils, beans, chickpeas, nuts, quinoa and everette seeds.     Patient educated on the 6 essential components necessary for wound healing: Circulation, Debridements, Proper Dressings and Topical Wound Products, Infection Control, Edema Control and Offloading.     Patient educated on those factors that negatively effect or impact wound healing: smoking, obesity, uncontrolled diabetes, anticoagulant and immunosuppressive regimens, inadequate nutrition, untreated arterial and venous disease if applicable and measures to manage edema.         PAST MEDICAL HISTORY        Diagnosis Date    Asthma     Chronic kidney disease     acute kidney failure    Chronic ulcer of left leg with fat layer exposed ([address] 3/7/2016    Chronic ulcer of right leg with fat layer exposed ([address] 3/7/2016    Diabetes type 2, controlled ([address]     History of asthma     History of blood transfusion 07/2015    Hypertension     Lymphedema of both lower extremities 3/7/2016    Osteoarthritis     Pneumonia     Thyroid disease     Venous stasis of both lower extremities 3/7/2016    WD-Non-pressure chronic ulcer right lower leg, 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 ([address] 4/21/2016       PAST SURGICAL HISTORY    Past Surgical History:   Procedure Laterality Date    APPENDECTOMY      CHOLECYSTECTOMY      CYSTOSCOPY      EYE SURGERY      bilateral implants    HYSTERECTOMY (CERVIX STATUS UNKNOWN)      JOINT REPLACEMENT Right     knee    PACEMAKER INSERTION N/A 11/17/2020    PACEMAKER INSERTION PERMANENT REMOVAL OF TEMPORARY PACEMAKER performed by Martin Berman MD at [address]    Family History   Problem Relation Age of Onset    Heart Disease Father        SOCIAL HISTORY    Social History     Tobacco Use    Smoking status: Never Smoker    Smokeless tobacco: Never Used   Vaping Use    Vaping Use: Never used   Substance Use Topics    Alcohol use: No    Drug use: No       ALLERGIES    Allergies   Allergen Reactions    Latex Rash    Dye [Iodides] Anaphylaxis    Iodine Anaphylaxis    Dyazide [Hydrochlorothiazide W-Triamterene] Rash    Lasix [Furosemide] Rash    Procardia [Nifedipine] Other (See Comments)     Not for sure if rash occurred or rash    Doxycycline Dermatitis    Edecrin [Ethacrynic Acid]     Levaquin [Levofloxacin] Other (See Comments)     unknown    Mobic [Meloxicam]     Vioxx [Rofecoxib]     Celebrex [Celecoxib] Rash    Lexapro [Escitalopram Oxalate] Rash    Sulfa Antibiotics Hives       MEDICATIONS    Current Outpatient Medications on File Prior to Encounter   Medication Sig Dispense Refill    traMADol (ULTRAM) 50 MG tablet Take 50 mg by mouth every 6 hours as needed for Pain.  potassium chloride (KLOR-CON M) 20 MEQ extended release tablet Take 3 tablets by mouth daily 90 tablet 3    metOLazone (ZAROXOLYN) 5 MG tablet Take 1 tablet by mouth daily 30 tablet 5    spironolactone (ALDACTONE) 50 MG tablet Take 1 tablet by mouth 2 times daily (Patient taking differently: Take 100 mg by mouth 2 times daily ) 60 tablet 5    albuterol sulfate HFA (VENTOLIN HFA) 108 (90 Base) MCG/ACT inhaler Inhale 2 puffs into the lungs every 6 hours as needed for Wheezing      bumetanide (BUMEX) 1 MG tablet Take 2 tablets by mouth 3 times daily (Patient taking differently: Take 2 mg by mouth in the morning, at noon, and at bedtime ) 540 tablet 3    febuxostat (ULORIC) 40 MG TABS tablet Take 1 tablet by mouth daily 30 tablet 5    clobetasol (TEMOVATE) 0.05 % ointment Apply topically 2 times daily. (Patient taking differently: as needed ) 120 g 1    Fluticasone Propionate, Inhal, (FLOVENT IN) Inhale 2 puffs into the lungs 2 times daily      silver sulfADIAZINE (SILVADENE) 1 % cream Apply topically daily.  (Patient taking differently: as needed ) 240 g 5    cilostazol (PLETAL) 50 MG tablet Take 1 tablet by mouth 2 times daily 60 tablet 3    rOPINIRole (REQUIP) 0.5 MG tablet Take 1 tablet by mouth 3 times daily 90 tablet 3    famotidine (PEPCID) 20 MG tablet Take 1 tablet by mouth 2 times daily 60 tablet 5    levothyroxine (SYNTHROID) 50 MCG tablet Take 50 mcg by mouth Daily      Polyethylene Glycol 3350 (MIRALAX PO) Take by mouth      fluticasone (FLONASE) 50 MCG/ACT nasal spray 1 spray by Nasal route 2 times daily       Loratadine (CLARITIN) 10 MG CAPS Take 10 mg by mouth daily      ferrous sulfate 325 (65 FE) MG tablet Take 1 tablet by mouth 2 times daily (with meals) 30 tablet 3    latanoprost (XALATAN) 0.005 % ophthalmic solution Place 1 drop into both eyes nightly      OXYGEN Inhale 2 L/min into the lungs nightly      Multiple Vitamins-Minerals (ICAPS) CAPS Take 1 tablet by mouth 2 times daily       acetaminophen (TYLENOL) 500 MG tablet Take 500 mg by mouth every 6 hours as needed for Pain.  calcium carbonate 600 MG TABS tablet Take 1 tablet by mouth daily. No current facility-administered medications on file prior to encounter. REVIEW OF SYSTEMS    Pertinent items are noted in HPI. Constitutional: Negative for systemic symptoms including fever, chills and malaise. Objective:      BP (!) 147/64   Pulse 73   Temp 97.2 °F (36.2 °C) (Temporal)   Resp 18     PHYSICAL EXAM    General: The patient is in no acute distress. Mental status:  Patient is appropriate, is  oriented to place and plan of care. Dermatologic exam: Visual inspection of the periwound reveals the skin to be edematous.   Wound exam:  see wound description below     All active wounds listed below with today's date are evaluated    Assessment:       Problem List Items Addressed This Visit        Circulatory    WD-Venous stasis ulcer of right calf with fat layer exposed with varicose veins ([address] - Primary    Relevant Orders    Initiate Outpatient Wound Care Protocol    WD-Venous stasis ulcer of left calf with fat layer exposed with varicose veins (HCC)    Relevant Orders    Initiate Outpatient Wound Care Protocol       Endocrine    WD-Diabetic ulcer of toe of left foot associated with type 2 diabetes mellitus, with fat layer exposed ([facility]       Other    WD-Callus of foot    WD-Pressure injury of right buttock, stage 2 ([facility]    WD-Excoriation of buttock crease    Relevant Orders    Initiate Outpatient Wound Care Protocol        Wound 06/10/22 #1 Right Buttock (Active)   Wound Image   06/10/22 1011   Wound Cleansed Wound cleanser 06/10/22 1011   Offloading for Diabetic Foot Ulcers Offloading not required 06/10/22 1011   Wound Length (cm) 0.5 cm 06/10/22 1011   Wound Width (cm) 0.4 cm 06/10/22 1011   Wound Depth (cm) 0.1 cm 06/10/22 1011   Wound Surface Area (cm^2) 0.2 cm^2 06/10/22 1011   Wound Volume (cm^3) 0.02 cm^3 06/10/22 1011   Distance Tunneling (cm) 0 cm 06/10/22 1011   Tunneling Position ___ O'Clock 0 06/10/22 1011   Undermining Starts ___ O'Clock 0 06/10/22 1011   Undermining Ends___ O'Clock 0 06/10/22 1011   Undermining Maxium Distance (cm) 0 06/10/22 1011   Wound Assessment Slough;Pink/red 06/10/22 1011   Drainage Amount Moderate 06/10/22 1011   Drainage Description Serosanguinous; Yellow 06/10/22 1011   Odor None 06/10/22 1011   Leatha-wound Assessment Dry/flaky 06/10/22 1011   Margins Defined edges 06/10/22 1011   Wound Thickness Description not for Pressure Injury Full thickness 06/10/22 1011   Number of days: 0       Wound 06/10/22 #2 Left Second Toe (Active)   Wound Image   06/10/22 1011   Wound Cleansed Wound cleanser 06/10/22 1011   Offloading for Diabetic Foot Ulcers Offloading not required 06/10/22 1011   Wound Length (cm) 0.4 cm 06/10/22 1011   Wound Width (cm) 0.4 cm 06/10/22 1011   Wound Depth (cm) 0.1 cm 06/10/22 1011   Wound Surface Area (cm^2) 0.16 cm^2 06/10/22 1011   Wound Volume (cm^3) 0.016 cm^3 06/10/22 1011   Distance Tunneling (cm) 0 cm 06/10/22 1011   Tunneling Position ___ O'Clock 0 06/10/22 1011   Undermining Starts ___ O'Clock 0 06/10/22 1011   Undermining Ends___ O'Clock 0 06/10/22 1011   Undermining Maxium Distance (cm) 0 06/10/22 9330 Fl-54 06/10/22 1011   Drainage Amount Moderate 06/10/22 1011   Drainage Description Yellow 06/10/22 1011   Odor None 06/10/22 1011   Leatha-wound Assessment Fragile; Intact 06/10/22 1011   Margins Defined edges 06/10/22 1011   Wound Thickness Description not for Pressure Injury Full thickness 06/10/22 1011   Number of days: 0     Procedure Note    Indications:  Based on my examination of this patient's wound(s) today, sharp excision into necrotic subcutaneous tissue is required to promote healing and evaluate the extent of previous healing. Performed by: TREVON Stoner CNP    Consent obtained: Yes    Time out taken:  Yes    Pain Control: Anesthetic  Anesthetic: 4% Lidocaine Liquid Topical        Debridement:Excisional Debridement    Using curette the wound(s) was/were sharply debrided down through and including the removal of subcutaneous tissue. Devitalized Tissue Debrided:  slough and exudate    Pre Debridement Measurements:  Are located in the Wound Documentation Flow Sheet    All active wounds listed below with today's date are evaluated  Wound(s)    debrided this date include # : 1 and 2     Post  Debridement Measurements:  Wound 06/10/22 #1 Right Buttock (Active)   Wound Image   06/10/22 1011   Wound Cleansed Wound cleanser 06/10/22 1011   Offloading for Diabetic Foot Ulcers Offloading not required 06/10/22 1011   Wound Length (cm) 0.5 cm 06/10/22 1011   Wound Width (cm) 0.4 cm 06/10/22 1011   Wound Depth (cm) 0.1 cm 06/10/22 1011   Wound Surface Area (cm^2) 0.2 cm^2 06/10/22 1011   Wound Volume (cm^3) 0.02 cm^3 06/10/22 1011   Distance Tunneling (cm) 0 cm 06/10/22 1011   Tunneling Position ___ O'Clock 0 06/10/22 1011   Undermining Starts ___ O'Clock 0 06/10/22 1011   Undermining Ends___ O'Clock 0 06/10/22 1011   Undermining Maxium Distance (cm) 0 06/10/22 1011   Wound Assessment Slough;Pink/red 06/10/22 1011   Drainage Amount Moderate 06/10/22 1011   Drainage Description Serosanguinous; Yellow 06/10/22 1011   Odor None 06/10/22 1011   Leatha-wound Assessment Dry/flaky 06/10/22 1011   Margins Defined edges 06/10/22 1011   Wound Thickness Description not for Pressure Injury Full thickness 06/10/22 1011   Number of days: 0       Wound 06/10/22 #2 Left Second Toe (Active)   Wound Image   06/10/22 1011   Wound Cleansed Wound cleanser 06/10/22 1011   Offloading for Diabetic Foot Ulcers Offloading not required 06/10/22 1011   Wound Length (cm) 0.4 cm 06/10/22 1011   Wound Width (cm) 0.4 cm 06/10/22 1011   Wound Depth (cm) 0.1 cm 06/10/22 1011   Wound Surface Area (cm^2) 0.16 cm^2 06/10/22 1011   Wound Volume (cm^3) 0.016 cm^3 06/10/22 1011   Distance Tunneling (cm) 0 cm 06/10/22 1011   Tunneling Position ___ O'Clock 0 06/10/22 1011   Undermining Starts ___ O'Clock 0 06/10/22 1011   Undermining Ends___ O'Clock 0 06/10/22 1011   Undermining Maxium Distance (cm) 0 06/10/22 1011   Wound Assessment Slough 06/10/22 1011   Drainage Amount Moderate 06/10/22 1011   Drainage Description Yellow 06/10/22 1011   Odor None 06/10/22 1011   Leatha-wound Assessment Fragile; Intact 06/10/22 1011   Margins Defined edges 06/10/22 1011   Wound Thickness Description not for Pressure Injury Full thickness 06/10/22 1011   Number of days: 0       Percent of Wound(s) Debrided: approximately 100%    Total  Area  Debrided:  0.36 sq cm     Bleeding:  Minimal    Hemostasis Achieved:  by pressure    Procedural Pain:  0  / 10     Post Procedural Pain:  0 / 10     Response to treatment:  Well tolerated by patient. Status of wound progress and description from last visit: Will use topicals and collagen. The spouse is well versed on the wound care. Will follow up in 2 weeks. Plan:     Discharge Instructions         PHYSICIAN ORDERS AND DISCHARGE INSTRUCTIONS   NOTE: [facility] you will receive a patient experience survey via E-mail.  We would be grateful if you would take the time to fill this survey out.   Wound care order history:               CYNTHIA's   Right  1.68     Left 1.65  Date 1/13/21              Vascular studies: .                Cultures: .3/18/22               Antibiotics: .Cora Linea              HbA1c:  .               Compression/Lymph Pumps: . Coban 2 lites (did not tolerate), Double Tubi              Grafts:  .              VXKK: .    Continuing wound care orders and information:              Residence: .Private              Continue home health care with:.               [facility] wound-care supplies will be provided by: .              Pharmacy: .                            VQJMN cleansing:                           LO not scrub or use excessive force.                          Wash hands with soap and water before and after dressing changes.                         Prior to applying a clean dressing, cleanse wound with normal saline,                          wound cleanser, or mild soap and water.                           Ask your physician or nurse before getting the wound(s) wet in the shower.              Daily Wound management:                          Keep weight off wounds and reposition every 2 hours.                          Avoid standing for long periods of time.                          Apply wraps/stockings in AM and remove at bedtime.                          Elevate legs to the level of the heart or above for 30 minutes 4-5 times a day and/or when sitting.                                               When taking antibiotics take entire prescription as ordered by MD do not stop taking until medicine is all gone.                                                                 Orders for this week (6/10/22):   Left  Second toe- Wash with mild soap and water, rinse with saline, pat dry with 4x4  Apply small amount of gentamicin ointment and stimulen powder  Cover with piece of Ioplex  Wrap with conform and secure with tape  Change Daily    Buttock- Wash with mild soap and water, Rinse with saline, pat dry with 4x4   Apply Clobetasol, desitin and stimulen  Secure with Foam border  Change Daily     Follow up with Mayra Moyer CNP if needed in the wound care center  Call [phone] for any questions or concerns.   Date__________   Time____________                   Treatment Note      Written Patient Dismissal Instructions Given            Electronically signed by TREVON Madera CNP on 6/10/2022 at 10:58 AM

## 2022-06-24 ENCOUNTER — HOSPITAL ENCOUNTER (OUTPATIENT)
Dept: WOUND CARE | Age: 87
Discharge: HOME | End: 2022-06-24
Payer: MEDICARE

## 2022-06-24 VITALS
TEMPERATURE: 97.1 F | HEART RATE: 75 BPM | DIASTOLIC BLOOD PRESSURE: 64 MMHG | RESPIRATION RATE: 16 BRPM | SYSTOLIC BLOOD PRESSURE: 137 MMHG

## 2022-06-24 DIAGNOSIS — L89.312 PRESSURE INJURY OF RIGHT BUTTOCK, STAGE 2 (HCC): ICD-10-CM

## 2022-06-24 DIAGNOSIS — E11.621 DIABETIC ULCER OF TOE OF LEFT FOOT ASSOCIATED WITH TYPE 2 DIABETES MELLITUS, WITH FAT LAYER EXPOSED (HCC): ICD-10-CM

## 2022-06-24 DIAGNOSIS — S30.810A EXCORIATION OF BUTTOCK, INITIAL ENCOUNTER: ICD-10-CM

## 2022-06-24 DIAGNOSIS — L97.522 DIABETIC ULCER OF TOE OF LEFT FOOT ASSOCIATED WITH TYPE 2 DIABETES MELLITUS, WITH FAT LAYER EXPOSED (HCC): ICD-10-CM

## 2022-06-24 DIAGNOSIS — L84 CALLUS OF FOOT: Primary | ICD-10-CM

## 2022-06-24 PROCEDURE — 11042 DBRDMT SUBQ TIS 1ST 20SQCM/<: CPT

## 2022-06-24 PROCEDURE — 11042 DBRDMT SUBQ TIS 1ST 20SQCM/<: CPT | Performed by: NURSE PRACTITIONER

## 2022-06-24 RX ORDER — BACITRACIN ZINC AND POLYMYXIN B SULFATE 500; 1000 [USP'U]/G; [USP'U]/G
OINTMENT TOPICAL ONCE
Status: CANCELLED | OUTPATIENT
Start: 2022-06-24 | End: 2022-06-24

## 2022-06-24 RX ORDER — LIDOCAINE HYDROCHLORIDE 40 MG/ML
SOLUTION TOPICAL ONCE
Status: CANCELLED | OUTPATIENT
Start: 2022-06-24 | End: 2022-06-24

## 2022-06-24 RX ORDER — CLOBETASOL PROPIONATE 0.5 MG/G
OINTMENT TOPICAL ONCE
Status: CANCELLED | OUTPATIENT
Start: 2022-06-24 | End: 2022-06-24

## 2022-06-24 RX ORDER — BETAMETHASONE DIPROPIONATE 0.05 %
OINTMENT (GRAM) TOPICAL ONCE
Status: CANCELLED | OUTPATIENT
Start: 2022-06-24 | End: 2022-06-24

## 2022-06-24 RX ORDER — LIDOCAINE 40 MG/G
CREAM TOPICAL ONCE
Status: CANCELLED | OUTPATIENT
Start: 2022-06-24 | End: 2022-06-24

## 2022-06-24 RX ORDER — GINSENG 100 MG
CAPSULE ORAL ONCE
Status: CANCELLED | OUTPATIENT
Start: 2022-06-24 | End: 2022-06-24

## 2022-06-24 RX ORDER — LIDOCAINE 50 MG/G
OINTMENT TOPICAL ONCE
Status: CANCELLED | OUTPATIENT
Start: 2022-06-24 | End: 2022-06-24

## 2022-06-24 RX ORDER — BACITRACIN, NEOMYCIN, POLYMYXIN B 400; 3.5; 5 [USP'U]/G; MG/G; [USP'U]/G
OINTMENT TOPICAL ONCE
Status: CANCELLED | OUTPATIENT
Start: 2022-06-24 | End: 2022-06-24

## 2022-06-24 RX ORDER — GENTAMICIN SULFATE 1 MG/G
OINTMENT TOPICAL ONCE
Status: CANCELLED | OUTPATIENT
Start: 2022-06-24 | End: 2022-06-24

## 2022-06-24 ASSESSMENT — PAIN DESCRIPTION - LOCATION: LOCATION: TOE (COMMENT WHICH ONE)

## 2022-06-24 ASSESSMENT — PAIN DESCRIPTION - PAIN TYPE: TYPE: ACUTE PAIN

## 2022-06-24 ASSESSMENT — PAIN - FUNCTIONAL ASSESSMENT: PAIN_FUNCTIONAL_ASSESSMENT: ACTIVITIES ARE NOT PREVENTED

## 2022-06-24 ASSESSMENT — PAIN DESCRIPTION - DESCRIPTORS: DESCRIPTORS: TENDER;SHARP

## 2022-06-24 ASSESSMENT — PAIN DESCRIPTION - ORIENTATION: ORIENTATION: LEFT

## 2022-06-24 ASSESSMENT — PAIN DESCRIPTION - ONSET: ONSET: ON-GOING

## 2022-06-24 ASSESSMENT — PAIN SCALES - GENERAL: PAINLEVEL_OUTOF10: 10

## 2022-06-24 ASSESSMENT — PAIN DESCRIPTION - FREQUENCY: FREQUENCY: INTERMITTENT

## 2022-06-24 NOTE — PROGRESS NOTES
Wound Care Center Progress Note       Daisy Davidson  AGE: 82 y.o. GENDER: female  : 1935  TODAY'S DATE:  2022        Subjective:     Chief Complaint   Patient presents with    Wound Check     Left 2nd toe, right buttock         HISTORY of PRESENT ILLNESS    Nicci Mills a 82 y. o. female who presents to the [facility] for evaluation and treatment of Acute on pressure ulcer right buttock. The wound is of mild severity. The ulcer has been present for approximately 1 week. The underlying cause is thought to be pressure. The patients care to date has included a dry dressing. Also has acute on chronic left second toe of mild severity, covering with a Band-Aid. The patient has significant underlying medical conditions as below.      Wound Pain Timing/Severity: None  Quality of pain: N/A  Severity of pain:  0 / 10   Modifying Factors: venous stasis, lymphedema, diabetes, poor glucose control, chronic pressure, decreased mobility, shear force and obesity  Associated Signs/Symptoms: edema, erythema   Arterial evaluation: VL arteries 21 per Dr. Peggy To, no significant stenosis     Venous Evaluation: as needed if indicated based on the wound, location, assessment and healing.     Wound infection: Both lower leg wounds cultured 3/18/22     Diabetes: Yes, no medication regimen at this time, diet control. Last AIC 7.8 as of 20  Smoking: Never smoker  Anticoagulant therapy: No  Immunosuppression: No  Obesity: Yes  Other History: Cor pulmonale on diuretic therapy, PAD on Pletal     Nutritional status: well nourished. Discussed need for increased protein and calories for wound healing and good sources of protein (just over 7 grams for every 20 pounds of body weight). Animal-based foods high in protein (meat, poultry, fish, eggs, and dairy foods).  Plant based foods high in protein (tofu, lentils, beans, chickpeas, nuts, quinoa and everette seeds.     Patient educated on the 6 essential components necessary for wound healing: Circulation, Debridements, Proper Dressings and Topical Wound Products, Infection Control, Edema Control and Offloading.     Patient educated on those factors that negatively effect or impact wound healing: smoking, obesity, uncontrolled diabetes, anticoagulant and immunosuppressive regimens, inadequate nutrition, untreated arterial and venous disease if applicable and measures to manage edema.         PAST MEDICAL HISTORY        Diagnosis Date    Asthma     Chronic kidney disease     acute kidney failure    Chronic ulcer of left leg with fat layer exposed ([address] 3/7/2016    Chronic ulcer of right leg with fat layer exposed ([address] 3/7/2016    Diabetes type 2, controlled ([address]     History of asthma     History of blood transfusion 07/2015    Hypertension     Lymphedema of both lower extremities 3/7/2016    Osteoarthritis     Pneumonia     Thyroid disease     Venous stasis of both lower extremities 3/7/2016    WD-Non-pressure chronic ulcer right lower leg, 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 ([address] 4/21/2016       PAST SURGICAL HISTORY    Past Surgical History:   Procedure Laterality Date    APPENDECTOMY      CHOLECYSTECTOMY      CYSTOSCOPY      EYE SURGERY      bilateral implants    HYSTERECTOMY (CERVIX STATUS UNKNOWN)      JOINT REPLACEMENT Right     knee    PACEMAKER INSERTION N/A 11/17/2020    PACEMAKER INSERTION PERMANENT REMOVAL OF TEMPORARY PACEMAKER performed by Burak Jiang MD at [facility]    Family History   Problem Relation Age of Onset    Heart Disease Father        SOCIAL HISTORY    Social History     Tobacco Use    Smoking status: Never Smoker    Smokeless tobacco: Never Used   Vaping Use    Vaping Use: Never used   Substance Use Topics    Alcohol use: No    Drug use: No       ALLERGIES    Allergies   Allergen Reactions    Latex Rash    Dye [Iodides] Anaphylaxis    Iodine Anaphylaxis    Dyazide [Hydrochlorothiazide W-Triamterene] Rash    Lasix [Furosemide] Rash    Procardia [Nifedipine] Other (See Comments)     Not for sure if rash occurred or rash    Doxycycline Dermatitis    Edecrin [Ethacrynic Acid]     Levaquin [Levofloxacin] Other (See Comments)     unknown    Mobic [Meloxicam]     Vioxx [Rofecoxib]     Celebrex [Celecoxib] Rash    Lexapro [Escitalopram Oxalate] Rash    Sulfa Antibiotics Hives       MEDICATIONS    Current Outpatient Medications on File Prior to Encounter   Medication Sig Dispense Refill    traMADol (ULTRAM) 50 MG tablet Take 50 mg by mouth every 6 hours as needed for Pain.  potassium chloride (KLOR-CON M) 20 MEQ extended release tablet Take 3 tablets by mouth daily 90 tablet 3    metOLazone (ZAROXOLYN) 5 MG tablet Take 1 tablet by mouth daily 30 tablet 5    spironolactone (ALDACTONE) 50 MG tablet Take 1 tablet by mouth 2 times daily (Patient taking differently: Take 100 mg by mouth 2 times daily ) 60 tablet 5    albuterol sulfate HFA (VENTOLIN HFA) 108 (90 Base) MCG/ACT inhaler Inhale 2 puffs into the lungs every 6 hours as needed for Wheezing      bumetanide (BUMEX) 1 MG tablet Take 2 tablets by mouth 3 times daily (Patient taking differently: Take 2 mg by mouth in the morning, at noon, and at bedtime ) 540 tablet 3    febuxostat (ULORIC) 40 MG TABS tablet Take 1 tablet by mouth daily 30 tablet 5    clobetasol (TEMOVATE) 0.05 % ointment Apply topically 2 times daily. (Patient taking differently: as needed ) 120 g 1    Fluticasone Propionate, Inhal, (FLOVENT IN) Inhale 2 puffs into the lungs 2 times daily      silver sulfADIAZINE (SILVADENE) 1 % cream Apply topically daily.  (Patient taking differently: as needed ) 240 g 5    cilostazol (PLETAL) 50 MG tablet Take 1 tablet by mouth 2 times daily 60 tablet 3    rOPINIRole (REQUIP) 0.5 MG tablet Take 1 tablet by mouth 3 times daily 90 tablet 3    famotidine (PEPCID) 20 MG tablet Take 1 tablet by mouth 2 times daily 60 tablet 5    levothyroxine (SYNTHROID) 50 MCG tablet Take 50 mcg by mouth Daily      Polyethylene Glycol 3350 (MIRALAX PO) Take by mouth      fluticasone (FLONASE) 50 MCG/ACT nasal spray 1 spray by Nasal route 2 times daily       Loratadine (CLARITIN) 10 MG CAPS Take 10 mg by mouth daily      ferrous sulfate 325 (65 FE) MG tablet Take 1 tablet by mouth 2 times daily (with meals) 30 tablet 3    latanoprost (XALATAN) 0.005 % ophthalmic solution Place 1 drop into both eyes nightly      OXYGEN Inhale 2 L/min into the lungs nightly      Multiple Vitamins-Minerals (ICAPS) CAPS Take 1 tablet by mouth 2 times daily       acetaminophen (TYLENOL) 500 MG tablet Take 500 mg by mouth every 6 hours as needed for Pain.  calcium carbonate 600 MG TABS tablet Take 1 tablet by mouth daily. No current facility-administered medications on file prior to encounter. REVIEW OF SYSTEMS    Pertinent items are noted in HPI. Constitutional: Negative for systemic symptoms including fever, chills and malaise. Objective:      /64   Pulse 75   Temp 97.1 °F (36.2 °C) (Temporal)   Resp 16     PHYSICAL EXAM    General: The patient is in no acute distress. Mental status:  Patient is appropriate, is  oriented to place and plan of care. Dermatologic exam: Visual inspection of the periwound reveals the skin to be edematous.   Wound exam:  see wound description below     All active wounds listed below with today's date are evaluated    Assessment:       Problem List Items Addressed This Visit        Endocrine    WD-Diabetic ulcer of toe of left foot associated with type 2 diabetes mellitus, with fat layer exposed ([address]    Relevant Orders    Initiate Outpatient Wound Care Protocol       Other    WD-Callus of foot - Primary    Relevant Orders    Initiate Outpatient Wound Care Protocol    WD-Pressure injury of right buttock, stage 2 ([address]    Relevant Orders    Initiate Outpatient Wound Care Protocol WD-Excoriation of buttock crease    Relevant Orders    Initiate Outpatient Wound Care Protocol        Wound 06/10/22 #1 Right Buttock (Active)   Wound Image   06/24/22 1022   Dressing Status New dressing applied 06/24/22 1057   Wound Cleansed Wound cleanser 06/24/22 1022   Offloading for Diabetic Foot Ulcers Offloading not required 06/10/22 1011   Wound Length (cm) 0.8 cm 06/24/22 1022   Wound Width (cm) 1 cm 06/24/22 1022   Wound Depth (cm) 0.1 cm 06/24/22 1022   Wound Surface Area (cm^2) 0.8 cm^2 06/24/22 1022   Change in Wound Size % (l*w) -300 06/24/22 1022   Wound Volume (cm^3) 0.08 cm^3 06/24/22 1022   Wound Healing % -300 06/24/22 1022   Post-Procedure Length (cm) 0.8 cm 06/24/22 1045   Post-Procedure Width (cm) 1 cm 06/24/22 1045   Post-Procedure Depth (cm) 0.1 cm 06/24/22 1045   Post-Procedure Surface Area (cm^2) 0.8 cm^2 06/24/22 1045   Post-Procedure Volume (cm^3) 0.08 cm^3 06/24/22 1045   Distance Tunneling (cm) 0 cm 06/24/22 1022   Tunneling Position ___ O'Clock 0 06/24/22 1022   Undermining Starts ___ O'Clock 0 06/24/22 1022   Undermining Ends___ O'Clock 0 06/24/22 1022   Undermining Maxium Distance (cm) 0 06/24/22 1022   Wound Assessment Pink/red 06/24/22 1022   Drainage Amount Moderate 06/24/22 1022   Drainage Description Serosanguinous 06/24/22 1022   Odor None 06/24/22 1022   Leatha-wound Assessment Dry/flaky 06/24/22 1022   Margins Defined edges 06/24/22 1022   Wound Thickness Description not for Pressure Injury Full thickness 06/24/22 1022   Number of days: 14       Wound 06/10/22 #2 Left Second Toe (Active)   Wound Image   06/24/22 1022   Dressing Status New dressing applied 06/24/22 1057   Wound Cleansed Wound cleanser 06/24/22 1022   Offloading for Diabetic Foot Ulcers Offloading not required 06/24/22 1022   Wound Length (cm) 0.7 cm 06/24/22 1022   Wound Width (cm) 0.7 cm 06/24/22 1022   Wound Depth (cm) 0.1 cm 06/24/22 1022   Wound Surface Area (cm^2) 0.49 cm^2 06/24/22 1022   Change in Wound Size % (l*w) -206.25 06/24/22 1022   Wound Volume (cm^3) 0.049 cm^3 06/24/22 1022   Wound Healing % -206 06/24/22 1022   Post-Procedure Length (cm) 0.7 cm 06/24/22 1045   Post-Procedure Width (cm) 0.7 cm 06/24/22 1045   Post-Procedure Depth (cm) 0.1 cm 06/24/22 1045   Post-Procedure Surface Area (cm^2) 0.49 cm^2 06/24/22 1045   Post-Procedure Volume (cm^3) 0.049 cm^3 06/24/22 1045   Distance Tunneling (cm) 0 cm 06/24/22 1022   Tunneling Position ___ O'Clock 0 06/24/22 1022   Undermining Starts ___ O'Clock 0 06/24/22 1022   Undermining Ends___ O'Clock 0 06/24/22 1022   Undermining Maxium Distance (cm) 0 06/24/22 1022   Wound Assessment Slough;Dry 06/24/22 1022   Drainage Amount Small 06/24/22 1022   Drainage Description Yellow 06/24/22 1022   Odor None 06/24/22 1022   Leatha-wound Assessment Hyperkeratosis (callous) 06/24/22 1022   Margins Defined edges 06/24/22 1022   Wound Thickness Description not for Pressure Injury Full thickness 06/24/22 1022   Number of days: 14       Procedure Note    Indications:  Based on my examination of this patient's wound(s) today, sharp excision into necrotic subcutaneous tissue is required to promote healing and evaluate the extent of previous healing. Performed by: TREVON Stack CNP    Consent obtained: Yes    Time out taken:  Yes    Pain Control: Anesthetic  Anesthetic: 4% Lidocaine Liquid Topical        Debridement:Excisional Debridement    Using curette the wound(s) was/were sharply debrided down through and including the removal of subcutaneous tissue.         Devitalized Tissue Debrided:  slough and exudate    Pre Debridement Measurements:  Are located in the Wound Documentation Flow Sheet    All active wounds listed below with today's date are evaluated  Wound(s)    debrided this date include # : 1 and 2     Post  Debridement Measurements:  Wound 06/10/22 #1 Right Buttock (Active)   Wound Image   06/10/22 1011   Wound Cleansed Wound cleanser 06/10/22 1018 Offloading for Diabetic Foot Ulcers Offloading not required 06/10/22 1011   Wound Length (cm) 0.5 cm 06/10/22 1011   Wound Width (cm) 0.4 cm 06/10/22 1011   Wound Depth (cm) 0.1 cm 06/10/22 1011   Wound Surface Area (cm^2) 0.2 cm^2 06/10/22 1011   Wound Volume (cm^3) 0.02 cm^3 06/10/22 1011   Distance Tunneling (cm) 0 cm 06/10/22 1011   Tunneling Position ___ O'Clock 0 06/10/22 1011   Undermining Starts ___ O'Clock 0 06/10/22 1011   Undermining Ends___ O'Clock 0 06/10/22 1011   Undermining Maxium Distance (cm) 0 06/10/22 1011   Wound Assessment Slough;Pink/red 06/10/22 1011   Drainage Amount Moderate 06/10/22 1011   Drainage Description Serosanguinous; Yellow 06/10/22 1011   Odor None 06/10/22 1011   Leatha-wound Assessment Dry/flaky 06/10/22 1011   Margins Defined edges 06/10/22 1011   Wound Thickness Description not for Pressure Injury Full thickness 06/10/22 1011   Number of days: 0       Wound 06/10/22 #2 Left Second Toe (Active)   Wound Image   06/10/22 1011   Wound Cleansed Wound cleanser 06/10/22 1011   Offloading for Diabetic Foot Ulcers Offloading not required 06/10/22 1011   Wound Length (cm) 0.4 cm 06/10/22 1011   Wound Width (cm) 0.4 cm 06/10/22 1011   Wound Depth (cm) 0.1 cm 06/10/22 1011   Wound Surface Area (cm^2) 0.16 cm^2 06/10/22 1011   Wound Volume (cm^3) 0.016 cm^3 06/10/22 1011   Distance Tunneling (cm) 0 cm 06/10/22 1011   Tunneling Position ___ O'Clock 0 06/10/22 1011   Undermining Starts ___ O'Clock 0 06/10/22 1011   Undermining Ends___ O'Clock 0 06/10/22 1011   Undermining Maxium Distance (cm) 0 06/10/22 1011   Wound Assessment Slough 06/10/22 1011   Drainage Amount Moderate 06/10/22 1011   Drainage Description Yellow 06/10/22 1011   Odor None 06/10/22 1011   Leatha-wound Assessment Fragile; Intact 06/10/22 1011   Margins Defined edges 06/10/22 1011   Wound Thickness Description not for Pressure Injury Full thickness 06/10/22 1011   Number of days: 0       Percent of Wound(s) Debrided: approximately 100%    Total  Area  Debrided: 1.29 sq cm     Bleeding:  Minimal    Hemostasis Achieved:  by pressure    Procedural Pain:  0  / 10     Post Procedural Pain:  0 / 10     Response to treatment:  Well tolerated by patient. Status of wound progress and description from last visit: Will use topicals and collagen. The spouse is well versed on the wound care. Will follow up in 2 weeks. Plan:     Discharge Instructions       PHYSICIAN ORDERS AND DISCHARGE INSTRUCTIONS   NOTE: Upon discharge from the [address], you will receive a patient experience survey via E-mail. We would be grateful if you would take the time to fill this survey out.   Wound care order history:               CYNTHIA's   Right  1.68     Left 1.65  Date 1/13/21              Vascular studies: .Babatunde Comment              Cultures: .3/18/22               Antibiotics: .Babatunde Comment              HbA1c:  .               Compression/Lymph Pumps: . Coban 2 lites (did not tolerate), Double Tubi              Grafts:  .              DCKO: .    Continuing wound care orders and information:              Residence: .Private              Continue home health care with:.               [facility] wound-care supplies will be provided by: .              Pharmacy: .                            XFICB cleansing:                           OI not scrub or use excessive force.                          Wash hands with soap and water before and after dressing changes.                           Prior to applying a clean dressing, cleanse wound with normal saline,                          wound cleanser, or mild soap and water.                           Ask your physician or nurse before getting the wound(s) wet in the shower.              Daily Wound management:                          Keep weight off wounds and reposition every 2 hours.                          AKHDM standing for long periods of time.                          XEFDP wraps/stockings in AM and remove at bedtime.                          Elevate legs to the level of the heart or above for 30 minutes 4-5 times a day and/or when sitting.                                               When taking antibiotics take entire prescription as ordered by MD do not stop taking until medicine is all gone.                                                                 Orders for this week (6/24/22): Left  Second toe- Wash with mild soap and water, rinse with saline, pat dry with 4x4  Paint with Betadine  Cover with bandaid  Change Daily     Buttock- Wash with mild soap and water, Rinse with saline, pat dry with 4x4   Apply Clobetasol, desitin and stimulen  Secure with Foam border  Change Daily     Follow up with Dc Rashid CNP if needed in the wound care center  Call [phone] for any questions or concerns.   Date__________   Time____________        Treatment Note Wound 06/10/22 #2 Left Second Toe-Dressing/Treatment:  (betadine;bandaid)  Wound 06/10/22 #1 Right Buttock-Dressing/Treatment:  (dusitin;clobetasol;stimulen;foam border)    Written Patient Dismissal Instructions Given            Electronically signed by TREVON Chandler CNP on 6/24/2022 at 11:38 AM

## 2022-07-08 ENCOUNTER — HOSPITAL ENCOUNTER (OUTPATIENT)
Dept: WOUND CARE | Age: 87
Discharge: HOME | End: 2022-07-08
Payer: MEDICARE

## 2022-07-08 VITALS
RESPIRATION RATE: 18 BRPM | TEMPERATURE: 97.2 F | DIASTOLIC BLOOD PRESSURE: 55 MMHG | SYSTOLIC BLOOD PRESSURE: 139 MMHG | HEART RATE: 70 BPM

## 2022-07-08 DIAGNOSIS — E11.621 DIABETIC ULCER OF TOE OF LEFT FOOT ASSOCIATED WITH TYPE 2 DIABETES MELLITUS, WITH FAT LAYER EXPOSED (HCC): ICD-10-CM

## 2022-07-08 DIAGNOSIS — S30.810A EXCORIATION OF BUTTOCK, INITIAL ENCOUNTER: ICD-10-CM

## 2022-07-08 DIAGNOSIS — L89.312 PRESSURE INJURY OF RIGHT BUTTOCK, STAGE 2 (HCC): ICD-10-CM

## 2022-07-08 DIAGNOSIS — L84 CALLUS OF FOOT: Primary | ICD-10-CM

## 2022-07-08 DIAGNOSIS — L97.522 DIABETIC ULCER OF TOE OF LEFT FOOT ASSOCIATED WITH TYPE 2 DIABETES MELLITUS, WITH FAT LAYER EXPOSED (HCC): ICD-10-CM

## 2022-07-08 PROCEDURE — 11042 DBRDMT SUBQ TIS 1ST 20SQCM/<: CPT

## 2022-07-08 PROCEDURE — 11042 DBRDMT SUBQ TIS 1ST 20SQCM/<: CPT | Performed by: NURSE PRACTITIONER

## 2022-07-08 RX ORDER — GENTAMICIN SULFATE 1 MG/G
OINTMENT TOPICAL ONCE
Status: CANCELLED | OUTPATIENT
Start: 2022-07-08 | End: 2022-07-08

## 2022-07-08 RX ORDER — CLOBETASOL PROPIONATE 0.5 MG/G
OINTMENT TOPICAL ONCE
Status: CANCELLED | OUTPATIENT
Start: 2022-07-08 | End: 2022-07-08

## 2022-07-08 RX ORDER — LIDOCAINE 40 MG/G
CREAM TOPICAL ONCE
Status: CANCELLED | OUTPATIENT
Start: 2022-07-08 | End: 2022-07-08

## 2022-07-08 RX ORDER — BACITRACIN ZINC AND POLYMYXIN B SULFATE 500; 1000 [USP'U]/G; [USP'U]/G
OINTMENT TOPICAL ONCE
Status: CANCELLED | OUTPATIENT
Start: 2022-07-08 | End: 2022-07-08

## 2022-07-08 RX ORDER — LIDOCAINE 50 MG/G
OINTMENT TOPICAL ONCE
Status: CANCELLED | OUTPATIENT
Start: 2022-07-08 | End: 2022-07-08

## 2022-07-08 RX ORDER — CLOBETASOL PROPIONATE 0.5 MG/G
OINTMENT TOPICAL ONCE
Status: DISCONTINUED | OUTPATIENT
Start: 2022-07-08 | End: 2022-07-09 | Stop reason: HOSPADM

## 2022-07-08 RX ORDER — GINSENG 100 MG
CAPSULE ORAL ONCE
Status: CANCELLED | OUTPATIENT
Start: 2022-07-08 | End: 2022-07-08

## 2022-07-08 RX ORDER — BACITRACIN, NEOMYCIN, POLYMYXIN B 400; 3.5; 5 [USP'U]/G; MG/G; [USP'U]/G
OINTMENT TOPICAL ONCE
Status: CANCELLED | OUTPATIENT
Start: 2022-07-08 | End: 2022-07-08

## 2022-07-08 RX ORDER — LIDOCAINE HYDROCHLORIDE 40 MG/ML
SOLUTION TOPICAL ONCE
Status: CANCELLED | OUTPATIENT
Start: 2022-07-08 | End: 2022-07-08

## 2022-07-08 RX ORDER — BETAMETHASONE DIPROPIONATE 0.05 %
OINTMENT (GRAM) TOPICAL ONCE
Status: CANCELLED | OUTPATIENT
Start: 2022-07-08 | End: 2022-07-08

## 2022-07-08 NOTE — PROGRESS NOTES
Wound Care Center Progress Note       Marisela Lemons  AGE: 82 y.o. GENDER: female  : 1935  TODAY'S DATE:  2022        Subjective:     Chief Complaint   Patient presents with    Wound Check         HISTORY of PRESENT ILLNESS    Anushka Asa a 82 y. o. female who presents to the [facility] for evaluation and treatment of Acute on pressure ulcer right buttock. The wound is of mild severity. The ulcer has been present for approximately 1 week. The underlying cause is thought to be pressure. The patients care to date has included a dry dressing. Also has acute on chronic left second toe of mild severity, covering with a Band-Aid. The patient has significant underlying medical conditions as below.      Wound Pain Timing/Severity: None  Quality of pain: N/A  Severity of pain:  0 / 10   Modifying Factors: venous stasis, lymphedema, diabetes, poor glucose control, chronic pressure, decreased mobility, shear force and obesity  Associated Signs/Symptoms: edema, erythema   Arterial evaluation: VL arteries 21 per Dr. Toyin Mcnamara, no significant stenosis     Venous Evaluation: as needed if indicated based on the wound, location, assessment and healing.     Wound infection: Both lower leg wounds cultured 3/18/22     Diabetes: Yes, no medication regimen at this time, diet control. Last AIC 7.8 as of 20  Smoking: Never smoker  Anticoagulant therapy: No  Immunosuppression: No  Obesity: Yes  Other History: Cor pulmonale on diuretic therapy, PAD on Pletal     Nutritional status: well nourished. Discussed need for increased protein and calories for wound healing and good sources of protein (just over 7 grams for every 20 pounds of body weight). Animal-based foods high in protein (meat, poultry, fish, eggs, and dairy foods).  Plant based foods high in protein (tofu, lentils, beans, chickpeas, nuts, quinoa and everette seeds.     Patient educated on the 6 essential components necessary for wound healing: Circulation, Debridements, Proper Dressings and Topical Wound Products, Infection Control, Edema Control and Offloading.     Patient educated on those factors that negatively effect or impact wound healing: smoking, obesity, uncontrolled diabetes, anticoagulant and immunosuppressive regimens, inadequate nutrition, untreated arterial and venous disease if applicable and measures to manage edema.         PAST MEDICAL HISTORY        Diagnosis Date    Asthma     Chronic kidney disease     acute kidney failure    Chronic ulcer of left leg with fat layer exposed ([address] 3/7/2016    Chronic ulcer of right leg with fat layer exposed ([address] 3/7/2016    Diabetes type 2, controlled ([address]     History of asthma     History of blood transfusion 07/2015    Hypertension     Lymphedema of both lower extremities 3/7/2016    Osteoarthritis     Pneumonia     Thyroid disease     Venous stasis of both lower extremities 3/7/2016    WD-Non-pressure chronic ulcer right lower leg, 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 ([address] 4/21/2016       PAST SURGICAL HISTORY    Past Surgical History:   Procedure Laterality Date    APPENDECTOMY      CHOLECYSTECTOMY      CYSTOSCOPY      EYE SURGERY      bilateral implants    HYSTERECTOMY (CERVIX STATUS UNKNOWN)      JOINT REPLACEMENT Right     knee    PACEMAKER INSERTION N/A 11/17/2020    PACEMAKER INSERTION PERMANENT REMOVAL OF TEMPORARY PACEMAKER performed by Ronal Peguero MD at [facility]    Family History   Problem Relation Age of Onset    Heart Disease Father        SOCIAL HISTORY    Social History     Tobacco Use    Smoking status: Never Smoker    Smokeless tobacco: Never Used   Vaping Use    Vaping Use: Never used   Substance Use Topics    Alcohol use: No    Drug use: No       ALLERGIES    Allergies   Allergen Reactions    Latex Rash    Dye [Iodides] Anaphylaxis    Iodine Anaphylaxis    Dyazide [Hydrochlorothiazide W-Triamterene] Rash  Lasix [Furosemide] Rash    Procardia [Nifedipine] Other (See Comments)     Not for sure if rash occurred or rash    Doxycycline Dermatitis    Edecrin [Ethacrynic Acid]     Levaquin [Levofloxacin] Other (See Comments)     unknown    Mobic [Meloxicam]     Vioxx [Rofecoxib]     Celebrex [Celecoxib] Rash    Lexapro [Escitalopram Oxalate] Rash    Sulfa Antibiotics Hives       MEDICATIONS    Current Outpatient Medications on File Prior to Encounter   Medication Sig Dispense Refill    febuxostat (ULORIC) 40 MG TABS tablet Take 1 tablet by mouth daily 30 tablet 5    traMADol (ULTRAM) 50 MG tablet Take 50 mg by mouth every 6 hours as needed for Pain.  potassium chloride (KLOR-CON M) 20 MEQ extended release tablet Take 3 tablets by mouth daily 90 tablet 3    metOLazone (ZAROXOLYN) 5 MG tablet Take 1 tablet by mouth daily 30 tablet 5    spironolactone (ALDACTONE) 50 MG tablet Take 1 tablet by mouth 2 times daily (Patient taking differently: Take 100 mg by mouth 2 times daily ) 60 tablet 5    albuterol sulfate HFA (VENTOLIN HFA) 108 (90 Base) MCG/ACT inhaler Inhale 2 puffs into the lungs every 6 hours as needed for Wheezing      bumetanide (BUMEX) 1 MG tablet Take 2 tablets by mouth 3 times daily (Patient taking differently: Take 2 mg by mouth in the morning, at noon, and at bedtime ) 540 tablet 3    clobetasol (TEMOVATE) 0.05 % ointment Apply topically 2 times daily. (Patient taking differently: as needed ) 120 g 1    Fluticasone Propionate, Inhal, (FLOVENT IN) Inhale 2 puffs into the lungs 2 times daily      silver sulfADIAZINE (SILVADENE) 1 % cream Apply topically daily.  (Patient taking differently: as needed ) 240 g 5    cilostazol (PLETAL) 50 MG tablet Take 1 tablet by mouth 2 times daily 60 tablet 3    rOPINIRole (REQUIP) 0.5 MG tablet Take 1 tablet by mouth 3 times daily 90 tablet 3    famotidine (PEPCID) 20 MG tablet Take 1 tablet by mouth 2 times daily 60 tablet 5    levothyroxine (SYNTHROID) 50 MCG tablet Take 50 mcg by mouth Daily      Polyethylene Glycol 3350 (MIRALAX PO) Take by mouth      fluticasone (FLONASE) 50 MCG/ACT nasal spray 1 spray by Nasal route 2 times daily       Loratadine (CLARITIN) 10 MG CAPS Take 10 mg by mouth daily      ferrous sulfate 325 (65 FE) MG tablet Take 1 tablet by mouth 2 times daily (with meals) 30 tablet 3    latanoprost (XALATAN) 0.005 % ophthalmic solution Place 1 drop into both eyes nightly      OXYGEN Inhale 2 L/min into the lungs nightly      Multiple Vitamins-Minerals (ICAPS) CAPS Take 1 tablet by mouth 2 times daily       acetaminophen (TYLENOL) 500 MG tablet Take 500 mg by mouth every 6 hours as needed for Pain.  calcium carbonate 600 MG TABS tablet Take 1 tablet by mouth daily. No current facility-administered medications on file prior to encounter. REVIEW OF SYSTEMS    Pertinent items are noted in HPI. Constitutional: Negative for systemic symptoms including fever, chills and malaise. Objective:      BP (!) 139/55   Pulse 70   Temp 97.2 °F (36.2 °C)   Resp 18     PHYSICAL EXAM    General: The patient is in no acute distress. Mental status:  Patient is appropriate, is  oriented to place and plan of care. Dermatologic exam: Visual inspection of the periwound reveals the skin to be edematous.   Wound exam:  see wound description below     All active wounds listed below with today's date are evaluated    Assessment:       Problem List Items Addressed This Visit        Endocrine    WD-Diabetic ulcer of toe of left foot associated with type 2 diabetes mellitus, with fat layer exposed ([address]    Relevant Medications    clobetasol (TEMOVATE) 0.05 % ointment (Start on 7/8/2022  9:45 AM)    Other Relevant Orders    Initiate Outpatient Wound Care Protocol       Other    WD-Callus of foot - Primary    Relevant Medications    clobetasol (TEMOVATE) 0.05 % ointment (Start on 7/8/2022  9:45 AM)    Other Relevant Orders Initiate Outpatient Wound Care Protocol    WD-Pressure injury of right buttock, stage 2 (HCC)    Relevant Medications    clobetasol (TEMOVATE) 0.05 % ointment (Start on 7/8/2022  9:45 AM)    Other Relevant Orders    Initiate Outpatient Wound Care Protocol    WD-Excoriation of buttock crease    Relevant Medications    clobetasol (TEMOVATE) 0.05 % ointment (Start on 7/8/2022  9:45 AM)    Other Relevant Orders    Initiate Outpatient Wound Care Protocol          Procedure Note    Indications:  Based on my examination of this patient's wound(s) today, sharp excision into necrotic subcutaneous tissue is required to promote healing and evaluate the extent of previous healing. Performed by: TREVON Marie CNP    Consent obtained: Yes    Time out taken:  Yes    Pain Control: Anesthetic  Anesthetic: 4% Lidocaine Liquid Topical        Debridement:Excisional Debridement    Using curette the wound(s) was/were sharply debrided down through and including the removal of subcutaneous tissue.         Devitalized Tissue Debrided:  slough and exudate    Pre Debridement Measurements:  Are located in the Wound Documentation Flow Sheet    All active wounds listed below with today's date are evaluated  Wound(s)    debrided this date include # : 1 and 2     Post  Debridement Measurements:  Wound 06/10/22 #1 Right Buttock (Active)   Wound Image   06/24/22 1022   Dressing Status New dressing applied 07/08/22 0926   Wound Cleansed Wound cleanser 07/08/22 0909   Offloading for Diabetic Foot Ulcers Offloading not required 06/10/22 1011   Wound Length (cm) 0.9 cm 07/08/22 0909   Wound Width (cm) 0.9 cm 07/08/22 0909   Wound Depth (cm) 0.1 cm 07/08/22 0909   Wound Surface Area (cm^2) 0.81 cm^2 07/08/22 0909   Change in Wound Size % (l*w) -305 07/08/22 0909   Wound Volume (cm^3) 0.081 cm^3 07/08/22 0909   Wound Healing % -305 07/08/22 0909   Post-Procedure Length (cm) 0.9 cm 07/08/22 0916   Post-Procedure Width (cm) 0.9 cm 07/08/22 0916 Post-Procedure Depth (cm) 0.1 cm 07/08/22 0916   Post-Procedure Surface Area (cm^2) 0.81 cm^2 07/08/22 0916   Post-Procedure Volume (cm^3) 0.081 cm^3 07/08/22 0916   Distance Tunneling (cm) 0 cm 07/08/22 0909   Tunneling Position ___ O'Clock 0 07/08/22 0909   Undermining Starts ___ O'Clock 0 07/08/22 0909   Undermining Ends___ O'Clock 0 07/08/22 0909   Undermining Maxium Distance (cm) 0 07/08/22 0909   Wound Assessment Granulation tissue 07/08/22 0909   Drainage Amount Moderate 07/08/22 0909   Drainage Description Serosanguinous 07/08/22 0909   Odor None 07/08/22 0909   Leatha-wound Assessment Dry/flaky 07/08/22 0909   Margins Defined edges 07/08/22 0909   Wound Thickness Description not for Pressure Injury Full thickness 07/08/22 0909   Number of days: 27       Wound 06/10/22 #2 Left Second Toe (Active)   Wound Image   06/24/22 1022   Dressing Status New dressing applied 07/08/22 0926   Wound Cleansed Wound cleanser 07/08/22 0909   Offloading for Diabetic Foot Ulcers Offloading not required 06/24/22 1022   Wound Length (cm) 0.1 cm 07/08/22 0909   Wound Width (cm) 0.1 cm 07/08/22 0909   Wound Depth (cm) 0.1 cm 07/08/22 0909   Wound Surface Area (cm^2) 0.01 cm^2 07/08/22 0909   Change in Wound Size % (l*w) 93.75 07/08/22 0909   Wound Volume (cm^3) 0.001 cm^3 07/08/22 0909   Wound Healing % 94 07/08/22 0909   Post-Procedure Length (cm) 0.1 cm 07/08/22 0916   Post-Procedure Width (cm) 0.1 cm 07/08/22 0916   Post-Procedure Depth (cm) 0.1 cm 07/08/22 0916   Post-Procedure Surface Area (cm^2) 0.01 cm^2 07/08/22 0916   Post-Procedure Volume (cm^3) 0.001 cm^3 07/08/22 0916   Distance Tunneling (cm) 0 cm 07/08/22 0909   Tunneling Position ___ O'Clock 0 07/08/22 0909   Undermining Starts ___ O'Clock 0 07/08/22 0909   Undermining Ends___ O'Clock 0 07/08/22 0909   Undermining Maxium Distance (cm) 0 07/08/22 0909   Wound Assessment Dry 07/08/22 0909   Drainage Amount Moderate 07/08/22 0909   Drainage Description Serosanguinous 07/08/22 0909   Odor None 07/08/22 0909   Leatha-wound Assessment Hyperkeratosis (callous) 07/08/22 0909   Margins Defined edges 07/08/22 0909   Wound Thickness Description not for Pressure Injury Full thickness 07/08/22 0909   Number of days: 27         Percent of Wound(s) Debrided: approximately 100%    Total  Area  Debrided: 0.82 sq cm     Bleeding:  Minimal    Hemostasis Achieved:  by pressure    Procedural Pain:  0  / 10     Post Procedural Pain:  0 / 10     Response to treatment:  Well tolerated by patient. Status of wound progress and description from last visit: Improving, continue regimen. The spouse is well versed on the wound care. Will follow up in 1 week. Plan:     Discharge Instructions         PHYSICIAN ORDERS AND DISCHARGE INSTRUCTIONS   NOTE: Upon discharge from the [address], you will receive a patient experience survey via E-mail. We would be grateful if you would take the time to fill this survey out.   Wound care order history:               CYNTHIA's   Right  1.68     Left 1.65  Date 1/13/21              Vascular studies: .Leata Miss              Cultures: .3/18/22               Antibiotics: .Mehreenata Miss              HbA1c:  .               Compression/Lymph Pumps: . Coban 2 lites (did not tolerate), Double Tubi              Grafts:  .              RQIE: .    Continuing wound care orders and information:              Residence: .Private              Continue home health care with:.               [facility] wound-care supplies will be provided by: .              Pharmacy: .                            CRISTINA cleansing:                           VU not scrub or use excessive force.                          Wash hands with soap and water before and after dressing changes.                           Prior to applying a clean dressing, cleanse wound with normal saline,                          wound cleanser, or mild soap and water.                           Ask your physician or nurse before getting the wound(s) wet in the shower.              Daily Wound management:                          Keep weight off wounds and reposition every 2 hours.                          TYGHO standing for long periods of time.                          EXJEW wraps/stockings in AM and remove at bedtime.                          Elevate legs to the level of the heart or above for 30 minutes 4-5 times a day and/or when sitting.                                               When taking antibiotics take entire prescription as ordered by MD do not stop taking until medicine is all gone.                                                                 Orders for this week (7/8/22): Left  Second toe- Wash with mild soap and water, rinse with saline, pat dry with 4x4  Paint with Betadine  Cover with bandaid  Change Daily     Buttock- Wash with mild soap and water, Rinse with saline, pat dry with 4x4   Apply Clobetasol, desitin and stimulen  Secure with Foam border  Change Daily     Follow up with Yasir Araujo CNP if needed in the wound care center  Call [phone] for any questions or concerns.   Date__________   Time____________                   Treatment Note Wound 06/10/22 #2 Left Second Toe-Dressing/Treatment:  (betadine, bandaide )  Wound 06/10/22 #1 Right Buttock-Dressing/Treatment:  (clobetasol, desitin, stimulen, sacral borber )    Written Patient Dismissal Instructions Given            Electronically signed by TREVON Patten CNP on 7/8/2022 at 9:35 AM

## 2022-07-11 NOTE — DISCHARGE INSTRUCTIONS
PHYSICIAN ORDERS AND DISCHARGE INSTRUCTIONS   NOTE: Upon discharge from the [address], you will receive a patient experience survey via E-mail. We would be grateful if you would take the time to fill this survey out. Wound care order history:               CYNTHIA's   Right  1.68     Left 1.65  Date 1/13/21              Vascular studies: . Cultures: .3/18/22               Antibiotics: . HbA1c:  . Compression/Lymph Pumps: . Coban 2 lites (did not tolerate), Double Tubi              Grafts: [place]: . Continuing wound care orders and information:              Residence: . Private              Continue home health care with: [facility] Your wound-care supplies will be provided by: [facility]: . Wound cleansing:                           Do not scrub or use excessive force. Wash hands with soap and water before and after dressing changes. Prior to applying a clean dressing, cleanse wound with normal saline,                          wound cleanser, or mild soap and water. Ask your physician or nurse before getting the wound(s) wet in the shower. Daily Wound management:                          Keep weight off wounds and reposition every 2 hours. Avoid standing for long periods of time. Apply wraps/stockings in AM and remove at bedtime. Elevate legs to the level of the heart or above for 30 minutes 4-5 times a day and/or when sitting. When taking antibiotics take entire prescription as ordered by MD do not stop taking until medicine is all gone. Orders for this week (7/8/22):   Left  Second toe- Wash with mild soap and water, rinse with saline, pat dry with 4x4  Place ca alginate between all toes  Paint with Betadine  Cover with bandaid  Change Daily     Buttock- Wash with mild soap and water, Rinse with saline, pat dry with 4x4   Apply Clobetasol, desitin and stimulen  Secure with Foam border  Change Daily     Follow up with Vitaly You CNP if needed in the wound care center  Call [phone] for any questions or concerns.   Date__________   Time____________

## 2022-07-15 ENCOUNTER — HOSPITAL ENCOUNTER (OUTPATIENT)
Dept: WOUND CARE | Age: 87
Discharge: HOME | End: 2022-07-15
Payer: MEDICARE

## 2022-07-15 VITALS
HEART RATE: 77 BPM | RESPIRATION RATE: 18 BRPM | TEMPERATURE: 96.3 F | DIASTOLIC BLOOD PRESSURE: 58 MMHG | SYSTOLIC BLOOD PRESSURE: 95 MMHG

## 2022-07-15 DIAGNOSIS — L84 CALLUS OF FOOT: Primary | ICD-10-CM

## 2022-07-15 DIAGNOSIS — E11.621 DIABETIC ULCER OF TOE OF LEFT FOOT ASSOCIATED WITH TYPE 2 DIABETES MELLITUS, WITH FAT LAYER EXPOSED (HCC): ICD-10-CM

## 2022-07-15 DIAGNOSIS — L89.312 PRESSURE INJURY OF RIGHT BUTTOCK, STAGE 2 (HCC): ICD-10-CM

## 2022-07-15 DIAGNOSIS — S30.810A EXCORIATION OF BUTTOCK, INITIAL ENCOUNTER: ICD-10-CM

## 2022-07-15 DIAGNOSIS — L97.522 DIABETIC ULCER OF TOE OF LEFT FOOT ASSOCIATED WITH TYPE 2 DIABETES MELLITUS, WITH FAT LAYER EXPOSED (HCC): ICD-10-CM

## 2022-07-15 PROCEDURE — 11042 DBRDMT SUBQ TIS 1ST 20SQCM/<: CPT | Performed by: NURSE PRACTITIONER

## 2022-07-15 PROCEDURE — 11042 DBRDMT SUBQ TIS 1ST 20SQCM/<: CPT

## 2022-07-15 RX ORDER — CLOBETASOL PROPIONATE 0.5 MG/G
OINTMENT TOPICAL ONCE
Status: CANCELLED | OUTPATIENT
Start: 2022-07-15 | End: 2022-07-15

## 2022-07-15 RX ORDER — GINSENG 100 MG
CAPSULE ORAL ONCE
Status: CANCELLED | OUTPATIENT
Start: 2022-07-15 | End: 2022-07-15

## 2022-07-15 RX ORDER — BETAMETHASONE DIPROPIONATE 0.05 %
OINTMENT (GRAM) TOPICAL ONCE
Status: CANCELLED | OUTPATIENT
Start: 2022-07-15 | End: 2022-07-15

## 2022-07-15 RX ORDER — LIDOCAINE HYDROCHLORIDE 40 MG/ML
SOLUTION TOPICAL ONCE
Status: CANCELLED | OUTPATIENT
Start: 2022-07-15 | End: 2022-07-15

## 2022-07-15 RX ORDER — BACITRACIN ZINC AND POLYMYXIN B SULFATE 500; 1000 [USP'U]/G; [USP'U]/G
OINTMENT TOPICAL ONCE
Status: CANCELLED | OUTPATIENT
Start: 2022-07-15 | End: 2022-07-15

## 2022-07-15 RX ORDER — LIDOCAINE 50 MG/G
OINTMENT TOPICAL ONCE
Status: CANCELLED | OUTPATIENT
Start: 2022-07-15 | End: 2022-07-15

## 2022-07-15 RX ORDER — LIDOCAINE 40 MG/G
CREAM TOPICAL ONCE
Status: CANCELLED | OUTPATIENT
Start: 2022-07-15 | End: 2022-07-15

## 2022-07-15 RX ORDER — BACITRACIN, NEOMYCIN, POLYMYXIN B 400; 3.5; 5 [USP'U]/G; MG/G; [USP'U]/G
OINTMENT TOPICAL ONCE
Status: CANCELLED | OUTPATIENT
Start: 2022-07-15 | End: 2022-07-15

## 2022-07-15 RX ORDER — GENTAMICIN SULFATE 1 MG/G
OINTMENT TOPICAL ONCE
Status: CANCELLED | OUTPATIENT
Start: 2022-07-15 | End: 2022-07-15

## 2022-07-15 ASSESSMENT — PAIN DESCRIPTION - PAIN TYPE: TYPE: ACUTE PAIN

## 2022-07-15 ASSESSMENT — PAIN DESCRIPTION - FREQUENCY: FREQUENCY: CONTINUOUS

## 2022-07-15 ASSESSMENT — PAIN - FUNCTIONAL ASSESSMENT: PAIN_FUNCTIONAL_ASSESSMENT: PREVENTS OR INTERFERES SOME ACTIVE ACTIVITIES AND ADLS

## 2022-07-15 ASSESSMENT — PAIN DESCRIPTION - ORIENTATION: ORIENTATION: LEFT

## 2022-07-15 ASSESSMENT — PAIN DESCRIPTION - LOCATION: LOCATION: TOE (COMMENT WHICH ONE)

## 2022-07-15 ASSESSMENT — PAIN DESCRIPTION - DESCRIPTORS: DESCRIPTORS: TENDER

## 2022-07-15 ASSESSMENT — PAIN SCALES - GENERAL: PAINLEVEL_OUTOF10: 6

## 2022-07-15 NOTE — PROGRESS NOTES
Wound Care Center Progress Note       Brett Graham  AGE: 82 y.o. GENDER: female  : 1935  TODAY'S DATE:  7/15/2022        Subjective:     Chief Complaint   Patient presents with    Wound Check         HISTORY of PRESENT ILLNESS    Brett Graham is a 82 y.o. female who presents to the [address] [place] [address] for evaluation and treatment of Acute on pressure ulcer right buttock. The wound is of mild severity. The ulcer has been present for approximately 1 week. The underlying cause is thought to be pressure. The patients care to date has included a dry dressing. Also has acute on chronic left second toe of mild severity, covering with a Band-Aid. The patient has significant underlying medical conditions as below. Wound Pain Timing/Severity: None  Quality of pain: N/A  Severity of pain:  0 / 10   Modifying Factors: venous stasis, lymphedema, diabetes, poor glucose control, chronic pressure, decreased mobility, shear force and obesity  Associated Signs/Symptoms: edema, erythema   Arterial evaluation: VL arteries 21 per Dr. Arielle Gutierrez, no significant stenosis     Venous Evaluation: as needed if indicated based on the wound, location, assessment and healing. Wound infection: Both lower leg wounds cultured 3/18/22     Diabetes: Yes, no medication regimen at this time, diet control. Last AIC 7.8 as of 20  Smoking: Never smoker  Anticoagulant therapy: No  Immunosuppression: No  Obesity: Yes  Other History: Cor pulmonale on diuretic therapy, PAD on Pletal     Nutritional status: well nourished. Discussed need for increased protein and calories for wound healing and good sources of protein (just over 7 grams for every 20 pounds of body weight). Animal-based foods high in protein (meat, poultry, fish, eggs, and dairy foods). Plant based foods high in protein (tofu, lentils, beans, chickpeas, nuts, quinoa and everette seeds.      Patient educated on the 6 essential components necessary for wound healing: Circulation, Debridements, Proper Dressings and Topical Wound Products, Infection Control, Edema Control and Offloading. Patient educated on those factors that negatively effect or impact wound healing: smoking, obesity, uncontrolled diabetes, anticoagulant and immunosuppressive regimens, inadequate nutrition, untreated arterial and venous disease if applicable and measures to manage edema.          PAST MEDICAL HISTORY        Diagnosis Date    Asthma     Chronic kidney disease     acute kidney failure    Chronic ulcer of left leg with fat layer exposed ([address] 3/7/2016    Chronic ulcer of right leg with fat layer exposed ([address] 3/7/2016    Diabetes type 2, controlled ([address]     History of asthma     History of blood transfusion 07/2015    Hypertension     Lymphedema of both lower extremities 3/7/2016    Osteoarthritis     Pneumonia     Thyroid disease     Venous stasis of both lower extremities 3/7/2016    WD-Non-pressure chronic ulcer right lower leg, 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 ([address] 4/21/2016       PAST SURGICAL HISTORY    Past Surgical History:   Procedure Laterality Date    APPENDECTOMY      CHOLECYSTECTOMY      CYSTOSCOPY      EYE SURGERY      bilateral implants    HYSTERECTOMY (CERVIX STATUS UNKNOWN)      JOINT REPLACEMENT Right     knee    PACEMAKER INSERTION N/A 11/17/2020    PACEMAKER INSERTION PERMANENT REMOVAL OF TEMPORARY PACEMAKER performed by Manny Ken MD at [address] [place] [address]    Family History   Problem Relation Age of Onset    Heart Disease Father        SOCIAL HISTORY    Social History     Tobacco Use    Smoking status: Never    Smokeless tobacco: Never   Vaping Use    Vaping Use: Never used   Substance Use Topics    Alcohol use: No    Drug use: No       ALLERGIES    Allergies   Allergen Reactions    Latex Rash    Dye [Iodides] Anaphylaxis    Iodine Anaphylaxis    Dyazide [Hydrochlorothiazide W-Triamterene] Rash    Lasix [Furosemide] Rash    Procardia [Nifedipine] Other (See Comments)     Not for sure if rash occurred or rash    Doxycycline Dermatitis    Edecrin [Ethacrynic Acid]     Levaquin [Levofloxacin] Other (See Comments)     unknown    Mobic [Meloxicam]     Vioxx [Rofecoxib]     Celebrex [Celecoxib] Rash    Lexapro [Escitalopram Oxalate] Rash    Sulfa Antibiotics Hives       MEDICATIONS    Current Outpatient Medications on File Prior to Encounter   Medication Sig Dispense Refill    febuxostat (ULORIC) 40 MG TABS tablet Take 1 tablet by mouth daily 30 tablet 5    traMADol (ULTRAM) 50 MG tablet Take 50 mg by mouth every 6 hours as needed for Pain.      potassium chloride (KLOR-CON M) 20 MEQ extended release tablet Take 3 tablets by mouth daily 90 tablet 3    metOLazone (ZAROXOLYN) 5 MG tablet Take 1 tablet by mouth daily 30 tablet 5    spironolactone (ALDACTONE) 50 MG tablet Take 1 tablet by mouth 2 times daily (Patient taking differently: Take 100 mg by mouth in the morning and 100 mg before bedtime.) 60 tablet 5    albuterol sulfate HFA (PROVENTIL;VENTOLIN;PROAIR) 108 (90 Base) MCG/ACT inhaler Inhale 2 puffs into the lungs every 6 hours as needed for Wheezing      bumetanide (BUMEX) 1 MG tablet Take 2 tablets by mouth 3 times daily (Patient taking differently: Take 2 mg by mouth in the morning, at noon, and at bedtime) 540 tablet 3    clobetasol (TEMOVATE) 0.05 % ointment Apply topically 2 times daily. (Patient taking differently: as needed) 120 g 1    Fluticasone Propionate, Inhal, (FLOVENT IN) Inhale 2 puffs into the lungs 2 times daily      silver sulfADIAZINE (SILVADENE) 1 % cream Apply topically daily.  (Patient taking differently: as needed) 240 g 5    cilostazol (PLETAL) 50 MG tablet Take 1 tablet by mouth 2 times daily 60 tablet 3    rOPINIRole (REQUIP) 0.5 MG tablet Take 1 tablet by mouth 3 times daily 90 tablet 3    famotidine (PEPCID) 20 MG tablet Take 1 tablet by mouth 2 times daily 60 tablet 5    levothyroxine (SYNTHROID) 50 MCG tablet Take 50 mcg by mouth Daily      Polyethylene Glycol 3350 (MIRALAX PO) Take by mouth      fluticasone (FLONASE) 50 MCG/ACT nasal spray 1 spray by Nasal route 2 times daily       loratadine (CLARITIN) 10 MG capsule Take 10 mg by mouth daily      ferrous sulfate 325 (65 FE) MG tablet Take 1 tablet by mouth 2 times daily (with meals) 30 tablet 3    latanoprost (XALATAN) 0.005 % ophthalmic solution Place 1 drop into both eyes nightly      OXYGEN Inhale 2 L/min into the lungs nightly      Multiple Vitamins-Minerals (ICAPS) CAPS Take 1 tablet by mouth 2 times daily       acetaminophen (TYLENOL) 500 MG tablet Take 500 mg by mouth every 6 hours as needed for Pain. calcium carbonate 600 MG TABS tablet Take 1 tablet by mouth daily. No current facility-administered medications on file prior to encounter. REVIEW OF SYSTEMS    Pertinent items are noted in HPI. Constitutional: Negative for systemic symptoms including fever, chills and malaise. Objective:      BP (!) 95/58   Pulse 77   Temp (!) 96.3 °F (35.7 °C) (Temporal)   Resp 18     PHYSICAL EXAM    General: The patient is in no acute distress. Mental status:  Patient is appropriate, is  oriented to place and plan of care. Dermatologic exam: Visual inspection of the periwound reveals the skin to be edematous.   Wound exam:  see wound description below     All active wounds listed below with today's date are evaluated    Assessment:       Problem List Items Addressed This Visit          Endocrine    WD-Diabetic ulcer of toe of left foot associated with type 2 diabetes mellitus, with fat layer exposed ([address]    Relevant Orders    Initiate Outpatient Wound Care Protocol       Other    WD-Callus of foot - Primary    Relevant Orders    Initiate Outpatient Wound Care Protocol    WD-Pressure injury of right buttock, stage 2 ([address]    Relevant Orders    Initiate Outpatient Wound Care Protocol    WD-Excoriation of buttock crease    Relevant Orders    Initiate Outpatient Wound Care Protocol       Procedure Note    Indications:  Based on my examination of this patient's wound(s) today, sharp excision into necrotic subcutaneous tissue is required to promote healing and evaluate the extent of previous healing. Performed by: TREVON Alcaraz CNP    Consent obtained: Yes    Time out taken:  Yes    Pain Control: Anesthetic  Anesthetic: 4% Lidocaine Liquid Topical        Debridement:Excisional Debridement    Using curette the wound(s) was/were sharply debrided down through and including the removal of subcutaneous tissue.         Devitalized Tissue Debrided:  slough and exudate    Pre Debridement Measurements:  Are located in the Wound Documentation Flow Sheet    All active wounds listed below with today's date are evaluated  Wound(s)    debrided this date include # : 1 and 2     Post  Debridement Measurements:  Wound 06/10/22 #1 Right Buttock (Active)   Wound Image   07/15/22 0917   Dressing Status New dressing applied 07/15/22 0953   Wound Cleansed Wound cleanser 07/15/22 0917   Offloading for Diabetic Foot Ulcers Offloading not required 07/15/22 0953   Wound Length (cm) 1 cm 07/15/22 0917   Wound Width (cm) 1.3 cm 07/15/22 0917   Wound Depth (cm) 0.1 cm 07/15/22 0917   Wound Surface Area (cm^2) 1.3 cm^2 07/15/22 0917   Change in Wound Size % (l*w) -550 07/15/22 0917   Wound Volume (cm^3) 0.13 cm^3 07/15/22 0917   Wound Healing % -550 07/15/22 0917   Post-Procedure Length (cm) 1 cm 07/15/22 0936   Post-Procedure Width (cm) 1.3 cm 07/15/22 0936   Post-Procedure Depth (cm) 0.1 cm 07/15/22 0936   Post-Procedure Surface Area (cm^2) 1.3 cm^2 07/15/22 0936   Post-Procedure Volume (cm^3) 0.13 cm^3 07/15/22 0936   Distance Tunneling (cm) 0 cm 07/15/22 0917   Tunneling Position ___ O'Clock 0 07/15/22 0917   Undermining Starts ___ O'Clock 0 07/15/22 0917   Undermining Ends___ O'Clock 0 07/15/22 0917   Undermining Maxium Distance (cm) 0 07/15/22 0917   Wound Assessment Granulation tissue 07/15/22 0917   Drainage Amount Moderate 07/15/22 0917   Drainage Description Serosanguinous 07/15/22 0917   Odor None 07/15/22 0917   Leatha-wound Assessment Dry/flaky 07/15/22 0917   Margins Defined edges 07/15/22 0917   Wound Thickness Description not for Pressure Injury Full thickness 07/15/22 0917   Number of days: 35       Wound 06/10/22 #2 Left Second Toe (Active)   Wound Image   07/15/22 0917   Dressing Status New dressing applied 07/15/22 0953   Wound Cleansed Wound cleanser 07/15/22 0917   Offloading for Diabetic Foot Ulcers Offloading not required 07/15/22 0953   Wound Length (cm) 0.1 cm 07/15/22 0917   Wound Width (cm) 0.1 cm 07/15/22 0917   Wound Depth (cm) 0.1 cm 07/15/22 0917   Wound Surface Area (cm^2) 0.01 cm^2 07/15/22 0917   Change in Wound Size % (l*w) 93.75 07/15/22 0917   Wound Volume (cm^3) 0.001 cm^3 07/15/22 0917   Wound Healing % 94 07/15/22 0917   Post-Procedure Length (cm) 0.1 cm 07/15/22 0936   Post-Procedure Width (cm) 0.1 cm 07/15/22 0936   Post-Procedure Depth (cm) 0.1 cm 07/15/22 0936   Post-Procedure Surface Area (cm^2) 0.01 cm^2 07/15/22 0936   Post-Procedure Volume (cm^3) 0.001 cm^3 07/15/22 0936   Distance Tunneling (cm) 0 cm 07/15/22 0917   Tunneling Position ___ O'Clock 0 07/15/22 0917   Undermining Starts ___ O'Clock 0 07/15/22 0917   Undermining Ends___ O'Clock 0 07/15/22 0917   Undermining Maxium Distance (cm) 0 07/15/22 0917   Wound Assessment Dry 07/15/22 0917   Drainage Amount Scant 07/15/22 0917   Drainage Description Serosanguinous 07/15/22 0917   Odor None 07/15/22 0917   Leatha-wound Assessment Hyperkeratosis (callous) 07/15/22 0917   Margins Defined edges 07/15/22 0917   Wound Thickness Description not for Pressure Injury Full thickness 07/15/22 0917   Number of days: 35     Percent of Wound(s) Debrided: approximately 100%    Total  Area  Debrided: 1.4 sq cm     Bleeding:  Minimal    Hemostasis Achieved:  by pressure    Procedural Pain:  0  / 10 When taking antibiotics take entire prescription as ordered by MD do not stop taking until medicine is all gone. Orders for this week (7/8/22): Left  Second toe- Wash with mild soap and water, rinse with saline, pat dry with 4x4  Place ca alginate between all toes  Paint with Betadine  Cover with bandaid  Change Daily     Buttock- Wash with mild soap and water, Rinse with saline, pat dry with 4x4   Apply Clobetasol, desitin and stimulen  Secure with Foam border  Change Daily     Follow up with Shaista Tello CNP if needed in the wound care center  Call [phone] for any questions or concerns.   Date__________   Time____________      Treatment Note Wound 06/10/22 #1 Right Buttock-Dressing/Treatment:  (clobet,desitin,stimulen,border)  Wound 06/10/22 #2 Left Second Toe-Dressing/Treatment:  (foam,tubi)    Written Patient Dismissal Instructions Given            Electronically signed by TREVON Malone CNP on 7/15/2022 at 10:18 AM

## 2022-07-26 NOTE — DISCHARGE INSTRUCTIONS
PHYSICIAN ORDERS AND DISCHARGE INSTRUCTIONS   NOTE: Upon discharge from the [address], you will receive a patient experience survey via E-mail. We would be grateful if you would take the time to fill this survey out. Wound care order history:               CYNTHIA's   Right  1.68     Left 1.65  Date 1/13/21              Vascular studies: . Cultures: .3/18/22              Antibiotics: . HbA1c:  . Compression/Lymph Pumps: . Coban 2 lites (did not tolerate), Double Tubi              Grafts: [place]: . Continuing wound care orders and information:              Residence: . Private              Continue home health care with: [facility] Your wound-care supplies will be provided by: [facility]: . Wound cleansing:                          Do not scrub or use excessive force. Wash hands with soap and water before and after dressing changes. Prior to applying a clean dressing, cleanse wound with normal saline,                          wound cleanser, or mild soap and water. Ask your physician or nurse before getting the wound(s) wet in the shower. Daily Wound management:                          Keep weight off wounds and reposition every 2 hours. Avoid standing for long periods of time. Apply wraps/stockings in AM and remove at bedtime. Elevate legs to the level of the heart or above for 30 minutes 4-5 times a day and/or when sitting. When taking antibiotics take entire prescription as ordered by MD do not stop taking until medicine is all gone. Orders for this week (7/29/22): Left Anterior Lower Leg - Wash with soap and water, pat dry. Cover with Puracol.  Secure with bandaid or silicone border. Change every 1-2 days. Left Second toe - Wash with mild soap and water, rinse with saline, pat dry with 4x4. Place ca alginate between all toes. Paint with Betadine  Cover with bandaid  Tubi F to Bilateral lower legs. Change daily. Buttock - Wash with mild soap and water, Rinse with saline, pat dry with 4x4. Apply Clobetasol, desitin and stimulen  Secure with Foam border  Change daily. Follow up with ROMI Dumont in 1-2 weeks in the wound care center. Call [phone] for any questions or concerns.   Date__________   Time____________

## 2022-07-29 ENCOUNTER — HOSPITAL ENCOUNTER (OUTPATIENT)
Dept: WOUND CARE | Age: 87
Discharge: HOME | End: 2022-07-29
Payer: MEDICARE

## 2022-07-29 VITALS
DIASTOLIC BLOOD PRESSURE: 63 MMHG | HEART RATE: 66 BPM | TEMPERATURE: 96.8 F | SYSTOLIC BLOOD PRESSURE: 130 MMHG | RESPIRATION RATE: 18 BRPM

## 2022-07-29 DIAGNOSIS — S30.810A EXCORIATION OF BUTTOCK, INITIAL ENCOUNTER: ICD-10-CM

## 2022-07-29 DIAGNOSIS — L97.522 DIABETIC ULCER OF TOE OF LEFT FOOT ASSOCIATED WITH TYPE 2 DIABETES MELLITUS, WITH FAT LAYER EXPOSED (HCC): ICD-10-CM

## 2022-07-29 DIAGNOSIS — L89.312 PRESSURE INJURY OF RIGHT BUTTOCK, STAGE 2 (HCC): ICD-10-CM

## 2022-07-29 DIAGNOSIS — L84 CALLUS OF FOOT: Primary | ICD-10-CM

## 2022-07-29 DIAGNOSIS — E11.621 DIABETIC ULCER OF TOE OF LEFT FOOT ASSOCIATED WITH TYPE 2 DIABETES MELLITUS, WITH FAT LAYER EXPOSED (HCC): ICD-10-CM

## 2022-07-29 PROCEDURE — 11042 DBRDMT SUBQ TIS 1ST 20SQCM/<: CPT | Performed by: NURSE PRACTITIONER

## 2022-07-29 PROCEDURE — 11042 DBRDMT SUBQ TIS 1ST 20SQCM/<: CPT

## 2022-07-29 RX ORDER — BACITRACIN ZINC AND POLYMYXIN B SULFATE 500; 1000 [USP'U]/G; [USP'U]/G
OINTMENT TOPICAL ONCE
Status: CANCELLED | OUTPATIENT
Start: 2022-07-29 | End: 2022-07-29

## 2022-07-29 RX ORDER — BETAMETHASONE DIPROPIONATE 0.05 %
OINTMENT (GRAM) TOPICAL ONCE
Status: CANCELLED | OUTPATIENT
Start: 2022-07-29 | End: 2022-07-29

## 2022-07-29 RX ORDER — CLOBETASOL PROPIONATE 0.5 MG/G
OINTMENT TOPICAL ONCE
Status: CANCELLED | OUTPATIENT
Start: 2022-07-29 | End: 2022-07-29

## 2022-07-29 RX ORDER — LIDOCAINE HYDROCHLORIDE 40 MG/ML
SOLUTION TOPICAL ONCE
Status: CANCELLED | OUTPATIENT
Start: 2022-07-29 | End: 2022-07-29

## 2022-07-29 RX ORDER — GINSENG 100 MG
CAPSULE ORAL ONCE
Status: CANCELLED | OUTPATIENT
Start: 2022-07-29 | End: 2022-07-29

## 2022-07-29 RX ORDER — LIDOCAINE 50 MG/G
OINTMENT TOPICAL ONCE
Status: CANCELLED | OUTPATIENT
Start: 2022-07-29 | End: 2022-07-29

## 2022-07-29 RX ORDER — CLOBETASOL PROPIONATE 0.5 MG/G
OINTMENT TOPICAL ONCE
Status: DISCONTINUED | OUTPATIENT
Start: 2022-07-29 | End: 2022-07-30 | Stop reason: HOSPADM

## 2022-07-29 RX ORDER — LIDOCAINE 40 MG/G
CREAM TOPICAL ONCE
Status: CANCELLED | OUTPATIENT
Start: 2022-07-29 | End: 2022-07-29

## 2022-07-29 RX ORDER — BACITRACIN, NEOMYCIN, POLYMYXIN B 400; 3.5; 5 [USP'U]/G; MG/G; [USP'U]/G
OINTMENT TOPICAL ONCE
Status: CANCELLED | OUTPATIENT
Start: 2022-07-29 | End: 2022-07-29

## 2022-07-29 RX ORDER — GENTAMICIN SULFATE 1 MG/G
OINTMENT TOPICAL ONCE
Status: CANCELLED | OUTPATIENT
Start: 2022-07-29 | End: 2022-07-29

## 2022-07-29 ASSESSMENT — PAIN DESCRIPTION - DESCRIPTORS: DESCRIPTORS: SORE;TENDER

## 2022-07-29 ASSESSMENT — PAIN - FUNCTIONAL ASSESSMENT: PAIN_FUNCTIONAL_ASSESSMENT: PREVENTS OR INTERFERES SOME ACTIVE ACTIVITIES AND ADLS

## 2022-07-29 ASSESSMENT — PAIN DESCRIPTION - ORIENTATION: ORIENTATION: LEFT

## 2022-07-29 ASSESSMENT — PAIN DESCRIPTION - ONSET: ONSET: ON-GOING

## 2022-07-29 ASSESSMENT — PAIN DESCRIPTION - LOCATION: LOCATION: TOE (COMMENT WHICH ONE)

## 2022-07-29 ASSESSMENT — PAIN DESCRIPTION - FREQUENCY: FREQUENCY: CONTINUOUS

## 2022-07-29 ASSESSMENT — PAIN SCALES - GENERAL: PAINLEVEL_OUTOF10: 10

## 2022-07-29 NOTE — PROGRESS NOTES
Wound Care Center Progress Note       Marisela Lemons  AGE: 82 y.o. GENDER: female  : 1935  TODAY'S DATE:  2022        Subjective:     Chief Complaint   Patient presents with    Wound Check     foot         HISTORY of PRESENT ILLNESS    Marisela Lemons is a 82 y.o. female who presents to the [address] [place] [address] for evaluation and treatment of Acute on pressure ulcer right buttock. The wound is of mild severity. The ulcer has been present for approximately 1 week. The underlying cause is thought to be pressure. The patients care to date has included a dry dressing. Also has acute on chronic left second toe of mild severity, covering with a Band-Aid. The patient has significant underlying medical conditions as below. Wound Pain Timing/Severity: None  Quality of pain: N/A  Severity of pain:  0 / 10   Modifying Factors: venous stasis, lymphedema, diabetes, poor glucose control, chronic pressure, decreased mobility, shear force and obesity  Associated Signs/Symptoms: edema, erythema   Arterial evaluation: VL arteries 21 per Dr. Toyin Mcnamara, no significant stenosis     Venous Evaluation: as needed if indicated based on the wound, location, assessment and healing. Wound infection: Both lower leg wounds cultured 3/18/22     Diabetes: Yes, no medication regimen at this time, diet control. Last AIC 7.8 as of 20  Smoking: Never smoker  Anticoagulant therapy: No  Immunosuppression: No  Obesity: Yes  Other History: Cor pulmonale on diuretic therapy, PAD on Pletal     Nutritional status: well nourished. Discussed need for increased protein and calories for wound healing and good sources of protein (just over 7 grams for every 20 pounds of body weight). Animal-based foods high in protein (meat, poultry, fish, eggs, and dairy foods). Plant based foods high in protein (tofu, lentils, beans, chickpeas, nuts, quinoa and everette seeds.      Patient educated on the 6 essential components necessary for wound healing: Circulation, Debridements, Proper Dressings and Topical Wound Products, Infection Control, Edema Control and Offloading. Patient educated on those factors that negatively effect or impact wound healing: smoking, obesity, uncontrolled diabetes, anticoagulant and immunosuppressive regimens, inadequate nutrition, untreated arterial and venous disease if applicable and measures to manage edema.          PAST MEDICAL HISTORY        Diagnosis Date    Asthma     Chronic kidney disease     acute kidney failure    Chronic ulcer of left leg with fat layer exposed ([address] 3/7/2016    Chronic ulcer of right leg with fat layer exposed ([address] 3/7/2016    Diabetes type 2, controlled ([address]     History of asthma     History of blood transfusion 07/2015    Hypertension     Lymphedema of both lower extremities 3/7/2016    Osteoarthritis     Pneumonia     Thyroid disease     Venous stasis of both lower extremities 3/7/2016    WD-Non-pressure chronic ulcer right lower leg, 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 ([address] 4/21/2016       PAST SURGICAL HISTORY    Past Surgical History:   Procedure Laterality Date    APPENDECTOMY      CHOLECYSTECTOMY      CYSTOSCOPY      EYE SURGERY      bilateral implants    HYSTERECTOMY (CERVIX STATUS UNKNOWN)      JOINT REPLACEMENT Right     knee    PACEMAKER INSERTION N/A 11/17/2020    PACEMAKER INSERTION PERMANENT REMOVAL OF TEMPORARY PACEMAKER performed by Brodie Araiza MD at [address] [place] [address]    Family History   Problem Relation Age of Onset    Heart Disease Father        SOCIAL HISTORY    Social History     Tobacco Use    Smoking status: Never    Smokeless tobacco: Never   Vaping Use    Vaping Use: Never used   Substance Use Topics    Alcohol use: No    Drug use: No       ALLERGIES    Allergies   Allergen Reactions    Latex Rash    Dye [Iodides] Anaphylaxis    Iodine Anaphylaxis    Dyazide [Hydrochlorothiazide W-Triamterene] Rash    Lasix [Furosemide] Rash    Procardia [Nifedipine] Other (See Comments)     Not for sure if rash occurred or rash    Doxycycline Dermatitis    Edecrin [Ethacrynic Acid]     Levaquin [Levofloxacin] Other (See Comments)     unknown    Mobic [Meloxicam]     Vioxx [Rofecoxib]     Celebrex [Celecoxib] Rash    Lexapro [Escitalopram Oxalate] Rash    Sulfa Antibiotics Hives       MEDICATIONS    Current Outpatient Medications on File Prior to Encounter   Medication Sig Dispense Refill    febuxostat (ULORIC) 40 MG TABS tablet Take 1 tablet by mouth daily 30 tablet 5    traMADol (ULTRAM) 50 MG tablet Take 50 mg by mouth every 6 hours as needed for Pain.      potassium chloride (KLOR-CON M) 20 MEQ extended release tablet Take 3 tablets by mouth daily 90 tablet 3    metOLazone (ZAROXOLYN) 5 MG tablet Take 1 tablet by mouth daily 30 tablet 5    spironolactone (ALDACTONE) 50 MG tablet Take 1 tablet by mouth 2 times daily (Patient taking differently: Take 100 mg by mouth in the morning and 100 mg before bedtime.) 60 tablet 5    albuterol sulfate HFA (PROVENTIL;VENTOLIN;PROAIR) 108 (90 Base) MCG/ACT inhaler Inhale 2 puffs into the lungs every 6 hours as needed for Wheezing      bumetanide (BUMEX) 1 MG tablet Take 2 tablets by mouth 3 times daily (Patient taking differently: Take 2 mg by mouth in the morning, at noon, and at bedtime) 540 tablet 3    clobetasol (TEMOVATE) 0.05 % ointment Apply topically 2 times daily. (Patient taking differently: as needed) 120 g 1    Fluticasone Propionate, Inhal, (FLOVENT IN) Inhale 2 puffs into the lungs 2 times daily      silver sulfADIAZINE (SILVADENE) 1 % cream Apply topically daily.  (Patient taking differently: as needed) 240 g 5    rOPINIRole (REQUIP) 0.5 MG tablet Take 1 tablet by mouth 3 times daily 90 tablet 3    famotidine (PEPCID) 20 MG tablet Take 1 tablet by mouth 2 times daily 60 tablet 5    levothyroxine (SYNTHROID) 50 MCG tablet Take 50 mcg by mouth Daily      Polyethylene Glycol 3350 (MIRALAX PO) Take by mouth fluticasone (FLONASE) 50 MCG/ACT nasal spray 1 spray by Nasal route 2 times daily       loratadine (CLARITIN) 10 MG capsule Take 10 mg by mouth daily      ferrous sulfate 325 (65 FE) MG tablet Take 1 tablet by mouth 2 times daily (with meals) 30 tablet 3    latanoprost (XALATAN) 0.005 % ophthalmic solution Place 1 drop into both eyes nightly      OXYGEN Inhale 2 L/min into the lungs nightly      Multiple Vitamins-Minerals (ICAPS) CAPS Take 1 tablet by mouth 2 times daily       acetaminophen (TYLENOL) 500 MG tablet Take 500 mg by mouth every 6 hours as needed for Pain. calcium carbonate 600 MG TABS tablet Take 1 tablet by mouth daily. No current facility-administered medications on file prior to encounter. REVIEW OF SYSTEMS    Pertinent items are noted in HPI. Constitutional: Negative for systemic symptoms including fever, chills and malaise. Objective:      /63   Pulse 66   Temp 96.8 °F (36 °C) (Temporal)   Resp 18     PHYSICAL EXAM    General: The patient is in no acute distress. Mental status:  Patient is appropriate, is  oriented to place and plan of care. Dermatologic exam: Visual inspection of the periwound reveals the skin to be edematous.   Wound exam:  see wound description below     All active wounds listed below with today's date are evaluated    Assessment:       Problem List Items Addressed This Visit          Endocrine    WD-Diabetic ulcer of toe of left foot associated with type 2 diabetes mellitus, with fat layer exposed ([address]    Relevant Medications    clobetasol (TEMOVATE) 0.05 % ointment    Other Relevant Orders    Initiate Outpatient Wound Care Protocol       Other    WD-Callus of foot - Primary    Relevant Medications    clobetasol (TEMOVATE) 0.05 % ointment    Other Relevant Orders    Initiate Outpatient Wound Care Protocol    WD-Pressure injury of right buttock, stage 2 (HCC)    Relevant Medications    clobetasol (TEMOVATE) 0.05 % ointment    Other Relevant Orders    Initiate Outpatient Wound Care Protocol    WD-Excoriation of buttock crease    Relevant Medications    clobetasol (TEMOVATE) 0.05 % ointment    Other Relevant Orders    Initiate Outpatient Wound Care Protocol       Procedure Note    Indications:  Based on my examination of this patient's wound(s) today, sharp excision into necrotic subcutaneous tissue is required to promote healing and evaluate the extent of previous healing. Performed by: TREVON Osborne CNP    Consent obtained: Yes    Time out taken:  Yes    Pain Control: Anesthetic  Anesthetic: 4% Lidocaine Liquid Topical        Debridement:Excisional Debridement    Using curette the wound(s) was/were sharply debrided down through and including the removal of subcutaneous tissue.         Devitalized Tissue Debrided:  slough and exudate    Pre Debridement Measurements:  Are located in the Wound Documentation Flow Sheet    All active wounds listed below with today's date are evaluated  Wound(s)    debrided this date include # : 1 and 3    Post  Debridement Measurements:  Wound 06/10/22 #1 Right Buttock (Active)   Wound Image   07/15/22 0917   Dressing Status Clean;Dry;New dressing applied 07/29/22 1007   Wound Cleansed Wound cleanser 07/29/22 0926   Offloading for Diabetic Foot Ulcers Offloading not required 07/29/22 0926   Wound Length (cm) 0.3 cm 07/29/22 0926   Wound Width (cm) 1 cm 07/29/22 0926   Wound Depth (cm) 0.1 cm 07/29/22 0926   Wound Surface Area (cm^2) 0.3 cm^2 07/29/22 0926   Change in Wound Size % (l*w) -50 07/29/22 0926   Wound Volume (cm^3) 0.03 cm^3 07/29/22 0926   Wound Healing % -50 07/29/22 0926   Post-Procedure Length (cm) 0.3 cm 07/29/22 0942   Post-Procedure Width (cm) 1 cm 07/29/22 0942   Post-Procedure Depth (cm) 0.1 cm 07/29/22 0942   Post-Procedure Surface Area (cm^2) 0.3 cm^2 07/29/22 0942   Post-Procedure Volume (cm^3) 0.03 cm^3 07/29/22 0942   Distance Tunneling (cm) 0 cm 07/29/22 0460   Tunneling Position ___ Wound 07/29/22, #3 Pretibial Left left pretib lower leg (Active)   Wound Etiology Venous 07/29/22 0926   Dressing Status Clean;Dry;New dressing applied 07/29/22 1007   Wound Cleansed Soap and water 07/29/22 0926   Wound Length (cm) 0.3 cm 07/29/22 0926   Wound Width (cm) 0.5 cm 07/29/22 0926   Wound Depth (cm) 0.1 cm 07/29/22 0926   Wound Surface Area (cm^2) 0.15 cm^2 07/29/22 0926   Wound Volume (cm^3) 0.015 cm^3 07/29/22 0926   Post-Procedure Length (cm) 0.3 cm 07/29/22 0942   Post-Procedure Width (cm) 0.5 cm 07/29/22 0942   Post-Procedure Depth (cm) 0.1 cm 07/29/22 0942   Post-Procedure Surface Area (cm^2) 0.15 cm^2 07/29/22 0942   Post-Procedure Volume (cm^3) 0.015 cm^3 07/29/22 0942   Distance Tunneling (cm) 0 cm 07/29/22 0926   Tunneling Position ___ O'Clock 0 07/29/22 0926   Undermining Starts ___ O'Clock 0 07/29/22 0926   Undermining Ends___ O'Clock 0 07/29/22 0926   Undermining Maxium Distance (cm) 0 07/29/22 0926   Wound Assessment Pink/red;Devitalized tissue 07/29/22 0926   Drainage Amount Small 07/29/22 0926   Drainage Description Serosanguinous 07/29/22 0926   Odor None 07/29/22 0926   Leatha-wound Assessment Intact;Fragile 07/29/22 0926   Margins Defined edges 07/29/22 0926   Wound Thickness Description not for Pressure Injury Full thickness 07/29/22 0926   Number of days: 0     Percent of Wound(s) Debrided: approximately 100%    Total  Area  Debrided: 0.45 sq cm     Bleeding:  Minimal    Hemostasis Achieved:  by pressure    Procedural Pain:  0  / 10     Post Procedural Pain:  0 / 10     Response to treatment:  Well tolerated by patient. Status of wound progress and description from last visit: She has a new traumatic wound to the left lower leg that developed since her last visit, it is mild in severity related to trauma from bumping her leg. The spouse is well versed on the wound care. Will follow up in 1 week.      Plan:     Discharge Instructions         PHYSICIAN ORDERS AND DISCHARGE INSTRUCTIONS   NOTE: Upon discharge from the [address], you will receive a patient experience survey via E-mail. We would be grateful if you would take the time to fill this survey out. Wound care order history:               CYNTHIA's   Right  1.68     Left 1.65  Date 1/13/21              Vascular studies: . Cultures: .3/18/22              Antibiotics: . HbA1c:  . Compression/Lymph Pumps: . Coban 2 lites (did not tolerate), Double Tubi              Grafts: Jerod Alexander: . Continuing wound care orders and information:              Residence: . Private              Continue home health care with: [facility] Your wound-care supplies will be provided by: [facility]: . Wound cleansing:                          Do not scrub or use excessive force. Wash hands with soap and water before and after dressing changes. Prior to applying a clean dressing, cleanse wound with normal saline,                          wound cleanser, or mild soap and water. Ask your physician or nurse before getting the wound(s) wet in the shower. Daily Wound management:                          Keep weight off wounds and reposition every 2 hours. Avoid standing for long periods of time. Apply wraps/stockings in AM and remove at bedtime. Elevate legs to the level of the heart or above for 30 minutes 4-5 times a day and/or when sitting. When taking antibiotics take entire prescription as ordered by MD do not stop taking until medicine is all gone. Orders for this week (7/29/22): Left Anterior Lower Leg - Wash with soap and water, pat dry. Cover with Puracol. Secure with bandaid or silicone border. Change every 1-2 days. Left Second toe - Wash with mild soap and water, rinse with saline, pat dry with 4x4. Place ca alginate between all toes. Paint with Betadine  Cover with bandaid  Tubi F to Bilateral lower legs. Change daily. Buttock - Wash with mild soap and water, Rinse with saline, pat dry with 4x4. Apply Clobetasol, desitin and stimulen  Secure with Foam border  Change daily. Follow up with ROMI Dumont in 1-2 weeks in the wound care center. Call [phone] for any questions or concerns.   Date__________   Time____________        Treatment Note Wound 06/10/22 #2 Left Second Toe-Dressing/Treatment:  (ca alg, betadine, bandaid, tubi F)  Wound 07/29/22 Pretibial Left left pretib lower leg-Dressing/Treatment:  (puracol, silicone border, ca alg, betadine, bandaid, tubi F)  Wound 06/10/22 #1 Right Buttock-Dressing/Treatment:  (clobetasol, desitin, stimulen, foam border)    Written Patient Dismissal Instructions Given            Electronically signed by TREVON Perez CNP on 7/29/2022 at 10:35 AM

## 2022-08-05 ENCOUNTER — HOSPITAL ENCOUNTER (OUTPATIENT)
Dept: WOUND CARE | Age: 87
Discharge: HOME | End: 2022-08-05
Payer: MEDICARE

## 2022-08-05 DIAGNOSIS — L84 CALLUS OF FOOT: Primary | ICD-10-CM

## 2022-08-05 DIAGNOSIS — L97.522 DIABETIC ULCER OF TOE OF LEFT FOOT ASSOCIATED WITH TYPE 2 DIABETES MELLITUS, WITH FAT LAYER EXPOSED (HCC): ICD-10-CM

## 2022-08-05 DIAGNOSIS — S30.810A EXCORIATION OF BUTTOCK, INITIAL ENCOUNTER: ICD-10-CM

## 2022-08-05 DIAGNOSIS — E11.621 DIABETIC ULCER OF TOE OF LEFT FOOT ASSOCIATED WITH TYPE 2 DIABETES MELLITUS, WITH FAT LAYER EXPOSED (HCC): ICD-10-CM

## 2022-08-05 DIAGNOSIS — L89.312 PRESSURE INJURY OF RIGHT BUTTOCK, STAGE 2 (HCC): ICD-10-CM

## 2022-08-05 PROCEDURE — 11042 DBRDMT SUBQ TIS 1ST 20SQCM/<: CPT

## 2022-08-05 PROCEDURE — 11042 DBRDMT SUBQ TIS 1ST 20SQCM/<: CPT | Performed by: NURSE PRACTITIONER

## 2022-08-05 RX ORDER — BETAMETHASONE DIPROPIONATE 0.05 %
OINTMENT (GRAM) TOPICAL ONCE
Status: CANCELLED | OUTPATIENT
Start: 2022-08-05 | End: 2022-08-05

## 2022-08-05 RX ORDER — BACITRACIN ZINC AND POLYMYXIN B SULFATE 500; 1000 [USP'U]/G; [USP'U]/G
OINTMENT TOPICAL ONCE
Status: CANCELLED | OUTPATIENT
Start: 2022-08-05 | End: 2022-08-05

## 2022-08-05 RX ORDER — LIDOCAINE HYDROCHLORIDE 40 MG/ML
SOLUTION TOPICAL ONCE
Status: CANCELLED | OUTPATIENT
Start: 2022-08-05 | End: 2022-08-05

## 2022-08-05 RX ORDER — GINSENG 100 MG
CAPSULE ORAL ONCE
Status: CANCELLED | OUTPATIENT
Start: 2022-08-05 | End: 2022-08-05

## 2022-08-05 RX ORDER — GENTAMICIN SULFATE 1 MG/G
OINTMENT TOPICAL ONCE
Status: CANCELLED | OUTPATIENT
Start: 2022-08-05 | End: 2022-08-05

## 2022-08-05 RX ORDER — CLOBETASOL PROPIONATE 0.5 MG/G
OINTMENT TOPICAL ONCE
Status: CANCELLED | OUTPATIENT
Start: 2022-08-05 | End: 2022-08-05

## 2022-08-05 RX ORDER — LIDOCAINE 50 MG/G
OINTMENT TOPICAL ONCE
Status: CANCELLED | OUTPATIENT
Start: 2022-08-05 | End: 2022-08-05

## 2022-08-05 RX ORDER — BACITRACIN, NEOMYCIN, POLYMYXIN B 400; 3.5; 5 [USP'U]/G; MG/G; [USP'U]/G
OINTMENT TOPICAL ONCE
Status: CANCELLED | OUTPATIENT
Start: 2022-08-05 | End: 2022-08-05

## 2022-08-05 RX ORDER — LIDOCAINE 40 MG/G
CREAM TOPICAL ONCE
Status: CANCELLED | OUTPATIENT
Start: 2022-08-05 | End: 2022-08-05

## 2022-08-05 NOTE — PROGRESS NOTES
Wound Care Center Progress Note       Briana Gustafson  AGE: 82 y.o. GENDER: female  : 1935  TODAY'S DATE:  2022        Subjective:     Chief Complaint   Patient presents with    Wound Check           HISTORY of PRESENT ILLNESS    Briana Gustafson is a 82 y.o. female who presents to the [address] [place] [address] for evaluation and treatment of Acute on pressure ulcer right buttock. The wound is of mild severity. The ulcer has been present for approximately 1 week when returning to the wound clinic. The underlying cause is thought to be pressure. The patients care to date has included a dry dressing. Also has acute on chronic left second toe of mild severity, covering with a Band-Aid. The patient has significant underlying medical conditions as below. Wound Pain Timing/Severity: None  Quality of pain: N/A  Severity of pain:  0 / 10   Modifying Factors: venous stasis, lymphedema, diabetes, poor glucose control, chronic pressure, decreased mobility, shear force and obesity  Associated Signs/Symptoms: edema, erythema   Arterial evaluation: VL arteries 21 per Dr. Yaquelin Bean, no significant stenosis     Venous Evaluation: as needed if indicated based on the wound, location, assessment and healing. Wound infection: Both lower leg wounds cultured 3/18/22     Diabetes: Yes, no medication regimen at this time, diet control. Last AIC 7.8 as of 20  Smoking: Never smoker  Anticoagulant therapy: No  Immunosuppression: No  Obesity: Yes  Other History: Cor pulmonale on diuretic therapy, PAD on Pletal     Nutritional status: well nourished. Discussed need for increased protein and calories for wound healing and good sources of protein (just over 7 grams for every 20 pounds of body weight). Animal-based foods high in protein (meat, poultry, fish, eggs, and dairy foods). Plant based foods high in protein (tofu, lentils, beans, chickpeas, nuts, quinoa and everette seeds.      Patient educated on the 6 essential components necessary for wound healing: Circulation, Debridements, Proper Dressings and Topical Wound Products, Infection Control, Edema Control and Offloading. Patient educated on those factors that negatively effect or impact wound healing: smoking, obesity, uncontrolled diabetes, anticoagulant and immunosuppressive regimens, inadequate nutrition, untreated arterial and venous disease if applicable and measures to manage edema.          PAST MEDICAL HISTORY        Diagnosis Date    Asthma     Chronic kidney disease     acute kidney failure    Chronic ulcer of left leg with fat layer exposed ([address] 3/7/2016    Chronic ulcer of right leg with fat layer exposed ([address] 3/7/2016    Diabetes type 2, controlled ([address]     History of asthma     History of blood transfusion 07/2015    Hypertension     Lymphedema of both lower extremities 3/7/2016    Osteoarthritis     Pneumonia     Thyroid disease     Venous stasis of both lower extremities 3/7/2016    WD-Non-pressure chronic ulcer right lower leg, 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 ([address] 4/21/2016       PAST SURGICAL HISTORY    Past Surgical History:   Procedure Laterality Date    APPENDECTOMY      CHOLECYSTECTOMY      CYSTOSCOPY      EYE SURGERY      bilateral implants    HYSTERECTOMY (CERVIX STATUS UNKNOWN)      JOINT REPLACEMENT Right     knee    PACEMAKER INSERTION N/A 11/17/2020    PACEMAKER INSERTION PERMANENT REMOVAL OF TEMPORARY PACEMAKER performed by Davina Pradhan MD at [address] [place] [address]    Family History   Problem Relation Age of Onset    Heart Disease Father        SOCIAL HISTORY    Social History     Tobacco Use    Smoking status: Never    Smokeless tobacco: Never   Vaping Use    Vaping Use: Never used   Substance Use Topics    Alcohol use: No    Drug use: No       ALLERGIES    Allergies   Allergen Reactions    Latex Rash    Dye [Iodides] Anaphylaxis    Iodine Anaphylaxis    Dyazide [Hydrochlorothiazide W-Triamterene] Rash    Lasix [Furosemide] Rash    Procardia [Nifedipine] Other (See Comments)     Not for sure if rash occurred or rash    Doxycycline Dermatitis    Edecrin [Ethacrynic Acid]     Levaquin [Levofloxacin] Other (See Comments)     unknown    Mobic [Meloxicam]     Vioxx [Rofecoxib]     Celebrex [Celecoxib] Rash    Lexapro [Escitalopram Oxalate] Rash    Sulfa Antibiotics Hives       MEDICATIONS    Current Outpatient Medications on File Prior to Encounter   Medication Sig Dispense Refill    febuxostat (ULORIC) 40 MG TABS tablet Take 1 tablet by mouth daily 30 tablet 5    traMADol (ULTRAM) 50 MG tablet Take 50 mg by mouth every 6 hours as needed for Pain.      potassium chloride (KLOR-CON M) 20 MEQ extended release tablet Take 3 tablets by mouth daily 90 tablet 3    metOLazone (ZAROXOLYN) 5 MG tablet Take 1 tablet by mouth daily 30 tablet 5    spironolactone (ALDACTONE) 50 MG tablet Take 1 tablet by mouth 2 times daily (Patient taking differently: Take 100 mg by mouth in the morning and 100 mg before bedtime.) 60 tablet 5    albuterol sulfate HFA (PROVENTIL;VENTOLIN;PROAIR) 108 (90 Base) MCG/ACT inhaler Inhale 2 puffs into the lungs every 6 hours as needed for Wheezing      bumetanide (BUMEX) 1 MG tablet Take 2 tablets by mouth 3 times daily (Patient taking differently: Take 2 mg by mouth in the morning, at noon, and at bedtime) 540 tablet 3    clobetasol (TEMOVATE) 0.05 % ointment Apply topically 2 times daily. (Patient taking differently: as needed) 120 g 1    Fluticasone Propionate, Inhal, (FLOVENT IN) Inhale 2 puffs into the lungs 2 times daily      silver sulfADIAZINE (SILVADENE) 1 % cream Apply topically daily.  (Patient taking differently: as needed) 240 g 5    rOPINIRole (REQUIP) 0.5 MG tablet Take 1 tablet by mouth 3 times daily 90 tablet 3    famotidine (PEPCID) 20 MG tablet Take 1 tablet by mouth 2 times daily 60 tablet 5    levothyroxine (SYNTHROID) 50 MCG tablet Take 50 mcg by mouth Daily      Polyethylene Glycol 3350 (MIRALAX PO) Take by mouth      fluticasone (FLONASE) 50 MCG/ACT nasal spray 1 spray by Nasal route 2 times daily       loratadine (CLARITIN) 10 MG capsule Take 10 mg by mouth daily      ferrous sulfate 325 (65 FE) MG tablet Take 1 tablet by mouth 2 times daily (with meals) 30 tablet 3    latanoprost (XALATAN) 0.005 % ophthalmic solution Place 1 drop into both eyes nightly      OXYGEN Inhale 2 L/min into the lungs nightly      Multiple Vitamins-Minerals (ICAPS) CAPS Take 1 tablet by mouth 2 times daily       acetaminophen (TYLENOL) 500 MG tablet Take 500 mg by mouth every 6 hours as needed for Pain. calcium carbonate 600 MG TABS tablet Take 1 tablet by mouth daily. No current facility-administered medications on file prior to encounter. REVIEW OF SYSTEMS    Pertinent items are noted in HPI. Constitutional: Negative for systemic symptoms including fever, chills and malaise. Objective: There were no vitals taken for this visit. PHYSICAL EXAM    General: The patient is in no acute distress. Mental status:  Patient is appropriate, is  oriented to place and plan of care. Dermatologic exam: Visual inspection of the periwound reveals the skin to be edematous.   Wound exam:  see wound description below     All active wounds listed below with today's date are evaluated    Assessment:       Problem List Items Addressed This Visit          Endocrine    WD-Diabetic ulcer of toe of left foot associated with type 2 diabetes mellitus, with fat layer exposed ([address]    Relevant Orders    Initiate Outpatient Wound Care Protocol       Other    WD-Callus of foot - Primary    Relevant Orders    Initiate Outpatient Wound Care Protocol    WD-Pressure injury of right buttock, stage 2 ([address]    Relevant Orders    Initiate Outpatient Wound Care Protocol    WD-Excoriation of buttock crease    Relevant Orders    Initiate Outpatient Wound Care Protocol       Procedure Note    Indications:  Based on my examination of this patient's wound(s) today, sharp excision into necrotic subcutaneous tissue is required to promote healing and evaluate the extent of previous healing. Performed by: TREVON Perez CNP    Consent obtained: Yes    Time out taken:  Yes    Pain Control: Anesthetic  Anesthetic: 4% Lidocaine Liquid Topical        Debridement:Excisional Debridement    Using curette the wound(s) was/were sharply debrided down through and including the removal of subcutaneous tissue. Devitalized Tissue Debrided:  slough and exudate    Pre Debridement Measurements:  Are located in the Wound Documentation Flow Sheet    All active wounds listed below with today's date are evaluated  Wound(s)    debrided this date include # : 1 and 3    Post  Debridement Measurements:  Wound 06/10/22 #1 Right Buttock (Active)   Wound Image   07/15/22 0917   Dressing Status Clean;Dry;New dressing applied 08/05/22 0921   Wound Cleansed Soap and water; Wound cleanser;Cleansed with saline 08/05/22 0921   Offloading for Diabetic Foot Ulcers Offloading not required 07/29/22 0926   Wound Length (cm) 1.5 cm 08/05/22 0921   Wound Width (cm) 1.2 cm 08/05/22 0921   Wound Depth (cm) 0.1 cm 08/05/22 0921   Wound Surface Area (cm^2) 1.8 cm^2 08/05/22 0921   Change in Wound Size % (l*w) -800 08/05/22 0921   Wound Volume (cm^3) 0.18 cm^3 08/05/22 0921   Wound Healing % -800 08/05/22 0921   Post-Procedure Length (cm) 1.5 cm 08/05/22 0943   Post-Procedure Width (cm) 1.2 cm 08/05/22 0943   Post-Procedure Depth (cm) 0.1 cm 08/05/22 0943   Post-Procedure Surface Area (cm^2) 1.8 cm^2 08/05/22 0943   Post-Procedure Volume (cm^3) 0.18 cm^3 08/05/22 0943   Distance Tunneling (cm) 0 cm 08/05/22 0921   Tunneling Position ___ O'Clock 0 08/05/22 0921   Undermining Starts ___ O'Clock 0 08/05/22 0921   Undermining Ends___ O'Clock 0 08/05/22 0921   Undermining Maxium Distance (cm) 0 08/05/22 0921   Wound Assessment Covel/red;Slough 08/05/22 0921   Drainage Amount Moderate 08/05/22 0921   Drainage Description Serosanguinous 08/05/22 0921   Odor None 08/05/22 0921   Leatha-wound Assessment Fragile 08/05/22 0921   Margins Defined edges 08/05/22 0921   Wound Thickness Description not for Pressure Injury Full thickness 08/05/22 0921   Number of days: 56       Wound 06/10/22 #2 Left Second Toe (Active)   Wound Image   07/15/22 0917   Dressing Status Clean;Dry;New dressing applied 07/29/22 1007   Wound Cleansed Soap and water; Wound cleanser;Cleansed with saline 08/05/22 0921   Offloading for Diabetic Foot Ulcers Offloading not required 08/05/22 0921   Wound Length (cm) 0.1 cm 08/05/22 0921   Wound Width (cm) 0.1 cm 08/05/22 0921   Wound Depth (cm) 0.1 cm 08/05/22 0921   Wound Surface Area (cm^2) 0.01 cm^2 08/05/22 0921   Change in Wound Size % (l*w) 93.75 08/05/22 0921   Wound Volume (cm^3) 0.001 cm^3 08/05/22 0921   Wound Healing % 94 08/05/22 0921   Post-Procedure Length (cm) 0.1 cm 08/05/22 0943   Post-Procedure Width (cm) 0.1 cm 08/05/22 0943   Post-Procedure Depth (cm) 0.1 cm 08/05/22 0943   Post-Procedure Surface Area (cm^2) 0.01 cm^2 08/05/22 0943   Post-Procedure Volume (cm^3) 0.001 cm^3 08/05/22 0943   Distance Tunneling (cm) 0 cm 08/05/22 0921   Tunneling Position ___ O'Clock 0 08/05/22 0921   Undermining Starts ___ O'Clock 0 08/05/22 0921   Undermining Ends___ O'Clock 0 08/05/22 0921   Undermining Maxium Distance (cm) 0 08/05/22 0921   Wound Assessment Dry 08/05/22 0921   Drainage Amount None 08/05/22 0921   Drainage Description Serosanguinous 07/15/22 0917   Odor None 08/05/22 0921   Leatha-wound Assessment Hyperkeratosis (callous) 08/05/22 0921   Margins Attached edges 08/05/22 0921   Wound Thickness Description not for Pressure Injury Full thickness 08/05/22 0921   Number of days: 56       Wound 07/29/22 #3 Left lower leg (Active)   Wound Etiology Venous 07/29/22 0926   Dressing Status Clean;Dry;New dressing applied 07/29/22 1007   Wound Cleansed Soap and water; Wound cleanser 08/05/22 0921   Wound Length (cm) 0.2 cm 08/05/22 0921   Wound Width (cm) 0.2 cm 08/05/22 0921   Wound Depth (cm) 0.1 cm 08/05/22 0921   Wound Surface Area (cm^2) 0.04 cm^2 08/05/22 0921   Change in Wound Size % (l*w) 73.33 08/05/22 0921   Wound Volume (cm^3) 0.004 cm^3 08/05/22 0921   Wound Healing % 73 08/05/22 0921   Post-Procedure Length (cm) 0.2 cm 08/05/22 0943   Post-Procedure Width (cm) 0.2 cm 08/05/22 0943   Post-Procedure Depth (cm) 0.1 cm 08/05/22 0943   Post-Procedure Surface Area (cm^2) 0.04 cm^2 08/05/22 0943   Post-Procedure Volume (cm^3) 0.004 cm^3 08/05/22 0943   Distance Tunneling (cm) 0 cm 08/05/22 0921   Tunneling Position ___ O'Clock 0 08/05/22 0921   Undermining Starts ___ O'Clock 0 08/05/22 0921   Undermining Ends___ O'Clock 0 08/05/22 0921   Undermining Maxium Distance (cm) 0 08/05/22 0921   Wound Assessment Pink/red 08/05/22 0921   Drainage Amount Small 08/05/22 0921   Drainage Description Serosanguinous 08/05/22 0921   Odor None 08/05/22 0921   Leatha-wound Assessment Intact;Fragile 08/05/22 0921   Margins Defined edges 08/05/22 0921   Wound Thickness Description not for Pressure Injury Full thickness 08/05/22 0921   Number of days: 7     Percent of Wound(s) Debrided: approximately 100%    Total  Area  Debrided: 1.85 sq cm     Bleeding:  Minimal    Hemostasis Achieved:  by pressure    Procedural Pain:  0  / 10     Post Procedural Pain:  0 / 10     Response to treatment:  Well tolerated by patient. Status of wound progress and description from last visit: She has a new traumatic wound to the left lower leg that developed since her last visit 7/29/22, it is mild in severity related to trauma from bumping her leg and almost healed this week. The spouse is well versed on the wound care. Will follow up in 1 week.      Plan:     Discharge Instructions         PHYSICIAN ORDERS AND DISCHARGE INSTRUCTIONS   NOTE: Upon discharge from the [address], you will receive a patient experience pat dry with 4x4. Place ca alginate between all toes. Paint with Betadine  Apply Marianna to wound bed  Secure with bandaid  Change daily. Buttock - Wash with mild soap and water, Rinse with saline, pat dry with 4x4. Apply puracol to wound bed  Secure with silicone border gauze  Change daily. Follow up with ROM[facility] Dumont in 1-2 weeks in the wound care center. Call [phone] for any questions or concerns.   Date__________   Time____________      Treatment Note Wound 06/10/22 #1 Right Buttock-Dressing/Treatment:  (anasept, marianna, border gauze)    Written Patient Dismissal Instructions Given            Electronically signed by TREVON Ceja CNP on 8/5/2022 at 10:15 AM

## 2022-08-05 NOTE — DISCHARGE INSTRUCTIONS
PHYSICIAN ORDERS AND DISCHARGE INSTRUCTIONS   NOTE: Upon discharge from the [address], you will receive a patient experience survey via E-mail. We would be grateful if you would take the time to fill this survey out. Wound care order history:               CYNTHIA's   Right  1.68     Left 1.65  Date 1/13/21              Vascular studies: . Cultures: .3/18/22              Antibiotics: . HbA1c:  . Compression/Lymph Pumps: . Coban 2 lites (did not tolerate), Double Tubi              Grafts: Preethi Mobley: . Continuing wound care orders and information:              Residence: . Private              Continue home health care with: [facility] Your wound-care supplies will be provided by: [facility]: . Wound cleansing:                          Do not scrub or use excessive force. Wash hands with soap and water before and after dressing changes. Prior to applying a clean dressing, cleanse wound with normal saline,                          wound cleanser, or mild soap and water. Ask your physician or nurse before getting the wound(s) wet in the shower. Daily Wound management:                          Keep weight off wounds and reposition every 2 hours. Avoid standing for long periods of time. Apply wraps/stockings in AM and remove at bedtime. Elevate legs to the level of the heart or above for 30 minutes 4-5 times a day and/or when sitting. When taking antibiotics take entire prescription as ordered by MD do not stop taking until medicine is all gone. Orders for this week (7/29/22): Left Anterior Lower Leg - Wash with soap and water, pat dry. Cover with Puracol. Secure with silicone border gauze   Tubi F  Change Daily     Left Second toe - Wash with mild soap and water, rinse with saline, pat dry with 4x4. Place ca alginate between all toes. Paint with Betadine  Apply Marianna to wound bed  Secure with bandaid  Change daily. Buttock - Wash with mild soap and water, Rinse with saline, pat dry with 4x4. Apply puracol to wound bed  Secure with silicone border gauze  Change daily. Follow up with [facility] Dumont in 1-2 weeks in the wound care center. Call [phone] for any questions or concerns.   Date__________   Time____________

## 2022-08-11 NOTE — DISCHARGE INSTRUCTIONS
PHYSICIAN ORDERS AND DISCHARGE INSTRUCTIONS   NOTE: Upon discharge from the [address], you will receive a patient experience survey via E-mail. We would be grateful if you would take the time to fill this survey out. Wound care order history:               CYNTHIA's   Right  1.68     Left 1.65  Date 1/13/21              Vascular studies: . Cultures: .3/18/22              Antibiotics: . HbA1c:  . Compression/Lymph Pumps: . Coban 2 lites (did not tolerate), Double Tubi              Grafts: Cyrena Kinjal: . Continuing wound care orders and information:              Residence: . Private              [facility] care [facility]: [facility] Your wound-care supplies will be provided by: [facility]: . Wound cleansing:                          Do not scrub or use excessive force. Wash hands with soap and water before and after dressing changes. Prior to applying a clean dressing, cleanse wound with normal saline,                          wound cleanser, or mild soap and water. Ask your physician or nurse before getting the wound(s) wet in the shower. Daily Wound management:                          Keep weight off wounds and reposition every 2 hours. Avoid standing for long periods of time. Apply wraps/stockings in AM and remove at bedtime. Elevate legs to the level of the heart or above for 30 minutes 4-5 times a day and/or when sitting. When taking antibiotics take entire prescription as ordered by MD do not stop taking until medicine is all gone. Orders for this week (8/12/22): Left Anterior Lower Leg - Wash with soap and water, pat dry.   Cover with Puracol. Secure with silicone border gauze  Tubi F  Change Daily     Left Second toe - Wash with mild soap and water, rinse with saline, pat dry with 4x4. Place ca alginate between all toes. Paint with Betadine  Apply Marianna to wound bed  Secure with bandaid  Change daily. Buttock - Wash with mild soap and water, Rinse with saline, pat dry with 4x4. Apply puracol to wound bed  Secure with silicone border gauze  Change daily. Follow up with [facility] Dumont in 1-2 weeks in the wound care center. Call [phone] for any questions or concerns.   Date__________   Time____________

## 2022-08-12 ENCOUNTER — HOSPITAL ENCOUNTER (OUTPATIENT)
Dept: WOUND CARE | Age: 87
Discharge: HOME | End: 2022-08-12
Payer: MEDICARE

## 2022-08-12 VITALS
DIASTOLIC BLOOD PRESSURE: 70 MMHG | HEART RATE: 63 BPM | SYSTOLIC BLOOD PRESSURE: 145 MMHG | RESPIRATION RATE: 18 BRPM | TEMPERATURE: 96.2 F

## 2022-08-12 DIAGNOSIS — L84 CALLUS OF FOOT: Primary | ICD-10-CM

## 2022-08-12 DIAGNOSIS — S30.810A EXCORIATION OF BUTTOCK, INITIAL ENCOUNTER: ICD-10-CM

## 2022-08-12 DIAGNOSIS — E11.621 DIABETIC ULCER OF TOE OF LEFT FOOT ASSOCIATED WITH TYPE 2 DIABETES MELLITUS, WITH FAT LAYER EXPOSED (HCC): ICD-10-CM

## 2022-08-12 DIAGNOSIS — L89.312 PRESSURE INJURY OF RIGHT BUTTOCK, STAGE 2 (HCC): ICD-10-CM

## 2022-08-12 DIAGNOSIS — L97.522 DIABETIC ULCER OF TOE OF LEFT FOOT ASSOCIATED WITH TYPE 2 DIABETES MELLITUS, WITH FAT LAYER EXPOSED (HCC): ICD-10-CM

## 2022-08-12 PROCEDURE — 11042 DBRDMT SUBQ TIS 1ST 20SQCM/<: CPT | Performed by: NURSE PRACTITIONER

## 2022-08-12 PROCEDURE — 11042 DBRDMT SUBQ TIS 1ST 20SQCM/<: CPT

## 2022-08-12 RX ORDER — GENTAMICIN SULFATE 1 MG/G
OINTMENT TOPICAL ONCE
Status: CANCELLED | OUTPATIENT
Start: 2022-08-12 | End: 2022-08-12

## 2022-08-12 RX ORDER — LIDOCAINE 50 MG/G
OINTMENT TOPICAL ONCE
Status: CANCELLED | OUTPATIENT
Start: 2022-08-12 | End: 2022-08-12

## 2022-08-12 RX ORDER — BETAMETHASONE DIPROPIONATE 0.05 %
OINTMENT (GRAM) TOPICAL ONCE
Status: CANCELLED | OUTPATIENT
Start: 2022-08-12 | End: 2022-08-12

## 2022-08-12 RX ORDER — BACITRACIN ZINC AND POLYMYXIN B SULFATE 500; 1000 [USP'U]/G; [USP'U]/G
OINTMENT TOPICAL ONCE
Status: CANCELLED | OUTPATIENT
Start: 2022-08-12 | End: 2022-08-12

## 2022-08-12 RX ORDER — LIDOCAINE HYDROCHLORIDE 40 MG/ML
SOLUTION TOPICAL ONCE
Status: CANCELLED | OUTPATIENT
Start: 2022-08-12 | End: 2022-08-12

## 2022-08-12 RX ORDER — GINSENG 100 MG
CAPSULE ORAL ONCE
Status: CANCELLED | OUTPATIENT
Start: 2022-08-12 | End: 2022-08-12

## 2022-08-12 RX ORDER — LIDOCAINE 40 MG/G
CREAM TOPICAL ONCE
Status: CANCELLED | OUTPATIENT
Start: 2022-08-12 | End: 2022-08-12

## 2022-08-12 RX ORDER — CLOBETASOL PROPIONATE 0.5 MG/G
OINTMENT TOPICAL ONCE
Status: CANCELLED | OUTPATIENT
Start: 2022-08-12 | End: 2022-08-12

## 2022-08-12 RX ORDER — BACITRACIN, NEOMYCIN, POLYMYXIN B 400; 3.5; 5 [USP'U]/G; MG/G; [USP'U]/G
OINTMENT TOPICAL ONCE
Status: CANCELLED | OUTPATIENT
Start: 2022-08-12 | End: 2022-08-12

## 2022-08-12 ASSESSMENT — PAIN DESCRIPTION - ORIENTATION: ORIENTATION: LEFT

## 2022-08-12 ASSESSMENT — PAIN DESCRIPTION - DESCRIPTORS: DESCRIPTORS: SORE;TENDER

## 2022-08-12 ASSESSMENT — PAIN SCALES - GENERAL: PAINLEVEL_OUTOF10: 9

## 2022-08-12 ASSESSMENT — PAIN DESCRIPTION - LOCATION: LOCATION: TOE (COMMENT WHICH ONE)

## 2022-08-12 NOTE — PROGRESS NOTES
Wound Care Center Progress Note       Briana Gustafson  AGE: 82 y.o. GENDER: female  : 1935  TODAY'S DATE:  2022        Subjective:     Chief Complaint   Patient presents with    Wound Check     Left foot second toe, right buttock            HISTORY of PRESENT ILLNESS    Briana Gustafson is a 82 y.o. female who presents to the [address] [place] [address] for evaluation and treatment of Acute on pressure ulcer right buttock. The wound is of mild severity. The ulcer has been present for approximately 1 week when returning to the wound clinic. The underlying cause is thought to be pressure. The patients care to date has included a dry dressing. Also has acute on chronic left second toe of mild severity, covering with a Band-Aid. The patient has significant underlying medical conditions as below. Wound Pain Timing/Severity: None  Quality of pain: N/A  Severity of pain:  0 / 10   Modifying Factors: venous stasis, lymphedema, diabetes, poor glucose control, chronic pressure, decreased mobility, shear force and obesity  Associated Signs/Symptoms: edema, erythema   Arterial evaluation: VL arteries 21 per Dr. Yaquelin Bean, no significant stenosis     Venous Evaluation: as needed if indicated based on the wound, location, assessment and healing. Wound infection: Both lower leg wounds cultured 3/18/22     Diabetes: Yes, no medication regimen at this time, diet control. Last AIC 7.8 as of 20  Smoking: Never smoker  Anticoagulant therapy: No  Immunosuppression: No  Obesity: Yes  Other History: Cor pulmonale on diuretic therapy, PAD on Pletal     Nutritional status: well nourished. Discussed need for increased protein and calories for wound healing and good sources of protein (just over 7 grams for every 20 pounds of body weight). Animal-based foods high in protein (meat, poultry, fish, eggs, and dairy foods). Plant based foods high in protein (tofu, lentils, beans, chickpeas, nuts, quinoa and everette seeds.      Patient educated on the 6 essential components necessary for wound healing: Circulation, Debridements, Proper Dressings and Topical Wound Products, Infection Control, Edema Control and Offloading. Patient educated on those factors that negatively effect or impact wound healing: smoking, obesity, uncontrolled diabetes, anticoagulant and immunosuppressive regimens, inadequate nutrition, untreated arterial and venous disease if applicable and measures to manage edema.          PAST MEDICAL HISTORY        Diagnosis Date    Asthma     Chronic kidney disease     acute kidney failure    Chronic ulcer of left leg with fat layer exposed ([address] 3/7/2016    Chronic ulcer of right leg with fat layer exposed ([address] 3/7/2016    Diabetes type 2, controlled ([address]     History of asthma     History of blood transfusion 07/2015    Hypertension     Lymphedema of both lower extremities 3/7/2016    Osteoarthritis     Pneumonia     Thyroid disease     Venous stasis of both lower extremities 3/7/2016    WD-Non-pressure chronic ulcer right lower leg, 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 ([address] 4/21/2016       PAST SURGICAL HISTORY    Past Surgical History:   Procedure Laterality Date    APPENDECTOMY      CHOLECYSTECTOMY      CYSTOSCOPY      EYE SURGERY      bilateral implants    HYSTERECTOMY (CERVIX STATUS UNKNOWN)      JOINT REPLACEMENT Right     knee    PACEMAKER INSERTION N/A 11/17/2020    PACEMAKER INSERTION PERMANENT REMOVAL OF TEMPORARY PACEMAKER performed by Lacinda Brittle, MD at [address] [place] [address]    Family History   Problem Relation Age of Onset    Heart Disease Father        SOCIAL HISTORY    Social History     Tobacco Use    Smoking status: Never    Smokeless tobacco: Never   Vaping Use    Vaping Use: Never used   Substance Use Topics    Alcohol use: No    Drug use: No       ALLERGIES    Allergies   Allergen Reactions    Latex Rash    Dye [Iodides] Anaphylaxis    Iodine Anaphylaxis    Dyazide [Hydrochlorothiazide W-Triamterene] Rash    Lasix [Furosemide] Rash    Procardia [Nifedipine] Other (See Comments)     Not for sure if rash occurred or rash    Doxycycline Dermatitis    Edecrin [Ethacrynic Acid]     Levaquin [Levofloxacin] Other (See Comments)     unknown    Mobic [Meloxicam]     Vioxx [Rofecoxib]     Celebrex [Celecoxib] Rash    Lexapro [Escitalopram Oxalate] Rash    Sulfa Antibiotics Hives       MEDICATIONS    Current Outpatient Medications on File Prior to Encounter   Medication Sig Dispense Refill    febuxostat (ULORIC) 40 MG TABS tablet Take 1 tablet by mouth daily 30 tablet 5    traMADol (ULTRAM) 50 MG tablet Take 50 mg by mouth every 6 hours as needed for Pain.      potassium chloride (KLOR-CON M) 20 MEQ extended release tablet Take 3 tablets by mouth daily 90 tablet 3    metOLazone (ZAROXOLYN) 5 MG tablet Take 1 tablet by mouth daily 30 tablet 5    spironolactone (ALDACTONE) 50 MG tablet Take 1 tablet by mouth 2 times daily (Patient taking differently: Take 100 mg by mouth in the morning and 100 mg before bedtime.) 60 tablet 5    albuterol sulfate HFA (PROVENTIL;VENTOLIN;PROAIR) 108 (90 Base) MCG/ACT inhaler Inhale 2 puffs into the lungs every 6 hours as needed for Wheezing      bumetanide (BUMEX) 1 MG tablet Take 2 tablets by mouth 3 times daily (Patient taking differently: Take 2 mg by mouth in the morning, at noon, and at bedtime) 540 tablet 3    clobetasol (TEMOVATE) 0.05 % ointment Apply topically 2 times daily. (Patient taking differently: as needed) 120 g 1    Fluticasone Propionate, Inhal, (FLOVENT IN) Inhale 2 puffs into the lungs 2 times daily      silver sulfADIAZINE (SILVADENE) 1 % cream Apply topically daily.  (Patient taking differently: as needed) 240 g 5    rOPINIRole (REQUIP) 0.5 MG tablet Take 1 tablet by mouth 3 times daily 90 tablet 3    famotidine (PEPCID) 20 MG tablet Take 1 tablet by mouth 2 times daily 60 tablet 5    levothyroxine (SYNTHROID) 50 MCG tablet Take 50 mcg by mouth Daily      Polyethylene Glycol 3350 (MIRALAX PO) Take by mouth      fluticasone (FLONASE) 50 MCG/ACT nasal spray 1 spray by Nasal route 2 times daily       loratadine (CLARITIN) 10 MG capsule Take 10 mg by mouth daily      ferrous sulfate 325 (65 FE) MG tablet Take 1 tablet by mouth 2 times daily (with meals) 30 tablet 3    latanoprost (XALATAN) 0.005 % ophthalmic solution Place 1 drop into both eyes nightly      OXYGEN Inhale 2 L/min into the lungs nightly      Multiple Vitamins-Minerals (ICAPS) CAPS Take 1 tablet by mouth 2 times daily       acetaminophen (TYLENOL) 500 MG tablet Take 500 mg by mouth every 6 hours as needed for Pain. calcium carbonate 600 MG TABS tablet Take 1 tablet by mouth daily. No current facility-administered medications on file prior to encounter. REVIEW OF SYSTEMS    Pertinent items are noted in HPI. Constitutional: Negative for systemic symptoms including fever, chills and malaise. Objective:      BP (!) 145/70   Pulse 63   Temp (!) 96.2 °F (35.7 °C) (Temporal)   Resp 18     PHYSICAL EXAM    General: The patient is in no acute distress. Mental status:  Patient is appropriate, is  oriented to place and plan of care. Dermatologic exam: Visual inspection of the periwound reveals the skin to be edematous.   Wound exam:  see wound description below     All active wounds listed below with today's date are evaluated    Assessment:       Problem List Items Addressed This Visit          Endocrine    WD-Diabetic ulcer of toe of left foot associated with type 2 diabetes mellitus, with fat layer exposed ([address]    Relevant Orders    Initiate Outpatient Wound Care Protocol       Other    WD-Callus of foot - Primary    Relevant Orders    Initiate Outpatient Wound Care Protocol    WD-Pressure injury of right buttock, stage 2 ([address]    Relevant Orders    Initiate Outpatient Wound Care Protocol    WD-Excoriation of buttock crease    Relevant Orders    Initiate Outpatient Wound Care Protocol       Procedure Note    Indications:  Based on my examination of this patient's wound(s) today, sharp excision into necrotic subcutaneous tissue is required to promote healing and evaluate the extent of previous healing. Performed by: TREVON Araya CNP    Consent obtained: Yes    Time out taken:  Yes    Pain Control: Anesthetic  Anesthetic: 4% Lidocaine Liquid Topical        Debridement:Excisional Debridement    Using curette the wound(s) was/were sharply debrided down through and including the removal of subcutaneous tissue.         Devitalized Tissue Debrided:  slough and exudate    Pre Debridement Measurements:  Are located in the Wound Documentation Flow Sheet    All active wounds listed below with today's date are evaluated  Wound(s)    debrided this date include # : 1 and 3    Post  Debridement Measurements:  Wound 06/10/22 #1 Right Buttock (Active)   Wound Image   07/15/22 0917   Dressing Status Clean;Dry;New dressing applied 08/12/22 1108   Wound Cleansed Wound cleanser;Cleansed with saline 08/12/22 0940   Offloading for Diabetic Foot Ulcers Offloading not required 07/29/22 0926   Wound Length (cm) 1.5 cm 08/12/22 0940   Wound Width (cm) 1 cm 08/12/22 0940   Wound Depth (cm) 0.1 cm 08/12/22 0940   Wound Surface Area (cm^2) 1.5 cm^2 08/12/22 0940   Change in Wound Size % (l*w) -650 08/12/22 0940   Wound Volume (cm^3) 0.15 cm^3 08/12/22 0940   Wound Healing % -650 08/12/22 0940   Post-Procedure Length (cm) 1.5 cm 08/12/22 1005   Post-Procedure Width (cm) 1 cm 08/12/22 1005   Post-Procedure Depth (cm) 0.1 cm 08/12/22 1005   Post-Procedure Surface Area (cm^2) 1.5 cm^2 08/12/22 1005   Post-Procedure Volume (cm^3) 0.15 cm^3 08/12/22 1005   Distance Tunneling (cm) 0 cm 08/12/22 0940   Tunneling Position ___ O'Clock 0 08/12/22 0940   Undermining Starts ___ O'Clock 0 08/12/22 0940   Undermining Ends___ O'Clock 0 08/12/22 0940   Undermining Maxium Distance (cm) 0 08/12/22 0940   Wound Assessment Pink/red;Slough 08/12/22 0940   Drainage Amount Moderate 08/12/22 0940   Drainage Description Serosanguinous 08/12/22 0940   Odor None 08/12/22 0940   Leatha-wound Assessment Maceration;Fragile;Blanchable erythema 08/12/22 0940   Margins Defined edges 08/12/22 0940   Wound Thickness Description not for Pressure Injury Full thickness 08/12/22 0940   Number of days: 63       Wound 06/10/22 #2 Left Second Toe (Active)   Wound Image   08/12/22 0940   Dressing Status New dressing applied;Clean;Dry; Intact 08/12/22 1108   Wound Cleansed Wound cleanser 08/12/22 0940   Offloading for Diabetic Foot Ulcers Offloading not required 08/12/22 0940   Wound Length (cm) 0.1 cm 08/12/22 0940   Wound Width (cm) 0.1 cm 08/12/22 0940   Wound Depth (cm) 0.1 cm 08/12/22 0940   Wound Surface Area (cm^2) 0.01 cm^2 08/12/22 0940   Change in Wound Size % (l*w) 93.75 08/12/22 0940   Wound Volume (cm^3) 0.001 cm^3 08/12/22 0940   Wound Healing % 94 08/12/22 0940   Post-Procedure Length (cm) 0.1 cm 08/12/22 1005   Post-Procedure Width (cm) 0.1 cm 08/12/22 1005   Post-Procedure Depth (cm) 0.1 cm 08/12/22 1005   Post-Procedure Surface Area (cm^2) 0.01 cm^2 08/12/22 1005   Post-Procedure Volume (cm^3) 0.001 cm^3 08/12/22 1005   Distance Tunneling (cm) 0 cm 08/12/22 0940   Tunneling Position ___ O'Clock 0 08/12/22 0940   Undermining Starts ___ O'Clock 0 08/12/22 0940   Undermining Ends___ O'Clock 0 08/12/22 0940   Undermining Maxium Distance (cm) 0 08/12/22 0940   Wound Assessment Dry;Slough 08/12/22 0940   Drainage Amount Scant 08/12/22 0940   Drainage Description Yellow 08/12/22 0940   Odor None 08/12/22 0940   Leatha-wound Assessment Hyperkeratosis (callous) 08/12/22 0940   Margins Attached edges 08/12/22 0940   Wound Thickness Description not for Pressure Injury Full thickness 08/12/22 0940   Number of days: 63       Percent of Wound(s) Debrided: approximately 100%    Total  Area  Debrided: 1.51 sq cm     Bleeding:  Minimal    Hemostasis Achieved:  by pressure    Procedural Pain:  0  / 10     Post Procedural Pain:  0 / 10     Response to treatment:  Well tolerated by patient. Status of wound progress and description from last visit: She has a new traumatic wound to the left lower leg that developed since her last visit 7/29/22, it is mild in severity related to trauma from bumping her leg and almost healed this week. The spouse is well versed on the wound care. Will follow up in 1 week. Plan:     Discharge Instructions           PHYSICIAN ORDERS AND DISCHARGE INSTRUCTIONS   NOTE: Upon discharge from the [address], you will receive a patient experience survey via E-mail. We would be grateful if you would take the time to fill this survey out. Wound care order history:               CYNTHIA's   Right  1.68     Left 1.65  Date 1/13/21              Vascular studies: . Cultures: .3/18/22              Antibiotics: . HbA1c:  . Compression/Lymph Pumps: . Coban 2 lites (did not tolerate), Double Tubi              Grafts: Melisa Lerner: . Continuing wound care orders and information:              Residence: . Private              Continue home health care with: [facility] Your wound-care supplies will be provided by: [facility]: . Wound cleansing:                          Do not scrub or use excessive force. Wash hands with soap and water before and after dressing changes. Prior to applying a clean dressing, cleanse wound with normal saline,                          wound cleanser, or mild soap and water. Ask your physician or nurse before getting the wound(s) wet in the shower. Daily Wound management:                          Keep weight off wounds and reposition every 2 hours. Avoid standing for long periods of time. Apply wraps/stockings in AM and remove at bedtime. Elevate legs to the level of the heart or above for 30 minutes 4-5 times a day and/or when sitting. When taking antibiotics take entire prescription as ordered by MD do not stop taking until medicine is all gone. Orders for this week (8/12/22): Left Anterior Lower Leg - Wash with soap and water, pat dry. Cover with Puracol. Secure with silicone border gauze  Tubi F  Change Daily     Left Second toe - Wash with mild soap and water, rinse with saline, pat dry with 4x4. Place ca alginate between all toes. Paint with Betadine  Apply Marianna to wound bed  Secure with bandaid  Change daily. Buttock - Wash with mild soap and water, Rinse with saline, pat dry with 4x4. Apply puracol to wound bed  Secure with silicone border gauze  Change daily. Follow up with ROMI Dumont in 1-2 weeks in the wound care center. Call [phone] for any questions or concerns.   Date__________   Time____________               Treatment Note Wound 06/10/22 #1 Right Buttock-Dressing/Treatment:  (puracol, silicone border)  Wound 06/10/22 #2 Left Second Toe-Dressing/Treatment:  (ca alginate, betadine, marianna, bandaid, tubi F)    Written Patient Dismissal Instructions Given            Electronically signed by TREVON Marie CNP on 8/12/2022 at 12:05 PM

## 2022-08-20 ENCOUNTER — HOSPITAL ENCOUNTER (OUTPATIENT)
Age: 87
Discharge: HOME | End: 2022-08-20
Payer: MEDICARE

## 2022-08-20 LAB
ALBUMIN SERPL-MCNC: 4.6 GM/DL (ref 3.4–5)
ALP BLD-CCNC: 64 IU/L (ref 40–129)
ALT SERPL-CCNC: 11 U/L (ref 10–40)
ANION GAP SERPL CALCULATED.3IONS-SCNC: 14 MMOL/L (ref 4–16)
AST SERPL-CCNC: 13 IU/L (ref 15–37)
BACTERIA: NEGATIVE /HPF
BILIRUB SERPL-MCNC: 0.5 MG/DL (ref 0–1)
BILIRUBIN URINE: NEGATIVE MG/DL
BLOOD, URINE: NEGATIVE
BUN BLDV-MCNC: 99 MG/DL (ref 6–23)
CALCIUM SERPL-MCNC: 9.9 MG/DL (ref 8.3–10.6)
CAST TYPE: ABNORMAL /HPF
CHLORIDE BLD-SCNC: 90 MMOL/L (ref 99–110)
CLARITY: CLEAR
CO2: 30 MMOL/L (ref 21–32)
COLOR: YELLOW
CREAT SERPL-MCNC: 2.5 MG/DL (ref 0.6–1.1)
CREATININE URINE: 16.7 MG/DL (ref 28–217)
CRYSTAL TYPE: NEGATIVE /HPF
EPITHELIAL CELLS, UA: ABNORMAL /HPF
GFR AFRICAN AMERICAN: 22 ML/MIN/1.73M2
GFR NON-AFRICAN AMERICAN: 18 ML/MIN/1.73M2
GLUCOSE BLD-MCNC: 165 MG/DL (ref 70–99)
GLUCOSE, URINE: NEGATIVE MG/DL
KETONES, URINE: NEGATIVE MG/DL
LEUKOCYTE ESTERASE, URINE: ABNORMAL
MAGNESIUM: 2 MG/DL (ref 1.8–2.4)
NITRITE URINE, QUANTITATIVE: NEGATIVE
PH, URINE: 7 (ref 5–8)
PHOSPHORUS: 4.1 MG/DL (ref 2.5–4.9)
POTASSIUM SERPL-SCNC: 3.8 MMOL/L (ref 3.5–5.1)
PROT/CREAT RATIO, UR: 0.2
PROTEIN UA: NEGATIVE MG/DL
RBC URINE: ABNORMAL /HPF (ref 0–6)
SODIUM BLD-SCNC: 134 MMOL/L (ref 135–145)
SPECIFIC GRAVITY UA: 1.01 (ref 1–1.03)
TOTAL PROTEIN: 7.5 GM/DL (ref 6.4–8.2)
URINE TOTAL PROTEIN: 4.1 MG/DL
UROBILINOGEN, URINE: 0.2 MG/DL (ref 0.2–1)
WBC UA: ABNORMAL /HPF (ref 0–5)

## 2022-08-20 PROCEDURE — 82570 ASSAY OF URINE CREATININE: CPT

## 2022-08-20 PROCEDURE — 81001 URINALYSIS AUTO W/SCOPE: CPT

## 2022-08-20 PROCEDURE — 36415 COLL VENOUS BLD VENIPUNCTURE: CPT

## 2022-08-20 PROCEDURE — 80053 COMPREHEN METABOLIC PANEL: CPT

## 2022-08-20 PROCEDURE — 84100 ASSAY OF PHOSPHORUS: CPT

## 2022-08-20 PROCEDURE — 84156 ASSAY OF PROTEIN URINE: CPT

## 2022-08-20 PROCEDURE — 83735 ASSAY OF MAGNESIUM: CPT

## 2022-08-26 ENCOUNTER — HOSPITAL ENCOUNTER (OUTPATIENT)
Dept: WOUND CARE | Age: 87
Discharge: HOME | End: 2022-08-26
Payer: MEDICARE

## 2022-08-26 VITALS
RESPIRATION RATE: 18 BRPM | HEART RATE: 67 BPM | SYSTOLIC BLOOD PRESSURE: 144 MMHG | TEMPERATURE: 97.4 F | DIASTOLIC BLOOD PRESSURE: 68 MMHG

## 2022-08-26 DIAGNOSIS — L97.522 DIABETIC ULCER OF TOE OF LEFT FOOT ASSOCIATED WITH TYPE 2 DIABETES MELLITUS, WITH FAT LAYER EXPOSED (HCC): ICD-10-CM

## 2022-08-26 DIAGNOSIS — S30.810A EXCORIATION OF BUTTOCK, INITIAL ENCOUNTER: ICD-10-CM

## 2022-08-26 DIAGNOSIS — L89.312 PRESSURE INJURY OF RIGHT BUTTOCK, STAGE 2 (HCC): ICD-10-CM

## 2022-08-26 DIAGNOSIS — L84 CALLUS OF FOOT: Primary | ICD-10-CM

## 2022-08-26 DIAGNOSIS — E11.621 DIABETIC ULCER OF TOE OF LEFT FOOT ASSOCIATED WITH TYPE 2 DIABETES MELLITUS, WITH FAT LAYER EXPOSED (HCC): ICD-10-CM

## 2022-08-26 PROCEDURE — 11042 DBRDMT SUBQ TIS 1ST 20SQCM/<: CPT | Performed by: NURSE PRACTITIONER

## 2022-08-26 PROCEDURE — 11042 DBRDMT SUBQ TIS 1ST 20SQCM/<: CPT

## 2022-08-26 RX ORDER — GINSENG 100 MG
CAPSULE ORAL ONCE
Status: CANCELLED | OUTPATIENT
Start: 2022-08-26 | End: 2022-08-26

## 2022-08-26 RX ORDER — BACITRACIN, NEOMYCIN, POLYMYXIN B 400; 3.5; 5 [USP'U]/G; MG/G; [USP'U]/G
OINTMENT TOPICAL ONCE
Status: CANCELLED | OUTPATIENT
Start: 2022-08-26 | End: 2022-08-26

## 2022-08-26 RX ORDER — CLOBETASOL PROPIONATE 0.5 MG/G
OINTMENT TOPICAL ONCE
Status: CANCELLED | OUTPATIENT
Start: 2022-08-26 | End: 2022-08-26

## 2022-08-26 RX ORDER — LIDOCAINE 40 MG/G
CREAM TOPICAL ONCE
Status: CANCELLED | OUTPATIENT
Start: 2022-08-26 | End: 2022-08-26

## 2022-08-26 RX ORDER — GENTAMICIN SULFATE 1 MG/G
OINTMENT TOPICAL ONCE
Status: CANCELLED | OUTPATIENT
Start: 2022-08-26 | End: 2022-08-26

## 2022-08-26 RX ORDER — BETAMETHASONE DIPROPIONATE 0.05 %
OINTMENT (GRAM) TOPICAL ONCE
Status: CANCELLED | OUTPATIENT
Start: 2022-08-26 | End: 2022-08-26

## 2022-08-26 RX ORDER — LIDOCAINE HYDROCHLORIDE 40 MG/ML
SOLUTION TOPICAL ONCE
Status: CANCELLED | OUTPATIENT
Start: 2022-08-26 | End: 2022-08-26

## 2022-08-26 RX ORDER — LIDOCAINE 50 MG/G
OINTMENT TOPICAL ONCE
Status: CANCELLED | OUTPATIENT
Start: 2022-08-26 | End: 2022-08-26

## 2022-08-26 RX ORDER — BACITRACIN ZINC AND POLYMYXIN B SULFATE 500; 1000 [USP'U]/G; [USP'U]/G
OINTMENT TOPICAL ONCE
Status: CANCELLED | OUTPATIENT
Start: 2022-08-26 | End: 2022-08-26

## 2022-08-26 NOTE — PROGRESS NOTES
Wound Care Center Progress Note       Shiloh Mendoza  AGE: 82 y.o. GENDER: female  : 1935  TODAY'S DATE:  2022        Subjective:     Chief Complaint   Patient presents with    Wound Check     Rt buttock           HISTORY of PRESENT ILLNESS    Shiloh Mendoza is a 82 y.o. female who presents to the [address] [place] [address] for evaluation and treatment of Acute on pressure ulcer right buttock. The wound is of mild severity. The ulcer has been present for approximately 1 week when returning to the wound clinic. The underlying cause is thought to be pressure. The patients care to date has included a dry dressing. Also has acute on chronic left second toe of mild severity, covering with a Band-Aid. The patient has significant underlying medical conditions as below. Wound Pain Timing/Severity: None  Quality of pain: N/A  Severity of pain:  0 / 10   Modifying Factors: venous stasis, lymphedema, diabetes, poor glucose control, chronic pressure, decreased mobility, shear force and obesity  Associated Signs/Symptoms: edema, erythema   Arterial evaluation: VL arteries 21 per Dr. Osvaldo Barakat, no significant stenosis     Venous Evaluation: as needed if indicated based on the wound, location, assessment and healing. Wound infection: Both lower leg wounds cultured 3/18/22     Diabetes: Yes, no medication regimen at this time, diet control. Last AIC 7.8 as of 20  Smoking: Never smoker  Anticoagulant therapy: No  Immunosuppression: No  Obesity: Yes  Other History: Cor pulmonale on diuretic therapy, PAD on Pletal     Nutritional status: well nourished. Discussed need for increased protein and calories for wound healing and good sources of protein (just over 7 grams for every 20 pounds of body weight). Animal-based foods high in protein (meat, poultry, fish, eggs, and dairy foods). Plant based foods high in protein (tofu, lentils, beans, chickpeas, nuts, quinoa and everette seeds.      Patient educated on the 6 essential Lasix [Furosemide] Rash    Procardia [Nifedipine] Other (See Comments)     Not for sure if rash occurred or rash    Doxycycline Dermatitis    Edecrin [Ethacrynic Acid]     Levaquin [Levofloxacin] Other (See Comments)     unknown    Mobic [Meloxicam]     Vioxx [Rofecoxib]     Celebrex [Celecoxib] Rash    Lexapro [Escitalopram Oxalate] Rash    Sulfa Antibiotics Hives       MEDICATIONS    Current Outpatient Medications on File Prior to Encounter   Medication Sig Dispense Refill    febuxostat (ULORIC) 40 MG TABS tablet Take 1 tablet by mouth daily 30 tablet 5    traMADol (ULTRAM) 50 MG tablet Take 50 mg by mouth every 6 hours as needed for Pain.      potassium chloride (KLOR-CON M) 20 MEQ extended release tablet Take 3 tablets by mouth daily 90 tablet 3    metOLazone (ZAROXOLYN) 5 MG tablet Take 1 tablet by mouth daily 30 tablet 5    spironolactone (ALDACTONE) 50 MG tablet Take 1 tablet by mouth 2 times daily (Patient taking differently: Take 100 mg by mouth in the morning and 100 mg before bedtime.) 60 tablet 5    albuterol sulfate HFA (PROVENTIL;VENTOLIN;PROAIR) 108 (90 Base) MCG/ACT inhaler Inhale 2 puffs into the lungs every 6 hours as needed for Wheezing      bumetanide (BUMEX) 1 MG tablet Take 2 tablets by mouth 3 times daily (Patient taking differently: Take 2 mg by mouth in the morning, at noon, and at bedtime) 540 tablet 3    clobetasol (TEMOVATE) 0.05 % ointment Apply topically 2 times daily. (Patient taking differently: as needed) 120 g 1    Fluticasone Propionate, Inhal, (FLOVENT IN) Inhale 2 puffs into the lungs 2 times daily      silver sulfADIAZINE (SILVADENE) 1 % cream Apply topically daily.  (Patient taking differently: as needed) 240 g 5    rOPINIRole (REQUIP) 0.5 MG tablet Take 1 tablet by mouth 3 times daily 90 tablet 3    famotidine (PEPCID) 20 MG tablet Take 1 tablet by mouth 2 times daily 60 tablet 5    levothyroxine (SYNTHROID) 50 MCG tablet Take 50 mcg by mouth Daily      Polyethylene Glycol 3350 (MIRALAX PO) Take by mouth      fluticasone (FLONASE) 50 MCG/ACT nasal spray 1 spray by Nasal route 2 times daily       loratadine (CLARITIN) 10 MG capsule Take 10 mg by mouth daily      ferrous sulfate 325 (65 FE) MG tablet Take 1 tablet by mouth 2 times daily (with meals) 30 tablet 3    latanoprost (XALATAN) 0.005 % ophthalmic solution Place 1 drop into both eyes nightly      OXYGEN Inhale 2 L/min into the lungs nightly      Multiple Vitamins-Minerals (ICAPS) CAPS Take 1 tablet by mouth 2 times daily       acetaminophen (TYLENOL) 500 MG tablet Take 500 mg by mouth every 6 hours as needed for Pain. calcium carbonate 600 MG TABS tablet Take 1 tablet by mouth daily. No current facility-administered medications on file prior to encounter. REVIEW OF SYSTEMS    Pertinent items are noted in HPI. Constitutional: Negative for systemic symptoms including fever, chills and malaise. Objective:      BP (!) 144/68   Pulse 67   Temp 97.4 °F (36.3 °C)   Resp 18     PHYSICAL EXAM    General: The patient is in no acute distress. Mental status:  Patient is appropriate, is  oriented to place and plan of care. Dermatologic exam: Visual inspection of the periwound reveals the skin to be edematous.   Wound exam:  see wound description below     All active wounds listed below with today's date are evaluated    Assessment:       Problem List Items Addressed This Visit          Endocrine    WD-Diabetic ulcer of toe of left foot associated with type 2 diabetes mellitus, with fat layer exposed ([address]    Relevant Orders    Initiate Outpatient Wound Care Protocol       Other    WD-Callus of foot - Primary    Relevant Orders    Initiate Outpatient Wound Care Protocol    WD-Pressure injury of right buttock, stage 2 ([address]    Relevant Orders    Initiate Outpatient Wound Care Protocol    WD-Excoriation of buttock crease    Relevant Orders    Initiate Outpatient Wound Care Protocol       Procedure Note    Indications: Based on my examination of this patient's wound(s) today, sharp excision into necrotic subcutaneous tissue is required to promote healing and evaluate the extent of previous healing. Performed by: TREVON Perez CNP    Consent obtained: Yes    Time out taken:  Yes    Pain Control: Anesthetic  Anesthetic: 4% Lidocaine Liquid Topical        Debridement:Excisional Debridement    Using curette the wound(s) was/were sharply debrided down through and including the removal of subcutaneous tissue. Devitalized Tissue Debrided:  slough and exudate    Pre Debridement Measurements:  Are located in the Wound Documentation Flow Sheet    All active wounds listed below with today's date are evaluated  Wound(s)    debrided this date include # : 2    Post  Debridement Measurements:  Wound 06/10/22 #1 Right Buttock (Active)   Wound Image   07/15/22 0917   Wound Etiology Pressure Stage 3 08/12/22 1108   Dressing Status New dressing applied;Clean;Dry; Intact 08/26/22 1013   Wound Cleansed Wound cleanser;Cleansed with saline 08/12/22 0940   Offloading for Diabetic Foot Ulcers Offloading not required 07/29/22 0926   Wound Length (cm) 1 cm 08/26/22 0942   Wound Width (cm) 0.7 cm 08/26/22 0942   Wound Depth (cm) 0.1 cm 08/26/22 0942   Wound Surface Area (cm^2) 0.7 cm^2 08/26/22 0942   Change in Wound Size % (l*w) -250 08/26/22 0942   Wound Volume (cm^3) 0.07 cm^3 08/26/22 0942   Wound Healing % -250 08/26/22 0942   Post-Procedure Length (cm) 1.5 cm 08/12/22 1005   Post-Procedure Width (cm) 1 cm 08/12/22 1005   Post-Procedure Depth (cm) 0.1 cm 08/12/22 1005   Post-Procedure Surface Area (cm^2) 1.5 cm^2 08/12/22 1005   Post-Procedure Volume (cm^3) 0.15 cm^3 08/12/22 1005   Distance Tunneling (cm) 0 cm 08/26/22 0942   Tunneling Position ___ O'Clock 0 08/26/22 0942   Undermining Starts ___ O'Clock 0 08/26/22 0942   Undermining Ends___ O'Clock 0 08/26/22 0942   Undermining Maxium Distance (cm) 0 08/26/22 0942   Wound Assessment Pink/red;Slough 08/26/22 0942   Drainage Amount Moderate 08/26/22 0942   Drainage Description Serosanguinous 08/26/22 0942   Odor None 08/26/22 0942   Leatha-wound Assessment Blanchable erythema; Excoriated 08/26/22 0942   Margins Defined edges 08/26/22 0942   Wound Thickness Description not for Pressure Injury Full thickness 08/26/22 0942   Number of days: 77       Wound 06/10/22 #2 Left Second Toe (Active)   Wound Image   08/12/22 0940   Dressing Status New dressing applied;Clean;Dry; Intact 08/26/22 1013   Wound Cleansed Wound cleanser 08/26/22 0942   Offloading for Diabetic Foot Ulcers Offloading not required 08/26/22 0942   Wound Length (cm) 0.1 cm 08/26/22 0942   Wound Width (cm) 0.1 cm 08/26/22 0942   Wound Depth (cm) 0.1 cm 08/26/22 0942   Wound Surface Area (cm^2) 0.01 cm^2 08/26/22 0942   Change in Wound Size % (l*w) 93.75 08/26/22 0942   Wound Volume (cm^3) 0.001 cm^3 08/26/22 0942   Wound Healing % 94 08/26/22 0942   Post-Procedure Length (cm) 0.1 cm 08/26/22 1004   Post-Procedure Width (cm) 0.1 cm 08/26/22 1004   Post-Procedure Depth (cm) 0.1 cm 08/26/22 1004   Post-Procedure Surface Area (cm^2) 0.01 cm^2 08/26/22 1004   Post-Procedure Volume (cm^3) 0.001 cm^3 08/26/22 1004   Distance Tunneling (cm) 0 cm 08/26/22 0942   Tunneling Position ___ O'Clock 0 08/26/22 0942   Undermining Starts ___ O'Clock 0 08/26/22 0942   Undermining Ends___ O'Clock 0 08/26/22 0942   Undermining Maxium Distance (cm) 0 08/26/22 0942   Wound Assessment Dry;Slough 08/26/22 0942   Drainage Amount Moderate 08/26/22 0942   Drainage Description Yellow 08/26/22 0942   Odor None 08/26/22 0942   Leatha-wound Assessment Hyperkeratosis (callous) 08/26/22 0942   Margins Attached edges 08/26/22 0942   Wound Thickness Description not for Pressure Injury Full thickness 08/26/22 0942   Number of days: 77       Percent of Wound(s) Debrided: approximately 100%    Total  Area  Debrided: 0.01 sq cm     Bleeding:  Minimal    Hemostasis Achieved:  by pressure    Procedural Pain:  0  / 10     Post Procedural Pain:  0 / 10     Response to treatment:  Well tolerated by patient. Status of wound progress and description from last visit: She has a new traumatic wound to the left lower leg that developed since her last visit 7/29/22, it is now healed. The buttock wound is worse today in the fact when the dressing was taken off at home it took off surrounding tissue. The spouse is well versed on the wound care. Will follow up in 1 week. Plan:     Discharge Instructions         PHYSICIAN ORDERS AND DISCHARGE INSTRUCTIONS   NOTE: Upon discharge from the [address], you will receive a patient experience survey via E-mail. We would be grateful if you would take the time to fill this survey out. Wound care order history:               CYNTHIA's   Right  1.68     Left 1.65  Date 1/13/21              Vascular studies: . Cultures: .3/18/22              Antibiotics: . HbA1c:  . Compression/Lymph Pumps: . Coban 2 lites (did not tolerate), Double Tubi              Grafts: Paula : . Continuing wound care orders and information:              Residence: . Private              Continue home health care with: [facility] Your wound-care supplies will be provided by: [facility]: . Wound cleansing:                          Do not scrub or use excessive force. Wash hands with soap and water before and after dressing changes. Prior to applying a clean dressing, cleanse wound with normal saline,                          wound cleanser, or mild soap and water. Ask your physician or nurse before getting the wound(s) wet in the shower. Daily Wound management:                          Keep weight off wounds and reposition every 2 hours.                           Avoid standing for long periods of time.                          Apply wraps/stockings in AM and remove at bedtime. Elevate legs to the level of the heart or above for 30 minutes 4-5 times a day and/or when sitting. When taking antibiotics take entire prescription as ordered by MD do not stop taking until medicine is all gone. Orders for this week (8/26/22)  Left Second toe - Wash with mild soap and water, rinse with saline, pat dry with 4x4. Place ca alginate between all toes. Paint with Betadine  Apply Marianna to wound bed  Secure with bandaid  Change daily. Buttock - Wash with mild soap and water, Rinse with saline, pat dry with 4x4. Apply puracol to wound bed  Secure with silicone border gauze  Change daily. Follow up with ROMI Dumont in 1-2 weeks in the wound care center. Call [phone] for any questions or concerns.         Treatment Note Wound 06/10/22 #2 Left Second Toe-Dressing/Treatment:  (calcium algainte, betadine, marianna, bandaie)  Wound 06/10/22 #1 Right Buttock-Dressing/Treatment:  (Puracol, border)    Written Patient Dismissal Instructions Given            Electronically signed by TREVON Qureshi CNP on 8/26/2022 at 10:22 AM

## 2022-08-26 NOTE — DISCHARGE INSTRUCTIONS
PHYSICIAN ORDERS AND DISCHARGE INSTRUCTIONS   NOTE: Upon discharge from the [address], you will receive a patient experience survey via E-mail. We would be grateful if you would take the time to fill this survey out. Wound care order history:               CYNTHIA's   Right  1.68     Left 1.65  Date 1/13/21              Vascular studies: . Cultures: .3/18/22              Antibiotics: . HbA1c:  . Compression/Lymph Pumps: . Coban 2 lites (did not tolerate), Double Tubi              Grafts: Juanita Reaves: . Continuing wound care orders and information:              Residence: . Private              Continue home health care with: [facility] Your wound-care supplies will be provided by: [facility]: . Wound cleansing:                          Do not scrub or use excessive force. Wash hands with soap and water before and after dressing changes. Prior to applying a clean dressing, cleanse wound with normal saline,                          wound cleanser, or mild soap and water. Ask your physician or nurse before getting the wound(s) wet in the shower. Daily Wound management:                          Keep weight off wounds and reposition every 2 hours. Avoid standing for long periods of time. Apply wraps/stockings in AM and remove at bedtime. Elevate legs to the level of the heart or above for 30 minutes 4-5 times a day and/or when sitting. When taking antibiotics take entire prescription as ordered by MD do not stop taking until medicine is all gone. Orders for this week (8/26/22)  Left Second toe - Wash with mild soap and water, rinse with saline, pat dry with 4x4.   Place ca alginate between all toes. Paint with Betadine  Apply Marianna to wound bed  Secure with bandaid  Change daily. Buttock - Wash with mild soap and water, Rinse with saline, pat dry with 4x4. Apply puracol to wound bed  Secure with silicone border gauze  Change daily. Follow up with ROMI Dumont in 1-2 weeks in the wound care center. Call [phone] for any questions or concerns.

## 2022-08-26 NOTE — PLAN OF CARE
Problem: Chronic Conditions and Co-morbidities  Goal: Patient's chronic conditions and co-morbidity symptoms are monitored and maintained or improved  8/26/2022 1012 by Charlie Shrestha LPN  Outcome: Progressing  8/26/2022 1009 by Charlie Shrestha LPN  Outcome: Progressing

## 2022-09-08 NOTE — DISCHARGE INSTRUCTIONS
PHYSICIAN ORDERS AND DISCHARGE INSTRUCTIONS   NOTE: Upon discharge from the [address], you will receive a patient experience survey via E-mail. We would be grateful if you would take the time to fill this survey out. Wound care order history:               CYNTHIA's   Right  1.68     Left 1.65  Date 1/13/21              Vascular studies: . Cultures: .3/18/22              Antibiotics: . HbA1c:  . Compression/Lymph Pumps: . Coban 2 lites (did not tolerate), Double Tubi              Grafts: Dannyida Go: . Continuing wound care orders and information:              Residence: . Private              Continue home health care with: [facility] Your wound-care supplies will be provided by: [facility]: . Wound cleansing:                          Do not scrub or use excessive force. Wash hands with soap and water before and after dressing changes. Prior to applying a clean dressing, cleanse wound with normal saline,                          wound cleanser, or mild soap and water. Ask your physician or nurse before getting the wound(s) wet in the shower. Daily Wound management:                          Keep weight off wounds and reposition every 2 hours. Avoid standing for long periods of time. Apply wraps/stockings in AM and remove at bedtime. Elevate legs to the level of the heart or above for 30 minutes 4-5 times a day and/or when sitting. When taking antibiotics take entire prescription as ordered by MD do not stop taking until medicine is all gone. Orders for this week (9/16/22)  Left Second toe - Wash with mild soap and water, rinse with saline, pat dry with 4x4.   Place ca alginate between all toes. Paint with Betadine  Apply Marianna to wound bed  Secure with bandaid  Change daily. Right Lateral Lower leg- Was hwith mild soap and water, rinse with saline, pat dry with 4x4  Apply gentamicin to wound bed  Cover with puracol   Secure with gentac border  Tubi F  Change daily     Buttock - Wash with mild soap and water, Rinse with saline, pat dry with 4x4. Apply puracol to wound bed  Secure with silicone border gauze  Change daily. Follow up with [facility]I Dumont in 1-2 weeks in the wound care center. Call [phone] for any questions or concerns.

## 2022-09-16 ENCOUNTER — HOSPITAL ENCOUNTER (OUTPATIENT)
Dept: WOUND CARE | Age: 87
Discharge: HOME | End: 2022-09-16
Payer: MEDICARE

## 2022-09-16 DIAGNOSIS — E11.621 DIABETIC ULCER OF TOE OF LEFT FOOT ASSOCIATED WITH TYPE 2 DIABETES MELLITUS, WITH FAT LAYER EXPOSED (HCC): ICD-10-CM

## 2022-09-16 DIAGNOSIS — L97.212 VENOUS STASIS ULCER OF RIGHT CALF WITH FAT LAYER EXPOSED WITH VARICOSE VEINS (HCC): ICD-10-CM

## 2022-09-16 DIAGNOSIS — L97.522 DIABETIC ULCER OF TOE OF LEFT FOOT ASSOCIATED WITH TYPE 2 DIABETES MELLITUS, WITH FAT LAYER EXPOSED (HCC): ICD-10-CM

## 2022-09-16 DIAGNOSIS — S30.810A EXCORIATION OF BUTTOCK, INITIAL ENCOUNTER: ICD-10-CM

## 2022-09-16 DIAGNOSIS — L89.312 PRESSURE INJURY OF RIGHT BUTTOCK, STAGE 2 (HCC): ICD-10-CM

## 2022-09-16 DIAGNOSIS — L84 CALLUS OF FOOT: Primary | ICD-10-CM

## 2022-09-16 DIAGNOSIS — I83.012 VENOUS STASIS ULCER OF RIGHT CALF WITH FAT LAYER EXPOSED WITH VARICOSE VEINS (HCC): ICD-10-CM

## 2022-09-16 PROCEDURE — 11042 DBRDMT SUBQ TIS 1ST 20SQCM/<: CPT | Performed by: NURSE PRACTITIONER

## 2022-09-16 PROCEDURE — 11042 DBRDMT SUBQ TIS 1ST 20SQCM/<: CPT

## 2022-09-16 RX ORDER — GENTAMICIN SULFATE 1 MG/G
OINTMENT TOPICAL ONCE
Status: CANCELLED | OUTPATIENT
Start: 2022-09-16 | End: 2022-09-16

## 2022-09-16 RX ORDER — GINSENG 100 MG
CAPSULE ORAL ONCE
Status: CANCELLED | OUTPATIENT
Start: 2022-09-16 | End: 2022-09-16

## 2022-09-16 RX ORDER — CLOBETASOL PROPIONATE 0.5 MG/G
OINTMENT TOPICAL ONCE
Status: CANCELLED | OUTPATIENT
Start: 2022-09-16 | End: 2022-09-16

## 2022-09-16 RX ORDER — LIDOCAINE HYDROCHLORIDE 40 MG/ML
SOLUTION TOPICAL ONCE
Status: CANCELLED | OUTPATIENT
Start: 2022-09-16 | End: 2022-09-16

## 2022-09-16 RX ORDER — LIDOCAINE 50 MG/G
OINTMENT TOPICAL ONCE
Status: CANCELLED | OUTPATIENT
Start: 2022-09-16 | End: 2022-09-16

## 2022-09-16 RX ORDER — LIDOCAINE 40 MG/G
CREAM TOPICAL ONCE
Status: CANCELLED | OUTPATIENT
Start: 2022-09-16 | End: 2022-09-16

## 2022-09-16 RX ORDER — BACITRACIN, NEOMYCIN, POLYMYXIN B 400; 3.5; 5 [USP'U]/G; MG/G; [USP'U]/G
OINTMENT TOPICAL ONCE
Status: CANCELLED | OUTPATIENT
Start: 2022-09-16 | End: 2022-09-16

## 2022-09-16 RX ORDER — BACITRACIN ZINC AND POLYMYXIN B SULFATE 500; 1000 [USP'U]/G; [USP'U]/G
OINTMENT TOPICAL ONCE
Status: CANCELLED | OUTPATIENT
Start: 2022-09-16 | End: 2022-09-16

## 2022-09-16 RX ORDER — BETAMETHASONE DIPROPIONATE 0.05 %
OINTMENT (GRAM) TOPICAL ONCE
Status: CANCELLED | OUTPATIENT
Start: 2022-09-16 | End: 2022-09-16

## 2022-09-16 NOTE — PROGRESS NOTES
Wound Care Center Progress Note       April Jarrett  AGE: 82 y.o. GENDER: female  : 1935  TODAY'S DATE:  2022        Subjective:     Chief Complaint   Patient presents with    Wound Check     Bilateral lower legs           HISTORY of PRESENT ILLNESS    April Jarrett is a 82 y.o. female who presents to the [address] [place] [address] for evaluation and treatment of Acute on pressure ulcer right buttock, mild severity, newly epithelize today. The ulcer has been present for approximately 1 week when returning to the wound clinic. The underlying cause is thought to be pressure. The patients care to date has included a dry dressing. Also has acute on chronic left second toe of mild severity, covering with a Band-Aid. The patient has significant underlying medical conditions as below. 22: The wound post blister right lower leg that is of moderate severity and has been present approximately one week. Wound Pain Timing/Severity: None  Quality of pain: N/A  Severity of pain:  0 / 10   Modifying Factors: venous stasis, lymphedema, diabetes, poor glucose control, chronic pressure, decreased mobility, shear force and obesity  Associated Signs/Symptoms: edema, erythema   Arterial evaluation: VL arteries 21 per Dr. Jac Cerda, no significant stenosis     Venous Evaluation: as needed if indicated based on the wound, location, assessment and healing. Wound infection: Both lower leg wounds cultured 3/18/22     Diabetes: Yes, no medication regimen at this time, diet control. Last AIC 7.8 as of 20  Smoking: Never smoker  Anticoagulant therapy: No  Immunosuppression: No  Obesity: Yes  Other History: Cor pulmonale on diuretic therapy, PAD on Pletal     Nutritional status: well nourished. Discussed need for increased protein and calories for wound healing and good sources of protein (just over 7 grams for every 20 pounds of body weight).  Animal-based foods high in protein (meat, poultry, fish, eggs, and dairy foods). Plant based foods high in protein (tofu, lentils, beans, chickpeas, nuts, quinoa and everette seeds. Patient educated on the 6 essential components necessary for wound healing: Circulation, Debridements, Proper Dressings and Topical Wound Products, Infection Control, Edema Control and Offloading. Patient educated on those factors that negatively effect or impact wound healing: smoking, obesity, uncontrolled diabetes, anticoagulant and immunosuppressive regimens, inadequate nutrition, untreated arterial and venous disease if applicable and measures to manage edema.          PAST MEDICAL HISTORY        Diagnosis Date    Asthma     Chronic kidney disease     acute kidney failure    Chronic ulcer of left leg with fat layer exposed ([address] 3/7/2016    Chronic ulcer of right leg with fat layer exposed ([address] 3/7/2016    Diabetes type 2, controlled ([address]     History of asthma     History of blood transfusion 07/2015    Hypertension     Lymphedema of both lower extremities 3/7/2016    Osteoarthritis     Pneumonia     Thyroid disease     Venous stasis of both lower extremities 3/7/2016    WD-Non-pressure chronic ulcer right lower leg, 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 ([address] 4/21/2016       PAST SURGICAL HISTORY    Past Surgical History:   Procedure Laterality Date    APPENDECTOMY      CHOLECYSTECTOMY      CYSTOSCOPY      EYE SURGERY      bilateral implants    HYSTERECTOMY (CERVIX STATUS UNKNOWN)      JOINT REPLACEMENT Right     knee    PACEMAKER INSERTION N/A 11/17/2020    PACEMAKER INSERTION PERMANENT REMOVAL OF TEMPORARY PACEMAKER performed by Andrew Bartlett MD at [address] [place] [address]    Family History   Problem Relation Age of Onset    Heart Disease Father        SOCIAL HISTORY    Social History     Tobacco Use    Smoking status: Never    Smokeless tobacco: Never   Vaping Use    Vaping Use: Never used   Substance Use Topics    Alcohol use: No    Drug use: No       ALLERGIES    Allergies Allergen Reactions    Latex Rash    Dye [Iodides] Anaphylaxis    Iodine Anaphylaxis    Dyazide [Hydrochlorothiazide W-Triamterene] Rash    Lasix [Furosemide] Rash    Procardia [Nifedipine] Other (See Comments)     Not for sure if rash occurred or rash    Doxycycline Dermatitis    Edecrin [Ethacrynic Acid]     Levaquin [Levofloxacin] Other (See Comments)     unknown    Mobic [Meloxicam]     Vioxx [Rofecoxib]     Celebrex [Celecoxib] Rash    Lexapro [Escitalopram Oxalate] Rash    Sulfa Antibiotics Hives       MEDICATIONS    Current Outpatient Medications on File Prior to Encounter   Medication Sig Dispense Refill    febuxostat (ULORIC) 40 MG TABS tablet Take 1 tablet by mouth daily 30 tablet 5    traMADol (ULTRAM) 50 MG tablet Take 50 mg by mouth every 6 hours as needed for Pain.      potassium chloride (KLOR-CON M) 20 MEQ extended release tablet Take 3 tablets by mouth daily 90 tablet 3    metOLazone (ZAROXOLYN) 5 MG tablet Take 1 tablet by mouth daily 30 tablet 5    spironolactone (ALDACTONE) 50 MG tablet Take 1 tablet by mouth 2 times daily (Patient taking differently: Take 100 mg by mouth 2 times daily) 60 tablet 5    albuterol sulfate HFA (PROVENTIL;VENTOLIN;PROAIR) 108 (90 Base) MCG/ACT inhaler Inhale 2 puffs into the lungs every 6 hours as needed for Wheezing      bumetanide (BUMEX) 1 MG tablet Take 2 tablets by mouth 3 times daily (Patient taking differently: Take 2 mg by mouth in the morning, at noon, and at bedtime) 540 tablet 3    clobetasol (TEMOVATE) 0.05 % ointment Apply topically 2 times daily. (Patient taking differently: as needed) 120 g 1    Fluticasone Propionate, Inhal, (FLOVENT IN) Inhale 2 puffs into the lungs 2 times daily      silver sulfADIAZINE (SILVADENE) 1 % cream Apply topically daily.  (Patient taking differently: as needed) 240 g 5    rOPINIRole (REQUIP) 0.5 MG tablet Take 1 tablet by mouth 3 times daily 90 tablet 3    famotidine (PEPCID) 20 MG tablet Take 1 tablet by mouth 2 times daily 60 tablet 5    levothyroxine (SYNTHROID) 50 MCG tablet Take 50 mcg by mouth Daily      Polyethylene Glycol 3350 (MIRALAX PO) Take by mouth      fluticasone (FLONASE) 50 MCG/ACT nasal spray 1 spray by Nasal route 2 times daily       loratadine (CLARITIN) 10 MG capsule Take 10 mg by mouth daily      ferrous sulfate 325 (65 FE) MG tablet Take 1 tablet by mouth 2 times daily (with meals) 30 tablet 3    latanoprost (XALATAN) 0.005 % ophthalmic solution Place 1 drop into both eyes nightly      OXYGEN Inhale 2 L/min into the lungs nightly      Multiple Vitamins-Minerals (ICAPS) CAPS Take 1 tablet by mouth 2 times daily       acetaminophen (TYLENOL) 500 MG tablet Take 500 mg by mouth every 6 hours as needed for Pain. calcium carbonate 600 MG TABS tablet Take 1 tablet by mouth daily. No current facility-administered medications on file prior to encounter. REVIEW OF SYSTEMS    Pertinent items are noted in HPI. Constitutional: Negative for systemic symptoms including fever, chills and malaise. Objective: There were no vitals taken for this visit. PHYSICAL EXAM    General: The patient is in no acute distress. Mental status:  Patient is appropriate, is  oriented to place and plan of care. Dermatologic exam: Visual inspection of the periwound reveals the skin to be edematous.   Wound exam:  see wound description below     All active wounds listed below with today's date are evaluated    Assessment:       Problem List Items Addressed This Visit          Circulatory    WD-Venous stasis ulcer of right calf with fat layer exposed with varicose veins (HCC)       Endocrine    WD-Diabetic ulcer of toe of left foot associated with type 2 diabetes mellitus, with fat layer exposed ([facility]    Relevant Orders    Initiate Outpatient Wound Care Protocol       Other    WD-Callus of foot - Primary    Relevant Orders    Initiate Outpatient Wound Care Protocol    WD-Pressure injury of right buttock, stage 2 [facility])    Relevant Orders    Initiate Outpatient Wound Care Protocol    WD-Excoriation of buttock crease    Relevant Orders    Initiate Outpatient Wound Care Protocol       Procedure Note    Indications:  Based on my examination of this patient's wound(s) today, sharp excision into necrotic subcutaneous tissue is required to promote healing and evaluate the extent of previous healing. Performed by: TREVON Peterson CNP    Consent obtained: Yes    Time out taken:  Yes    Pain Control: Anesthetic  Anesthetic: 4% Lidocaine Liquid Topical        Debridement:Excisional Debridement    Using curette the wound(s) was/were sharply debrided down through and including the removal of subcutaneous tissue.         Devitalized Tissue Debrided:  slough and exudate    Pre Debridement Measurements:  Are located in the Wound Documentation Flow Sheet    All active wounds listed below with today's date are evaluated  Wound(s)    debrided this date include # : 2 & 3    Post  Debridement Measurements:    Wound 06/10/22 #1 Right Buttock (Active)   Wound Image   07/15/22 0917   Wound Etiology Pressure Stage 3 08/12/22 1108   Dressing Status Clean;Dry;New dressing applied 09/16/22 1005   Wound Cleansed Wound cleanser;Cleansed with saline 08/12/22 0940   Offloading for Diabetic Foot Ulcers Offloading not required 07/29/22 0926   Wound Length (cm) 1 cm 08/26/22 0942   Wound Width (cm) 0.7 cm 08/26/22 0942   Wound Depth (cm) 0.1 cm 08/26/22 0942   Wound Surface Area (cm^2) 0.7 cm^2 08/26/22 0942   Change in Wound Size % (l*w) -250 08/26/22 0942   Wound Volume (cm^3) 0.07 cm^3 08/26/22 0942   Wound Healing % -250 08/26/22 0942   Post-Procedure Length (cm) 1.5 cm 08/12/22 1005   Post-Procedure Width (cm) 1 cm 08/12/22 1005   Post-Procedure Depth (cm) 0.1 cm 08/12/22 1005   Post-Procedure Surface Area (cm^2) 1.5 cm^2 08/12/22 1005   Post-Procedure Volume (cm^3) 0.15 cm^3 08/12/22 1005   Distance Tunneling (cm) 0 cm 08/26/22 7135 Tunneling Position ___ O'Clock 0 08/26/22 0942   Undermining Starts ___ O'Clock 0 08/26/22 0942   Undermining Ends___ O'Clock 0 08/26/22 0942   Undermining Maxium Distance (cm) 0 08/26/22 0942   Wound Assessment [place]/red;Slough 08/26/22 0942   Drainage Amount Moderate 08/26/22 0942   Drainage Description Serosanguinous 08/26/22 0942   Odor None 08/26/22 0942   Leatha-wound Assessment Blanchable erythema; Excoriated 08/26/22 0942   Margins Defined edges 08/26/22 0942   Wound Thickness Description not for Pressure Injury Full thickness 08/26/22 0942   Number of days: 98       Wound 06/10/22 #2 Left Second Toe (Active)   Wound Image   08/12/22 0940   Dressing Status Clean;Dry;New dressing applied 09/16/22 1005   Wound Cleansed Wound cleanser 08/26/22 0942   Offloading for Diabetic Foot Ulcers Offloading not required 08/26/22 0942   Wound Length (cm) 0.1 cm 09/16/22 0932   Wound Width (cm) 0.1 cm 09/16/22 0932   Wound Depth (cm) 0.1 cm 09/16/22 0932   Wound Surface Area (cm^2) 0.01 cm^2 09/16/22 0932   Change in Wound Size % (l*w) 93.75 09/16/22 0932   Wound Volume (cm^3) 0.001 cm^3 09/16/22 0932   Wound Healing % 94 09/16/22 0932   Post-Procedure Length (cm) 0.1 cm 09/16/22 0945   Post-Procedure Width (cm) 0.1 cm 09/16/22 0945   Post-Procedure Depth (cm) 0.1 cm 09/16/22 0945   Post-Procedure Surface Area (cm^2) 0.01 cm^2 09/16/22 0945   Post-Procedure Volume (cm^3) 0.001 cm^3 09/16/22 0945   Distance Tunneling (cm) 0 cm 09/16/22 0932   Tunneling Position ___ O'Clock 0 09/16/22 0932   Undermining Starts ___ O'Clock 0 09/16/22 0932   Undermining Ends___ O'Clock 0 09/16/22 0932   Undermining Maxium Distance (cm) 0 09/16/22 0932   Wound Assessment Dry;Slough 09/16/22 0932   Drainage Amount None 09/16/22 0932   Drainage Description Yellow 08/26/22 0942   Odor None 09/16/22 0932   Leatha-wound Assessment Hyperkeratosis (callous) 09/16/22 0932   Margins Attached edges 09/16/22 0932   Wound Thickness Description not for Pressure experience survey via E-mail. We would be grateful if you would take the time to fill this survey out. Wound care order history:               CYNTHIA's   Right  1.68     Left 1.65  Date 1/13/21              Vascular studies: . Cultures: .3/18/22              Antibiotics: . HbA1c:  . Compression/Lymph Pumps: . Coban 2 lites (did not tolerate), Double Tubi              Grafts: Melisa Lerner: . Continuing wound care orders and information:              Residence: . Private              Continue home health care with: [facility] Your wound-care supplies will be provided by: [facility]: . Wound cleansing:                          Do not scrub or use excessive force. Wash hands with soap and water before and after dressing changes. Prior to applying a clean dressing, cleanse wound with normal saline,                          wound cleanser, or mild soap and water. Ask your physician or nurse before getting the wound(s) wet in the shower. Daily Wound management:                          Keep weight off wounds and reposition every 2 hours. Avoid standing for long periods of time. Apply wraps/stockings in AM and remove at bedtime. Elevate legs to the level of the heart or above for 30 minutes 4-5 times a day and/or when sitting. When taking antibiotics take entire prescription as ordered by MD do not stop taking until medicine is all gone. Orders for this week (9/16/22)  Left Second toe - Wash with mild soap and water, rinse with saline, pat dry with 4x4. Place ca alginate between all toes.   Paint with Betadine  Apply [place] to wound bed  Secure with bandaid  Change daily.    Right Lateral Lower leg- Was hwith mild soap and water, rinse with saline, pat dry with 4x4  Apply gentamicin to wound bed  Cover with puracol   Secure with gentac border  Tubi F  Change daily     Buttock - Wash with mild soap and water, Rinse with saline, pat dry with 4x4. Apply puracol to wound bed  Secure with silicone border gauze  Change daily. Follow up with ROMI Dumont in 1-2 weeks in the wound care center. Call [phone] for any questions or concerns.                Treatment Note Wound 09/16/22 #3 Right Lateral lower leg-Dressing/Treatment:  (gentamicin, puracol, gentac border, tubi F)  Wound 06/10/22 #2 Left Second Toe-Dressing/Treatment:  (ca alginate, betadine, juliette, bandaid)  Wound 06/10/22 #1 Right Buttock-Dressing/Treatment:  (puracol, silicone border gauze)    Written Patient Dismissal Instructions Given            Electronically signed by TREVON Molina CNP on 9/16/2022 at 10:28 AM

## 2022-09-23 ENCOUNTER — HOSPITAL ENCOUNTER (OUTPATIENT)
Dept: WOUND CARE | Age: 87
Discharge: HOME | End: 2022-09-23
Payer: MEDICARE

## 2022-09-23 VITALS
RESPIRATION RATE: 18 BRPM | TEMPERATURE: 96.9 F | SYSTOLIC BLOOD PRESSURE: 113 MMHG | DIASTOLIC BLOOD PRESSURE: 53 MMHG | HEART RATE: 68 BPM

## 2022-09-23 DIAGNOSIS — L97.522 DIABETIC ULCER OF TOE OF LEFT FOOT ASSOCIATED WITH TYPE 2 DIABETES MELLITUS, WITH FAT LAYER EXPOSED (HCC): ICD-10-CM

## 2022-09-23 DIAGNOSIS — E11.621 DIABETIC ULCER OF TOE OF LEFT FOOT ASSOCIATED WITH TYPE 2 DIABETES MELLITUS, WITH FAT LAYER EXPOSED (HCC): ICD-10-CM

## 2022-09-23 DIAGNOSIS — S30.810A EXCORIATION OF BUTTOCK, INITIAL ENCOUNTER: ICD-10-CM

## 2022-09-23 DIAGNOSIS — L89.312 PRESSURE INJURY OF RIGHT BUTTOCK, STAGE 2 (HCC): ICD-10-CM

## 2022-09-23 DIAGNOSIS — L84 CALLUS OF FOOT: Primary | ICD-10-CM

## 2022-09-23 PROCEDURE — 11042 DBRDMT SUBQ TIS 1ST 20SQCM/<: CPT | Performed by: NURSE PRACTITIONER

## 2022-09-23 PROCEDURE — 11042 DBRDMT SUBQ TIS 1ST 20SQCM/<: CPT

## 2022-09-23 RX ORDER — BACITRACIN ZINC AND POLYMYXIN B SULFATE 500; 1000 [USP'U]/G; [USP'U]/G
OINTMENT TOPICAL ONCE
Status: CANCELLED | OUTPATIENT
Start: 2022-09-23 | End: 2022-09-23

## 2022-09-23 RX ORDER — LIDOCAINE 50 MG/G
OINTMENT TOPICAL ONCE
Status: CANCELLED | OUTPATIENT
Start: 2022-09-23 | End: 2022-09-23

## 2022-09-23 RX ORDER — LIDOCAINE 40 MG/G
CREAM TOPICAL ONCE
Status: CANCELLED | OUTPATIENT
Start: 2022-09-23 | End: 2022-09-23

## 2022-09-23 RX ORDER — GENTAMICIN SULFATE 1 MG/G
OINTMENT TOPICAL ONCE
Status: CANCELLED | OUTPATIENT
Start: 2022-09-23 | End: 2022-09-23

## 2022-09-23 RX ORDER — LIDOCAINE HYDROCHLORIDE 40 MG/ML
SOLUTION TOPICAL ONCE
Status: CANCELLED | OUTPATIENT
Start: 2022-09-23 | End: 2022-09-23

## 2022-09-23 RX ORDER — BETAMETHASONE DIPROPIONATE 0.05 %
OINTMENT (GRAM) TOPICAL ONCE
Status: CANCELLED | OUTPATIENT
Start: 2022-09-23 | End: 2022-09-23

## 2022-09-23 RX ORDER — CLOBETASOL PROPIONATE 0.5 MG/G
OINTMENT TOPICAL ONCE
Status: CANCELLED | OUTPATIENT
Start: 2022-09-23 | End: 2022-09-23

## 2022-09-23 RX ORDER — BACITRACIN, NEOMYCIN, POLYMYXIN B 400; 3.5; 5 [USP'U]/G; MG/G; [USP'U]/G
OINTMENT TOPICAL ONCE
Status: CANCELLED | OUTPATIENT
Start: 2022-09-23 | End: 2022-09-23

## 2022-09-23 RX ORDER — GINSENG 100 MG
CAPSULE ORAL ONCE
Status: CANCELLED | OUTPATIENT
Start: 2022-09-23 | End: 2022-09-23

## 2022-09-23 ASSESSMENT — PAIN SCALES - GENERAL: PAINLEVEL_OUTOF10: 5

## 2022-09-23 NOTE — PROGRESS NOTES
Wound Care Center Progress Note       Rosetta Elizabeth  AGE: 82 y.o. GENDER: female  : 1935  TODAY'S DATE:  2022        Subjective:     Chief Complaint   Patient presents with    Wound Check      HISTORY of PRESENT ILLNESS    Rosetta Elizabeth is a 82 y.o. female who presents to the [address] [place] [address] for evaluation and treatment of Acute on pressure ulcer right buttock, mild severity, newly epithelize today. The ulcer has been present for approximately 1 week when returning to the wound clinic. The underlying cause is thought to be pressure. The patients care to date has included a dry dressing. Also has acute on chronic left second toe of mild severity, covering with a Band-Aid. The patient has significant underlying medical conditions as below. 22: The wound post blister right lower leg that is of moderate severity and has been present approximately one week. Wound Pain Timing/Severity: None  Quality of pain: N/A  Severity of pain:  0 / 10   Modifying Factors: venous stasis, lymphedema, diabetes, poor glucose control, chronic pressure, decreased mobility, shear force and obesity  Associated Signs/Symptoms: edema, erythema   Arterial evaluation: VL arteries 21 per Dr. Amy Sawant, no significant stenosis     Venous Evaluation: as needed if indicated based on the wound, location, assessment and healing. Wound infection: Both lower leg wounds cultured 3/18/22     Diabetes: Yes, no medication regimen at this time, diet control. Last AIC 7.8 as of 20  Smoking: Never smoker  Anticoagulant therapy: No  Immunosuppression: No  Obesity: Yes  Other History: Cor pulmonale on diuretic therapy, PAD on Pletal     Nutritional status: well nourished. Discussed need for increased protein and calories for wound healing and good sources of protein (just over 7 grams for every 20 pounds of body weight). Animal-based foods high in protein (meat, poultry, fish, eggs, and dairy foods).  Plant based foods high in protein (tofu, lentils, beans, chickpeas, nuts, quinoa and everette seeds. Patient educated on the 6 essential components necessary for wound healing: Circulation, Debridements, Proper Dressings and Topical Wound Products, Infection Control, Edema Control and Offloading. Patient educated on those factors that negatively effect or impact wound healing: smoking, obesity, uncontrolled diabetes, anticoagulant and immunosuppressive regimens, inadequate nutrition, untreated arterial and venous disease if applicable and measures to manage edema.          PAST MEDICAL HISTORY        Diagnosis Date    Asthma     Chronic kidney disease     acute kidney failure    Chronic ulcer of left leg with fat layer exposed ([address] 3/7/2016    Chronic ulcer of right leg with fat layer exposed ([address] 3/7/2016    Diabetes type 2, controlled ([address]     History of asthma     History of blood transfusion 07/2015    Hypertension     Lymphedema of both lower extremities 3/7/2016    Osteoarthritis     Pneumonia     Thyroid disease     Venous stasis of both lower extremities 3/7/2016    WD-Non-pressure chronic ulcer right lower leg, 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 ([address] 4/21/2016       PAST SURGICAL HISTORY    Past Surgical History:   Procedure Laterality Date    APPENDECTOMY      CHOLECYSTECTOMY      CYSTOSCOPY      EYE SURGERY      bilateral implants    HYSTERECTOMY (CERVIX STATUS UNKNOWN)      JOINT REPLACEMENT Right     knee    PACEMAKER INSERTION N/A 11/17/2020    PACEMAKER INSERTION PERMANENT REMOVAL OF TEMPORARY PACEMAKER performed by Gwen Massey MD at [address] [place] [address]    Family History   Problem Relation Age of Onset    Heart Disease Father        SOCIAL HISTORY    Social History     Tobacco Use    Smoking status: Never    Smokeless tobacco: Never   Vaping Use    Vaping Use: Never used   Substance Use Topics    Alcohol use: No    Drug use: No       ALLERGIES    Allergies   Allergen Reactions    Latex Rash    Dye [Iodides] Anaphylaxis    Iodine Anaphylaxis    Dyazide [Hydrochlorothiazide W-Triamterene] Rash    Lasix [Furosemide] Rash    Procardia [Nifedipine] Other (See Comments)     Not for sure if rash occurred or rash    Doxycycline Dermatitis    Edecrin [Ethacrynic Acid]     Levaquin [Levofloxacin] Other (See Comments)     unknown    Mobic [Meloxicam]     Vioxx [Rofecoxib]     Celebrex [Celecoxib] Rash    Lexapro [Escitalopram Oxalate] Rash    Sulfa Antibiotics Hives       MEDICATIONS    Current Outpatient Medications on File Prior to Encounter   Medication Sig Dispense Refill    febuxostat (ULORIC) 40 MG TABS tablet Take 1 tablet by mouth daily 30 tablet 5    traMADol (ULTRAM) 50 MG tablet Take 50 mg by mouth every 6 hours as needed for Pain.      potassium chloride (KLOR-CON M) 20 MEQ extended release tablet Take 3 tablets by mouth daily 90 tablet 3    metOLazone (ZAROXOLYN) 5 MG tablet Take 1 tablet by mouth daily 30 tablet 5    spironolactone (ALDACTONE) 50 MG tablet Take 1 tablet by mouth 2 times daily (Patient taking differently: Take 100 mg by mouth 2 times daily) 60 tablet 5    albuterol sulfate HFA (PROVENTIL;VENTOLIN;PROAIR) 108 (90 Base) MCG/ACT inhaler Inhale 2 puffs into the lungs every 6 hours as needed for Wheezing      bumetanide (BUMEX) 1 MG tablet Take 2 tablets by mouth 3 times daily (Patient taking differently: Take 2 mg by mouth in the morning, at noon, and at bedtime) 540 tablet 3    clobetasol (TEMOVATE) 0.05 % ointment Apply topically 2 times daily. (Patient taking differently: as needed) 120 g 1    Fluticasone Propionate, Inhal, (FLOVENT IN) Inhale 2 puffs into the lungs 2 times daily      silver sulfADIAZINE (SILVADENE) 1 % cream Apply topically daily.  (Patient taking differently: as needed) 240 g 5    rOPINIRole (REQUIP) 0.5 MG tablet Take 1 tablet by mouth 3 times daily 90 tablet 3    famotidine (PEPCID) 20 MG tablet Take 1 tablet by mouth 2 times daily 60 tablet 5    levothyroxine (SYNTHROID) 50 MCG tablet Take 50 mcg by mouth Daily      Polyethylene Glycol 3350 (MIRALAX PO) Take by mouth      fluticasone (FLONASE) 50 MCG/ACT nasal spray 1 spray by Nasal route 2 times daily       loratadine (CLARITIN) 10 MG capsule Take 10 mg by mouth daily      ferrous sulfate 325 (65 FE) MG tablet Take 1 tablet by mouth 2 times daily (with meals) 30 tablet 3    latanoprost (XALATAN) 0.005 % ophthalmic solution Place 1 drop into both eyes nightly      OXYGEN Inhale 2 L/min into the lungs nightly      Multiple Vitamins-Minerals (ICAPS) CAPS Take 1 tablet by mouth 2 times daily       acetaminophen (TYLENOL) 500 MG tablet Take 500 mg by mouth every 6 hours as needed for Pain. calcium carbonate 600 MG TABS tablet Take 1 tablet by mouth daily. No current facility-administered medications on file prior to encounter. REVIEW OF SYSTEMS    Pertinent items are noted in HPI. Constitutional: Negative for systemic symptoms including fever, chills and malaise. Objective:      BP (!) 113/53   Pulse 68   Temp 96.9 °F (36.1 °C) (Temporal)   Resp 18     PHYSICAL EXAM    General: The patient is in no acute distress. Mental status:  Patient is appropriate, is  oriented to place and plan of care. Dermatologic exam: Visual inspection of the periwound reveals the skin to be edematous.   Wound exam:  see wound description below     All active wounds listed below with today's date are evaluated    Assessment:       Problem List Items Addressed This Visit          Endocrine    WD-Diabetic ulcer of toe of left foot associated with type 2 diabetes mellitus, with fat layer exposed ([address]    Relevant Orders    Initiate Outpatient Wound Care Protocol       Other    WD-Callus of foot - Primary    Relevant Orders    Initiate Outpatient Wound Care Protocol    WD-Pressure injury of right buttock, stage 2 ([address]    Relevant Orders    Initiate Outpatient Wound Care Protocol    WD-Excoriation of buttock crease Relevant Orders    Initiate Outpatient Wound Care Protocol       Procedure Note    Indications:  Based on my examination of this patient's wound(s) today, sharp excision into necrotic subcutaneous tissue is required to promote healing and evaluate the extent of previous healing. Performed by: TREVON Patten CNP    Consent obtained: Yes    Time out taken:  Yes    Pain Control: Anesthetic  Anesthetic: 4% Lidocaine Liquid Topical        Debridement:Excisional Debridement    Using curette the wound(s) was/were sharply debrided down through and including the removal of subcutaneous tissue.         Devitalized Tissue Debrided:  slough and exudate    Pre Debridement Measurements:  Are located in the Wound Documentation Flow Sheet    All active wounds listed below with today's date are evaluated  Wound(s)    debrided this date include # : 2 & 3    Post  Debridement Measurements:    Wound 06/10/22 #1 Right Buttock (Active)   Wound Image   09/23/22 0926   Wound Etiology Pressure Stage 3 08/12/22 1108   Dressing Status Clean;Dry;New dressing applied 09/23/22 1000   Wound Cleansed Wound cleanser 09/23/22 0920   Offloading for Diabetic Foot Ulcers Offloading not required 07/29/22 0926   Wound Length (cm) 0 cm 09/23/22 0920   Wound Width (cm) 0 cm 09/23/22 0920   Wound Depth (cm) 0 cm 09/23/22 0920   Wound Surface Area (cm^2) 0 cm^2 09/23/22 0920   Change in Wound Size % (l*w) 100 09/23/22 0920   Wound Volume (cm^3) 0 cm^3 09/23/22 0920   Wound Healing % 100 09/23/22 0920   Post-Procedure Length (cm) 1.5 cm 08/12/22 1005   Post-Procedure Width (cm) 1 cm 08/12/22 1005   Post-Procedure Depth (cm) 0.1 cm 08/12/22 1005   Post-Procedure Surface Area (cm^2) 1.5 cm^2 08/12/22 1005   Post-Procedure Volume (cm^3) 0.15 cm^3 08/12/22 1005   Distance Tunneling (cm) 0 cm 09/23/22 0920   Tunneling Position ___ O'Clock 0 09/23/22 0920   Undermining Starts ___ O'Clock 0 09/23/22 0920   Undermining Ends___ O'Clock 0 09/23/22 0920 Undermining Maxium Distance (cm) 0 09/23/22 0920   Wound Assessment Pink/red;Dry 09/23/22 0920   Drainage Amount None 09/23/22 0920   Drainage Description Serosanguinous 08/26/22 0942   Odor None 09/23/22 0920   Leatha-wound Assessment Dry/flaky;Fragile 09/23/22 0920   Margins Defined edges 08/26/22 0942   Wound Thickness Description not for Pressure Injury Full thickness 08/26/22 0942   Number of days: 105       Wound 06/10/22 #2 Left Second Toe (Active)   Wound Image   09/23/22 0926   Dressing Status Clean;Dry;New dressing applied 09/23/22 1000   Wound Cleansed Wound cleanser 09/23/22 0926   Offloading for Diabetic Foot Ulcers Offloading not required 09/16/22 1005   Wound Length (cm) 0.1 cm 09/23/22 0926   Wound Width (cm) 0.1 cm 09/23/22 0926   Wound Depth (cm) 0.1 cm 09/23/22 0926   Wound Surface Area (cm^2) 0.01 cm^2 09/23/22 0926   Change in Wound Size % (l*w) 93.75 09/23/22 0926   Wound Volume (cm^3) 0.001 cm^3 09/23/22 0926   Wound Healing % 94 09/23/22 0926   Post-Procedure Length (cm) 0.1 cm 09/23/22 0932   Post-Procedure Width (cm) 0.1 cm 09/23/22 0932   Post-Procedure Depth (cm) 0.1 cm 09/23/22 0932   Post-Procedure Surface Area (cm^2) 0.01 cm^2 09/23/22 0932   Post-Procedure Volume (cm^3) 0.001 cm^3 09/23/22 0932   Distance Tunneling (cm) 0 cm 09/23/22 0926   Tunneling Position ___ O'Clock 0 09/23/22 0926   Undermining Starts ___ O'Clock 0 09/23/22 0926   Undermining Ends___ O'Clock 0 09/23/22 0926   Undermining Maxium Distance (cm) 0 09/23/22 0926   Wound Assessment Dry 09/23/22 0926   Drainage Amount None 09/23/22 0926   Drainage Description Yellow 08/26/22 0942   Odor None 09/23/22 0926   Leatha-wound Assessment Fragile;Dry/flaky 09/23/22 0926   Margins Defined edges 09/23/22 0926   Wound Thickness Description not for Pressure Injury Partial thickness 09/23/22 0926   Number of days: 105       Wound 09/16/22 #3 Right Lateral lower leg (Active)   Wound Image   09/23/22 0926   Dressing Status care order history:               CYNTHIA's   Right  1.68     Left 1.65  Date 1/13/21              Vascular studies: . Cultures: .3/18/22              Antibiotics: . HbA1c:  . Compression/Lymph Pumps: . Coban 2 lites (did not tolerate), Double Tubi              Grafts: Mike Gunderson: . Continuing wound care orders and information:              Residence: . Private              Continue home health care with: [facility] Your wound-care supplies will be provided by: [facility]: . Wound cleansing:                          Do not scrub or use excessive force. Wash hands with soap and water before and after dressing changes. Prior to applying a clean dressing, cleanse wound with normal saline,                          wound cleanser, or mild soap and water. Ask your physician or nurse before getting the wound(s) wet in the shower. Daily Wound management:                          Keep weight off wounds and reposition every 2 hours. Avoid standing for long periods of time. Apply wraps/stockings in AM and remove at bedtime. Elevate legs to the level of the heart or above for 30 minutes 4-5 times a day and/or when sitting. When taking antibiotics take entire prescription as ordered by MD do not stop taking until medicine is all gone. Orders for this week (9/23/22)  Left Second toe - Wash with mild soap and water, rinse with saline, pat dry with 4x4. Place ca alginate between all toes. Paint with Betadine  Apply Marianna to wound bed  Secure with bandaid  Change daily.      Right Lateral Lower leg- Was hwith mild soap and water, rinse with saline, pat dry with 4x4  Apply gentamicin to wound bed  Cover with puracol   Secure with gentac border  Tubi F  Change daily     Buttock - Wash with mild soap and water, Rinse with saline, pat dry with 4x4. Apply puracol to wound bed  Secure with silicone border gauze  Change daily. Follow up with ROMI Dumont in 1-2 weeks in the wound care center. Call [phone] for any questions or concerns.                Treatment Note Wound 06/10/22 #2 Left Second Toe-Dressing/Treatment:  (puracol, gentac border)  Wound 09/16/22 #3 Right Lateral lower leg-Dressing/Treatment:  (gent, puracol, gentac border)  Wound 06/10/22 #1 Right Buttock-Dressing/Treatment:  (puracol, silicone border gauze)    Written Patient Dismissal Instructions Given            Electronically signed by TREVON Fonseca CNP on 9/23/2022 at 10:58 AM

## 2022-09-23 NOTE — DISCHARGE INSTRUCTIONS
PHYSICIAN ORDERS AND DISCHARGE INSTRUCTIONS   NOTE: Upon discharge from the [address], you will receive a patient experience survey via E-mail. We would be grateful if you would take the time to fill this survey out. Wound care order history:               CYNTHIA's   Right  1.68     Left 1.65  Date 1/13/21              Vascular studies: . Cultures: .3/18/22              Antibiotics: . HbA1c:  . Compression/Lymph Pumps: . Coban 2 lites (did not tolerate), Double Tubi              Grafts: Preethi Mobley: . Continuing wound care orders and information:              Residence: . Private              Continue home health care with: [facility] Your wound-care supplies will be provided by: [facility]: . Wound cleansing:                          Do not scrub or use excessive force. Wash hands with soap and water before and after dressing changes. Prior to applying a clean dressing, cleanse wound with normal saline,                          wound cleanser, or mild soap and water. Ask your physician or nurse before getting the wound(s) wet in the shower. Daily Wound management:                          Keep weight off wounds and reposition every 2 hours. Avoid standing for long periods of time. Apply wraps/stockings in AM and remove at bedtime. Elevate legs to the level of the heart or above for 30 minutes 4-5 times a day and/or when sitting. When taking antibiotics take entire prescription as ordered by MD do not stop taking until medicine is all gone. Orders for this week (9/23/22)  Left Second toe - Wash with mild soap and water, rinse with saline, pat dry with 4x4.   Place ca alginate between all toes. Paint with Betadine  Apply Marianna to wound bed  Secure with bandaid  Change daily. Right Lateral Lower leg- Was hwith mild soap and water, rinse with saline, pat dry with 4x4  Apply gentamicin to wound bed  Cover with puracol   Secure with gentac border  Tubi F  Change daily     Buttock - Wash with mild soap and water, Rinse with saline, pat dry with 4x4. Apply puracol to wound bed  Secure with silicone border gauze  Change daily. Follow up with ROMI Dumont in 1-2 weeks in the wound care center. Call [phone] for any questions or concerns.

## 2022-09-30 ENCOUNTER — HOSPITAL ENCOUNTER (OUTPATIENT)
Dept: WOUND CARE | Age: 87
Discharge: HOME | End: 2022-09-30
Payer: MEDICARE

## 2022-09-30 VITALS
SYSTOLIC BLOOD PRESSURE: 148 MMHG | TEMPERATURE: 97.2 F | RESPIRATION RATE: 16 BRPM | HEART RATE: 72 BPM | DIASTOLIC BLOOD PRESSURE: 70 MMHG

## 2022-09-30 DIAGNOSIS — L84 CALLUS OF FOOT: Primary | ICD-10-CM

## 2022-09-30 DIAGNOSIS — S30.810A EXCORIATION OF BUTTOCK, INITIAL ENCOUNTER: ICD-10-CM

## 2022-09-30 DIAGNOSIS — L97.522 DIABETIC ULCER OF TOE OF LEFT FOOT ASSOCIATED WITH TYPE 2 DIABETES MELLITUS, WITH FAT LAYER EXPOSED (HCC): ICD-10-CM

## 2022-09-30 DIAGNOSIS — E11.621 DIABETIC ULCER OF TOE OF LEFT FOOT ASSOCIATED WITH TYPE 2 DIABETES MELLITUS, WITH FAT LAYER EXPOSED (HCC): ICD-10-CM

## 2022-09-30 DIAGNOSIS — L89.312 PRESSURE INJURY OF RIGHT BUTTOCK, STAGE 2 (HCC): ICD-10-CM

## 2022-09-30 PROCEDURE — 11042 DBRDMT SUBQ TIS 1ST 20SQCM/<: CPT

## 2022-09-30 PROCEDURE — 11042 DBRDMT SUBQ TIS 1ST 20SQCM/<: CPT | Performed by: NURSE PRACTITIONER

## 2022-09-30 RX ORDER — BACITRACIN ZINC AND POLYMYXIN B SULFATE 500; 1000 [USP'U]/G; [USP'U]/G
OINTMENT TOPICAL ONCE
Status: CANCELLED | OUTPATIENT
Start: 2022-09-30 | End: 2022-09-30

## 2022-09-30 RX ORDER — GINSENG 100 MG
CAPSULE ORAL ONCE
Status: CANCELLED | OUTPATIENT
Start: 2022-09-30 | End: 2022-09-30

## 2022-09-30 RX ORDER — LIDOCAINE HYDROCHLORIDE 40 MG/ML
SOLUTION TOPICAL ONCE
Status: CANCELLED | OUTPATIENT
Start: 2022-09-30 | End: 2022-09-30

## 2022-09-30 RX ORDER — LIDOCAINE 40 MG/G
CREAM TOPICAL ONCE
Status: CANCELLED | OUTPATIENT
Start: 2022-09-30 | End: 2022-09-30

## 2022-09-30 RX ORDER — BACITRACIN, NEOMYCIN, POLYMYXIN B 400; 3.5; 5 [USP'U]/G; MG/G; [USP'U]/G
OINTMENT TOPICAL ONCE
Status: CANCELLED | OUTPATIENT
Start: 2022-09-30 | End: 2022-09-30

## 2022-09-30 RX ORDER — GENTAMICIN SULFATE 1 MG/G
OINTMENT TOPICAL ONCE
Status: CANCELLED | OUTPATIENT
Start: 2022-09-30 | End: 2022-09-30

## 2022-09-30 RX ORDER — BETAMETHASONE DIPROPIONATE 0.05 %
OINTMENT (GRAM) TOPICAL ONCE
Status: CANCELLED | OUTPATIENT
Start: 2022-09-30 | End: 2022-09-30

## 2022-09-30 RX ORDER — LIDOCAINE 50 MG/G
OINTMENT TOPICAL ONCE
Status: CANCELLED | OUTPATIENT
Start: 2022-09-30 | End: 2022-09-30

## 2022-09-30 RX ORDER — CLOBETASOL PROPIONATE 0.5 MG/G
OINTMENT TOPICAL ONCE
Status: CANCELLED | OUTPATIENT
Start: 2022-09-30 | End: 2022-09-30

## 2022-09-30 ASSESSMENT — PAIN SCALES - GENERAL: PAINLEVEL_OUTOF10: 10

## 2022-09-30 ASSESSMENT — PAIN DESCRIPTION - LOCATION: LOCATION: TOE (COMMENT WHICH ONE)

## 2022-09-30 ASSESSMENT — PAIN DESCRIPTION - ORIENTATION: ORIENTATION: LEFT

## 2022-09-30 NOTE — DISCHARGE INSTRUCTIONS
PHYSICIAN ORDERS AND DISCHARGE INSTRUCTIONS   NOTE: Upon discharge from the [address], you will receive a patient experience survey via E-mail. We would be grateful if you would take the time to fill this survey out. Wound care order history:               CYNTHIA's   Right  1.68     Left 1.65  Date 1/13/21              Vascular studies: . Cultures: .3/18/22              Antibiotics: . HbA1c:  . Compression/Lymph Pumps: . Coban 2 lites (did not tolerate), Double Tubi              Grafts: Janel Slot: . Continuing wound care orders and information:              Residence: . Private              Continue home health care with: [facility] Your wound-care supplies will be provided by: [facility]: . Wound cleansing:                          Do not scrub or use excessive force. Wash hands with soap and water before and after dressing changes. Prior to applying a clean dressing, cleanse wound with normal saline,                          wound cleanser, or mild soap and water. Ask your physician or nurse before getting the wound(s) wet in the shower. Daily Wound management:                          Keep weight off wounds and reposition every 2 hours. Avoid standing for long periods of time. Apply wraps/stockings in AM and remove at bedtime. Elevate legs to the level of the heart or above for 30 minutes 4-5 times a day and/or when sitting. When taking antibiotics take entire prescription as ordered by MD do not stop taking until medicine is all gone. Orders for this week (9/30/22)  Left Second toe - Wash with mild soap and water, rinse with saline, pat dry with 4x4.   Paint all toe with betadine   Place ca alginate between all toes. Apply Marianna to wound bed  Cover with piece of marianna  Secure with bandaid  Change daily. Biateral Lower leg- Was hwith mild soap and water, rinse with saline, pat dry with 4x4  A&D  Tubi F  Change daily        Follow up with ROMI Dumont in 1-2 weeks in the wound care center. Call [phone] for any questions or concerns.

## 2022-09-30 NOTE — PROGRESS NOTES
Wound Care Center Progress Note       Rivas Sweet  AGE: 82 y.o. GENDER: female  : 1935  TODAY'S DATE:  2022        Subjective:     Chief Complaint   Patient presents with    Wound Check      HISTORY of PRESENT ILLNESS    Rivas Sweet is a 82 y.o. female who presents to the [address] [place] [address] for evaluation and treatment of Acute on pressure ulcer right buttock, mild severity, newly epithelize today, recurrent in nature based on pressure and moisture. The ulcer has been present for approximately 1 week when returning to the wound clinic. The underlying cause is thought to be pressure. The patients care to date has included a dry dressing. Also has acute on chronic left second toe of mild severity, covering with a Band-Aid. The patient has significant underlying medical conditions as below. 22: The wound post blister right lower leg that is of moderate severity and has been present approximately one week. Wound Pain Timing/Severity: None  Quality of pain: N/A  Severity of pain:  0 / 10   Modifying Factors: venous stasis, lymphedema, diabetes, poor glucose control, chronic pressure, decreased mobility, shear force and obesity  Associated Signs/Symptoms: edema, erythema   Arterial evaluation: VL arteries 21 per Dr. Christine Douglas, no significant stenosis     Venous Evaluation: as needed if indicated based on the wound, location, assessment and healing. Wound infection: Both lower leg wounds cultured 3/18/22     Diabetes: Yes, no medication regimen at this time, diet control. Last AIC 7.8 as of 20  Smoking: Never smoker  Anticoagulant therapy: No  Immunosuppression: No  Obesity: Yes  Other History: Cor pulmonale on diuretic therapy, PAD on Pletal     Nutritional status: well nourished. Discussed need for increased protein and calories for wound healing and good sources of protein (just over 7 grams for every 20 pounds of body weight).  Animal-based foods high in protein (meat, poultry, fish, eggs, and dairy foods). Plant based foods high in protein (tofu, lentils, beans, chickpeas, nuts, quinoa and everette seeds. Patient educated on the 6 essential components necessary for wound healing: Circulation, Debridements, Proper Dressings and Topical Wound Products, Infection Control, Edema Control and Offloading. Patient educated on those factors that negatively effect or impact wound healing: smoking, obesity, uncontrolled diabetes, anticoagulant and immunosuppressive regimens, inadequate nutrition, untreated arterial and venous disease if applicable and measures to manage edema. PAST MEDICAL HISTORY        Diagnosis Date    Asthma     Chronic kidney disease     acute kidney failure    Chronic ulcer of left leg with fat layer exposed ([address] 3/7/2016    Chronic ulcer of right leg with fat layer exposed ([address] 3/7/2016    Diabetes type 2, controlled ([address]     History of asthma     History of blood transfusion 07/2015    Hypertension     Lymphedema of both lower extremities 3/7/2016    Osteoarthritis     Pneumonia     Thyroid disease     Venous stasis of both lower extremities 3/7/2016    WD-Non-pressure chronic ulcer right lower leg, 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 ([address] 4/21/2016       PAST SURGICAL HISTORY    Past Surgical History:   Procedure Laterality Date    APPENDECTOMY      CHOLECYSTECTOMY      CYSTOSCOPY      EYE SURGERY      bilateral implants    HYSTERECTOMY (CERVIX STATUS UNKNOWN)      JOINT REPLACEMENT Right     knee    PACEMAKER INSERTION N/A 11/17/2020    PACEMAKER INSERTION PERMANENT REMOVAL OF TEMPORARY PACEMAKER performed by Karin Hudson MD at [address] [place] [address]    Family History   Problem Relation Age of Onset    Heart Disease Father        SOCIAL HISTORY    Social History     Tobacco Use    Smoking status: Never    Smokeless tobacco: Never   Vaping Use    Vaping Use: Never used   Substance Use Topics    Alcohol use: No    Drug use:  No ALLERGIES    Allergies   Allergen Reactions    Latex Rash    Dye [Iodides] Anaphylaxis    Iodine Anaphylaxis    Dyazide [Hydrochlorothiazide W-Triamterene] Rash    Lasix [Furosemide] Rash    Procardia [Nifedipine] Other (See Comments)     Not for sure if rash occurred or rash    Doxycycline Dermatitis    Edecrin [Ethacrynic Acid]     Levaquin [Levofloxacin] Other (See Comments)     unknown    Mobic [Meloxicam]     Vioxx [Rofecoxib]     Celebrex [Celecoxib] Rash    Lexapro [Escitalopram Oxalate] Rash    Sulfa Antibiotics Hives       MEDICATIONS    Current Outpatient Medications on File Prior to Encounter   Medication Sig Dispense Refill    febuxostat (ULORIC) 40 MG TABS tablet Take 1 tablet by mouth daily 30 tablet 5    traMADol (ULTRAM) 50 MG tablet Take 50 mg by mouth every 6 hours as needed for Pain.      potassium chloride (KLOR-CON M) 20 MEQ extended release tablet Take 3 tablets by mouth daily 90 tablet 3    metOLazone (ZAROXOLYN) 5 MG tablet Take 1 tablet by mouth daily 30 tablet 5    spironolactone (ALDACTONE) 50 MG tablet Take 1 tablet by mouth 2 times daily (Patient taking differently: Take 100 mg by mouth 2 times daily) 60 tablet 5    albuterol sulfate HFA (PROVENTIL;VENTOLIN;PROAIR) 108 (90 Base) MCG/ACT inhaler Inhale 2 puffs into the lungs every 6 hours as needed for Wheezing      bumetanide (BUMEX) 1 MG tablet Take 2 tablets by mouth 3 times daily (Patient taking differently: Take 2 mg by mouth in the morning, at noon, and at bedtime) 540 tablet 3    clobetasol (TEMOVATE) 0.05 % ointment Apply topically 2 times daily. (Patient taking differently: as needed) 120 g 1    Fluticasone Propionate, Inhal, (FLOVENT IN) Inhale 2 puffs into the lungs 2 times daily      silver sulfADIAZINE (SILVADENE) 1 % cream Apply topically daily.  (Patient taking differently: as needed) 240 g 5    rOPINIRole (REQUIP) 0.5 MG tablet Take 1 tablet by mouth 3 times daily 90 tablet 3    famotidine (PEPCID) 20 MG tablet Take 1 tablet by mouth 2 times daily 60 tablet 5    levothyroxine (SYNTHROID) 50 MCG tablet Take 50 mcg by mouth Daily      Polyethylene Glycol 3350 (MIRALAX PO) Take by mouth      fluticasone (FLONASE) 50 MCG/ACT nasal spray 1 spray by Nasal route 2 times daily       loratadine (CLARITIN) 10 MG capsule Take 10 mg by mouth daily      ferrous sulfate 325 (65 FE) MG tablet Take 1 tablet by mouth 2 times daily (with meals) 30 tablet 3    latanoprost (XALATAN) 0.005 % ophthalmic solution Place 1 drop into both eyes nightly      OXYGEN Inhale 2 L/min into the lungs nightly      Multiple Vitamins-Minerals (ICAPS) CAPS Take 1 tablet by mouth 2 times daily       acetaminophen (TYLENOL) 500 MG tablet Take 500 mg by mouth every 6 hours as needed for Pain. calcium carbonate 600 MG TABS tablet Take 1 tablet by mouth daily. No current facility-administered medications on file prior to encounter. REVIEW OF SYSTEMS    Pertinent items are noted in HPI. Constitutional: Negative for systemic symptoms including fever, chills and malaise. Objective:      BP (!) 148/70   Pulse 72   Temp 97.2 °F (36.2 °C) (Temporal)   Resp 16     PHYSICAL EXAM    General: The patient is in no acute distress. Mental status:  Patient is appropriate, is  oriented to place and plan of care. Dermatologic exam: Visual inspection of the periwound reveals the skin to be edematous.   Wound exam:  see wound description below     All active wounds listed below with today's date are evaluated    Assessment:       Problem List Items Addressed This Visit          Endocrine    WD-Diabetic ulcer of toe of left foot associated with type 2 diabetes mellitus, with fat layer exposed ([address]    Relevant Orders    Initiate Outpatient Wound Care Protocol       Other    WD-Callus of foot - Primary    Relevant Orders    Initiate Outpatient Wound Care Protocol    WD-Pressure injury of right buttock, stage 2 ([address]    Relevant Orders    Initiate Outpatient Wound Care Protocol    WD-Excoriation of buttock crease    Relevant Orders    Initiate Outpatient Wound Care Protocol       Procedure Note    Indications:  Based on my examination of this patient's wound(s) today, sharp excision into necrotic subcutaneous tissue is required to promote healing and evaluate the extent of previous healing. Performed by: TREVON Rivas CNP    Consent obtained: Yes    Time out taken:  Yes    Pain Control: Anesthetic  Anesthetic: 4% Lidocaine Liquid Topical        Debridement:Excisional Debridement    Using curette the wound(s) was/were sharply debrided down through and including the removal of subcutaneous tissue.         Devitalized Tissue Debrided:  slough and exudate    Pre Debridement Measurements:  Are located in the Wound Documentation Flow Sheet    All active wounds listed below with today's date are evaluated  Wound(s)    debrided this date include # : 2 & 4    Post  Debridement Measurements:    Wound 06/10/22 #2 Left Second Toe (Active)   Wound Image   09/30/22 0923   Dressing Status Clean;Dry;New dressing applied 09/23/22 1000   Wound Cleansed Wound cleanser 09/30/22 0923   Offloading for Diabetic Foot Ulcers Offloading not required 09/30/22 0923   Wound Length (cm) 1 cm 09/30/22 0923   Wound Width (cm) 1 cm 09/30/22 0923   Wound Depth (cm) 0.1 cm 09/30/22 0923   Wound Surface Area (cm^2) 1 cm^2 09/30/22 0923   Change in Wound Size % (l*w) -525 09/30/22 0923   Wound Volume (cm^3) 0.1 cm^3 09/30/22 0923   Wound Healing % -525 09/30/22 0923   Post-Procedure Length (cm) 1 cm 09/30/22 0948   Post-Procedure Width (cm) 1 cm 09/30/22 0948   Post-Procedure Depth (cm) 0.1 cm 09/30/22 0948   Post-Procedure Surface Area (cm^2) 1 cm^2 09/30/22 0948   Post-Procedure Volume (cm^3) 0.1 cm^3 09/30/22 0948   Distance Tunneling (cm) 0 cm 09/30/22 0923   Tunneling Position ___ O'Clock 0 09/30/22 0923   Undermining Starts ___ O'Clock 0 09/30/22 0923   Undermining Ends___ O'Clock 0 09/30/22 0923   Undermining Maxium Distance (cm) 0 09/30/22 0923   Wound Assessment [facility] 09/30/22 0923   Drainage Amount Moderate 09/30/22 0923   Drainage Description Yellow 09/30/22 0923   Odor None 09/30/22 0923   Leatha-wound Assessment Maceration 09/30/22 0923   Margins Defined edges 09/30/22 0923   Wound Thickness Description not for Pressure Injury Full thickness 09/30/22 0923   Number of days: 111       Wound 09/30/22 #4 Left Anterior Lower leg (Active)   Wound Image   09/30/22 0927   Wound Cleansed Wound cleanser 09/30/22 0927   Wound Length (cm) 0.2 cm 09/30/22 0927   Wound Width (cm) 0.6 cm 09/30/22 0927   Wound Depth (cm) 0.1 cm 09/30/22 0927   Wound Surface Area (cm^2) 0.12 cm^2 09/30/22 0927   Wound Volume (cm^3) 0.012 cm^3 09/30/22 0927   Post-Procedure Length (cm) 0.2 cm 09/30/22 0948   Post-Procedure Width (cm) 0.6 cm 09/30/22 0948   Post-Procedure Depth (cm) 0.1 cm 09/30/22 0948   Post-Procedure Surface Area (cm^2) 0.12 cm^2 09/30/22 0948   Post-Procedure Volume (cm^3) 0.012 cm^3 09/30/22 0948   Distance Tunneling (cm) 0 cm 09/30/22 0927   Tunneling Position ___ O'Clock 0 09/30/22 0927   Undermining Starts ___ O'Clock 0 09/30/22 0927   Undermining Ends___ O'Clock 0 09/30/22 0927   Undermining Maxium Distance (cm) 0 09/30/22 0927   Wound Assessment Dry;Pink/red 09/30/22 0927   Drainage Amount Moderate 09/30/22 0927   Drainage Description Serosanguinous 09/30/22 0927   Odor None 09/30/22 0927   Leatha-wound Assessment Fragile 09/30/22 0927   Margins Defined edges 09/30/22 0927   Wound Thickness Description not for Pressure Injury Full thickness 09/30/22 0927   Number of days: 0     Percent of Wound(s) Debrided: approximately 100%    Total  Area  Debrided: 1.2 sq cm     Bleeding:  Minimal    Hemostasis Achieved:  by pressure    Procedural Pain:  0  / 10     Post Procedural Pain:  0 / 10     Response to treatment:  Well tolerated by patient.      Status of wound progress and description from last visit: Stable, continue regimen. Will follow up in 1 week. Plan:     Discharge Instructions         PHYSICIAN ORDERS AND DISCHARGE INSTRUCTIONS   NOTE: Upon discharge from the [address], you will receive a patient experience survey via E-mail. We would be grateful if you would take the time to fill this survey out. Wound care order history:               CYNTHIA's   Right  1.68     Left 1.65  Date 1/13/21              Vascular studies: . Cultures: .3/18/22              Antibiotics: . HbA1c:  . Compression/Lymph Pumps: . Coban 2 lites (did not tolerate), Double Tubi              Grafts: Andriy Marquez: . Continuing wound care orders and information:              Residence: . Private              Continue home health care with: [facility] Your wound-care supplies will be provided by: [facility]acy: . Wound cleansing:                          Do not scrub or use excessive force. Wash hands with soap and water before and after dressing changes. Prior to applying a clean dressing, cleanse wound with normal saline,                          wound cleanser, or mild soap and water. Ask your physician or nurse before getting the wound(s) wet in the shower. Daily Wound management:                          Keep weight off wounds and reposition every 2 hours. Avoid standing for long periods of time. Apply wraps/stockings in AM and remove at bedtime. Elevate legs to the level of the heart or above for 30 minutes 4-5 times a day and/or when sitting. When taking antibiotics take entire prescription as ordered by MD do not stop taking until medicine is all gone.                                                     Orders for this week (9/30/22)  Left Second toe - Wash with mild soap and water, rinse with saline, pat dry with 4x4. Paint all toe with betadine   Place ca alginate between all toes. Apply Marianna to wound bed  Cover with piece of marianna  Secure with bandaid  Change daily. Biateral Lower leg- Was hwith mild soap and water, rinse with saline, pat dry with 4x4  A&D  Tubi F  Change daily        Follow up with ROMI Dumont in 1-2 weeks in the wound care center. Call [phone] for any questions or concerns.         Treatment Note      Written Patient Dismissal Instructions Given            Electronically signed by TREVON Palacio CNP on 9/30/2022 at 9:53 AM

## 2022-10-07 ENCOUNTER — HOSPITAL ENCOUNTER (OUTPATIENT)
Dept: WOUND CARE | Age: 87
Discharge: HOME | End: 2022-10-07
Payer: MEDICARE

## 2022-10-07 DIAGNOSIS — L84 CALLUS OF FOOT: Primary | ICD-10-CM

## 2022-10-07 DIAGNOSIS — S30.810A EXCORIATION OF BUTTOCK, INITIAL ENCOUNTER: ICD-10-CM

## 2022-10-07 DIAGNOSIS — L97.212 VENOUS STASIS ULCER OF RIGHT CALF WITH FAT LAYER EXPOSED WITH VARICOSE VEINS (HCC): ICD-10-CM

## 2022-10-07 DIAGNOSIS — E11.621 DIABETIC ULCER OF TOE OF LEFT FOOT ASSOCIATED WITH TYPE 2 DIABETES MELLITUS, WITH FAT LAYER EXPOSED (HCC): ICD-10-CM

## 2022-10-07 DIAGNOSIS — L97.522 DIABETIC ULCER OF TOE OF LEFT FOOT ASSOCIATED WITH TYPE 2 DIABETES MELLITUS, WITH FAT LAYER EXPOSED (HCC): ICD-10-CM

## 2022-10-07 DIAGNOSIS — L89.312 PRESSURE INJURY OF RIGHT BUTTOCK, STAGE 2 (HCC): ICD-10-CM

## 2022-10-07 DIAGNOSIS — L03.115 CELLULITIS OF RIGHT LOWER EXTREMITY: ICD-10-CM

## 2022-10-07 DIAGNOSIS — L03.116 CELLULITIS OF LEFT LOWER EXTREMITY: ICD-10-CM

## 2022-10-07 DIAGNOSIS — I89.0 LYMPHEDEMA OF BOTH LOWER EXTREMITIES: ICD-10-CM

## 2022-10-07 DIAGNOSIS — I83.012 VENOUS STASIS ULCER OF RIGHT CALF WITH FAT LAYER EXPOSED WITH VARICOSE VEINS (HCC): ICD-10-CM

## 2022-10-07 PROCEDURE — 11042 DBRDMT SUBQ TIS 1ST 20SQCM/<: CPT

## 2022-10-07 PROCEDURE — 99213 OFFICE O/P EST LOW 20 MIN: CPT

## 2022-10-07 PROCEDURE — 11042 DBRDMT SUBQ TIS 1ST 20SQCM/<: CPT | Performed by: NURSE PRACTITIONER

## 2022-10-07 PROCEDURE — 99213 OFFICE O/P EST LOW 20 MIN: CPT | Performed by: NURSE PRACTITIONER

## 2022-10-07 RX ORDER — BACITRACIN ZINC AND POLYMYXIN B SULFATE 500; 1000 [USP'U]/G; [USP'U]/G
OINTMENT TOPICAL ONCE
Status: CANCELLED | OUTPATIENT
Start: 2022-10-07 | End: 2022-10-07

## 2022-10-07 RX ORDER — BACITRACIN, NEOMYCIN, POLYMYXIN B 400; 3.5; 5 [USP'U]/G; MG/G; [USP'U]/G
OINTMENT TOPICAL ONCE
Status: CANCELLED | OUTPATIENT
Start: 2022-10-07 | End: 2022-10-07

## 2022-10-07 RX ORDER — LIDOCAINE 50 MG/G
OINTMENT TOPICAL ONCE
Status: CANCELLED | OUTPATIENT
Start: 2022-10-07 | End: 2022-10-07

## 2022-10-07 RX ORDER — GENTAMICIN SULFATE 1 MG/G
OINTMENT TOPICAL ONCE
Status: CANCELLED | OUTPATIENT
Start: 2022-10-07 | End: 2022-10-07

## 2022-10-07 RX ORDER — GINSENG 100 MG
CAPSULE ORAL ONCE
Status: CANCELLED | OUTPATIENT
Start: 2022-10-07 | End: 2022-10-07

## 2022-10-07 RX ORDER — CLOBETASOL PROPIONATE 0.5 MG/G
OINTMENT TOPICAL ONCE
Status: CANCELLED | OUTPATIENT
Start: 2022-10-07 | End: 2022-10-07

## 2022-10-07 RX ORDER — CEPHALEXIN 500 MG/1
500 CAPSULE ORAL 3 TIMES DAILY
Qty: 42 CAPSULE | Refills: 0 | Status: SHIPPED | OUTPATIENT
Start: 2022-10-07 | End: 2022-10-21

## 2022-10-07 RX ORDER — GENTAMICIN SULFATE 1 MG/G
OINTMENT TOPICAL
Qty: 30 G | Refills: 0 | Status: SHIPPED | OUTPATIENT
Start: 2022-10-07 | End: 2022-10-14

## 2022-10-07 RX ORDER — LIDOCAINE HYDROCHLORIDE 40 MG/ML
SOLUTION TOPICAL ONCE
Status: CANCELLED | OUTPATIENT
Start: 2022-10-07 | End: 2022-10-07

## 2022-10-07 RX ORDER — BETAMETHASONE DIPROPIONATE 0.05 %
OINTMENT (GRAM) TOPICAL ONCE
Status: CANCELLED | OUTPATIENT
Start: 2022-10-07 | End: 2022-10-07

## 2022-10-07 RX ORDER — LIDOCAINE 40 MG/G
CREAM TOPICAL ONCE
Status: CANCELLED | OUTPATIENT
Start: 2022-10-07 | End: 2022-10-07

## 2022-10-07 ASSESSMENT — PAIN DESCRIPTION - PROGRESSION: CLINICAL_PROGRESSION: NOT CHANGED

## 2022-10-07 NOTE — PROGRESS NOTES
Wound Care Center Progress Note       Cam Lopez  AGE: 82 y.o. GENDER: female  : 1935  TODAY'S DATE:  10/7/2022        Subjective:     Chief Complaint   Patient presents with    Wound Check      HISTORY of PRESENT ILLNESS    Cam Lopez is a 82 y.o. female who presents to the [address] [place] [address] for evaluation and treatment of Acute on pressure ulcer right buttock, mild severity, newly epithelize today, recurrent in nature based on pressure and moisture. Also has a chronic diabetic left second toe wound of mild to moderate severity. The patient has significant underlying medical conditions as below. 10/7/22: The patient presents today with increased edema and cellulitis bilateral lower legs (right worse left). She has blisters and weeping on the right lower leg. She and spouse verbalizes the patient was on her feet more brandon green beans thus a contributing factor to her cellulitis. 22: The wound post blister right lower leg that is of moderate severity and has been present approximately one week, now healed. Wound Pain Timing/Severity: None  Quality of pain: N/A  Severity of pain:  0 / 10   Modifying Factors: venous stasis, lymphedema, diabetes, poor glucose control, chronic pressure, decreased mobility, shear force and obesity  Associated Signs/Symptoms: edema, erythema     Arterial evaluation: VL arteries 21 per Dr. Kait Kemp, no significant stenosis     Venous Evaluation: as needed if indicated based on the wound, location, assessment and healing. Wound infection: as indicated for S&S infection     Diabetes: Yes, no medication regimen at this time, diet control. Last AIC 7.8 as of 20  Smoking: Never smoker  Anticoagulant therapy: No  Immunosuppression: No  Obesity: Yes  Other History: Cor pulmonale on diuretic therapy, PAD on Pletal     Nutritional status: well nourished.  Discussed need for increased protein and calories for wound healing and good sources of protein (just Vaping Use    Vaping Use: Never used   Substance Use Topics    Alcohol use: No    Drug use: No       ALLERGIES    Allergies   Allergen Reactions    Latex Rash    Dye [Iodides] Anaphylaxis    Iodine Anaphylaxis    Dyazide [Hydrochlorothiazide W-Triamterene] Rash    Lasix [Furosemide] Rash    Procardia [Nifedipine] Other (See Comments)     Not for sure if rash occurred or rash    Doxycycline Dermatitis    Edecrin [Ethacrynic Acid]     Levaquin [Levofloxacin] Other (See Comments)     unknown    Mobic [Meloxicam]     Vioxx [Rofecoxib]     Celebrex [Celecoxib] Rash    Lexapro [Escitalopram Oxalate] Rash    Sulfa Antibiotics Hives       MEDICATIONS    Current Outpatient Medications on File Prior to Encounter   Medication Sig Dispense Refill    febuxostat (ULORIC) 40 MG TABS tablet Take 1 tablet by mouth daily 30 tablet 5    traMADol (ULTRAM) 50 MG tablet Take 50 mg by mouth every 6 hours as needed for Pain.      potassium chloride (KLOR-CON M) 20 MEQ extended release tablet Take 3 tablets by mouth daily 90 tablet 3    metOLazone (ZAROXOLYN) 5 MG tablet Take 1 tablet by mouth daily 30 tablet 5    spironolactone (ALDACTONE) 50 MG tablet Take 1 tablet by mouth 2 times daily (Patient taking differently: Take 100 mg by mouth 2 times daily) 60 tablet 5    albuterol sulfate HFA (PROVENTIL;VENTOLIN;PROAIR) 108 (90 Base) MCG/ACT inhaler Inhale 2 puffs into the lungs every 6 hours as needed for Wheezing      bumetanide (BUMEX) 1 MG tablet Take 2 tablets by mouth 3 times daily (Patient taking differently: Take 2 mg by mouth in the morning, at noon, and at bedtime) 540 tablet 3    clobetasol (TEMOVATE) 0.05 % ointment Apply topically 2 times daily. (Patient taking differently: as needed) 120 g 1    Fluticasone Propionate, Inhal, (FLOVENT IN) Inhale 2 puffs into the lungs 2 times daily      silver sulfADIAZINE (SILVADENE) 1 % cream Apply topically daily.  (Patient taking differently: as needed) 240 g 5    rOPINIRole (REQUIP) 0.5 MG tablet Take 1 tablet by mouth 3 times daily 90 tablet 3    famotidine (PEPCID) 20 MG tablet Take 1 tablet by mouth 2 times daily 60 tablet 5    levothyroxine (SYNTHROID) 50 MCG tablet Take 50 mcg by mouth Daily      Polyethylene Glycol 3350 (MIRALAX PO) Take by mouth      fluticasone (FLONASE) 50 MCG/ACT nasal spray 1 spray by Nasal route 2 times daily       loratadine (CLARITIN) 10 MG capsule Take 10 mg by mouth daily      ferrous sulfate 325 (65 FE) MG tablet Take 1 tablet by mouth 2 times daily (with meals) 30 tablet 3    latanoprost (XALATAN) 0.005 % ophthalmic solution Place 1 drop into both eyes nightly      OXYGEN Inhale 2 L/min into the lungs nightly      Multiple Vitamins-Minerals (ICAPS) CAPS Take 1 tablet by mouth 2 times daily       acetaminophen (TYLENOL) 500 MG tablet Take 500 mg by mouth every 6 hours as needed for Pain. calcium carbonate 600 MG TABS tablet Take 1 tablet by mouth daily. No current facility-administered medications on file prior to encounter. REVIEW OF SYSTEMS    Pertinent items are noted in HPI. Constitutional: Negative for systemic symptoms including fever, chills and malaise. Objective: There were no vitals taken for this visit. PHYSICAL EXAM    General: The patient is in no acute distress. Mental status:  Patient is appropriate, is  oriented to place and plan of care. Dermatologic exam: Visual inspection of the periwound reveals the skin to be edematous.   Wound exam:  see wound description below     All active wounds listed below with today's date are evaluated    Assessment:       Problem List Items Addressed This Visit          Circulatory    WD-Venous stasis ulcer of right calf with fat layer exposed with varicose veins (HCC)       Endocrine    WD-Diabetic ulcer of toe of left foot associated with type 2 diabetes mellitus, with fat layer exposed ([address]    Relevant Orders    Initiate Outpatient Wound Care Protocol       Other    WD-Callus of foot - Primary    Relevant Orders    Initiate Outpatient Wound Care Protocol    Cellulitis of right lower extremity    Cellulitis of left lower extremity    WD-Lymphedema of both lower extremities with cellulitis    WD-Pressure injury of right buttock, stage 2 (HCC)    Relevant Orders    Initiate Outpatient Wound Care Protocol    WD-Excoriation of buttock crease    Relevant Orders    Initiate Outpatient Wound Care Protocol       Procedure Note    Indications:  Based on my examination of this patient's wound(s) today, sharp excision into necrotic subcutaneous tissue is required to promote healing and evaluate the extent of previous healing. Performed by: TREVON Dorsey - CNP    Consent obtained: Yes    Time out taken:  Yes    Pain Control: Anesthetic  Anesthetic: 4% Lidocaine Liquid Topical        Debridement:Excisional Debridement    Using curette the wound(s) was/were sharply debrided down through and including the removal of subcutaneous tissue.         Devitalized Tissue Debrided:  slough and exudate    Pre Debridement Measurements:  Are located in the Wound Documentation Flow Sheet    All active wounds listed below with today's date are evaluated  Wound(s)    debrided this date include # : 2 & 3    Post  Debridement Measurements:    Wound 06/10/22 #2 Left Second Toe (Active)   Wound Image   09/30/22 0923   Dressing Status Clean;Dry;New dressing applied 10/07/22 1001   Wound Cleansed Wound cleanser 10/07/22 0921   Offloading for Diabetic Foot Ulcers Offloading ordered;Walker 10/07/22 0921   Wound Length (cm) 0.2 cm 10/07/22 0921   Wound Width (cm) 0.3 cm 10/07/22 0921   Wound Depth (cm) 0.1 cm 10/07/22 0921   Wound Surface Area (cm^2) 0.06 cm^2 10/07/22 0921   Change in Wound Size % (l*w) 62.5 10/07/22 0921   Wound Volume (cm^3) 0.006 cm^3 10/07/22 0921   Wound Healing % 63 10/07/22 0921   Post-Procedure Length (cm) 0.2 cm 10/07/22 0929   Post-Procedure Width (cm) 0.3 cm 10/07/22 0929   Post-Procedure Depth (cm) 0.1 cm 10/07/22 0929   Post-Procedure Surface Area (cm^2) 0.06 cm^2 10/07/22 0929   Post-Procedure Volume (cm^3) 0.006 cm^3 10/07/22 0929   Distance Tunneling (cm) 0 cm 10/07/22 0921   Tunneling Position ___ O'Clock 0 10/07/22 0921   Undermining Starts ___ O'Clock 0 10/07/22 0921   Undermining Ends___ O'Clock 0 10/07/22 0921   Undermining Maxium Distance (cm) 0 10/07/22 0921   Wound Assessment Slough 10/07/22 0921   Drainage Amount Moderate 10/07/22 0921   Drainage Description Yellow 10/07/22 0921   Odor None 10/07/22 0921   Leatha-wound Assessment Maceration 10/07/22 0921   Margins Defined edges 10/07/22 0921   Wound Thickness Description not for Pressure Injury Full thickness 10/07/22 0921   Number of days: 119       Wound 10/07/22 Wound #3 Right medial lower leg (Active)   Dressing Status Clean;Dry;New dressing applied 10/07/22 1001   Wound Cleansed Wound cleanser 10/07/22 0921   Offloading for Diabetic Foot Ulcers Walker 10/07/22 0921   Wound Length (cm) 2 cm 10/07/22 0921   Wound Width (cm) 2.5 cm 10/07/22 0921   Wound Depth (cm) 0.1 cm 10/07/22 0921   Wound Surface Area (cm^2) 5 cm^2 10/07/22 0921   Wound Volume (cm^3) 0.5 cm^3 10/07/22 0921   Distance Tunneling (cm) 0 cm 10/07/22 0921   Tunneling Position ___ O'Clock 0 10/07/22 0921   Undermining Starts ___ O'Clock 0 10/07/22 0921   Undermining Ends___ O'Clock 0 10/07/22 0921   Undermining Maxium Distance (cm) 00 10/07/22 0921   Wound Assessment Other (Comment) 10/07/22 0921   Drainage Amount None 10/07/22 0921   Odor None 10/07/22 0921   Leatha-wound Assessment Dry/flaky 10/07/22 0921   Margins Defined edges 10/07/22 0921   Wound Thickness Description not for Pressure Injury Partial thickness 10/07/22 0921   Number of days: 0     Percent of Wound(s) Debrided: approximately 100%    Total  Area  Debrided: 5.06 sq cm     Bleeding:  Minimal    Hemostasis Achieved:  by pressure    Procedural Pain:  0  / 10     Post Procedural Pain:  0 / 10     Response to treatment:  Well tolerated by patient. Status of wound progress and description from last visit: Cellulitis and a new wound to the right lower leg, will start on Keflex, regimen as below. Will follow up in 1 week. Plan:     Discharge Instructions           PHYSICIAN ORDERS AND DISCHARGE INSTRUCTIONS   NOTE: Upon discharge from the [address], you will receive a patient experience survey via E-mail. We would be grateful if you would take the time to fill this survey out. Wound care order history:               CYNTHIA's   Right  1.68     Left 1.65  Date 1/13/21              Vascular studies: . Cultures: .3/18/22              Antibiotics: . HbA1c:  . Compression/Lymph Pumps: . Coban 2 lites (did not tolerate), Double Tubi              Grafts: Neal Silveira: . Continuing wound care orders and information:              Residence: . Private              Continue home health care with: [facility] Your wound-care supplies will be provided by: [facility]acy: . Wound cleansing:                          Do not scrub or use excessive force. Wash hands with soap and water before and after dressing changes. Prior to applying a clean dressing, cleanse wound with normal saline,                          wound cleanser, or mild soap and water. Ask your physician or nurse before getting the wound(s) wet in the shower. Daily Wound management:                          Keep weight off wounds and reposition every 2 hours. Avoid standing for long periods of time. Apply wraps/stockings in AM and remove at bedtime. Elevate legs to the level of the heart or above for 30 minutes 4-5 times a day and/or when sitting.                                                When taking antibiotics take entire prescription as ordered by MD do not stop taking until medicine is all gone. Orders for this week (10/7/22)  Left Second toe - Wash with mild soap and water, rinse with saline, pat dry with 4x4. Paint all toe with betadine   Place ca alginate between all toes. Apply thin layer of  and stimulen to wound bed  Cover cover with calcium alginate  Wrap with conform and secure with tape (please wrap entire foot)  Ace bandage  Change Monday, Wednesday and Friday     Biateral Lower leg- Was hwith mild soap and water, rinse with saline, pat dry with 4x4  A&D  Apply gentamicin and stimulen to wound bed  Cover with Calcium alginate and sorbex  Wrap with conform and secure with tape  Double Tubi F  Change Monday, Wenesday and Friday      Follow up with Sherry Osman CNP in 1-2 weeks in the wound care center. Call [phone] for any questions or concerns.                 Treatment Note Wound 10/07/22 Wound #3 Right medial lower leg-Dressing/Treatment:  (gent, stimulen, ca alg, sorbex, conform, tape, double tubi F, ace)  Wound 06/10/22 #2 Left Second Toe-Dressing/Treatment:  (stimulen, ca alg between toes, conform, tape, ace)    Written Patient Dismissal Instructions Given            Electronically signed by TREVON Alvarado CNP on 10/7/2022 at 1:08 PM

## 2022-10-07 NOTE — DISCHARGE INSTRUCTIONS
PHYSICIAN ORDERS AND DISCHARGE INSTRUCTIONS   NOTE: Upon discharge from the [address], you will receive a patient experience survey via E-mail. We would be grateful if you would take the time to fill this survey out. Wound care order history:               CYNTHIA's   Right  1.68     Left 1.65  Date 1/13/21              Vascular studies: . Cultures: .3/18/22              Antibiotics: . HbA1c:  . Compression/Lymph Pumps: . Coban 2 lites (did not tolerate), Double Tubi              Grafts: Andriy Marquez: . Continuing wound care orders and information:              Residence: . Private              Continue home health care with: E[facility] Silvestre Your wound-care supplies will be provided by: [facility]: . Wound cleansing:                          Do not scrub or use excessive force. Wash hands with soap and water before and after dressing changes. Prior to applying a clean dressing, cleanse wound with normal saline,                          wound cleanser, or mild soap and water. Ask your physician or nurse before getting the wound(s) wet in the shower. Daily Wound management:                          Keep weight off wounds and reposition every 2 hours. Avoid standing for long periods of time. Apply wraps/stockings in AM and remove at bedtime. Elevate legs to the level of the heart or above for 30 minutes 4-5 times a day and/or when sitting. When taking antibiotics take entire prescription as ordered by MD do not stop taking until medicine is all gone. Orders for this week (10/7/22)  Left Second toe - Wash with mild soap and water, rinse with saline, pat dry with 4x4.   Paint all toe with betadine   Place ca alginate between all toes. Apply thin layer of  and stimulen to wound bed  Cover cover with calcium alginate  Wrap with conform and secure with tape (please wrap entire foot)  Ace bandage  Change Monday, Wednesday and Friday     Biateral Lower leg- Was hwith mild soap and water, rinse with saline, pat dry with 4x4  A&D  Apply gentamicin and stimulen to wound bed  Cover with Calcium alginate and sorbex  Wrap with conform and secure with tape  Double Tubi F  Change Monday, Wenesday and Friday      Follow up with Sinai Ramirez CNP in 1-2 weeks in the wound care center. Call [phone] for any questions or concerns.

## 2022-10-13 NOTE — DISCHARGE INSTRUCTIONS
PHYSICIAN ORDERS AND DISCHARGE INSTRUCTIONS   NOTE: Upon discharge from the [address], you will receive a patient experience survey via E-mail. We would be grateful if you would take the time to fill this survey out. Wound care order history:               CYNTHIA's   Right  1.68     Left 1.65  Date 1/13/21              Vascular studies: . Cultures: .3/18/22              Antibiotics: . HbA1c:  . Compression/Lymph Pumps: . Coban 2 lites (did not tolerate), Double Tubi              Grafts: Rhina Solorio: . Continuing wound care orders and information:              Residence: . Private              Continue home health care with: Row[facility]acon Your wound-care supplies will be provided by: [facility]: . Wound cleansing:                          Do not scrub or use excessive force. Wash hands with soap and water before and after dressing changes. Prior to applying a clean dressing, cleanse wound with normal saline,                          wound cleanser, or mild soap and water. Ask your physician or nurse before getting the wound(s) wet in the shower. Daily Wound management:                          Keep weight off wounds and reposition every 2 hours. Avoid standing for long periods of time. Apply wraps/stockings in AM and remove at bedtime. Elevate legs to the level of the heart or above for 30 minutes 4-5 times a day and/or when sitting. When taking antibiotics take entire prescription as ordered by MD do not stop taking until medicine is all gone.                                                     Orders for this week (10/14/22)  Left Second toe - Wash with mild soap and water, rinse with saline, pat dry with 4x4.  Paint all toe with betadine   Place ca alginate between all toes. Apply thin layer of  and stimulen to wound bed  Cover cover with calcium alginate  Wrap with conform and secure with tape (please wrap entire foot)  Ace bandage  Change Monday, Wednesday and Friday     Bilateral Lower leg- Wash with mild soap and water, rinse with saline, pat dry with 4x4  In Clinic Only  A&D  Apply gentamicin and stimulen to wound bed  Cover with Calcium alginate and sorbex  Wrap with coban 2 lite and secure with tape - Box in toes  Leave till Monday   On Monday- remove, and cleanse wound  A&D  Apply gentamicin and stimulen to wound bed  Cover with Calcium alginate and sorbex  Wrap with conform and secure with tape  Double Tubi F  Change Monday, Wenesday       Follow up with Alta White CNP in 1-2 weeks in the wound care center. Call [phone] for any questions or concerns.

## 2022-10-14 ENCOUNTER — HOSPITAL ENCOUNTER (OUTPATIENT)
Dept: WOUND CARE | Age: 87
Discharge: HOME | End: 2022-10-14
Payer: MEDICARE

## 2022-10-14 DIAGNOSIS — L03.115 CELLULITIS OF RIGHT LOWER EXTREMITY: Primary | ICD-10-CM

## 2022-10-14 DIAGNOSIS — L97.222 VENOUS STASIS ULCER OF LEFT CALF WITH FAT LAYER EXPOSED WITH VARICOSE VEINS (HCC): ICD-10-CM

## 2022-10-14 DIAGNOSIS — L97.212 VENOUS STASIS ULCER OF RIGHT CALF WITH FAT LAYER EXPOSED WITH VARICOSE VEINS (HCC): ICD-10-CM

## 2022-10-14 DIAGNOSIS — I89.0 LYMPHEDEMA OF BOTH LOWER EXTREMITIES: ICD-10-CM

## 2022-10-14 DIAGNOSIS — L03.116 CELLULITIS OF LEFT LOWER EXTREMITY: ICD-10-CM

## 2022-10-14 DIAGNOSIS — E11.621 DIABETIC ULCER OF TOE OF LEFT FOOT ASSOCIATED WITH TYPE 2 DIABETES MELLITUS, WITH FAT LAYER EXPOSED (HCC): ICD-10-CM

## 2022-10-14 DIAGNOSIS — I83.012 VENOUS STASIS ULCER OF RIGHT CALF WITH FAT LAYER EXPOSED WITH VARICOSE VEINS (HCC): ICD-10-CM

## 2022-10-14 DIAGNOSIS — L97.522 DIABETIC ULCER OF TOE OF LEFT FOOT ASSOCIATED WITH TYPE 2 DIABETES MELLITUS, WITH FAT LAYER EXPOSED (HCC): ICD-10-CM

## 2022-10-14 DIAGNOSIS — I83.022 VENOUS STASIS ULCER OF LEFT CALF WITH FAT LAYER EXPOSED WITH VARICOSE VEINS (HCC): ICD-10-CM

## 2022-10-14 PROCEDURE — 11042 DBRDMT SUBQ TIS 1ST 20SQCM/<: CPT

## 2022-10-14 PROCEDURE — 11042 DBRDMT SUBQ TIS 1ST 20SQCM/<: CPT | Performed by: NURSE PRACTITIONER

## 2022-10-14 PROCEDURE — 99213 OFFICE O/P EST LOW 20 MIN: CPT | Performed by: NURSE PRACTITIONER

## 2022-10-14 NOTE — PROGRESS NOTES
Multilayer Compression Wrap   (Not Unna) Below the Knee    NAME:  Richard Cover OF BIRTH:  1935  MEDICAL RECORD NUMBER:  [id number]  DATE:  10/14/2022    Multilayer compression wrap: Removed old Multilayer wrap if indicated and wash leg with mild soap/water. Applied moisturizing agent to dry skin as needed. Applied primary and secondary dressing as ordered. Applied multilayered dressing below the knee to right lower leg. Applied multilayered dressing below the knee to left lower leg. Instructed patient/caregiver not to remove dressing and to keep it clean and dry. Instructed patient/caregiver on complications to report to provider, such as pain, numbness in toes, heavy drainage, and slippage of dressing. Instructed patient on purpose of compression dressing and on activity and exercise recommendations.       Electronically signed by James Fonseca RN on 10/14/2022 at 9:50 AM

## 2022-10-14 NOTE — PROGRESS NOTES
Wound Care Center Progress Note       Singh Pickett  AGE: 82 y.o. GENDER: female  : 1935  TODAY'S DATE:  10/14/2022        Subjective:     Chief Complaint   Patient presents with    Wound Check      HISTORY of PRESENT ILLNESS    Singh Pickett is a 82 y.o. female who presents to the [address] [place] [address] for evaluation and treatment of Acute on pressure ulcer right buttock, mild severity, newly epithelize today, recurrent in nature based on pressure and moisture. Also has a chronic diabetic left second toe wound of mild to moderate severity. The patient has significant underlying medical conditions as below. 10/14/22: The patient has a new wound to the left lower leg, she still has cellulitis bilateral lower legs and is on Keflex. The patient responds well to compression wraps but the patient doesn't like to wear them. Discussed heavily the importance of compression this week, she is agreeable. 10/7/22: The patient presents today with increased edema and cellulitis bilateral lower legs (right worse left). She has blisters and weeping on the right lower leg. She and spouse verbalizes the patient was on her feet more brandon green beans thus a contributing factor to her cellulitis. 22: The wound post blister right lower leg that is of moderate severity and has been present approximately one week, now healed. Wound Pain Timing/Severity: None  Quality of pain: N/A  Severity of pain:  0 / 10   Modifying Factors: venous stasis, lymphedema, diabetes, poor glucose control, chronic pressure, decreased mobility, shear force and obesity  Associated Signs/Symptoms: edema, erythema     Arterial evaluation: VL arteries 21 per Dr. Naye Vidales, no significant stenosis     Venous Evaluation: as needed if indicated based on the wound, location, assessment and healing. Wound infection: as indicated for S&S infection     Diabetes: Yes, no medication regimen at this time, diet control.  Last AIC 7.8 as of 8/19/20  Smoking: Never smoker  Anticoagulant therapy: No  Immunosuppression: No  Obesity: Yes  Other History: Cor pulmonale on diuretic therapy, PAD on Pletal     Nutritional status: well nourished. Discussed need for increased protein and calories for wound healing and good sources of protein (just over 7 grams for every 20 pounds of body weight). Animal-based foods high in protein (meat, poultry, fish, eggs, and dairy foods). Plant based foods high in protein (tofu, lentils, beans, chickpeas, nuts, quinoa and everette seeds. Patient educated on the 6 essential components necessary for wound healing: Circulation, Debridements, Proper Dressings and Topical Wound Products, Infection Control, Edema Control and Offloading. Patient educated on those factors that negatively effect or impact wound healing: smoking, obesity, uncontrolled diabetes, anticoagulant and immunosuppressive regimens, inadequate nutrition, untreated arterial and venous disease if applicable and measures to manage edema.          PAST MEDICAL HISTORY        Diagnosis Date    Asthma     Chronic kidney disease     acute kidney failure    Chronic ulcer of left leg with fat layer exposed ([address] 3/7/2016    Chronic ulcer of right leg with fat layer exposed ([address] 3/7/2016    Diabetes type 2, controlled ([address]     History of asthma     History of blood transfusion 07/2015    Hypertension     Lymphedema of both lower extremities 3/7/2016    Osteoarthritis     Pneumonia     Thyroid disease     Venous stasis of both lower extremities 3/7/2016    WD-Non-pressure chronic ulcer right lower leg, 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 ([address] 4/21/2016       PAST SURGICAL HISTORY    Past Surgical History:   Procedure Laterality Date    APPENDECTOMY      CHOLECYSTECTOMY      CYSTOSCOPY      EYE SURGERY      bilateral implants    HYSTERECTOMY (CERVIX STATUS UNKNOWN)      JOINT REPLACEMENT Right     knee    PACEMAKER INSERTION N/A 11/17/2020    PACEMAKER INSERTION PERMANENT REMOVAL OF TEMPORARY PACEMAKER performed by Gladis Trent MD at [address] [place] [address]    Family History   Problem Relation Age of Onset    Heart Disease Father        SOCIAL HISTORY    Social History     Tobacco Use    Smoking status: Never    Smokeless tobacco: Never   Vaping Use    Vaping Use: Never used   Substance Use Topics    Alcohol use: No    Drug use: No       ALLERGIES    Allergies   Allergen Reactions    Latex Rash    Dye [Iodides] Anaphylaxis    Iodine Anaphylaxis    Dyazide [Hydrochlorothiazide W-Triamterene] Rash    Lasix [Furosemide] Rash    Procardia [Nifedipine] Other (See Comments)     Not for sure if rash occurred or rash    Doxycycline Dermatitis    Edecrin [Ethacrynic Acid]     Levaquin [Levofloxacin] Other (See Comments)     unknown    Mobic [Meloxicam]     Vioxx [Rofecoxib]     Celebrex [Celecoxib] Rash    Lexapro [Escitalopram Oxalate] Rash    Sulfa Antibiotics Hives       MEDICATIONS    Current Outpatient Medications on File Prior to Encounter   Medication Sig Dispense Refill    spironolactone (ALDACTONE) 50 MG tablet Take 1 tablet by mouth 2 times daily 60 tablet 5    metOLazone (ZAROXOLYN) 5 MG tablet Take 1 tablet by mouth daily 30 tablet 5    gentamicin (GARAMYCIN) 0.1 % ointment Apply topically daily.  30 g 0    cephALEXin (KEFLEX) 500 MG capsule Take 1 capsule by mouth 3 times daily for 14 days 42 capsule 0    febuxostat (ULORIC) 40 MG TABS tablet Take 1 tablet by mouth daily 30 tablet 5    traMADol (ULTRAM) 50 MG tablet Take 50 mg by mouth every 6 hours as needed for Pain.      potassium chloride (KLOR-CON M) 20 MEQ extended release tablet Take 3 tablets by mouth daily 90 tablet 3    albuterol sulfate HFA (PROVENTIL;VENTOLIN;PROAIR) 108 (90 Base) MCG/ACT inhaler Inhale 2 puffs into the lungs every 6 hours as needed for Wheezing      bumetanide (BUMEX) 1 MG tablet Take 2 tablets by mouth 3 times daily (Patient taking differently: Take 2 mg by mouth in the morning, at noon, and at bedtime) 540 tablet 3    clobetasol (TEMOVATE) 0.05 % ointment Apply topically 2 times daily. (Patient taking differently: as needed) 120 g 1    Fluticasone Propionate, Inhal, (FLOVENT IN) Inhale 2 puffs into the lungs 2 times daily      silver sulfADIAZINE (SILVADENE) 1 % cream Apply topically daily. (Patient taking differently: as needed) 240 g 5    rOPINIRole (REQUIP) 0.5 MG tablet Take 1 tablet by mouth 3 times daily 90 tablet 3    famotidine (PEPCID) 20 MG tablet Take 1 tablet by mouth 2 times daily 60 tablet 5    levothyroxine (SYNTHROID) 50 MCG tablet Take 50 mcg by mouth Daily      Polyethylene Glycol 3350 (MIRALAX PO) Take by mouth      fluticasone (FLONASE) 50 MCG/ACT nasal spray 1 spray by Nasal route 2 times daily       loratadine (CLARITIN) 10 MG capsule Take 10 mg by mouth daily      ferrous sulfate 325 (65 FE) MG tablet Take 1 tablet by mouth 2 times daily (with meals) 30 tablet 3    latanoprost (XALATAN) 0.005 % ophthalmic solution Place 1 drop into both eyes nightly      OXYGEN Inhale 2 L/min into the lungs nightly      Multiple Vitamins-Minerals (ICAPS) CAPS Take 1 tablet by mouth 2 times daily       acetaminophen (TYLENOL) 500 MG tablet Take 500 mg by mouth every 6 hours as needed for Pain. calcium carbonate 600 MG TABS tablet Take 1 tablet by mouth daily. No current facility-administered medications on file prior to encounter. REVIEW OF SYSTEMS    Pertinent items are noted in HPI. Constitutional: Negative for systemic symptoms including fever, chills and malaise. Objective: There were no vitals taken for this visit. PHYSICAL EXAM    General: The patient is in no acute distress. Mental status:  Patient is appropriate, is  oriented to place and plan of care. Dermatologic exam: Visual inspection of the periwound reveals the skin to be edematous.   Wound exam:  see wound description below     All active wounds listed below with today's date are evaluated    Assessment:       Problem List Items Addressed This Visit          Circulatory    WD-Venous stasis ulcer of right calf with fat layer exposed with varicose veins (HCC)    WD-Venous stasis ulcer of left calf with fat layer exposed with varicose veins (HCC)       Endocrine    WD-Diabetic ulcer of toe of left foot associated with type 2 diabetes mellitus, with fat layer exposed ([address]       Other    Cellulitis of right lower extremity - Primary    Cellulitis of left lower extremity    WD-Lymphedema of both lower extremities with cellulitis       Procedure Note    Indications:  Based on my examination of this patient's wound(s) today, sharp excision into necrotic subcutaneous tissue is required to promote healing and evaluate the extent of previous healing. Performed by: TREVON Ambrosio - CNP    Consent obtained: Yes    Time out taken:  Yes    Pain Control: Anesthetic  Anesthetic: 4% Lidocaine Liquid Topical        Debridement:Excisional Debridement    Using curette the wound(s) was/were sharply debrided down through and including the removal of subcutaneous tissue. Devitalized Tissue Debrided:  slough and exudate    Pre Debridement Measurements:  Are located in the Wound Documentation Flow Sheet    All active wounds listed below with today's date are evaluated  Wound(s)    debrided this date include # : 2, 3 & 4    Post  Debridement Measurements:    Wound 06/10/22 #2 Left Second Toe (Active)   Wound Image   09/30/22 0923   Dressing Status New dressing applied;Clean;Dry; Intact 10/14/22 0948   Wound Cleansed Wound cleanser 10/14/22 0816   Offloading for Diabetic Foot Ulcers Offloading not required 10/14/22 0816   Wound Length (cm) 0.1 cm 10/14/22 0816   Wound Width (cm) 0.1 cm 10/14/22 0816   Wound Depth (cm) 0.1 cm 10/14/22 0816   Wound Surface Area (cm^2) 0.01 cm^2 10/14/22 0816   Change in Wound Size % (l*w) 93.75 10/14/22 0816   Wound Volume (cm^3) 0.001 cm^3 10/14/22 0816   Wound Healing % 94 10/14/22 0816   Post-Procedure Length (cm) 0.1 cm 10/14/22 0845   Post-Procedure Width (cm) 0.1 cm 10/14/22 0845   Post-Procedure Depth (cm) 0.1 cm 10/14/22 0845   Post-Procedure Surface Area (cm^2) 0.01 cm^2 10/14/22 0845   Post-Procedure Volume (cm^3) 0.001 cm^3 10/14/22 0845   Distance Tunneling (cm) 0 cm 10/14/22 0816   Tunneling Position ___ O'Clock 0 10/14/22 0816   Undermining Starts ___ O'Clock 0 10/14/22 0816   Undermining Ends___ O'Clock 0 10/14/22 0816   Undermining Maxium Distance (cm) 0 10/14/22 0816   Wound Assessment Slough 10/14/22 0816   Drainage Amount Moderate 10/14/22 0816   Drainage Description Yellow 10/14/22 0816   Odor None 10/14/22 0816   Leatha-wound Assessment Maceration 10/14/22 0816   Margins Defined edges 10/14/22 0816   Wound Thickness Description not for Pressure Injury Full thickness 10/14/22 0816   Number of days: 126       Wound 10/07/22 #3 Right medial lower leg (Active)   Wound Image   10/14/22 0816   Dressing Status New dressing applied 10/14/22 0948   Wound Cleansed Wound cleanser 10/14/22 0816   Offloading for Diabetic Foot Ulcers Walker 10/07/22 0921   Wound Length (cm) 2 cm 10/14/22 0816   Wound Width (cm) 0.8 cm 10/14/22 0816   Wound Depth (cm) 0.1 cm 10/14/22 0816   Wound Surface Area (cm^2) 1.6 cm^2 10/14/22 0816   Change in Wound Size % (l*w) 68 10/14/22 0816   Wound Volume (cm^3) 0.16 cm^3 10/14/22 0816   Wound Healing % 68 10/14/22 0816   Distance Tunneling (cm) 0 cm 10/14/22 0816   Tunneling Position ___ O'Clock 0 10/14/22 0816   Undermining Starts ___ O'Clock 0 10/14/22 0816   Undermining Ends___ O'Clock 0 10/14/22 0816   Undermining Maxium Distance (cm) 0 10/14/22 0816   Wound Assessment Dry 10/14/22 0816   Drainage Amount None 10/14/22 0816   Odor None 10/14/22 0816   Leatha-wound Assessment Dry/flaky 10/14/22 0816   Margins Defined edges 10/14/22 0816   Wound Thickness Description not for Pressure Injury Partial thickness 10/14/22 0816   Number of days: 7 Wound 10/14/22 #4 Left anterior lower leg (Active)   Wound Image   10/14/22 0816   Dressing Status New dressing applied;Clean;Dry; Intact 10/14/22 0948   Wound Cleansed Wound cleanser 10/14/22 0816   Offloading for Diabetic Foot Ulcers Offloading not required 10/14/22 0816   Wound Length (cm) 0.6 cm 10/14/22 0816   Wound Width (cm) 0.8 cm 10/14/22 0816   Wound Depth (cm) 0.1 cm 10/14/22 0816   Wound Surface Area (cm^2) 0.48 cm^2 10/14/22 0816   Wound Volume (cm^3) 0.048 cm^3 10/14/22 0816   Distance Tunneling (cm) 0 cm 10/14/22 0816   Tunneling Position ___ O'Clock 0 10/14/22 0816   Undermining Starts ___ O'Clock 0 10/14/22 0816   Undermining Ends___ O'Clock 0 10/14/22 0816   Undermining Maxium Distance (cm) 0 10/14/22 0816   Wound Assessment Pink/red 10/14/22 0816   Drainage Amount Scant 10/14/22 0816   Drainage Description Thin 10/14/22 0816   Odor None 10/14/22 0816   Leatha-wound Assessment Dry/flaky 10/14/22 0816   Margins Attached edges 10/14/22 0816   Wound Thickness Description not for Pressure Injury Full thickness 10/14/22 0816   Number of days: 0     Percent of Wound(s) Debrided: approximately 100%    Total  Area  Debrided: 2.18 sq cm     Bleeding:  Minimal    Hemostasis Achieved:  by pressure    Procedural Pain:  0  / 10     Post Procedural Pain:  0 / 10     Response to treatment:  Well tolerated by patient. Status of wound progress and description from last visit: Cellulitis persists, continue Keflex. Worsening edema, with a new wound to the left lower leg, will change regimen and start compression wraps. Will follow up in 1 week. Plan:     Discharge Instructions         PHYSICIAN ORDERS AND DISCHARGE INSTRUCTIONS   NOTE: Upon discharge from the [address], you will receive a patient experience survey via E-mail. We would be grateful if you would take the time to fill this survey out.    Wound care order history:               CYNTHIA's   Right  1.68     Left 1.65  Date 1/13/21 Vascular studies: . Cultures: .3/18/22              Antibiotics: . HbA1c:  . Compression/Lymph Pumps: . Coban 2 lites (did not tolerate), Double Tubi              Grafts: Fuentes Going: . Continuing wound care orders and information:              Residence: . Private              Continue home health care with: [facility] Your wound-care supplies will be provided by: [facility] Pharmacy: . Wound cleansing:                          Do not scrub or use excessive force. Wash hands with soap and water before and after dressing changes. Prior to applying a clean dressing, cleanse wound with normal saline,                          wound cleanser, or mild soap and water. Ask your physician or nurse before getting the wound(s) wet in the shower. Daily Wound management:                          Keep weight off wounds and reposition every 2 hours. Avoid standing for long periods of time. Apply wraps/stockings in AM and remove at bedtime. Elevate legs to the level of the heart or above for 30 minutes 4-5 times a day and/or when sitting. When taking antibiotics take entire prescription as ordered by MD do not stop taking until medicine is all gone. Orders for this week (10/14/22)  Left Second toe - Wash with mild soap and water, rinse with saline, pat dry with 4x4. Paint all toe with betadine   Place ca alginate between all toes.   Apply thin layer of  and stimulen to wound bed  Cover cover with calcium alginate  Wrap with conform and secure with tape (please wrap entire foot)  Ace bandage  Change Monday, Wednesday and Friday     Bilateral Lower leg- Wash with mild soap and water, rinse with saline, pat dry with 4x4  In Clinic Only  A&D  Apply gentamicin and stimulen to wound bed  Cover with Calcium alginate and sorbex  Wrap with coban 2 lite and secure with tape - Box in toes  Leave till Monday   On Monday- remove, and cleanse wound  A&D  Apply gentamicin and stimulen to wound bed  Cover with Calcium alginate and sorbex  Wrap with conform and secure with tape  Double Tubi F  Change Monday, Wenesday       Follow up with Brandy Martini CNP in 1-2 weeks in the wound care center. Call [phone] for any questions or concerns.         Treatment Note Wound 10/14/22 #4 Left anterior lower leg-Dressing/Treatment:  (gentamicin, stimulen, ca alg, sorbex, coban 2 lite)  Wound 10/07/22 #3 Right medial lower leg-Dressing/Treatment:  (gentamicin, stimulen, ca alg, sorbex, coban 2 lite)  Wound 06/10/22 #2 Left Second Toe-Dressing/Treatment:  (betadine, stimulen, ca alg, coban 2 lite)    Written Patient Dismissal Instructions Given            Electronically signed by TREVON Nuno CNP on 10/14/2022 at 11:47 AM

## 2022-10-21 ENCOUNTER — HOSPITAL ENCOUNTER (OUTPATIENT)
Dept: WOUND CARE | Age: 87
Discharge: HOME | End: 2022-10-21
Payer: MEDICARE

## 2022-10-21 VITALS
RESPIRATION RATE: 16 BRPM | SYSTOLIC BLOOD PRESSURE: 113 MMHG | DIASTOLIC BLOOD PRESSURE: 57 MMHG | TEMPERATURE: 97.7 F | HEART RATE: 64 BPM

## 2022-10-21 DIAGNOSIS — L03.116 CELLULITIS OF LEFT LOWER EXTREMITY: ICD-10-CM

## 2022-10-21 DIAGNOSIS — L97.222 VENOUS STASIS ULCER OF LEFT CALF WITH FAT LAYER EXPOSED WITH VARICOSE VEINS (HCC): ICD-10-CM

## 2022-10-21 DIAGNOSIS — L03.115 CELLULITIS OF RIGHT LOWER EXTREMITY: ICD-10-CM

## 2022-10-21 DIAGNOSIS — L97.522 DIABETIC ULCER OF TOE OF LEFT FOOT ASSOCIATED WITH TYPE 2 DIABETES MELLITUS, WITH FAT LAYER EXPOSED (HCC): ICD-10-CM

## 2022-10-21 DIAGNOSIS — E11.621 DIABETIC ULCER OF TOE OF LEFT FOOT ASSOCIATED WITH TYPE 2 DIABETES MELLITUS, WITH FAT LAYER EXPOSED (HCC): ICD-10-CM

## 2022-10-21 DIAGNOSIS — I83.012 VENOUS STASIS ULCER OF RIGHT CALF WITH FAT LAYER EXPOSED WITH VARICOSE VEINS (HCC): ICD-10-CM

## 2022-10-21 DIAGNOSIS — L97.212 VENOUS STASIS ULCER OF RIGHT CALF WITH FAT LAYER EXPOSED WITH VARICOSE VEINS (HCC): ICD-10-CM

## 2022-10-21 DIAGNOSIS — I83.022 VENOUS STASIS ULCER OF LEFT CALF WITH FAT LAYER EXPOSED WITH VARICOSE VEINS (HCC): ICD-10-CM

## 2022-10-21 DIAGNOSIS — L84 CALLUS OF FOOT: Primary | ICD-10-CM

## 2022-10-21 PROCEDURE — 11042 DBRDMT SUBQ TIS 1ST 20SQCM/<: CPT

## 2022-10-21 PROCEDURE — 11042 DBRDMT SUBQ TIS 1ST 20SQCM/<: CPT | Performed by: NURSE PRACTITIONER

## 2022-10-21 PROCEDURE — 11045 DBRDMT SUBQ TISS EACH ADDL: CPT

## 2022-10-21 PROCEDURE — 99213 OFFICE O/P EST LOW 20 MIN: CPT | Performed by: NURSE PRACTITIONER

## 2022-10-21 RX ORDER — LIDOCAINE HYDROCHLORIDE 40 MG/ML
SOLUTION TOPICAL ONCE
Status: CANCELLED | OUTPATIENT
Start: 2022-10-21 | End: 2022-10-21

## 2022-10-21 RX ORDER — LIDOCAINE 40 MG/G
CREAM TOPICAL ONCE
Status: CANCELLED | OUTPATIENT
Start: 2022-10-21 | End: 2022-10-21

## 2022-10-21 RX ORDER — LIDOCAINE 50 MG/G
OINTMENT TOPICAL ONCE
Status: CANCELLED | OUTPATIENT
Start: 2022-10-21 | End: 2022-10-21

## 2022-10-21 RX ORDER — BACITRACIN, NEOMYCIN, POLYMYXIN B 400; 3.5; 5 [USP'U]/G; MG/G; [USP'U]/G
OINTMENT TOPICAL ONCE
Status: CANCELLED | OUTPATIENT
Start: 2022-10-21 | End: 2022-10-21

## 2022-10-21 RX ORDER — BETAMETHASONE DIPROPIONATE 0.05 %
OINTMENT (GRAM) TOPICAL ONCE
Status: CANCELLED | OUTPATIENT
Start: 2022-10-21 | End: 2022-10-21

## 2022-10-21 RX ORDER — CLOBETASOL PROPIONATE 0.5 MG/G
OINTMENT TOPICAL ONCE
Status: CANCELLED | OUTPATIENT
Start: 2022-10-21 | End: 2022-10-21

## 2022-10-21 RX ORDER — GINSENG 100 MG
CAPSULE ORAL ONCE
Status: CANCELLED | OUTPATIENT
Start: 2022-10-21 | End: 2022-10-21

## 2022-10-21 RX ORDER — GENTAMICIN SULFATE 1 MG/G
OINTMENT TOPICAL ONCE
Status: CANCELLED | OUTPATIENT
Start: 2022-10-21 | End: 2022-10-21

## 2022-10-21 RX ORDER — BACITRACIN ZINC AND POLYMYXIN B SULFATE 500; 1000 [USP'U]/G; [USP'U]/G
OINTMENT TOPICAL ONCE
Status: CANCELLED | OUTPATIENT
Start: 2022-10-21 | End: 2022-10-21

## 2022-10-21 NOTE — DISCHARGE INSTRUCTIONS
PHYSICIAN ORDERS AND DISCHARGE INSTRUCTIONS   NOTE: Upon discharge from the [address], you will receive a patient experience survey via E-mail. We would be grateful if you would take the time to fill this survey out. Wound care order history:               CYNTHIA's   Right  1.68     Left 1.65  Date 1/13/21              Vascular studies: . Cultures: .3/18/22              Antibiotics: . HbA1c:  . Compression/Lymph Pumps: . Coban 2 lites (did not tolerate), Double Tubi              Grafts: Andriy Marquez: . Continuing wound care orders and information:              Residence: . Private              Continue home health care with: [facility] Your wound-care supplies will be provided by: [facility] Pharmacy: . Wound cleansing:                          Do not scrub or use excessive force. Wash hands with soap and water before and after dressing changes. Prior to applying a clean dressing, cleanse wound with normal saline,                          wound cleanser, or mild soap and water. Ask your physician or nurse before getting the wound(s) wet in the shower. Daily Wound management:                          Keep weight off wounds and reposition every 2 hours. Avoid standing for long periods of time. Apply wraps/stockings in AM and remove at bedtime. Elevate legs to the level of the heart or above for 30 minutes 4-5 times a day and/or when sitting. When taking antibiotics take entire prescription as ordered by MD do not stop taking until medicine is all gone.                                                     Orders for this week (10/21/22)  Left Second toe - Wash with mild soap and water, rinse with saline, pat dry with 4x4.  Paint all toe with betadine   Apply thin layer of  and stimulen to wound bed  Cover cover with calcium alginate  Wrap with conform and secure with tape   Padded socks  Double Tubi F  Change Monday, Wednesday and Friday     Bilateral Lower leg- Wash with mild soap and water, rinse with saline, pat dry with 4x4  A&D  Apply gentamicin and stimulen to wound bed  Cover with Calcium alginate and sorbex  Wrap with conform and secure with tape  Padded socks  Double Tubi F  Change Monday, Wenesday       Follow up with Makeda Pastrana CNP in 1-2 weeks in the wound care center. Call [phone] for any questions or concerns.

## 2022-10-21 NOTE — PROGRESS NOTES
Wound Care Center Progress Note       Boni Marquez  AGE: 82 y.o. GENDER: female  : 1935  TODAY'S DATE:  10/21/2022        Subjective:     Chief Complaint   Patient presents with    Wound Check      HISTORY of PRESENT ILLNESS    Boni Marquez is a 82 y.o. female who presents to the [address] [place] [address] for evaluation and treatment of Acute on pressure ulcer right buttock, mild severity, newly epithelize today, recurrent in nature based on pressure and moisture. Also has a chronic diabetic left second toe wound of mild to moderate severity. The patient has significant underlying medical conditions as below. 10/14/22: The patient has a new wound to the left lower leg, she still has cellulitis bilateral lower legs and is on Keflex. The patient responds well to compression wraps but the patient doesn't like to wear them. Discussed heavily the importance of compression this week, she is agreeable. 10/7/22: The patient presents today with increased edema and cellulitis bilateral lower legs (right worse left). She has blisters and weeping on the right lower leg. She and spouse verbalizes the patient was on her feet more brandon green beans thus a contributing factor to her cellulitis. 22: The wound post blister right lower leg that is of moderate severity and has been present approximately one week, now healed. Wound Pain Timing/Severity: None  Quality of pain: N/A  Severity of pain:  0 / 10   Modifying Factors: venous stasis, lymphedema, diabetes, poor glucose control, chronic pressure, decreased mobility, shear force and obesity  Associated Signs/Symptoms: edema, erythema     Arterial evaluation: VL arteries 21 per Dr. Jadon Kaufman, no significant stenosis     Venous Evaluation: as needed if indicated based on the wound, location, assessment and healing. Wound infection: as indicated for S&S infection     Diabetes: Yes, no medication regimen at this time, diet control.  Last AIC 7.8 as of REMOVAL OF TEMPORARY PACEMAKER performed by Prashant Alarcon MD at [facility]    Family History   Problem Relation Age of Onset    Heart Disease Father        SOCIAL HISTORY    Social History     Tobacco Use    Smoking status: Never    Smokeless tobacco: Never   Vaping Use    Vaping Use: Never used   Substance Use Topics    Alcohol use: No    Drug use: No       ALLERGIES    Allergies   Allergen Reactions    Latex Rash    Dye [Iodides] Anaphylaxis    Iodine Anaphylaxis    Dyazide [Hydrochlorothiazide W-Triamterene] Rash    Lasix [Furosemide] Rash    Procardia [Nifedipine] Other (See Comments)     Not for sure if rash occurred or rash    Doxycycline Dermatitis    Edecrin [Ethacrynic Acid]     Levaquin [Levofloxacin] Other (See Comments)     unknown    Mobic [Meloxicam]     Vioxx [Rofecoxib]     Celebrex [Celecoxib] Rash    Lexapro [Escitalopram Oxalate] Rash    Sulfa Antibiotics Hives       MEDICATIONS    Current Outpatient Medications on File Prior to Encounter   Medication Sig Dispense Refill    metOLazone (ZAROXOLYN) 5 MG tablet Take 1 tablet by mouth 2 times daily (Patient taking differently: Take 10 mg by mouth 2 times daily) 60 tablet 5    spironolactone (ALDACTONE) 50 MG tablet Take 1 tablet by mouth 2 times daily 60 tablet 5    cephALEXin (KEFLEX) 500 MG capsule Take 1 capsule by mouth 3 times daily for 14 days 42 capsule 0    febuxostat (ULORIC) 40 MG TABS tablet Take 1 tablet by mouth daily 30 tablet 5    traMADol (ULTRAM) 50 MG tablet Take 50 mg by mouth every 6 hours as needed for Pain.      potassium chloride (KLOR-CON M) 20 MEQ extended release tablet Take 3 tablets by mouth daily 90 tablet 3    albuterol sulfate HFA (PROVENTIL;VENTOLIN;PROAIR) 108 (90 Base) MCG/ACT inhaler Inhale 2 puffs into the lungs every 6 hours as needed for Wheezing      bumetanide (BUMEX) 1 MG tablet Take 2 tablets by mouth 3 times daily (Patient taking differently: Take 2 mg by mouth wounds listed below with today's date are evaluated    Assessment:       Problem List Items Addressed This Visit          Endocrine    WD-Diabetic ulcer of toe of left foot associated with type 2 diabetes mellitus, with fat layer exposed ([address]    Relevant Orders    Initiate Outpatient Wound Care Protocol       Other    WD-Callus of foot - Primary    Relevant Orders    Initiate Outpatient Wound Care Protocol    WD-Pressure injury of right buttock, stage 2 ([address]    Relevant Orders    Initiate Outpatient Wound Care Protocol    WD-Excoriation of buttock crease    Relevant Orders    Initiate Outpatient Wound Care Protocol       Procedure Note    Indications:  Based on my examination of this patient's wound(s) today, sharp excision into necrotic subcutaneous tissue is required to promote healing and evaluate the extent of previous healing. Performed by: TREVON Cedeño - CNP    Consent obtained: Yes    Time out taken:  Yes    Pain Control: Anesthetic  Anesthetic: 4% Lidocaine Liquid Topical        Debridement:Excisional Debridement    Using curette the wound(s) was/were sharply debrided down through and including the removal of subcutaneous tissue. Devitalized Tissue Debrided:  slough and exudate    Pre Debridement Measurements:  Are located in the Wound Documentation Flow Sheet    All active wounds listed below with today's date are evaluated  Wound(s)    debrided this date include # : 2, 3 & 4    Post  Debridement Measurements:  Wound 06/10/22 #2 Left Second Toe (Active)   Wound Image   09/30/22 0923   Dressing Status New dressing applied;Clean;Dry; Intact 10/21/22 0957   Wound Cleansed Soap and water; Wound cleanser 10/21/22 0918   Offloading for Diabetic Foot Ulcers Offloading not required 10/14/22 0816   Wound Length (cm) 0.1 cm 10/21/22 0918   Wound Width (cm) 0.1 cm 10/21/22 0918   Wound Depth (cm) 0.1 cm 10/21/22 0918   Wound Surface Area (cm^2) 0.01 cm^2 10/21/22 0918   Change in Wound Size % (l*w) 93.75 10/21/22 0918   Wound Volume (cm^3) 0.001 cm^3 10/21/22 0918   Wound Healing % 94 10/21/22 0918   Post-Procedure Length (cm) 0.1 cm 10/21/22 0944   Post-Procedure Width (cm) 0.1 cm 10/21/22 0944   Post-Procedure Depth (cm) 0.1 cm 10/21/22 0944   Post-Procedure Surface Area (cm^2) 0.01 cm^2 10/21/22 0944   Post-Procedure Volume (cm^3) 0.001 cm^3 10/21/22 0944   Distance Tunneling (cm) 0 cm 10/21/22 0918   Tunneling Position ___ O'Clock 0 10/21/22 0918   Undermining Starts ___ O'Clock 0 10/21/22 0918   Undermining Ends___ O'Clock 0 10/21/22 0918   Undermining Maxium Distance (cm) 0 10/21/22 0918   Wound Assessment Dry 10/21/22 0918   Drainage Amount Small 10/21/22 0918   Drainage Description Yellow 10/21/22 0918   Odor None 10/21/22 0918   Leatha-wound Assessment Dry/flaky 10/21/22 0918   Margins Defined edges 10/21/22 0918   Wound Thickness Description not for Pressure Injury Full thickness 10/21/22 0918   Number of days: 132       Wound 10/07/22 #3 Right medial lower leg cluster (Active)   Wound Image   10/21/22 0851   Dressing Status New dressing applied;Clean;Dry; Intact 10/21/22 0957   Wound Cleansed Wound cleanser; Soap and water 10/21/22 0918   Offloading for Diabetic Foot Ulcers Walker 10/07/22 0921   Wound Length (cm) 4.5 cm 10/21/22 0918   Wound Width (cm) 6 cm 10/21/22 0918   Wound Depth (cm) 0.1 cm 10/21/22 0918   Wound Surface Area (cm^2) 27 cm^2 10/21/22 0918   Change in Wound Size % (l*w) -440 10/21/22 0918   Wound Volume (cm^3) 2.7 cm^3 10/21/22 0918   Wound Healing % -440 10/21/22 0918   Post-Procedure Length (cm) 4.5 cm 10/21/22 0944   Post-Procedure Width (cm) 6 cm 10/21/22 0944   Post-Procedure Depth (cm) 0.1 cm 10/21/22 0944   Post-Procedure Surface Area (cm^2) 27 cm^2 10/21/22 0944   Post-Procedure Volume (cm^3) 2.7 cm^3 10/21/22 0944   Distance Tunneling (cm) 0 cm 10/21/22 0918   Tunneling Position ___ O'Clock 0 10/21/22 0918   Undermining Starts ___ O'Clock 0 10/21/22 0918   Undermining Ends___ O'Clock 0 10/21/22 0918   Undermining Maxium Distance (cm) 0 10/21/22 0918   Wound Assessment Pink/red;Fluid filled blister 10/21/22 0918   Drainage Amount Moderate 10/21/22 0918   Drainage Description Serosanguinous 10/21/22 0918   Odor None 10/21/22 0918   Leatha-wound Assessment Fragile 10/21/22 0918   Margins Undefined edges 10/21/22 0918   Wound Thickness Description not for Pressure Injury Full thickness 10/21/22 0918   Number of days: 14       Wound 10/14/22 #4 Left anterior lower leg (Active)   Wound Image   10/21/22 0819   Dressing Status New dressing applied;Clean;Dry; Intact 10/21/22 0957   Wound Cleansed Wound cleanser; Soap and water 10/21/22 0918   Offloading for Diabetic Foot Ulcers Offloading not required 10/14/22 0816   Wound Length (cm) 0.1 cm 10/21/22 0918   Wound Width (cm) 0.1 cm 10/21/22 0918   Wound Depth (cm) 0 cm 10/21/22 0918   Wound Surface Area (cm^2) 0.01 cm^2 10/21/22 0918   Change in Wound Size % (l*w) 97.92 10/21/22 0918   Wound Volume (cm^3) 0 cm^3 10/21/22 0918   Wound Healing % 100 10/21/22 0918   Post-Procedure Length (cm) 0.1 cm 10/21/22 0944   Post-Procedure Width (cm) 0.1 cm 10/21/22 0944   Post-Procedure Depth (cm) 0.1 cm 10/21/22 0944   Post-Procedure Surface Area (cm^2) 0.01 cm^2 10/21/22 0944   Post-Procedure Volume (cm^3) 0.001 cm^3 10/21/22 0944   Distance Tunneling (cm) 0 cm 10/21/22 0918   Tunneling Position ___ O'Clock 0 10/21/22 0918   Undermining Starts ___ O'Clock 0 10/21/22 0918   Undermining Ends___ O'Clock 0 10/21/22 0918   Undermining Maxium Distance (cm) 0 10/21/22 0918   Wound Assessment Devitalized tissue 10/21/22 0918   Drainage Amount Scant 10/14/22 0816   Drainage Description Thin 10/14/22 0816   Odor None 10/14/22 0816   Leatha-wound Assessment Dry/flaky 10/14/22 0816   Margins Attached edges 10/14/22 0816   Wound Thickness Description not for Pressure Injury Full thickness 10/14/22 0816   Number of days: 7       Percent of Wound(s) Debrided: approximately 100%    Total  Area  Debrided: 20 sq cm     Bleeding:  Minimal    Hemostasis Achieved:  by pressure    Procedural Pain:  0  / 10     Post Procedural Pain:  0 / 10     Response to treatment:  Well tolerated by patient. Status of wound progress and description from last visit: Cellulitis improved, continue Keflex. New wound to the right lower leg, regimen as below. She has been in contact to Nephrology who increased diuretics. Edema has improved. Will follow up in 1 week. Plan:     Discharge Instructions            PHYSICIAN ORDERS AND DISCHARGE INSTRUCTIONS   NOTE: Upon discharge from the [address], you will receive a patient experience survey via E-mail. We would be grateful if you would take the time to fill this survey out. Wound care order history:               CYNTHIA's   Right  1.68     Left 1.65  Date 1/13/21              Vascular studies: . Cultures: .3/18/22              Antibiotics: . HbA1c:  . Compression/Lymph Pumps: . Coban 2 lites (did not tolerate), Double Tubi              Grafts: Solange Macarena: . Continuing wound care orders and information:              Residence: . Private              Continue home health care with: [facility] Your wound-care supplies will be provided by: [facility]: . Wound cleansing:                          Do not scrub or use excessive force. Wash hands with soap and water before and after dressing changes. Prior to applying a clean dressing, cleanse wound with normal saline,                          wound cleanser, or mild soap and water. Ask your physician or nurse before getting the wound(s) wet in the shower. Daily Wound management:                          Keep weight off wounds and reposition every 2 hours.                           Avoid standing for long periods of time.                          Apply wraps/stockings in AM and remove at bedtime. Elevate legs to the level of the heart or above for 30 minutes 4-5 times a day and/or when sitting. When taking antibiotics take entire prescription as ordered by MD do not stop taking until medicine is all gone. Orders for this week (10/14/22)  Left Second toe - Wash with mild soap and water, rinse with saline, pat dry with 4x4. Paint all toe with betadine   Place ca alginate between all toes. Apply thin layer of  and stimulen to wound bed  Cover cover with calcium alginate  Wrap with conform and secure with tape   Padded socks  Double Tubi F  Change Monday, Wednesday and Friday     Bilateral Lower leg- Wash with mild soap and water, rinse with saline, pat dry with 4x4  A&D  Apply gentamicin and stimulen to wound bed  Cover with Calcium alginate and sorbex  Wrap with conform and secure with tape  Padded socks  Double Tubi F  Change Monday, Wenesday       Follow up with Carmen Alfredo CNP in 1-2 weeks in the wound care center. Call [phone] for any questions or concerns.                         Treatment Note Wound 10/07/22 #3 Right medial lower leg cluster-Dressing/Treatment:  (A&D, gentamicin, stimulen, calcium alginate, sorbex, conform, padded socks,  tubie)  Wound 10/14/22 #4 Left anterior lower leg-Dressing/Treatment:  (A&D, gentamicin, stimulen, calcium alginate, sorbex, conform, padded socks,  tubie)  Wound 06/10/22 #2 Left Second Toe-Dressing/Treatment:  (betadine, gentamicin, stimulen, calcium alginate, conform, padded socks,  tubie)    Written Patient Dismissal Instructions Given            Electronically signed by TREVON Glass CNP on 10/21/2022 at 10:03 AM

## 2022-10-28 ENCOUNTER — HOSPITAL ENCOUNTER (OUTPATIENT)
Dept: WOUND CARE | Age: 87
Discharge: HOME | End: 2022-10-28
Payer: MEDICARE

## 2022-10-28 VITALS
DIASTOLIC BLOOD PRESSURE: 71 MMHG | TEMPERATURE: 96.9 F | RESPIRATION RATE: 18 BRPM | SYSTOLIC BLOOD PRESSURE: 136 MMHG | HEART RATE: 67 BPM

## 2022-10-28 DIAGNOSIS — I89.0 LYMPHEDEMA OF BOTH LOWER EXTREMITIES: ICD-10-CM

## 2022-10-28 DIAGNOSIS — L84 CALLUS OF FOOT: Primary | ICD-10-CM

## 2022-10-28 DIAGNOSIS — I83.022 VENOUS STASIS ULCER OF LEFT CALF WITH FAT LAYER EXPOSED WITH VARICOSE VEINS (HCC): ICD-10-CM

## 2022-10-28 DIAGNOSIS — I83.012 VENOUS STASIS ULCER OF RIGHT CALF WITH FAT LAYER EXPOSED WITH VARICOSE VEINS (HCC): ICD-10-CM

## 2022-10-28 DIAGNOSIS — L97.522 DIABETIC ULCER OF TOE OF LEFT FOOT ASSOCIATED WITH TYPE 2 DIABETES MELLITUS, WITH FAT LAYER EXPOSED (HCC): ICD-10-CM

## 2022-10-28 DIAGNOSIS — L97.222 VENOUS STASIS ULCER OF LEFT CALF WITH FAT LAYER EXPOSED WITH VARICOSE VEINS (HCC): ICD-10-CM

## 2022-10-28 DIAGNOSIS — E11.621 DIABETIC ULCER OF TOE OF LEFT FOOT ASSOCIATED WITH TYPE 2 DIABETES MELLITUS, WITH FAT LAYER EXPOSED (HCC): ICD-10-CM

## 2022-10-28 DIAGNOSIS — L97.212 VENOUS STASIS ULCER OF RIGHT CALF WITH FAT LAYER EXPOSED WITH VARICOSE VEINS (HCC): ICD-10-CM

## 2022-10-28 PROCEDURE — 11042 DBRDMT SUBQ TIS 1ST 20SQCM/<: CPT | Performed by: NURSE PRACTITIONER

## 2022-10-28 PROCEDURE — 11042 DBRDMT SUBQ TIS 1ST 20SQCM/<: CPT

## 2022-10-28 RX ORDER — GENTAMICIN SULFATE 1 MG/G
OINTMENT TOPICAL ONCE
Status: CANCELLED | OUTPATIENT
Start: 2022-10-28 | End: 2022-10-28

## 2022-10-28 RX ORDER — BACITRACIN, NEOMYCIN, POLYMYXIN B 400; 3.5; 5 [USP'U]/G; MG/G; [USP'U]/G
OINTMENT TOPICAL ONCE
Status: CANCELLED | OUTPATIENT
Start: 2022-10-28 | End: 2022-10-28

## 2022-10-28 RX ORDER — CLOBETASOL PROPIONATE 0.5 MG/G
OINTMENT TOPICAL ONCE
Status: CANCELLED | OUTPATIENT
Start: 2022-10-28 | End: 2022-10-28

## 2022-10-28 RX ORDER — LIDOCAINE 50 MG/G
OINTMENT TOPICAL ONCE
Status: CANCELLED | OUTPATIENT
Start: 2022-10-28 | End: 2022-10-28

## 2022-10-28 RX ORDER — BACITRACIN ZINC AND POLYMYXIN B SULFATE 500; 1000 [USP'U]/G; [USP'U]/G
OINTMENT TOPICAL ONCE
Status: CANCELLED | OUTPATIENT
Start: 2022-10-28 | End: 2022-10-28

## 2022-10-28 RX ORDER — BETAMETHASONE DIPROPIONATE 0.05 %
OINTMENT (GRAM) TOPICAL ONCE
Status: CANCELLED | OUTPATIENT
Start: 2022-10-28 | End: 2022-10-28

## 2022-10-28 RX ORDER — LIDOCAINE 40 MG/G
CREAM TOPICAL ONCE
Status: CANCELLED | OUTPATIENT
Start: 2022-10-28 | End: 2022-10-28

## 2022-10-28 RX ORDER — GINSENG 100 MG
CAPSULE ORAL ONCE
Status: CANCELLED | OUTPATIENT
Start: 2022-10-28 | End: 2022-10-28

## 2022-10-28 RX ORDER — LIDOCAINE HYDROCHLORIDE 40 MG/ML
SOLUTION TOPICAL ONCE
Status: CANCELLED | OUTPATIENT
Start: 2022-10-28 | End: 2022-10-28

## 2022-10-28 NOTE — DISCHARGE INSTRUCTIONS
PHYSICIAN ORDERS AND DISCHARGE INSTRUCTIONS   NOTE: Upon discharge from the [address], you will receive a patient experience survey via E-mail. We would be grateful if you would take the time to fill this survey out. Wound care order history:               CYNTHIA's   Right  1.68     Left 1.65  Date 1/13/21              Vascular studies: . Cultures: .3/18/22              Antibiotics: . HbA1c:  . Compression/Lymph Pumps: . Coban 2 lites (did not tolerate), Double Tubi              Grafts: Alma Kras: . Continuing wound care orders and information:              Residence: . Private              [facility]: [facility] Your wound-care supplies will be provided by: [facility]: . Wound cleansing:                          Do not scrub or use excessive force. Wash hands with soap and water before and after dressing changes. Prior to applying a clean dressing, cleanse wound with normal saline,                          wound cleanser, or mild soap and water. Ask your physician or nurse before getting the wound(s) wet in the shower. Daily Wound management:                          Keep weight off wounds and reposition every 2 hours. Avoid standing for long periods of time. Apply wraps/stockings in AM and remove at bedtime. Elevate legs to the level of the heart or above for 30 minutes 4-5 times a day and/or when sitting. When taking antibiotics take entire prescription as ordered by MD do not stop taking until medicine is all gone.                                                     Orders for this week (10/28/22)  Left Second toe - Wash with mild soap and water, rinse with saline, pat dry with 4x4.  Paint all toe with betadine   Apply thin layer of  and stimulen to wound bed  Cover cover with calcium alginate  Wrap with conform and secure with tape   Change Monday, Wednesday and Friday     Bilateral Lower leg- Wash with mild soap and water, rinse with saline, pat dry with 4x4  Apply gentamicin and stimulen to wound bed  Cover with Calcium alginate and sorbex  Wrap with cast padding around foot and Profore to foot and leg  Leave in place till Monday      On Monday  A&D  Apply gentamicin and stimulen to wound bed  Cover with Calcium alginate and sorbex  Wrap with conform and secure with tape  Padded socks  Double Tubi F  Change daily      Follow up with ROMI Dumont in 1-2 weeks in the wound care center. Call [phone] for any questions or concerns.

## 2022-10-28 NOTE — PROGRESS NOTES
Wound Care Center Progress Note       Cam Lopez  AGE: 82 y.o. GENDER: female  : 1935  TODAY'S DATE:  10/28/2022        Subjective:     Chief Complaint   Patient presents with    Wound Check      HISTORY of PRESENT ILLNESS    Cam Lopez is a 82 y.o. female who presents to the [address] [place] [address] for evaluation and treatment of Acute on pressure ulcer right buttock, mild severity, newly epithelize today, recurrent in nature based on pressure and moisture. Also has a chronic diabetic left second toe wound of mild to moderate severity. The patient has significant underlying medical conditions as below. 10/14/22: The patient has a new wound to the left lower leg, she still has cellulitis bilateral lower legs and is on Keflex. The patient responds well to compression wraps but the patient doesn't like to wear them. Discussed heavily the importance of compression this week, she is agreeable. 10/7/22: The patient presents today with increased edema and cellulitis bilateral lower legs (right worse left). She has blisters and weeping on the right lower leg. She and spouse verbalizes the patient was on her feet more brandon green beans thus a contributing factor to her cellulitis. 22: The wound post blister right lower leg that is of moderate severity and has been present approximately one week, now healed. Wound Pain Timing/Severity: None  Quality of pain: N/A  Severity of pain:  0 / 10   Modifying Factors: venous stasis, lymphedema, diabetes, poor glucose control, chronic pressure, decreased mobility, shear force and obesity  Associated Signs/Symptoms: edema, erythema     Arterial evaluation: VL arteries 21 per Dr. Kait Kemp, no significant stenosis     Venous Evaluation: as needed if indicated based on the wound, location, assessment and healing. Wound infection: as indicated for S&S infection     Diabetes: Yes, no medication regimen at this time, diet control.  Last AIC 7.8 as of 8/19/20  Smoking: Never smoker  Anticoagulant therapy: No  Immunosuppression: No  Obesity: Yes  Other History: Cor pulmonale on diuretic therapy, PAD on Pletal     Nutritional status: well nourished. Discussed need for increased protein and calories for wound healing and good sources of protein (just over 7 grams for every 20 pounds of body weight). Animal-based foods high in protein (meat, poultry, fish, eggs, and dairy foods). Plant based foods high in protein (tofu, lentils, beans, chickpeas, nuts, quinoa and everette seeds. Patient educated on the 6 essential components necessary for wound healing: Circulation, Debridements, Proper Dressings and Topical Wound Products, Infection Control, Edema Control and Offloading. Patient educated on those factors that negatively effect or impact wound healing: smoking, obesity, uncontrolled diabetes, anticoagulant and immunosuppressive regimens, inadequate nutrition, untreated arterial and venous disease if applicable and measures to manage edema.          PAST MEDICAL HISTORY        Diagnosis Date    Asthma     Chronic kidney disease     acute kidney failure    Chronic ulcer of left leg with fat layer exposed ([address] 3/7/2016    Chronic ulcer of right leg with fat layer exposed ([address] 3/7/2016    Diabetes type 2, controlled ([address]     History of asthma     History of blood transfusion 07/2015    Hypertension     Lymphedema of both lower extremities 3/7/2016    Osteoarthritis     Pneumonia     Thyroid disease     Venous stasis of both lower extremities 3/7/2016    WD-Non-pressure chronic ulcer right lower leg, 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 ([address] 4/21/2016       PAST SURGICAL HISTORY    Past Surgical History:   Procedure Laterality Date    APPENDECTOMY      CHOLECYSTECTOMY      CYSTOSCOPY      EYE SURGERY      bilateral implants    HYSTERECTOMY (CERVIX STATUS UNKNOWN)      JOINT REPLACEMENT Right     knee    PACEMAKER INSERTION N/A 11/17/2020    PACEMAKER INSERTION PERMANENT REMOVAL OF TEMPORARY PACEMAKER performed by Shannan Collier MD at [facility]    Family History   Problem Relation Age of Onset    Heart Disease Father        SOCIAL HISTORY    Social History     Tobacco Use    Smoking status: Never    Smokeless tobacco: Never   Vaping Use    Vaping Use: Never used   Substance Use Topics    Alcohol use: No    Drug use: No       ALLERGIES    Allergies   Allergen Reactions    Latex Rash    Dye [Iodides] Anaphylaxis    Iodine Anaphylaxis    Dyazide [Hydrochlorothiazide W-Triamterene] Rash    Lasix [Furosemide] Rash    Procardia [Nifedipine] Other (See Comments)     Not for sure if rash occurred or rash    Doxycycline Dermatitis    Edecrin [Ethacrynic Acid]     Levaquin [Levofloxacin] Other (See Comments)     unknown    Mobic [Meloxicam]     Vioxx [Rofecoxib]     Celebrex [Celecoxib] Rash    Lexapro [Escitalopram Oxalate] Rash    Sulfa Antibiotics Hives       MEDICATIONS    Current Outpatient Medications on File Prior to Encounter   Medication Sig Dispense Refill    metOLazone (ZAROXOLYN) 5 MG tablet Take 1 tablet by mouth 2 times daily (Patient taking differently: Take 10 mg by mouth 2 times daily) 60 tablet 5    spironolactone (ALDACTONE) 50 MG tablet Take 1 tablet by mouth 2 times daily 60 tablet 5    febuxostat (ULORIC) 40 MG TABS tablet Take 1 tablet by mouth daily 30 tablet 5    traMADol (ULTRAM) 50 MG tablet Take 50 mg by mouth every 6 hours as needed for Pain.      potassium chloride (KLOR-CON M) 20 MEQ extended release tablet Take 3 tablets by mouth daily 90 tablet 3    albuterol sulfate HFA (PROVENTIL;VENTOLIN;PROAIR) 108 (90 Base) MCG/ACT inhaler Inhale 2 puffs into the lungs every 6 hours as needed for Wheezing      bumetanide (BUMEX) 1 MG tablet Take 2 tablets by mouth 3 times daily (Patient taking differently: Take 2 mg by mouth in the morning, at noon, and at bedtime) 540 tablet 3    clobetasol (TEMOVATE) 0.05 % ointment Apply topically 2 times daily. (Patient taking differently: as needed) 120 g 1    Fluticasone Propionate, Inhal, (FLOVENT IN) Inhale 2 puffs into the lungs 2 times daily      silver sulfADIAZINE (SILVADENE) 1 % cream Apply topically daily. (Patient taking differently: as needed) 240 g 5    rOPINIRole (REQUIP) 0.5 MG tablet Take 1 tablet by mouth 3 times daily 90 tablet 3    famotidine (PEPCID) 20 MG tablet Take 1 tablet by mouth 2 times daily 60 tablet 5    levothyroxine (SYNTHROID) 50 MCG tablet Take 50 mcg by mouth Daily      Polyethylene Glycol 3350 (MIRALAX PO) Take by mouth      fluticasone (FLONASE) 50 MCG/ACT nasal spray 1 spray by Nasal route 2 times daily       loratadine (CLARITIN) 10 MG capsule Take 10 mg by mouth daily      ferrous sulfate 325 (65 FE) MG tablet Take 1 tablet by mouth 2 times daily (with meals) 30 tablet 3    latanoprost (XALATAN) 0.005 % ophthalmic solution Place 1 drop into both eyes nightly      OXYGEN Inhale 2 L/min into the lungs nightly      Multiple Vitamins-Minerals (ICAPS) CAPS Take 1 tablet by mouth 2 times daily       acetaminophen (TYLENOL) 500 MG tablet Take 500 mg by mouth every 6 hours as needed for Pain. calcium carbonate 600 MG TABS tablet Take 1 tablet by mouth daily. No current facility-administered medications on file prior to encounter. REVIEW OF SYSTEMS    Pertinent items are noted in HPI. Constitutional: Negative for systemic symptoms including fever, chills and malaise. Objective:      /71   Pulse 67   Temp 96.9 °F (36.1 °C)   Resp 18     PHYSICAL EXAM    General: The patient is in no acute distress. Mental status:  Patient is appropriate, is  oriented to place and plan of care. Dermatologic exam: Visual inspection of the periwound reveals the skin to be edematous.   Wound exam:  see wound description below     All active wounds listed below with today's date are evaluated    Assessment:       Problem List Items Addressed This Visit Circulatory    WD-Venous stasis ulcer of right calf with fat layer exposed with varicose veins (HCC)    WD-Venous stasis ulcer of left calf with fat layer exposed with varicose veins (HCC)       Endocrine    WD-Diabetic ulcer of toe of left foot associated with type 2 diabetes mellitus, with fat layer exposed ([address]    Relevant Orders    Initiate Outpatient Wound Care Protocol       Other    WD-Callus of foot - Primary    Relevant Orders    Initiate Outpatient Wound Care Protocol    WD-Lymphedema of both lower extremities with cellulitis       Procedure Note    Indications:  Based on my examination of this patient's wound(s) today, sharp excision into necrotic subcutaneous tissue is required to promote healing and evaluate the extent of previous healing. Performed by: TREVON Burton CNP    Consent obtained: Yes    Time out taken:  Yes    Pain Control: Anesthetic  Anesthetic: 4% Lidocaine Liquid Topical        Debridement:Excisional Debridement    Using curette the wound(s) was/were sharply debrided down through and including the removal of subcutaneous tissue. Devitalized Tissue Debrided:  slough and exudate    Pre Debridement Measurements:  Are located in the Wound Documentation Flow Sheet    All active wounds listed below with today's date are evaluated  Wound(s)    debrided this date include # : 2, 3 & 4    Post  Debridement Measurements:  Wound 06/10/22 #2 Left Second Toe (Active)   Wound Image   09/30/22 0923   Dressing Status New dressing applied;Clean;Dry; Intact 10/21/22 0957   Wound Cleansed Soap and water; Wound cleanser 10/28/22 0921   Offloading for Diabetic Foot Ulcers Offloading not required 10/28/22 0921   Wound Length (cm) 0.1 cm 10/21/22 0918   Wound Width (cm) 0.1 cm 10/21/22 0918   Wound Depth (cm) 0.1 cm 10/21/22 0918   Wound Surface Area (cm^2) 0.01 cm^2 10/21/22 0918   Change in Wound Size % (l*w) 93.75 10/21/22 0918   Wound Volume (cm^3) 0.001 cm^3 10/21/22 0918   Wound Healing % 94 10/21/22 0918   Post-Procedure Length (cm) 0.1 cm 10/21/22 0944   Post-Procedure Width (cm) 0.1 cm 10/21/22 0944   Post-Procedure Depth (cm) 0.1 cm 10/21/22 0944   Post-Procedure Surface Area (cm^2) 0.01 cm^2 10/21/22 0944   Post-Procedure Volume (cm^3) 0.001 cm^3 10/21/22 0944   Distance Tunneling (cm) 0 cm 10/28/22 0921   Tunneling Position ___ O'Clock 0 10/28/22 0921   Undermining Starts ___ O'Clock 0 10/28/22 0921   Undermining Ends___ O'Clock 0 10/28/22 0921   Undermining Maxium Distance (cm) 0 10/28/22 0921   Wound Assessment Dry 10/28/22 0921   Drainage Amount None 10/28/22 0921   Drainage Description Yellow 10/21/22 0918   Odor None 10/28/22 0921   Leatha-wound Assessment Dry/flaky 10/28/22 0921   Margins Attached edges 10/28/22 0921   Wound Thickness Description not for Pressure Injury Full thickness 10/28/22 0921   Number of days: 140       Wound 10/07/22 #3 Right medial lower leg cluster (Active)   Wound Image   10/21/22 2965   Dressing Status New dressing applied;Clean;Dry; Intact 10/21/22 0957   Wound Cleansed Wound cleanser; Soap and water 10/28/22 0921   Offloading for Diabetic Foot Ulcers Walker 10/28/22 0921   Wound Length (cm) 13.5 cm 10/28/22 0921   Wound Width (cm) 14.5 cm 10/28/22 0921   Wound Depth (cm) 0.1 cm 10/28/22 0921   Wound Surface Area (cm^2) 195.75 cm^2 10/28/22 0921   Change in Wound Size % (l*w) -3815 10/28/22 0921   Wound Volume (cm^3) 19.575 cm^3 10/28/22 0921   Wound Healing % -3815 10/28/22 0921   Post-Procedure Length (cm) 13.5 cm 10/28/22 0947   Post-Procedure Width (cm) 14.5 cm 10/28/22 0947   Post-Procedure Depth (cm) 0.1 cm 10/28/22 0947   Post-Procedure Surface Area (cm^2) 195.75 cm^2 10/28/22 0947   Post-Procedure Volume (cm^3) 19.575 cm^3 10/28/22 0947   Distance Tunneling (cm) 0 cm 10/28/22 0921   Tunneling Position ___ O'Clock 0 10/28/22 0921   Undermining Starts ___ O'Clock 0 10/28/22 0921   Undermining Ends___ O'Clock 0 10/28/22 0921   Undermining Maxium Distance (cm) 0 10/28/22 0921   Wound Assessment Dry;Devitalized tissue;Pink/red;Slough 10/28/22 0921   Drainage Amount Moderate 10/28/22 0921   Drainage Description Serosanguinous 10/28/22 0921   Odor None 10/28/22 0921   Leatha-wound Assessment Edematous;Fragile 10/28/22 0921   Margins Attached edges; Unattached edges; Undefined edges 10/28/22 0921   Wound Thickness Description not for Pressure Injury Full thickness 10/28/22 9001   Number of days: 21       Wound 10/14/22 #4 Left anterior lower leg (Active)   Wound Image   10/21/22 3421   Dressing Status New dressing applied;Clean;Dry; Intact 10/21/22 0957   Wound Cleansed Wound cleanser; Soap and water 10/28/22 0921   Offloading for Diabetic Foot Ulcers Offloading not required 10/28/22 0921   Wound Length (cm) 6.1 cm 10/28/22 0921   Wound Width (cm) 3.5 cm 10/28/22 0921   Wound Depth (cm) 0.1 cm 10/28/22 0921   Wound Surface Area (cm^2) 21.35 cm^2 10/28/22 0921   Change in Wound Size % (l*w) -4347.92 10/28/22 0921   Wound Volume (cm^3) 2.135 cm^3 10/28/22 0921   Wound Healing % -4348 10/28/22 0921   Post-Procedure Length (cm) 6.1 cm 10/28/22 0947   Post-Procedure Width (cm) 3.5 cm 10/28/22 0947   Post-Procedure Depth (cm) 0.1 cm 10/28/22 0947   Post-Procedure Surface Area (cm^2) 21.35 cm^2 10/28/22 0947   Post-Procedure Volume (cm^3) 2.135 cm^3 10/28/22 0947   Distance Tunneling (cm) 0 cm 10/28/22 0921   Tunneling Position ___ O'Clock 0 10/28/22 0921   Undermining Starts ___ O'Clock 0 10/28/22 0921   Undermining Ends___ O'Clock 0 10/28/22 0921   Undermining Maxium Distance (cm) 0 10/28/22 0921   Wound Assessment Devitalized tissue;Dry;Slough;Pink/red 10/28/22 0921   Drainage Amount Moderate 10/28/22 0921   Drainage Description Serosanguinous 10/28/22 0921   Odor None 10/28/22 0921   Leatha-wound Assessment Edematous;Fragile 10/28/22 0921   Margins Attached edges; Undefined edges 10/28/22 0921   Wound Thickness Description not for Pressure Injury Full thickness 10/28/22 1367   Number of days: 14     Percent of Wound(s) Debrided: approximately 100%    Total  Area  Debrided: 20 sq cm     Bleeding:  Minimal    Hemostasis Achieved:  by pressure    Procedural Pain:  0  / 10     Post Procedural Pain:  0 / 10     Response to treatment:  Well tolerated by patient. Status of wound progress and description from last visit: Cellulitis improved, Keflex completed. Both lower legs have increased edema, and open wounds. Patient has been inconsistent with allowing use of compression wraps which is really inhibiting improvement in edema thus wound healing is much slower. She uses double Tubi for compression. She has agreed to trying Juxtalite wraps today. Regimen as below. She has been in contact to Nephrology who increased diuretics. Will follow up in 1 week. She wound benefit for lymphedema pumps to better manage her edema, weeping and cellulitis. DIAGNOSIS ASSESSMENT:  Lymphedema is caused by:  Lymphedema, not elsewhere classified [I89.0] (includes CVI-related lymphedema) [x] Yes [] No   Hereditary lymphedema [Q82.0] [] Yes [x] No   Chronic Venous Stasis ulcers [I87.2] (non-healing despite 6 months of ongoing treatment)  [x] Yes [] No      SWELLING SEVERITY:   Patient exhibits the following swelling severity (International Society of Lymphology clinical classification):  [] Stage 0: Latent or subclinical condition where swelling is not yet evident despite impaired lymph transport, subtle alterations in tissue fluid/composition and changes in subjective symptoms. It may exist months or years before overt edema occurs (Stages I - III). [] Stage I: Early accumulation of fluid relatively high in protein content (e.g., in comparison with ?enous edema) which subsides with limb elevation. Pitting may occur. An increase in various types of proliferating cells may also be seen.   [x] Stage II: Limb elevation alone rarely reduces the tissue swelling and pitting is manifest.  Later in Stage II, the limb may not pit as excess subcutaneous fat and fibrosis develop. [] Stage III: Lymphostatic elephantiasis where pitting can be absent and trophic skin changes such as acanthosis, alterations in skin character and thickness, further deposition of fat and fibrosis, and warty overgrowths have developed. SYMPTOMS DESPITE CONSERVATIVE THERAPY:  [x] Hyperpigmentation  [x] Skin breakdown with lymphorrhea (skin weeping)  [x] Recurrent cellulitis  [x] Fibrosis   [x] Progressive edema  [x] Truncal/abdominal swelling  [x] Unable to control swelling  [x] Impaired ROM  [x] Impaired mobility  [x] Pain    CONSERVATIVE TREATMENT:  Patient has tried conservative treatments (compression/exercise/elevation):  [x] Yes [] No    Instructed on self-manual lymphatic drainage techniques (self-MLD): [x] Yes [] No  Instructed on appropriate skin/nail care practices: [x] Yes [] No  Compression garments of at least 20-30mmHg, Bandaging, Elevation, Exercise have been used for greater than a month with no reduction in symptoms. Plan:     Discharge Instructions         PHYSICIAN ORDERS AND DISCHARGE INSTRUCTIONS   NOTE: Upon discharge from the [address], you will receive a patient experience survey via E-mail. We would be grateful if you would take the time to fill this survey out. Wound care order history:               CYNTHIA's   Right  1.68     Left 1.65  Date 1/13/21              Vascular studies: . Cultures: .3/18/22              Antibiotics: . HbA1c:  . Compression/Lymph Pumps: . Coban 2 lites (did not tolerate), Double Tubi              Grafts: [facility]in [facility]: . Continuing wound care orders and information:              Residence: . Private              Continue home health care with: Shasha Gilbert Your wound-care supplies will be provided by: [facility]: .                             Wound cleansing:                          Do not scrub or use excessive force. Wash hands with soap and water before and after dressing changes. Prior to applying a clean dressing, cleanse wound with normal saline,                          wound cleanser, or mild soap and water. Ask your physician or nurse before getting the wound(s) wet in the shower. Daily Wound management:                          Keep weight off wounds and reposition every 2 hours. Avoid standing for long periods of time. Apply wraps/stockings in AM and remove at bedtime. Elevate legs to the level of the heart or above for 30 minutes 4-5 times a day and/or when sitting. When taking antibiotics take entire prescription as ordered by MD do not stop taking until medicine is all gone. Orders for this week (10/28/22)  Left Second toe - Wash with mild soap and water, rinse with saline, pat dry with 4x4. Paint all toe with betadine   Apply thin layer of  and stimulen to wound bed  Cover cover with calcium alginate  Wrap with conform and secure with tape   Change Monday, Wednesday and Friday     Bilateral Lower leg- Wash with mild soap and water, rinse with saline, pat dry with 4x4  Apply gentamicin and stimulen to wound bed  Cover with Calcium alginate and sorbex  Wrap with cast padding around foot and Profore to foot and leg  Leave in place till Monday      On Monday  A&D  Apply gentamicin and stimulen to wound bed  Cover with Calcium alginate and sorbex  Wrap with conform and secure with tape  Padded socks  Double Tubi F  Change daily      Follow up with ROMI Dumont in 1-2 weeks in the wound care center. Call [phone] for any questions or concerns.         Treatment Note      Written Patient Dismissal Instructions Given            Electronically signed by Mary Coreas, APRN - CNP on 10/28/2022 at 10:35 AM

## 2022-10-28 NOTE — PROGRESS NOTES
Multilayer Compression Wrap   (Not Unna) Below the Knee    NAME:  Pallavi Mata OF BIRTH:  1935  MEDICAL RECORD NUMBER:  [id number]  DATE:  10/28/2022    Multilayer compression wrap: Removed old Multilayer wrap if indicated and wash leg with mild soap/water. Applied moisturizing agent to dry skin as needed. Applied primary and secondary dressing as ordered. Applied multilayered dressing below the knee to right lower leg. Applied multilayered dressing below the knee to left lower leg. Instructed patient/caregiver not to remove dressing and to keep it clean and dry. Instructed patient/caregiver on complications to report to provider, such as pain, numbness in toes, heavy drainage, and slippage of dressing. Instructed patient on purpose of compression dressing and on activity and exercise recommendations.     PROFORE WRAP APPLIED PER ORDER     Electronically signed by Nick Noble LPN on 90/58/9697 at 10:48 AM

## 2022-11-01 ENCOUNTER — HOSPITAL ENCOUNTER (OUTPATIENT)
Age: 87
Discharge: HOME | End: 2022-11-01
Payer: MEDICARE

## 2022-11-01 LAB
ALBUMIN SERPL-MCNC: 4.3 GM/DL (ref 3.4–5)
ALP BLD-CCNC: 64 IU/L (ref 40–129)
ALT SERPL-CCNC: 8 U/L (ref 10–40)
ANION GAP SERPL CALCULATED.3IONS-SCNC: 16 MMOL/L (ref 4–16)
AST SERPL-CCNC: 14 IU/L (ref 15–37)
BACTERIA: NEGATIVE /HPF
BILIRUB SERPL-MCNC: 0.5 MG/DL (ref 0–1)
BILIRUBIN URINE: NEGATIVE MG/DL
BLOOD, URINE: NEGATIVE
BUN BLDV-MCNC: 95 MG/DL (ref 6–23)
CALCIUM SERPL-MCNC: 9.4 MG/DL (ref 8.3–10.6)
CAST TYPE: NORMAL /HPF
CHLORIDE BLD-SCNC: 88 MMOL/L (ref 99–110)
CLARITY: CLEAR
CO2: 30 MMOL/L (ref 21–32)
COLOR: YELLOW
CREAT SERPL-MCNC: 2.7 MG/DL (ref 0.6–1.1)
CRYSTAL TYPE: NEGATIVE /HPF
EPITHELIAL CELLS, UA: 2 /HPF
GFR SERPL CREATININE-BSD FRML MDRD: 17 ML/MIN/1.73M2
GLUCOSE FASTING: 124 MG/DL (ref 70–99)
GLUCOSE, URINE: NEGATIVE MG/DL
KETONES, URINE: NEGATIVE MG/DL
LEUKOCYTE ESTERASE, URINE: ABNORMAL
MAGNESIUM: 2 MG/DL (ref 1.8–2.4)
NITRITE URINE, QUANTITATIVE: NEGATIVE
PH, URINE: 7 (ref 5–8)
PHOSPHORUS: 5.5 MG/DL (ref 2.5–4.9)
POTASSIUM SERPL-SCNC: 3.6 MMOL/L (ref 3.5–5.1)
PROTEIN UA: NEGATIVE MG/DL
RBC URINE: 0 /HPF (ref 0–6)
SODIUM BLD-SCNC: 134 MMOL/L (ref 135–145)
SPECIFIC GRAVITY UA: 1.01 (ref 1–1.03)
TOTAL PROTEIN: 7.3 GM/DL (ref 6.4–8.2)
UROBILINOGEN, URINE: 0.2 MG/DL (ref 0.2–1)
WBC UA: 3 /HPF (ref 0–5)

## 2022-11-01 PROCEDURE — 80053 COMPREHEN METABOLIC PANEL: CPT

## 2022-11-01 PROCEDURE — 36415 COLL VENOUS BLD VENIPUNCTURE: CPT

## 2022-11-01 PROCEDURE — 84100 ASSAY OF PHOSPHORUS: CPT

## 2022-11-01 PROCEDURE — 81001 URINALYSIS AUTO W/SCOPE: CPT

## 2022-11-01 PROCEDURE — 83735 ASSAY OF MAGNESIUM: CPT

## 2022-11-04 ENCOUNTER — HOSPITAL ENCOUNTER (OUTPATIENT)
Dept: WOUND CARE | Age: 87
Discharge: HOME | End: 2022-11-04
Payer: MEDICARE

## 2022-11-04 VITALS
TEMPERATURE: 97.3 F | RESPIRATION RATE: 18 BRPM | SYSTOLIC BLOOD PRESSURE: 136 MMHG | HEART RATE: 66 BPM | DIASTOLIC BLOOD PRESSURE: 66 MMHG

## 2022-11-04 DIAGNOSIS — E11.621 DIABETIC ULCER OF TOE OF LEFT FOOT ASSOCIATED WITH TYPE 2 DIABETES MELLITUS, WITH FAT LAYER EXPOSED (HCC): ICD-10-CM

## 2022-11-04 DIAGNOSIS — L84 CALLUS OF FOOT: Primary | ICD-10-CM

## 2022-11-04 DIAGNOSIS — S30.810A EXCORIATION OF BUTTOCK, INITIAL ENCOUNTER: ICD-10-CM

## 2022-11-04 DIAGNOSIS — L89.312 PRESSURE INJURY OF RIGHT BUTTOCK, STAGE 2 (HCC): ICD-10-CM

## 2022-11-04 DIAGNOSIS — L97.522 DIABETIC ULCER OF TOE OF LEFT FOOT ASSOCIATED WITH TYPE 2 DIABETES MELLITUS, WITH FAT LAYER EXPOSED (HCC): ICD-10-CM

## 2022-11-04 PROCEDURE — 11042 DBRDMT SUBQ TIS 1ST 20SQCM/<: CPT

## 2022-11-04 PROCEDURE — 11042 DBRDMT SUBQ TIS 1ST 20SQCM/<: CPT | Performed by: NURSE PRACTITIONER

## 2022-11-04 RX ORDER — BACITRACIN ZINC AND POLYMYXIN B SULFATE 500; 1000 [USP'U]/G; [USP'U]/G
OINTMENT TOPICAL ONCE
OUTPATIENT
Start: 2022-11-04 | End: 2022-11-04

## 2022-11-04 RX ORDER — BETAMETHASONE DIPROPIONATE 0.05 %
OINTMENT (GRAM) TOPICAL ONCE
OUTPATIENT
Start: 2022-11-04 | End: 2022-11-04

## 2022-11-04 RX ORDER — LIDOCAINE 40 MG/G
CREAM TOPICAL ONCE
OUTPATIENT
Start: 2022-11-04 | End: 2022-11-04

## 2022-11-04 RX ORDER — LIDOCAINE 50 MG/G
OINTMENT TOPICAL ONCE
OUTPATIENT
Start: 2022-11-04 | End: 2022-11-04

## 2022-11-04 RX ORDER — LIDOCAINE HYDROCHLORIDE 40 MG/ML
SOLUTION TOPICAL ONCE
OUTPATIENT
Start: 2022-11-04 | End: 2022-11-04

## 2022-11-04 RX ORDER — GINSENG 100 MG
CAPSULE ORAL ONCE
OUTPATIENT
Start: 2022-11-04 | End: 2022-11-04

## 2022-11-04 RX ORDER — BACITRACIN, NEOMYCIN, POLYMYXIN B 400; 3.5; 5 [USP'U]/G; MG/G; [USP'U]/G
OINTMENT TOPICAL ONCE
OUTPATIENT
Start: 2022-11-04 | End: 2022-11-04

## 2022-11-04 RX ORDER — CLOBETASOL PROPIONATE 0.5 MG/G
OINTMENT TOPICAL ONCE
OUTPATIENT
Start: 2022-11-04 | End: 2022-11-04

## 2022-11-04 RX ORDER — GENTAMICIN SULFATE 1 MG/G
OINTMENT TOPICAL
Qty: 30 G | Refills: 1 | Status: SHIPPED | OUTPATIENT
Start: 2022-11-04 | End: 2022-11-11

## 2022-11-04 RX ORDER — GENTAMICIN SULFATE 1 MG/G
OINTMENT TOPICAL ONCE
OUTPATIENT
Start: 2022-11-04 | End: 2022-11-04

## 2022-11-04 ASSESSMENT — PAIN DESCRIPTION - PROGRESSION: CLINICAL_PROGRESSION: NOT CHANGED

## 2022-11-04 NOTE — DISCHARGE INSTRUCTIONS
PHYSICIAN ORDERS AND DISCHARGE INSTRUCTIONS   NOTE: Upon discharge from the [address], you will receive a patient experience survey via E-mail. We would be grateful if you would take the time to fill this survey out. Wound care order history:               CYNTHIA's   Right  1.68     Left 1.65  Date 1/13/21              Vascular studies: . Cultures: .3/18/22              Antibiotics: . HbA1c:  . Compression/Lymph Pumps: . Coban 2 lites (did not tolerate), Double Tubi              Grafts: Silvina : . Continuing wound care orders and information:              Residence: . Private              Continue home health care with: [facility] Prasad Your wound-care supplies will be provided by: [facility]: . Wound cleansing:                          Do not scrub or use excessive force. Wash hands with soap and water before and after dressing changes. Prior to applying a clean dressing, cleanse wound with normal saline,                          wound cleanser, or mild soap and water. Ask your physician or nurse before getting the wound(s) wet in the shower. Daily Wound management:                          Keep weight off wounds and reposition every 2 hours. Avoid standing for long periods of time. Apply wraps/stockings in AM and remove at bedtime. Elevate legs to the level of the heart or above for 30 minutes 4-5 times a day and/or when sitting. When taking antibiotics take entire prescription as ordered by MD do not stop taking until medicine is all gone.                                                     Orders for this week (11/04/22)  Left Second toe - Wash with mild soap and water, rinse with saline, pat dry with 4x4.  Apply stimulen to wound bed  Cover cover with Iodaplex  Wrap with conform and secure with tape   Change Monday, Wednesday and Friday     Bilateral Lower leg- Wash with mild soap and water, rinse with saline, pat dry with 4x4  Apply gentamicin and stimulen to wound bed  Cover with Calcium alginate and sorbex  Wrap with cast padding around foot, coban 2 lite to foot and legs  Leave in place till Monday        On Monday  A&D  Apply gentamicin and stimulen to wound bed  Cover with Calcium alginate and sorbex  Wrap with conform and secure with tape  Padded socks  Double Tubi F  Change daily      Follow up with ROMI Dumont in 1-2 weeks in the wound care center. Call [phone] for any questions or concerns.

## 2022-11-04 NOTE — WOUND CARE
Multilayer Compression Wrap   (Not Unna) Below the Knee    NAME:  Urban Blocker OF BIRTH:  1935  MEDICAL RECORD NUMBER:  [id number]  DATE:  11/4/2022    Multilayer compression wrap: Removed old Multilayer wrap if indicated and wash leg with mild soap/water. Applied moisturizing agent to dry skin as needed. Applied primary and secondary dressing as ordered. Applied multilayered dressing below the knee to right lower leg. Applied multilayered dressing below the knee to left lower leg. Instructed patient/caregiver not to remove dressing and to keep it clean and dry. Instructed patient/caregiver on complications to report to provider, such as pain, numbness in toes, heavy drainage, and slippage of dressing. Instructed patient on purpose of compression dressing and on activity and exercise recommendations.       Electronically signed by Kaz Rey LPN on 07/1/6314 at 2:85 AM

## 2022-11-04 NOTE — PROGRESS NOTES
Wound Care Center Progress Note       Eric Carter  AGE: 82 y.o. GENDER: female  : 1935  TODAY'S DATE:  2022        Subjective:     Chief Complaint   Patient presents with    Wound Check      HISTORY of PRESENT ILLNESS    Eric Carter is a 82 y.o. female who presents to the [address] [place] [address] for evaluation and treatment of Acute on pressure ulcer right buttock, mild severity, newly epithelize today, recurrent in nature based on pressure and moisture. Also has a chronic diabetic left second toe wound of mild to moderate severity. The patient has significant underlying medical conditions as below. 10/14/22: The patient has a new wound to the left lower leg, she still has cellulitis bilateral lower legs and is on Keflex. The patient responds well to compression wraps but the patient doesn't like to wear them. Discussed heavily the importance of compression this week, she is agreeable. 10/7/22: The patient presents today with increased edema and cellulitis bilateral lower legs (right worse left). She has blisters and weeping on the right lower leg. She and spouse verbalizes the patient was on her feet more brandon green beans thus a contributing factor to her cellulitis. 22: The wound post blister right lower leg that is of moderate severity and has been present approximately one week, now healed. Wound Pain Timing/Severity: None  Quality of pain: N/A  Severity of pain:  0 / 10   Modifying Factors: venous stasis, lymphedema, diabetes, poor glucose control, chronic pressure, decreased mobility, shear force and obesity  Associated Signs/Symptoms: edema, erythema     Arterial evaluation: VL arteries 21 per Dr. Vernon Kauffman, no significant stenosis     Venous Evaluation: as needed if indicated based on the wound, location, assessment and healing. Wound infection: as indicated for S&S infection     Diabetes: Yes, no medication regimen at this time, diet control.  Last AIC 7.8 as of 8/19/20  Smoking: Never smoker  Anticoagulant therapy: No  Immunosuppression: No  Obesity: Yes  Other History: Cor pulmonale on diuretic therapy, PAD on Pletal     Nutritional status: well nourished. Discussed need for increased protein and calories for wound healing and good sources of protein (just over 7 grams for every 20 pounds of body weight). Animal-based foods high in protein (meat, poultry, fish, eggs, and dairy foods). Plant based foods high in protein (tofu, lentils, beans, chickpeas, nuts, quinoa and everette seeds. Patient educated on the 6 essential components necessary for wound healing: Circulation, Debridements, Proper Dressings and Topical Wound Products, Infection Control, Edema Control and Offloading. Patient educated on those factors that negatively effect or impact wound healing: smoking, obesity, uncontrolled diabetes, anticoagulant and immunosuppressive regimens, inadequate nutrition, untreated arterial and venous disease if applicable and measures to manage edema.          PAST MEDICAL HISTORY        Diagnosis Date    Asthma     Chronic kidney disease     acute kidney failure    Chronic ulcer of left leg with fat layer exposed ([address] 3/7/2016    Chronic ulcer of right leg with fat layer exposed ([address] 3/7/2016    Diabetes type 2, controlled ([address]     History of asthma     History of blood transfusion 07/2015    Hypertension     Lymphedema of both lower extremities 3/7/2016    Osteoarthritis     Pneumonia     Thyroid disease     Venous stasis of both lower extremities 3/7/2016    WD-Non-pressure chronic ulcer right lower leg, 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 ([address] 4/21/2016       PAST SURGICAL HISTORY    Past Surgical History:   Procedure Laterality Date    APPENDECTOMY      CHOLECYSTECTOMY      CYSTOSCOPY      EYE SURGERY      bilateral implants    HYSTERECTOMY (CERVIX STATUS UNKNOWN)      JOINT REPLACEMENT Right     knee    PACEMAKER INSERTION N/A 11/17/2020    PACEMAKER INSERTION PERMANENT REMOVAL OF TEMPORARY PACEMAKER performed by Carolina Perry MD at [address] [place] [address]    Family History   Problem Relation Age of Onset    Heart Disease Father        SOCIAL HISTORY    Social History     Tobacco Use    Smoking status: Never    Smokeless tobacco: Never   Vaping Use    Vaping Use: Never used   Substance Use Topics    Alcohol use: No    Drug use: No       ALLERGIES    Allergies   Allergen Reactions    Latex Rash    Dye [Iodides] Anaphylaxis    Iodine Anaphylaxis    Dyazide [Hydrochlorothiazide W-Triamterene] Rash    Lasix [Furosemide] Rash    Procardia [Nifedipine] Other (See Comments)     Not for sure if rash occurred or rash    Doxycycline Dermatitis    Edecrin [Ethacrynic Acid]     Levaquin [Levofloxacin] Other (See Comments)     unknown    Mobic [Meloxicam]     Vioxx [Rofecoxib]     Celebrex [Celecoxib] Rash    Lexapro [Escitalopram Oxalate] Rash    Sulfa Antibiotics Hives       MEDICATIONS    Current Outpatient Medications on File Prior to Encounter   Medication Sig Dispense Refill    metOLazone (ZAROXOLYN) 5 MG tablet Take 1 tablet by mouth 2 times daily (Patient taking differently: Take 10 mg by mouth 2 times daily) 60 tablet 5    spironolactone (ALDACTONE) 50 MG tablet Take 1 tablet by mouth 2 times daily 60 tablet 5    febuxostat (ULORIC) 40 MG TABS tablet Take 1 tablet by mouth daily 30 tablet 5    traMADol (ULTRAM) 50 MG tablet Take 50 mg by mouth every 6 hours as needed for Pain.      potassium chloride (KLOR-CON M) 20 MEQ extended release tablet Take 3 tablets by mouth daily 90 tablet 3    albuterol sulfate HFA (PROVENTIL;VENTOLIN;PROAIR) 108 (90 Base) MCG/ACT inhaler Inhale 2 puffs into the lungs every 6 hours as needed for Wheezing      bumetanide (BUMEX) 1 MG tablet Take 2 tablets by mouth 3 times daily (Patient taking differently: Take 2 mg by mouth in the morning, at noon, and at bedtime) 540 tablet 3    clobetasol (TEMOVATE) 0.05 % ointment Apply topically 2 times daily. (Patient taking differently: as needed) 120 g 1    Fluticasone Propionate, Inhal, (FLOVENT IN) Inhale 2 puffs into the lungs 2 times daily      silver sulfADIAZINE (SILVADENE) 1 % cream Apply topically daily. (Patient taking differently: as needed) 240 g 5    rOPINIRole (REQUIP) 0.5 MG tablet Take 1 tablet by mouth 3 times daily 90 tablet 3    famotidine (PEPCID) 20 MG tablet Take 1 tablet by mouth 2 times daily 60 tablet 5    levothyroxine (SYNTHROID) 50 MCG tablet Take 50 mcg by mouth Daily      Polyethylene Glycol 3350 (MIRALAX PO) Take by mouth      fluticasone (FLONASE) 50 MCG/ACT nasal spray 1 spray by Nasal route 2 times daily       loratadine (CLARITIN) 10 MG capsule Take 10 mg by mouth daily      ferrous sulfate 325 (65 FE) MG tablet Take 1 tablet by mouth 2 times daily (with meals) 30 tablet 3    latanoprost (XALATAN) 0.005 % ophthalmic solution Place 1 drop into both eyes nightly      OXYGEN Inhale 2 L/min into the lungs nightly      Multiple Vitamins-Minerals (ICAPS) CAPS Take 1 tablet by mouth 2 times daily       acetaminophen (TYLENOL) 500 MG tablet Take 500 mg by mouth every 6 hours as needed for Pain. calcium carbonate 600 MG TABS tablet Take 1 tablet by mouth daily. No current facility-administered medications on file prior to encounter. REVIEW OF SYSTEMS    Pertinent items are noted in HPI. Constitutional: Negative for systemic symptoms including fever, chills and malaise. Objective:      /66   Pulse 66   Temp 97.3 °F (36.3 °C) (Temporal)   Resp 18     PHYSICAL EXAM    General: The patient is in no acute distress. Mental status:  Patient is appropriate, is  oriented to place and plan of care. Dermatologic exam: Visual inspection of the periwound reveals the skin to be edematous.   Wound exam:  see wound description below     All active wounds listed below with today's date are evaluated    Assessment:       Problem List Items Addressed This Visit          Endocrine    WD-Diabetic ulcer of toe of left foot associated with type 2 diabetes mellitus, with fat layer exposed ([facility]    Relevant Orders    Initiate Outpatient Wound Care Protocol       Other    WD-Callus of foot - Primary    Relevant Orders    Initiate Outpatient Wound Care Protocol    WD-Pressure injury of right buttock, stage 2 ([facility]    Relevant Orders    Initiate Outpatient Wound Care Protocol    WD-Excoriation of buttock crease    Relevant Orders    Initiate Outpatient Wound Care Protocol       Procedure Note    Indications:  Based on my examination of this patient's wound(s) today, sharp excision into necrotic subcutaneous tissue is required to promote healing and evaluate the extent of previous healing. Performed by: TREVON Gonzalez CNP    Consent obtained: Yes    Time out taken:  Yes    Pain Control: Anesthetic  Anesthetic: 4% Lidocaine Liquid Topical        Debridement:Excisional Debridement    Using curette the wound(s) was/were sharply debrided down through and including the removal of subcutaneous tissue. Devitalized Tissue Debrided:  slough and exudate    Pre Debridement Measurements:  Are located in the Wound Documentation Flow Sheet    All active wounds listed below with today's date are evaluated  Wound(s)    debrided this date include # : 2, 3 & 4    Post  Debridement Measurements:  Wound 06/10/22 #2 Left Second Toe (Active)   Wound Image   09/30/22 0923   Dressing Status New dressing applied;Clean;Dry; Intact 11/04/22 0915   Wound Cleansed Wound cleanser; Soap and water 11/04/22 0841   Offloading for Diabetic Foot Ulcers Offloading not required 11/04/22 0841   Wound Length (cm) 0.1 cm 11/04/22 0841   Wound Width (cm) 0.1 cm 11/04/22 0841   Wound Depth (cm) 0.1 cm 11/04/22 0841   Wound Surface Area (cm^2) 0.01 cm^2 11/04/22 0841   Change in Wound Size % (l*w) 93.75 11/04/22 0841   Wound Volume (cm^3) 0.001 cm^3 11/04/22 0841   Wound Healing % 94 11/04/22 0841 Post-Procedure Length (cm) 0.1 cm 11/04/22 0856   Post-Procedure Width (cm) 0.1 cm 11/04/22 0856   Post-Procedure Depth (cm) 0.1 cm 11/04/22 0856   Post-Procedure Surface Area (cm^2) 0.01 cm^2 11/04/22 0856   Post-Procedure Volume (cm^3) 0.001 cm^3 11/04/22 0856   Distance Tunneling (cm) 0 cm 11/04/22 0841   Tunneling Position ___ O'Clock 0 11/04/22 0841   Undermining Starts ___ O'Clock 0 11/04/22 0841   Undermining Ends___ O'Clock 0 11/04/22 0841   Undermining Maxium Distance (cm) 0 11/04/22 0841   Wound Assessment Dry 11/04/22 0841   Drainage Amount None 11/04/22 0841   Drainage Description Serosanguinous 10/28/22 0921   Odor None 11/04/22 0841   Leatha-wound Assessment Dry/flaky 11/04/22 0841   Margins Attached edges 11/04/22 0841   Wound Thickness Description not for Pressure Injury Partial thickness 11/04/22 0841   Number of days: 147       Wound 10/07/22 #3 Right Lower Circumf  leg cluster (Active)   Wound Image   10/21/22 4493   Dressing Status New dressing applied;Clean;Dry; Intact 11/04/22 0915   Wound Cleansed Soap and water; Wound cleanser 11/04/22 0841   Offloading for Diabetic Foot Ulcers Walker 11/04/22 0841   Wound Length (cm) 7.5 cm 11/04/22 0841   Wound Width (cm) 13 cm 11/04/22 0841   Wound Depth (cm) 0.1 cm 11/04/22 0841   Wound Surface Area (cm^2) 97.5 cm^2 11/04/22 0841   Change in Wound Size % (l*w) -1850 11/04/22 0841   Wound Volume (cm^3) 9.75 cm^3 11/04/22 0841   Wound Healing % -1850 11/04/22 0841   Post-Procedure Length (cm) 7.5 cm 11/04/22 0856   Post-Procedure Width (cm) 13 cm 11/04/22 0856   Post-Procedure Depth (cm) 0.1 cm 11/04/22 0856   Post-Procedure Surface Area (cm^2) 97.5 cm^2 11/04/22 0856   Post-Procedure Volume (cm^3) 9.75 cm^3 11/04/22 0856   Distance Tunneling (cm) 0 cm 11/04/22 0841   Tunneling Position ___ O'Clock 0 11/04/22 0841   Undermining Starts ___ O'Clock 0 11/04/22 0841   Undermining Ends___ O'Clock 0 11/04/22 0841   Undermining Maxium Distance (cm) 0 11/04/22 0841 Wound Assessment Pink/red;Dry 11/04/22 0841   Drainage Amount None 11/04/22 0841   Drainage Description Serosanguinous 10/28/22 0921   Odor None 11/04/22 0841   Leatha-wound Assessment Edematous 11/04/22 0841   Margins Attached edges 11/04/22 0841   Wound Thickness Description not for Pressure Injury Full thickness 11/04/22 0841   Number of days: 28       Wound 10/14/22 #4 Left anterior lower leg (Active)   Wound Image   10/21/22 0593   Dressing Status New dressing applied;Clean;Dry; Intact 11/04/22 0915   Wound Cleansed Wound cleanser; Soap and water 11/04/22 0841   Offloading for Diabetic Foot Ulcers Offloading not required 11/04/22 0841   Wound Length (cm) 0.2 cm 11/04/22 0841   Wound Width (cm) 0.3 cm 11/04/22 0841   Wound Depth (cm) 0.1 cm 11/04/22 0841   Wound Surface Area (cm^2) 0.06 cm^2 11/04/22 0841   Change in Wound Size % (l*w) 87.5 11/04/22 0841   Wound Volume (cm^3) 0.006 cm^3 11/04/22 0841   Wound Healing % 88 11/04/22 0841   Post-Procedure Length (cm) 0.2 cm 11/04/22 0856   Post-Procedure Width (cm) 0.3 cm 11/04/22 0856   Post-Procedure Depth (cm) 0.1 cm 11/04/22 0856   Post-Procedure Surface Area (cm^2) 0.06 cm^2 11/04/22 0856   Post-Procedure Volume (cm^3) 0.006 cm^3 11/04/22 0856   Distance Tunneling (cm) 0 cm 11/04/22 0841   Tunneling Position ___ O'Clock 0 11/04/22 0841   Undermining Starts ___ O'Clock 00 11/04/22 0841   Undermining Ends___ O'Clock 0 11/04/22 0841   Undermining Maxium Distance (cm) 0 11/04/22 0841   Wound Assessment Dry 11/04/22 0841   Drainage Amount None 11/04/22 0841   Drainage Description Serosanguinous 10/28/22 0921   Odor None 11/04/22 0841   Leatha-wound Assessment Edematous;Fragile 11/04/22 0841   Margins Attached edges 11/04/22 0841   Wound Thickness Description not for Pressure Injury Full thickness 11/04/22 0841   Number of days: 21       Percent of Wound(s) Debrided: approximately 100%    Total  Area  Debrided: 20 sq cm     Bleeding:  Minimal    Hemostasis Achieved:  by pressure    Procedural Pain:  0  / 10     Post Procedural Pain:  0 / 10     Response to treatment:  Well tolerated by patient. Status of wound progress and description from last visit: Wounds much smaller. Cellulitis improved, Keflex completed. Both lower legs have increased edema, and open wounds. Patient has been inconsistent with allowing use of compression wraps which is really inhibiting improvement in edema thus wound healing is much slower. She uses double Tubi for compression. She has agreed to compression wraps. Regimen as below. She has been in contact to Nephrology who increased diuretics. Will follow up in 1 week. She wound benefit for lymphedema pumps to better manage her edema, weeping and cellulitis. DIAGNOSIS ASSESSMENT:  Lymphedema is caused by:  Lymphedema, not elsewhere classified [I89.0] (includes CVI-related lymphedema) [x] Yes [] No   Hereditary lymphedema [Q82.0] [] Yes [x] No   Chronic Venous Stasis ulcers [I87.2] (non-healing despite 6 months of ongoing treatment)  [x] Yes [] No      SWELLING SEVERITY:   Patient exhibits the following swelling severity (International Society of Lymphology clinical classification):  [] Stage 0: Latent or subclinical condition where swelling is not yet evident despite impaired lymph transport, subtle alterations in tissue fluid/composition and changes in subjective symptoms. It may exist months or years before overt edema occurs (Stages I - III). [] Stage I: Early accumulation of fluid relatively high in protein content (e.g., in comparison with ?enous edema) which subsides with limb elevation. Pitting may occur. An increase in various types of proliferating cells may also be seen. [x] Stage II: Limb elevation alone rarely reduces the tissue swelling and pitting is manifest.  Later in Stage II, the limb may not pit as excess subcutaneous fat and fibrosis develop.   [] Stage III: Lymphostatic elephantiasis where pitting can be absent and trophic skin changes such as acanthosis, alterations in skin character and thickness, further deposition of fat and fibrosis, and warty overgrowths have developed. SYMPTOMS DESPITE CONSERVATIVE THERAPY:  [x] Hyperpigmentation  [x] Skin breakdown with lymphorrhea (skin weeping)  [x] Recurrent cellulitis  [x] Fibrosis   [x] Progressive edema  [x] Truncal/abdominal swelling  [x] Unable to control swelling  [x] Impaired ROM  [x] Impaired mobility  [x] Pain    CONSERVATIVE TREATMENT:  Patient has tried conservative treatments (compression/exercise/elevation):  [x] Yes [] No    Instructed on self-manual lymphatic drainage techniques (self-MLD): [x] Yes [] No  Instructed on appropriate skin/nail care practices: [x] Yes [] No  Compression garments of at least 20-30mmHg, Bandaging, Elevation, Exercise have been used for greater than a month with no reduction in symptoms. Plan:     Discharge Instructions         PHYSICIAN ORDERS AND DISCHARGE INSTRUCTIONS   NOTE: Upon discharge from the [address], you will receive a patient experience survey via E-mail. We would be grateful if you would take the time to fill this survey out. Wound care order history:               CYNTHIA's   Right  1.68     Left 1.65  Date 1/13/21              Vascular studies: . Cultures: .3/18/22              Antibiotics: . HbA1c:  . Compression/Lymph Pumps: . Coban 2 lites (did not tolerate), Double Tubi              Grafts: Avni Torres: . Continuing wound care orders and information:              Residence: . Private              [facility] health care with: [facility] Your wound-care supplies will be provided by: [facility]: . Wound cleansing:                          Do not scrub or use excessive force. Wash hands with soap and water before and after dressing changes.                           Prior to applying a clean dressing, cleanse wound with normal saline,                          wound cleanser, or mild soap and water. Ask your physician or nurse before getting the wound(s) wet in the shower. Daily Wound management:                          Keep weight off wounds and reposition every 2 hours. Avoid standing for long periods of time. Apply wraps/stockings in AM and remove at bedtime. Elevate legs to the level of the heart or above for 30 minutes 4-5 times a day and/or when sitting. When taking antibiotics take entire prescription as ordered by MD do not stop taking until medicine is all gone. Orders for this week (11/04/22)  Left Second toe - Wash with mild soap and water, rinse with saline, pat dry with 4x4. Apply stimulen to wound bed  Cover cover with Iodaplex  Wrap with conform and secure with tape   Change Monday, Wednesday and Friday     Bilateral Lower leg- Wash with mild soap and water, rinse with saline, pat dry with 4x4  Apply gentamicin and stimulen to wound bed  Cover with Calcium alginate and sorbex  Wrap with cast padding around foot, coban 2 lite to foot and legs  Leave in place till Monday        On Monday  A&D  Apply gentamicin and stimulen to wound bed  Cover with Calcium alginate and sorbex  Wrap with conform and secure with tape  Padded socks  Double Tubi F  Change daily      Follow up with ROMI Dumont in 1-2 weeks in the wound care center. Call [phone] for any questions or concerns.         Treatment Note Wound 10/07/22 #3 Right Lower Circumf  leg cluster-Dressing/Treatment:  (Getn, stimulen, Calcium algainte, Sorbex, cast padding, coban 2 lite)  Wound 10/14/22 #4 Left anterior lower leg-Dressing/Treatment:  (Getn, stimulen, Calcium algainte, Sorbex, cast padding, coban 2 lite)  Wound 06/10/22 #2 Left Second Toe-Dressing/Treatment:  (betadine, ioplex, conform, tape)    Written Patient Dismissal Instructions Given            Electronically signed by TREVON Pritchard CNP on 11/4/2022 at 10:19 AM

## 2022-11-11 ENCOUNTER — HOSPITAL ENCOUNTER (OUTPATIENT)
Dept: WOUND CARE | Age: 87
Discharge: HOME | End: 2022-11-11
Payer: MEDICARE

## 2022-11-11 VITALS
SYSTOLIC BLOOD PRESSURE: 143 MMHG | HEART RATE: 73 BPM | RESPIRATION RATE: 18 BRPM | TEMPERATURE: 97.6 F | DIASTOLIC BLOOD PRESSURE: 75 MMHG

## 2022-11-11 DIAGNOSIS — S30.810A EXCORIATION OF BUTTOCK, INITIAL ENCOUNTER: ICD-10-CM

## 2022-11-11 DIAGNOSIS — L97.522 DIABETIC ULCER OF TOE OF LEFT FOOT ASSOCIATED WITH TYPE 2 DIABETES MELLITUS, WITH FAT LAYER EXPOSED (HCC): ICD-10-CM

## 2022-11-11 DIAGNOSIS — L84 CALLUS OF FOOT: Primary | ICD-10-CM

## 2022-11-11 DIAGNOSIS — L89.312 PRESSURE INJURY OF RIGHT BUTTOCK, STAGE 2 (HCC): ICD-10-CM

## 2022-11-11 DIAGNOSIS — E11.621 DIABETIC ULCER OF TOE OF LEFT FOOT ASSOCIATED WITH TYPE 2 DIABETES MELLITUS, WITH FAT LAYER EXPOSED (HCC): ICD-10-CM

## 2022-11-11 PROCEDURE — 11042 DBRDMT SUBQ TIS 1ST 20SQCM/<: CPT | Performed by: SURGERY

## 2022-11-11 PROCEDURE — 11042 DBRDMT SUBQ TIS 1ST 20SQCM/<: CPT

## 2022-11-11 RX ORDER — GINSENG 100 MG
CAPSULE ORAL ONCE
OUTPATIENT
Start: 2022-11-11 | End: 2022-11-11

## 2022-11-11 RX ORDER — LIDOCAINE 50 MG/G
OINTMENT TOPICAL ONCE
OUTPATIENT
Start: 2022-11-11 | End: 2022-11-11

## 2022-11-11 RX ORDER — LIDOCAINE 40 MG/G
CREAM TOPICAL ONCE
OUTPATIENT
Start: 2022-11-11 | End: 2022-11-11

## 2022-11-11 RX ORDER — LIDOCAINE HYDROCHLORIDE 40 MG/ML
SOLUTION TOPICAL ONCE
OUTPATIENT
Start: 2022-11-11 | End: 2022-11-11

## 2022-11-11 RX ORDER — BACITRACIN, NEOMYCIN, POLYMYXIN B 400; 3.5; 5 [USP'U]/G; MG/G; [USP'U]/G
OINTMENT TOPICAL ONCE
OUTPATIENT
Start: 2022-11-11 | End: 2022-11-11

## 2022-11-11 RX ORDER — GENTAMICIN SULFATE 1 MG/G
OINTMENT TOPICAL ONCE
OUTPATIENT
Start: 2022-11-11 | End: 2022-11-11

## 2022-11-11 RX ORDER — BETAMETHASONE DIPROPIONATE 0.05 %
OINTMENT (GRAM) TOPICAL ONCE
OUTPATIENT
Start: 2022-11-11 | End: 2022-11-11

## 2022-11-11 RX ORDER — CLOBETASOL PROPIONATE 0.5 MG/G
OINTMENT TOPICAL ONCE
OUTPATIENT
Start: 2022-11-11 | End: 2022-11-11

## 2022-11-11 RX ORDER — BACITRACIN ZINC AND POLYMYXIN B SULFATE 500; 1000 [USP'U]/G; [USP'U]/G
OINTMENT TOPICAL ONCE
OUTPATIENT
Start: 2022-11-11 | End: 2022-11-11

## 2022-11-11 NOTE — PROGRESS NOTES
Wound Care Center Progress Note With Procedure    Cassandra Born  AGE: 82 y.o. GENDER: female  : 1935  EPISODE DATE:  2022     Subjective:     No chief complaint on file. HISTORY of PRESENT ILLNESS      Cassandra Born is a 82 y.o. female who presents today for wound evaluation of Acute venous ulcer(s) of the bilateral lower legs and pressure wound of the left 2nd toe. The ulcer is of mild severity. The underlying cause of the wound is venous stasis with edema and also pressure injury between her toes, likely exacerbated by edema as well. She denies problems over the past week. She has been undergoing therapy with gent/stimulen/ca alg/sorbex and coban lite wraps. She then switches to tubi  1/2 way through the week.         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 ([address] 3/7/2016    Chronic ulcer of right leg with fat layer exposed ([address] 3/7/2016    Diabetes type 2, controlled ([address]     History of asthma     History of blood transfusion 2015    Hypertension     Lymphedema of both lower extremities 3/7/2016    Osteoarthritis     Pneumonia     Thyroid disease     Venous stasis of both lower extremities 3/7/2016    WD-Non-pressure chronic ulcer right lower leg, 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 ([address] 2016       PAST SURGICAL HISTORY    Past Surgical History:   Procedure Laterality Date    APPENDECTOMY      CHOLECYSTECTOMY      CYSTOSCOPY      EYE SURGERY      bilateral implants    HYSTERECTOMY (CERVIX STATUS UNKNOWN)      JOINT REPLACEMENT Right     knee    PACEMAKER INSERTION N/A 2020    PACEMAKER INSERTION PERMANENT REMOVAL OF TEMPORARY PACEMAKER performed by Jen White MD at [facility]    Family History   Problem Relation Age of Onset    Heart Disease Father        SOCIAL HISTORY    Social History     Tobacco Use    Smoking status: Never    Smokeless tobacco: Never   Vaping Use    Vaping Use: Never used   Substance Use Topics    Alcohol use: No    Drug use: No       ALLERGIES    Allergies   Allergen Reactions    Latex Rash    Dye [Iodides] Anaphylaxis    Iodine Anaphylaxis    Dyazide [Hydrochlorothiazide W-Triamterene] Rash    Lasix [Furosemide] Rash    Procardia [Nifedipine] Other (See Comments)     Not for sure if rash occurred or rash    Doxycycline Dermatitis    Edecrin [Ethacrynic Acid]     Levaquin [Levofloxacin] Other (See Comments)     unknown    Mobic [Meloxicam]     Vioxx [Rofecoxib]     Celebrex [Celecoxib] Rash    Lexapro [Escitalopram Oxalate] Rash    Sulfa Antibiotics Hives       MEDICATIONS    Current Outpatient Medications on File Prior to Encounter   Medication Sig Dispense Refill    potassium chloride (KLOR-CON M) 20 MEQ extended release tablet Take 3 tablets by mouth daily 90 tablet 3    gentamicin (GARAMYCIN) 0.1 % ointment Apply topically with each dressing change 30 g 1    metOLazone (ZAROXOLYN) 5 MG tablet Take 1 tablet by mouth 2 times daily (Patient taking differently: Take 10 mg by mouth 2 times daily) 60 tablet 5    spironolactone (ALDACTONE) 50 MG tablet Take 1 tablet by mouth 2 times daily 60 tablet 5    febuxostat (ULORIC) 40 MG TABS tablet Take 1 tablet by mouth daily 30 tablet 5    traMADol (ULTRAM) 50 MG tablet Take 50 mg by mouth every 6 hours as needed for Pain.       albuterol sulfate HFA (PROVENTIL;VENTOLIN;PROAIR) 108 (90 Base) MCG/ACT inhaler Inhale 2 puffs into the lungs every 6 hours as needed for Wheezing      bumetanide (BUMEX) 1 MG tablet Take 2 tablets by mouth 3 times daily (Patient taking differently: Take 2 mg by mouth in the morning, at noon, and at bedtime) 540 tablet 3 clobetasol (TEMOVATE) 0.05 % ointment Apply topically 2 times daily. (Patient taking differently: as needed) 120 g 1    Fluticasone Propionate, Inhal, (FLOVENT IN) Inhale 2 puffs into the lungs 2 times daily      silver sulfADIAZINE (SILVADENE) 1 % cream Apply topically daily. (Patient taking differently: as needed) 240 g 5    rOPINIRole (REQUIP) 0.5 MG tablet Take 1 tablet by mouth 3 times daily 90 tablet 3    famotidine (PEPCID) 20 MG tablet Take 1 tablet by mouth 2 times daily 60 tablet 5    levothyroxine (SYNTHROID) 50 MCG tablet Take 50 mcg by mouth Daily      Polyethylene Glycol 3350 (MIRALAX PO) Take by mouth (Patient not taking: Reported on 11/9/2022)      fluticasone (FLONASE) 50 MCG/ACT nasal spray 1 spray by Nasal route 2 times daily       loratadine (CLARITIN) 10 MG capsule Take 10 mg by mouth daily      ferrous sulfate 325 (65 FE) MG tablet Take 1 tablet by mouth 2 times daily (with meals) 30 tablet 3    latanoprost (XALATAN) 0.005 % ophthalmic solution Place 1 drop into both eyes nightly      OXYGEN Inhale 2 L/min into the lungs nightly      Multiple Vitamins-Minerals (ICAPS) CAPS Take 1 tablet by mouth 2 times daily       acetaminophen (TYLENOL) 500 MG tablet Take 500 mg by mouth every 6 hours as needed for Pain. calcium carbonate 600 MG TABS tablet Take 1 tablet by mouth daily. No current facility-administered medications on file prior to encounter. REVIEW OF SYSTEMS    Pertinent items are noted in HPI. Constitutional: Negative for systemic symptoms including fever, chills and malaise. Objective:      BP (!) 143/75   Pulse 73   Temp 97.6 °F (36.4 °C)   Resp 18     PHYSICAL EXAM  General Appearance: alert and oriented to person, place and time, well-developed and well-nourished, in no acute distress    General: The patient is in no acute distress. Mental status:  Patient is appropriate, is  oriented to place and plan of care.   Dermatologic exam: Visual inspection of the periwound reveals the skin to be edematous  Wound exam: see wound description below in procedure note      Assessment:   82 y.o. female with bilateral leg edema and left 2nd toe wound. Problem List Items Addressed This Visit          Endocrine    WD-Diabetic ulcer of toe of left foot associated with type 2 diabetes mellitus, with fat layer exposed ([address]    Relevant Orders    Initiate Outpatient Wound Care Protocol       Other    WD-Callus of foot - Primary    Relevant Orders    Initiate Outpatient Wound Care Protocol    WD-Pressure injury of right buttock, stage 2 ([address]    Relevant Orders    Initiate Outpatient Wound Care Protocol    WD-Excoriation of buttock crease    Relevant Orders    Initiate Outpatient Wound Care Protocol     Procedure Note    Indications:  Based on my examination of this patient's wound(s) today, sharp excision into necrotic subcutaneous tissue is required to promote healing and evaluate the extent of previous healing. Performed by: Sherry Burns MD    Consent obtained: Yes    Time out taken:  Yes    Pain Control: n/a      Debridement:Excisional Debridement    Using curette the wound(s) was/were sharply debrided down through and including the removal of epidermis, dermis, and subcutaneous tissue. Devitalized Tissue Debrided:  slough and callus    Pre Debridement Measurements:  Are located in the Wound Documentation Flow Sheet    All active wounds listed below with today's date are evaluated  Wound(s)    debrided this date include # : 2     Post  Debridement Measurements:  Wound 06/10/22 #2 Left Second Toe (Active)   Wound Image   09/30/22 0923   Dressing Status New dressing applied 11/11/22 1004   Wound Cleansed Wound cleanser; Soap and water 11/04/22 0841   Offloading for Diabetic Foot Ulcers Offloading not required 11/04/22 0841   Wound Length (cm) 0 cm 11/11/22 0922   Wound Width (cm) 0 cm 11/11/22 0922   Wound Depth (cm) 0 cm 11/11/22 0922   Wound Surface Area (cm^2) 0 cm^2 11/11/22 0922   Change in Wound Size % (l*w) 100 11/11/22 0922   Wound Volume (cm^3) 0 cm^3 11/11/22 0922   Wound Healing % 100 11/11/22 0922   Post-Procedure Length (cm) 0.1 cm 11/11/22 0945   Post-Procedure Width (cm) 0.1 cm 11/11/22 0945   Post-Procedure Depth (cm) 0.1 cm 11/11/22 0945   Post-Procedure Surface Area (cm^2) 0.01 cm^2 11/11/22 0945   Post-Procedure Volume (cm^3) 0.001 cm^3 11/11/22 0945   Distance Tunneling (cm) 0 cm 11/04/22 0841   Tunneling Position ___ O'Clock 0 11/04/22 0841   Undermining Starts ___ O'Clock 0 11/04/22 0841   Undermining Ends___ O'Clock 0 11/04/22 0841   Undermining Maxium Distance (cm) 0 11/04/22 0841   Wound Assessment Dry;Devitalized tissue 11/11/22 0922   Drainage Amount None 11/04/22 0841   Drainage Description Serosanguinous 10/28/22 0921   Odor None 11/04/22 0841   Leatha-wound Assessment Dry/flaky 11/04/22 0841   Margins Attached edges 11/04/22 0841   Wound Thickness Description not for Pressure Injury Partial thickness 11/04/22 0841   Number of days: 154       Wound 10/07/22 #3 Right Lower Circumf  leg cluster (Active)   Wound Image   11/11/22 1647   Dressing Status New dressing applied 11/11/22 1004   Wound Cleansed Soap and water; Wound cleanser 11/11/22 0922   Offloading for Diabetic Foot Ulcers Offloading not required 11/11/22 8392   Wound Length (cm) 1.2 cm 11/11/22 0922   Wound Width (cm) 0.7 cm 11/11/22 0922   Wound Depth (cm) 0.1 cm 11/11/22 0922   Wound Surface Area (cm^2) 0.84 cm^2 11/11/22 0922   Change in Wound Size % (l*w) 83.2 11/11/22 0922   Wound Volume (cm^3) 0.084 cm^3 11/11/22 0922   Wound Healing % 83 11/11/22 0922   Post-Procedure Length (cm) 7.5 cm 11/04/22 0856   Post-Procedure Width (cm) 13 cm 11/04/22 0856   Post-Procedure Depth (cm) 0.1 cm 11/04/22 0856   Post-Procedure Surface Area (cm^2) 97.5 cm^2 11/04/22 0856   Post-Procedure Volume (cm^3) 9.75 cm^3 11/04/22 0856   Distance Tunneling (cm) 0 cm 11/11/22 5397   Tunneling Position ___ O'Clock 0 11/11/22 0922   Undermining Starts ___ O'Clock 0 11/11/22 0922   Undermining Ends___ O'Clock 0 11/11/22 0922   Undermining Maxium Distance (cm) 0 11/11/22 0922   Wound Assessment Dry;Devitalized tissue 11/11/22 0922   Drainage Amount None 11/11/22 0922   Drainage Description Serosanguinous 10/28/22 0921   Odor None 11/11/22 0922   Leatha-wound Assessment Edematous 11/04/22 0841   Margins Attached edges 11/04/22 0841   Wound Thickness Description not for Pressure Injury Full thickness 11/04/22 0841   Number of days: 35       Percent of Wound(s) Debrided: approximately 100%    Total  Area  Debrided:  0.5 sq cm     Bleeding:  None    Hemostasis Achieved:  not needed    Procedural Pain:  0  / 10     Post Procedural Pain:  0 / 10     Response to treatment:  Well tolerated by patient. Status of wound progress and description from last visit:   wound is nearly healed with only a pin-point wound present after removal of adjacent callous and wound care product buildup. Can switch to just bandage over the wound for protection and continued compression therapy. Plan:       Discharge Instructions         PHYSICIAN ORDERS AND DISCHARGE INSTRUCTIONS   NOTE: Upon discharge from the [address], you will receive a patient experience survey via E-mail. We would be grateful if you would take the time to fill this survey out. Wound care order history:               CYNTHIA's   Right  1.68     Left 1.65  Date 1/13/21              Vascular studies: . Cultures: .3/18/22              Antibiotics: . HbA1c:  . Compression/Lymph Pumps: . Double Tubi              Grafts: [place]: . Continuing wound care orders and information:              Residence: . Private              Continue home health care with: [facility] Your wound-care supplies will be provided by: [facility]: .                             Wound cleansing:                          Do not scrub or use excessive force. Wash hands with soap and water before and after dressing changes. Prior to applying a clean dressing, cleanse wound with normal saline,                          wound cleanser, or mild soap and water. Ask your physician or nurse before getting the wound(s) wet in the shower. Daily Wound management:                          Keep weight off wounds and reposition every 2 hours. Avoid standing for long periods of time. Apply wraps/stockings in AM and remove at bedtime. Elevate legs to the level of the heart or above for 30 minutes 4-5 times a day and/or when sitting. When taking antibiotics take entire prescription as ordered by MD do not stop taking until medicine is all gone. Orders for this week (11/11/22)  Left Second toe - Wash with mild soap and water, rinse with saline, pat dry with 4x4. Apply gentamicin to wound bed  Bandaid  Change Monday, Wednesday and Friday     Bilateral Lower leg- Wash with mild soap and water, rinse with saline, pat dry with 4x4  Apply gentamicin and stimulen to wound bed  Cover with Calcium alginate and sorbex  Wrap with cast padding and coban 2 lites  Leave in place till Monday      On Monday  A&D  Apply gentamicin and stimulen to wound bed  Cover with Calcium alginate and sorbex  Wrap with conform and secure with tape  Padded socks  Double Tubi F  Change daily      Follow up with Estevan Knapp CNP in 1-2 weeks in the wound care center. Call [phone] for any questions or concerns.         Treatment Note Wound 06/10/22 #2 Left Second Toe-Dressing/Treatment:  (gentamicin, bandaid)  Wound 10/07/22 #3 Right Lower Circumf  leg cluster-Dressing/Treatment:  (gentamicin, stimulen, calcium alginate, sorbex, cast padding, coban 2 lite)    Written Patient Dismissal Instructions Given            Electronically signed by Sinan Arroyo MD on 11/11/2022 at 12:32 PM

## 2022-11-11 NOTE — PROGRESS NOTES
Multilayer Compression Wrap   (Not Unna) Below the Knee    NAME:  Liza Smiley OF BIRTH:  1935  MEDICAL RECORD NUMBER:  [id number]  DATE:  11/11/2022    Multilayer compression wrap: Removed old Multilayer wrap if indicated and wash leg with mild soap/water. Applied moisturizing agent to dry skin as needed. Applied primary and secondary dressing as ordered. Applied multilayered dressing below the knee to right lower leg. Applied multilayered dressing below the knee to left lower leg. Instructed patient/caregiver not to remove dressing and to keep it clean and dry. Instructed patient/caregiver on complications to report to provider, such as pain, numbness in toes, heavy drainage, and slippage of dressing. Instructed patient on purpose of compression dressing and on activity and exercise recommendations.       Electronically signed by Reynaldo Pradhan RN on 11/11/2022 at 10:07 AM

## 2022-11-11 NOTE — DISCHARGE INSTRUCTIONS
PHYSICIAN ORDERS AND DISCHARGE INSTRUCTIONS   NOTE: Upon discharge from the [address], you will receive a patient experience survey via E-mail. We would be grateful if you would take the time to fill this survey out. Wound care order history:               CYNTHIA's   Right  1.68     Left 1.65  Date 1/13/21              Vascular studies: . Cultures: .3/18/22              Antibiotics: . HbA1c:  . Compression/Lymph Pumps: . Double Tubi              Grafts: Alma Kras: . Continuing wound care orders and information:              Residence: . Private              Continue home health care with: [facility] Your wound-care supplies will be provided by: [facility]: . Wound cleansing:                          Do not scrub or use excessive force. Wash hands with soap and water before and after dressing changes. Prior to applying a clean dressing, cleanse wound with normal saline,                          wound cleanser, or mild soap and water. Ask your physician or nurse before getting the wound(s) wet in the shower. Daily Wound management:                          Keep weight off wounds and reposition every 2 hours. Avoid standing for long periods of time. Apply wraps/stockings in AM and remove at bedtime. Elevate legs to the level of the heart or above for 30 minutes 4-5 times a day and/or when sitting. When taking antibiotics take entire prescription as ordered by MD do not stop taking until medicine is all gone. Orders for this week (11/11/22)  Left Second toe - Wash with mild soap and water, rinse with saline, pat dry with 4x4.   Apply gentamicin to wound bed  Bandaid  Change Monday, Wednesday and Friday     Bilateral Lower leg- Wash with mild soap and water, rinse with saline, pat dry with 4x4  Apply gentamicin and stimulen to wound bed  Cover with Calcium alginate and sorbex  Wrap with cast padding and coban 2 lites  Leave in place till Monday      On Monday  A&D  Apply gentamicin and stimulen to wound bed  Cover with Calcium alginate and sorbex  Wrap with conform and secure with tape  Padded socks  Double Tubi F  Change daily      Follow up with Daisy Carlin CNP in 1-2 weeks in the wound care center. Call [phone] for any questions or concerns.

## 2022-12-02 ENCOUNTER — HOSPITAL ENCOUNTER (OUTPATIENT)
Dept: WOUND CARE | Age: 87
Discharge: HOME | End: 2022-12-02
Payer: MEDICARE

## 2022-12-02 VITALS
DIASTOLIC BLOOD PRESSURE: 76 MMHG | HEART RATE: 98 BPM | RESPIRATION RATE: 18 BRPM | SYSTOLIC BLOOD PRESSURE: 150 MMHG | TEMPERATURE: 96.6 F

## 2022-12-02 DIAGNOSIS — L97.522 DIABETIC ULCER OF TOE OF LEFT FOOT ASSOCIATED WITH TYPE 2 DIABETES MELLITUS, WITH FAT LAYER EXPOSED (HCC): ICD-10-CM

## 2022-12-02 DIAGNOSIS — I89.0 LYMPHEDEMA OF BOTH LOWER EXTREMITIES: ICD-10-CM

## 2022-12-02 DIAGNOSIS — L97.222 VENOUS STASIS ULCER OF LEFT CALF WITH FAT LAYER EXPOSED WITH VARICOSE VEINS (HCC): ICD-10-CM

## 2022-12-02 DIAGNOSIS — E11.621 DIABETIC ULCER OF TOE OF LEFT FOOT ASSOCIATED WITH TYPE 2 DIABETES MELLITUS, WITH FAT LAYER EXPOSED (HCC): ICD-10-CM

## 2022-12-02 DIAGNOSIS — L97.512 DIABETIC ULCER OF TOE OF RIGHT FOOT ASSOCIATED WITH TYPE 2 DIABETES MELLITUS, WITH FAT LAYER EXPOSED (HCC): ICD-10-CM

## 2022-12-02 DIAGNOSIS — L84 CALLUS OF FOOT: Primary | ICD-10-CM

## 2022-12-02 DIAGNOSIS — E11.621 DIABETIC ULCER OF TOE OF RIGHT FOOT ASSOCIATED WITH TYPE 2 DIABETES MELLITUS, WITH FAT LAYER EXPOSED (HCC): ICD-10-CM

## 2022-12-02 DIAGNOSIS — I83.022 VENOUS STASIS ULCER OF LEFT CALF WITH FAT LAYER EXPOSED WITH VARICOSE VEINS (HCC): ICD-10-CM

## 2022-12-02 DIAGNOSIS — L97.212 VENOUS STASIS ULCER OF RIGHT CALF WITH FAT LAYER EXPOSED WITH VARICOSE VEINS (HCC): ICD-10-CM

## 2022-12-02 DIAGNOSIS — I83.012 VENOUS STASIS ULCER OF RIGHT CALF WITH FAT LAYER EXPOSED WITH VARICOSE VEINS (HCC): ICD-10-CM

## 2022-12-02 PROCEDURE — 11042 DBRDMT SUBQ TIS 1ST 20SQCM/<: CPT

## 2022-12-02 RX ORDER — BACITRACIN ZINC AND POLYMYXIN B SULFATE 500; 1000 [USP'U]/G; [USP'U]/G
OINTMENT TOPICAL ONCE
OUTPATIENT
Start: 2022-12-02 | End: 2022-12-02

## 2022-12-02 RX ORDER — BETAMETHASONE DIPROPIONATE 0.05 %
OINTMENT (GRAM) TOPICAL ONCE
OUTPATIENT
Start: 2022-12-02 | End: 2022-12-02

## 2022-12-02 RX ORDER — LIDOCAINE 50 MG/G
OINTMENT TOPICAL ONCE
OUTPATIENT
Start: 2022-12-02 | End: 2022-12-02

## 2022-12-02 RX ORDER — GINSENG 100 MG
CAPSULE ORAL ONCE
OUTPATIENT
Start: 2022-12-02 | End: 2022-12-02

## 2022-12-02 RX ORDER — GENTAMICIN SULFATE 1 MG/G
OINTMENT TOPICAL ONCE
OUTPATIENT
Start: 2022-12-02 | End: 2022-12-02

## 2022-12-02 RX ORDER — CLOBETASOL PROPIONATE 0.5 MG/G
OINTMENT TOPICAL ONCE
OUTPATIENT
Start: 2022-12-02 | End: 2022-12-02

## 2022-12-02 RX ORDER — LIDOCAINE 40 MG/G
CREAM TOPICAL ONCE
OUTPATIENT
Start: 2022-12-02 | End: 2022-12-02

## 2022-12-02 RX ORDER — GENTAMICIN SULFATE 1 MG/G
OINTMENT TOPICAL
Qty: 30 G | Refills: 1 | Status: SHIPPED | OUTPATIENT
Start: 2022-12-02 | End: 2022-12-09

## 2022-12-02 RX ORDER — LIDOCAINE HYDROCHLORIDE 40 MG/ML
SOLUTION TOPICAL ONCE
OUTPATIENT
Start: 2022-12-02 | End: 2022-12-02

## 2022-12-02 RX ORDER — BACITRACIN, NEOMYCIN, POLYMYXIN B 400; 3.5; 5 [USP'U]/G; MG/G; [USP'U]/G
OINTMENT TOPICAL ONCE
OUTPATIENT
Start: 2022-12-02 | End: 2022-12-02

## 2022-12-02 ASSESSMENT — PAIN SCALES - GENERAL: PAINLEVEL_OUTOF10: 9

## 2022-12-02 ASSESSMENT — PAIN DESCRIPTION - DESCRIPTORS: DESCRIPTORS: DISCOMFORT

## 2022-12-02 ASSESSMENT — PAIN DESCRIPTION - PAIN TYPE: TYPE: ACUTE PAIN

## 2022-12-02 ASSESSMENT — PAIN DESCRIPTION - ORIENTATION: ORIENTATION: RIGHT;LEFT

## 2022-12-02 ASSESSMENT — PAIN DESCRIPTION - LOCATION: LOCATION: LEG

## 2022-12-02 NOTE — DISCHARGE INSTRUCTIONS
PHYSICIAN ORDERS AND DISCHARGE INSTRUCTIONS   NOTE: Upon discharge from the [address], you will receive a patient experience survey via E-mail. We would be grateful if you would take the time to fill this survey out. Wound care order history:               CYNTHIA's   Right  1.68     Left 1.65  Date 1/13/21              Vascular studies: . Cultures: .3/18/22              Antibiotics: . HbA1c:  . Compression/Lymph Pumps: . Double Tubi              Grafts: Daniel See: . Continuing wound care orders and information:              Residence: . Private              Continue home health care with: S[facility]spinosa Your wound-care supplies will be provided by: [facility]: . Wound cleansing:                          Do not scrub or use excessive force. Wash hands with soap and water before and after dressing changes. Prior to applying a clean dressing, cleanse wound with normal saline,                          wound cleanser, or mild soap and water. Ask your physician or nurse before getting the wound(s) wet in the shower. Daily Wound management:                          Keep weight off wounds and reposition every 2 hours. Avoid standing for long periods of time. Apply wraps/stockings in AM and remove at bedtime. Elevate legs to the level of the heart or above for 30 minutes 4-5 times a day and/or when sitting. When taking antibiotics take entire prescription as ordered by MD do not stop taking until medicine is all gone.                                                     Orders for this week (12/2/22)  Bilateral Lower leg- Wash with mild soap and water, rinse with saline, pat dry with 4x4  A&D  Apply gentamicin and stimulen to wound bed  Cover with Calcium alginate and sorbex  Wrap with conform and secure with tape  Padded socks  Double Tubi F  Change daily      Follow up with ROMI Dumont in 1-2 weeks in the wound care center. Call [phone] for any questions or concerns.

## 2022-12-02 NOTE — PROGRESS NOTES
Wound Care Center Progress Note       Dylan Gonzales  AGE: 82 y.o. GENDER: female  : 1935  TODAY'S DATE:  2022        Subjective:     Chief Complaint   Patient presents with    Wound Check      HISTORY of PRESENT ILLNESS    Dylan Gonzales is a 82 y.o. female who presents to the [address] [place] [address] for evaluation and treatment of chronic diabetic left second toe wound that is of mild to moderate severity. Recurrent venous and lymphedema wounds bilateral lower legs. The patient has significant underlying medical conditions as below. 22: New wounds to toes to the right foot, pressure and diabetic in nature of mild to moderate severity. Recurrent venous and lymphedema wounds bilateral lower leg of mild/moderate severity. Regimen as below. Wound Pain Timing/Severity: None  Quality of pain: N/A  Severity of pain:  0 / 10   Modifying Factors: venous stasis, lymphedema, diabetes, poor glucose control, chronic pressure, decreased mobility, shear force and obesity  Associated Signs/Symptoms: edema, erythema     Arterial evaluation: VL arteries 21 per Dr. Suellen Calderon, no significant stenosis     Venous Evaluation: as needed if indicated based on the wound, location, assessment and healing. Wound infection: as indicated for S&S infection     Diabetes: Yes, no medication regimen at this time, diet control. Last AIC 7.8 as of 20  Smoking: Never smoker  Anticoagulant therapy: No  Immunosuppression: No  Obesity: Yes  Other History: Cor pulmonale on diuretic therapy, PAD on Pletal     Nutritional status: well nourished. Discussed need for increased protein and calories for wound healing and good sources of protein (just over 7 grams for every 20 pounds of body weight). Animal-based foods high in protein (meat, poultry, fish, eggs, and dairy foods). Plant based foods high in protein (tofu, lentils, beans, chickpeas, nuts, quinoa and everette seeds.      Patient educated on the 6 essential components necessary for wound healing: Circulation, Debridements, Proper Dressings and Topical Wound Products, Infection Control, Edema Control and Offloading. Patient educated on those factors that negatively effect or impact wound healing: smoking, obesity, uncontrolled diabetes, anticoagulant and immunosuppressive regimens, inadequate nutrition, untreated arterial and venous disease if applicable and measures to manage edema.          PAST MEDICAL HISTORY        Diagnosis Date    Asthma     Chronic kidney disease     acute kidney failure    Chronic ulcer of left leg with fat layer exposed ([address] 3/7/2016    Chronic ulcer of right leg with fat layer exposed ([address] 3/7/2016    Diabetes type 2, controlled ([address]     History of asthma     History of blood transfusion 07/2015    Hypertension     Lymphedema of both lower extremities 3/7/2016    Osteoarthritis     Pneumonia     Thyroid disease     Venous stasis of both lower extremities 3/7/2016    WD-Non-pressure chronic ulcer right lower leg, 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 ([address] 4/21/2016       PAST SURGICAL HISTORY    Past Surgical History:   Procedure Laterality Date    APPENDECTOMY      CHOLECYSTECTOMY      CYSTOSCOPY      EYE SURGERY      bilateral implants    HYSTERECTOMY (CERVIX STATUS UNKNOWN)      JOINT REPLACEMENT Right     knee    PACEMAKER INSERTION N/A 11/17/2020    PACEMAKER INSERTION PERMANENT REMOVAL OF TEMPORARY PACEMAKER performed by Adamaris Macdonald MD at [address] [place] [address]    Family History   Problem Relation Age of Onset    Heart Disease Father        SOCIAL HISTORY    Social History     Tobacco Use    Smoking status: Never    Smokeless tobacco: Never   Vaping Use    Vaping Use: Never used   Substance Use Topics    Alcohol use: No    Drug use: No       ALLERGIES    Allergies   Allergen Reactions    Latex Rash    Dye [Iodides] Anaphylaxis    Iodine Anaphylaxis    Dyazide [Hydrochlorothiazide W-Triamterene] Rash    Lasix [Furosemide] Rash    Procardia [Nifedipine] Other (See Comments)     Not for sure if rash occurred or rash    Doxycycline Dermatitis    Edecrin [Ethacrynic Acid]     Levaquin [Levofloxacin] Other (See Comments)     unknown    Mobic [Meloxicam]     Vioxx [Rofecoxib]     Celebrex [Celecoxib] Rash    Lexapro [Escitalopram Oxalate] Rash    Sulfa Antibiotics Hives       MEDICATIONS    Current Outpatient Medications on File Prior to Encounter   Medication Sig Dispense Refill    potassium chloride (KLOR-CON M) 20 MEQ extended release tablet Take 3 tablets by mouth daily 90 tablet 3    metOLazone (ZAROXOLYN) 5 MG tablet Take 1 tablet by mouth 2 times daily (Patient taking differently: Take 10 mg by mouth 2 times daily) 60 tablet 5    spironolactone (ALDACTONE) 50 MG tablet Take 1 tablet by mouth 2 times daily 60 tablet 5    febuxostat (ULORIC) 40 MG TABS tablet Take 1 tablet by mouth daily 30 tablet 5    traMADol (ULTRAM) 50 MG tablet Take 50 mg by mouth every 6 hours as needed for Pain. albuterol sulfate HFA (PROVENTIL;VENTOLIN;PROAIR) 108 (90 Base) MCG/ACT inhaler Inhale 2 puffs into the lungs every 6 hours as needed for Wheezing      bumetanide (BUMEX) 1 MG tablet Take 2 tablets by mouth 3 times daily (Patient taking differently: Take 2 mg by mouth in the morning, at noon, and at bedtime) 540 tablet 3    clobetasol (TEMOVATE) 0.05 % ointment Apply topically 2 times daily. (Patient taking differently: as needed) 120 g 1    Fluticasone Propionate, Inhal, (FLOVENT IN) Inhale 2 puffs into the lungs 2 times daily      silver sulfADIAZINE (SILVADENE) 1 % cream Apply topically daily.  (Patient taking differently: as needed) 240 g 5    rOPINIRole (REQUIP) 0.5 MG tablet Take 1 tablet by mouth 3 times daily 90 tablet 3    famotidine (PEPCID) 20 MG tablet Take 1 tablet by mouth 2 times daily 60 tablet 5    levothyroxine (SYNTHROID) 50 MCG tablet Take 50 mcg by mouth Daily      Polyethylene Glycol 3350 (MIRALAX PO) Take by mouth (Patient not taking: No sig reported)      fluticasone (FLONASE) 50 MCG/ACT nasal spray 1 spray by Nasal route 2 times daily       loratadine (CLARITIN) 10 MG capsule Take 10 mg by mouth daily      ferrous sulfate 325 (65 FE) MG tablet Take 1 tablet by mouth 2 times daily (with meals) 30 tablet 3    latanoprost (XALATAN) 0.005 % ophthalmic solution Place 1 drop into both eyes nightly      OXYGEN Inhale 2 L/min into the lungs nightly      Multiple Vitamins-Minerals (ICAPS) CAPS Take 1 tablet by mouth 2 times daily       acetaminophen (TYLENOL) 500 MG tablet Take 500 mg by mouth every 6 hours as needed for Pain. calcium carbonate 600 MG TABS tablet Take 1 tablet by mouth daily. No current facility-administered medications on file prior to encounter. REVIEW OF SYSTEMS    Pertinent items are noted in HPI. Constitutional: Negative for systemic symptoms including fever, chills and malaise. Objective:      BP (!) 150/76   Pulse 98   Temp (!) 96.6 °F (35.9 °C) (Temporal)   Resp 18     PHYSICAL EXAM    General: The patient is in no acute distress. Mental status:  Patient is appropriate, is  oriented to place and plan of care. Dermatologic exam: Visual inspection of the periwound reveals the skin to be edematous.   Wound exam:  see wound description below     All active wounds listed below with today's date are evaluated    Assessment:       Problem List Items Addressed This Visit          Circulatory    WD-Venous stasis ulcer of right calf with fat layer exposed with varicose veins (HCC)    WD-Venous stasis ulcer of left calf with fat layer exposed with varicose veins (HCC)       Endocrine    WD-Diabetic ulcer of toe of left foot associated with type 2 diabetes mellitus, with fat layer exposed ([address]    Relevant Orders    Initiate Outpatient Wound Care Protocol    WD-Diabetic ulcer of toe of right foot associated with type 2 diabetes mellitus, with fat layer exposed ([address]       Other    WD-Callus of foot - Primary    Relevant Orders    Initiate Outpatient Wound Care Protocol    WD-Lymphedema of both lower extremities with cellulitis       Procedure Note    Indications:  Based on my examination of this patient's wound(s) today, sharp excision into necrotic subcutaneous tissue is required to promote healing and evaluate the extent of previous healing. Performed by: TREVON Dunn CNP    Consent obtained: Yes    Time out taken:  Yes    Pain Control: Anesthetic  Anesthetic: 4% Lidocaine Liquid Topical        Debridement:Excisional Debridement    Using curette the wound(s) was/were sharply debrided down through and including the removal of subcutaneous tissue. Devitalized Tissue Debrided:  slough and exudate    Pre Debridement Measurements:  Are located in the Wound Documentation Flow Sheet    All active wounds listed below with today's date are evaluated  Wound(s)    debrided this date include # : 2, 3, 4 & 5    Post  Debridement Measurements:  Wound 06/10/22 #2 Left Second Toe (Active)   Wound Image   12/02/22 0944   Dressing Status Clean;Dry; Intact; New dressing applied 12/02/22 1023   Wound Cleansed Wound cleanser 12/02/22 0944   Dressing/Treatment Moisturizing cream;Collagen;Alginate;ABD 12/02/22 1023   Offloading for Diabetic Foot Ulcers Offloading not required 12/02/22 0944   Wound Length (cm) 0 cm 12/02/22 0944   Wound Width (cm) 0 cm 12/02/22 0944   Wound Depth (cm) 0 cm 12/02/22 0944   Wound Surface Area (cm^2) 0 cm^2 12/02/22 0944   Change in Wound Size % (l*w) 100 12/02/22 0944   Wound Volume (cm^3) 0 cm^3 12/02/22 0944   Wound Healing % 100 12/02/22 0944   Post-Procedure Length (cm) 0.1 cm 11/11/22 0945   Post-Procedure Width (cm) 0.1 cm 11/11/22 0945   Post-Procedure Depth (cm) 0.1 cm 11/11/22 0945   Post-Procedure Surface Area (cm^2) 0.01 cm^2 11/11/22 0945   Post-Procedure Volume (cm^3) 0.001 cm^3 11/11/22 0945   Distance Tunneling (cm) 0 cm 12/02/22 0944   Tunneling Position ___ O'Clock 0 12/02/22 0944   Undermining Starts ___ O'Clock 0 12/02/22 0944   Undermining Ends___ O'Clock 0 12/02/22 0944   Undermining Maxium Distance (cm) 0 12/02/22 0944   Wound Assessment Dry 12/02/22 0944   Drainage Amount None 12/02/22 0944   Drainage Description Serosanguinous 10/28/22 0921   Odor None 12/02/22 0944   Leatha-wound Assessment Dry/flaky 12/02/22 0944   Margins Attached edges 12/02/22 0944   Wound Thickness Description not for Pressure Injury Partial thickness 12/02/22 0944   Number of days: 175       Wound 10/07/22 #3 Right Circumferential  Lower Leg Cluster (Active)   Wound Image   12/02/22 0940   Dressing Status Clean;Dry; Intact; New dressing applied 12/02/22 1023   Wound Cleansed Soap and water 12/02/22 0940   Dressing/Treatment Moisturizing cream;Collagen;Alginate;ABD 12/02/22 1023   Offloading for Diabetic Foot Ulcers Offloading not required 12/02/22 0940   Wound Length (cm) 12.5 cm 12/02/22 0940   Wound Width (cm) 12 cm 12/02/22 0940   Wound Depth (cm) 0.1 cm 12/02/22 0940   Wound Surface Area (cm^2) 150 cm^2 12/02/22 0940   Change in Wound Size % (l*w) -2900 12/02/22 0940   Wound Volume (cm^3) 15 cm^3 12/02/22 0940   Wound Healing % -2900 12/02/22 0940   Post-Procedure Length (cm) 12.5 cm 12/02/22 0944   Post-Procedure Width (cm) 12 cm 12/02/22 0944   Post-Procedure Depth (cm) 0.1 cm 12/02/22 0944   Post-Procedure Surface Area (cm^2) 150 cm^2 12/02/22 0944   Post-Procedure Volume (cm^3) 15 cm^3 12/02/22 0944   Distance Tunneling (cm) 0 cm 12/02/22 0940   Tunneling Position ___ O'Clock 0 12/02/22 0940   Undermining Starts ___ O'Clock 0 12/02/22 0940   Undermining Ends___ O'Clock 0 12/02/22 0940   Undermining Maxium Distance (cm) 0 12/02/22 0940   Wound Assessment Devitalized tissue;Pink/red 12/02/22 0940   Drainage Amount Moderate 12/02/22 0940   Drainage Description Yellow 12/02/22 0940   Odor None 12/02/22 0940   Leatha-wound Assessment Edematous 12/02/22 0940   Margins Undefined edges 12/02/22 0940   Wound Thickness Description not for Pressure Injury Full thickness 12/02/22 0940   Number of days: 56       Wound 12/02/22 #4 Left Anterior Lower Leg Cluster (Active)   Wound Image   12/02/22 0944   Dressing Status Clean;Dry; Intact; New dressing applied 12/02/22 1023   Wound Cleansed Wound cleanser; Soap and water 12/02/22 0944   Dressing/Treatment Moisturizing cream;Collagen;Alginate;ABD 12/02/22 1023   Offloading for Diabetic Foot Ulcers Offloading not required 12/02/22 0944   Wound Length (cm) 3.8 cm 12/02/22 0944   Wound Width (cm) 5.8 cm 12/02/22 0944   Wound Depth (cm) 0.1 cm 12/02/22 0944   Wound Surface Area (cm^2) 22.04 cm^2 12/02/22 0944   Wound Volume (cm^3) 2. 204 cm^3 12/02/22 0944   Post-Procedure Length (cm) 3.8 cm 12/02/22 0948   Post-Procedure Width (cm) 5.8 cm 12/02/22 0948   Post-Procedure Depth (cm) 0.1 cm 12/02/22 0948   Post-Procedure Surface Area (cm^2) 22.04 cm^2 12/02/22 0948   Post-Procedure Volume (cm^3) 2. 204 cm^3 12/02/22 0948   Distance Tunneling (cm) 0 cm 12/02/22 0944   Tunneling Position ___ O'Clock 0 12/02/22 0944   Undermining Starts ___ O'Clock 0 12/02/22 0944   Undermining Ends___ O'Clock 0 12/02/22 0944   Undermining Maxium Distance (cm) 0 12/02/22 0944   Wound Assessment Pink/red 12/02/22 0944   Drainage Amount Moderate 12/02/22 0944   Drainage Description Serosanguinous 12/02/22 0944   Odor None 12/02/22 0944   Leatha-wound Assessment Dry/flaky 12/02/22 0944   Margins Undefined edges 12/02/22 0944   Wound Thickness Description not for Pressure Injury Full thickness 12/02/22 0944   Number of days: 0       Wound 12/02/22 #5 Right 2nd toe (Active)   Wound Image   12/02/22 0940   Dressing Status Clean;Dry;New dressing applied 12/02/22 1023   Wound Cleansed Wound cleanser 12/02/22 0940   Dressing/Treatment Moisturizing cream;Collagen;Alginate;ABD 12/02/22 1023   Offloading for Diabetic Foot Ulcers Offloading not required 12/02/22 0940   Wound Length (cm) 0.4 cm 12/02/22 0940 Wound Width (cm) 0.5 cm 12/02/22 0940   Wound Depth (cm) 0.1 cm 12/02/22 0940   Wound Surface Area (cm^2) 0.2 cm^2 12/02/22 0940   Wound Volume (cm^3) 0.02 cm^3 12/02/22 0940   Post-Procedure Length (cm) 0.4 cm 12/02/22 0944   Post-Procedure Width (cm) 0.5 cm 12/02/22 0944   Post-Procedure Depth (cm) 0.1 cm 12/02/22 0944   Post-Procedure Surface Area (cm^2) 0.2 cm^2 12/02/22 0944   Post-Procedure Volume (cm^3) 0.02 cm^3 12/02/22 0944   Distance Tunneling (cm) 0 cm 12/02/22 0940   Tunneling Position ___ O'Clock 0 12/02/22 0940   Undermining Starts ___ O'Clock 0 12/02/22 0940   Undermining Ends___ O'Clock 0 12/02/22 0940   Undermining Maxium Distance (cm) 0 12/02/22 0940   Wound Assessment Dry 12/02/22 0940   Drainage Amount Small 12/02/22 0940   Drainage Description Yellow 12/02/22 0940   Odor None 12/02/22 0940   Leatha-wound Assessment Maceration 12/02/22 0940   Margins Defined edges 12/02/22 0940   Wound Thickness Description not for Pressure Injury Full thickness 12/02/22 0940   Number of days: 0       Total  Area  Debrided: 20 sq cm     Bleeding:  Minimal    Hemostasis Achieved:  by pressure    Procedural Pain:  0  / 10     Post Procedural Pain:  0 / 10     Response to treatment:  Well tolerated by patient. Status of wound progress and description from last visit: New wounds today. Patient has been inconsistent with allowing use of compression wraps which is really inhibiting improvement in edema thus wound healing is much slower. She uses double Tubi for compression. She has agreed to compression wraps. Regimen as below. Now has lymphedema pumps. She has hammer toes and this is causing pressure wounds to the toes. Will trial toe sleeves to see how she tolerates. She has been in contact to Nephrology who increased diuretics. Will follow up in 1 week.    Plan:     Discharge Instructions         PHYSICIAN ORDERS AND DISCHARGE INSTRUCTIONS   NOTE: Upon discharge from the [facility], you will receive a patient experience survey via E-mail. We would be grateful if you would take the time to fill this survey out. Wound care order history:               CYNTHIA's   Right  1.68     Left 1.65  Date 1/13/21              Vascular studies: . Cultures: .3/18/22              Antibiotics: . HbA1c:  . Compression/Lymph Pumps: . Double Tubi              Grafts: Blease [place]: . Continuing wound care orders and information:              Residence: . Private              Continue home health care with: Myrna Rivera Your wound-care supplies will be provided by: [facility]: . Wound cleansing:                          Do not scrub or use excessive force. Wash hands with soap and water before and after dressing changes. Prior to applying a clean dressing, cleanse wound with normal saline,                          wound cleanser, or mild soap and water. Ask your physician or nurse before getting the wound(s) wet in the shower. Daily Wound management:                          Keep weight off wounds and reposition every 2 hours. Avoid standing for long periods of time. Apply wraps/stockings in AM and remove at bedtime. Elevate legs to the level of the heart or above for 30 minutes 4-5 times a day and/or when sitting. When taking antibiotics take entire prescription as ordered by MD do not stop taking until medicine is all gone.                                                     Orders for this week (12/2/22)  Bilateral Lower leg- Wash with mild soap and water, rinse with saline, pat dry with 4x4  A&D  Apply gentamicin and stimulen to wound bed  Cover with Calcium alginate and sorbex  Wrap with conform and secure with tape  Padded socks  Double Tubi F  Change daily      Follow up with ROMI Dumont in 1-2 weeks in the wound care center. Call [phone] for any questions or concerns.         Treatment Note Wound 10/07/22 #3 Right Circumferential  Lower Leg Cluster-Dressing/Treatment: Moisturizing cream, Collagen, Alginate, ABD (A&D-micha, gentamicin, stimulen, calcium alginate, ABD, conform, tape, tubi F x2, pt has padded socks,)  Wound 12/02/22 #4 Left Anterior Lower Leg Cluster-Dressing/Treatment: Moisturizing cream, Collagen, Alginate, ABD (A&D-micha, gentamicin, stimulen, calcium alginate, ABD, conform, tape, tubi F x2, pt has padded socks,)  Wound 12/02/22 #5 Right 2nd toe-Dressing/Treatment: Moisturizing cream, Collagen, Alginate, ABD (A&D-micha, gentamicin, stimulen, calcium alginate, ABD, conform, tape, tubi F x2, pt has padded socks,)  Wound 06/10/22 #2 Left Second Toe-Dressing/Treatment: Moisturizing cream, Collagen, Alginate, ABD (A&D-micha, gentamicin, stimulen, calcium alginate, ABD, conform, tape, tubi F x2, pt has padded socks,)    Written Patient Dismissal Instructions Given            Electronically signed by TREVON Wade CNP on 12/2/2022 at 11:36 AM

## 2022-12-08 NOTE — DISCHARGE INSTRUCTIONS
PHYSICIAN ORDERS AND DISCHARGE INSTRUCTIONS   NOTE: Upon discharge from the [address], you will receive a patient experience survey via E-mail. We would be grateful if you would take the time to fill this survey out. Wound care order history:               CYNTHIA's   Right  1.68     Left 1.65  Date 1/13/21              Vascular studies: . Cultures: .3/18/22              Antibiotics: . HbA1c:  . Compression/Lymph Pumps: . Double Tubi              Grafts: Rainer [place]: . Continuing wound care orders and information:              Residence: . Private              Continue home health care with: Chrissie Siddiqui Your wound-care supplies will be provided by: [facility]: . Wound cleansing:                          Do not scrub or use excessive force. Wash hands with soap and water before and after dressing changes. Prior to applying a clean dressing, cleanse wound with normal saline,                          wound cleanser, or mild soap and water. Ask your physician or nurse before getting the wound(s) wet in the shower. Daily Wound management:                          Keep weight off wounds and reposition every 2 hours. Avoid standing for long periods of time. Apply wraps/stockings in AM and remove at bedtime. Elevate legs to the level of the heart or above for 30 minutes 4-5 times a day and/or when sitting. When taking antibiotics take entire prescription as ordered by MD do not stop taking until medicine is all gone. Orders for this week (12/9/22)    Bilateral Lower Leg - Wash with mild soap and water, rinse with saline, pat dry with 4x4.   A&D to intact skin of lower extremities. Apply Gentamicin and Stimulen to wound bed (may use Puracol at home in place of Stimulen). Cover with Calcium alginate and Sorbex (or Sofsorb). Wrap with conform and secure with tape. Padded socks  Double Tubi F  Change twice daily        Follow up with Sajan Alvarez CNP in 1-2 weeks in the wound care center. Call [phone] for any questions or concerns.

## 2022-12-09 ENCOUNTER — HOSPITAL ENCOUNTER (OUTPATIENT)
Dept: WOUND CARE | Age: 87
Discharge: HOME | End: 2022-12-09
Payer: MEDICARE

## 2022-12-09 VITALS
TEMPERATURE: 96 F | SYSTOLIC BLOOD PRESSURE: 134 MMHG | RESPIRATION RATE: 18 BRPM | DIASTOLIC BLOOD PRESSURE: 76 MMHG | HEART RATE: 76 BPM

## 2022-12-09 DIAGNOSIS — L84 CALLUS OF FOOT: Primary | ICD-10-CM

## 2022-12-09 DIAGNOSIS — E11.621 DIABETIC ULCER OF TOE OF LEFT FOOT ASSOCIATED WITH TYPE 2 DIABETES MELLITUS, WITH FAT LAYER EXPOSED (HCC): ICD-10-CM

## 2022-12-09 DIAGNOSIS — L89.312 PRESSURE INJURY OF RIGHT BUTTOCK, STAGE 2 (HCC): ICD-10-CM

## 2022-12-09 DIAGNOSIS — S30.810A EXCORIATION OF BUTTOCK, INITIAL ENCOUNTER: ICD-10-CM

## 2022-12-09 DIAGNOSIS — L97.522 DIABETIC ULCER OF TOE OF LEFT FOOT ASSOCIATED WITH TYPE 2 DIABETES MELLITUS, WITH FAT LAYER EXPOSED (HCC): ICD-10-CM

## 2022-12-09 PROCEDURE — 11042 DBRDMT SUBQ TIS 1ST 20SQCM/<: CPT

## 2022-12-09 RX ORDER — GINSENG 100 MG
CAPSULE ORAL ONCE
OUTPATIENT
Start: 2022-12-09 | End: 2022-12-09

## 2022-12-09 RX ORDER — BACITRACIN, NEOMYCIN, POLYMYXIN B 400; 3.5; 5 [USP'U]/G; MG/G; [USP'U]/G
OINTMENT TOPICAL ONCE
OUTPATIENT
Start: 2022-12-09 | End: 2022-12-09

## 2022-12-09 RX ORDER — BACITRACIN ZINC AND POLYMYXIN B SULFATE 500; 1000 [USP'U]/G; [USP'U]/G
OINTMENT TOPICAL ONCE
OUTPATIENT
Start: 2022-12-09 | End: 2022-12-09

## 2022-12-09 RX ORDER — BETAMETHASONE DIPROPIONATE 0.05 %
OINTMENT (GRAM) TOPICAL ONCE
OUTPATIENT
Start: 2022-12-09 | End: 2022-12-09

## 2022-12-09 RX ORDER — GENTAMICIN SULFATE 1 MG/G
OINTMENT TOPICAL ONCE
OUTPATIENT
Start: 2022-12-09 | End: 2022-12-09

## 2022-12-09 RX ORDER — LIDOCAINE 50 MG/G
OINTMENT TOPICAL ONCE
OUTPATIENT
Start: 2022-12-09 | End: 2022-12-09

## 2022-12-09 RX ORDER — LIDOCAINE HYDROCHLORIDE 40 MG/ML
SOLUTION TOPICAL ONCE
OUTPATIENT
Start: 2022-12-09 | End: 2022-12-09

## 2022-12-09 RX ORDER — GENTAMICIN SULFATE 1 MG/G
OINTMENT TOPICAL ONCE
Status: DISCONTINUED | OUTPATIENT
Start: 2022-12-09 | End: 2022-12-10 | Stop reason: HOSPADM

## 2022-12-09 RX ORDER — CLOBETASOL PROPIONATE 0.5 MG/G
OINTMENT TOPICAL ONCE
OUTPATIENT
Start: 2022-12-09 | End: 2022-12-09

## 2022-12-09 RX ORDER — LIDOCAINE 40 MG/G
CREAM TOPICAL ONCE
OUTPATIENT
Start: 2022-12-09 | End: 2022-12-09

## 2022-12-09 ASSESSMENT — PAIN DESCRIPTION - LOCATION: LOCATION: TOE (COMMENT WHICH ONE)

## 2022-12-09 ASSESSMENT — PAIN SCALES - GENERAL: PAINLEVEL_OUTOF10: 7

## 2022-12-09 ASSESSMENT — PAIN DESCRIPTION - ORIENTATION: ORIENTATION: RIGHT;LEFT

## 2022-12-09 ASSESSMENT — PAIN DESCRIPTION - DESCRIPTORS: DESCRIPTORS: ACHING;DISCOMFORT

## 2022-12-09 NOTE — PROGRESS NOTES
Wound Care Center Progress Note       Abida Moreno  AGE: 82 y.o. GENDER: female  : 1935  TODAY'S DATE:  2022        Subjective:     Chief Complaint   Patient presents with    Wound Check      HISTORY of PRESENT ILLNESS    Abida Moreno is a 82 y.o. female who presents to the [address] [place] [address] for evaluation and treatment of chronic diabetic left second toe wound that is of mild to moderate severity. Recurrent venous and lymphedema wounds bilateral lower legs. The patient has significant underlying medical conditions as below. 22: New wounds to toes to the right foot, pressure and diabetic in nature of mild to moderate severity. Recurrent venous and lymphedema wounds bilateral lower leg of mild/moderate severity. Regimen as below. Wound Pain Timing/Severity: None  Quality of pain: N/A  Severity of pain:  0 / 10   Modifying Factors: venous stasis, lymphedema, diabetes, poor glucose control, chronic pressure, decreased mobility, shear force and obesity  Associated Signs/Symptoms: edema, erythema     Arterial evaluation: VL arteries 21 per Dr. Mateo Heard, no significant stenosis     Venous Evaluation: as needed if indicated based on the wound, location, assessment and healing. Wound infection: as indicated for S&S infection     Diabetes: Yes, no medication regimen at this time, diet control. Last AIC 7.8 as of 20  Smoking: Never smoker  Anticoagulant therapy: No  Immunosuppression: No  Obesity: Yes  Other History: Cor pulmonale on diuretic therapy, PAD on Pletal     Nutritional status: well nourished. Discussed need for increased protein and calories for wound healing and good sources of protein (just over 7 grams for every 20 pounds of body weight). Animal-based foods high in protein (meat, poultry, fish, eggs, and dairy foods). Plant based foods high in protein (tofu, lentils, beans, chickpeas, nuts, quinoa and everette seeds.      Patient educated on the 6 essential components necessary for wound healing: Circulation, Debridements, Proper Dressings and Topical Wound Products, Infection Control, Edema Control and Offloading. Patient educated on those factors that negatively effect or impact wound healing: smoking, obesity, uncontrolled diabetes, anticoagulant and immunosuppressive regimens, inadequate nutrition, untreated arterial and venous disease if applicable and measures to manage edema.          PAST MEDICAL HISTORY        Diagnosis Date    Asthma     Chronic kidney disease     acute kidney failure    Chronic ulcer of left leg with fat layer exposed ([address] 3/7/2016    Chronic ulcer of right leg with fat layer exposed ([address] 3/7/2016    Diabetes type 2, controlled ([address]     History of asthma     History of blood transfusion 07/2015    Hypertension     Lymphedema of both lower extremities 3/7/2016    Osteoarthritis     Pneumonia     Thyroid disease     Venous stasis of both lower extremities 3/7/2016    WD-Non-pressure chronic ulcer right lower leg, 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 ([address] 4/21/2016       PAST SURGICAL HISTORY    Past Surgical History:   Procedure Laterality Date    APPENDECTOMY      CHOLECYSTECTOMY      CYSTOSCOPY      EYE SURGERY      bilateral implants    HYSTERECTOMY (CERVIX STATUS UNKNOWN)      JOINT REPLACEMENT Right     knee    PACEMAKER INSERTION N/A 11/17/2020    PACEMAKER INSERTION PERMANENT REMOVAL OF TEMPORARY PACEMAKER performed by Jarett Elaine MD at [address] [place] [address]    Family History   Problem Relation Age of Onset    Heart Disease Father        SOCIAL HISTORY    Social History     Tobacco Use    Smoking status: Never    Smokeless tobacco: Never   Vaping Use    Vaping Use: Never used   Substance Use Topics    Alcohol use: No    Drug use: No       ALLERGIES    Allergies   Allergen Reactions    Latex Rash    Dye [Iodides] Anaphylaxis    Iodine Anaphylaxis    Dyazide [Hydrochlorothiazide W-Triamterene] Rash    Lasix [Furosemide] Rash    Procardia [Nifedipine] Other (See Comments)     Not for sure if rash occurred or rash    Doxycycline Dermatitis    Edecrin [Ethacrynic Acid]     Levaquin [Levofloxacin] Other (See Comments)     unknown    Mobic [Meloxicam]     Vioxx [Rofecoxib]     Celebrex [Celecoxib] Rash    Lexapro [Escitalopram Oxalate] Rash    Sulfa Antibiotics Hives       MEDICATIONS    Current Outpatient Medications on File Prior to Encounter   Medication Sig Dispense Refill    gentamicin (GARAMYCIN) 0.1 % ointment Apply topically 3 daily to wounds 30 g 1    potassium chloride (KLOR-CON M) 20 MEQ extended release tablet Take 3 tablets by mouth daily 90 tablet 3    metOLazone (ZAROXOLYN) 5 MG tablet Take 1 tablet by mouth 2 times daily (Patient taking differently: Take 10 mg by mouth 2 times daily) 60 tablet 5    spironolactone (ALDACTONE) 50 MG tablet Take 1 tablet by mouth 2 times daily 60 tablet 5    febuxostat (ULORIC) 40 MG TABS tablet Take 1 tablet by mouth daily 30 tablet 5    traMADol (ULTRAM) 50 MG tablet Take 50 mg by mouth every 6 hours as needed for Pain. albuterol sulfate HFA (PROVENTIL;VENTOLIN;PROAIR) 108 (90 Base) MCG/ACT inhaler Inhale 2 puffs into the lungs every 6 hours as needed for Wheezing      bumetanide (BUMEX) 1 MG tablet Take 2 tablets by mouth 3 times daily (Patient taking differently: Take 2 mg by mouth in the morning, at noon, and at bedtime) 540 tablet 3    clobetasol (TEMOVATE) 0.05 % ointment Apply topically 2 times daily. (Patient taking differently: as needed) 120 g 1    Fluticasone Propionate, Inhal, (FLOVENT IN) Inhale 2 puffs into the lungs 2 times daily      silver sulfADIAZINE (SILVADENE) 1 % cream Apply topically daily.  (Patient taking differently: as needed) 240 g 5    rOPINIRole (REQUIP) 0.5 MG tablet Take 1 tablet by mouth 3 times daily 90 tablet 3    famotidine (PEPCID) 20 MG tablet Take 1 tablet by mouth 2 times daily 60 tablet 5    levothyroxine (SYNTHROID) 50 MCG tablet Take 50 mcg by mouth Daily      Polyethylene Glycol 3350 (MIRALAX PO) Take by mouth (Patient not taking: No sig reported)      fluticasone (FLONASE) 50 MCG/ACT nasal spray 1 spray by Nasal route 2 times daily       loratadine (CLARITIN) 10 MG capsule Take 10 mg by mouth daily      ferrous sulfate 325 (65 FE) MG tablet Take 1 tablet by mouth 2 times daily (with meals) 30 tablet 3    latanoprost (XALATAN) 0.005 % ophthalmic solution Place 1 drop into both eyes nightly      OXYGEN Inhale 2 L/min into the lungs nightly      Multiple Vitamins-Minerals (ICAPS) CAPS Take 1 tablet by mouth 2 times daily       acetaminophen (TYLENOL) 500 MG tablet Take 500 mg by mouth every 6 hours as needed for Pain. calcium carbonate 600 MG TABS tablet Take 1 tablet by mouth daily. No current facility-administered medications on file prior to encounter. REVIEW OF SYSTEMS    Pertinent items are noted in HPI. Constitutional: Negative for systemic symptoms including fever, chills and malaise. Objective:      /76   Pulse 76   Temp (!) 96 °F (35.6 °C) (Temporal)   Resp 18     PHYSICAL EXAM    General: The patient is in no acute distress. Mental status:  Patient is appropriate, is  oriented to place and plan of care. Dermatologic exam: Visual inspection of the periwound reveals the skin to be edematous.   Wound exam:  see wound description below     All active wounds listed below with today's date are evaluated    Assessment:       Problem List Items Addressed This Visit          Endocrine    WD-Diabetic ulcer of toe of left foot associated with type 2 diabetes mellitus, with fat layer exposed ([facility]    Relevant Medications    gentamicin (GARAMYCIN) 0.1 % ointment    Other Relevant Orders    Initiate Outpatient Wound Care Protocol       Other    WD-Callus of foot - Primary    Relevant Medications    gentamicin (GARAMYCIN) 0.1 % ointment    Other Relevant Orders    Initiate Outpatient Wound Care Protocol WD-Pressure injury of right buttock, stage 2 (HCC)    Relevant Medications    gentamicin (GARAMYCIN) 0.1 % ointment    Other Relevant Orders    Initiate Outpatient Wound Care Protocol    WD-Excoriation of buttock crease    Relevant Medications    gentamicin (GARAMYCIN) 0.1 % ointment    Other Relevant Orders    Initiate Outpatient Wound Care Protocol       Procedure Note    Indications:  Based on my examination of this patient's wound(s) today, sharp excision into necrotic subcutaneous tissue is required to promote healing and evaluate the extent of previous healing. Performed by: TREVON Cedeño CNP    Consent obtained: Yes    Time out taken:  Yes    Pain Control: Anesthetic  Anesthetic: 4% Lidocaine Liquid Topical        Debridement:Excisional Debridement    Using curette the wound(s) was/were sharply debrided down through and including the removal of subcutaneous tissue. Devitalized Tissue Debrided:  slough and exudate    Pre Debridement Measurements:  Are located in the Wound Documentation Flow Sheet    All active wounds listed below with today's date are evaluated  Wound(s)    debrided this date include # : 2, 3, 4     Post  Debridement Measurements:  Wound 06/10/22 #2 Left Second Toe (Active)   Wound Image   12/02/22 0944   Wound Etiology Diabetic 12/09/22 0923   Dressing Status Clean;Dry; Intact; New dressing applied 12/02/22 1023   Wound Cleansed Soap and water; Wound cleanser;Cleansed with saline 12/09/22 0923   Dressing/Treatment Moisturizing cream;Collagen;Alginate;ABD 12/02/22 1023   Offloading for Diabetic Foot Ulcers Offloading not required 12/09/22 0923   Wound Length (cm) 0 cm 12/09/22 0923   Wound Width (cm) 0 cm 12/09/22 0923   Wound Depth (cm) 0 cm 12/09/22 0923   Wound Surface Area (cm^2) 0 cm^2 12/09/22 0923   Change in Wound Size % (l*w) 100 12/09/22 0923   Wound Volume (cm^3) 0 cm^3 12/09/22 0923   Wound Healing % 100 12/09/22 0923   Post-Procedure Length (cm) 0.1 cm 11/11/22 0945   Post-Procedure Width (cm) 0.1 cm 11/11/22 0945   Post-Procedure Depth (cm) 0.1 cm 11/11/22 0945   Post-Procedure Surface Area (cm^2) 0.01 cm^2 11/11/22 0945   Post-Procedure Volume (cm^3) 0.001 cm^3 11/11/22 0945   Distance Tunneling (cm) 0 cm 12/09/22 0923   Tunneling Position ___ O'Clock 0 12/09/22 0923   Undermining Starts ___ O'Clock 0 12/09/22 0923   Undermining Ends___ O'Clock 0 12/09/22 0923   Undermining Maxium Distance (cm) 0 12/09/22 0923   Wound Assessment Dry 12/09/22 0923   Drainage Amount None 12/09/22 0923   Drainage Description Serosanguinous 10/28/22 0921   Odor None 12/09/22 0923   Leatha-wound Assessment Dry/flaky 12/09/22 0923   Margins Attached edges 12/09/22 0923   Wound Thickness Description not for Pressure Injury Partial thickness 12/09/22 0923   Number of days: 182       Wound 10/07/22 #3 Right Circumferential  Lower Leg Cluster (Active)   Wound Image   12/02/22 0940   Wound Etiology Venous 12/09/22 0923   Dressing Status New dressing applied;Clean;Dry; Intact 12/09/22 1032   Wound Cleansed Soap and water; Wound cleanser;Cleansed with saline 12/09/22 0923   Dressing/Treatment Moisturizing cream;Collagen;Alginate;ABD 12/02/22 1023   Offloading for Diabetic Foot Ulcers Offloading not required 12/09/22 0923   Wound Length (cm) 13 cm 12/09/22 0923   Wound Width (cm) 17 cm 12/09/22 0923   Wound Depth (cm) 0.1 cm 12/09/22 0923   Wound Surface Area (cm^2) 221 cm^2 12/09/22 0923   Change in Wound Size % (l*w) -4320 12/09/22 0923   Wound Volume (cm^3) 22.1 cm^3 12/09/22 0923   Wound Healing % -4320 12/09/22 0923   Post-Procedure Length (cm) 13 cm 12/09/22 0936   Post-Procedure Width (cm) 17 cm 12/09/22 0936   Post-Procedure Depth (cm) 0.1 cm 12/09/22 0936   Post-Procedure Surface Area (cm^2) 221 cm^2 12/09/22 0936   Post-Procedure Volume (cm^3) 22.1 cm^3 12/09/22 0936   Distance Tunneling (cm) 0 cm 12/09/22 0923   Tunneling Position ___ O'Clock 0 12/09/22 0923   Undermining Starts ___ O'Clock 0 12/09/22 0923   Undermining Ends___ O'Clock 0 12/09/22 0923   Undermining Maxium Distance (cm) 0 12/09/22 0923   Wound Assessment Pink/red 12/09/22 0923   Drainage Amount Moderate 12/09/22 0923   Drainage Description Yellow 12/09/22 0923   Odor None 12/09/22 0923   Leatha-wound Assessment Edematous 12/09/22 0923   Margins Undefined edges 12/09/22 0923   Wound Thickness Description not for Pressure Injury Full thickness 12/09/22 0923   Number of days: 63       Wound 12/02/22 #4 Left Anterior Lower Leg Cluster (Active)   Wound Image   12/02/22 0944   Wound Etiology Venous 12/09/22 0923   Dressing Status New dressing applied;Clean;Dry; Intact 12/09/22 1032   Wound Cleansed Soap and water; Wound cleanser;Cleansed with saline 12/09/22 0923   Dressing/Treatment Moisturizing cream;Collagen;Alginate;ABD 12/02/22 1023   Offloading for Diabetic Foot Ulcers Offloading not required 12/09/22 0923   Wound Length (cm) 13.5 cm 12/09/22 0923   Wound Width (cm) 6.5 cm 12/09/22 0923   Wound Depth (cm) 0.1 cm 12/09/22 0923   Wound Surface Area (cm^2) 87.75 cm^2 12/09/22 0923   Change in Wound Size % (l*w) -298.14 12/09/22 0923   Wound Volume (cm^3) 8.775 cm^3 12/09/22 0923   Wound Healing % -298 12/09/22 0923   Post-Procedure Length (cm) 13.5 cm 12/09/22 0936   Post-Procedure Width (cm) 6.5 cm 12/09/22 0936   Post-Procedure Depth (cm) 0.1 cm 12/09/22 0936   Post-Procedure Surface Area (cm^2) 87.75 cm^2 12/09/22 0936   Post-Procedure Volume (cm^3) 8.775 cm^3 12/09/22 0936   Distance Tunneling (cm) 0 cm 12/09/22 0923   Tunneling Position ___ O'Clock 0 12/09/22 0923   Undermining Starts ___ O'Clock 0 12/09/22 0923   Undermining Ends___ O'Clock 0 12/09/22 0923   Undermining Maxium Distance (cm) 0 12/09/22 0923   Wound Assessment Pink/red 12/09/22 0923   Drainage Amount Moderate 12/09/22 0923   Drainage Description Serosanguinous 12/09/22 0923   Odor None 12/09/22 0923   Leatha-wound Assessment Edematous 12/09/22 0923   Margins Undefined edges 12/09/22 0923   Wound Thickness Description not for Pressure Injury Full thickness 12/09/22 2712   Number of days: 7       Wound 12/02/22 #5 Right 2nd toe (Active)   Wound Image   12/02/22 0940   Wound Etiology Diabetic 12/09/22 1094   Dressing Status New dressing applied;Clean;Dry; Intact 12/09/22 1032   Wound Cleansed Soap and water; Wound cleanser;Cleansed with saline 12/09/22 0923   Dressing/Treatment Moisturizing cream;Collagen;Alginate;ABD 12/02/22 1023   Offloading for Diabetic Foot Ulcers Offloading not required 12/09/22 0923   Wound Length (cm) 0.3 cm 12/09/22 0923   Wound Width (cm) 0.5 cm 12/09/22 0923   Wound Depth (cm) 0.1 cm 12/09/22 0923   Wound Surface Area (cm^2) 0.15 cm^2 12/09/22 0923   Change in Wound Size % (l*w) 25 12/09/22 0923   Wound Volume (cm^3) 0.015 cm^3 12/09/22 0923   Wound Healing % 25 12/09/22 0923   Post-Procedure Length (cm) 0.3 cm 12/09/22 0936   Post-Procedure Width (cm) 0.5 cm 12/09/22 0936   Post-Procedure Depth (cm) 0.1 cm 12/09/22 0936   Post-Procedure Surface Area (cm^2) 0.15 cm^2 12/09/22 0936   Post-Procedure Volume (cm^3) 0.015 cm^3 12/09/22 0936   Distance Tunneling (cm) 0 cm 12/09/22 0923   Tunneling Position ___ O'Clock 0 12/09/22 0923   Undermining Starts ___ O'Clock 0 12/09/22 0923   Undermining Ends___ O'Clock 0 12/09/22 0923   Undermining Maxium Distance (cm) 0 12/09/22 0923   Wound Assessment Slough 12/09/22 0923   Drainage Amount Moderate 12/09/22 0923   Drainage Description Yellow;Serosanguinous 12/09/22 0923   Odor None 12/09/22 0923   Leatha-wound Assessment Maceration 12/09/22 0923   Margins Defined edges 12/09/22 0923   Wound Thickness Description not for Pressure Injury Full thickness 12/09/22 0923   Number of days: 7     Total  Area  Debrided: 20 sq cm     Bleeding:  Minimal    Hemostasis Achieved:  by pressure    Procedural Pain:  0  / 10     Post Procedural Pain:  0 / 10     Response to treatment:  Well tolerated by patient.      Status of wound progress and description from last visit: Wounds improved today. Patient has been inconsistent with allowing use of compression wraps which is really inhibiting improvement in edema thus wound healing is much slower. She uses double Tubi for compression. She has agreed to compression wraps. Regimen as below. Now has lymphedema pumps. She has hammer toes and this is causing pressure wounds to the toes. Will trial toe sleeves to see how she tolerates. She has been in contact to Nephrology who increased diuretics. Will follow up in 1 week. Plan:     Discharge Instructions         PHYSICIAN ORDERS AND DISCHARGE INSTRUCTIONS   NOTE: Upon discharge from the [address], you will receive a patient experience survey via E-mail. We would be grateful if you would take the time to fill this survey out. Wound care order history:               CYNTHIA's   Right  1.68     Left 1.65  Date 1/13/21              Vascular studies: . Cultures: .3/18/22              Antibiotics: . HbA1c:  . Compression/Lymph Pumps: . Double Tubi              Grafts: Manav Zimmer: . Continuing wound care orders and information:              Residence: . Private              Continue home [facility] care with: [facility] Your wound-care supplies will be provided by: [facility]: . Wound cleansing:                          Do not scrub or use excessive force. Wash hands with soap and water before and after dressing changes. Prior to applying a clean dressing, cleanse wound with normal saline,                          wound cleanser, or mild soap and water. Ask your physician or nurse before getting the wound(s) wet in the shower. Daily Wound management:                          Keep weight off wounds and reposition every 2 hours.                           Avoid standing for long periods of time.                          Apply wraps/stockings in AM and remove at bedtime. Elevate legs to the level of the heart or above for 30 minutes 4-5 times a day and/or when sitting. When taking antibiotics take entire prescription as ordered by MD do not stop taking until medicine is all gone. Orders for this week (12/9/22)    Bilateral Lower Leg - Wash with mild soap and water, rinse with saline, pat dry with 4x4. A&D to intact skin of lower extremities. Apply Gentamicin and Stimulen to wound bed (may use Puracol at home in place of Stimulen). Cover with Calcium alginate and Sorbex (or Sofsorb). Wrap with conform and secure with tape. Padded socks  Double Tubi F  Change twice daily        Follow up with Rahel Restrepo CNP in 1-2 weeks in the wound care center. Call [phone] for any questions or concerns.         Treatment Note Wound 12/02/22 #4 Left Anterior Lower Leg Cluster-Dressing/Treatment:  (gentamicin, stimulen, ca algiante, sorbex, conform, tubi F)  Wound 12/02/22 #5 Right 2nd toe-Dressing/Treatment:  (gentamicin, stimulen, ca algiante, sorbex, conform, tubi F)  Wound 10/07/22 #3 Right Circumferential  Lower Leg Cluster-Dressing/Treatment:  (gentamicin, stimulen, ca algiante, sorbex, conform, tubi F)    Written Patient Dismissal Instructions Given            Electronically signed by TREVON Palma CNP on 12/9/2022 at 10:39 AM

## 2022-12-30 ENCOUNTER — HOSPITAL ENCOUNTER (OUTPATIENT)
Dept: WOUND CARE | Age: 87
Discharge: HOME | End: 2022-12-30
Payer: MEDICARE

## 2022-12-30 VITALS
DIASTOLIC BLOOD PRESSURE: 71 MMHG | TEMPERATURE: 97.4 F | HEART RATE: 67 BPM | RESPIRATION RATE: 18 BRPM | SYSTOLIC BLOOD PRESSURE: 122 MMHG

## 2022-12-30 DIAGNOSIS — L84 CALLUS OF FOOT: Primary | ICD-10-CM

## 2022-12-30 DIAGNOSIS — L89.312 PRESSURE INJURY OF RIGHT BUTTOCK, STAGE 2 (HCC): ICD-10-CM

## 2022-12-30 DIAGNOSIS — L97.522 DIABETIC ULCER OF TOE OF LEFT FOOT ASSOCIATED WITH TYPE 2 DIABETES MELLITUS, WITH FAT LAYER EXPOSED (HCC): ICD-10-CM

## 2022-12-30 DIAGNOSIS — S30.810A EXCORIATION OF BUTTOCK, INITIAL ENCOUNTER: ICD-10-CM

## 2022-12-30 DIAGNOSIS — E11.621 DIABETIC ULCER OF TOE OF LEFT FOOT ASSOCIATED WITH TYPE 2 DIABETES MELLITUS, WITH FAT LAYER EXPOSED (HCC): ICD-10-CM

## 2022-12-30 PROCEDURE — 11042 DBRDMT SUBQ TIS 1ST 20SQCM/<: CPT | Performed by: NURSE PRACTITIONER

## 2022-12-30 PROCEDURE — 11042 DBRDMT SUBQ TIS 1ST 20SQCM/<: CPT

## 2022-12-30 RX ORDER — GINSENG 100 MG
CAPSULE ORAL ONCE
OUTPATIENT
Start: 2022-12-30 | End: 2022-12-30

## 2022-12-30 RX ORDER — GENTAMICIN SULFATE 1 MG/G
OINTMENT TOPICAL ONCE
OUTPATIENT
Start: 2022-12-30 | End: 2022-12-30

## 2022-12-30 RX ORDER — BACITRACIN ZINC AND POLYMYXIN B SULFATE 500; 1000 [USP'U]/G; [USP'U]/G
OINTMENT TOPICAL ONCE
OUTPATIENT
Start: 2022-12-30 | End: 2022-12-30

## 2022-12-30 RX ORDER — LIDOCAINE 50 MG/G
OINTMENT TOPICAL ONCE
OUTPATIENT
Start: 2022-12-30 | End: 2022-12-30

## 2022-12-30 RX ORDER — BACITRACIN, NEOMYCIN, POLYMYXIN B 400; 3.5; 5 [USP'U]/G; MG/G; [USP'U]/G
OINTMENT TOPICAL ONCE
OUTPATIENT
Start: 2022-12-30 | End: 2022-12-30

## 2022-12-30 RX ORDER — CLOBETASOL PROPIONATE 0.5 MG/G
OINTMENT TOPICAL ONCE
OUTPATIENT
Start: 2022-12-30 | End: 2022-12-30

## 2022-12-30 RX ORDER — LIDOCAINE HYDROCHLORIDE 40 MG/ML
SOLUTION TOPICAL ONCE
OUTPATIENT
Start: 2022-12-30 | End: 2022-12-30

## 2022-12-30 RX ORDER — LIDOCAINE 40 MG/G
CREAM TOPICAL ONCE
OUTPATIENT
Start: 2022-12-30 | End: 2022-12-30

## 2022-12-30 RX ORDER — BETAMETHASONE DIPROPIONATE 0.05 %
OINTMENT (GRAM) TOPICAL ONCE
OUTPATIENT
Start: 2022-12-30 | End: 2022-12-30

## 2022-12-30 NOTE — PROGRESS NOTES
Wound Care Center Progress Note       Abida Moreno  AGE: 82 y.o. GENDER: female  : 1935  TODAY'S DATE:  2022        Subjective:     Chief Complaint   Patient presents with    Wound Check      HISTORY of PRESENT ILLNESS    Abida Moreno is a 82 y.o. female who presents to the [address] [place] [address] for evaluation and treatment of chronic diabetic left second toe wound that is of mild to moderate severity. Recurrent venous and lymphedema wounds bilateral lower legs. The patient has significant underlying medical conditions as below. 22: New wounds to toes to the right foot, pressure and diabetic in nature of mild to moderate severity. Recurrent venous and lymphedema wounds bilateral lower leg of mild/moderate severity. Regimen as below. Wound Pain Timing/Severity: None  Quality of pain: N/A  Severity of pain:  0 / 10   Modifying Factors: venous stasis, lymphedema, diabetes, poor glucose control, chronic pressure, decreased mobility, shear force and obesity  Associated Signs/Symptoms: edema, erythema     Arterial evaluation: VL arteries 21 per Dr. Mateo Heard, no significant stenosis     Venous Evaluation: as needed if indicated based on the wound, location, assessment and healing. Wound infection: as indicated for S&S infection     Diabetes: Yes, no medication regimen at this time, diet control. Last AIC 7.8 as of 20  Smoking: Never smoker  Anticoagulant therapy: No  Immunosuppression: No  Obesity: Yes  Other History: Cor pulmonale on diuretic therapy, PAD on Pletal     Nutritional status: well nourished. Discussed need for increased protein and calories for wound healing and good sources of protein (just over 7 grams for every 20 pounds of body weight). Animal-based foods high in protein (meat, poultry, fish, eggs, and dairy foods). Plant based foods high in protein (tofu, lentils, beans, chickpeas, nuts, quinoa and everette seeds.      Patient educated on the 6 essential components necessary for wound healing: Circulation, Debridements, Proper Dressings and Topical Wound Products, Infection Control, Edema Control and Offloading. Patient educated on those factors that negatively effect or impact wound healing: smoking, obesity, uncontrolled diabetes, anticoagulant and immunosuppressive regimens, inadequate nutrition, untreated arterial and venous disease if applicable and measures to manage edema.          PAST MEDICAL HISTORY        Diagnosis Date    Asthma     Chronic kidney disease     acute kidney failure    Chronic ulcer of left leg with fat layer exposed ([address] 3/7/2016    Chronic ulcer of right leg with fat layer exposed ([address] 3/7/2016    Diabetes type 2, controlled ([address]     History of asthma     History of blood transfusion 07/2015    Hypertension     Lymphedema of both lower extremities 3/7/2016    Osteoarthritis     Pneumonia     Thyroid disease     Venous stasis of both lower extremities 3/7/2016    WD-Non-pressure chronic ulcer right lower leg, 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 ([address] 4/21/2016       PAST SURGICAL HISTORY    Past Surgical History:   Procedure Laterality Date    APPENDECTOMY      CHOLECYSTECTOMY      CYSTOSCOPY      EYE SURGERY      bilateral implants    HYSTERECTOMY (CERVIX STATUS UNKNOWN)      JOINT REPLACEMENT Right     knee    PACEMAKER INSERTION N/A 11/17/2020    PACEMAKER INSERTION PERMANENT REMOVAL OF TEMPORARY PACEMAKER performed by Elinor Sheikh MD at [address] [place] [address]    Family History   Problem Relation Age of Onset    Heart Disease Father        SOCIAL HISTORY    Social History     Tobacco Use    Smoking status: Never    Smokeless tobacco: Never   Vaping Use    Vaping Use: Never used   Substance Use Topics    Alcohol use: No    Drug use: No       ALLERGIES    Allergies   Allergen Reactions    Latex Rash    Dye [Iodides] Anaphylaxis    Iodine Anaphylaxis    Dyazide [Hydrochlorothiazide W-Triamterene] Rash    Lasix [Furosemide] Rash    Procardia [Nifedipine] Other (See Comments)     Not for sure if rash occurred or rash    Doxycycline Dermatitis    Edecrin [Ethacrynic Acid]     Levaquin [Levofloxacin] Other (See Comments)     unknown    Mobic [Meloxicam]     Vioxx [Rofecoxib]     Celebrex [Celecoxib] Rash    Lexapro [Escitalopram Oxalate] Rash    Sulfa Antibiotics Hives       MEDICATIONS    Current Outpatient Medications on File Prior to Encounter   Medication Sig Dispense Refill    potassium chloride (KLOR-CON M) 20 MEQ extended release tablet Take 3 tablets by mouth daily 90 tablet 3    metOLazone (ZAROXOLYN) 5 MG tablet Take 1 tablet by mouth 2 times daily (Patient taking differently: Take 10 mg by mouth 2 times daily) 60 tablet 5    spironolactone (ALDACTONE) 50 MG tablet Take 1 tablet by mouth 2 times daily 60 tablet 5    febuxostat (ULORIC) 40 MG TABS tablet Take 1 tablet by mouth daily 30 tablet 5    traMADol (ULTRAM) 50 MG tablet Take 50 mg by mouth every 6 hours as needed for Pain. albuterol sulfate HFA (PROVENTIL;VENTOLIN;PROAIR) 108 (90 Base) MCG/ACT inhaler Inhale 2 puffs into the lungs every 6 hours as needed for Wheezing      bumetanide (BUMEX) 1 MG tablet Take 2 tablets by mouth 3 times daily (Patient taking differently: Take 2 mg by mouth in the morning, at noon, and at bedtime) 540 tablet 3    clobetasol (TEMOVATE) 0.05 % ointment Apply topically 2 times daily. (Patient taking differently: as needed) 120 g 1    Fluticasone Propionate, Inhal, (FLOVENT IN) Inhale 2 puffs into the lungs 2 times daily      silver sulfADIAZINE (SILVADENE) 1 % cream Apply topically daily.  (Patient taking differently: as needed) 240 g 5    rOPINIRole (REQUIP) 0.5 MG tablet Take 1 tablet by mouth 3 times daily 90 tablet 3    famotidine (PEPCID) 20 MG tablet Take 1 tablet by mouth 2 times daily 60 tablet 5    levothyroxine (SYNTHROID) 50 MCG tablet Take 50 mcg by mouth Daily      Polyethylene Glycol 3350 (MIRALAX PO) Take by mouth fluticasone (FLONASE) 50 MCG/ACT nasal spray 1 spray by Nasal route 2 times daily       loratadine (CLARITIN) 10 MG capsule Take 10 mg by mouth daily      ferrous sulfate 325 (65 FE) MG tablet Take 1 tablet by mouth 2 times daily (with meals) 30 tablet 3    latanoprost (XALATAN) 0.005 % ophthalmic solution Place 1 drop into both eyes nightly      OXYGEN Inhale 2 L/min into the lungs nightly      Multiple Vitamins-Minerals (ICAPS) CAPS Take 1 tablet by mouth 2 times daily       acetaminophen (TYLENOL) 500 MG tablet Take 500 mg by mouth every 6 hours as needed for Pain. calcium carbonate 600 MG TABS tablet Take 1 tablet by mouth daily. No current facility-administered medications on file prior to encounter. REVIEW OF SYSTEMS    Pertinent items are noted in HPI. Constitutional: Negative for systemic symptoms including fever, chills and malaise. Objective:      /71   Pulse 67   Temp 97.4 °F (36.3 °C) (Temporal)   Resp 18     PHYSICAL EXAM    General: The patient is in no acute distress. Mental status:  Patient is appropriate, is  oriented to place and plan of care. Dermatologic exam: Visual inspection of the periwound reveals the skin to be edematous.   Wound exam:  see wound description below     All active wounds listed below with today's date are evaluated    Assessment:       Problem List Items Addressed This Visit          Endocrine    WD-Diabetic ulcer of toe of left foot associated with type 2 diabetes mellitus, with fat layer exposed ([address]    Relevant Orders    Initiate Outpatient Wound Care Protocol       Other    WD-Callus of foot - Primary    Relevant Orders    Initiate Outpatient Wound Care Protocol    WD-Pressure injury of right buttock, stage 2 ([address]    Relevant Orders    Initiate Outpatient Wound Care Protocol    WD-Excoriation of buttock crease    Relevant Orders    Initiate Outpatient Wound Care Protocol       Procedure Note    Indications:  Based on my examination of this patient's wound(s) today, sharp excision into necrotic subcutaneous tissue is required to promote healing and evaluate the extent of previous healing. Performed by: TREVON Lanza CNP    Consent obtained: Yes    Time out taken:  Yes    Pain Control: Anesthetic  Anesthetic: 4% Lidocaine Liquid Topical        Debridement:Excisional Debridement    Using curette the wound(s) was/were sharply debrided down through and including the removal of subcutaneous tissue. Devitalized Tissue Debrided:  slough and exudate    Pre Debridement Measurements:  Are located in the Wound Documentation Flow Sheet    All active wounds listed below with today's date are evaluated  Wound(s)    debrided this date include # : 3, 4     Post  Debridement Measurements:  Wound 10/07/22 #3 Right Circumferential  Lower Leg Cluster (Active)   Wound Image   12/02/22 0940   Wound Etiology Venous 12/09/22 0923   Dressing Status New dressing applied;Clean;Dry; Intact 12/09/22 1032   Wound Cleansed Soap and water 12/30/22 0932   Dressing/Treatment Moisturizing cream;Collagen;Alginate;ABD 12/02/22 1023   Offloading for Diabetic Foot Ulcers Offloading not required 12/30/22 0932   Wound Length (cm) 8 cm 12/30/22 0932   Wound Width (cm) 7 cm 12/30/22 0932   Wound Depth (cm) 0.1 cm 12/30/22 0932   Wound Surface Area (cm^2) 56 cm^2 12/30/22 0932   Change in Wound Size % (l*w) -1020 12/30/22 0932   Wound Volume (cm^3) 5.6 cm^3 12/30/22 0932   Wound Healing % -1020 12/30/22 0932   Post-Procedure Length (cm) 8 cm 12/30/22 0952   Post-Procedure Width (cm) 7 cm 12/30/22 0952   Post-Procedure Depth (cm) 0.1 cm 12/30/22 0952   Post-Procedure Surface Area (cm^2) 56 cm^2 12/30/22 0952   Post-Procedure Volume (cm^3) 5.6 cm^3 12/30/22 0952   Distance Tunneling (cm) 0 cm 12/30/22 0932   Tunneling Position ___ O'Clock 0 12/30/22 0932   Undermining Starts ___ O'Clock 0 12/30/22 0932   Undermining Ends___ O'Clock 0 12/30/22 0932 Undermining Maxium Distance (cm) 0 12/30/22 0932   Wound Assessment Eschar dry 12/30/22 0932   Drainage Amount None 12/30/22 0932   Drainage Description Yellow 12/09/22 0923   Odor None 12/30/22 0932   Leatha-wound Assessment Edematous 12/09/22 0923   Margins Undefined edges 12/09/22 0923   Wound Thickness Description not for Pressure Injury Full thickness 12/09/22 0923   Number of days: 84       Wound 12/02/22 #4 Left Anterior Lower Leg Cluster (Active)   Wound Image   12/02/22 0944   Wound Etiology Venous 12/09/22 0923   Dressing Status New dressing applied;Clean;Dry; Intact 12/09/22 1032   Wound Cleansed Soap and water 12/30/22 0932   Dressing/Treatment Moisturizing cream;Collagen;Alginate;ABD 12/02/22 1023   Offloading for Diabetic Foot Ulcers Offloading not required 12/30/22 0932   Wound Length (cm) 5.5 cm 12/30/22 0932   Wound Width (cm) 2.5 cm 12/30/22 0932   Wound Depth (cm) 0.1 cm 12/30/22 0932   Wound Surface Area (cm^2) 13.75 cm^2 12/30/22 0932   Change in Wound Size % (l*w) 37.61 12/30/22 0932   Wound Volume (cm^3) 1.375 cm^3 12/30/22 0932   Wound Healing % 38 12/30/22 0932   Post-Procedure Length (cm) 5.5 cm 12/30/22 0952   Post-Procedure Width (cm) 2.5 cm 12/30/22 0952   Post-Procedure Depth (cm) 0.1 cm 12/30/22 0952   Post-Procedure Surface Area (cm^2) 13.75 cm^2 12/30/22 0952   Post-Procedure Volume (cm^3) 1.375 cm^3 12/30/22 0952   Distance Tunneling (cm) 0 cm 12/30/22 0932   Tunneling Position ___ O'Clock 0 12/30/22 0932   Undermining Starts ___ O'Clock 0 12/30/22 0932   Undermining Ends___ O'Clock 0 12/30/22 0932   Undermining Maxium Distance (cm) 0 12/30/22 0932   Wound Assessment Dry 12/30/22 0932   Drainage Amount None 12/30/22 0932   Drainage Description Serosanguinous 12/09/22 0923   Odor None 12/09/22 0923   Leatha-wound Assessment Edematous 12/09/22 0923   Margins Undefined edges 12/09/22 0923   Wound Thickness Description not for Pressure Injury Full thickness 12/09/22 0923   Number of days: 28       Total  Area  Debrided: 10 sq cm     Bleeding:  Minimal    Hemostasis Achieved:  by pressure    Procedural Pain:  0  / 10     Post Procedural Pain:  0 / 10     Response to treatment:  Well tolerated by patient. Status of wound progress and description from last visit: Wounds improved today. Patient has been inconsistent with allowing use of compression wraps which is really inhibiting improvement in edema thus wound healing is much slower. She uses double Tubi for compression. She has agreed to compression wraps. Regimen as below. Now has lymphedema pumps. She has hammer toes and this is causing pressure wounds to the toes. Toe sleeves working well. She has been in contact to Nephrology who increased diuretics. Will follow up in 2 weeks. Plan:     Discharge Instructions         PHYSICIAN ORDERS AND DISCHARGE INSTRUCTIONS   NOTE: Upon discharge from the [address], you will receive a patient experience survey via E-mail. We would be grateful if you would take the time to fill this survey out. Wound care order history:               CYNTHIA's   Right  1.68     Left 1.65  Date 1/13/21              Vascular studies: . Cultures: .3/18/22              Antibiotics: . HbA1c:  . Compression/Lymph Pumps: . Double Tubi              Grafts: Silvano Monsalve: . Continuing wound care orders and information:              Residence: . Private              Continue home health care with: [facility] Your wound-care supplies will be provided by: [facility]: . Wound cleansing:                          Do not scrub or use excessive force. Wash hands with soap and water before and after dressing changes. Prior to applying a clean dressing, cleanse wound with normal saline,                          wound cleanser, or mild soap and water.                           Ask your physician or nurse before getting the wound(s) wet in the shower. Daily Wound management:                          Keep weight off wounds and reposition every 2 hours. Avoid standing for long periods of time. Apply wraps/stockings in AM and remove at bedtime. Elevate legs to the level of the heart or above for 30 minutes 4-5 times a day and/or when sitting. When taking antibiotics take entire prescription as ordered by MD do not stop taking until medicine is all gone. Orders for this week (12/30/22)     Bilateral Lower Leg - Wash with mild soap and water, rinse with saline, pat dry with 4x4. A&D to intact skin of lower extremities. Toe Protectors to bilateral 2nd toes  Apply Gentamicin and Stimulen to wound bed (may use Puracol at home in place of Stimulen). Cover with Calcium alginate and Sorbex (or Sofsorb). Wrap with conform and secure with tape. Padded socks  Double Tubi F  Change twice daily         Follow up with Sarah Cruz CNP in 1-2 weeks in the wound care center. Call [phone] for any questions or concerns.         Treatment Note      Written Patient Dismissal Instructions Given            Electronically signed by TREVON Braxton CNP on 12/30/2022 at 1:49 PM

## 2022-12-30 NOTE — DISCHARGE INSTRUCTIONS
PHYSICIAN ORDERS AND DISCHARGE INSTRUCTIONS   NOTE: Upon discharge from the [address], you will receive a patient experience survey via E-mail. We would be grateful if you would take the time to fill this survey out. Wound care order history:               CYNTHIA's   Right  1.68     Left 1.65  Date 1/13/21              Vascular studies: . Cultures: .3/18/22              Antibiotics: . HbA1c:  . Compression/Lymph Pumps: . Double Tubi              Grafts: Geraldo Ip: . Continuing wound care orders and information:              Residence: . Private              Continue home health care with: [facility] Your wound-care supplies will be provided by: [facility]: . Wound cleansing:                          Do not scrub or use excessive force. Wash hands with soap and water before and after dressing changes. Prior to applying a clean dressing, cleanse wound with normal saline,                          wound cleanser, or mild soap and water. Ask your physician or nurse before getting the wound(s) wet in the shower. Daily Wound management:                          Keep weight off wounds and reposition every 2 hours. Avoid standing for long periods of time. Apply wraps/stockings in AM and remove at bedtime. Elevate legs to the level of the heart or above for 30 minutes 4-5 times a day and/or when sitting. When taking antibiotics take entire prescription as ordered by MD do not stop taking until medicine is all gone. Orders for this week (12/30/22)     Bilateral Lower Leg - Wash with mild soap and water, rinse with saline, pat dry with 4x4.   A&D to intact skin of lower extremities. Toe Protectors to bilateral 2nd toes  Apply Gentamicin and Stimulen to wound bed (may use Puracol at home in place of Stimulen). Cover with Calcium alginate and Sorbex (or Sofsorb). Wrap with conform and secure with tape. Padded socks  Double Tubi F  Change twice daily         Follow up with Mary Coreas CNP in 1-2 weeks in the wound care center. Call [phone] for any questions or concerns.

## 2023-01-13 ENCOUNTER — HOSPITAL ENCOUNTER (OUTPATIENT)
Dept: WOUND CARE | Age: 88
Discharge: HOME | End: 2023-01-13
Payer: MEDICARE

## 2023-01-13 VITALS
DIASTOLIC BLOOD PRESSURE: 54 MMHG | HEART RATE: 66 BPM | RESPIRATION RATE: 16 BRPM | SYSTOLIC BLOOD PRESSURE: 149 MMHG | TEMPERATURE: 96.9 F

## 2023-01-13 DIAGNOSIS — E11.621 DIABETIC ULCER OF TOE OF LEFT FOOT ASSOCIATED WITH TYPE 2 DIABETES MELLITUS, WITH FAT LAYER EXPOSED (HCC): ICD-10-CM

## 2023-01-13 DIAGNOSIS — L84 CALLUS OF FOOT: Primary | ICD-10-CM

## 2023-01-13 DIAGNOSIS — S30.810A EXCORIATION OF BUTTOCK, INITIAL ENCOUNTER: ICD-10-CM

## 2023-01-13 DIAGNOSIS — L97.522 DIABETIC ULCER OF TOE OF LEFT FOOT ASSOCIATED WITH TYPE 2 DIABETES MELLITUS, WITH FAT LAYER EXPOSED (HCC): ICD-10-CM

## 2023-01-13 DIAGNOSIS — L89.312 PRESSURE INJURY OF RIGHT BUTTOCK, STAGE 2 (HCC): ICD-10-CM

## 2023-01-13 PROCEDURE — 11042 DBRDMT SUBQ TIS 1ST 20SQCM/<: CPT

## 2023-01-13 RX ORDER — BACITRACIN ZINC AND POLYMYXIN B SULFATE 500; 1000 [USP'U]/G; [USP'U]/G
OINTMENT TOPICAL ONCE
OUTPATIENT
Start: 2023-01-13 | End: 2023-01-13

## 2023-01-13 RX ORDER — LIDOCAINE 40 MG/G
CREAM TOPICAL ONCE
OUTPATIENT
Start: 2023-01-13 | End: 2023-01-13

## 2023-01-13 RX ORDER — GENTAMICIN SULFATE 1 MG/G
OINTMENT TOPICAL ONCE
OUTPATIENT
Start: 2023-01-13 | End: 2023-01-13

## 2023-01-13 RX ORDER — LIDOCAINE HYDROCHLORIDE 40 MG/ML
SOLUTION TOPICAL ONCE
OUTPATIENT
Start: 2023-01-13 | End: 2023-01-13

## 2023-01-13 RX ORDER — CLOBETASOL PROPIONATE 0.5 MG/G
OINTMENT TOPICAL ONCE
OUTPATIENT
Start: 2023-01-13 | End: 2023-01-13

## 2023-01-13 RX ORDER — BACITRACIN, NEOMYCIN, POLYMYXIN B 400; 3.5; 5 [USP'U]/G; MG/G; [USP'U]/G
OINTMENT TOPICAL ONCE
OUTPATIENT
Start: 2023-01-13 | End: 2023-01-13

## 2023-01-13 RX ORDER — BETAMETHASONE DIPROPIONATE 0.05 %
OINTMENT (GRAM) TOPICAL ONCE
OUTPATIENT
Start: 2023-01-13 | End: 2023-01-13

## 2023-01-13 RX ORDER — GINSENG 100 MG
CAPSULE ORAL ONCE
OUTPATIENT
Start: 2023-01-13 | End: 2023-01-13

## 2023-01-13 RX ORDER — LIDOCAINE 50 MG/G
OINTMENT TOPICAL ONCE
OUTPATIENT
Start: 2023-01-13 | End: 2023-01-13

## 2023-01-13 NOTE — PROGRESS NOTES
Wound Care Center Progress Note       Stacie Gunter  AGE: 82 y.o. GENDER: female  : 1935  TODAY'S DATE:  2023        Subjective:     Chief Complaint   Patient presents with    Wound Check      HISTORY of PRESENT ILLNESS    Stacie Gunter is a 82 y.o. female who presents to the [address] [place] [address] for evaluation and treatment of chronic diabetic left second toe wound that is of mild to moderate severity. Recurrent venous and lymphedema wounds bilateral lower legs. The patient has significant underlying medical conditions as below. 22: New wounds to toes to the right foot, pressure and diabetic in nature of mild to moderate severity. Recurrent venous and lymphedema wounds bilateral lower leg of mild/moderate severity. Regimen as below. Wound Pain Timing/Severity: None  Quality of pain: N/A  Severity of pain:  0 / 10   Modifying Factors: venous stasis, lymphedema, diabetes, poor glucose control, chronic pressure, decreased mobility, shear force and obesity  Associated Signs/Symptoms: edema, erythema     Arterial evaluation: VL arteries 21 per Dr. Vernon Fleming, no significant stenosis     Venous Evaluation: as needed if indicated based on the wound, location, assessment and healing. Wound infection: as indicated for S&S infection     Diabetes: Yes, no medication regimen at this time, diet control. Last AIC 7.8 as of 20  Smoking: Never smoker  Anticoagulant therapy: No  Immunosuppression: No  Obesity: Yes  Other History: Cor pulmonale on diuretic therapy, PAD on Pletal     Nutritional status: well nourished. Discussed need for increased protein and calories for wound healing and good sources of protein (just over 7 grams for every 20 pounds of body weight). Animal-based foods high in protein (meat, poultry, fish, eggs, and dairy foods). Plant based foods high in protein (tofu, lentils, beans, chickpeas, nuts, quinoa and everette seeds.      Patient educated on the 6 essential components necessary for wound healing: Circulation, Debridements, Proper Dressings and Topical Wound Products, Infection Control, Edema Control and Offloading. Patient educated on those factors that negatively effect or impact wound healing: smoking, obesity, uncontrolled diabetes, anticoagulant and immunosuppressive regimens, inadequate nutrition, untreated arterial and venous disease if applicable and measures to manage edema.          PAST MEDICAL HISTORY        Diagnosis Date    Asthma     Chronic kidney disease     acute kidney failure    Chronic ulcer of left leg with fat layer exposed ([address] 3/7/2016    Chronic ulcer of right leg with fat layer exposed ([address] 3/7/2016    Diabetes type 2, controlled ([address]     History of asthma     History of blood transfusion 07/2015    Hypertension     Lymphedema of both lower extremities 3/7/2016    Osteoarthritis     Pneumonia     Thyroid disease     Venous stasis of both lower extremities 3/7/2016    WD-Non-pressure chronic ulcer right lower leg, 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 ([address] 4/21/2016       PAST SURGICAL HISTORY    Past Surgical History:   Procedure Laterality Date    APPENDECTOMY      CHOLECYSTECTOMY      CYSTOSCOPY      EYE SURGERY      bilateral implants    HYSTERECTOMY (CERVIX STATUS UNKNOWN)      JOINT REPLACEMENT Right     knee    PACEMAKER INSERTION N/A 11/17/2020    PACEMAKER INSERTION PERMANENT REMOVAL OF TEMPORARY PACEMAKER performed by Dianelys Cooley MD at [address] [place] [address]    Family History   Problem Relation Age of Onset    Heart Disease Father        SOCIAL HISTORY    Social History     Tobacco Use    Smoking status: Never    Smokeless tobacco: Never   Vaping Use    Vaping Use: Never used   Substance Use Topics    Alcohol use: No    Drug use: No       ALLERGIES    Allergies   Allergen Reactions    Latex Rash    Dye [Iodides] Anaphylaxis    Iodine Anaphylaxis    Dyazide [Hydrochlorothiazide W-Triamterene] Rash    Lasix [Furosemide] Rash    Procardia [Nifedipine] Other (See Comments)     Not for sure if rash occurred or rash    Doxycycline Dermatitis    Edecrin [Ethacrynic Acid]     Levaquin [Levofloxacin] Other (See Comments)     unknown    Mobic [Meloxicam]     Vioxx [Rofecoxib]     Celebrex [Celecoxib] Rash    Lexapro [Escitalopram Oxalate] Rash    Sulfa Antibiotics Hives       MEDICATIONS    Current Outpatient Medications on File Prior to Encounter   Medication Sig Dispense Refill    potassium chloride (KLOR-CON M) 20 MEQ extended release tablet Take 3 tablets by mouth daily 90 tablet 3    metOLazone (ZAROXOLYN) 5 MG tablet Take 1 tablet by mouth 2 times daily (Patient taking differently: Take 10 mg by mouth 2 times daily) 60 tablet 5    spironolactone (ALDACTONE) 50 MG tablet Take 1 tablet by mouth 2 times daily 60 tablet 5    febuxostat (ULORIC) 40 MG TABS tablet Take 1 tablet by mouth daily 30 tablet 5    traMADol (ULTRAM) 50 MG tablet Take 50 mg by mouth every 6 hours as needed for Pain. albuterol sulfate HFA (PROVENTIL;VENTOLIN;PROAIR) 108 (90 Base) MCG/ACT inhaler Inhale 2 puffs into the lungs every 6 hours as needed for Wheezing      bumetanide (BUMEX) 1 MG tablet Take 2 tablets by mouth 3 times daily (Patient taking differently: Take 2 mg by mouth in the morning, at noon, and at bedtime) 540 tablet 3    clobetasol (TEMOVATE) 0.05 % ointment Apply topically 2 times daily. (Patient taking differently: as needed) 120 g 1    Fluticasone Propionate, Inhal, (FLOVENT IN) Inhale 2 puffs into the lungs 2 times daily      silver sulfADIAZINE (SILVADENE) 1 % cream Apply topically daily.  (Patient taking differently: as needed) 240 g 5    rOPINIRole (REQUIP) 0.5 MG tablet Take 1 tablet by mouth 3 times daily 90 tablet 3    famotidine (PEPCID) 20 MG tablet Take 1 tablet by mouth 2 times daily 60 tablet 5    levothyroxine (SYNTHROID) 50 MCG tablet Take 50 mcg by mouth Daily      Polyethylene Glycol 3350 (MIRALAX PO) Take by mouth fluticasone (FLONASE) 50 MCG/ACT nasal spray 1 spray by Nasal route 2 times daily       loratadine (CLARITIN) 10 MG capsule Take 10 mg by mouth daily      ferrous sulfate 325 (65 FE) MG tablet Take 1 tablet by mouth 2 times daily (with meals) 30 tablet 3    latanoprost (XALATAN) 0.005 % ophthalmic solution Place 1 drop into both eyes nightly      OXYGEN Inhale 2 L/min into the lungs nightly      Multiple Vitamins-Minerals (ICAPS) CAPS Take 1 tablet by mouth 2 times daily       acetaminophen (TYLENOL) 500 MG tablet Take 500 mg by mouth every 6 hours as needed for Pain. calcium carbonate 600 MG TABS tablet Take 1 tablet by mouth daily. No current facility-administered medications on file prior to encounter. REVIEW OF SYSTEMS    Pertinent items are noted in HPI. Constitutional: Negative for systemic symptoms including fever, chills and malaise. Objective:      BP (!) 149/54   Pulse 66   Temp 96.9 °F (36.1 °C) (Temporal)   Resp 16     PHYSICAL EXAM    General: The patient is in no acute distress. Mental status:  Patient is appropriate, is  oriented to place and plan of care. Dermatologic exam: Visual inspection of the periwound reveals the skin to be edematous.   Wound exam:  see wound description below     All active wounds listed below with today's date are evaluated    Assessment:       Problem List Items Addressed This Visit          Endocrine    WD-Diabetic ulcer of toe of left foot associated with type 2 diabetes mellitus, with fat layer exposed ([facility]    Relevant Orders    Initiate Outpatient Wound Care Protocol       Other    WD-Callus of foot - Primary    Relevant Orders    Initiate Outpatient Wound Care Protocol    WD-Pressure injury of right buttock, stage 2 ([facility]    Relevant Orders    Initiate Outpatient Wound Care Protocol    WD-Excoriation of buttock crease    Relevant Orders    Initiate Outpatient Wound Care Protocol       Procedure Note    Indications:  Based on my examination of this patient's wound(s) today, sharp excision into necrotic subcutaneous tissue is required to promote healing and evaluate the extent of previous healing. Performed by: TREVON Galicia CNP    Consent obtained: Yes    Time out taken:  Yes    Pain Control: Anesthetic  Anesthetic: 4% Lidocaine Liquid Topical        Debridement:Excisional Debridement    Using curette the wound(s) was/were sharply debrided down through and including the removal of subcutaneous tissue. Devitalized Tissue Debrided:  slough and exudate    Pre Debridement Measurements:  Are located in the Wound Documentation Flow Sheet    All active wounds listed below with today's date are evaluated  Wound(s)    debrided this date include # : 4     Post  Debridement Measurements:  Wound 12/02/22 #4 Left Anterior Lower Leg Cluster (Active)   Wound Image   01/13/23 0939   Wound Etiology Venous 12/09/22 0923   Dressing Status New dressing applied 01/13/23 1110   Wound Cleansed Soap and water; Wound cleanser 01/13/23 0939   Dressing/Treatment Other (comment) 01/13/23 1110   Offloading for Diabetic Foot Ulcers Offloading not required 01/13/23 0939   Wound Length (cm) 2.2 cm 01/13/23 0939   Wound Width (cm) 2.3 cm 01/13/23 0939   Wound Depth (cm) 0.1 cm 01/13/23 0939   Wound Surface Area (cm^2) 5.06 cm^2 01/13/23 0939   Change in Wound Size % (l*w) 77.04 01/13/23 0939   Wound Volume (cm^3) 0.506 cm^3 01/13/23 0939   Wound Healing % 77 01/13/23 0939   Post-Procedure Length (cm) 2.2 cm 01/13/23 1015   Post-Procedure Width (cm) 2.3 cm 01/13/23 1015   Post-Procedure Depth (cm) 0.1 cm 01/13/23 1015   Post-Procedure Surface Area (cm^2) 5.06 cm^2 01/13/23 1015   Post-Procedure Volume (cm^3) 0.506 cm^3 01/13/23 1015   Distance Tunneling (cm) 0 cm 01/13/23 0939   Tunneling Position ___ O'Clock 0 01/13/23 0939   Undermining Starts ___ O'Clock 0 01/13/23 0939   Undermining Ends___ O'Clock 0 01/13/23 0939   Undermining Maxium Distance (cm) 0 01/13/23 0939   Wound Assessment Pink/red 01/13/23 0939   Drainage Amount Scant 01/13/23 0939   Drainage Description Serosanguinous 01/13/23 0939   Odor None 01/13/23 0939   Leatha-wound Assessment Intact 01/13/23 0939   Margins Defined edges; Unattached edges 01/13/23 0722   Wound Thickness Description not for Pressure Injury Full thickness 01/13/23 0939   Number of days: 42       Total  Area  Debrided: 5.06 sq cm     Bleeding:  Minimal    Hemostasis Achieved:  by pressure    Procedural Pain:  0  / 10     Post Procedural Pain:  0 / 10     Response to treatment:  Well tolerated by patient. Status of wound progress and description from last visit: Wounds improved today. Patient has been inconsistent with allowing use of compression wraps which is really inhibiting improvement in edema thus wound healing is much slower. She uses double Tubi for compression. She has agreed to compression wraps. Regimen as below. Now has lymphedema pumps. She has hammer toes and this is causing pressure wounds to the toes. Toe sleeves working well. She has been in contact to Nephrology who increased diuretics. Will follow up in 2 weeks. Plan:     Discharge Instructions         PHYSICIAN ORDERS AND DISCHARGE INSTRUCTIONS   NOTE: Upon discharge from the [address], you will receive a patient experience survey via E-mail. We would be grateful if you would take the time to fill this survey out. Wound care order history:               CYNTHIA's   Right  1.68     Left 1.65  Date 1/13/21              Vascular studies: . Cultures: .3/18/22              Antibiotics: . HbA1c:  . Compression/Lymph Pumps: . Double Tubi              Grafts: Geraldo Ip: . Continuing wound care orders and information:              Residence: . Private              Continue home health care with: [facility] Your wound-care supplies will be provided by: [facility]: . Wound cleansing:                          Do not scrub or use excessive force. Wash hands with soap and water before and after dressing changes. Prior to applying a clean dressing, cleanse wound with normal saline,                          wound cleanser, or mild soap and water. Ask your physician or nurse before getting the wound(s) wet in the shower. Daily Wound management:                          Keep weight off wounds and reposition every 2 hours. Avoid standing for long periods of time. Apply wraps/stockings in AM and remove at bedtime. Elevate legs to the level of the heart or above for 30 minutes 4-5 times a day and/or when sitting. When taking antibiotics take entire prescription as ordered by MD do not stop taking until medicine is all gone. Orders for this week (1/13/23)     Bilateral Lower Leg - Wash with mild soap and water, rinse with saline, pat dry with 4x4. A&D to intact skin of lower extremities. Toe Protectors to bilateral 2nd toes  Apply Gentamicin and Stimulen to wound bed (may use Puracol at home in place of Stimulen). Cover with Calcium alginate and Sorbex (or Sofsorb). Wrap with conform and secure with tape. Padded socks  Double Tubi F  Change twice daily         Follow up with Sajan Alvarez CNP in 1-2 weeks in the wound care center. Call [phone] for any questions or concerns        Treatment Note Wound 12/02/22 #4 Left Anterior Lower Leg Cluster-Dressing/Treatment: Other (comment) (A&D, gentamicin,stimulen, calcium alginate, sorbex, conform, tape, tubi F.  Pt declined double layer of tubi.   requested a single layer.)    Written Patient Dismissal Instructions Given            Electronically signed by TREVON Guidry - CNP on 1/13/2023 at 11:38 AM

## 2023-01-13 NOTE — DISCHARGE INSTRUCTIONS
PHYSICIAN ORDERS AND DISCHARGE INSTRUCTIONS   NOTE: Upon discharge from the [address], you will receive a patient experience survey via E-mail. We would be grateful if you would take the time to fill this survey out. Wound care order history:               CYNTHIA's   Right  1.68     Left 1.65  Date 1/13/21              Vascular studies: . Cultures: .3/18/22              Antibiotics: . HbA1c:  . Compression/Lymph Pumps: . Double Tubi              Grafts: Kelly Espinoza: . Continuing wound care orders and information:              Residence: . Private              [facility] home health care with: [facility] Your wound-care supplies will be provided by: [facility]: . Wound cleansing:                          Do not scrub or use excessive force. Wash hands with soap and water before and after dressing changes. Prior to applying a clean dressing, cleanse wound with normal saline,                          wound cleanser, or mild soap and water. Ask your physician or nurse before getting the wound(s) wet in the shower. Daily Wound management:                          Keep weight off wounds and reposition every 2 hours. Avoid standing for long periods of time. Apply wraps/stockings in AM and remove at bedtime. Elevate legs to the level of the heart or above for 30 minutes 4-5 times a day and/or when sitting. When taking antibiotics take entire prescription as ordered by MD do not stop taking until medicine is all gone. Orders for this week (1/13/23)     Bilateral Lower Leg - Wash with mild soap and water, rinse with saline, pat dry with 4x4.   A&D to intact skin of lower extremities. Toe Protectors to bilateral 2nd toes  Apply Gentamicin and Stimulen to wound bed (may use Puracol at home in place of Stimulen). Cover with Calcium alginate and Sorbex (or Sofsorb). Wrap with conform and secure with tape. Padded socks  Double Tubi F  Change twice daily         Follow up with Vitor Felix CNP in 1-2 weeks in the wound care center.   Call [phone] for any questions or concerns

## 2023-01-26 NOTE — DISCHARGE INSTRUCTIONS
PHYSICIAN ORDERS AND DISCHARGE INSTRUCTIONS   NOTE: Upon discharge from the [address], you will receive a patient experience survey via E-mail. We would be grateful if you would take the time to fill this survey out. Wound care order history:               CYNTHIA's   Right  1.68     Left 1.65  Date 1/13/21              Vascular studies: . Cultures: .3/18/22              Antibiotics: . HbA1c:  . Compression/Lymph Pumps: . Double Tubi              Grafts: Ja Melchor: . Continuing wound care orders and information:              Residence: . Private              Continue home health care with: [facility] Your wound-care supplies will be provided by: [facility]: . Wound cleansing:                          Do not scrub or use excessive force. Wash hands with soap and water before and after dressing changes. Prior to applying a clean dressing, cleanse wound with normal saline,                          wound cleanser, or mild soap and water. Ask your physician or nurse before getting the wound(s) wet in the shower. Daily Wound management:                          Keep weight off wounds and reposition every 2 hours. Avoid standing for long periods of time. Apply wraps/stockings in AM and remove at bedtime. Elevate legs to the level of the heart or above for 30 minutes 4-5 times a day and/or when sitting. When taking antibiotics take entire prescription as ordered by MD do not stop taking until medicine is all gone. Orders for this week (1/27/23)     Bilateral Lower Leg - Wash with mild soap and water, rinse with saline, pat dry with 4x4.   A&D to intact skin of lower extremities. Toe Protectors to bilateral 2nd toes  Padded socks  Double Tubi F  Change twice daily         Discharged.  Call if need anything  Call [phone] for any questions or concerns

## 2023-01-27 ENCOUNTER — HOSPITAL ENCOUNTER (OUTPATIENT)
Dept: WOUND CARE | Age: 88
Discharge: HOME | End: 2023-01-27
Payer: MEDICARE

## 2023-01-27 VITALS
DIASTOLIC BLOOD PRESSURE: 67 MMHG | SYSTOLIC BLOOD PRESSURE: 136 MMHG | RESPIRATION RATE: 16 BRPM | TEMPERATURE: 97.6 F | HEART RATE: 71 BPM

## 2023-01-27 DIAGNOSIS — S30.810A EXCORIATION OF BUTTOCK, INITIAL ENCOUNTER: ICD-10-CM

## 2023-01-27 DIAGNOSIS — L97.522 DIABETIC ULCER OF TOE OF LEFT FOOT ASSOCIATED WITH TYPE 2 DIABETES MELLITUS, WITH FAT LAYER EXPOSED (HCC): ICD-10-CM

## 2023-01-27 DIAGNOSIS — E11.621 DIABETIC ULCER OF TOE OF LEFT FOOT ASSOCIATED WITH TYPE 2 DIABETES MELLITUS, WITH FAT LAYER EXPOSED (HCC): ICD-10-CM

## 2023-01-27 DIAGNOSIS — L89.312 PRESSURE INJURY OF RIGHT BUTTOCK, STAGE 2 (HCC): ICD-10-CM

## 2023-01-27 DIAGNOSIS — L84 CALLUS OF FOOT: Primary | ICD-10-CM

## 2023-01-27 PROCEDURE — 99213 OFFICE O/P EST LOW 20 MIN: CPT | Performed by: NURSE PRACTITIONER

## 2023-01-27 PROCEDURE — 11055 PARING/CUTG B9 HYPRKER LES 1: CPT

## 2023-01-27 RX ORDER — LIDOCAINE 50 MG/G
OINTMENT TOPICAL ONCE
OUTPATIENT
Start: 2023-01-27 | End: 2023-01-27

## 2023-01-27 RX ORDER — BACITRACIN, NEOMYCIN, POLYMYXIN B 400; 3.5; 5 [USP'U]/G; MG/G; [USP'U]/G
OINTMENT TOPICAL ONCE
OUTPATIENT
Start: 2023-01-27 | End: 2023-01-27

## 2023-01-27 RX ORDER — CYCLOBENZAPRINE HCL 10 MG
10 TABLET ORAL 2 TIMES DAILY PRN
COMMUNITY

## 2023-01-27 RX ORDER — GENTAMICIN SULFATE 1 MG/G
OINTMENT TOPICAL ONCE
OUTPATIENT
Start: 2023-01-27 | End: 2023-01-27

## 2023-01-27 RX ORDER — CLOBETASOL PROPIONATE 0.5 MG/G
OINTMENT TOPICAL ONCE
OUTPATIENT
Start: 2023-01-27 | End: 2023-01-27

## 2023-01-27 RX ORDER — LIDOCAINE 40 MG/G
CREAM TOPICAL ONCE
OUTPATIENT
Start: 2023-01-27 | End: 2023-01-27

## 2023-01-27 RX ORDER — BACITRACIN ZINC AND POLYMYXIN B SULFATE 500; 1000 [USP'U]/G; [USP'U]/G
OINTMENT TOPICAL ONCE
OUTPATIENT
Start: 2023-01-27 | End: 2023-01-27

## 2023-01-27 RX ORDER — BETAMETHASONE DIPROPIONATE 0.05 %
OINTMENT (GRAM) TOPICAL ONCE
OUTPATIENT
Start: 2023-01-27 | End: 2023-01-27

## 2023-01-27 RX ORDER — GINSENG 100 MG
CAPSULE ORAL ONCE
OUTPATIENT
Start: 2023-01-27 | End: 2023-01-27

## 2023-01-27 RX ORDER — LIDOCAINE HYDROCHLORIDE 40 MG/ML
SOLUTION TOPICAL ONCE
OUTPATIENT
Start: 2023-01-27 | End: 2023-01-27

## 2023-01-27 ASSESSMENT — PAIN SCALES - GENERAL: PAINLEVEL_OUTOF10: 8

## 2023-01-27 ASSESSMENT — PAIN DESCRIPTION - LOCATION: LOCATION: TOE (COMMENT WHICH ONE)

## 2023-01-27 ASSESSMENT — PAIN DESCRIPTION - FREQUENCY: FREQUENCY: CONTINUOUS

## 2023-01-27 ASSESSMENT — PAIN DESCRIPTION - ORIENTATION: ORIENTATION: LEFT;RIGHT

## 2023-01-27 ASSESSMENT — PAIN DESCRIPTION - PAIN TYPE: TYPE: CHRONIC PAIN

## 2023-01-27 ASSESSMENT — PAIN DESCRIPTION - DESCRIPTORS: DESCRIPTORS: ACHING;DULL;TENDER

## 2023-01-27 NOTE — PROGRESS NOTES
Wound Care Center Progress Note       Jennifer Putnam  AGE: 82 y.o. GENDER: female  : 1935  TODAY'S DATE:  2023        Subjective:     Chief Complaint   Patient presents with    Wound Check      HISTORY of PRESENT ILLNESS    Jennifer Putnam is a 82 y.o. female who presents to the [address] [place] [address] for evaluation and treatment of chronic diabetic left second toe wound that is of mild to moderate severity. Recurrent venous and lymphedema wounds bilateral lower legs. The patient has significant underlying medical conditions as below. 22: New wounds to toes to the right foot, pressure and diabetic in nature of mild to moderate severity. Recurrent venous and lymphedema wounds bilateral lower leg of mild/moderate severity. Regimen as below. Wound Pain Timing/Severity: None  Quality of pain: N/A  Severity of pain:  0 / 10   Modifying Factors: venous stasis, lymphedema, diabetes, poor glucose control, chronic pressure, decreased mobility, shear force and obesity  Associated Signs/Symptoms: edema, erythema     Arterial evaluation: VL arteries 21 per Dr. Cassi Leary, no significant stenosis     Venous Evaluation: as needed if indicated based on the wound, location, assessment and healing. Wound infection: as indicated for S&S infection     Diabetes: Yes, no medication regimen at this time, diet control. Last AIC 7.8 as of 20  Smoking: Never smoker  Anticoagulant therapy: No  Immunosuppression: No  Obesity: Yes  Other History: Cor pulmonale on diuretic therapy, PAD on Pletal     Nutritional status: well nourished. Discussed need for increased protein and calories for wound healing and good sources of protein (just over 7 grams for every 20 pounds of body weight). Animal-based foods high in protein (meat, poultry, fish, eggs, and dairy foods). Plant based foods high in protein (tofu, lentils, beans, chickpeas, nuts, quinoa and everette seeds.      Patient educated on the 6 essential components necessary for wound healing: Circulation, Debridements, Proper Dressings and Topical Wound Products, Infection Control, Edema Control and Offloading. Patient educated on those factors that negatively effect or impact wound healing: smoking, obesity, uncontrolled diabetes, anticoagulant and immunosuppressive regimens, inadequate nutrition, untreated arterial and venous disease if applicable and measures to manage edema.          PAST MEDICAL HISTORY        Diagnosis Date    Asthma     Chronic kidney disease     acute kidney failure    Chronic ulcer of left leg with fat layer exposed ([address] 3/7/2016    Chronic ulcer of right leg with fat layer exposed ([address] 3/7/2016    Diabetes type 2, controlled ([address]     History of asthma     History of blood transfusion 07/2015    Hypertension     Lymphedema of both lower extremities 3/7/2016    Osteoarthritis     Pneumonia     Thyroid disease     Venous stasis of both lower extremities 3/7/2016    WD-Non-pressure chronic ulcer right lower leg, 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 ([address] 4/21/2016       PAST SURGICAL HISTORY    Past Surgical History:   Procedure Laterality Date    APPENDECTOMY      CHOLECYSTECTOMY      CYSTOSCOPY      EYE SURGERY      bilateral implants    HYSTERECTOMY (CERVIX STATUS UNKNOWN)      JOINT REPLACEMENT Right     knee    PACEMAKER INSERTION N/A 11/17/2020    PACEMAKER INSERTION PERMANENT REMOVAL OF TEMPORARY PACEMAKER performed by Renee Mccall MD at [address] [place] [place] [address]    Family History   Problem Relation Age of Onset    Heart Disease Father        SOCIAL HISTORY    Social History     Tobacco Use    Smoking status: Never    Smokeless tobacco: Never   Vaping Use    Vaping Use: Never used   Substance Use Topics    Alcohol use: No    Drug use: No       ALLERGIES    Allergies   Allergen Reactions    Latex Rash    Dye [Iodides] Anaphylaxis    Iodine Anaphylaxis    Dyazide [Hydrochlorothiazide W-Triamterene] Rash    Lasix [Furosemide] Rash    Procardia [Nifedipine] Other (See Comments)     Not for sure if rash occurred or rash    Doxycycline Dermatitis    Edecrin [Ethacrynic Acid]     Levaquin [Levofloxacin] Other (See Comments)     unknown    Mobic [Meloxicam]     Vioxx [Rofecoxib]     Celebrex [Celecoxib] Rash    Lexapro [Escitalopram Oxalate] Rash    Sulfa Antibiotics Hives       MEDICATIONS    Current Outpatient Medications on File Prior to Encounter   Medication Sig Dispense Refill    cyclobenzaprine (FLEXERIL) 10 MG tablet Take 10 mg by mouth 2 times daily as needed for Muscle spasms      febuxostat (ULORIC) 40 MG TABS tablet Take 1 tablet by mouth daily 30 tablet 5    bumetanide (BUMEX) 1 MG tablet Take 2 tablets by mouth 3 times daily 540 tablet 3    potassium chloride (KLOR-CON M) 20 MEQ extended release tablet Take 3 tablets by mouth daily 90 tablet 3    metOLazone (ZAROXOLYN) 5 MG tablet Take 1 tablet by mouth 2 times daily (Patient taking differently: Take 10 mg by mouth 2 times daily) 60 tablet 5    spironolactone (ALDACTONE) 50 MG tablet Take 1 tablet by mouth 2 times daily 60 tablet 5    traMADol (ULTRAM) 50 MG tablet Take 50 mg by mouth every 6 hours as needed for Pain. albuterol sulfate HFA (PROVENTIL;VENTOLIN;PROAIR) 108 (90 Base) MCG/ACT inhaler Inhale 2 puffs into the lungs every 6 hours as needed for Wheezing      clobetasol (TEMOVATE) 0.05 % ointment Apply topically 2 times daily. (Patient taking differently: as needed) 120 g 1    Fluticasone Propionate, Inhal, (FLOVENT IN) Inhale 2 puffs into the lungs 2 times daily      silver sulfADIAZINE (SILVADENE) 1 % cream Apply topically daily.  (Patient taking differently: as needed) 240 g 5    rOPINIRole (REQUIP) 0.5 MG tablet Take 1 tablet by mouth 3 times daily 90 tablet 3    famotidine (PEPCID) 20 MG tablet Take 1 tablet by mouth 2 times daily 60 tablet 5    levothyroxine (SYNTHROID) 50 MCG tablet Take 50 mcg by mouth Daily      Polyethylene Glycol 3350 (MIRALAX PO) Take by mouth      fluticasone (FLONASE) 50 MCG/ACT nasal spray 1 spray by Nasal route 2 times daily       loratadine (CLARITIN) 10 MG capsule Take 10 mg by mouth daily      ferrous sulfate 325 (65 FE) MG tablet Take 1 tablet by mouth 2 times daily (with meals) 30 tablet 3    latanoprost (XALATAN) 0.005 % ophthalmic solution Place 1 drop into both eyes nightly      OXYGEN Inhale 2 L/min into the lungs nightly      Multiple Vitamins-Minerals (ICAPS) CAPS Take 1 tablet by mouth 2 times daily       acetaminophen (TYLENOL) 500 MG tablet Take 500 mg by mouth every 6 hours as needed for Pain. calcium carbonate 600 MG TABS tablet Take 1 tablet by mouth daily. No current facility-administered medications on file prior to encounter. REVIEW OF SYSTEMS    Pertinent items are noted in HPI. Constitutional: Negative for systemic symptoms including fever, chills and malaise. Objective:      /67   Pulse 71   Temp 97.6 °F (36.4 °C) (Temporal)   Resp 16     PHYSICAL EXAM    General: The patient is in no acute distress. Mental status:  Patient is appropriate, is  oriented to place and plan of care. Dermatologic exam: Visual inspection of the periwound reveals the skin to be edematous.   Wound exam:  see wound description below     All active wounds listed below with today's date are evaluated    Assessment:       Problem List Items Addressed This Visit          Endocrine    WD-Diabetic ulcer of toe of left foot associated with type 2 diabetes mellitus, with fat layer exposed ([address]    Relevant Orders    Initiate Outpatient Wound Care Protocol       Other    WD-Callus of foot - Primary    Relevant Orders    Initiate Outpatient Wound Care Protocol    WD-Pressure injury of right buttock, stage 2 ([address]    Relevant Orders    Initiate Outpatient Wound Care Protocol    WD-Excoriation of buttock crease    Relevant Orders    Initiate Outpatient Wound Care Protocol     Status of wound progress and description from last visit: Wounds healed today, reinforced edema management and use of lymphedema pumps to manage edema. She uses double Tubi for compression. She has hammer toes and this is causing pressure wounds to the toes. Toe sleeves working well. Will follow up as needed. Plan:     Discharge Instructions         PHYSICIAN ORDERS AND DISCHARGE INSTRUCTIONS   NOTE: Upon discharge from the [address], you will receive a patient experience survey via E-mail. We would be grateful if you would take the time to fill this survey out. Wound care order history:               CYNTHIA's   Right  1.68     Left 1.65  Date 1/13/21              Vascular studies: . Cultures: .3/18/22              Antibiotics: . HbA1c:  . Compression/Lymph Pumps: . Double Tubi              Grafts: Ciarra Loera: . Continuing wound care orders and information:              Residence: . Private              Continue home health care with: [facility] Your wound-care supplies will be provided by: [facility]: . Wound cleansing:                          Do not scrub or use excessive force. Wash hands with soap and water before and after dressing changes. Prior to applying a clean dressing, cleanse wound with normal saline,                          wound cleanser, or mild soap and water. Ask your physician or nurse before getting the wound(s) wet in the shower. Daily Wound management:                          Keep weight off wounds and reposition every 2 hours. Avoid standing for long periods of time. Apply wraps/stockings in AM and remove at bedtime. Elevate legs to the level of the heart or above for 30 minutes 4-5 times a day and/or when sitting. When taking antibiotics take entire prescription as ordered by MD do not stop taking until medicine is all gone. Orders for this week (1/27/23)     Bilateral Lower Leg - Wash with mild soap and water, rinse with saline, pat dry with 4x4. A&D to intact skin of lower extremities. Toe Protectors to bilateral 2nd toes  Padded socks  Double Tubi F  Change twice daily         Discharged.  Call if need anything  Call [phone] for any questions or concerns        Treatment Note      Written Patient Dismissal Instructions Given            Electronically signed by TREVON Fonseca CNP on 1/27/2023 at 2:38 PM

## 2023-02-24 ENCOUNTER — HOSPITAL ENCOUNTER (OUTPATIENT)
Dept: WOUND CARE | Age: 88
Discharge: HOME | End: 2023-02-24
Payer: MEDICARE

## 2023-02-24 VITALS
RESPIRATION RATE: 16 BRPM | HEART RATE: 72 BPM | SYSTOLIC BLOOD PRESSURE: 147 MMHG | DIASTOLIC BLOOD PRESSURE: 75 MMHG | TEMPERATURE: 97.2 F

## 2023-02-24 DIAGNOSIS — E11.621 DIABETIC ULCER OF TOE OF LEFT FOOT ASSOCIATED WITH TYPE 2 DIABETES MELLITUS, WITH FAT LAYER EXPOSED (HCC): ICD-10-CM

## 2023-02-24 DIAGNOSIS — L89.312 PRESSURE INJURY OF RIGHT BUTTOCK, STAGE 2 (HCC): ICD-10-CM

## 2023-02-24 DIAGNOSIS — S30.810A EXCORIATION OF BUTTOCK, INITIAL ENCOUNTER: ICD-10-CM

## 2023-02-24 DIAGNOSIS — M54.2 NECK PAIN, ACUTE: ICD-10-CM

## 2023-02-24 DIAGNOSIS — L97.522 DIABETIC ULCER OF TOE OF LEFT FOOT ASSOCIATED WITH TYPE 2 DIABETES MELLITUS, WITH FAT LAYER EXPOSED (HCC): ICD-10-CM

## 2023-02-24 DIAGNOSIS — L84 CALLUS OF FOOT: Primary | ICD-10-CM

## 2023-02-24 PROCEDURE — 11042 DBRDMT SUBQ TIS 1ST 20SQCM/<: CPT

## 2023-02-24 RX ORDER — GENTAMICIN SULFATE 1 MG/G
OINTMENT TOPICAL ONCE
OUTPATIENT
Start: 2023-02-24 | End: 2023-02-24

## 2023-02-24 RX ORDER — TIZANIDINE 4 MG/1
4 TABLET ORAL 3 TIMES DAILY PRN
Qty: 30 TABLET | Refills: 0 | Status: SHIPPED | OUTPATIENT
Start: 2023-02-24

## 2023-02-24 RX ORDER — LIDOCAINE HYDROCHLORIDE 40 MG/ML
SOLUTION TOPICAL ONCE
OUTPATIENT
Start: 2023-02-24 | End: 2023-02-24

## 2023-02-24 RX ORDER — CLOBETASOL PROPIONATE 0.5 MG/G
OINTMENT TOPICAL ONCE
OUTPATIENT
Start: 2023-02-24 | End: 2023-02-24

## 2023-02-24 RX ORDER — BETAMETHASONE DIPROPIONATE 0.05 %
OINTMENT (GRAM) TOPICAL ONCE
OUTPATIENT
Start: 2023-02-24 | End: 2023-02-24

## 2023-02-24 RX ORDER — LIDOCAINE 50 MG/G
OINTMENT TOPICAL ONCE
OUTPATIENT
Start: 2023-02-24 | End: 2023-02-24

## 2023-02-24 RX ORDER — BACITRACIN, NEOMYCIN, POLYMYXIN B 400; 3.5; 5 [USP'U]/G; MG/G; [USP'U]/G
OINTMENT TOPICAL ONCE
OUTPATIENT
Start: 2023-02-24 | End: 2023-02-24

## 2023-02-24 RX ORDER — BACITRACIN ZINC AND POLYMYXIN B SULFATE 500; 1000 [USP'U]/G; [USP'U]/G
OINTMENT TOPICAL ONCE
OUTPATIENT
Start: 2023-02-24 | End: 2023-02-24

## 2023-02-24 RX ORDER — GINSENG 100 MG
CAPSULE ORAL ONCE
OUTPATIENT
Start: 2023-02-24 | End: 2023-02-24

## 2023-02-24 RX ORDER — LIDOCAINE 40 MG/G
CREAM TOPICAL ONCE
OUTPATIENT
Start: 2023-02-24 | End: 2023-02-24

## 2023-02-24 ASSESSMENT — PAIN DESCRIPTION - ORIENTATION: ORIENTATION: RIGHT;LEFT

## 2023-02-24 ASSESSMENT — PAIN DESCRIPTION - LOCATION: LOCATION: FOOT;TOE (COMMENT WHICH ONE)

## 2023-02-24 ASSESSMENT — PAIN DESCRIPTION - PAIN TYPE: TYPE: CHRONIC PAIN

## 2023-02-24 ASSESSMENT — PAIN DESCRIPTION - DESCRIPTORS: DESCRIPTORS: BURNING

## 2023-02-24 ASSESSMENT — PAIN SCALES - GENERAL: PAINLEVEL_OUTOF10: 10

## 2023-02-24 NOTE — PROGRESS NOTES
Nonselective enzymatic debridement performed with Santyl per physician order to wound(s) of the [address] Patient tolerated the procedure well.

## 2023-02-24 NOTE — PROGRESS NOTES
Wound Care Center Progress Note       Lynn Breaux  AGE: 82 y.o. GENDER: female  : 1935  TODAY'S DATE:  2023        Subjective:     Chief Complaint   Patient presents with    Wound Check      HISTORY of PRESENT ILLNESS    Lynn Breaux is a 82 y.o. female who presents to the [address] [place] [address] for evaluation and treatment of chronic diabetic left second toe wound that is of mild to moderate severity. Recurrent venous and lymphedema wounds bilateral lower legs. The patient has significant underlying medical conditions as below. 22: New wounds to toes to the right foot, pressure and diabetic in nature of mild to moderate severity. Recurrent venous and lymphedema wounds bilateral lower leg of mild/moderate severity. Regimen as below. Wound Pain Timing/Severity: None  Quality of pain: N/A  Severity of pain:  0 / 10   Modifying Factors: venous stasis, lymphedema, diabetes, poor glucose control, chronic pressure, decreased mobility, shear force and obesity  Associated Signs/Symptoms: edema, erythema     Arterial evaluation: VL arteries 21 per Dr. Nereyda Montiel, no significant stenosis     Venous Evaluation: as needed if indicated based on the wound, location, assessment and healing. Wound infection: as indicated for S&S infection     Diabetes: Yes, no medication regimen at this time, diet control. Last AIC 7.8 as of 20  Smoking: Never smoker  Anticoagulant therapy: No  Immunosuppression: No  Obesity: Yes  Other History: Cor pulmonale on diuretic therapy, PAD on Pletal     Nutritional status: well nourished. Discussed need for increased protein and calories for wound healing and good sources of protein (just over 7 grams for every 20 pounds of body weight). Animal-based foods high in protein (meat, poultry, fish, eggs, and dairy foods). Plant based foods high in protein (tofu, lentils, beans, chickpeas, nuts, quinoa and everette seeds.      Patient educated on the 6 essential components necessary for wound healing: Circulation, Debridements, Proper Dressings and Topical Wound Products, Infection Control, Edema Control and Offloading. Patient educated on those factors that negatively effect or impact wound healing: smoking, obesity, uncontrolled diabetes, anticoagulant and immunosuppressive regimens, inadequate nutrition, untreated arterial and venous disease if applicable and measures to manage edema.          PAST MEDICAL HISTORY        Diagnosis Date    Asthma     Chronic kidney disease     acute kidney failure    Chronic ulcer of left leg with fat layer exposed ([address] 3/7/2016    Chronic ulcer of right leg with fat layer exposed ([address] 3/7/2016    Diabetes type 2, controlled ([address]     History of asthma     History of blood transfusion 07/2015    Hypertension     Lymphedema of both lower extremities 3/7/2016    Osteoarthritis     Pneumonia     Thyroid disease     Venous stasis of both lower extremities 3/7/2016    WD-Non-pressure chronic ulcer right lower leg, 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 ([address] 4/21/2016       PAST SURGICAL HISTORY    Past Surgical History:   Procedure Laterality Date    APPENDECTOMY      CHOLECYSTECTOMY      CYSTOSCOPY      EYE SURGERY      bilateral implants    HYSTERECTOMY (CERVIX STATUS UNKNOWN)      JOINT REPLACEMENT Right     knee    PACEMAKER INSERTION N/A 11/17/2020    PACEMAKER INSERTION PERMANENT REMOVAL OF TEMPORARY PACEMAKER performed by Marleni Mabry MD at [address] [place] [address]    Family History   Problem Relation Age of Onset    Heart Disease Father        SOCIAL HISTORY    Social History     Tobacco Use    Smoking status: Never    Smokeless tobacco: Never   Vaping Use    Vaping Use: Never used   Substance Use Topics    Alcohol use: No    Drug use: No       ALLERGIES    Allergies   Allergen Reactions    Latex Rash    Dye [Iodides] Anaphylaxis    Iodine Anaphylaxis    Dyazide [Hydrochlorothiazide W-Triamterene] Rash    Lasix [Furosemide] Rash    Procardia [Nifedipine] Other (See Comments)     Not for sure if rash occurred or rash    Doxycycline Dermatitis    Edecrin [Ethacrynic Acid]     Levaquin [Levofloxacin] Other (See Comments)     unknown    Mobic [Meloxicam]     Vioxx [Rofecoxib]     Celebrex [Celecoxib] Rash    Lexapro [Escitalopram Oxalate] Rash    Sulfa Antibiotics Hives       MEDICATIONS    Current Outpatient Medications on File Prior to Encounter   Medication Sig Dispense Refill    febuxostat (ULORIC) 40 MG TABS tablet Take 1 tablet by mouth daily 30 tablet 5    bumetanide (BUMEX) 1 MG tablet Take 2 tablets by mouth 3 times daily 540 tablet 3    potassium chloride (KLOR-CON M) 20 MEQ extended release tablet Take 3 tablets by mouth daily 90 tablet 3    metOLazone (ZAROXOLYN) 5 MG tablet Take 1 tablet by mouth 2 times daily (Patient taking differently: Take 10 mg by mouth 2 times daily) 60 tablet 5    spironolactone (ALDACTONE) 50 MG tablet Take 1 tablet by mouth 2 times daily 60 tablet 5    traMADol (ULTRAM) 50 MG tablet Take 50 mg by mouth every 6 hours as needed for Pain. albuterol sulfate HFA (PROVENTIL;VENTOLIN;PROAIR) 108 (90 Base) MCG/ACT inhaler Inhale 2 puffs into the lungs every 6 hours as needed for Wheezing      clobetasol (TEMOVATE) 0.05 % ointment Apply topically 2 times daily. (Patient not taking: Reported on 2/24/2023) 120 g 1    Fluticasone Propionate, Inhal, (FLOVENT IN) Inhale 2 puffs into the lungs 2 times daily      silver sulfADIAZINE (SILVADENE) 1 % cream Apply topically daily.  (Patient taking differently: as needed) 240 g 5    rOPINIRole (REQUIP) 0.5 MG tablet Take 1 tablet by mouth 3 times daily 90 tablet 3    famotidine (PEPCID) 20 MG tablet Take 1 tablet by mouth 2 times daily 60 tablet 5    levothyroxine (SYNTHROID) 50 MCG tablet Take 50 mcg by mouth Daily      Polyethylene Glycol 3350 (MIRALAX PO) Take by mouth      fluticasone (FLONASE) 50 MCG/ACT nasal spray 1 spray by Nasal route 2 times daily       loratadine (CLARITIN) 10 MG capsule Take 10 mg by mouth daily      ferrous sulfate 325 (65 FE) MG tablet Take 1 tablet by mouth 2 times daily (with meals) 30 tablet 3    latanoprost (XALATAN) 0.005 % ophthalmic solution Place 1 drop into both eyes nightly      OXYGEN Inhale 2 L/min into the lungs nightly      Multiple Vitamins-Minerals (ICAPS) CAPS Take 1 tablet by mouth 2 times daily       acetaminophen (TYLENOL) 500 MG tablet Take 500 mg by mouth every 6 hours as needed for Pain. calcium carbonate 600 MG TABS tablet Take 1 tablet by mouth daily. No current facility-administered medications on file prior to encounter. REVIEW OF SYSTEMS    Pertinent items are noted in HPI. Constitutional: Negative for systemic symptoms including fever, chills and malaise. Objective:      BP (!) 147/75   Pulse 72   Temp 97.2 °F (36.2 °C) (Temporal)   Resp 16     PHYSICAL EXAM    General: The patient is in no acute distress. Mental status:  Patient is appropriate, is  oriented to place and plan of care. Dermatologic exam: Visual inspection of the periwound reveals the skin to be edematous.   Wound exam:  see wound description below     All active wounds listed below with today's date are evaluated    Assessment:       Problem List Items Addressed This Visit          Endocrine    WD-Diabetic ulcer of toe of left foot associated with type 2 diabetes mellitus, with fat layer exposed ([address]    Relevant Orders    Initiate Outpatient Wound Care Protocol       Other    WD-Callus of foot - Primary    Relevant Orders    Initiate Outpatient Wound Care Protocol    WD-Neck pain, acute    WD-Pressure injury of right buttock, stage 2 (HCC)    Relevant Orders    Initiate Outpatient Wound Care Protocol    WD-Excoriation of buttock crease    Relevant Orders    Initiate Outpatient Wound Care Protocol     Procedure Note    Indications:  Based on my examination of this patient's wound(s) today, sharp excision into necrotic subcutaneous tissue is required to promote healing and evaluate the extent of previous healing. Performed by: TREVON Dunn CNP    Consent obtained: Yes    Time out taken:  Yes    Pain Control: Anesthetic  Anesthetic: 4% Lidocaine Liquid Topical        Debridement:Excisional Debridement    Using curette the wound(s) was/were sharply debrided down through and including the removal of subcutaneous tissue. Devitalized Tissue Debrided:  slough and exudate    Pre Debridement Measurements:  Are located in the Wound Documentation Flow Sheet    All active wounds listed below with today's date are evaluated  Wound(s)    debrided this date include # : 1 and 2     Post  Debridement Measurements:  Wound 02/24/23 #1 Right Second Toe (Active)   Wound Image   02/24/23 0936   Wound Cleansed Soap and water; Wound cleanser 02/24/23 0936   Offloading for Diabetic Foot Ulcers Offloading not required 02/24/23 0936   Wound Length (cm) 0.5 cm 02/24/23 0936   Wound Width (cm) 1 cm 02/24/23 0936   Wound Depth (cm) 0.1 cm 02/24/23 0936   Wound Surface Area (cm^2) 0.5 cm^2 02/24/23 0936   Wound Volume (cm^3) 0.05 cm^3 02/24/23 0936   Distance Tunneling (cm) 0 cm 02/24/23 0936   Tunneling Position ___ O'Clock 0 02/24/23 0936   Undermining Starts ___ O'Clock 0 02/24/23 0936   Undermining Ends___ O'Clock 0 02/24/23 0936   Undermining Maxium Distance (cm) 0 02/24/23 0936   Wound Assessment Pink/red;Slough 02/24/23 0936   Drainage Amount Small 02/24/23 0936   Drainage Description Clear 02/24/23 0936   Odor None 02/24/23 0936   Leatha-wound Assessment Maceration 02/24/23 0936   Margins Defined edges 02/24/23 0936   Wound Thickness Description not for Pressure Injury Full thickness 02/24/23 0936   Number of days: 0       Wound 02/24/23 #2 Left Second Toe (Active)   Wound Image   02/24/23 0936   Wound Cleansed Soap and water; Wound cleanser 02/24/23 0936   Offloading for Diabetic Foot Ulcers Offloading not required 02/24/23 5947   Wound Length (cm) 0.9 cm 02/24/23 0936   Wound Width (cm) 0.9 cm 02/24/23 0936   Wound Depth (cm) 0.1 cm 02/24/23 0936   Wound Surface Area (cm^2) 0.81 cm^2 02/24/23 0936   Wound Volume (cm^3) 0.081 cm^3 02/24/23 0936   Post-Procedure Length (cm) 0.9 cm 02/24/23 0956   Post-Procedure Width (cm) 0.9 cm 02/24/23 0956   Post-Procedure Depth (cm) 0.1 cm 02/24/23 0956   Post-Procedure Surface Area (cm^2) 0.81 cm^2 02/24/23 0956   Post-Procedure Volume (cm^3) 0.081 cm^3 02/24/23 0956   Distance Tunneling (cm) 0 cm 02/24/23 0936   Tunneling Position ___ O'Clock 0 02/24/23 0936   Undermining Starts ___ O'Clock 0 02/24/23 0936   Undermining Ends___ O'Clock 0 02/24/23 0936   Undermining Maxium Distance (cm) 0 02/24/23 0936   Wound Assessment Devitalized tissue 02/24/23 0936   Drainage Amount None 02/24/23 0936   Odor None 02/24/23 0936   Leatha-wound Assessment Dry/flaky;Fragile 02/24/23 0936   Margins Attached edges 02/24/23 0936   Wound Thickness Description not for Pressure Injury Full thickness 02/24/23 0936   Number of days: 0     Percent of Wound(s) Debrided: approximately 100%    Total  Area  Debrided:  1.31 sq cm     Bleeding:  Minimal    Hemostasis Achieved:  by pressure    Procedural Pain:  0  / 10     Post Procedural Pain:  0 / 10     Response to treatment:  Well tolerated by patient. Status of wound progress and description from last visit: Open wounds on toes of both feet, regimen as below. Lymphedema pumps in place to manage edema. She uses double Tubi for compression. She has hammer toes and this is causing pressure wounds to the toes. Toe sleeves in place. Will follow up in one week. Review of Systems   Musculoskeletal:  Positive for myalgias and neck pain. Negative for falls. he patient complains of right side neck pain today, she does hyper extends her neck to the left as a result of the pain. She has talked with her PCP about this and was given Flexeril which she states help minimally.  Will try Zanaflex today, discussed exercise regimen and also use of a neck pillow to assist with better alignment of the neck. She also has Ultram she can use as needed for pain. Stretch your muscles every day, especially before and after exercise and at bedtime. Regular stretching can relax your muscles and may prevent cramps. Do not suddenly increase the amount of exercise you get. Increase your exercise a little each week. When you get a cramp, stretch and massage the muscle. You can also take a warm shower or bath to relax the muscle. A heating pad placed on the muscle can also help. Plan:     Discharge Instructions         PHYSICIAN ORDERS AND DISCHARGE INSTRUCTIONS   NOTE: Upon discharge from the [address], you will receive a patient experience survey via E-mail. We would be grateful if you would take the time to fill this survey out. Wound care order history:               CYNTHIA's   Right  1.68     Left 1.65  Date 1/13/21              Vascular studies: . Cultures: .3/18/22              Antibiotics: . HbA1c:  . Compression/Lymph Pumps: . Double Tubi              Grafts: Fuentes Going: . Continuing wound care orders and information:              Residence: . Private              Continue home health care with: [facility] Your wound-care supplies will be provided by: [facility]: . Wound cleansing:                          Do not scrub or use excessive force. Wash hands with soap and water before and after dressing changes. Prior to applying a clean dressing, cleanse wound with normal saline,                          wound cleanser, or mild soap and water. Ask your physician or nurse before getting the wound(s) wet in the shower.               Daily Wound management:                          Keep weight off wounds and reposition every 2 hours. Avoid standing for long periods of time. Apply wraps/stockings in AM and remove at bedtime. Elevate legs to the level of the heart or above for 30 minutes 4-5 times a day and/or when sitting. When taking antibiotics take entire prescription as ordered by MD do not stop taking until medicine is all gone. Orders for this week (2/24/23)    Left and Right 2nd Toe-  Wash with mild soap and water, rinse with saline,  Apply puracol to wound bed  Wrap strip of ioplex around toe  Wrap with conform and secure with tape  Change Daily    Bilateral Lower Leg - Wash with mild soap and water, rinse with saline, pat dry with 4x4. A&D to intact skin of lower extremities. Apply Gentamicin, Santyl and Stimulen to wound bed (may use Puracol at home in place of Stimulen). Secure with silicone border   Wrap with conform and secure with tape  Tubi F  Change daily       Follow up with Pebbles Garza CNP in 1 weeks in the wound care center.   Call [phone] for any questions or concerns        Treatment Note      Written Patient Dismissal Instructions Given            Electronically signed by TREVON Nuno CNP on 2/24/2023 at 10:44 AM

## 2023-02-24 NOTE — DISCHARGE INSTRUCTIONS
PHYSICIAN ORDERS AND DISCHARGE INSTRUCTIONS   NOTE: Upon discharge from the [address], you will receive a patient experience survey via E-mail. We would be grateful if you would take the time to fill this survey out. Wound care order history:               CYNTHIA's   Right  1.68     Left 1.65  Date 1/13/21              Vascular studies: . Cultures: .3/18/22              Antibiotics: . HbA1c:  . Compression/Lymph Pumps: . Double Tubi              Grafts: Ana Valero: . Continuing wound care orders and information:              Residence: . Private              [facility] care with: [facility] Your wound-care supplies will be provided by: [facility]: . Wound cleansing:                          Do not scrub or use excessive force. Wash hands with soap and water before and after dressing changes. Prior to applying a clean dressing, cleanse wound with normal saline,                          wound cleanser, or mild soap and water. Ask your physician or nurse before getting the wound(s) wet in the shower. Daily Wound management:                          Keep weight off wounds and reposition every 2 hours. Avoid standing for long periods of time. Apply wraps/stockings in AM and remove at bedtime. Elevate legs to the level of the heart or above for 30 minutes 4-5 times a day and/or when sitting. When taking antibiotics take entire prescription as ordered by MD do not stop taking until medicine is all gone.                                                     Orders for this week (2/24/23)    Left and Right 2nd Toe-  Wash with mild soap and water, rinse with saline,  Apply puracol to wound bed  Wrap strip of ioplex around toe  Wrap with conform and secure with tape  Change Daily    Bilateral Lower Leg - Wash with mild soap and water, rinse with saline, pat dry with 4x4. A&D to intact skin of lower extremities. Apply Gentamicin, Santyl and Stimulen to wound bed (may use Puracol at home in place of Stimulen). Secure with silicone border   Wrap with conform and secure with tape  Tubi F  Change daily       Follow up with Rachel Churchill CNP in 1 weeks in the wound care center.   Call [phone] for any questions or concerns

## 2023-02-24 NOTE — PROGRESS NOTES
NURSE TX:    APPLIED GENT, SANTYL, STIMULEN POWDER, SILICONE BORDER, TUBI F TO BLE PER ORDER. PATIENT TOLERATED WELL. DENIES PAIN OR DISCOMFORT.

## 2023-03-03 ENCOUNTER — HOSPITAL ENCOUNTER (OUTPATIENT)
Dept: WOUND CARE | Age: 88
Discharge: HOME | End: 2023-03-03
Payer: MEDICARE

## 2023-03-03 VITALS
SYSTOLIC BLOOD PRESSURE: 146 MMHG | HEART RATE: 69 BPM | RESPIRATION RATE: 16 BRPM | DIASTOLIC BLOOD PRESSURE: 73 MMHG | TEMPERATURE: 96.2 F

## 2023-03-03 DIAGNOSIS — S30.810A EXCORIATION OF BUTTOCK, INITIAL ENCOUNTER: ICD-10-CM

## 2023-03-03 DIAGNOSIS — L89.312 PRESSURE INJURY OF RIGHT BUTTOCK, STAGE 2 (HCC): ICD-10-CM

## 2023-03-03 DIAGNOSIS — M54.2 NECK PAIN: ICD-10-CM

## 2023-03-03 DIAGNOSIS — M54.2 NECK PAIN, ACUTE: ICD-10-CM

## 2023-03-03 DIAGNOSIS — L97.522 DIABETIC ULCER OF TOE OF LEFT FOOT ASSOCIATED WITH TYPE 2 DIABETES MELLITUS, WITH FAT LAYER EXPOSED (HCC): ICD-10-CM

## 2023-03-03 DIAGNOSIS — E11.621 DIABETIC ULCER OF TOE OF LEFT FOOT ASSOCIATED WITH TYPE 2 DIABETES MELLITUS, WITH FAT LAYER EXPOSED (HCC): ICD-10-CM

## 2023-03-03 DIAGNOSIS — L84 CALLUS OF FOOT: Primary | ICD-10-CM

## 2023-03-03 PROCEDURE — 99213 OFFICE O/P EST LOW 20 MIN: CPT | Performed by: NURSE PRACTITIONER

## 2023-03-03 PROCEDURE — 99213 OFFICE O/P EST LOW 20 MIN: CPT

## 2023-03-03 RX ORDER — LIDOCAINE HYDROCHLORIDE 40 MG/ML
SOLUTION TOPICAL ONCE
OUTPATIENT
Start: 2023-03-03 | End: 2023-03-03

## 2023-03-03 RX ORDER — BACITRACIN, NEOMYCIN, POLYMYXIN B 400; 3.5; 5 [USP'U]/G; MG/G; [USP'U]/G
OINTMENT TOPICAL ONCE
OUTPATIENT
Start: 2023-03-03 | End: 2023-03-03

## 2023-03-03 RX ORDER — GENTAMICIN SULFATE 1 MG/G
OINTMENT TOPICAL ONCE
OUTPATIENT
Start: 2023-03-03 | End: 2023-03-03

## 2023-03-03 RX ORDER — BETAMETHASONE DIPROPIONATE 0.05 %
OINTMENT (GRAM) TOPICAL ONCE
OUTPATIENT
Start: 2023-03-03 | End: 2023-03-03

## 2023-03-03 RX ORDER — BACITRACIN ZINC AND POLYMYXIN B SULFATE 500; 1000 [USP'U]/G; [USP'U]/G
OINTMENT TOPICAL ONCE
OUTPATIENT
Start: 2023-03-03 | End: 2023-03-03

## 2023-03-03 RX ORDER — CLOBETASOL PROPIONATE 0.5 MG/G
OINTMENT TOPICAL ONCE
OUTPATIENT
Start: 2023-03-03 | End: 2023-03-03

## 2023-03-03 RX ORDER — LIDOCAINE 50 MG/G
OINTMENT TOPICAL ONCE
OUTPATIENT
Start: 2023-03-03 | End: 2023-03-03

## 2023-03-03 RX ORDER — LIDOCAINE 40 MG/G
CREAM TOPICAL ONCE
OUTPATIENT
Start: 2023-03-03 | End: 2023-03-03

## 2023-03-03 RX ORDER — GINSENG 100 MG
CAPSULE ORAL ONCE
OUTPATIENT
Start: 2023-03-03 | End: 2023-03-03

## 2023-03-03 ASSESSMENT — PAIN DESCRIPTION - LOCATION: LOCATION: OTHER (COMMENT)

## 2023-03-03 ASSESSMENT — PAIN DESCRIPTION - PAIN TYPE: TYPE: CHRONIC PAIN

## 2023-03-03 ASSESSMENT — PAIN DESCRIPTION - DESCRIPTORS: DESCRIPTORS: SHARP

## 2023-03-03 ASSESSMENT — PAIN - FUNCTIONAL ASSESSMENT: PAIN_FUNCTIONAL_ASSESSMENT: PREVENTS OR INTERFERES SOME ACTIVE ACTIVITIES AND ADLS

## 2023-03-03 ASSESSMENT — PAIN DESCRIPTION - FREQUENCY: FREQUENCY: CONTINUOUS

## 2023-03-03 ASSESSMENT — PAIN DESCRIPTION - ONSET: ONSET: ON-GOING

## 2023-03-03 ASSESSMENT — PAIN SCALES - GENERAL: PAINLEVEL_OUTOF10: 9

## 2023-03-03 ASSESSMENT — PAIN DESCRIPTION - ORIENTATION: ORIENTATION: RIGHT;LEFT

## 2023-03-03 NOTE — DISCHARGE INSTRUCTIONS
PHYSICIAN ORDERS AND DISCHARGE INSTRUCTIONS   NOTE: Upon discharge from the [address], you will receive a patient experience survey via E-mail. We would be grateful if you would take the time to fill this survey out. Wound care order history:               CYNTHIA's   Right  1.68     Left 1.65  Date 1/13/21              Vascular studies: . Cultures: .3/18/22              Antibiotics: . HbA1c:  . Compression/Lymph Pumps: . Double Tubi              Grafts: Tavon Ware: . Continuing wound care orders and information:              Residence: . Private              Continue home health care with: [facility] Your wound-care supplies will be provided by: [facility]: . Wound cleansing:                          Do not scrub or use excessive force. Wash hands with soap and water before and after dressing changes. Prior to applying a clean dressing, cleanse wound with normal saline,                          wound cleanser, or mild soap and water. Ask your physician or nurse before getting the wound(s) wet in the shower. Daily Wound management:                          Keep weight off wounds and reposition every 2 hours. Avoid standing for long periods of time. Apply wraps/stockings in AM and remove at bedtime. Elevate legs to the level of the heart or above for 30 minutes 4-5 times a day and/or when sitting. When taking antibiotics take entire prescription as ordered by MD do not stop taking until medicine is all gone.                                                     Orders for this week (3/3/23)  Left and Right 2nd Toe-  Wash with mild soap and water, rinse with saline,  Apply puracol to wound bed  Wrap strip of ioplex around toe  Wrap with conform and secure with tape  Change Daily     Bilateral Lower Leg - Wash with mild soap and water, rinse with saline, pat dry with 4x4. A&D to intact skin of lower extremities. Apply Gentamicin, Santyl and Stimulen to wound bed (may use Puracol at home in place of Stimulen). Secure with silicone border   Wrap with conform and secure with tape  Tubi F  Change daily       Follow up with Estrella Lawrence CNP in 1 weeks in the wound care center.   Call [phone] for any questions or concerns

## 2023-03-03 NOTE — PROGRESS NOTES
Wound Care Center Progress Note       Soumya Rodriguez  AGE: 82 y.o. GENDER: female  : 1935  TODAY'S DATE:  3/3/2023        Subjective:     Chief Complaint   Patient presents with    Wound Check      HISTORY of PRESENT ILLNESS    Soumya Rodriguez is a 82 y.o. female who presents to the [address] [place] [address] for evaluation and treatment of chronic diabetic left second toe wound that is of mild to moderate severity. Recurrent venous and lymphedema wounds bilateral lower legs. The patient has significant underlying medical conditions as below. 22: New wounds to toes to the right foot, pressure and diabetic in nature of mild to moderate severity. Recurrent venous and lymphedema wounds bilateral lower leg of mild/moderate severity. Regimen as below. Wound Pain Timing/Severity: None  Quality of pain: N/A  Severity of pain:  0 / 10   Modifying Factors: venous stasis, lymphedema, diabetes, poor glucose control, chronic pressure, decreased mobility, shear force and obesity  Associated Signs/Symptoms: edema, erythema     Arterial evaluation: VL arteries 21 per Dr. Vance Riley, no significant stenosis     Venous Evaluation: as needed if indicated based on the wound, location, assessment and healing. Wound infection: as indicated for S&S infection     Diabetes: Yes, no medication regimen at this time, diet control. Last AIC 7.8 as of 20  Smoking: Never smoker  Anticoagulant therapy: No  Immunosuppression: No  Obesity: Yes  Other History: Cor pulmonale on diuretic therapy, PAD on Pletal     Nutritional status: well nourished. Discussed need for increased protein and calories for wound healing and good sources of protein (just over 7 grams for every 20 pounds of body weight). Animal-based foods high in protein (meat, poultry, fish, eggs, and dairy foods). Plant based foods high in protein (tofu, lentils, beans, chickpeas, nuts, quinoa and everette seeds.      Patient educated on the 6 essential components necessary for wound healing: Circulation, Debridements, Proper Dressings and Topical Wound Products, Infection Control, Edema Control and Offloading. Patient educated on those factors that negatively effect or impact wound healing: smoking, obesity, uncontrolled diabetes, anticoagulant and immunosuppressive regimens, inadequate nutrition, untreated arterial and venous disease if applicable and measures to manage edema.          PAST MEDICAL HISTORY        Diagnosis Date    Asthma     Chronic kidney disease     acute kidney failure    Chronic ulcer of left leg with fat layer exposed ([address] 3/7/2016    Chronic ulcer of right leg with fat layer exposed ([address] 3/7/2016    Diabetes type 2, controlled ([address]     History of asthma     History of blood transfusion 07/2015    Hypertension     Lymphedema of both lower extremities 3/7/2016    Osteoarthritis     Pneumonia     Thyroid disease     Venous stasis of both lower extremities 3/7/2016    WD-Non-pressure chronic ulcer right lower leg, 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 ([address] 4/21/2016       PAST SURGICAL HISTORY    Past Surgical History:   Procedure Laterality Date    APPENDECTOMY      CHOLECYSTECTOMY      CYSTOSCOPY      EYE SURGERY      bilateral implants    HYSTERECTOMY (CERVIX STATUS UNKNOWN)      JOINT REPLACEMENT Right     knee    PACEMAKER INSERTION N/A 11/17/2020    PACEMAKER INSERTION PERMANENT REMOVAL OF TEMPORARY PACEMAKER performed by Reena Avitia MD at [address] [place] [address]    Family History   Problem Relation Age of Onset    Heart Disease Father        SOCIAL HISTORY    Social History     Tobacco Use    Smoking status: Never    Smokeless tobacco: Never   Vaping Use    Vaping Use: Never used   Substance Use Topics    Alcohol use: No    Drug use: No       ALLERGIES    Allergies   Allergen Reactions    Latex Rash    Dye [Iodides] Anaphylaxis    Iodine Anaphylaxis    Dyazide [Hydrochlorothiazide W-Triamterene] Rash    Lasix [Furosemide] Rash Procardia [Nifedipine] Other (See Comments)     Not for sure if rash occurred or rash    Doxycycline Dermatitis    Edecrin [Ethacrynic Acid]     Levaquin [Levofloxacin] Other (See Comments)     unknown    Mobic [Meloxicam]     Vioxx [Rofecoxib]     Celebrex [Celecoxib] Rash    Lexapro [Escitalopram Oxalate] Rash    Sulfa Antibiotics Hives       MEDICATIONS    Current Outpatient Medications on File Prior to Encounter   Medication Sig Dispense Refill    tiZANidine (ZANAFLEX) 4 MG tablet Take 1 tablet by mouth 3 times daily as needed (muscle spasm) 30 tablet 0    febuxostat (ULORIC) 40 MG TABS tablet Take 1 tablet by mouth daily 30 tablet 5    bumetanide (BUMEX) 1 MG tablet Take 2 tablets by mouth 3 times daily 540 tablet 3    potassium chloride (KLOR-CON M) 20 MEQ extended release tablet Take 3 tablets by mouth daily 90 tablet 3    metOLazone (ZAROXOLYN) 5 MG tablet Take 1 tablet by mouth 2 times daily (Patient taking differently: Take 10 mg by mouth 2 times daily) 60 tablet 5    spironolactone (ALDACTONE) 50 MG tablet Take 1 tablet by mouth 2 times daily 60 tablet 5    traMADol (ULTRAM) 50 MG tablet Take 50 mg by mouth every 6 hours as needed for Pain. albuterol sulfate HFA (PROVENTIL;VENTOLIN;PROAIR) 108 (90 Base) MCG/ACT inhaler Inhale 2 puffs into the lungs every 6 hours as needed for Wheezing      clobetasol (TEMOVATE) 0.05 % ointment Apply topically 2 times daily. (Patient not taking: No sig reported) 120 g 1    Fluticasone Propionate, Inhal, (FLOVENT IN) Inhale 2 puffs into the lungs 2 times daily      silver sulfADIAZINE (SILVADENE) 1 % cream Apply topically daily.  (Patient taking differently: as needed) 240 g 5    rOPINIRole (REQUIP) 0.5 MG tablet Take 1 tablet by mouth 3 times daily 90 tablet 3    famotidine (PEPCID) 20 MG tablet Take 1 tablet by mouth 2 times daily 60 tablet 5    levothyroxine (SYNTHROID) 50 MCG tablet Take 50 mcg by mouth Daily      Polyethylene Glycol 3350 (MIRALAX PO) Take by mouth      fluticasone (FLONASE) 50 MCG/ACT nasal spray 1 spray by Nasal route 2 times daily       loratadine (CLARITIN) 10 MG capsule Take 10 mg by mouth daily      ferrous sulfate 325 (65 FE) MG tablet Take 1 tablet by mouth 2 times daily (with meals) 30 tablet 3    latanoprost (XALATAN) 0.005 % ophthalmic solution Place 1 drop into both eyes nightly      OXYGEN Inhale 2 L/min into the lungs nightly      Multiple Vitamins-Minerals (ICAPS) CAPS Take 1 tablet by mouth 2 times daily       acetaminophen (TYLENOL) 500 MG tablet Take 500 mg by mouth every 6 hours as needed for Pain. calcium carbonate 600 MG TABS tablet Take 1 tablet by mouth daily. No current facility-administered medications on file prior to encounter. REVIEW OF SYSTEMS    Pertinent items are noted in HPI. Constitutional: Negative for systemic symptoms including fever, chills and malaise. Objective:      BP (!) 146/73   Pulse 69   Temp (!) 96.2 °F (35.7 °C) (Temporal)   Resp 16     PHYSICAL EXAM    General: The patient is in no acute distress. Mental status:  Patient is appropriate, is  oriented to place and plan of care. Dermatologic exam: Visual inspection of the periwound reveals the skin to be edematous.   Wound exam:  see wound description below     All active wounds listed below with today's date are evaluated    Assessment:       Problem List Items Addressed This Visit          Endocrine    WD-Diabetic ulcer of toe of left foot associated with type 2 diabetes mellitus, with fat layer exposed ([address]    Relevant Orders    Initiate Outpatient Wound Care Protocol       Other    WD-Callus of foot - Primary    Relevant Orders    Initiate Outpatient Wound Care Protocol    Neck pain, acute    WD-Pressure injury of right buttock, stage 2 ([address]    Relevant Orders    Initiate Outpatient Wound Care Protocol    WD-Excoriation of buttock crease    Relevant Orders    Initiate Outpatient Wound Care Protocol     Wound 02/24/23 #1 Right Second Toe (Active)   Wound Image   02/24/23 0936   Dressing Status Clean;Dry;New dressing applied 02/24/23 1058   Wound Cleansed Wound cleanser 03/03/23 0940   Offloading for Diabetic Foot Ulcers Walker 03/03/23 0940   Wound Length (cm) 0.3 cm 03/03/23 0940   Wound Width (cm) 0.3 cm 03/03/23 0940   Wound Depth (cm) 0.1 cm 03/03/23 0940   Wound Surface Area (cm^2) 0.09 cm^2 03/03/23 0940   Change in Wound Size % (l*w) 82 03/03/23 0940   Wound Volume (cm^3) 0.009 cm^3 03/03/23 0940   Wound Healing % 82 03/03/23 0940   Distance Tunneling (cm) 0 cm 03/03/23 0940   Tunneling Position ___ O'Clock 0 03/03/23 0940   Undermining Starts ___ O'Clock 0 03/03/23 0940   Undermining Ends___ O'Clock 0 03/03/23 0940   Undermining Maxium Distance (cm) 0 03/03/23 0940   Wound Assessment Dry 03/03/23 0940   Drainage Amount None 03/03/23 0940   Drainage Description Clear 02/24/23 0936   Odor None 03/03/23 0940   Leatha-wound Assessment Fragile 03/03/23 0940   Margins Defined edges; Attached edges 03/03/23 0940   Wound Thickness Description not for Pressure Injury Full thickness 03/03/23 0940   Number of days: 7       Wound 02/24/23 #2 Left Second Toe (Active)   Wound Image   02/24/23 0936   Dressing Status Clean;Dry;New dressing applied 02/24/23 1058   Wound Cleansed Wound cleanser 03/03/23 0940   Offloading for Diabetic Foot Ulcers Walker 03/03/23 0940   Wound Length (cm) 0.2 cm 03/03/23 0940   Wound Width (cm) 0.2 cm 03/03/23 0940   Wound Depth (cm) 0.1 cm 03/03/23 0940   Wound Surface Area (cm^2) 0.04 cm^2 03/03/23 0940   Change in Wound Size % (l*w) 95.06 03/03/23 0940   Wound Volume (cm^3) 0.004 cm^3 03/03/23 0940   Wound Healing % 95 03/03/23 0940   Post-Procedure Length (cm) 0.9 cm 02/24/23 0956   Post-Procedure Width (cm) 0.9 cm 02/24/23 0956   Post-Procedure Depth (cm) 0.1 cm 02/24/23 0956   Post-Procedure Surface Area (cm^2) 0.81 cm^2 02/24/23 0956   Post-Procedure Volume (cm^3) 0.081 cm^3 02/24/23 0956   Distance Tunneling (cm) 0 cm 03/03/23 0940   Tunneling Position ___ O'Clock 0 03/03/23 0940   Undermining Starts ___ O'Clock 0 03/03/23 0940   Undermining Ends___ O'Clock 0 03/03/23 0940   Undermining Maxium Distance (cm) 0 03/03/23 0940   Wound Assessment Dry;Pink/red 03/03/23 0940   Drainage Amount None 03/03/23 0940   Odor None 03/03/23 0940   Leatha-wound Assessment Dry/flaky 03/03/23 0940   Margins Defined edges; Attached edges 03/03/23 0940   Wound Thickness Description not for Pressure Injury Full thickness 03/03/23 0940   Number of days: 7     Status of wound progress and description from last visit: Open wounds on toes of both feet, regimen as below. Lymphedema pumps in place to manage edema. She uses double Tubi for compression. She has hammer toes and this is causing pressure wounds to the toes. Toe sleeves in place. Will follow up in one week. Review of Systems   Musculoskeletal:  Positive for myalgias and neck pain. Negative for falls. he patient complains of right side neck pain today, she does hyper extend her neck to the left as a result of the pain. She has talked with her PCP about this and was given Flexeril which she states help minimally. Started Zanaflex last week with some improvement, discussed exercise regimen, will add home physical therapy. She did get a neck pillow for support and to assist with better alignment of the neck. She also has Ultram she can use as needed for pain. Stretch your muscles every day, especially before and after exercise and at bedtime. Regular stretching can relax your muscles and may prevent cramps. Do not suddenly increase the amount of exercise you get. Increase your exercise a little each week. When you get a cramp, stretch and massage the muscle. You can also take a warm shower or bath to relax the muscle. A heating pad placed on the muscle can also help.     Plan:     Discharge Instructions         PHYSICIAN ORDERS AND DISCHARGE INSTRUCTIONS   NOTE: Upon discharge from the [address] [facility],[address], you will receive a patient experience survey via E-mail. We would be grateful if you would take the time to fill this survey out. Wound care order history:               CYNTHIA's   Right  1.68     Left 1.65  Date 1/13/21              Vascular studies: . Cultures: .3/18/22              Antibiotics: . HbA1c:  . Compression/Lymph Pumps: . Double Tubi              Grafts: [place]: . Continuing wound care orders and information:              Residence: . Private              Continue home health care with: [facility] Your wound-care supplies will be provided by: [facility]: . Wound cleansing:                          Do not scrub or use excessive force. Wash hands with soap and water before and after dressing changes. Prior to applying a clean dressing, cleanse wound with normal saline,                          wound cleanser, or mild soap and water. Ask your physician or nurse before getting the wound(s) wet in the shower. Daily Wound management:                          Keep weight off wounds and reposition every 2 hours. Avoid standing for long periods of time. Apply wraps/stockings in AM and remove at bedtime. Elevate legs to the level of the heart or above for 30 minutes 4-5 times a day and/or when sitting. When taking antibiotics take entire prescription as ordered by MD do not stop taking until medicine is all gone.                                                     Orders for this week (3/3/23)  Left and Right 2nd Toe-  Wash with mild soap and water, rinse with saline,  Apply puracol to wound bed  Wrap strip of ioplex around toe  Wrap with conform and secure with tape  Change Daily Bilateral Lower Leg - Wash with mild soap and water, rinse with saline, pat dry with 4x4. A&D to intact skin of lower extremities. Apply Gentamicin, Santyl and Stimulen to wound bed (may use Puracol at home in place of Stimulen). Secure with silicone border   Wrap with conform and secure with tape  Tubi F  Change daily       Follow up with Jill Hernández CNP in 1 weeks in the wound care center.   Call [phone] for any questions or concerns      Treatment Note      Written Patient Dismissal Instructions Given            Electronically signed by TREVON Kiran CNP on 3/3/2023 at 10:26 AM

## 2023-03-17 ENCOUNTER — HOSPITAL ENCOUNTER (OUTPATIENT)
Dept: WOUND CARE | Age: 88
Discharge: HOME | End: 2023-03-17
Payer: MEDICARE

## 2023-03-17 VITALS
RESPIRATION RATE: 16 BRPM | TEMPERATURE: 97.4 F | SYSTOLIC BLOOD PRESSURE: 140 MMHG | HEART RATE: 75 BPM | DIASTOLIC BLOOD PRESSURE: 59 MMHG

## 2023-03-17 DIAGNOSIS — L89.312 PRESSURE INJURY OF RIGHT BUTTOCK, STAGE 2 (HCC): ICD-10-CM

## 2023-03-17 DIAGNOSIS — L97.522 DIABETIC ULCER OF TOE OF LEFT FOOT ASSOCIATED WITH TYPE 2 DIABETES MELLITUS, WITH FAT LAYER EXPOSED (HCC): ICD-10-CM

## 2023-03-17 DIAGNOSIS — E11.621 DIABETIC ULCER OF TOE OF LEFT FOOT ASSOCIATED WITH TYPE 2 DIABETES MELLITUS, WITH FAT LAYER EXPOSED (HCC): ICD-10-CM

## 2023-03-17 DIAGNOSIS — L84 CALLUS OF FOOT: Primary | ICD-10-CM

## 2023-03-17 DIAGNOSIS — S30.810A EXCORIATION OF BUTTOCK, INITIAL ENCOUNTER: ICD-10-CM

## 2023-03-17 PROCEDURE — 6370000000 HC RX 637 (ALT 250 FOR IP): Performed by: NURSE PRACTITIONER

## 2023-03-17 PROCEDURE — 11042 DBRDMT SUBQ TIS 1ST 20SQCM/<: CPT

## 2023-03-17 RX ORDER — GENTAMICIN SULFATE 1 MG/G
OINTMENT TOPICAL ONCE
Status: COMPLETED | OUTPATIENT
Start: 2023-03-17 | End: 2023-03-17

## 2023-03-17 RX ORDER — BACITRACIN ZINC AND POLYMYXIN B SULFATE 500; 1000 [USP'U]/G; [USP'U]/G
OINTMENT TOPICAL ONCE
OUTPATIENT
Start: 2023-03-17 | End: 2023-03-17

## 2023-03-17 RX ORDER — BACITRACIN, NEOMYCIN, POLYMYXIN B 400; 3.5; 5 [USP'U]/G; MG/G; [USP'U]/G
OINTMENT TOPICAL ONCE
OUTPATIENT
Start: 2023-03-17 | End: 2023-03-17

## 2023-03-17 RX ORDER — LIDOCAINE 50 MG/G
OINTMENT TOPICAL ONCE
OUTPATIENT
Start: 2023-03-17 | End: 2023-03-17

## 2023-03-17 RX ORDER — GINSENG 100 MG
CAPSULE ORAL ONCE
OUTPATIENT
Start: 2023-03-17 | End: 2023-03-17

## 2023-03-17 RX ORDER — LIDOCAINE HYDROCHLORIDE 40 MG/ML
SOLUTION TOPICAL ONCE
OUTPATIENT
Start: 2023-03-17 | End: 2023-03-17

## 2023-03-17 RX ORDER — GENTAMICIN SULFATE 1 MG/G
OINTMENT TOPICAL ONCE
OUTPATIENT
Start: 2023-03-17 | End: 2023-03-17

## 2023-03-17 RX ORDER — BETAMETHASONE DIPROPIONATE 0.05 %
OINTMENT (GRAM) TOPICAL ONCE
OUTPATIENT
Start: 2023-03-17 | End: 2023-03-17

## 2023-03-17 RX ORDER — CLOBETASOL PROPIONATE 0.5 MG/G
OINTMENT TOPICAL ONCE
OUTPATIENT
Start: 2023-03-17 | End: 2023-03-17

## 2023-03-17 RX ORDER — LIDOCAINE 40 MG/G
CREAM TOPICAL ONCE
OUTPATIENT
Start: 2023-03-17 | End: 2023-03-17

## 2023-03-17 RX ADMIN — COLLAGENASE SANTYL: 250 OINTMENT TOPICAL at 12:12

## 2023-03-17 RX ADMIN — GENTAMICIN SULFATE: 1 OINTMENT TOPICAL at 12:12

## 2023-03-17 NOTE — PROGRESS NOTES
Nonselective enzymatic debridement performed with Santyl per physician order to wound(s) of the left lower leg  Patient tolerated the procedure well.      Electronically signed by Ofelia Hernandez RN on 3/17/2023 at 12:13 PM

## 2023-03-17 NOTE — DISCHARGE INSTRUCTIONS
PHYSICIAN ORDERS AND DISCHARGE INSTRUCTIONS   NOTE: Upon discharge from the [address], you will receive a patient experience survey via E-mail. We would be grateful if you would take the time to fill this survey out. Wound care order history:               CYNTHIA's   Right  1.68     Left 1.65  Date 1/13/21              Vascular studies: . Cultures: .3/18/22              Antibiotics: . HbA1c:  . Compression/Lymph Pumps: . Double Tubi              Grafts: Suraj Gonzalez: . Continuing wound care orders and information:              Residence: . Private              Continue home health care with: [facility] Your wound-care supplies will be provided by: [facility]: . Wound cleansing:                          Do not scrub or use excessive force. Wash hands with soap and water before and after dressing changes. Prior to applying a clean dressing, cleanse wound with normal saline,                          wound cleanser, or mild soap and water. Ask your physician or nurse before getting the wound(s) wet in the shower. Daily Wound management:                          Keep weight off wounds and reposition every 2 hours. Avoid standing for long periods of time. Apply wraps/stockings in AM and remove at bedtime. Elevate legs to the level of the heart or above for 30 minutes 4-5 times a day and/or when sitting. When taking antibiotics take entire prescription as ordered by MD do not stop taking until medicine is all gone.                                                     Orders for this week (3/17/23)  Left and Right 2nd Toe-  Wash with mild soap and water, rinse with saline,  Apply puracol to wound bed  Wrap strip of ioplex around toe  Wrap with conform and secure with tape  Change Daily     Bilateral Lower Leg - Wash with mild soap and water, rinse with saline, pat dry with 4x4. A&D to intact skin of lower extremities. Apply Gentamicin, Santyl and Stimulen to wound bed (may use Puracol at home in place of Stimulen). Secure with silicone border   Tubi F  Change daily       Follow up with Bethany Cummings CNP in 1 weeks in the wound care center.   Call [phone] for any questions or concerns

## 2023-03-17 NOTE — PROGRESS NOTES
Wound Care Center Progress Note       Tonny Duran  AGE: 82 y.o. GENDER: female  : 1935  TODAY'S DATE:  3/17/2023        Subjective:     Chief Complaint   Patient presents with    Wound Check      HISTORY of PRESENT ILLNESS    Tonny Duran is a 82 y.o. female who presents to the [address] [place] [address] for evaluation and treatment of chronic diabetic left second toe wound that is of mild to moderate severity. Recurrent venous and lymphedema wounds bilateral lower legs. The patient has significant underlying medical conditions as below. 22: New wounds to toes to the right foot, pressure and diabetic in nature of mild to moderate severity. Recurrent venous and lymphedema wounds bilateral lower leg of mild/moderate severity. Regimen as below. Wound Pain Timing/Severity: None  Quality of pain: N/A  Severity of pain:  0 / 10   Modifying Factors: venous stasis, lymphedema, diabetes, poor glucose control, chronic pressure, decreased mobility, shear force and obesity  Associated Signs/Symptoms: edema, erythema     Arterial evaluation: VL arteries 21 per Dr. Woodford Boast, no significant stenosis     Venous Evaluation: as needed if indicated based on the wound, location, assessment and healing. Wound infection: as indicated for S&S infection     Diabetes: Yes, no medication regimen at this time, diet control. Last AIC 7.8 as of 20  Smoking: Never smoker  Anticoagulant therapy: No  Immunosuppression: No  Obesity: Yes  Other History: Cor pulmonale on diuretic therapy, PAD on Pletal     Nutritional status: well nourished. Discussed need for increased protein and calories for wound healing and good sources of protein (just over 7 grams for every 20 pounds of body weight). Animal-based foods high in protein (meat, poultry, fish, eggs, and dairy foods). Plant based foods high in protein (tofu, lentils, beans, chickpeas, nuts, quinoa and everette seeds.      Patient educated on the 6 essential components necessary for wound healing: Circulation, Debridements, Proper Dressings and Topical Wound Products, Infection Control, Edema Control and Offloading. Patient educated on those factors that negatively effect or impact wound healing: smoking, obesity, uncontrolled diabetes, anticoagulant and immunosuppressive regimens, inadequate nutrition, untreated arterial and venous disease if applicable and measures to manage edema.          PAST MEDICAL HISTORY        Diagnosis Date    Asthma     Chronic kidney disease     acute kidney failure    Chronic ulcer of left leg with fat layer exposed ([address] 3/7/2016    Chronic ulcer of right leg with fat layer exposed ([address] 3/7/2016    Diabetes type 2, controlled ([address]     History of asthma     History of blood transfusion 07/2015    Hypertension     Lymphedema of both lower extremities 3/7/2016    Osteoarthritis     Pneumonia     Thyroid disease     Venous stasis of both lower extremities 3/7/2016    WD-Non-pressure chronic ulcer right lower leg, 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 ([address] 4/21/2016       PAST SURGICAL HISTORY    Past Surgical History:   Procedure Laterality Date    APPENDECTOMY      CHOLECYSTECTOMY      CYSTOSCOPY      EYE SURGERY      bilateral implants    HYSTERECTOMY (CERVIX STATUS UNKNOWN)      JOINT REPLACEMENT Right     knee    PACEMAKER INSERTION N/A 11/17/2020    PACEMAKER INSERTION PERMANENT REMOVAL OF TEMPORARY PACEMAKER performed by Mariam Lucero MD at [address] [place] [address]    Family History   Problem Relation Age of Onset    Heart Disease Father        SOCIAL HISTORY    Social History     Tobacco Use    Smoking status: Never    Smokeless tobacco: Never   Vaping Use    Vaping Use: Never used   Substance Use Topics    Alcohol use: No    Drug use: No       ALLERGIES    Allergies   Allergen Reactions    Latex Rash    Dye [Iodides] Anaphylaxis    Iodine Anaphylaxis    Dyazide [Hydrochlorothiazide W-Triamterene] Rash    Lasix [Furosemide] Rash    Procardia [Nifedipine] Other (See Comments)     Not for sure if rash occurred or rash    Doxycycline Dermatitis    Edecrin [Ethacrynic Acid]     Levaquin [Levofloxacin] Other (See Comments)     unknown    Mobic [Meloxicam]     Vioxx [Rofecoxib]     Celebrex [Celecoxib] Rash    Lexapro [Escitalopram Oxalate] Rash    Sulfa Antibiotics Hives       MEDICATIONS    Current Outpatient Medications on File Prior to Encounter   Medication Sig Dispense Refill    tiZANidine (ZANAFLEX) 4 MG tablet Take 1 tablet by mouth 3 times daily as needed (muscle spasm) 30 tablet 0    febuxostat (ULORIC) 40 MG TABS tablet Take 1 tablet by mouth daily 30 tablet 5    bumetanide (BUMEX) 1 MG tablet Take 2 tablets by mouth 3 times daily 540 tablet 3    potassium chloride (KLOR-CON M) 20 MEQ extended release tablet Take 3 tablets by mouth daily 90 tablet 3    metOLazone (ZAROXOLYN) 5 MG tablet Take 1 tablet by mouth 2 times daily (Patient taking differently: Take 10 mg by mouth 2 times daily) 60 tablet 5    spironolactone (ALDACTONE) 50 MG tablet Take 1 tablet by mouth 2 times daily 60 tablet 5    traMADol (ULTRAM) 50 MG tablet Take 50 mg by mouth every 6 hours as needed for Pain. albuterol sulfate HFA (PROVENTIL;VENTOLIN;PROAIR) 108 (90 Base) MCG/ACT inhaler Inhale 2 puffs into the lungs every 6 hours as needed for Wheezing      clobetasol (TEMOVATE) 0.05 % ointment Apply topically 2 times daily. 120 g 1    Fluticasone Propionate, Inhal, (FLOVENT IN) Inhale 2 puffs into the lungs 2 times daily      silver sulfADIAZINE (SILVADENE) 1 % cream Apply topically daily.  (Patient taking differently: as needed) 240 g 5    rOPINIRole (REQUIP) 0.5 MG tablet Take 1 tablet by mouth 3 times daily 90 tablet 3    famotidine (PEPCID) 20 MG tablet Take 1 tablet by mouth 2 times daily 60 tablet 5    levothyroxine (SYNTHROID) 50 MCG tablet Take 50 mcg by mouth Daily      Polyethylene Glycol 3350 (MIRALAX PO) Take by mouth      fluticasone (FLONASE) 50 MCG/ACT nasal spray 1 spray by Nasal route 2 times daily       loratadine (CLARITIN) 10 MG capsule Take 10 mg by mouth daily      ferrous sulfate 325 (65 FE) MG tablet Take 1 tablet by mouth 2 times daily (with meals) 30 tablet 3    latanoprost (XALATAN) 0.005 % ophthalmic solution Place 1 drop into both eyes nightly      OXYGEN Inhale 2 L/min into the lungs nightly      Multiple Vitamins-Minerals (ICAPS) CAPS Take 1 tablet by mouth 2 times daily       acetaminophen (TYLENOL) 500 MG tablet Take 500 mg by mouth every 6 hours as needed for Pain. calcium carbonate 600 MG TABS tablet Take 1 tablet by mouth daily. No current facility-administered medications on file prior to encounter. REVIEW OF SYSTEMS    Pertinent items are noted in HPI. Constitutional: Negative for systemic symptoms including fever, chills and malaise. Objective:      BP (!) 140/59   Pulse 75   Temp 97.4 °F (36.3 °C) (Temporal)   Resp 16     PHYSICAL EXAM    General: The patient is in no acute distress. Mental status:  Patient is appropriate, is  oriented to place and plan of care. Dermatologic exam: Visual inspection of the periwound reveals the skin to be edematous.   Wound exam:  see wound description below     All active wounds listed below with today's date are evaluated    Assessment:       Problem List Items Addressed This Visit          Endocrine    WD-Diabetic ulcer of toe of left foot associated with type 2 diabetes mellitus, with fat layer exposed ([address]    Relevant Orders    Initiate Outpatient Wound Care Protocol       Other    WD-Callus of foot - Primary    Relevant Orders    Initiate Outpatient Wound Care Protocol    WD-Pressure injury of right buttock, stage 2 ([address]    Relevant Orders    Initiate Outpatient Wound Care Protocol    WD-Excoriation of buttock crease    Relevant Orders    Initiate Outpatient Wound Care Protocol     Procedure Note    Indications:  Based on my examination of this patient's wound(s) today, sharp excision into necrotic subcutaneous tissue is required to promote healing and evaluate the extent of previous healing. Performed by: TREVON Trivedi CNP    Consent obtained: Yes    Time out taken:  Yes    Pain Control: Anesthetic  Anesthetic: 4% Lidocaine Liquid Topical        Debridement:Excisional Debridement    Using curette the wound(s) was/were sharply debrided down through and including the removal of subcutaneous tissue. Devitalized Tissue Debrided:  slough, exudate, and callus    Pre Debridement Measurements:  Are located in the Wound Documentation Flow Sheet    All active wounds listed below with today's date are evaluated  Wound(s)    debrided this date include # : 1 and 2     Post  Debridement Measurements:  Wound 02/24/23 #1 Right Second Toe (Active)   Wound Image   03/17/23 0919   Dressing Status New dressing applied 03/03/23 1014   Wound Cleansed Soap and water; Wound cleanser 03/17/23 0919   Dressing/Treatment Other (comment) 03/03/23 1014   Offloading for Diabetic Foot Ulcers Walker 03/17/23 0919   Wound Length (cm) 0.7 cm 03/17/23 0919   Wound Width (cm) 0.1 cm 03/17/23 0919   Wound Depth (cm) 0.1 cm 03/17/23 0919   Wound Surface Area (cm^2) 0.07 cm^2 03/17/23 0919   Change in Wound Size % (l*w) 86 03/17/23 0919   Wound Volume (cm^3) 0.007 cm^3 03/17/23 0919   Wound Healing % 86 03/17/23 0919   Post-Procedure Length (cm) 0.7 cm 03/17/23 0942   Post-Procedure Width (cm) 0.1 cm 03/17/23 0942   Post-Procedure Depth (cm) 0.1 cm 03/17/23 0942   Post-Procedure Surface Area (cm^2) 0.07 cm^2 03/17/23 0942   Post-Procedure Volume (cm^3) 0.007 cm^3 03/17/23 0942   Distance Tunneling (cm) 0 cm 03/17/23 0919   Tunneling Position ___ O'Clock 0 03/17/23 0919   Undermining Starts ___ O'Clock 0 03/17/23 0919   Undermining Ends___ O'Clock 0 03/17/23 0919   Undermining Maxium Distance (cm) 0 03/17/23 0919   Wound Assessment Dry 03/17/23 0919   Drainage Amount None 03/17/23 0919   Drainage Description Clear 02/24/23 0936   Odor None 03/17/23 0919   Leatha-wound Assessment Hyperkeratosis (callous) 03/17/23 0919   Margins Defined edges 03/17/23 0919   Wound Thickness Description not for Pressure Injury Full thickness 03/17/23 0919   Number of days: 20       Wound 02/24/23 #2 Left Second Toe (Active)   Wound Image   03/17/23 0919   Dressing Status New dressing applied 03/03/23 1014   Wound Cleansed Soap and water; Wound cleanser 03/17/23 0919   Dressing/Treatment Other (comment) 03/03/23 1014   Offloading for Diabetic Foot Ulcers Walker 03/17/23 0919   Wound Length (cm) 0.5 cm 03/17/23 0919   Wound Width (cm) 0.1 cm 03/17/23 0919   Wound Depth (cm) 0.1 cm 03/17/23 0919   Wound Surface Area (cm^2) 0.05 cm^2 03/17/23 0919   Change in Wound Size % (l*w) 93.83 03/17/23 0919   Wound Volume (cm^3) 0.005 cm^3 03/17/23 0919   Wound Healing % 94 03/17/23 0919   Post-Procedure Length (cm) 0.5 cm 03/17/23 0942   Post-Procedure Width (cm) 0.1 cm 03/17/23 0942   Post-Procedure Depth (cm) 0.1 cm 03/17/23 0942   Post-Procedure Surface Area (cm^2) 0.05 cm^2 03/17/23 0942   Post-Procedure Volume (cm^3) 0.005 cm^3 03/17/23 0942   Distance Tunneling (cm) 0 cm 03/17/23 0919   Tunneling Position ___ O'Clock 0 03/17/23 0919   Undermining Starts ___ O'Clock 0 03/17/23 0919   Undermining Ends___ O'Clock 0 03/17/23 0919   Undermining Maxium Distance (cm) 0 03/17/23 0919   Wound Assessment Dry 03/17/23 0919   Drainage Amount None 03/17/23 0919   Odor None 03/17/23 0919   Leatha-wound Assessment Hyperkeratosis (callous) 03/17/23 0919   Margins Defined edges 03/17/23 0919   Wound Thickness Description not for Pressure Injury Full thickness 03/17/23 0919   Number of days: 20       Percent of Wound(s) Debrided: approximately 100%    Total  Area  Debrided:  0.12 sq cm     Bleeding:  Minimal    Hemostasis Achieved:  by pressure    Procedural Pain:  0  / 10     Post Procedural Pain:  0 / 10     Response to treatment: Well tolerated by patient. Status of wound progress and description from last visit: Open wounds on toes of both feet, regimen as below. Lymphedema pumps in place to manage edema. She uses double Tubi for compression. She has hammer toes and this is causing pressure wounds to the toes. Toe sleeves in place. Will follow up in one week. Review of Systems   Musculoskeletal:  Positive for myalgias and neck pain. Negative for falls. The patient complains of right side neck pain, she does hyper extend her neck to the left as a result of the pain. She has talked with her PCP about this and was given Flexeril which she states help minimally. Started Zanaflex last week with some improvement, discussed exercise regimen, will add home physical therapy, which is now seeing her 3 times weekly. She did get a neck pillow for support and to assist with better alignment of the neck. She also has Ultram she can use as needed for pain. Stretch your muscles every day, especially before and after exercise and at bedtime. Regular stretching can relax your muscles and may prevent cramps. Do not suddenly increase the amount of exercise you get. Increase your exercise a little each week. When you get a cramp, stretch and massage the muscle. You can also take a warm shower or bath to relax the muscle. A heating pad placed on the muscle can also help. Plan:     Discharge Instructions         PHYSICIAN ORDERS AND DISCHARGE INSTRUCTIONS   NOTE: Upon discharge from the [address], you will receive a patient experience survey via E-mail. We would be grateful if you would take the time to fill this survey out. Wound care order history:               CYNTHIA's   Right  1.68     Left 1.65  Date 1/13/21              Vascular studies: . Cultures: .3/18/22              Antibiotics: . HbA1c:  . Compression/Lymph Pumps: . Double Tubi              Grafts: Geraldo Ip: .     Continuing wound care orders and information:              Residence: . Private              Continue home health care with: [facility] Your wound-care supplies will be provided by: [facility]: . Wound cleansing:                          Do not scrub or use excessive force. Wash hands with soap and water before and after dressing changes. Prior to applying a clean dressing, cleanse wound with normal saline,                          wound cleanser, or mild soap and water. Ask your physician or nurse before getting the wound(s) wet in the shower. Daily Wound management:                          Keep weight off wounds and reposition every 2 hours. Avoid standing for long periods of time. Apply wraps/stockings in AM and remove at bedtime. Elevate legs to the level of the heart or above for 30 minutes 4-5 times a day and/or when sitting. When taking antibiotics take entire prescription as ordered by MD do not stop taking until medicine is all gone. Orders for this week (3/17/23)  Left and Right 2nd Toe-  Wash with mild soap and water, rinse with saline,  Apply puracol to wound bed  Wrap strip of ioplex around toe  Wrap with conform and secure with tape  Change Daily     Bilateral Lower Leg - Wash with mild soap and water, rinse with saline, pat dry with 4x4. A&D to intact skin of lower extremities. Apply Gentamicin, Santyl and Stimulen to wound bed (may use Puracol at home in place of Stimulen). Secure with silicone border   Tubi F  Change daily       Follow up with Zamzam Walton CNP in 1 weeks in the wound care center.   Call [phone] for any questions or concerns        Treatment Note      Written Patient Dismissal Instructions Given            Electronically signed by TREVON Yan CNP on 3/17/2023 at 10:27 AM

## 2023-03-31 ENCOUNTER — HOSPITAL ENCOUNTER (OUTPATIENT)
Dept: WOUND CARE | Age: 88
Discharge: HOME | End: 2023-03-31
Payer: MEDICARE

## 2023-03-31 VITALS
TEMPERATURE: 97.3 F | SYSTOLIC BLOOD PRESSURE: 152 MMHG | RESPIRATION RATE: 16 BRPM | DIASTOLIC BLOOD PRESSURE: 72 MMHG | HEART RATE: 73 BPM

## 2023-03-31 DIAGNOSIS — L97.522 DIABETIC ULCER OF TOE OF LEFT FOOT ASSOCIATED WITH TYPE 2 DIABETES MELLITUS, WITH FAT LAYER EXPOSED (HCC): ICD-10-CM

## 2023-03-31 DIAGNOSIS — L89.312 PRESSURE INJURY OF RIGHT BUTTOCK, STAGE 2 (HCC): ICD-10-CM

## 2023-03-31 DIAGNOSIS — E11.621 DIABETIC ULCER OF TOE OF LEFT FOOT ASSOCIATED WITH TYPE 2 DIABETES MELLITUS, WITH FAT LAYER EXPOSED (HCC): ICD-10-CM

## 2023-03-31 DIAGNOSIS — L84 CALLUS OF FOOT: Primary | ICD-10-CM

## 2023-03-31 DIAGNOSIS — S30.810A EXCORIATION OF BUTTOCK, INITIAL ENCOUNTER: ICD-10-CM

## 2023-03-31 PROCEDURE — 97597 DBRDMT OPN WND 1ST 20 CM/<: CPT

## 2023-03-31 RX ORDER — LIDOCAINE HYDROCHLORIDE 40 MG/ML
SOLUTION TOPICAL ONCE
OUTPATIENT
Start: 2023-03-31 | End: 2023-03-31

## 2023-03-31 RX ORDER — BACITRACIN ZINC AND POLYMYXIN B SULFATE 500; 1000 [USP'U]/G; [USP'U]/G
OINTMENT TOPICAL ONCE
OUTPATIENT
Start: 2023-03-31 | End: 2023-03-31

## 2023-03-31 RX ORDER — BACITRACIN, NEOMYCIN, POLYMYXIN B 400; 3.5; 5 [USP'U]/G; MG/G; [USP'U]/G
OINTMENT TOPICAL ONCE
OUTPATIENT
Start: 2023-03-31 | End: 2023-03-31

## 2023-03-31 RX ORDER — GINSENG 100 MG
CAPSULE ORAL ONCE
OUTPATIENT
Start: 2023-03-31 | End: 2023-03-31

## 2023-03-31 RX ORDER — LIDOCAINE 40 MG/G
CREAM TOPICAL ONCE
OUTPATIENT
Start: 2023-03-31 | End: 2023-03-31

## 2023-03-31 RX ORDER — CLOBETASOL PROPIONATE 0.5 MG/G
OINTMENT TOPICAL ONCE
OUTPATIENT
Start: 2023-03-31 | End: 2023-03-31

## 2023-03-31 RX ORDER — LIDOCAINE 50 MG/G
OINTMENT TOPICAL ONCE
OUTPATIENT
Start: 2023-03-31 | End: 2023-03-31

## 2023-03-31 RX ORDER — GENTAMICIN SULFATE 1 MG/G
OINTMENT TOPICAL ONCE
OUTPATIENT
Start: 2023-03-31 | End: 2023-03-31

## 2023-03-31 RX ORDER — BETAMETHASONE DIPROPIONATE 0.05 %
OINTMENT (GRAM) TOPICAL ONCE
OUTPATIENT
Start: 2023-03-31 | End: 2023-03-31

## 2023-03-31 ASSESSMENT — PAIN SCALES - GENERAL: PAINLEVEL_OUTOF10: 5

## 2023-03-31 ASSESSMENT — PAIN DESCRIPTION - ORIENTATION: ORIENTATION: RIGHT

## 2023-03-31 ASSESSMENT — PAIN DESCRIPTION - DESCRIPTORS: DESCRIPTORS: SHARP

## 2023-03-31 ASSESSMENT — PAIN DESCRIPTION - FREQUENCY: FREQUENCY: INTERMITTENT

## 2023-03-31 ASSESSMENT — PAIN DESCRIPTION - LOCATION: LOCATION: TOE (COMMENT WHICH ONE)

## 2023-03-31 NOTE — PROGRESS NOTES
gone.                                                    Orders for this week (3/31/23)  Left and Right 2nd Toe-  Wash with mild soap and water, rinse with saline,  Apply puracol to wound bed  Wrap strip of ioplex around toe  Wrap with conform and secure with tape  Change Daily     Bilateral Lower Leg - Wash with mild soap and water, rinse with saline, pat dry with 4x4. A&D to intact skin of lower extremities. Tubi F  Change daily       Follow up with Joe Metcalf CNP in 1 weeks in the wound care center.   Call [phone] for any questions or concerns        Treatment Note      Written Patient Dismissal Instructions Given            Electronically signed by TREVON Sepulveda CNP on 3/31/2023 at 10:47 AM

## 2023-03-31 NOTE — DISCHARGE INSTRUCTIONS
around toe  Wrap with conform and secure with tape  Change Daily     Bilateral Lower Leg - Wash with mild soap and water, rinse with saline, pat dry with 4x4. A&D to intact skin of lower extremities. Tubi F  Change daily       Follow up with Karyn Munoz CNP in 1 weeks in the wound care center.   Call [phone] for any questions or concerns

## 2023-04-01 ENCOUNTER — HOSPITAL ENCOUNTER (OUTPATIENT)
Age: 88
Discharge: HOME | End: 2023-04-01
Payer: MEDICARE

## 2023-04-01 LAB
ALBUMIN SERPL-MCNC: 4.3 GM/DL (ref 3.4–5)
ALP BLD-CCNC: 69 IU/L (ref 40–129)
ALT SERPL-CCNC: 9 U/L (ref 10–40)
ANION GAP SERPL CALCULATED.3IONS-SCNC: 14 MMOL/L (ref 4–16)
AST SERPL-CCNC: 12 IU/L (ref 15–37)
BACTERIA: NEGATIVE /HPF
BILIRUB SERPL-MCNC: 0.5 MG/DL (ref 0–1)
BILIRUBIN URINE: NEGATIVE MG/DL
BLOOD, URINE: NEGATIVE
BUN SERPL-MCNC: 99 MG/DL (ref 6–23)
CALCIUM SERPL-MCNC: 10 MG/DL (ref 8.3–10.6)
CAST TYPE: NORMAL /HPF
CHLORIDE BLD-SCNC: 89 MMOL/L (ref 99–110)
CLARITY: CLEAR
CO2: 30 MMOL/L (ref 21–32)
COLOR: YELLOW
CREAT SERPL-MCNC: 2.7 MG/DL (ref 0.6–1.1)
CRYSTAL TYPE: NEGATIVE /HPF
EPITHELIAL CELLS, UA: 3 /HPF
GFR SERPL CREATININE-BSD FRML MDRD: 17 ML/MIN/1.73M2
GLUCOSE SERPL-MCNC: 128 MG/DL (ref 70–99)
GLUCOSE, URINE: NEGATIVE MG/DL
KETONES, URINE: NEGATIVE MG/DL
LEUKOCYTE ESTERASE, URINE: ABNORMAL
NITRITE URINE, QUANTITATIVE: NEGATIVE
PH, URINE: 6 (ref 5–8)
POTASSIUM SERPL-SCNC: 3.2 MMOL/L (ref 3.5–5.1)
PROTEIN UA: NEGATIVE MG/DL
RBC URINE: NEGATIVE /HPF (ref 0–6)
SODIUM BLD-SCNC: 133 MMOL/L (ref 135–145)
SPECIFIC GRAVITY UA: 1.01 (ref 1–1.03)
TOTAL PROTEIN: 7.3 GM/DL (ref 6.4–8.2)
UROBILINOGEN, URINE: 0.2 MG/DL (ref 0.2–1)
WBC UA: 5 /HPF (ref 0–5)

## 2023-04-01 PROCEDURE — 36415 COLL VENOUS BLD VENIPUNCTURE: CPT

## 2023-04-01 PROCEDURE — 80053 COMPREHEN METABOLIC PANEL: CPT

## 2023-04-01 PROCEDURE — 81001 URINALYSIS AUTO W/SCOPE: CPT

## 2023-04-07 ENCOUNTER — HOSPITAL ENCOUNTER (OUTPATIENT)
Age: 88
Discharge: HOME | End: 2023-04-07
Payer: MEDICARE

## 2023-04-07 DIAGNOSIS — N18.4 CKD STAGE G4/A1, GFR 15-29 AND ALBUMIN CREATININE RATIO <30 MG/G (HCC): ICD-10-CM

## 2023-04-07 LAB
ALBUMIN SERPL-MCNC: 4.4 GM/DL (ref 3.4–5)
ALP BLD-CCNC: 69 IU/L (ref 40–129)
ALT SERPL-CCNC: 9 U/L (ref 10–40)
ANION GAP SERPL CALCULATED.3IONS-SCNC: 15 MMOL/L (ref 4–16)
AST SERPL-CCNC: 11 IU/L (ref 15–37)
BILIRUB SERPL-MCNC: 0.4 MG/DL (ref 0–1)
BUN SERPL-MCNC: 110 MG/DL (ref 6–23)
CALCIUM SERPL-MCNC: 10.2 MG/DL (ref 8.3–10.6)
CHLORIDE BLD-SCNC: 92 MMOL/L (ref 99–110)
CO2: 27 MMOL/L (ref 21–32)
CREAT SERPL-MCNC: 3.1 MG/DL (ref 0.6–1.1)
GFR SERPL CREATININE-BSD FRML MDRD: 14 ML/MIN/1.73M2
GLUCOSE SERPL-MCNC: 145 MG/DL (ref 70–99)
POTASSIUM SERPL-SCNC: 4.4 MMOL/L (ref 3.5–5.1)
SODIUM BLD-SCNC: 134 MMOL/L (ref 135–145)
TOTAL PROTEIN: 7.4 GM/DL (ref 6.4–8.2)

## 2023-04-07 PROCEDURE — 36415 COLL VENOUS BLD VENIPUNCTURE: CPT

## 2023-04-07 PROCEDURE — 80053 COMPREHEN METABOLIC PANEL: CPT

## 2023-04-11 ENCOUNTER — APPOINTMENT (OUTPATIENT)
Dept: GENERAL RADIOLOGY | Age: 88
DRG: 683 | End: 2023-04-11
Payer: MEDICARE

## 2023-04-11 ENCOUNTER — HOSPITAL ENCOUNTER (INPATIENT)
Age: 88
LOS: 2 days | Discharge: HOME | DRG: 683 | End: 2023-04-13
Attending: EMERGENCY MEDICINE | Admitting: INTERNAL MEDICINE
Payer: MEDICARE

## 2023-04-11 DIAGNOSIS — N17.9 ACUTE RENAL FAILURE SUPERIMPOSED ON CHRONIC KIDNEY DISEASE, UNSPECIFIED CKD STAGE, UNSPECIFIED ACUTE RENAL FAILURE TYPE (HCC): Primary | ICD-10-CM

## 2023-04-11 DIAGNOSIS — E87.5 HYPERKALEMIA: ICD-10-CM

## 2023-04-11 DIAGNOSIS — R41.82 ALTERED MENTAL STATUS, UNSPECIFIED ALTERED MENTAL STATUS TYPE: ICD-10-CM

## 2023-04-11 DIAGNOSIS — N18.9 ACUTE RENAL FAILURE SUPERIMPOSED ON CHRONIC KIDNEY DISEASE, UNSPECIFIED CKD STAGE, UNSPECIFIED ACUTE RENAL FAILURE TYPE (HCC): Primary | ICD-10-CM

## 2023-04-11 PROBLEM — R53.1 GENERALIZED WEAKNESS: Status: ACTIVE | Noted: 2023-04-11

## 2023-04-11 LAB
ALBUMIN SERPL-MCNC: 4.8 GM/DL (ref 3.4–5)
ALP BLD-CCNC: 88 IU/L (ref 40–129)
ALT SERPL-CCNC: 11 U/L (ref 10–40)
ANION GAP SERPL CALCULATED.3IONS-SCNC: 15 MMOL/L (ref 4–16)
AST SERPL-CCNC: 14 IU/L (ref 15–37)
BACTERIA: NEGATIVE /HPF
BILIRUB SERPL-MCNC: 0.5 MG/DL (ref 0–1)
BILIRUBIN URINE: NEGATIVE MG/DL
BLOOD, URINE: NEGATIVE
BUN SERPL-MCNC: 110 MG/DL (ref 6–23)
CALCIUM SERPL-MCNC: 10 MG/DL (ref 8.3–10.6)
CHLORIDE BLD-SCNC: 90 MMOL/L (ref 99–110)
CLARITY: CLEAR
CO2: 27 MMOL/L (ref 21–32)
COLOR: YELLOW
CREAT SERPL-MCNC: 3.1 MG/DL (ref 0.6–1.1)
DIFFERENTIAL TYPE: ABNORMAL
EOSINOPHILS ABSOLUTE: 0.1 K/CU MM
EOSINOPHILS RELATIVE PERCENT: 1 % (ref 0–3)
GFR SERPL CREATININE-BSD FRML MDRD: 14 ML/MIN/1.73M2
GLUCOSE SERPL-MCNC: 163 MG/DL (ref 70–99)
GLUCOSE, URINE: NEGATIVE MG/DL
HCT VFR BLD CALC: 41.8 % (ref 37–47)
HEMOGLOBIN: 13.6 GM/DL (ref 12.5–16)
KETONES, URINE: NEGATIVE MG/DL
LACTATE: 1 MMOL/L (ref 0.5–1.9)
LEUKOCYTE ESTERASE, URINE: ABNORMAL
LYMPHOCYTES ABSOLUTE: 0.5 K/CU MM
LYMPHOCYTES RELATIVE PERCENT: 4 % (ref 24–44)
MAGNESIUM: 2.1 MG/DL (ref 1.8–2.4)
MCH RBC QN AUTO: 31.8 PG (ref 27–31)
MCHC RBC AUTO-ENTMCNC: 32.5 % (ref 32–36)
MCV RBC AUTO: 97.7 FL (ref 78–100)
MONOCYTES ABSOLUTE: 0.4 K/CU MM
MONOCYTES RELATIVE PERCENT: 3 % (ref 0–4)
MUCUS: ABNORMAL HPF
NITRITE URINE, QUANTITATIVE: NEGATIVE
PDW BLD-RTO: 11.7 % (ref 11.7–14.9)
PH, URINE: 6 (ref 5–8)
PLATELET # BLD: 265 K/CU MM (ref 140–440)
PMV BLD AUTO: 9.2 FL (ref 7.5–11.1)
POTASSIUM SERPL-SCNC: 5.2 MMOL/L (ref 3.5–5.1)
PROTEIN UA: NEGATIVE MG/DL
RBC # BLD: 4.28 M/CU MM (ref 4.2–5.4)
RBC URINE: 4 /HPF (ref 0–6)
SARS-COV-2 RDRP RESP QL NAA+PROBE: NOT DETECTED
SEGMENTED NEUTROPHILS ABSOLUTE COUNT: 11.2 K/CU MM
SEGMENTED NEUTROPHILS RELATIVE PERCENT: 92 % (ref 36–66)
SODIUM BLD-SCNC: 132 MMOL/L (ref 135–145)
SOURCE: NORMAL
SPECIFIC GRAVITY UA: 1.01 (ref 1–1.03)
SQUAMOUS EPITHELIAL: 1 /HPF
STOMATOCYTES: ABNORMAL
TOTAL PROTEIN: 7.5 GM/DL (ref 6.4–8.2)
TRICHOMONAS: ABNORMAL /HPF
TROPONIN T: 0.05 NG/ML
UROBILINOGEN, URINE: 0.2 MG/DL (ref 0.2–1)
WBC # BLD: 12.2 K/CU MM (ref 4–10.5)
WBC UA: 8 /HPF (ref 0–5)

## 2023-04-11 PROCEDURE — 71046 X-RAY EXAM CHEST 2 VIEWS: CPT

## 2023-04-11 PROCEDURE — 2580000003 HC RX 258: Performed by: EMERGENCY MEDICINE

## 2023-04-11 PROCEDURE — 85007 BL SMEAR W/DIFF WBC COUNT: CPT

## 2023-04-11 PROCEDURE — 80053 COMPREHEN METABOLIC PANEL: CPT

## 2023-04-11 PROCEDURE — 85027 COMPLETE CBC AUTOMATED: CPT

## 2023-04-11 PROCEDURE — 83735 ASSAY OF MAGNESIUM: CPT

## 2023-04-11 PROCEDURE — 99285 EMERGENCY DEPT VISIT HI MDM: CPT

## 2023-04-11 PROCEDURE — 87040 BLOOD CULTURE FOR BACTERIA: CPT

## 2023-04-11 PROCEDURE — 87635 SARS-COV-2 COVID-19 AMP PRB: CPT

## 2023-04-11 PROCEDURE — 96360 HYDRATION IV INFUSION INIT: CPT

## 2023-04-11 PROCEDURE — 1200000000 HC SEMI PRIVATE

## 2023-04-11 PROCEDURE — 6360000002 HC RX W HCPCS: Performed by: EMERGENCY MEDICINE

## 2023-04-11 PROCEDURE — 93005 ELECTROCARDIOGRAM TRACING: CPT | Performed by: EMERGENCY MEDICINE

## 2023-04-11 PROCEDURE — 96361 HYDRATE IV INFUSION ADD-ON: CPT

## 2023-04-11 PROCEDURE — 83605 ASSAY OF LACTIC ACID: CPT

## 2023-04-11 PROCEDURE — 84484 ASSAY OF TROPONIN QUANT: CPT

## 2023-04-11 PROCEDURE — 81001 URINALYSIS AUTO W/SCOPE: CPT

## 2023-04-11 PROCEDURE — 87086 URINE CULTURE/COLONY COUNT: CPT

## 2023-04-11 RX ORDER — ROPINIROLE 0.25 MG/1
0.5 TABLET, FILM COATED ORAL 3 TIMES DAILY
Status: DISCONTINUED | OUTPATIENT
Start: 2023-04-12 | End: 2023-04-13 | Stop reason: HOSPADM

## 2023-04-11 RX ORDER — TRAMADOL HYDROCHLORIDE 50 MG/1
50 TABLET ORAL EVERY 6 HOURS PRN
Status: DISCONTINUED | OUTPATIENT
Start: 2023-04-11 | End: 2023-04-13 | Stop reason: HOSPADM

## 2023-04-11 RX ORDER — POLYETHYLENE GLYCOL 3350 17 G/17G
17 POWDER, FOR SOLUTION ORAL DAILY PRN
Status: DISCONTINUED | OUTPATIENT
Start: 2023-04-11 | End: 2023-04-13 | Stop reason: HOSPADM

## 2023-04-11 RX ORDER — SODIUM CHLORIDE 0.9 % (FLUSH) 0.9 %
5-40 SYRINGE (ML) INJECTION EVERY 12 HOURS SCHEDULED
Status: DISCONTINUED | OUTPATIENT
Start: 2023-04-12 | End: 2023-04-13 | Stop reason: HOSPADM

## 2023-04-11 RX ORDER — HEPARIN SODIUM 5000 [USP'U]/ML
5000 INJECTION, SOLUTION INTRAVENOUS; SUBCUTANEOUS EVERY 8 HOURS SCHEDULED
Status: DISCONTINUED | OUTPATIENT
Start: 2023-04-12 | End: 2023-04-13 | Stop reason: HOSPADM

## 2023-04-11 RX ORDER — LEVOTHYROXINE SODIUM 0.05 MG/1
50 TABLET ORAL DAILY
Status: DISCONTINUED | OUTPATIENT
Start: 2023-04-12 | End: 2023-04-13 | Stop reason: HOSPADM

## 2023-04-11 RX ORDER — ACETAMINOPHEN 650 MG/1
650 SUPPOSITORY RECTAL EVERY 6 HOURS PRN
Status: DISCONTINUED | OUTPATIENT
Start: 2023-04-11 | End: 2023-04-13 | Stop reason: HOSPADM

## 2023-04-11 RX ORDER — ONDANSETRON 4 MG/1
4 TABLET, ORALLY DISINTEGRATING ORAL EVERY 8 HOURS PRN
Status: DISCONTINUED | OUTPATIENT
Start: 2023-04-11 | End: 2023-04-11

## 2023-04-11 RX ORDER — SODIUM CHLORIDE 9 MG/ML
INJECTION, SOLUTION INTRAVENOUS CONTINUOUS
Status: ACTIVE | OUTPATIENT
Start: 2023-04-12 | End: 2023-04-12

## 2023-04-11 RX ORDER — 0.9 % SODIUM CHLORIDE 0.9 %
500 INTRAVENOUS SOLUTION INTRAVENOUS ONCE
Status: COMPLETED | OUTPATIENT
Start: 2023-04-11 | End: 2023-04-11

## 2023-04-11 RX ORDER — ALBUTEROL SULFATE 90 UG/1
2 AEROSOL, METERED RESPIRATORY (INHALATION) EVERY 6 HOURS PRN
Status: DISCONTINUED | OUTPATIENT
Start: 2023-04-11 | End: 2023-04-13 | Stop reason: HOSPADM

## 2023-04-11 RX ORDER — ONDANSETRON 2 MG/ML
4 INJECTION INTRAMUSCULAR; INTRAVENOUS EVERY 6 HOURS PRN
Status: DISCONTINUED | OUTPATIENT
Start: 2023-04-11 | End: 2023-04-11

## 2023-04-11 RX ORDER — FLUTICASONE PROPIONATE 50 MCG
1 SPRAY, SUSPENSION (ML) NASAL 2 TIMES DAILY
Status: DISCONTINUED | OUTPATIENT
Start: 2023-04-12 | End: 2023-04-13 | Stop reason: HOSPADM

## 2023-04-11 RX ORDER — SODIUM CHLORIDE 0.9 % (FLUSH) 0.9 %
5-40 SYRINGE (ML) INJECTION PRN
Status: DISCONTINUED | OUTPATIENT
Start: 2023-04-11 | End: 2023-04-13 | Stop reason: HOSPADM

## 2023-04-11 RX ORDER — FEBUXOSTAT 40 MG/1
40 TABLET, FILM COATED ORAL DAILY
Status: DISCONTINUED | OUTPATIENT
Start: 2023-04-12 | End: 2023-04-13 | Stop reason: HOSPADM

## 2023-04-11 RX ORDER — SODIUM CHLORIDE 9 MG/ML
INJECTION, SOLUTION INTRAVENOUS PRN
Status: DISCONTINUED | OUTPATIENT
Start: 2023-04-11 | End: 2023-04-13 | Stop reason: HOSPADM

## 2023-04-11 RX ORDER — ACETAMINOPHEN 325 MG/1
650 TABLET ORAL EVERY 6 HOURS PRN
Status: DISCONTINUED | OUTPATIENT
Start: 2023-04-11 | End: 2023-04-13 | Stop reason: HOSPADM

## 2023-04-11 RX ORDER — LATANOPROST 50 UG/ML
1 SOLUTION/ DROPS OPHTHALMIC NIGHTLY
Status: DISCONTINUED | OUTPATIENT
Start: 2023-04-12 | End: 2023-04-13 | Stop reason: HOSPADM

## 2023-04-11 RX ADMIN — CEFTRIAXONE SODIUM 1000 MG: 1 INJECTION, POWDER, FOR SOLUTION INTRAMUSCULAR; INTRAVENOUS at 22:36

## 2023-04-11 RX ADMIN — SODIUM CHLORIDE 500 ML: 9 INJECTION, SOLUTION INTRAVENOUS at 20:22

## 2023-04-11 NOTE — ED TRIAGE NOTES
Pt presents to ED for decreased urine output ( stage 4 kidney disease) pt also states \" I just dont feel well lately\"

## 2023-04-12 LAB
ALBUMIN SERPL-MCNC: 3.8 GM/DL (ref 3.4–5)
ALP BLD-CCNC: 66 IU/L (ref 40–128)
ALT SERPL-CCNC: 10 U/L (ref 10–40)
ANION GAP SERPL CALCULATED.3IONS-SCNC: 16 MMOL/L (ref 4–16)
AST SERPL-CCNC: 13 IU/L (ref 15–37)
BASOPHILS ABSOLUTE: 0 K/CU MM
BASOPHILS RELATIVE PERCENT: 0.1 % (ref 0–1)
BILIRUB SERPL-MCNC: 0.3 MG/DL (ref 0–1)
BUN SERPL-MCNC: 102 MG/DL (ref 6–23)
CALCIUM SERPL-MCNC: 8.9 MG/DL (ref 8.3–10.6)
CHLORIDE BLD-SCNC: 97 MMOL/L (ref 99–110)
CO2: 23 MMOL/L (ref 21–32)
CREAT SERPL-MCNC: 2.7 MG/DL (ref 0.6–1.1)
DIFFERENTIAL TYPE: ABNORMAL
EOSINOPHILS ABSOLUTE: 0 K/CU MM
EOSINOPHILS RELATIVE PERCENT: 0.4 % (ref 0–3)
GFR SERPL CREATININE-BSD FRML MDRD: 17 ML/MIN/1.73M2
GLUCOSE BLD-MCNC: 114 MG/DL (ref 70–99)
GLUCOSE BLD-MCNC: 126 MG/DL (ref 70–99)
GLUCOSE BLD-MCNC: 139 MG/DL (ref 70–99)
GLUCOSE SERPL-MCNC: 124 MG/DL (ref 70–99)
HCT VFR BLD CALC: 35.9 % (ref 37–47)
HEMOGLOBIN: 11.7 GM/DL (ref 12.5–16)
IMMATURE NEUTROPHIL %: 0.4 % (ref 0–0.43)
LYMPHOCYTES ABSOLUTE: 0.4 K/CU MM
LYMPHOCYTES RELATIVE PERCENT: 6 % (ref 24–44)
MCH RBC QN AUTO: 31.9 PG (ref 27–31)
MCHC RBC AUTO-ENTMCNC: 32.6 % (ref 32–36)
MCV RBC AUTO: 97.8 FL (ref 78–100)
MONOCYTES ABSOLUTE: 0.4 K/CU MM
MONOCYTES RELATIVE PERCENT: 5.9 % (ref 0–4)
NUCLEATED RBC %: 0 %
PDW BLD-RTO: 11.6 % (ref 11.7–14.9)
PLATELET # BLD: 216 K/CU MM (ref 140–440)
PMV BLD AUTO: 9.3 FL (ref 7.5–11.1)
POTASSIUM SERPL-SCNC: 4.2 MMOL/L (ref 3.5–5.1)
RBC # BLD: 3.67 M/CU MM (ref 4.2–5.4)
SEGMENTED NEUTROPHILS ABSOLUTE COUNT: 6.1 K/CU MM
SEGMENTED NEUTROPHILS RELATIVE PERCENT: 87.2 % (ref 36–66)
SODIUM BLD-SCNC: 136 MMOL/L (ref 135–145)
TOTAL IMMATURE NEUTOROPHIL: 0.03 K/CU MM
TOTAL NUCLEATED RBC: 0 K/CU MM
TOTAL PROTEIN: 5.6 GM/DL (ref 6.4–8.2)
TROPONIN T: 0.06 NG/ML
TROPONIN T: 0.09 NG/ML
WBC # BLD: 7 K/CU MM (ref 4–10.5)

## 2023-04-12 PROCEDURE — 85025 COMPLETE CBC W/AUTO DIFF WBC: CPT

## 2023-04-12 PROCEDURE — 99211 OFF/OP EST MAY X REQ PHY/QHP: CPT

## 2023-04-12 PROCEDURE — 1200000000 HC SEMI PRIVATE

## 2023-04-12 PROCEDURE — 6360000002 HC RX W HCPCS: Performed by: INTERNAL MEDICINE

## 2023-04-12 PROCEDURE — 36415 COLL VENOUS BLD VENIPUNCTURE: CPT

## 2023-04-12 PROCEDURE — 82962 GLUCOSE BLOOD TEST: CPT

## 2023-04-12 PROCEDURE — 2580000003 HC RX 258: Performed by: INTERNAL MEDICINE

## 2023-04-12 PROCEDURE — 84484 ASSAY OF TROPONIN QUANT: CPT

## 2023-04-12 PROCEDURE — 80053 COMPREHEN METABOLIC PANEL: CPT

## 2023-04-12 PROCEDURE — 6370000000 HC RX 637 (ALT 250 FOR IP): Performed by: INTERNAL MEDICINE

## 2023-04-12 PROCEDURE — 94761 N-INVAS EAR/PLS OXIMETRY MLT: CPT

## 2023-04-12 RX ORDER — GLUCAGON 1 MG/ML
1 KIT INJECTION PRN
Status: DISCONTINUED | OUTPATIENT
Start: 2023-04-12 | End: 2023-04-13 | Stop reason: HOSPADM

## 2023-04-12 RX ORDER — FOLIC ACID 1 MG/1
1 TABLET ORAL DAILY
Status: DISCONTINUED | OUTPATIENT
Start: 2023-04-12 | End: 2023-04-13 | Stop reason: HOSPADM

## 2023-04-12 RX ORDER — DEXTROSE MONOHYDRATE 100 MG/ML
INJECTION, SOLUTION INTRAVENOUS CONTINUOUS PRN
Status: DISCONTINUED | OUTPATIENT
Start: 2023-04-12 | End: 2023-04-13 | Stop reason: HOSPADM

## 2023-04-12 RX ORDER — FOLIC ACID 1 MG/1
0.8 TABLET ORAL DAILY
COMMUNITY

## 2023-04-12 RX ORDER — INSULIN LISPRO 100 [IU]/ML
0-4 INJECTION, SOLUTION INTRAVENOUS; SUBCUTANEOUS NIGHTLY
Status: DISCONTINUED | OUTPATIENT
Start: 2023-04-12 | End: 2023-04-13 | Stop reason: HOSPADM

## 2023-04-12 RX ORDER — HYDROXYZINE HYDROCHLORIDE 25 MG/1
25 TABLET, FILM COATED ORAL DAILY PRN
COMMUNITY

## 2023-04-12 RX ORDER — LANOLIN ALCOHOL/MO/W.PET/CERES
500 CREAM (GRAM) TOPICAL DAILY
COMMUNITY

## 2023-04-12 RX ORDER — INSULIN LISPRO 100 [IU]/ML
0-4 INJECTION, SOLUTION INTRAVENOUS; SUBCUTANEOUS
Status: DISCONTINUED | OUTPATIENT
Start: 2023-04-12 | End: 2023-04-13 | Stop reason: HOSPADM

## 2023-04-12 RX ORDER — CILOSTAZOL 50 MG/1
50 TABLET ORAL 2 TIMES DAILY
COMMUNITY

## 2023-04-12 RX ORDER — LANOLIN ALCOHOL/MO/W.PET/CERES
500 CREAM (GRAM) TOPICAL DAILY
Status: DISCONTINUED | OUTPATIENT
Start: 2023-04-13 | End: 2023-04-13 | Stop reason: HOSPADM

## 2023-04-12 RX ORDER — CALCIUM CARBONATE 200(500)MG
1 TABLET,CHEWABLE ORAL DAILY
COMMUNITY

## 2023-04-12 RX ORDER — GLUCOSAMINE/D3/BOSWELLIA SERRA 1500MG-400
TABLET ORAL
COMMUNITY

## 2023-04-12 RX ORDER — HYDROXYZINE HYDROCHLORIDE 25 MG/1
25 TABLET, FILM COATED ORAL DAILY PRN
Status: DISCONTINUED | OUTPATIENT
Start: 2023-04-12 | End: 2023-04-13 | Stop reason: HOSPADM

## 2023-04-12 RX ORDER — LANOLIN ALCOHOL/MO/W.PET/CERES
3 CREAM (GRAM) TOPICAL NIGHTLY PRN
Status: DISCONTINUED | OUTPATIENT
Start: 2023-04-12 | End: 2023-04-13 | Stop reason: HOSPADM

## 2023-04-12 RX ORDER — LANOLIN ALCOHOL/MO/W.PET/CERES
1000 CREAM (GRAM) TOPICAL DAILY
Status: DISCONTINUED | OUTPATIENT
Start: 2023-04-12 | End: 2023-04-12

## 2023-04-12 RX ORDER — ONDANSETRON 4 MG/1
4 TABLET, FILM COATED ORAL EVERY 6 HOURS PRN
COMMUNITY

## 2023-04-12 RX ORDER — LANOLIN ALCOHOL/MO/W.PET/CERES
3 CREAM (GRAM) TOPICAL DAILY
COMMUNITY

## 2023-04-12 RX ORDER — TIZANIDINE 4 MG/1
4 TABLET ORAL 3 TIMES DAILY
COMMUNITY

## 2023-04-12 RX ADMIN — FEBUXOSTAT 40 MG: 40 TABLET ORAL at 09:09

## 2023-04-12 RX ADMIN — SODIUM CHLORIDE, PRESERVATIVE FREE 10 ML: 5 INJECTION INTRAVENOUS at 22:04

## 2023-04-12 RX ADMIN — HEPARIN SODIUM 5000 UNITS: 5000 INJECTION INTRAVENOUS; SUBCUTANEOUS at 22:04

## 2023-04-12 RX ADMIN — LATANOPROST 1 DROP: 50 SOLUTION OPHTHALMIC at 22:03

## 2023-04-12 RX ADMIN — ROPINIROLE HYDROCHLORIDE 0.5 MG: 0.25 TABLET, FILM COATED ORAL at 08:59

## 2023-04-12 RX ADMIN — ROPINIROLE HYDROCHLORIDE 0.5 MG: 0.25 TABLET, FILM COATED ORAL at 22:04

## 2023-04-12 RX ADMIN — HEPARIN SODIUM 5000 UNITS: 5000 INJECTION INTRAVENOUS; SUBCUTANEOUS at 15:50

## 2023-04-12 RX ADMIN — LATANOPROST 1 DROP: 50 SOLUTION OPHTHALMIC at 01:13

## 2023-04-12 RX ADMIN — ROPINIROLE HYDROCHLORIDE 0.5 MG: 0.25 TABLET, FILM COATED ORAL at 01:13

## 2023-04-12 RX ADMIN — SODIUM CHLORIDE: 9 INJECTION, SOLUTION INTRAVENOUS at 01:12

## 2023-04-12 RX ADMIN — CEFTRIAXONE SODIUM 1000 MG: 1 INJECTION, POWDER, FOR SOLUTION INTRAMUSCULAR; INTRAVENOUS at 22:11

## 2023-04-12 RX ADMIN — FLUTICASONE PROPIONATE 1 SPRAY: 50 SPRAY, METERED NASAL at 09:10

## 2023-04-12 RX ADMIN — LEVOTHYROXINE SODIUM 50 MCG: 0.05 TABLET ORAL at 06:42

## 2023-04-12 RX ADMIN — ROPINIROLE HYDROCHLORIDE 0.5 MG: 0.25 TABLET, FILM COATED ORAL at 15:49

## 2023-04-12 RX ADMIN — SODIUM CHLORIDE, PRESERVATIVE FREE 10 ML: 5 INJECTION INTRAVENOUS at 09:01

## 2023-04-12 RX ADMIN — FOLIC ACID 1 MG: 1 TABLET ORAL at 09:00

## 2023-04-12 RX ADMIN — FLUTICASONE PROPIONATE 1 SPRAY: 50 SPRAY, METERED NASAL at 22:03

## 2023-04-12 RX ADMIN — HEPARIN SODIUM 5000 UNITS: 5000 INJECTION INTRAVENOUS; SUBCUTANEOUS at 06:42

## 2023-04-12 RX ADMIN — FLUTICASONE PROPIONATE 1 SPRAY: 50 SPRAY, METERED NASAL at 01:13

## 2023-04-12 NOTE — ED PROVIDER NOTES
Leukocyte Esterase, Urine MODERATE NUMBER OR AMOUNT OBSERVED (*)     All other components within normal limits   MICROSCOPIC URINALYSIS - Abnormal; Notable for the following components:    WBC, UA 8 (*)     Mucus, UA RARE (*)     All other components within normal limits   COVID-19, RAPID   CULTURE, BLOOD 1   CULTURE, BLOOD 1   MAGNESIUM   LACTIC ACID       All other labs were within normal range or not returned as of this dictation. MEDICAL DECISION MAKING     Medications   cefTRIAXone (ROCEPHIN) 1,000 mg in sodium chloride 0.9 % 50 mL IVPB (mini-bag) (has no administration in time range)   0.9 % sodium chloride bolus (500 mLs IntraVENous New Bag 4/11/23 2022)             [place] Coma Scale  Eye Opening: Spontaneous  Best Verbal Response: Oriented  Best Motor Response: Obeys commands  [place] Coma Scale Score: 15                     CIWA Assessment  BP: (!) 112/47  Heart Rate: 92                 MDM  This is a 82 y.o. female who presents to the emergency department with worsening lethargy, weakness and decreased urine output over the past 1 to 2 days. The patient describes nausea with episodic vomiting. She denies chest pain or difficulty breathing. Patient says she normally urinates multiple times throughout the day and this has significantly decreased over the past 1 to 2 days. She denies recent fevers. She denies new cough. She denies new lower extremity swelling. Patient has a known history of chronic renal insufficiency but she does not currently receive dialysis. Patient has a minimally depressed sodium of 132 and mildly elevated potassium of 5.2. BUN is 110 and creatinine is 3.1. While this is not a significant elevation compared to the patient's prior labs it is demonstrably higher than previously and indicates worsening renal function.     Chest x-ray was completed and shows no evidence of significant intrathoracic pathology including no evidence of pneumonia, pneumothorax, and a low

## 2023-04-12 NOTE — ED NOTES
Pt here today with family who states pt has not been acting herself for the last 3 days. States pt is very fatigued and just sleeping all the time. States this started once her spirolactone was increased 3 days ago. States pt also has been c/o feeling dizzy since Saturday.      Yony Key, OSEAS  04/11/23 [address] Mcginnis RN  04/11/23 1922
Background  History:   Past Medical History:   Diagnosis Date    Asthma     Chronic kidney disease     acute kidney failure    Chronic ulcer of left leg with fat layer exposed ([facility] 3/7/2016    Chronic ulcer of right leg with fat layer exposed ([facility] 3/7/2016    Diabetes type 2, controlled ([facility]     History of asthma     History of blood transfusion 07/2015    Hypertension     Lymphedema of both lower extremities 3/7/2016    Osteoarthritis     Pneumonia     Thyroid disease     Venous stasis of both lower extremities 3/7/2016    WD-Non-pressure chronic ulcer right lower leg, 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 ([facility] 4/21/2016       Assessment    Vitals/MEWS: MEWS Score: 1  Level of Consciousness: Alert (0)   Vitals:    04/11/23 2102 04/11/23 2132 04/11/23 2202 04/11/23 2230   BP: (!) 118/48 (!) 119/53 (!) 101/51 (!) 112/47   Pulse: 87 86 88 92   Resp: 16 17 23 18   Temp:    99.7 °F (37.6 °C)   TempSrc:       SpO2: 96% 96% 97% 98%   Weight:       Height:         PO Status: Regular  O2 Flow Rate: O2 Device: None (Room air)    Cardiac Rhythm:    Last documented pain medication administered:    NIH Score: NIH     Active LDA's:   Peripheral IV 04/11/23 Right Antecubital (Active)       Pertinent or High Risk Medications/Drips: no   If Yes, please provide details:    Blood Product Administration: no  If Yes, please provide details:      Recommendation    Incomplete orders    Additional Comments:     If any further questions, please call Sending RN at [phone]    Electronically signed by: Electronically signed by Remington Maier RN on 4/11/2023 at 10:32 PM       Remington Maier RN  04/11/23 1121

## 2023-04-12 NOTE — CONSULTS
Comprehensive Nutrition Assessment    Type and Reason for Visit:  Initial, Wound, Consult (Elier Score, Pressure Injury Stage 2)    Nutrition Recommendations/Plan:   Begin wound healing oral nutrition supplement BID with Diabetic oral nutrition supplements daily   Continue Renal Diet, may liberalize if pt remains with po intakes less than 50%  Fluid mgt per nephrology recommendations   Endorse adequate PO intakes/ONS Intake and record in I/O      Malnutrition Assessment:  Malnutrition Status:  No malnutrition (04/12/23 1634)    Context:  Acute Illness       Nutrition Assessment:    Admitted c generalized weakness s/s KAREEM on CKD, likely related to poor po intake, N/V from acute illness. Hx CKD IV/V, HTN. DM2 w/ diabetic ulcer of toe, Third-degree heart block-s/p pacemaker, Hypothyroidism. Pt currently on low sodium, low potassium, and low phos (renal) diet. Consumes at least half of meals but sometimes less due to reduced appetite s/s fluids. Admits to early satiety but denies poor intake pta. Hx of gradual wt loss 11% in 1 yr s/s fluid losses from diuretics. Discussed best options for oral nutrition supplements for those concerns with renal issues. Provided recommendations for wound healing. Pt open to trial supplements and appreciated education. No wasting. Follow as moderate nutrition risk. Nutrition Related Findings:    , Cr 2.7,  GFR 17, Glu 124. Pt sometimes will drink Boost at home. Wound Type: Pressure Injury, Stage II       Current Nutrition Intake & Therapies:    Average Meal Intake: 26-50%  Average Supplements Intake: None Ordered  ADULT DIET; Regular; Low Sodium (2 gm); Low Potassium (Less than 3000 mg/day); Low Phosphorus (Less than 1000 mg)  ADULT ORAL NUTRITION SUPPLEMENT; Breakfast; Diabetic Oral Supplement  ADULT ORAL NUTRITION SUPPLEMENT; Lunch, Dinner;  Wound Healing Oral Supplement    Anthropometric Measures:  Height: 5' 1\" (154.9 cm)  Ideal Body Weight (IBW): 105 lbs (48 kg)
now, mainly she probably was not ambulating.   Rule out any occult infection, if not may discontinue the antimicrobial agent, follow clinically and biochemically otherwise
mildly decreased glomerular filtration rate (GFR) between 60-89 mL/min/1.73 square meter and albuminuria creatinine ratio between  mg/g    Acute gout    Acute kidney injury ([facility]    Acute kidney injury superimposed on chronic kidney disease ([facility]    Third degree heart block (HCC)    Weakness    WD-Venous hypertension of both lower extremities    WD-Pressure injury of right buttock, stage 2 (HCC)    WD-Pressure injury of left buttock, stage 2 (HCC)    WD-Excoriation of buttock crease    WD-Venous stasis ulcer of right calf with fat layer exposed with varicose veins (HCC)    WD-Venous stasis ulcer of left calf with fat layer exposed with varicose veins ([facility]    WD-Callus of foot    WD-Diabetic ulcer of toe of left foot associated with type 2 diabetes mellitus, with fat layer exposed ([facility]    WD-Diabetic ulcer of toe of right foot associated with type 2 diabetes mellitus, with fat layer exposed ([facility]    Neck pain    Neck pain, acute    Generalized weakness       Measurements:  Wound 04/12/23 Buttocks Left (Active)   Wound Image   04/12/23 0830   Wound Etiology Pressure Stage 2 04/12/23 0853   Dressing Status Clean;Dry; Intact 04/12/23 0853   Wound Cleansed Cleansed with saline 04/12/23 0830   Dressing/Treatment Collagen;Silicone border 27/35/38 0830   Wound Length (cm) 0.9 cm 04/12/23 0830   Wound Width (cm) 0.5 cm 04/12/23 0830   Wound Depth (cm) 0.1 cm 04/12/23 0830   Wound Surface Area (cm^2) 0.45 cm^2 04/12/23 0830   Wound Volume (cm^3) 0.045 cm^3 04/12/23 0830   Distance Tunneling (cm) 0 cm 04/12/23 0830   Tunneling Position ___ O'Clock 0 04/12/23 0830   Undermining Starts ___ O'Clock 0 04/12/23 0830   Undermining Ends___ O'Clock 0 04/12/23 0830   Undermining Maxium Distance (cm) 0 04/12/23 0830   Wound Assessment Pink/red 04/12/23 0830   Drainage Amount Small 04/12/23 0830   Drainage Description Serosanguinous 04/12/23 0830   Odor None 04/12/23 0830   Leatha-wound Assessment Blanchable erythema 04/12/23 0830

## 2023-04-12 NOTE — CARE COORDINATION
MCG criteria for systemic or infectious reviewed at this time, criteria supports Inpatient admission.  JONATHAN,RN/CM
Identified Issues/Barriers to RETURNING to current housing: Home with   Potential Assistance needed at discharge: N/A            Potential DME:    Patient expects to discharge to: [address] [facility] for transportation at discharge:      Financial    Payor: [facility] / Plan: MEDICARE PART A AND B / Product Type: *No Product type* /     Does insurance require precert for SNF: No    Potential assistance Purchasing Medications:    Meds-to-Beds request: Yes      [facility] [address] MILA SINGLETON [address], [address] [facility] [address] [facility] [address] [address]  Phone: [phone] Fax: [phone]      Notes:    Factors facilitating achievement of predicted outcomes: Family support and Caregiver support    Barriers to discharge: Limited family support    Additional Case Management Notes: Reviewed chart and spoke with pt's  at bedside d/t pt sleeping soundly. Pt lives with her , PTA she was independent with ADL's and  provides all transportation. Pt has a PCP and insurance that covers medications. Discussed HC but does not feel needed at this time. Plan will be home with , CM available if any needs arise. The Plan for Transition of Care is related to the following treatment goals of Hyperkalemia [E87.5]  Generalized weakness [R53.1]  Altered mental status, unspecified altered mental status type [R41.82]  Acute renal failure superimposed on chronic kidney disease, unspecified CKD stage, unspecified acute renal failure type ([facility] [N17.9, L41.1]    IF APPLICABLE: The Patient and/or patient representative Ho Traylor and her family were provided with a choice of provider and agrees with the discharge plan.  Freedom of choice list with basic dialogue that supports the patient's individualized plan of care/goals and shares the quality data associated with the providers was provided to:     Patient Representative Name:       The Patient and/or Patient Representative

## 2023-04-12 NOTE — H&P
([facility] 3/7/2016    Diabetes type 2, controlled ([facility]     History of asthma     History of blood transfusion 07/2015    Hypertension     Lymphedema of both lower extremities 3/7/2016    Osteoarthritis     Pneumonia     Thyroid disease     Venous stasis of both lower extremities 3/7/2016    WD-Non-pressure chronic ulcer right lower leg, 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 ([facility] 4/21/2016     PSHX:  has a past surgical history that includes Urethra surgery; Appendectomy; Hysterectomy; Cholecystectomy; Cystoscopy; eye surgery; joint replacement (Right); and Pacemaker insertion (N/A, 11/17/2020). Allergies: Allergies   Allergen Reactions    Latex Rash    Dye [Iodides] Anaphylaxis    Iodine Anaphylaxis    Dyazide [Hydrochlorothiazide W-Triamterene] Rash    Lasix [Furosemide] Rash    Procardia [Nifedipine] Other (See Comments)     Not for sure if rash occurred or rash    Doxycycline Dermatitis    Edecrin [Ethacrynic Acid]     Levaquin [Levofloxacin] Other (See Comments)     unknown    Mobic [Meloxicam]     Vioxx [Rofecoxib]     Celebrex [Celecoxib] Rash    Lexapro [Escitalopram Oxalate] Rash    Sulfa Antibiotics Hives     Fam HX: family history includes Heart Disease in her father.   Soc HX:   Social History     Socioeconomic History    Marital status:      Spouse name: None    Number of children: None    Years of education: None    Highest education level: None   Tobacco Use    Smoking status: Never    Smokeless tobacco: Never   Vaping Use    Vaping Use: Never used   Substance and Sexual Activity    Alcohol use: No    Drug use: No    Sexual activity: Yes     Partners: Male       Medications:   Medications:    cefTRIAXone (ROCEPHIN) IV  1,000 mg IntraVENous Once      Infusions:   PRN Meds:      Labs      CBC:   Recent Labs     04/11/23 1936   WBC 12.2*   HGB 13.6        BMP:    Recent Labs     04/11/23 1936   *   K 5.2*   CL 90*   CO2 27   *   CREATININE 3.1*   GLUCOSE 163*     Hepatic:   Recent Labs

## 2023-04-12 NOTE — PLAN OF CARE
Problem: Discharge Planning  Goal: Discharge to home or other facility with appropriate resources  4/12/2023 0136 by Angi Watson RN  Outcome: Progressing  4/12/2023 0136 by Angi Watson RN  Outcome: Progressing     Problem: Safety - Adult  Goal: Free from fall injury  4/12/2023 0136 by Angi Watson RN  Outcome: Progressing  4/12/2023 0136 by Angi Watson RN  Outcome: Progressing     Problem: ABCDS Injury Assessment  Goal: Absence of physical injury  4/12/2023 0136 by Angi Watson RN  Outcome: Progressing  4/12/2023 0136 by Angi Watson RN  Outcome: Progressing     Problem: Skin/Tissue Integrity  Goal: Absence of new skin breakdown  Description: 1. Monitor for areas of redness and/or skin breakdown  2. Assess vascular access sites hourly  3. Every 4-6 hours minimum:  Change oxygen saturation probe site  4. Every 4-6 hours:  If on nasal continuous positive airway pressure, respiratory therapy assess nares and determine need for appliance change or resting period.   Outcome: Progressing

## 2023-04-13 VITALS
RESPIRATION RATE: 17 BRPM | DIASTOLIC BLOOD PRESSURE: 43 MMHG | OXYGEN SATURATION: 98 % | TEMPERATURE: 98.8 F | BODY MASS INDEX: 30.6 KG/M2 | WEIGHT: 162.06 LBS | HEIGHT: 61 IN | SYSTOLIC BLOOD PRESSURE: 113 MMHG | HEART RATE: 64 BPM

## 2023-04-13 LAB
ALBUMIN SERPL-MCNC: 3.3 GM/DL (ref 3.4–5)
ALP BLD-CCNC: 58 IU/L (ref 40–128)
ALT SERPL-CCNC: 9 U/L (ref 10–40)
ANION GAP SERPL CALCULATED.3IONS-SCNC: 12 MMOL/L (ref 4–16)
AST SERPL-CCNC: 14 IU/L (ref 15–37)
BILIRUB SERPL-MCNC: 0.2 MG/DL (ref 0–1)
BUN SERPL-MCNC: 84 MG/DL (ref 6–23)
CALCIUM SERPL-MCNC: 8.2 MG/DL (ref 8.3–10.6)
CHLORIDE BLD-SCNC: 98 MMOL/L (ref 99–110)
CO2: 23 MMOL/L (ref 21–32)
CREAT SERPL-MCNC: 2.1 MG/DL (ref 0.6–1.1)
CULTURE: NORMAL
GFR SERPL CREATININE-BSD FRML MDRD: 23 ML/MIN/1.73M2
GLUCOSE BLD-MCNC: 131 MG/DL (ref 70–99)
GLUCOSE BLD-MCNC: 99 MG/DL (ref 70–99)
GLUCOSE SERPL-MCNC: 107 MG/DL (ref 70–99)
Lab: NORMAL
MAGNESIUM: 1.8 MG/DL (ref 1.8–2.4)
PHOSPHORUS: 3.3 MG/DL (ref 2.5–4.9)
POTASSIUM SERPL-SCNC: 3.5 MMOL/L (ref 3.5–5.1)
SODIUM BLD-SCNC: 133 MMOL/L (ref 135–145)
SPECIMEN: NORMAL
TOTAL PROTEIN: 5.1 GM/DL (ref 6.4–8.2)

## 2023-04-13 PROCEDURE — 83735 ASSAY OF MAGNESIUM: CPT

## 2023-04-13 PROCEDURE — 36415 COLL VENOUS BLD VENIPUNCTURE: CPT

## 2023-04-13 PROCEDURE — 84100 ASSAY OF PHOSPHORUS: CPT

## 2023-04-13 PROCEDURE — 6360000002 HC RX W HCPCS: Performed by: INTERNAL MEDICINE

## 2023-04-13 PROCEDURE — 6370000000 HC RX 637 (ALT 250 FOR IP): Performed by: INTERNAL MEDICINE

## 2023-04-13 PROCEDURE — 2580000003 HC RX 258: Performed by: INTERNAL MEDICINE

## 2023-04-13 PROCEDURE — 82962 GLUCOSE BLOOD TEST: CPT

## 2023-04-13 PROCEDURE — 6370000000 HC RX 637 (ALT 250 FOR IP): Performed by: STUDENT IN AN ORGANIZED HEALTH CARE EDUCATION/TRAINING PROGRAM

## 2023-04-13 PROCEDURE — 80053 COMPREHEN METABOLIC PANEL: CPT

## 2023-04-13 RX ADMIN — LEVOTHYROXINE SODIUM 50 MCG: 0.05 TABLET ORAL at 05:52

## 2023-04-13 RX ADMIN — SODIUM CHLORIDE, PRESERVATIVE FREE 10 ML: 5 INJECTION INTRAVENOUS at 08:21

## 2023-04-13 RX ADMIN — Medication 3 MG: at 01:18

## 2023-04-13 RX ADMIN — FLUTICASONE PROPIONATE 1 SPRAY: 50 SPRAY, METERED NASAL at 08:21

## 2023-04-13 RX ADMIN — FOLIC ACID 1 MG: 1 TABLET ORAL at 08:20

## 2023-04-13 RX ADMIN — ROPINIROLE HYDROCHLORIDE 0.5 MG: 0.25 TABLET, FILM COATED ORAL at 08:20

## 2023-04-13 RX ADMIN — HEPARIN SODIUM 5000 UNITS: 5000 INJECTION INTRAVENOUS; SUBCUTANEOUS at 05:52

## 2023-04-13 RX ADMIN — CYANOCOBALAMIN TAB 1000 MCG 500 MCG: 1000 TAB at 08:20

## 2023-04-13 RX ADMIN — FEBUXOSTAT 40 MG: 40 TABLET ORAL at 08:20

## 2023-04-13 NOTE — DISCHARGE SUMMARY
V2.0  Discharge Summary    Name:  Gideon Davidson /Age/Sex: 1935 (82 y.o. female)   Admit Date: 2023  Discharge Date: 23    MRN & CSN:  [id number] & [id number] Encounter Date and Time 23 11:11 AM EDT    Attending:  Alie Rae MD Discharging Provider: Alie Rae MD       Hospital Course:     Brief HPI: Gideon Davidson is a 82 y.o. female with hypertension, diabetes mellitus type 2, diet controlled, hypothyroidism, chronic diastolic congestive heart failure, lymphedema, 33 heart block s/p pacemaker, CKD presented to ED with complaints of generalized weakness. Stroke information was provided by patient's  at bedside. Patient reported feeling unwell since . He was feeling dizzy. Patient was sleeping whole day today, had decreased p.o. intake, decreased urine output. When tried to get up prompted them to get the pt to ER for evaluation. Patient is also complaining of dysuria, frequency denying any headache, visual disturbance, chest pain, shortness of breath, cough, chills, cough, denied any abdominal pain, denied any constipation. Vitals on presentation-/58, HR 90, RR 18, temp 98.4, saturating 99% on room air. Lab work significant for sodium 132, potassium 5.2, chloride 90, , creatinine 3.1, random glucose 163, troponin 0.047, hemoglobin 13.6, WBC 12.2. Rapid COVID-negative. Chest x-ray-no acute process. Patient received 500 cc bolus in ER, ceftriaxone 1gm IV. Brief Problem Based Course:   #. Generalized weakness  -Could be secondary to poor oral intake, vomiting patient  -Monitor     #. Acute kidney injury on known CKD - in setting of dehydration in the setting of nausea and vomiting of 1 day duration  -BUN/creatinine-was 99/2.7 (2023), 110/3.1 on admission   -Hold Bumex, metolazone, spironolactone.   Evaluated by nephrology while inpatient, discharged off Bumex and metolazone, continue spironolactone, soon follow-up with nephrology

## 2023-04-13 NOTE — PLAN OF CARE
Problem: Discharge Planning  Goal: Discharge to home or other facility with appropriate resources  4/12/2023 2255 by David Ken RN  Outcome: Progressing  Flowsheets (Taken 4/12/2023 1925)  Discharge to home or other facility with appropriate resources:   Identify barriers to discharge with patient and caregiver   Arrange for needed discharge resources and transportation as appropriate   Identify discharge learning needs (meds, wound care, etc)   Refer to discharge planning if patient needs post-hospital services based on physician order or complex needs related to functional status, cognitive ability or social support system  4/12/2023 1231 by Kelley Moya RN  Outcome: Progressing     Problem: Safety - Adult  Goal: Free from fall injury  4/12/2023 2255 by David Ken RN  Outcome: Progressing  4/12/2023 1231 by Kelley Moya RN  Outcome: Progressing     Problem: ABCDS Injury Assessment  Goal: Absence of physical injury  4/12/2023 2255 by David Ken RN  Outcome: Progressing  4/12/2023 1231 by Kelley Moya RN  Outcome: Progressing     Problem: Skin/Tissue Integrity  Goal: Absence of new skin breakdown  Description: 1. Monitor for areas of redness and/or skin breakdown  2. Assess vascular access sites hourly  3. Every 4-6 hours minimum:  Change oxygen saturation probe site  4. Every 4-6 hours:  If on nasal continuous positive airway pressure, respiratory therapy assess nares and determine need for appliance change or resting period.   4/12/2023 2255 by David Ken RN  Outcome: Progressing  4/12/2023 1231 by Kelley Moya RN  Outcome: Progressing     Problem: Chronic Conditions and Co-morbidities  Goal: Patient's chronic conditions and co-morbidity symptoms are monitored and maintained or improved  4/12/2023 2255 by David Ken RN  Outcome: Progressing  4/12/2023 1231 by Kelley Moya RN  Outcome: Progressing     Problem: Nutrition Deficit:  Goal: Optimize nutritional

## 2023-04-13 NOTE — ACP (ADVANCE CARE PLANNING)
Advance Care Planning     Advance Care Planning Inpatient Note  Spiritual Care Department    Today's Date: 4/13/2023  Unit: [facility]    Received request from [employer]. Upon review of chart and communication with care team, patient's decision making abilities are not in question. . Patient was/were present in the room during visit. Goals of ACP Conversation:  Discuss advance care planning documents    Health Care Decision Makers:       Primary Decision Maker: Diaz Denson - [phone]  Summary:  Ean Goddard  Verify that ACP/Healthcare POA documents are present, complete, witnessed & notarized as forms require. Then verify accuracy of Healthcare Decision Maker(s) contact information. Last, ensure Decision Maker(s) is designated Primary, Secondary or Supplemental.  Advance Care Planning Documents (Patient Wishes):  None     Assessment:  Patient has document at home and spouse will bring in a copy for medical records. In the assessment narrative, address the way holistic dimensions influence ACP decisions - medical, psychological, psychosocial, family systems, ethical, cultural, societal and spiritual.    Interventions:  Requested patient/family to submit existing document for our records: None  Discussed and provided education on state decision maker hierarchy  Encouraged ongoing ACP conversation with future decision makers and loved ones    Care Preferences Communicated:   No    Outcomes/Plan:  ACP Discussion: Postponed    Electronically signed by Erika Jerez, [employer] on 4/13/2023 at 8:45 AM

## 2023-04-14 ENCOUNTER — HOSPITAL ENCOUNTER (OUTPATIENT)
Dept: WOUND CARE | Age: 88
Discharge: HOME | End: 2023-04-14

## 2023-04-14 LAB
EKG ATRIAL RATE: 86 BPM
EKG DIAGNOSIS: NORMAL
EKG P AXIS: 44 DEGREES
EKG P-R INTERVAL: 160 MS
EKG Q-T INTERVAL: 460 MS
EKG QRS DURATION: 156 MS
EKG QTC CALCULATION (BAZETT): 550 MS
EKG R AXIS: 76 DEGREES
EKG T AXIS: 63 DEGREES
EKG VENTRICULAR RATE: 86 BPM

## 2023-04-14 PROCEDURE — 93010 ELECTROCARDIOGRAM REPORT: CPT | Performed by: INTERNAL MEDICINE

## 2023-04-16 LAB
CULTURE: NORMAL
CULTURE: NORMAL
Lab: NORMAL
Lab: NORMAL
SPECIMEN: NORMAL
SPECIMEN: NORMAL

## 2023-04-18 ENCOUNTER — HOSPITAL ENCOUNTER (OUTPATIENT)
Dept: WOUND CARE | Age: 88
Discharge: HOME | End: 2023-04-18
Payer: MEDICARE

## 2023-04-18 VITALS
DIASTOLIC BLOOD PRESSURE: 73 MMHG | RESPIRATION RATE: 16 BRPM | SYSTOLIC BLOOD PRESSURE: 138 MMHG | HEART RATE: 71 BPM | TEMPERATURE: 96.7 F

## 2023-04-18 DIAGNOSIS — L89.322 PRESSURE INJURY OF LEFT BUTTOCK, STAGE 2 (HCC): ICD-10-CM

## 2023-04-18 DIAGNOSIS — S30.810A EXCORIATION OF BUTTOCK, INITIAL ENCOUNTER: ICD-10-CM

## 2023-04-18 DIAGNOSIS — L84 CALLUS OF FOOT: Primary | ICD-10-CM

## 2023-04-18 DIAGNOSIS — L97.522 DIABETIC ULCER OF TOE OF LEFT FOOT ASSOCIATED WITH TYPE 2 DIABETES MELLITUS, WITH FAT LAYER EXPOSED (HCC): ICD-10-CM

## 2023-04-18 DIAGNOSIS — E11.621 DIABETIC ULCER OF TOE OF LEFT FOOT ASSOCIATED WITH TYPE 2 DIABETES MELLITUS, WITH FAT LAYER EXPOSED (HCC): ICD-10-CM

## 2023-04-18 PROCEDURE — 11042 DBRDMT SUBQ TIS 1ST 20SQCM/<: CPT

## 2023-04-18 PROCEDURE — 6370000000 HC RX 637 (ALT 250 FOR IP): Performed by: NURSE PRACTITIONER

## 2023-04-18 RX ORDER — BACITRACIN, NEOMYCIN, POLYMYXIN B 400; 3.5; 5 [USP'U]/G; MG/G; [USP'U]/G
OINTMENT TOPICAL ONCE
OUTPATIENT
Start: 2023-04-18 | End: 2023-04-18

## 2023-04-18 RX ORDER — LIDOCAINE 40 MG/G
CREAM TOPICAL ONCE
OUTPATIENT
Start: 2023-04-18 | End: 2023-04-18

## 2023-04-18 RX ORDER — BACITRACIN ZINC AND POLYMYXIN B SULFATE 500; 1000 [USP'U]/G; [USP'U]/G
OINTMENT TOPICAL ONCE
OUTPATIENT
Start: 2023-04-18 | End: 2023-04-18

## 2023-04-18 RX ORDER — LIDOCAINE HYDROCHLORIDE 40 MG/ML
SOLUTION TOPICAL ONCE
OUTPATIENT
Start: 2023-04-18 | End: 2023-04-18

## 2023-04-18 RX ORDER — BETAMETHASONE DIPROPIONATE 0.05 %
OINTMENT (GRAM) TOPICAL ONCE
OUTPATIENT
Start: 2023-04-18 | End: 2023-04-18

## 2023-04-18 RX ORDER — CLOBETASOL PROPIONATE 0.5 MG/G
OINTMENT TOPICAL ONCE
OUTPATIENT
Start: 2023-04-18 | End: 2023-04-18

## 2023-04-18 RX ORDER — CLOBETASOL PROPIONATE 0.5 MG/G
OINTMENT TOPICAL ONCE
Status: COMPLETED | OUTPATIENT
Start: 2023-04-18 | End: 2023-04-18

## 2023-04-18 RX ORDER — GINSENG 100 MG
CAPSULE ORAL ONCE
OUTPATIENT
Start: 2023-04-18 | End: 2023-04-18

## 2023-04-18 RX ORDER — LIDOCAINE 50 MG/G
OINTMENT TOPICAL ONCE
OUTPATIENT
Start: 2023-04-18 | End: 2023-04-18

## 2023-04-18 RX ORDER — GENTAMICIN SULFATE 1 MG/G
OINTMENT TOPICAL ONCE
OUTPATIENT
Start: 2023-04-18 | End: 2023-04-18

## 2023-04-18 RX ADMIN — CLOBETASOL PROPIONATE: 0.5 OINTMENT TOPICAL at 09:43

## 2023-04-18 NOTE — PROGRESS NOTES
Wash hands with soap and water before and after dressing changes. Prior to applying a clean dressing, cleanse wound with normal saline,                          wound cleanser, or mild soap and water. Ask your physician or nurse before getting the wound(s) wet in the shower. Daily Wound management:                          Keep weight off wounds and reposition every 2 hours. Avoid standing for long periods of time. Apply wraps/stockings in AM and remove at bedtime. Elevate legs to the level of the heart or above for 30 minutes 4-5 times a day and/or when sitting. When taking antibiotics take entire prescription as ordered by MD do not stop taking until medicine is all gone. Orders for this week (4/18/23)  Bilateral Lower Leg - Wash with mild soap and water, rinse with saline, pat dry with 4x4. A&D to intact skin of lower extremities. Tubi F  Change daily    Buttock -  wash with mild soap and water  Apply clobetasol, desitin and stimulen to thigh and under abdomen  Apply puracol to wound bed  Cover with Gentac/silicone border  Change Daily       Follow up with Rakan Marquez CNP in 1 weeks in the wound care center.   Call [phone] for any questions or concerns        Treatment Note Wound 04/18/23 Buttocks Left #1-Dressing/Treatment:  (puracol; gentac)    Written Patient Dismissal Instructions Given            Electronically signed by TREVON Angulo CNP on 4/18/2023 at 2:04 PM

## 2023-04-18 NOTE — DISCHARGE INSTRUCTIONS
extremities. Tubi F  Change daily    Buttock -  wash with mild soap and water  Apply clobetasol, desitin and stimulen to thigh and under abdomen  Apply puracol to wound bed  Cover with Gentac/silicone border  Change Daily       Follow up with Manfred Lanes, CNP in 1 weeks in the wound care center.   Call [phone] for any questions or concerns

## 2023-04-28 ENCOUNTER — HOSPITAL ENCOUNTER (OUTPATIENT)
Dept: WOUND CARE | Age: 88
Discharge: HOME | End: 2023-04-28
Payer: MEDICARE

## 2023-04-28 VITALS
TEMPERATURE: 96.4 F | RESPIRATION RATE: 16 BRPM | DIASTOLIC BLOOD PRESSURE: 74 MMHG | HEART RATE: 72 BPM | SYSTOLIC BLOOD PRESSURE: 144 MMHG

## 2023-04-28 DIAGNOSIS — L97.522 DIABETIC ULCER OF TOE OF LEFT FOOT ASSOCIATED WITH TYPE 2 DIABETES MELLITUS, WITH FAT LAYER EXPOSED (HCC): ICD-10-CM

## 2023-04-28 DIAGNOSIS — L89.312 PRESSURE INJURY OF RIGHT BUTTOCK, STAGE 2 (HCC): ICD-10-CM

## 2023-04-28 DIAGNOSIS — L84 CALLUS OF FOOT: Primary | ICD-10-CM

## 2023-04-28 DIAGNOSIS — I87.303 VENOUS HYPERTENSION OF BOTH LOWER EXTREMITIES: ICD-10-CM

## 2023-04-28 DIAGNOSIS — S30.810A EXCORIATION OF BUTTOCK, INITIAL ENCOUNTER: ICD-10-CM

## 2023-04-28 DIAGNOSIS — E11.621 DIABETIC ULCER OF TOE OF LEFT FOOT ASSOCIATED WITH TYPE 2 DIABETES MELLITUS, WITH FAT LAYER EXPOSED (HCC): ICD-10-CM

## 2023-04-28 PROCEDURE — 99213 OFFICE O/P EST LOW 20 MIN: CPT

## 2023-04-28 RX ORDER — LIDOCAINE 40 MG/G
CREAM TOPICAL ONCE
OUTPATIENT
Start: 2023-04-28 | End: 2023-04-28

## 2023-04-28 RX ORDER — GINSENG 100 MG
CAPSULE ORAL ONCE
OUTPATIENT
Start: 2023-04-28 | End: 2023-04-28

## 2023-04-28 RX ORDER — CLOBETASOL PROPIONATE 0.5 MG/G
OINTMENT TOPICAL ONCE
OUTPATIENT
Start: 2023-04-28 | End: 2023-04-28

## 2023-04-28 RX ORDER — LIDOCAINE 50 MG/G
OINTMENT TOPICAL ONCE
OUTPATIENT
Start: 2023-04-28 | End: 2023-04-28

## 2023-04-28 RX ORDER — BETAMETHASONE DIPROPIONATE 0.05 %
OINTMENT (GRAM) TOPICAL ONCE
OUTPATIENT
Start: 2023-04-28 | End: 2023-04-28

## 2023-04-28 RX ORDER — BACITRACIN ZINC AND POLYMYXIN B SULFATE 500; 1000 [USP'U]/G; [USP'U]/G
OINTMENT TOPICAL ONCE
OUTPATIENT
Start: 2023-04-28 | End: 2023-04-28

## 2023-04-28 RX ORDER — BACITRACIN, NEOMYCIN, POLYMYXIN B 400; 3.5; 5 [USP'U]/G; MG/G; [USP'U]/G
OINTMENT TOPICAL ONCE
OUTPATIENT
Start: 2023-04-28 | End: 2023-04-28

## 2023-04-28 RX ORDER — LIDOCAINE HYDROCHLORIDE 40 MG/ML
SOLUTION TOPICAL ONCE
OUTPATIENT
Start: 2023-04-28 | End: 2023-04-28

## 2023-04-28 RX ORDER — GENTAMICIN SULFATE 1 MG/G
OINTMENT TOPICAL ONCE
OUTPATIENT
Start: 2023-04-28 | End: 2023-04-28

## 2023-04-28 ASSESSMENT — PAIN SCALES - GENERAL: PAINLEVEL_OUTOF10: 0

## 2023-04-28 NOTE — PROGRESS NOTES
lower extremities       Endocrine    WD-Diabetic ulcer of toe of left foot associated with type 2 diabetes mellitus, with fat layer exposed ([facility]    Relevant Orders    Initiate Outpatient Wound Care Protocol       Other    WD-Callus of foot - Primary    Relevant Orders    Initiate Outpatient Wound Care Protocol    WD-Pressure injury of right buttock, stage 2 ([facility]    Relevant Orders    Initiate Outpatient Wound Care Protocol    WD-Excoriation of buttock crease    Relevant Orders    Initiate Outpatient Wound Care Protocol      Status of wound progress and description from last visit: New wound left buttock since hospitalized for UTI now healed. She has been followed by nephrology and diuretics held thus increased edema we are managing with heavy moisturizing, Tubigrip and lymphedema pumps. Regimen as below. She has hammer toes and this is causing pressure wounds to the toes. Toe sleeves in place. Will follow up as needed. Plan:     Discharge Instructions         PHYSICIAN ORDERS AND DISCHARGE INSTRUCTIONS   NOTE: Upon discharge from the [address], you will receive a patient experience survey via E-mail. We would be grateful if you would take the time to fill this survey out. Wound care order history:               CYNTHIA's   Right  1.68     Left 1.65  Date 1/13/21              Vascular studies: . Cultures: .3/18/22              Antibiotics: . HbA1c:  . Compression/Lymph Pumps: . Double Tubi              Grafts: Alfonzo Ojeda: . Continuing wound care orders and information:              Residence: . Private              Continue home health care with: [facility] Your wound-care supplies will be provided by: [facility]: . Wound cleansing:                          Do not scrub or use excessive force. Wash hands with soap and water before and after dressing changes.                           Prior

## 2023-04-28 NOTE — DISCHARGE INSTRUCTIONS
PHYSICIAN ORDERS AND DISCHARGE INSTRUCTIONS   NOTE: Upon discharge from the [address], you will receive a patient experience survey via E-mail. We would be grateful if you would take the time to fill this survey out. Wound care order history:               CYNTHIA's   Right  1.68     Left 1.65  Date 1/13/21              Vascular studies: . Cultures: .3/18/22              Antibiotics: . HbA1c:  . Compression/Lymph Pumps: . Double Tubi              Grafts: Alma Kras: . Continuing wound care orders and information:              Residence: . Private              Continue home health care with: [facility] Your wound-care supplies will be provided by: [facility]: . Wound cleansing:                          Do not scrub or use excessive force. Wash hands with soap and water before and after dressing changes. Prior to applying a clean dressing, cleanse wound with normal saline,                          wound cleanser, or mild soap and water. Ask your physician or nurse before getting the wound(s) wet in the shower. Daily Wound management:                          Keep weight off wounds and reposition every 2 hours. Avoid standing for long periods of time. Apply wraps/stockings in AM and remove at bedtime. Elevate legs to the level of the heart or above for 30 minutes 4-5 times a day and/or when sitting. When taking antibiotics take entire prescription as ordered by MD do not stop taking until medicine is all gone. Orders for this week (4/28/23)  Bilateral Lower Leg - Wash with mild soap and water, rinse with saline, pat dry with 4x4.   A&D to intact skin of lower

## 2023-05-08 ENCOUNTER — HOSPITAL ENCOUNTER (OUTPATIENT)
Age: 88
Discharge: HOME | End: 2023-05-08
Payer: MEDICARE

## 2023-05-08 DIAGNOSIS — N17.9 AKI (ACUTE KIDNEY INJURY) (HCC): ICD-10-CM

## 2023-05-08 LAB
ALBUMIN SERPL-MCNC: 3.8 GM/DL (ref 3.4–5)
ALP BLD-CCNC: 77 IU/L (ref 40–129)
ALT SERPL-CCNC: 8 U/L (ref 10–40)
ANION GAP SERPL CALCULATED.3IONS-SCNC: 13 MMOL/L (ref 4–16)
AST SERPL-CCNC: 14 IU/L (ref 15–37)
BILIRUB SERPL-MCNC: 0.5 MG/DL (ref 0–1)
BUN SERPL-MCNC: 73 MG/DL (ref 6–23)
CALCIUM SERPL-MCNC: 9.2 MG/DL (ref 8.3–10.6)
CHLORIDE BLD-SCNC: 95 MMOL/L (ref 99–110)
CO2: 32 MMOL/L (ref 21–32)
CREAT SERPL-MCNC: 1.9 MG/DL (ref 0.6–1.1)
GFR SERPL CREATININE-BSD FRML MDRD: 26 ML/MIN/1.73M2
GLUCOSE SERPL-MCNC: 132 MG/DL (ref 70–99)
POTASSIUM SERPL-SCNC: 3 MMOL/L (ref 3.5–5.1)
SODIUM BLD-SCNC: 140 MMOL/L (ref 135–145)
TOTAL PROTEIN: 7 GM/DL (ref 6.4–8.2)

## 2023-05-08 PROCEDURE — 36415 COLL VENOUS BLD VENIPUNCTURE: CPT

## 2023-05-08 PROCEDURE — 80053 COMPREHEN METABOLIC PANEL: CPT

## 2023-05-09 NOTE — PROGRESS NOTES
04/13/23 0849   Encounter Summary   Encounter Overview/Reason  Initial Encounter; Advance Care Planning   Service Provided For: Patient   Referral/Consult From: Rounding   Support System Spouse; Children   Last Encounter  04/13/23  (Offered prayer and spiritual support. Family will bring in a copy of Advanced Directive. Patient in good spiritual support)   Complexity of Encounter Low   Begin Time 0830   End Time  0850   Total Time Calculated 20 min   Encounter    Type Initial Screen/Assessment   Spiritual/Emotional needs   Type Spiritual Support   Advance Care Planning   Type ACP conversation  (Patient has an Advanced Directive at home and family will bring in a copy)   Assessment/Intervention/Outcome   Assessment Calm;Peaceful   Intervention Active listening;Discussed belief system/Anglican practices/breana;Discussed meaning/purpose;Discussed illness injury and its impact; Life review/Legacy; Nurtured Hope;Sustaining Presence/Ministry of presence   Outcome Encouraged;Engaged in conversation;Expressed feelings of Sonia, Peace and/or Love;Expressed Gratitude   Plan and Referrals   Plan/Referrals Continue Support (comment)  (as needed)
4 Eyes Skin Assessment     NAME:  Gelacio Hernandez OF BIRTH:  1935  MEDICAL RECORD NUMBER:  [id number]    The patient is being assess for  Admission    I agree that 2 RN's have performed a thorough Head to Toe Skin Assessment on the patient. ALL assessment sites listed below have been assessed. Areas assessed by both nurses:    Head, Face, Ears, Shoulders, Back, Chest, Arms, Elbows, Hands, Sacrum. Buttock, Coccyx, Ischium, and Legs. Feet and Heels        Does the Patient have a Wound? Yes wound(s) were present on assessment.  LDA wound assessment was Initiated and completed by OSEAS Thapa Prevention initiated:  Yes   Wound Care Orders initiated:  Yes    Pressure Injury (Stage 3,4, Unstageable, DTI, NWPT, and Complex wounds) if present place consult order under [de-identified] Yes    New and Established Ostomies if present place consult order under : No      Nurse 1 eSignature: Electronically signed by Angi Watson RN on 4/12/23 at 12:05 AM EDT    **SHARE this note so that the co-signing nurse is able to place an eSignature**    Nurse 2 eSignature: Electronically signed by Cat Faria RN on 4/12/23 at 12:41 AM EDT        2
Nephrology Progress Note  4/13/2023 7:08 AM        Subjective:   Admit Date: 4/11/2023  PCP: Nallely Quintero, APRN - CNP    Interval History: No major event, that I am ARF, doing well and wondering whether she can go home    Diet: Reasonable    ROS: No confusion or shortness of breath  Urine output was not recorded  Acceptable blood pressure has no fever    Data:     Current meds:    cefTRIAXone (ROCEPHIN) IV  1,000 mg IntraVENous L51B    folic acid  1 mg Oral Daily    insulin lispro  0-4 Units SubCUTAneous TID WC    insulin lispro  0-4 Units SubCUTAneous Nightly    vitamin B-12  500 mcg Oral Daily    sodium chloride flush  5-40 mL IntraVENous 2 times per day    heparin (porcine)  5,000 Units SubCUTAneous 3 times per day    febuxostat  40 mg Oral Daily    fluticasone  1 spray Nasal BID    latanoprost  1 drop Both Eyes Nightly    levothyroxine  50 mcg Oral Daily    rOPINIRole  0.5 mg Oral TID      dextrose      sodium chloride           No intake/output data recorded.     CBC:   Recent Labs     04/11/23 1936 04/12/23  0728   WBC 12.2* 7.0   HGB 13.6 11.7*    216          Recent Labs     04/11/23 1936 04/12/23  0728 04/13/23  0227   * 136 133*   K 5.2* 4.2 3.5   CL 90* 97* 98*   CO2 27 23 23   * 102* 84*   CREATININE 3.1* 2.7* 2.1*   GLUCOSE 163* 124* 107*       Lab Results   Component Value Date    CALCIUM 8.2 (L) 04/13/2023    PHOS 3.3 04/13/2023       Objective:     Vitals: BP (!) 120/45   Pulse 76   Temp 98.4 °F (36.9 °C) (Oral)   Resp 16   Ht 5' 1\" (1.549 m)   Wt 162 lb 1 oz (73.5 kg)   SpO2 98%   BMI 30.62 kg/m² ,    General appearance: Alert, awake and oriented  HEENT: Positive conjunctival pallor  Neck: Seems supple-chest wall cardiac device  Lungs: Coarse breath sounds no crackles  Heart: Seemed regular rate and rhythm  Abdomen: Soft, nontender  Extremities: No gross edema now      Problem List :         Impression :     Acute kidney injury with underlying chronic kidney disease
Room assignment changed to 0861
by: Scar Gipson on 4/21/2023 3:32 PM      Electronically signed by:  Dami Pompa MD 5/9/2023 7:43 AM
MM    Differential Type MANUAL DIFFERENTIAL     Stomatocytes 1+    CMP    Collection Time: 04/11/23  7:36 PM   Result Value Ref Range    Sodium 132 (L) 135 - 145 MMOL/L    Potassium 5.2 (H) 3.5 - 5.1 MMOL/L    Chloride 90 (L) 99 - 110 mMol/L    CO2 27 21 - 32 MMOL/L     (H) 6 - 23 MG/DL    Creatinine 3.1 (H) 0.6 - 1.1 MG/DL    Est, Glom Filt Rate 14 (L) >60 mL/min/1.73m2    Glucose 163 (H) 70 - 99 MG/DL    Calcium 10.0 8.3 - 10.6 MG/DL    Albumin 4.8 3.4 - 5.0 GM/DL    Total Protein 7.5 6.4 - 8.2 GM/DL    Total Bilirubin 0.5 0.0 - 1.0 MG/DL    ALT 11 10 - 40 U/L    AST 14 (L) 15 - 37 IU/L    Alkaline Phosphatase 88 40 - 129 IU/L    Anion Gap 15 4 - 16   Magnesium    Collection Time: 04/11/23  7:36 PM   Result Value Ref Range    Magnesium 2.1 1.8 - 2.4 mg/dl   Troponin    Collection Time: 04/11/23  7:36 PM   Result Value Ref Range    Troponin T 0.047 (H) <0.01 NG/ML   Lactic Acid    Collection Time: 04/11/23  7:36 PM   Result Value Ref Range    Lactate 1.0 0.5 - 1.9 mMOL/L   EKG 12 Lead    Collection Time: 04/11/23  7:49 PM   Result Value Ref Range    Ventricular Rate 86 BPM    Atrial Rate 86 BPM    P-R Interval 160 ms    QRS Duration 156 ms    Q-T Interval 460 ms    QTc Calculation (Bazett) 550 ms    P Axis 44 degrees    R Axis 76 degrees    T Axis 63 degrees    Diagnosis       Atrial-sensed ventricular-paced rhythm  Abnormal ECG  When compared with ECG of 16-NOV-2020 11:28,  Electronic ventricular pacemaker has replaced Sinus rhythm  Vent.  rate has increased BY  50 BPM     COVID-19, Rapid    Collection Time: 04/11/23  7:50 PM    Specimen: Nasopharyngeal   Result Value Ref Range    Source UNKNOWN     SARS-CoV-2, NAAT NOT DETECTED NOT DETECTED   Urinalysis    Collection Time: 04/11/23  8:18 PM   Result Value Ref Range    Color, UA YELLOW YELLOW    Clarity, UA CLEAR CLEAR    Glucose, Urine NEGATIVE NEGATIVE MG/DL    Bilirubin Urine NEGATIVE NEGATIVE MG/DL    Ketones, Urine NEGATIVE NEGATIVE MG/DL    Specific
Clear

## 2023-05-15 ENCOUNTER — HOSPITAL ENCOUNTER (OUTPATIENT)
Age: 88
Discharge: HOME | End: 2023-05-15
Payer: MEDICARE

## 2023-05-15 LAB
ALBUMIN SERPL-MCNC: 4.2 GM/DL (ref 3.4–5)
ALP BLD-CCNC: 73 IU/L (ref 40–129)
ALT SERPL-CCNC: 8 U/L (ref 10–40)
ANION GAP SERPL CALCULATED.3IONS-SCNC: 13 MMOL/L (ref 4–16)
AST SERPL-CCNC: 11 IU/L (ref 15–37)
BILIRUB SERPL-MCNC: 0.5 MG/DL (ref 0–1)
BUN SERPL-MCNC: 71 MG/DL (ref 6–23)
CALCIUM SERPL-MCNC: 9.4 MG/DL (ref 8.3–10.6)
CHLORIDE BLD-SCNC: 99 MMOL/L (ref 99–110)
CO2: 30 MMOL/L (ref 21–32)
CREAT SERPL-MCNC: 2.3 MG/DL (ref 0.6–1.1)
GFR SERPL CREATININE-BSD FRML MDRD: 20 ML/MIN/1.73M2
GLUCOSE P FAST SERPL-MCNC: 124 MG/DL (ref 70–99)
POTASSIUM SERPL-SCNC: 3.7 MMOL/L (ref 3.5–5.1)
SODIUM BLD-SCNC: 142 MMOL/L (ref 135–145)
TOTAL PROTEIN: 6.8 GM/DL (ref 6.4–8.2)

## 2023-05-15 PROCEDURE — 80053 COMPREHEN METABOLIC PANEL: CPT

## 2023-05-15 PROCEDURE — 36415 COLL VENOUS BLD VENIPUNCTURE: CPT

## 2023-06-09 ENCOUNTER — HOSPITAL ENCOUNTER (OUTPATIENT)
Dept: WOUND CARE | Age: 88
Discharge: HOME | End: 2023-06-09
Payer: MEDICARE

## 2023-06-09 VITALS
HEART RATE: 73 BPM | TEMPERATURE: 97.2 F | DIASTOLIC BLOOD PRESSURE: 54 MMHG | RESPIRATION RATE: 16 BRPM | SYSTOLIC BLOOD PRESSURE: 148 MMHG

## 2023-06-09 DIAGNOSIS — L97.522 DIABETIC ULCER OF TOE OF LEFT FOOT ASSOCIATED WITH TYPE 2 DIABETES MELLITUS, WITH FAT LAYER EXPOSED (HCC): ICD-10-CM

## 2023-06-09 DIAGNOSIS — E11.621 DIABETIC ULCER OF TOE OF LEFT FOOT ASSOCIATED WITH TYPE 2 DIABETES MELLITUS, WITH FAT LAYER EXPOSED (HCC): ICD-10-CM

## 2023-06-09 DIAGNOSIS — L89.312 PRESSURE INJURY OF RIGHT BUTTOCK, STAGE 2 (HCC): ICD-10-CM

## 2023-06-09 DIAGNOSIS — S30.810A EXCORIATION OF BUTTOCK, INITIAL ENCOUNTER: ICD-10-CM

## 2023-06-09 DIAGNOSIS — L84 CALLUS OF FOOT: Primary | ICD-10-CM

## 2023-06-09 PROCEDURE — 11042 DBRDMT SUBQ TIS 1ST 20SQCM/<: CPT

## 2023-06-09 RX ORDER — BETAMETHASONE DIPROPIONATE 0.05 %
OINTMENT (GRAM) TOPICAL ONCE
OUTPATIENT
Start: 2023-06-09 | End: 2023-06-09

## 2023-06-09 RX ORDER — GINSENG 100 MG
CAPSULE ORAL ONCE
OUTPATIENT
Start: 2023-06-09 | End: 2023-06-09

## 2023-06-09 RX ORDER — GENTAMICIN SULFATE 1 MG/G
OINTMENT TOPICAL ONCE
OUTPATIENT
Start: 2023-06-09 | End: 2023-06-09

## 2023-06-09 RX ORDER — LIDOCAINE 50 MG/G
OINTMENT TOPICAL ONCE
OUTPATIENT
Start: 2023-06-09 | End: 2023-06-09

## 2023-06-09 RX ORDER — LIDOCAINE 40 MG/G
CREAM TOPICAL ONCE
OUTPATIENT
Start: 2023-06-09 | End: 2023-06-09

## 2023-06-09 RX ORDER — CLOBETASOL PROPIONATE 0.5 MG/G
OINTMENT TOPICAL ONCE
OUTPATIENT
Start: 2023-06-09 | End: 2023-06-09

## 2023-06-09 RX ORDER — LIDOCAINE HYDROCHLORIDE 40 MG/ML
SOLUTION TOPICAL ONCE
OUTPATIENT
Start: 2023-06-09 | End: 2023-06-09

## 2023-06-09 RX ORDER — IBUPROFEN 200 MG
TABLET ORAL ONCE
OUTPATIENT
Start: 2023-06-09 | End: 2023-06-09

## 2023-06-09 RX ORDER — BACITRACIN ZINC AND POLYMYXIN B SULFATE 500; 1000 [USP'U]/G; [USP'U]/G
OINTMENT TOPICAL ONCE
OUTPATIENT
Start: 2023-06-09 | End: 2023-06-09

## 2023-06-09 NOTE — DISCHARGE INSTRUCTIONS
extremities. Apply puracol to wound bed  Cover with Bandaid  Tubi F  Change daily      Follow up with Marta Choi CNP in 1 week in the wound care center.   Call [phone] for any questions or concerns

## 2023-06-09 NOTE — PROGRESS NOTES
Starts ___ O'Clock 0 06/09/23 0946   Undermining Ends___ O'Clock 0 06/09/23 0946   Undermining Maxium Distance (cm) 0 06/09/23 0946   Wound Assessment Slough 06/09/23 0946   Drainage Amount Moderate 06/09/23 0946   Drainage Description Serosanguinous 06/09/23 0946   Odor None 06/09/23 0946   Leatha-wound Assessment Intact 06/09/23 0946   Margins Defined edges 06/09/23 0946   Wound Thickness Description not for Pressure Injury Full thickness 06/09/23 0946   Number of days: 0       Wound 06/09/23 Wound #2 Left 2nd toe (Active)   Dressing Status New dressing applied 06/09/23 1011   Offloading for Diabetic Foot Ulcers Offloading not required 06/09/23 1011   Wound Length (cm) 0.3 cm 06/09/23 0946   Wound Width (cm) 0.3 cm 06/09/23 0946   Wound Depth (cm) 0.1 cm 06/09/23 0946   Wound Surface Area (cm^2) 0.09 cm^2 06/09/23 0946   Wound Volume (cm^3) 0.009 cm^3 06/09/23 0946   Post-Procedure Length (cm) 0.3 cm 06/09/23 1008   Post-Procedure Width (cm) 0.3 cm 06/09/23 1008   Post-Procedure Depth (cm) 0.1 cm 06/09/23 1008   Post-Procedure Surface Area (cm^2) 0.09 cm^2 06/09/23 1008   Post-Procedure Volume (cm^3) 0.009 cm^3 06/09/23 1008   Distance Tunneling (cm) 0 cm 06/09/23 0946   Tunneling Position ___ O'Clock 0 06/09/23 0946   Undermining Starts ___ O'Clock 0 06/09/23 0946   Undermining Ends___ O'Clock 0 06/09/23 0946   Undermining Maxium Distance (cm) 0 06/09/23 0946   Wound Assessment Granulation tissue 06/09/23 0946   Drainage Amount Moderate 06/09/23 0946   Drainage Description Serosanguinous 06/09/23 0946   Odor None 06/09/23 0946   Leatha-wound Assessment Intact 06/09/23 0946   Margins Defined edges 06/09/23 0946   Wound Thickness Description not for Pressure Injury Full thickness 06/09/23 0946   Number of days: 0     Total  Area  Debrided:  0.13 sq cm     Bleeding:  Minimal    Hemostasis Achieved:  by pressure    Procedural Pain:  0  / 10     Post Procedural Pain:  0 / 10     Response to treatment:  Well tolerated by

## 2023-07-06 ENCOUNTER — TELEPHONE (OUTPATIENT)
Dept: WOUND CARE | Age: 88
End: 2023-07-06

## 2023-07-06 DIAGNOSIS — L84 CALLUS OF FOOT: ICD-10-CM

## 2023-07-06 DIAGNOSIS — L89.312 PRESSURE INJURY OF RIGHT BUTTOCK, STAGE 2 (HCC): ICD-10-CM

## 2023-07-06 DIAGNOSIS — E11.621 DIABETIC ULCER OF TOE OF LEFT FOOT ASSOCIATED WITH TYPE 2 DIABETES MELLITUS, WITH FAT LAYER EXPOSED (HCC): Primary | ICD-10-CM

## 2023-07-06 DIAGNOSIS — L97.522 DIABETIC ULCER OF TOE OF LEFT FOOT ASSOCIATED WITH TYPE 2 DIABETES MELLITUS, WITH FAT LAYER EXPOSED (HCC): Primary | ICD-10-CM

## 2023-07-06 NOTE — TELEPHONE ENCOUNTER
Pt was healed at last visit. No need for further f/u.     Electronically signed by Benjie Nunez RN on 7/6/2023 at 9:13 AM

## 2023-07-28 ENCOUNTER — HOSPITAL ENCOUNTER (OUTPATIENT)
Dept: WOUND CARE | Age: 88
Discharge: HOME | End: 2023-07-28
Payer: MEDICARE

## 2023-07-28 VITALS
RESPIRATION RATE: 16 BRPM | DIASTOLIC BLOOD PRESSURE: 57 MMHG | SYSTOLIC BLOOD PRESSURE: 121 MMHG | HEART RATE: 67 BPM | TEMPERATURE: 98 F

## 2023-07-28 DIAGNOSIS — L84 CALLUS OF FOOT: ICD-10-CM

## 2023-07-28 DIAGNOSIS — E11.621 DIABETIC ULCER OF TOE OF LEFT FOOT ASSOCIATED WITH TYPE 2 DIABETES MELLITUS, WITH FAT LAYER EXPOSED (HCC): ICD-10-CM

## 2023-07-28 DIAGNOSIS — L89.312 PRESSURE INJURY OF RIGHT BUTTOCK, STAGE 2 (HCC): ICD-10-CM

## 2023-07-28 DIAGNOSIS — L97.522 DIABETIC ULCER OF TOE OF LEFT FOOT ASSOCIATED WITH TYPE 2 DIABETES MELLITUS, WITH FAT LAYER EXPOSED (HCC): ICD-10-CM

## 2023-07-28 DIAGNOSIS — S30.810S EXCORIATION OF BUTTOCK, SEQUELA: Primary | ICD-10-CM

## 2023-07-28 PROCEDURE — 11056 PARNG/CUTG B9 HYPRKR LES 2-4: CPT

## 2023-07-28 NOTE — PROGRESS NOTES
Wound Care Center Progress Note       Susan Tavarez  AGE: 82 y.o. GENDER: female  : 1935  TODAY'S DATE:  2023        Subjective:     Chief Complaint   Patient presents with    Wound Check      HISTORY of PRESENT ILLNESS    Susan Tavarez is a 82 y.o. female who presents to the [address] [place] [address] for evaluation and treatment of chronic diabetic left second toe wound that is of mild to moderate severity. Recurrent venous and lymphedema wounds bilateral lower legs. The patient has significant underlying medical conditions as below. 22: New wounds to toes to the right foot, pressure and diabetic in nature of mild to moderate severity. Recurrent venous and lymphedema wounds bilateral lower leg of mild/moderate severity. Regimen as below. Wound Pain Timing/Severity: None  Quality of pain: N/A  Severity of pain:  0 / 10   Modifying Factors: venous stasis, lymphedema, diabetes, poor glucose control, chronic pressure, decreased mobility, shear force and obesity  Associated Signs/Symptoms: edema, erythema     Arterial evaluation: VL arteries 21 per Dr. Josselin Hancock, no significant stenosis     Venous Evaluation: as needed if indicated based on the wound, location, assessment and healing. Wound infection: as indicated for S&S infection     Diabetes: Yes, no medication regimen at this time, diet control. Last AIC 7.8 as of 20  Smoking: Never smoker  Anticoagulant therapy: No  Immunosuppression: No  Obesity: Yes  Other History: Cor pulmonale on diuretic therapy, PAD on Pletal     Nutritional status: well nourished. Discussed need for increased protein and calories for wound healing and good sources of protein (just over 7 grams for every 20 pounds of body weight). Animal-based foods high in protein (meat, poultry, fish, eggs, and dairy foods). Plant based foods high in protein (tofu, lentils, beans, chickpeas, nuts, quinoa and everette seeds.      Patient educated on the 6 essential components

## 2023-07-28 NOTE — DISCHARGE INSTRUCTIONS
PHYSICIAN ORDERS AND DISCHARGE INSTRUCTIONS   NOTE: Upon discharge from the  [facility], you will receive a patient experience survey via E-mail. We would be grateful if you would take the time to fill this survey out. Wound care order history:               CYNTHIA's   Right  1.68     Left 1.65  Date 1/13/21              Vascular studies: . Cultures: .3/18/22              Antibiotics: . HbA1c:  . Compression/Lymph Pumps: . Double Tubi              Grafts: Venetta Flow: . Continuing wound care orders and information:              Residence: . Private              Continue home health care with: [facility] Your wound-care supplies will be provided by: [facility]: . Wound cleansing:                          Do not scrub or use excessive force. Wash hands with soap and water before and after dressing changes. Prior to applying a clean dressing, cleanse wound with normal saline,                          wound cleanser, or mild soap and water. Ask your physician or nurse before getting the wound(s) wet in the shower. Daily Wound management:                          Keep weight off wounds and reposition every 2 hours. Avoid standing for long periods of time. Apply wraps/stockings in AM and remove at bedtime. Elevate legs to the level of the heart or above for 30 minutes 4-5 times a day and/or when sitting. When taking antibiotics take entire prescription as ordered by MD do not stop taking until medicine is all gone. Orders for this week (7/28/23)  Bilateral Lower Leg - Wash with mild soap and water, rinse with saline, pat dry with 4x4.   Betadine to toenials  A&D   Please

## 2023-07-28 NOTE — PROGRESS NOTES
NURSE TX:    Betadine to toenials  A&D   Please give set of tubi to take home. Patient tolerated well.

## 2023-08-08 ENCOUNTER — HOSPITAL ENCOUNTER (OUTPATIENT)
Age: 88
Discharge: HOME | End: 2023-08-08
Payer: MEDICARE

## 2023-08-08 DIAGNOSIS — N18.4 CKD STAGE G4/A1, GFR 15-29 AND ALBUMIN CREATININE RATIO <30 MG/G (HCC): ICD-10-CM

## 2023-08-08 LAB
ALBUMIN SERPL-MCNC: 3.9 GM/DL (ref 3.4–5)
ALP BLD-CCNC: 74 IU/L (ref 40–129)
ALT SERPL-CCNC: 11 U/L (ref 10–40)
ANION GAP SERPL CALCULATED.3IONS-SCNC: 17 MMOL/L (ref 4–16)
AST SERPL-CCNC: 16 IU/L (ref 15–37)
BACTERIA: ABNORMAL /HPF
BASOPHILS ABSOLUTE: 0 K/CU MM
BASOPHILS RELATIVE PERCENT: 0.1 % (ref 0–1)
BILIRUB SERPL-MCNC: 0.5 MG/DL (ref 0–1)
BILIRUBIN URINE: NEGATIVE MG/DL
BLOOD, URINE: ABNORMAL
BUN SERPL-MCNC: 87 MG/DL (ref 6–23)
CALCIUM SERPL-MCNC: 9.4 MG/DL (ref 8.3–10.6)
CAST TYPE: ABNORMAL /HPF
CHLORIDE BLD-SCNC: 86 MMOL/L (ref 99–110)
CLARITY: CLEAR
CO2: 25 MMOL/L (ref 21–32)
COLOR: YELLOW
CREAT SERPL-MCNC: 2.6 MG/DL (ref 0.6–1.1)
CRYSTAL TYPE: NEGATIVE /HPF
DIFFERENTIAL TYPE: ABNORMAL
EOSINOPHILS ABSOLUTE: 0.1 K/CU MM
EOSINOPHILS RELATIVE PERCENT: 0.4 % (ref 0–3)
EPITHELIAL CELLS, UA: 10 /HPF
GFR SERPL CREATININE-BSD FRML MDRD: 17 ML/MIN/1.73M2
GLUCOSE SERPL-MCNC: 166 MG/DL (ref 70–99)
GLUCOSE, URINE: NEGATIVE MG/DL
HCT VFR BLD CALC: 36.5 % (ref 37–47)
HEMOGLOBIN: 12.1 GM/DL (ref 12.5–16)
IMMATURE NEUTROPHIL %: 0.6 % (ref 0–0.43)
KETONES, URINE: NEGATIVE MG/DL
LEUKOCYTE ESTERASE, URINE: ABNORMAL
LYMPHOCYTES ABSOLUTE: 1 K/CU MM
LYMPHOCYTES RELATIVE PERCENT: 7 % (ref 24–44)
MAGNESIUM: 2.2 MG/DL (ref 1.8–2.4)
MCH RBC QN AUTO: 31.8 PG (ref 27–31)
MCHC RBC AUTO-ENTMCNC: 33.2 % (ref 32–36)
MCV RBC AUTO: 96.1 FL (ref 78–100)
MONOCYTES ABSOLUTE: 1.1 K/CU MM
MONOCYTES RELATIVE PERCENT: 7.7 % (ref 0–4)
NITRITE URINE, QUANTITATIVE: NEGATIVE
PDW BLD-RTO: 11.9 % (ref 11.7–14.9)
PH, URINE: 5.5 (ref 5–8)
PHOSPHORUS: 4.6 MG/DL (ref 2.5–4.9)
PLATELET # BLD: 250 K/CU MM (ref 140–440)
PMV BLD AUTO: 9.7 FL (ref 7.5–11.1)
POTASSIUM SERPL-SCNC: 3.5 MMOL/L (ref 3.5–5.1)
PROTEIN UA: ABNORMAL MG/DL
RBC # BLD: 3.8 M/CU MM (ref 4.2–5.4)
RBC URINE: 5 /HPF (ref 0–6)
SEGMENTED NEUTROPHILS ABSOLUTE COUNT: 11.5 K/CU MM
SEGMENTED NEUTROPHILS RELATIVE PERCENT: 84.2 % (ref 36–66)
SODIUM BLD-SCNC: 128 MMOL/L (ref 135–145)
SPECIFIC GRAVITY UA: 1.01 (ref 1–1.03)
TOTAL IMMATURE NEUTOROPHIL: 0.08 K/CU MM
TOTAL PROTEIN: 7.3 GM/DL (ref 6.4–8.2)
UROBILINOGEN, URINE: 0.2 MG/DL (ref 0.2–1)
WBC # BLD: 13.7 K/CU MM (ref 4–10.5)
WBC UA: 25 /HPF (ref 0–5)

## 2023-08-08 PROCEDURE — 83735 ASSAY OF MAGNESIUM: CPT

## 2023-08-08 PROCEDURE — 36415 COLL VENOUS BLD VENIPUNCTURE: CPT

## 2023-08-08 PROCEDURE — 84100 ASSAY OF PHOSPHORUS: CPT

## 2023-08-08 PROCEDURE — 81001 URINALYSIS AUTO W/SCOPE: CPT

## 2023-08-08 PROCEDURE — 80053 COMPREHEN METABOLIC PANEL: CPT

## 2023-08-08 PROCEDURE — 85025 COMPLETE CBC W/AUTO DIFF WBC: CPT

## 2023-08-10 ENCOUNTER — APPOINTMENT (OUTPATIENT)
Dept: CT IMAGING | Age: 88
End: 2023-08-10
Payer: MEDICARE

## 2023-08-10 ENCOUNTER — APPOINTMENT (OUTPATIENT)
Dept: GENERAL RADIOLOGY | Age: 88
End: 2023-08-10
Payer: MEDICARE

## 2023-08-10 ENCOUNTER — HOSPITAL ENCOUNTER (INPATIENT)
Age: 88
LOS: 8 days | Discharge: NURSING, REHAB OR LONG-TERM CARE | End: 2023-08-18
Attending: EMERGENCY MEDICINE
Payer: MEDICARE

## 2023-08-10 DIAGNOSIS — E83.42 HYPOMAGNESEMIA: ICD-10-CM

## 2023-08-10 DIAGNOSIS — E87.6 HYPOKALEMIA: ICD-10-CM

## 2023-08-10 DIAGNOSIS — I50.40 COMBINED SYSTOLIC AND DIASTOLIC CONGESTIVE HEART FAILURE, UNSPECIFIED HF CHRONICITY (HCC): Primary | ICD-10-CM

## 2023-08-10 DIAGNOSIS — E87.1 HYPONATREMIA: ICD-10-CM

## 2023-08-10 DIAGNOSIS — R19.7 DIARRHEA, UNSPECIFIED TYPE: ICD-10-CM

## 2023-08-10 LAB
ALBUMIN SERPL-MCNC: 3.3 GM/DL (ref 3.4–5)
ALP BLD-CCNC: 63 IU/L (ref 40–129)
ALT SERPL-CCNC: 23 U/L (ref 10–40)
ANION GAP SERPL CALCULATED.3IONS-SCNC: 19 MMOL/L (ref 4–16)
AST SERPL-CCNC: 43 IU/L (ref 15–37)
BASE EXCESS: 2 (ref 0–2.4)
BASOPHILS ABSOLUTE: 0 K/CU MM
BASOPHILS RELATIVE PERCENT: 0.2 % (ref 0–1)
BILIRUB SERPL-MCNC: 0.4 MG/DL (ref 0–1)
BUN SERPL-MCNC: 90 MG/DL (ref 6–23)
CALCIUM SERPL-MCNC: 9.1 MG/DL (ref 8.3–10.6)
CHLORIDE BLD-SCNC: 87 MMOL/L (ref 99–110)
CO2: 20 MMOL/L (ref 21–32)
COMMENT: ABNORMAL
CREAT SERPL-MCNC: 2.6 MG/DL (ref 0.6–1.1)
DIFFERENTIAL TYPE: ABNORMAL
EKG ATRIAL RATE: 81 BPM
EKG DIAGNOSIS: NORMAL
EKG P AXIS: 38 DEGREES
EKG P-R INTERVAL: 166 MS
EKG Q-T INTERVAL: 484 MS
EKG QRS DURATION: 166 MS
EKG QTC CALCULATION (BAZETT): 562 MS
EKG R AXIS: 31 DEGREES
EKG T AXIS: 61 DEGREES
EKG VENTRICULAR RATE: 81 BPM
EOSINOPHILS ABSOLUTE: 0 K/CU MM
EOSINOPHILS RELATIVE PERCENT: 0 % (ref 0–3)
GFR SERPL CREATININE-BSD FRML MDRD: 17 ML/MIN/1.73M2
GLUCOSE SERPL-MCNC: 142 MG/DL (ref 70–99)
HCO3 VENOUS: 24.3 MMOL/L (ref 19–25)
HCT VFR BLD CALC: 37.2 % (ref 37–47)
HEMOGLOBIN: 12.3 GM/DL (ref 12.5–16)
IMMATURE NEUTROPHIL %: 0.7 % (ref 0–0.43)
LACTATE: 0.9 MMOL/L (ref 0.5–1.9)
LV EF: 63 %
LVEF MODALITY: NORMAL
LYMPHOCYTES ABSOLUTE: 0.6 K/CU MM
LYMPHOCYTES RELATIVE PERCENT: 5.4 % (ref 24–44)
MCH RBC QN AUTO: 30.8 PG (ref 27–31)
MCHC RBC AUTO-ENTMCNC: 33.1 % (ref 32–36)
MCV RBC AUTO: 93.2 FL (ref 78–100)
MONOCYTES ABSOLUTE: 0.9 K/CU MM
MONOCYTES RELATIVE PERCENT: 7.6 % (ref 0–4)
NUCLEATED RBC %: 0 %
O2 SAT, VEN: 94 % (ref 50–70)
PCO2, VEN: 44 MMHG (ref 38–52)
PDW BLD-RTO: 11.8 % (ref 11.7–14.9)
PH VENOUS: 7.35 (ref 7.32–7.42)
PLATELET # BLD: 231 K/CU MM (ref 140–440)
PMV BLD AUTO: 9.2 FL (ref 7.5–11.1)
PO2, VEN: 95 MMHG (ref 28–48)
POTASSIUM SERPL-SCNC: 2.9 MMOL/L (ref 3.5–5.1)
PRO-BNP: 5239 PG/ML
RBC # BLD: 3.99 M/CU MM (ref 4.2–5.4)
SARS-COV-2 RDRP RESP QL NAA+PROBE: NOT DETECTED
SEGMENTED NEUTROPHILS ABSOLUTE COUNT: 10.3 K/CU MM
SEGMENTED NEUTROPHILS RELATIVE PERCENT: 86.1 % (ref 36–66)
SODIUM BLD-SCNC: 126 MMOL/L (ref 135–145)
SOURCE: NORMAL
T4 FREE SERPL-MCNC: 1.2 NG/DL (ref 0.9–1.8)
TOTAL IMMATURE NEUTOROPHIL: 0.08 K/CU MM
TOTAL NUCLEATED RBC: 0 K/CU MM
TOTAL PROTEIN: 6.6 GM/DL (ref 6.4–8.2)
TROPONIN T: 0.05 NG/ML
TSH SERPL DL<=0.005 MIU/L-ACNC: 0.97 UIU/ML (ref 0.27–4.2)
WBC # BLD: 11.9 K/CU MM (ref 4–10.5)

## 2023-08-10 PROCEDURE — 2580000003 HC RX 258: Performed by: NURSE PRACTITIONER

## 2023-08-10 PROCEDURE — 84484 ASSAY OF TROPONIN QUANT: CPT

## 2023-08-10 PROCEDURE — 6370000000 HC RX 637 (ALT 250 FOR IP): Performed by: NURSE PRACTITIONER

## 2023-08-10 PROCEDURE — 74176 CT ABD & PELVIS W/O CONTRAST: CPT

## 2023-08-10 PROCEDURE — 87507 IADNA-DNA/RNA PROBE TQ 12-25: CPT

## 2023-08-10 PROCEDURE — 96361 HYDRATE IV INFUSION ADD-ON: CPT

## 2023-08-10 PROCEDURE — 6360000002 HC RX W HCPCS: Performed by: INTERNAL MEDICINE

## 2023-08-10 PROCEDURE — 82805 BLOOD GASES W/O2 SATURATION: CPT

## 2023-08-10 PROCEDURE — 93010 ELECTROCARDIOGRAM REPORT: CPT | Performed by: INTERNAL MEDICINE

## 2023-08-10 PROCEDURE — 99285 EMERGENCY DEPT VISIT HI MDM: CPT

## 2023-08-10 PROCEDURE — 71045 X-RAY EXAM CHEST 1 VIEW: CPT

## 2023-08-10 PROCEDURE — 80053 COMPREHEN METABOLIC PANEL: CPT

## 2023-08-10 PROCEDURE — 6360000002 HC RX W HCPCS: Performed by: PHYSICIAN ASSISTANT

## 2023-08-10 PROCEDURE — 84443 ASSAY THYROID STIM HORMONE: CPT

## 2023-08-10 PROCEDURE — 93306 TTE W/DOPPLER COMPLETE: CPT

## 2023-08-10 PROCEDURE — 6370000000 HC RX 637 (ALT 250 FOR IP): Performed by: PHYSICIAN ASSISTANT

## 2023-08-10 PROCEDURE — 83880 ASSAY OF NATRIURETIC PEPTIDE: CPT

## 2023-08-10 PROCEDURE — 87635 SARS-COV-2 COVID-19 AMP PRB: CPT

## 2023-08-10 PROCEDURE — 71250 CT THORAX DX C-: CPT

## 2023-08-10 PROCEDURE — 6360000002 HC RX W HCPCS: Performed by: NURSE PRACTITIONER

## 2023-08-10 PROCEDURE — 93005 ELECTROCARDIOGRAM TRACING: CPT | Performed by: EMERGENCY MEDICINE

## 2023-08-10 PROCEDURE — 2580000003 HC RX 258: Performed by: PHYSICIAN ASSISTANT

## 2023-08-10 PROCEDURE — 96365 THER/PROPH/DIAG IV INF INIT: CPT

## 2023-08-10 PROCEDURE — 1200000000 HC SEMI PRIVATE

## 2023-08-10 PROCEDURE — 84439 ASSAY OF FREE THYROXINE: CPT

## 2023-08-10 PROCEDURE — 85025 COMPLETE CBC W/AUTO DIFF WBC: CPT

## 2023-08-10 PROCEDURE — 83605 ASSAY OF LACTIC ACID: CPT

## 2023-08-10 RX ORDER — LANOLIN ALCOHOL/MO/W.PET/CERES
3 CREAM (GRAM) TOPICAL NIGHTLY PRN
Status: DISCONTINUED | OUTPATIENT
Start: 2023-08-10 | End: 2023-08-18 | Stop reason: HOSPADM

## 2023-08-10 RX ORDER — LEVOTHYROXINE SODIUM 0.05 MG/1
50 TABLET ORAL DAILY
Status: DISCONTINUED | OUTPATIENT
Start: 2023-08-11 | End: 2023-08-18 | Stop reason: HOSPADM

## 2023-08-10 RX ORDER — POTASSIUM CHLORIDE 20 MEQ/1
40 TABLET, EXTENDED RELEASE ORAL ONCE
Status: COMPLETED | OUTPATIENT
Start: 2023-08-10 | End: 2023-08-10

## 2023-08-10 RX ORDER — FAMOTIDINE 20 MG/1
20 TABLET, FILM COATED ORAL 2 TIMES DAILY
Status: DISCONTINUED | OUTPATIENT
Start: 2023-08-10 | End: 2023-08-11

## 2023-08-10 RX ORDER — SODIUM CHLORIDE 0.9 % (FLUSH) 0.9 %
5-40 SYRINGE (ML) INJECTION PRN
Status: DISCONTINUED | OUTPATIENT
Start: 2023-08-10 | End: 2023-08-18 | Stop reason: HOSPADM

## 2023-08-10 RX ORDER — BUDESONIDE 0.25 MG/2ML
0.25 INHALANT ORAL
Status: DISCONTINUED | OUTPATIENT
Start: 2023-08-10 | End: 2023-08-12

## 2023-08-10 RX ORDER — MAGNESIUM SULFATE IN WATER 40 MG/ML
2000 INJECTION, SOLUTION INTRAVENOUS ONCE
Status: COMPLETED | OUTPATIENT
Start: 2023-08-10 | End: 2023-08-10

## 2023-08-10 RX ORDER — ACETAMINOPHEN 650 MG/1
650 SUPPOSITORY RECTAL EVERY 6 HOURS PRN
Status: DISCONTINUED | OUTPATIENT
Start: 2023-08-10 | End: 2023-08-18 | Stop reason: HOSPADM

## 2023-08-10 RX ORDER — ENOXAPARIN SODIUM 100 MG/ML
40 INJECTION SUBCUTANEOUS DAILY
Status: CANCELLED | OUTPATIENT
Start: 2023-08-10

## 2023-08-10 RX ORDER — 0.9 % SODIUM CHLORIDE 0.9 %
500 INTRAVENOUS SOLUTION INTRAVENOUS ONCE
Status: COMPLETED | OUTPATIENT
Start: 2023-08-10 | End: 2023-08-10

## 2023-08-10 RX ORDER — SODIUM CHLORIDE AND POTASSIUM CHLORIDE 300; 900 MG/100ML; MG/100ML
INJECTION, SOLUTION INTRAVENOUS CONTINUOUS
Status: DISPENSED | OUTPATIENT
Start: 2023-08-10 | End: 2023-08-11

## 2023-08-10 RX ORDER — ONDANSETRON 4 MG/1
4 TABLET, ORALLY DISINTEGRATING ORAL EVERY 8 HOURS PRN
Status: DISCONTINUED | OUTPATIENT
Start: 2023-08-10 | End: 2023-08-18 | Stop reason: HOSPADM

## 2023-08-10 RX ORDER — ALBUTEROL SULFATE 90 UG/1
2 AEROSOL, METERED RESPIRATORY (INHALATION) EVERY 6 HOURS PRN
Status: DISCONTINUED | OUTPATIENT
Start: 2023-08-10 | End: 2023-08-18 | Stop reason: HOSPADM

## 2023-08-10 RX ORDER — HEPARIN SODIUM 5000 [USP'U]/ML
5000 INJECTION, SOLUTION INTRAVENOUS; SUBCUTANEOUS EVERY 8 HOURS SCHEDULED
Status: DISCONTINUED | OUTPATIENT
Start: 2023-08-10 | End: 2023-08-18 | Stop reason: HOSPADM

## 2023-08-10 RX ORDER — CILOSTAZOL 50 MG/1
50 TABLET ORAL 2 TIMES DAILY
Status: DISCONTINUED | OUTPATIENT
Start: 2023-08-10 | End: 2023-08-18 | Stop reason: HOSPADM

## 2023-08-10 RX ORDER — FLUTICASONE PROPIONATE 44 UG/1
1 AEROSOL, METERED RESPIRATORY (INHALATION)
Status: DISCONTINUED | OUTPATIENT
Start: 2023-08-10 | End: 2023-08-10 | Stop reason: CLARIF

## 2023-08-10 RX ORDER — TIZANIDINE 4 MG/1
4 TABLET ORAL EVERY 8 HOURS PRN
Status: DISCONTINUED | OUTPATIENT
Start: 2023-08-10 | End: 2023-08-11

## 2023-08-10 RX ORDER — TORSEMIDE 20 MG/1
20 TABLET ORAL ONCE
Status: DISCONTINUED | OUTPATIENT
Start: 2023-08-10 | End: 2023-08-10

## 2023-08-10 RX ORDER — BUDESONIDE 0.5 MG/2ML
0.5 INHALANT ORAL
Status: DISCONTINUED | OUTPATIENT
Start: 2023-08-10 | End: 2023-08-10

## 2023-08-10 RX ORDER — TRAMADOL HYDROCHLORIDE 50 MG/1
50 TABLET ORAL EVERY 6 HOURS PRN
Status: DISCONTINUED | OUTPATIENT
Start: 2023-08-10 | End: 2023-08-18 | Stop reason: HOSPADM

## 2023-08-10 RX ORDER — ACETAMINOPHEN 325 MG/1
650 TABLET ORAL EVERY 6 HOURS PRN
Status: DISCONTINUED | OUTPATIENT
Start: 2023-08-10 | End: 2023-08-18 | Stop reason: HOSPADM

## 2023-08-10 RX ORDER — SODIUM CHLORIDE 9 MG/ML
INJECTION, SOLUTION INTRAVENOUS PRN
Status: DISCONTINUED | OUTPATIENT
Start: 2023-08-10 | End: 2023-08-18 | Stop reason: HOSPADM

## 2023-08-10 RX ORDER — ONDANSETRON 2 MG/ML
4 INJECTION INTRAMUSCULAR; INTRAVENOUS EVERY 6 HOURS PRN
Status: DISCONTINUED | OUTPATIENT
Start: 2023-08-10 | End: 2023-08-18 | Stop reason: HOSPADM

## 2023-08-10 RX ORDER — LATANOPROST 50 UG/ML
1 SOLUTION/ DROPS OPHTHALMIC NIGHTLY
Status: DISCONTINUED | OUTPATIENT
Start: 2023-08-10 | End: 2023-08-18 | Stop reason: HOSPADM

## 2023-08-10 RX ORDER — FERROUS SULFATE 325(65) MG
325 TABLET ORAL 2 TIMES DAILY WITH MEALS
Status: DISCONTINUED | OUTPATIENT
Start: 2023-08-10 | End: 2023-08-18 | Stop reason: HOSPADM

## 2023-08-10 RX ORDER — POLYETHYLENE GLYCOL 3350 17 G/17G
17 POWDER, FOR SOLUTION ORAL DAILY PRN
Status: DISCONTINUED | OUTPATIENT
Start: 2023-08-10 | End: 2023-08-15

## 2023-08-10 RX ORDER — SODIUM CHLORIDE 0.9 % (FLUSH) 0.9 %
5-40 SYRINGE (ML) INJECTION EVERY 12 HOURS SCHEDULED
Status: DISCONTINUED | OUTPATIENT
Start: 2023-08-10 | End: 2023-08-18 | Stop reason: HOSPADM

## 2023-08-10 RX ORDER — ROPINIROLE 0.25 MG/1
0.5 TABLET, FILM COATED ORAL 3 TIMES DAILY
Status: DISCONTINUED | OUTPATIENT
Start: 2023-08-10 | End: 2023-08-18 | Stop reason: HOSPADM

## 2023-08-10 RX ADMIN — SODIUM CHLORIDE, PRESERVATIVE FREE 10 ML: 5 INJECTION INTRAVENOUS at 20:46

## 2023-08-10 RX ADMIN — FAMOTIDINE 20 MG: 20 TABLET ORAL at 22:21

## 2023-08-10 RX ADMIN — FERROUS SULFATE TAB 325 MG (65 MG ELEMENTAL FE) 325 MG: 325 (65 FE) TAB at 18:31

## 2023-08-10 RX ADMIN — ROPINIROLE HYDROCHLORIDE 0.5 MG: 0.25 TABLET, FILM COATED ORAL at 22:21

## 2023-08-10 RX ADMIN — SODIUM CHLORIDE 500 ML: 9 INJECTION, SOLUTION INTRAVENOUS at 11:39

## 2023-08-10 RX ADMIN — HEPARIN SODIUM 5000 UNITS: 5000 INJECTION INTRAVENOUS; SUBCUTANEOUS at 22:21

## 2023-08-10 RX ADMIN — POTASSIUM CHLORIDE 40 MEQ: 1500 TABLET, EXTENDED RELEASE ORAL at 14:02

## 2023-08-10 RX ADMIN — POTASSIUM CHLORIDE AND SODIUM CHLORIDE: 900; 300 INJECTION, SOLUTION INTRAVENOUS at 20:46

## 2023-08-10 RX ADMIN — MAGNESIUM SULFATE HEPTAHYDRATE 2000 MG: 40 INJECTION, SOLUTION INTRAVENOUS at 14:05

## 2023-08-10 RX ADMIN — LATANOPROST 1 DROP: 50 SOLUTION OPHTHALMIC at 23:17

## 2023-08-10 RX ADMIN — CILOSTAZOL 50 MG: 50 TABLET ORAL at 22:21

## 2023-08-10 RX ADMIN — HEPARIN SODIUM 5000 UNITS: 5000 INJECTION INTRAVENOUS; SUBCUTANEOUS at 18:31

## 2023-08-10 ASSESSMENT — ENCOUNTER SYMPTOMS
SORE THROAT: 0
ABDOMINAL PAIN: 0
SHORTNESS OF BREATH: 1
TROUBLE SWALLOWING: 0
DIARRHEA: 1

## 2023-08-10 NOTE — ED NOTES
Medication History  [facility]    Patient Name: Ros Villalta 1935     Medication history has been completed by: Elsa Payne CPhT    Source(s) of information: patient, family and insurance claims    Primary Care Physician: TREVON Bianchi - CNP     Pharmacy: [facility]    Allergies as of 08/10/2023 - Fully Reviewed 08/10/2023   Allergen Reaction Noted    Latex Rash 09/08/2014    Dye [iodides] Anaphylaxis 01/02/2014    Iodine Anaphylaxis 01/02/2014    Dyazide [hydrochlorothiazide w-triamterene] Rash 03/08/2017    Lasix [furosemide] Rash 03/08/2017    Procardia [nifedipine] Other (See Comments) 03/08/2017    Doxycycline Dermatitis 09/05/2014    Edecrin [ethacrynic acid]  01/02/2014    Manoj Kiley [dapagliflozin]  05/23/2023    Levaquin [levofloxacin] Other (See Comments) 10/10/2015    Mobic [meloxicam]  01/02/2014    Vioxx [rofecoxib]  01/02/2014    Celebrex [celecoxib] Rash 01/02/2014    Lexapro [escitalopram oxalate] Rash 09/05/2014    Sulfa antibiotics Hives 01/02/2014        Prior to Admission medications    Medication Sig Start Date End Date Taking?  Authorizing Provider   febuxostat (ULORIC) 40 MG TABS tablet Take 1 tablet by mouth daily 7/24/23   Inocencio Herron MD   spironolactone (ALDACTONE) 50 MG tablet Take 1 tablet by mouth 2 times daily 5/18/23   Inocencio Herron MD   torsemide (DEMADEX) 20 MG tablet Take 1 tablet by mouth 2 times daily 5/8/23   Inocencio Herron MD   lactulose (CHRONULAC) 10 GM/15ML solution Take 15 mLs by mouth 3 times daily as needed (constipation) 4/27/23   Celsa Weller MD   melatonin 3 MG TABS tablet Take 1 tablet by mouth nightly as needed    Historical Provider, MD   ondansetron (ZOFRAN) 4 MG tablet Take 1 tablet by mouth every 6 hours as needed for Nausea or Vomiting    Historical Provider, MD   tiZANidine (ZANAFLEX) 4 MG tablet Take 1 tablet by mouth 3 times daily    Historical Provider, MD   calcium carbonate (TUMS) 500 MG

## 2023-08-10 NOTE — CONSULTS
Will dictate full note  Ekg shows v paced  Check echo   Chronic renal insuffiencey  Has a pacer   Low K  Came In with diarrhea and fatigue      Cath--2020 [id number]  LEFT MAIN PATENT  LAD/LCX AND RCA MILD DX  LVEDP 20-25  PA-61/29/28  RV-64/14/29  TEMP PACER PLACED  RATE OF 80-PACED  SHEATHS SUTURED IN PLACE  PLAN FOR A PERMANENT PACER  DISCUSSED WITH DR. Nacho Ruelas      Echo--  Summary-2020   Technically difficult examination. Left ventricular systolic function is normal.   Ejection fraction is visually estimated at 50-55%. Sclerotic, but non-stenotic aortic valve. Moderate mitral regurgitation. Mild tricuspid regurgitation. RVSP is 52 mmHg. No evidence of any pericardial effusion. Temporary pacing wire is visualized within the IVC and extends to the apex   of the right heart. PAST MEDICAL HISTORY:  The patient has a history of having hypertension  present; history of renal insufficiency stage IV, not on dialysis;  diabetes; hyperlipidemia; venous ulcers; and thyroid disease present. PAST SURGICAL HISTORY:  Appendectomy, gallbladder surgery, bilateral I  think cataract implants, and hysterectomy done.     Electronically signed by Emiliano Sibley MD on 8/10/23 at 3:10 PM EDT    Dictated  Place on telemetry  Ct of abdomen and chest   Check her pacer in am  On IVF for now -pre-renal hold diuretics

## 2023-08-10 NOTE — CARE COORDINATION
MCG criteria for electrolyte disorder  reviewed at this time, criteria supports Inpatient Admission.  JONATHAN,RN/CM

## 2023-08-10 NOTE — ED PROVIDER NOTES
In brief, for evaluation of fatigue. The patient is reported that she has had some shortness of breath. She has some weakness ongoing for the last 4 days. Has not noticed any productive cough or congestion. She does report some loose bowel movements. Decreased appetite as well. Episodes of confusion reported as well. No recent fall or injury that they can recall. The patient has denied any headache neck pain or stiffness. .    Focused exam revealed patient is clinically fairly well-appearing here with slight dry mucous membranes. Motor, sensory, coordination all appear to be grossly intact as well as patient has been alert and oriented here. Does not appear to be confused. Abdomen is soft nontender nondistended without rebound or guarding. Bilateral pedal edema. ED course: Patient is clinically well-appearing here but did have some fatigue. Reportedly had some shortness of breath. BNP is elevated so the concern is for heart failure exacerbation and chest x-ray also showing pulmonary vascular congestion. Will order diuresis. The patient was agreeable with this plan of care. 12 lead EKG per my interpretation:  Paced EKG  Rate of 81  100% capture  There are no acute ST segment changes    Prior EKG to compare with was available and is the same    (All EKG's are interpreted by myself in the absence of a cardiologist)    I personally saw and evaluated the patient and performed a substantive portion of the visit including all aspects of the medical decision making [consider including your MDM here]. All diagnostic, treatment, and disposition decisions were made by me in conjunction with the MAURICE as noted in the medical record. For all further details of the patient's emergency department visit, please see the advanced practice provider's documentation.     Comment: Please note this report has been produced using speech recognition software and may contain errors related to that system including errors in grammar, punctuation, and spelling, as well as words and phrases that may be inappropriate. If there are any questions or concerns please feel free to contact the dictating provider for clarification.      Sugey Keita MD  08/10/23 [id number]

## 2023-08-10 NOTE — ED NOTES
ED TO INPATIENT SBAR HANDOFF    Patient Name: Christina Gaines   :  1935  82 y.o. Preferred Name  Kita Eduardo  Family/Caregiver Present yes   Restraints no   C-SSRS: Risk of Suicide: No Risk  Sitter no   Sepsis Risk Score Sepsis Risk Score: 1.5      Situation  Chief Complaint   Patient presents with    Fatigue     Increased weakness and confusion x4 days. Diarrhea     Since      Brief Description of Patient's Condition: PT came to ED with complaints of increased fatigue and weakness. Has been having diarrhea x4 days. Per labs, PT has low potassium and low magnesium. Mental Status: oriented and alert  Arrived from: home    Imaging:   XR CHEST PORTABLE   Final Result   1. Cardiomegaly with pulmonary vascular congestion and bibasilar lung   infiltrates, likely related to atelectasis and/or small pleural effusions.            Abnormal labs:   Abnormal Labs Reviewed   CBC WITH AUTO DIFFERENTIAL - Abnormal; Notable for the following components:       Result Value    WBC 11.9 (*)     RBC 3.99 (*)     Hemoglobin 12.3 (*)     Segs Relative 86.1 (*)     Lymphocytes % 5.4 (*)     Monocytes % 7.6 (*)     Immature Neutrophil % 0.7 (*)     All other components within normal limits   COMPREHENSIVE METABOLIC PANEL - Abnormal; Notable for the following components:    Sodium 126 (*)     Potassium 2.9 (*)     Chloride 87 (*)     CO2 20 (*)     BUN 90 (*)     Creatinine 2.6 (*)     Est, Glom Filt Rate 17 (*)     Glucose 142 (*)     Albumin 3.3 (*)     AST 43 (*)     Anion Gap 19 (*)     All other components within normal limits   TROPONIN - Abnormal; Notable for the following components:    Troponin T 0.051 (*)     All other components within normal limits   BRAIN NATRIURETIC PEPTIDE - Abnormal; Notable for the following components:    Pro-BNP 5,239 (*)     All other components within normal limits       Background  History:   Past Medical History:   Diagnosis Date    Asthma     Chronic kidney disease     acute kidney failure    Chronic ulcer of left leg with fat layer exposed ([address]) 3/7/2016    Chronic ulcer of right leg with fat layer exposed ([address]) 3/7/2016    Diabetes type 2, controlled ([address])     History of asthma     History of blood transfusion 07/2015    Hypertension     Lymphedema of both lower extremities 3/7/2016    Osteoarthritis     Pneumonia     Thyroid disease     Venous stasis of both lower extremities 3/7/2016    WD-Non-pressure chronic ulcer right lower leg, 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 (HCC) 4/21/2016       Assessment    Vitals/MEWS: MEWS Score: 1  Level of Consciousness: Alert (0)   Vitals:    08/10/23 1500 08/10/23 1515 08/10/23 1530 08/10/23 1545   BP: (!) 140/63  (!) 126/40    Pulse: 81 74 77 78   Resp: 17 22 24 24   Temp:       TempSrc:       SpO2: 100% 99% 98% 97%     PO Status: Regular  O2 Flow Rate: O2 Device: None (Room air)    Cardiac Rhythm:   Last documented pain medication administered:   NIH Score: NIH     Active LDA's:   Peripheral IV 08/10/23 Left Antecubital (Active)       Pertinent or High Risk Medications/Drips:    If Yes, please provide details:   Blood Product Administration:   If Yes, please provide details:     Recommendation    Incomplete orders   Additional Comments:    If any further questions, please call Sending RN at [phone]    Electronically signed by: Electronically signed by Awilda Marsh RN on 8/10/2023 at 3:52 PM       Awilda Marsh RN  08/10/23 [id number]

## 2023-08-10 NOTE — CONSULTS
Patient:   Jennifer Fox    Date:  08/10/23  :  1935, 82 y.o. Nephrologist: Kathy Acevedo MD  Provider: TREVON Law CNP    Reason for Consult: Acute kidney injury with underlying chronic kidney disease stage G4 A1    Consult requested by : Dr Preeti Guy MD    Chief Complaint:   Diarrhea with confusion and increased sleep    HISTORY OF PRESENT ILLNESS/Brief hospital course  AND  brief background history      Ms. Yessenia Parada, is a 82-year young female, who was driven to the emergency department by her  and daughter to 2-day history of diarrhea, no confusion, she was also sleeping more with low urine output and oxygenation. Her symptoms started 3 to 4 days ago. As symptoms continue to worsen that prompted the emergency room visit. On arrival in the emergency department, she was afebrile and hemodynamic stable without tachycardia, and oxygen saturation is 94% while breathing ambient air. Chest x-ray suggested cardiomegaly and vascular congestion. Biochemical testing showed low sodium of 126, with concomitant serum glucose of 142, low potassium of 2.9, low serum bicarbonate 20, high blood urea nitrogen of 90 mg/dL and creatinine of 2.6 mg/dL. Additionally albumin is 3.3, slightly elevated white cell of 11.9 hemoglobin of 12.3, thousand, profound lymphocytopenia-her proBNP level was slightly more than 5000. .    Initially she was given 500 cc of normal saline, potassium was supplemented, magnesium will be infused soon-although the serum magnesium level was 2.2 on 2023. When I saw her, she is alert awake did recognize me, she is lying flat without any respiratory distress-does have some coarse breath sound but no crackles-no peripheral edema-as initially she has significant edema-also her  and daughter in the room. I am of course very familiar with her. I have been knowing her since  2016.   Briefly speaking, her creatinine at that time was 1.4 to

## 2023-08-11 LAB
ALBUMIN SERPL-MCNC: 3 GM/DL (ref 3.4–5)
ALP BLD-CCNC: 56 IU/L (ref 40–129)
ALT SERPL-CCNC: 26 U/L (ref 10–40)
ANION GAP SERPL CALCULATED.3IONS-SCNC: 17 MMOL/L (ref 4–16)
AST SERPL-CCNC: 39 IU/L (ref 15–37)
B PARAP IS1001 DNA NPH QL NAA+NON-PROBE: NOT DETECTED
B PERT.PT PRMT NPH QL NAA+NON-PROBE: NOT DETECTED
BACTERIA: NEGATIVE /HPF
BASOPHILS ABSOLUTE: 0 K/CU MM
BASOPHILS RELATIVE PERCENT: 0.1 % (ref 0–1)
BILIRUB SERPL-MCNC: 0.3 MG/DL (ref 0–1)
BILIRUBIN URINE: NEGATIVE MG/DL
BLOOD, URINE: ABNORMAL
BUN SERPL-MCNC: 89 MG/DL (ref 6–23)
C PNEUM DNA NPH QL NAA+NON-PROBE: NOT DETECTED
CALCIUM SERPL-MCNC: 8.2 MG/DL (ref 8.3–10.6)
CHLORIDE BLD-SCNC: 99 MMOL/L (ref 99–110)
CLARITY: ABNORMAL
CO2: 17 MMOL/L (ref 21–32)
COLOR: YELLOW
CREAT SERPL-MCNC: 2.4 MG/DL (ref 0.6–1.1)
CREATININE URINE: 83.3 MG/DL (ref 28–217)
CRP SERPL HS-MCNC: 225.1 MG/L
DIFFERENTIAL TYPE: ABNORMAL
EOSINOPHILS ABSOLUTE: 0 K/CU MM
EOSINOPHILS RELATIVE PERCENT: 0.1 % (ref 0–3)
FLUAV H1 2009 PAN RNA NPH NAA+NON-PROBE: NOT DETECTED
FLUAV H1 RNA NPH QL NAA+NON-PROBE: NOT DETECTED
FLUAV H3 RNA NPH QL NAA+NON-PROBE: NOT DETECTED
FLUAV RNA NPH QL NAA+NON-PROBE: NOT DETECTED
FLUBV RNA NPH QL NAA+NON-PROBE: NOT DETECTED
GFR SERPL CREATININE-BSD FRML MDRD: 19 ML/MIN/1.73M2
GLUCOSE SERPL-MCNC: 93 MG/DL (ref 70–99)
GLUCOSE, URINE: NEGATIVE MG/DL
HADV DNA NPH QL NAA+NON-PROBE: NOT DETECTED
HCOV 229E RNA NPH QL NAA+NON-PROBE: NOT DETECTED
HCOV HKU1 RNA NPH QL NAA+NON-PROBE: NOT DETECTED
HCOV NL63 RNA NPH QL NAA+NON-PROBE: NOT DETECTED
HCOV OC43 RNA NPH QL NAA+NON-PROBE: NOT DETECTED
HCT VFR BLD CALC: 34.6 % (ref 37–47)
HEMOGLOBIN: 11.1 GM/DL (ref 12.5–16)
HMPV RNA NPH QL NAA+NON-PROBE: NOT DETECTED
HPIV1 RNA NPH QL NAA+NON-PROBE: NOT DETECTED
HPIV2 RNA NPH QL NAA+NON-PROBE: NOT DETECTED
HPIV3 RNA NPH QL NAA+NON-PROBE: NOT DETECTED
HPIV4 RNA NPH QL NAA+NON-PROBE: NOT DETECTED
IMMATURE NEUTROPHIL %: 0.6 % (ref 0–0.43)
KETONES, URINE: NEGATIVE MG/DL
LEUKOCYTE ESTERASE, URINE: ABNORMAL
LYMPHOCYTES ABSOLUTE: 0.8 K/CU MM
LYMPHOCYTES RELATIVE PERCENT: 7.3 % (ref 24–44)
M PNEUMO DNA NPH QL NAA+NON-PROBE: NOT DETECTED
MAGNESIUM: 2.9 MG/DL (ref 1.8–2.4)
MAGNESIUM: 3 MG/DL (ref 1.8–2.4)
MCH RBC QN AUTO: 31.3 PG (ref 27–31)
MCHC RBC AUTO-ENTMCNC: 32.1 % (ref 32–36)
MCV RBC AUTO: 97.5 FL (ref 78–100)
MONOCYTES ABSOLUTE: 0.9 K/CU MM
MONOCYTES RELATIVE PERCENT: 8.5 % (ref 0–4)
MUCUS: ABNORMAL HPF
NITRITE URINE, QUANTITATIVE: NEGATIVE
NUCLEATED RBC %: 0 %
PDW BLD-RTO: 12 % (ref 11.7–14.9)
PH, URINE: 5.5 (ref 5–8)
PHOSPHORUS: 3.8 MG/DL (ref 2.5–4.9)
PLATELET # BLD: 208 K/CU MM (ref 140–440)
PMV BLD AUTO: 9 FL (ref 7.5–11.1)
POTASSIUM SERPL-SCNC: 3.9 MMOL/L (ref 3.5–5.1)
POTASSIUM, UR: 20.4 MMOL/L (ref 22–119)
PROCALCITONIN SERPL-MCNC: 0.91 NG/ML
PROT/CREAT RATIO, UR: 0.2
PROTEIN UA: ABNORMAL MG/DL
RBC # BLD: 3.55 M/CU MM (ref 4.2–5.4)
RBC URINE: 4 /HPF (ref 0–6)
REASON FOR REJECTION: NORMAL
REJECTED TEST: NORMAL
RENAL EPITHELIAL, UA: <1 /HPF
RSV RNA NPH QL NAA+NON-PROBE: NOT DETECTED
RV+EV RNA NPH QL NAA+NON-PROBE: NOT DETECTED
SARS-COV-2 RNA NPH QL NAA+NON-PROBE: NOT DETECTED
SEGMENTED NEUTROPHILS ABSOLUTE COUNT: 8.7 K/CU MM
SEGMENTED NEUTROPHILS RELATIVE PERCENT: 83.4 % (ref 36–66)
SODIUM BLD-SCNC: 133 MMOL/L (ref 135–145)
SODIUM URINE: 20 MMOL/L (ref 35–167)
SPECIFIC GRAVITY UA: 1.02 (ref 1–1.03)
SQUAMOUS EPITHELIAL: 2 /HPF
TOTAL IMMATURE NEUTOROPHIL: 0.06 K/CU MM
TOTAL NUCLEATED RBC: 0 K/CU MM
TOTAL PROTEIN: 4.9 GM/DL (ref 6.4–8.2)
TRICHOMONAS: ABNORMAL /HPF
URINE TOTAL PROTEIN: 17.4 MG/DL
UROBILINOGEN, URINE: 0.2 MG/DL (ref 0.2–1)
WBC # BLD: 10.4 K/CU MM (ref 4–10.5)
WBC UA: 5 /HPF (ref 0–5)

## 2023-08-11 PROCEDURE — 84300 ASSAY OF URINE SODIUM: CPT

## 2023-08-11 PROCEDURE — 2700000000 HC OXYGEN THERAPY PER DAY

## 2023-08-11 PROCEDURE — 86140 C-REACTIVE PROTEIN: CPT

## 2023-08-11 PROCEDURE — 87186 SC STD MICRODIL/AGAR DIL: CPT

## 2023-08-11 PROCEDURE — 0202U NFCT DS 22 TRGT SARS-COV-2: CPT

## 2023-08-11 PROCEDURE — 87449 NOS EACH ORGANISM AG IA: CPT

## 2023-08-11 PROCEDURE — 36415 COLL VENOUS BLD VENIPUNCTURE: CPT

## 2023-08-11 PROCEDURE — 84156 ASSAY OF PROTEIN URINE: CPT

## 2023-08-11 PROCEDURE — 81001 URINALYSIS AUTO W/SCOPE: CPT

## 2023-08-11 PROCEDURE — 87077 CULTURE AEROBIC IDENTIFY: CPT

## 2023-08-11 PROCEDURE — 6360000002 HC RX W HCPCS: Performed by: NURSE PRACTITIONER

## 2023-08-11 PROCEDURE — 85025 COMPLETE CBC W/AUTO DIFF WBC: CPT

## 2023-08-11 PROCEDURE — 92610 EVALUATE SWALLOWING FUNCTION: CPT

## 2023-08-11 PROCEDURE — 83735 ASSAY OF MAGNESIUM: CPT

## 2023-08-11 PROCEDURE — 2580000003 HC RX 258: Performed by: PHYSICIAN ASSISTANT

## 2023-08-11 PROCEDURE — 87899 AGENT NOS ASSAY W/OPTIC: CPT

## 2023-08-11 PROCEDURE — 87205 SMEAR GRAM STAIN: CPT

## 2023-08-11 PROCEDURE — 94640 AIRWAY INHALATION TREATMENT: CPT

## 2023-08-11 PROCEDURE — 87040 BLOOD CULTURE FOR BACTERIA: CPT

## 2023-08-11 PROCEDURE — 82570 ASSAY OF URINE CREATININE: CPT

## 2023-08-11 PROCEDURE — 2580000003 HC RX 258: Performed by: NURSE PRACTITIONER

## 2023-08-11 PROCEDURE — 94761 N-INVAS EAR/PLS OXIMETRY MLT: CPT

## 2023-08-11 PROCEDURE — 87070 CULTURE OTHR SPECIMN AEROBIC: CPT

## 2023-08-11 PROCEDURE — 87086 URINE CULTURE/COLONY COUNT: CPT

## 2023-08-11 PROCEDURE — 6370000000 HC RX 637 (ALT 250 FOR IP): Performed by: PHYSICIAN ASSISTANT

## 2023-08-11 PROCEDURE — 1200000000 HC SEMI PRIVATE

## 2023-08-11 PROCEDURE — 84133 ASSAY OF URINE POTASSIUM: CPT

## 2023-08-11 PROCEDURE — 84145 PROCALCITONIN (PCT): CPT

## 2023-08-11 PROCEDURE — 84100 ASSAY OF PHOSPHORUS: CPT

## 2023-08-11 PROCEDURE — 6360000002 HC RX W HCPCS: Performed by: PHYSICIAN ASSISTANT

## 2023-08-11 PROCEDURE — 80053 COMPREHEN METABOLIC PANEL: CPT

## 2023-08-11 PROCEDURE — 99222 1ST HOSP IP/OBS MODERATE 55: CPT | Performed by: INTERNAL MEDICINE

## 2023-08-11 RX ORDER — FAMOTIDINE 20 MG/1
20 TABLET, FILM COATED ORAL DAILY
Status: DISCONTINUED | OUTPATIENT
Start: 2023-08-12 | End: 2023-08-18 | Stop reason: HOSPADM

## 2023-08-11 RX ADMIN — ROPINIROLE HYDROCHLORIDE 0.5 MG: 0.25 TABLET, FILM COATED ORAL at 09:43

## 2023-08-11 RX ADMIN — HEPARIN SODIUM 5000 UNITS: 5000 INJECTION INTRAVENOUS; SUBCUTANEOUS at 05:47

## 2023-08-11 RX ADMIN — BUDESONIDE 250 MCG: 0.25 SUSPENSION RESPIRATORY (INHALATION) at 07:35

## 2023-08-11 RX ADMIN — FERROUS SULFATE TAB 325 MG (65 MG ELEMENTAL FE) 325 MG: 325 (65 FE) TAB at 09:43

## 2023-08-11 RX ADMIN — LEVOTHYROXINE SODIUM 50 MCG: 0.05 TABLET ORAL at 05:47

## 2023-08-11 RX ADMIN — FERROUS SULFATE TAB 325 MG (65 MG ELEMENTAL FE) 325 MG: 325 (65 FE) TAB at 17:27

## 2023-08-11 RX ADMIN — SODIUM CHLORIDE, PRESERVATIVE FREE 10 ML: 5 INJECTION INTRAVENOUS at 21:47

## 2023-08-11 RX ADMIN — CILOSTAZOL 50 MG: 50 TABLET ORAL at 09:43

## 2023-08-11 RX ADMIN — FAMOTIDINE 20 MG: 20 TABLET ORAL at 09:43

## 2023-08-11 RX ADMIN — ROPINIROLE HYDROCHLORIDE 0.5 MG: 0.25 TABLET, FILM COATED ORAL at 21:47

## 2023-08-11 RX ADMIN — SODIUM CHLORIDE, PRESERVATIVE FREE 10 ML: 5 INJECTION INTRAVENOUS at 09:43

## 2023-08-11 RX ADMIN — HEPARIN SODIUM 5000 UNITS: 5000 INJECTION INTRAVENOUS; SUBCUTANEOUS at 21:47

## 2023-08-11 RX ADMIN — SODIUM CHLORIDE: 9 INJECTION, SOLUTION INTRAVENOUS at 05:44

## 2023-08-11 RX ADMIN — SODIUM CHLORIDE 3000 MG: 900 INJECTION INTRAVENOUS at 05:45

## 2023-08-11 RX ADMIN — CILOSTAZOL 50 MG: 50 TABLET ORAL at 21:47

## 2023-08-11 RX ADMIN — LATANOPROST 1 DROP: 50 SOLUTION OPHTHALMIC at 21:48

## 2023-08-11 RX ADMIN — SODIUM CHLORIDE 3000 MG: 900 INJECTION INTRAVENOUS at 17:27

## 2023-08-11 RX ADMIN — TRAMADOL HYDROCHLORIDE 50 MG: 50 TABLET, COATED ORAL at 04:43

## 2023-08-11 RX ADMIN — HEPARIN SODIUM 5000 UNITS: 5000 INJECTION INTRAVENOUS; SUBCUTANEOUS at 14:03

## 2023-08-11 RX ADMIN — ALBUTEROL SULFATE 2 PUFF: 90 AEROSOL, METERED RESPIRATORY (INHALATION) at 00:12

## 2023-08-11 RX ADMIN — ROPINIROLE HYDROCHLORIDE 0.5 MG: 0.25 TABLET, FILM COATED ORAL at 14:03

## 2023-08-11 ASSESSMENT — PAIN SCALES - GENERAL
PAINLEVEL_OUTOF10: 6
PAINLEVEL_OUTOF10: 0
PAINLEVEL_OUTOF10: 3
PAINLEVEL_OUTOF10: 0

## 2023-08-11 ASSESSMENT — PAIN DESCRIPTION - LOCATION
LOCATION: ABDOMEN
LOCATION: CHEST

## 2023-08-11 ASSESSMENT — PAIN DESCRIPTION - ORIENTATION: ORIENTATION: RIGHT;MID

## 2023-08-11 ASSESSMENT — PAIN DESCRIPTION - DESCRIPTORS: DESCRIPTORS: ACHING

## 2023-08-11 NOTE — CARE COORDINATION
08/11/23 1247   Service Assessment   Patient Orientation Alert and Oriented   Cognition Alert   History Provided By Patient; Child/Family; Spouse   Primary Caregiver Spouse   Support Systems Spouse/Significant Other;Children;Family Members   PCP Verified by CM Yes   Last Visit to PCP Within last 3 months   Prior Functional Level Assistance with the following:   Current Functional Level Assistance with the following:   Can patient return to prior living arrangement Yes   Ability to make needs known: Good   Family able to assist with home care needs: Yes   Would you like for me to discuss the discharge plan with any other family members/significant others, and if so, who? Yes  ( and daughter)   Financial Resources Medicare   Social/Functional History   Lives With Spouse   Type of [address]   [address] Rollator;Walker, rolling; Wheelchair-manual   Active  No   Patient's  Info family   Mode of Transportation Car   Condition of Participation: Discharge Planning   The Patient and/Or Patient Representative agree with the Discharge Plan? Yes     Reviewed chart, discussed in IDR and spoke with pt/spouse and daughter. Pt lives with her , family assist her with all her needs. Pt has had [facility] in past feel they did a good job but not needed at this time. Pt has a PCP and insurance that covers medications. Plan at this time is home with family to assist her as needed. CM will continue to follow.

## 2023-08-11 NOTE — CONSULTS
RENAL DOSE ADJUSTMENT MADE PER P/T PROTOCOL    PREVIOUS ORDER:  Famotidine 20mg po bid    CrCl cannot be calculated (Unknown ideal weight. ).   Recent Labs     08/10/23  1129 08/11/23  0631   BUN 90* 89*   CREATININE 2.6* 2.4*    208     NEW RENALLY ADJUSTED ORDER:  FAMOTIDINE 20MG PO DAILY    Peggy Guaman [facility] [place]  8/11/2023 12:14 PM

## 2023-08-11 NOTE — PROGRESS NOTES
Speech-Language Pathology Department   Facility/Department: [facility] 4N   CLINICAL BEDSIDE SWALLOW EVALUATION    NAME: Griselda Cambridge  : 1935  MRN: [id number]    ADMISSION DATE: 8/10/2023  ADMITTING DIAGNOSIS: has Diabetes type 2, controlled ([address]); Osteoarthritis; History of asthma; Cellulitis of right lower extremity; Sepsis ([address]); Gram positive sepsis ([address]); Gram-positive bacteremia; Cellulitis of left lower extremity; WD-Lymphedema of both lower extremities with cellulitis; Rhabdomyolysis; Hyperkalemia; Morbid obesity with BMI of 45.0-49.9, adult ([address]); Encephalopathy in sepsis; Toxic shock syndrome ([address]); Chronic renal impairment, stage 3 (moderate) ([address]); Anemia of chronic disease; Chronic kidney disease (CKD) stage G3a/A2, moderately decreased glomerular filtration rate (GFR) between 45-59 mL/min/1.73 square meter and albuminuria creatinine ratio between  mg/g ([address]); Cor pulmonale, chronic ([address]); DM (diabetes mellitus) ([address]); Fluid overload; HTN (hypertension); Chronic kidney disease (CKD) stage G2/A2, mildly decreased glomerular filtration rate (GFR) between 60-89 mL/min/1.73 square meter and albuminuria creatinine ratio between  mg/g; Acute gout; Acute kidney injury ([address]); Acute kidney injury superimposed on chronic kidney disease ([address]); Third degree heart block ([address]); Weakness; WD-Venous hypertension of both lower extremities; WD-Pressure injury of right buttock, stage 2 (HCC); WD-Pressure injury of left buttock, stage 2 (HCC); WD-Excoriation of buttock crease; WD-Venous stasis ulcer of right calf with fat layer exposed with varicose veins (HCC); WD-Venous stasis ulcer of left calf with fat layer exposed with varicose veins ([address]); WD-Callus of foot; WD-Diabetic ulcer of toe of left foot associated with type 2 diabetes mellitus, with fat layer exposed ([address]); WD-Diabetic ulcer of toe of right foot associated with type 2 diabetes mellitus, with fat layer exposed ([address]);  Neck pain; Neck pain, acute; Generalized weakness; and Diarrhea on their problem list.      Impressions: Katie Rosado was seen for a bedside swallowing evaluation after being admitted to [facility] with acute respiratory failure and diarrhea. Pt was alert and pleasant throughout assessment. Relevant medical hx includes CKD, asthma, thyroid disease and pneumonia. Pt denies any hx of swallowing difficulties PTA. Pt was positioned upright in bed and presented with a clear vocal quality and strong volitional cough. Oral mechanism examination was [facility] with no focal orofacial weakness. PO trials of puree, regular solids and thin liquids by cup/straw sips were given. Oropharyngeal swallow appears grossly intact characterized by adequate mastication and oral clearance, timely swallow initiation and adequate laryngeal elevation. Clear vocal quality and 0 overt s/s of aspiration were observed with all PO trials given. Recommend regular solids/thin liquids with aspiration precautions. No further acute SLP needs were identified at this time. ONSET DATE: this admission     Date of Eval: 8/11/2023  Evaluating Therapist: ANAND Dobbins    Current Diet level:  Current Liquid Diet : Thin;Clear    Primary Complaint  weakness    Pain:  Pain Assessment  Pain Assessment: 0-10  Pain Level: 3  Pain Location: Chest  Pain Orientation: Right, Mid  Pain Descriptors: Aching    Reason for Referral  Katie Rosado was referred for a bedside swallow evaluation to assess the efficiency of her swallow function, identify signs and symptoms of aspiration and make recommendations regarding safe dietary consistencies, effective compensatory strategies, and safe eating environment.     Impression  Dysphagia Diagnosis: Swallow function appears WFL;No clinical indicators of dysphagia  Dysphagia Outcome Severity Scale: Level 6: Within functional limits/Modified independence     Treatment Plan  Requires SLP Intervention: No  Duration of Treatment: n/a

## 2023-08-11 NOTE — CONSULTS
[facility] Gastroenterology and Hepatology             MD Kassandra Shipley MD Juna Fischer, APRN-CNP             [address] [facility] [address], [address] [facility]             [phone] fax [phone]     Department of Internal Medicine  Gastroenterology Consult Note  Kassandra Feldman MD    Reason for Consult: Altered bowel habits with diarrhea    Primary Care Physician:  Madelin Perdomo, APRN - CNP    History Obtained From:  patient    HISTORY OF PRESENT ILLNESS:              The patient is a 82 y.o.  female with past medical history of asthma, chronic kidney disease, chronic ulcer of right/left leg, diabetes type 2, hypertension, lymphedema of lower extremities, osteoarthritis, pneumonia, thyroid disease, venous stasis who presented to [facility] due to weakness and shortness of breath we were consulted for diarrhea. She has never had a colonoscopy and has declined them in the past.  She denied diet or medication changes. No fever but having chills in the last week or so. Her bowel pattern changed on Sunday with diarrhea moving her bowels four to five times with loose to liquid stools. She took Pepto bismo with a little improvement. Prior to that she struggled with constipation off and on. She was taking Miralax once a day with some improvement. Her bowels had been moving two times daily with soft brown formed stools. Per nursing last night she had two small loose green stools but they were unable to obtain stool sample due to mix of urine in her attends. No blood in her stools or melena. Her appetite has been poor over the last week but improving some since admission. She is tolerating clear liquids. No nausea or vomiting. No abdominal pain. She has bloating with abdominal pressure like discomfort.   Her CT of the abdomen/pelvis showed extensive consolidation in the right middle lobe and right lower lobe concerning for pneumonia, right

## 2023-08-11 NOTE — PLAN OF CARE
Problem: Skin/Tissue Integrity  Goal: Absence of new skin breakdown  Description: 1. Monitor for areas of redness and/or skin breakdown  2. Assess vascular access sites hourly  3. Every 4-6 hours minimum:  Change oxygen saturation probe site  4. Every 4-6 hours:  If on nasal continuous positive airway pressure, respiratory therapy assess nares and determine need for appliance change or resting period.   Outcome: Progressing     Problem: ABCDS Injury Assessment  Goal: Absence of physical injury  Outcome: Progressing     Problem: Safety - Adult  Goal: Free from fall injury  Outcome: Progressing     Problem: Discharge Planning  Goal: Discharge to home or other facility with appropriate resources  Outcome: Progressing     Problem: Pain  Goal: Verbalizes/displays adequate comfort level or baseline comfort level  Outcome: Progressing     Problem: Chronic Conditions and Co-morbidities  Goal: Patient's chronic conditions and co-morbidity symptoms are monitored and maintained or improved  Outcome: Progressing

## 2023-08-11 NOTE — CONSULTS
[address], [address] [place] [address]                                  CONSULTATION    PATIENT NAME: Sergio Sorensen                    :        1935  MED REC NO:   [id number]                          ROOM:       4115  ACCOUNT NO:   [de-identified]                           ADMIT DATE: 08/10/2023  PROVIDER:     Army Josie MD    CONSULT DATE:  08/10/2023    INDICATIONS:  Elevated troponin. HISTORY OF PRESENT ILLNESS:  This is an 51-year-old female patient who  came into the hospital with having diarrhea going on, dizziness, and  lightheadedness going on. She has been having alternating constipation  and diarrhea. With this, nausea is getting worse. Therefore, she came  into the ER. The patient sees Dr. Mitch Higgins for her renal insufficiency. The patient has  chronic renal insufficiency present. I did a heart catheterization back in . Left main is patent. LAD,  circ, and RCA mild disease noted. She has pulmonary hypertension  present. Right heart failure present and a temporary pacer was placed. Then, she underwent a permanent pacemaker placement by Dr. Felicita Oviedo. Her last echo was done in . LV function was preserved. Moderate  pulmonary hypertension was noted. PAST MEDICAL HISTORY:  She has no history of stroke or seizures present. She has a history of pacemaker placement for heart block. Nonobstructive coronary artery disease present, pulmonary hypertension,  right heart failure present, renal insufficiency present, chronic venous  ulcers present, diabetes present, anemia present, and thyroid disease  present. PAST SURGICAL HISTORY:  Pacemaker placement and it is a dual-chamber  [employer] device. Appendectomy, hysterectomy, and gallbladder surgery. SOCIAL HISTORY:  She does not smoke. She does not drink.     MEDICATIONS AT HOME:  She is on _____, Aldactone 50 mg twice a day,  Demadex 20 mg

## 2023-08-12 LAB
ALBUMIN SERPL-MCNC: 2.6 GM/DL (ref 3.4–5)
ALP BLD-CCNC: 54 IU/L (ref 40–128)
ALT SERPL-CCNC: 29 U/L (ref 10–40)
ANION GAP SERPL CALCULATED.3IONS-SCNC: 13 MMOL/L (ref 4–16)
AST SERPL-CCNC: 36 IU/L (ref 15–37)
BILIRUB SERPL-MCNC: 0.3 MG/DL (ref 0–1)
BUN SERPL-MCNC: 88 MG/DL (ref 6–23)
CALCIUM SERPL-MCNC: 7.8 MG/DL (ref 8.3–10.6)
CHLORIDE BLD-SCNC: 96 MMOL/L (ref 99–110)
CO2: 20 MMOL/L (ref 21–32)
CREAT SERPL-MCNC: 2.6 MG/DL (ref 0.6–1.1)
CRP SERPL HS-MCNC: 174.9 MG/L
GFR SERPL CREATININE-BSD FRML MDRD: 17 ML/MIN/1.73M2
GLUCOSE BLD-MCNC: 127 MG/DL (ref 70–99)
GLUCOSE BLD-MCNC: 151 MG/DL (ref 70–99)
GLUCOSE BLD-MCNC: 187 MG/DL (ref 70–99)
GLUCOSE BLD-MCNC: 196 MG/DL (ref 70–99)
GLUCOSE SERPL-MCNC: 150 MG/DL (ref 70–99)
L PNEUMO AG UR QL IA: NEGATIVE
MAGNESIUM: 2.8 MG/DL (ref 1.8–2.4)
PHOSPHORUS: 3.2 MG/DL (ref 2.5–4.9)
POTASSIUM SERPL-SCNC: 3.5 MMOL/L (ref 3.5–5.1)
S PNEUM AG CSF QL: NORMAL
SODIUM BLD-SCNC: 129 MMOL/L (ref 135–145)
TOTAL PROTEIN: 4.8 GM/DL (ref 6.4–8.2)

## 2023-08-12 PROCEDURE — 86140 C-REACTIVE PROTEIN: CPT

## 2023-08-12 PROCEDURE — 2580000003 HC RX 258: Performed by: NURSE PRACTITIONER

## 2023-08-12 PROCEDURE — 6360000002 HC RX W HCPCS: Performed by: NURSE PRACTITIONER

## 2023-08-12 PROCEDURE — 1200000000 HC SEMI PRIVATE

## 2023-08-12 PROCEDURE — 94640 AIRWAY INHALATION TREATMENT: CPT

## 2023-08-12 PROCEDURE — 6360000002 HC RX W HCPCS: Performed by: PHYSICIAN ASSISTANT

## 2023-08-12 PROCEDURE — 2700000000 HC OXYGEN THERAPY PER DAY

## 2023-08-12 PROCEDURE — 6370000000 HC RX 637 (ALT 250 FOR IP): Performed by: HOSPITALIST

## 2023-08-12 PROCEDURE — 36415 COLL VENOUS BLD VENIPUNCTURE: CPT

## 2023-08-12 PROCEDURE — 6370000000 HC RX 637 (ALT 250 FOR IP): Performed by: PHYSICIAN ASSISTANT

## 2023-08-12 PROCEDURE — 80053 COMPREHEN METABOLIC PANEL: CPT

## 2023-08-12 PROCEDURE — 84100 ASSAY OF PHOSPHORUS: CPT

## 2023-08-12 PROCEDURE — 82962 GLUCOSE BLOOD TEST: CPT

## 2023-08-12 PROCEDURE — 2580000003 HC RX 258: Performed by: INTERNAL MEDICINE

## 2023-08-12 PROCEDURE — 83735 ASSAY OF MAGNESIUM: CPT

## 2023-08-12 PROCEDURE — 2580000003 HC RX 258: Performed by: PHYSICIAN ASSISTANT

## 2023-08-12 PROCEDURE — 94761 N-INVAS EAR/PLS OXIMETRY MLT: CPT

## 2023-08-12 RX ORDER — FLUTICASONE PROPIONATE 110 UG/1
1 AEROSOL, METERED RESPIRATORY (INHALATION)
Status: DISCONTINUED | OUTPATIENT
Start: 2023-08-12 | End: 2023-08-18 | Stop reason: HOSPADM

## 2023-08-12 RX ORDER — SODIUM CHLORIDE, SODIUM LACTATE, POTASSIUM CHLORIDE, CALCIUM CHLORIDE 600; 310; 30; 20 MG/100ML; MG/100ML; MG/100ML; MG/100ML
INJECTION, SOLUTION INTRAVENOUS CONTINUOUS
Status: DISPENSED | OUTPATIENT
Start: 2023-08-12 | End: 2023-08-13

## 2023-08-12 RX ADMIN — ALBUTEROL SULFATE 2 PUFF: 90 AEROSOL, METERED RESPIRATORY (INHALATION) at 14:59

## 2023-08-12 RX ADMIN — CILOSTAZOL 50 MG: 50 TABLET ORAL at 09:23

## 2023-08-12 RX ADMIN — FAMOTIDINE 20 MG: 20 TABLET ORAL at 09:23

## 2023-08-12 RX ADMIN — CILOSTAZOL 50 MG: 50 TABLET ORAL at 21:58

## 2023-08-12 RX ADMIN — FLUTICASONE PROPIONATE 1 PUFF: 110 AEROSOL, METERED RESPIRATORY (INHALATION) at 20:50

## 2023-08-12 RX ADMIN — LATANOPROST 1 DROP: 50 SOLUTION OPHTHALMIC at 22:00

## 2023-08-12 RX ADMIN — SODIUM CHLORIDE, POTASSIUM CHLORIDE, SODIUM LACTATE AND CALCIUM CHLORIDE: 600; 310; 30; 20 INJECTION, SOLUTION INTRAVENOUS at 11:54

## 2023-08-12 RX ADMIN — FLUTICASONE PROPIONATE 1 PUFF: 110 AEROSOL, METERED RESPIRATORY (INHALATION) at 08:05

## 2023-08-12 RX ADMIN — ROPINIROLE HYDROCHLORIDE 0.5 MG: 0.25 TABLET, FILM COATED ORAL at 09:23

## 2023-08-12 RX ADMIN — LEVOTHYROXINE SODIUM 50 MCG: 0.05 TABLET ORAL at 06:15

## 2023-08-12 RX ADMIN — HEPARIN SODIUM 5000 UNITS: 5000 INJECTION INTRAVENOUS; SUBCUTANEOUS at 13:06

## 2023-08-12 RX ADMIN — ROPINIROLE HYDROCHLORIDE 0.5 MG: 0.25 TABLET, FILM COATED ORAL at 13:06

## 2023-08-12 RX ADMIN — FERROUS SULFATE TAB 325 MG (65 MG ELEMENTAL FE) 325 MG: 325 (65 FE) TAB at 09:23

## 2023-08-12 RX ADMIN — SODIUM CHLORIDE, PRESERVATIVE FREE 10 ML: 5 INJECTION INTRAVENOUS at 09:23

## 2023-08-12 RX ADMIN — HEPARIN SODIUM 5000 UNITS: 5000 INJECTION INTRAVENOUS; SUBCUTANEOUS at 21:59

## 2023-08-12 RX ADMIN — HEPARIN SODIUM 5000 UNITS: 5000 INJECTION INTRAVENOUS; SUBCUTANEOUS at 06:15

## 2023-08-12 RX ADMIN — SODIUM CHLORIDE 3000 MG: 900 INJECTION INTRAVENOUS at 16:53

## 2023-08-12 RX ADMIN — SODIUM CHLORIDE: 9 INJECTION, SOLUTION INTRAVENOUS at 06:14

## 2023-08-12 RX ADMIN — ALBUTEROL SULFATE 2 PUFF: 90 AEROSOL, METERED RESPIRATORY (INHALATION) at 08:09

## 2023-08-12 RX ADMIN — BUDESONIDE 250 MCG: 0.25 SUSPENSION RESPIRATORY (INHALATION) at 00:10

## 2023-08-12 RX ADMIN — SODIUM CHLORIDE 3000 MG: 900 INJECTION INTRAVENOUS at 06:15

## 2023-08-12 RX ADMIN — ROPINIROLE HYDROCHLORIDE 0.5 MG: 0.25 TABLET, FILM COATED ORAL at 21:58

## 2023-08-12 RX ADMIN — FERROUS SULFATE TAB 325 MG (65 MG ELEMENTAL FE) 325 MG: 325 (65 FE) TAB at 16:51

## 2023-08-12 RX ADMIN — ALBUTEROL SULFATE 2 PUFF: 90 AEROSOL, METERED RESPIRATORY (INHALATION) at 20:49

## 2023-08-12 ASSESSMENT — PAIN SCALES - GENERAL
PAINLEVEL_OUTOF10: 0

## 2023-08-12 NOTE — PROGRESS NOTES
V2.0  [facility] [place] [facility] [place]      Name:  Ruby Duncan /Age/Sex: 1935  (82 y.o. female)   MRN & CSN:  [id number] & [id number] Encounter Date/Time: 2023 11:28 AM EDT    [facility] PCP: Ethel Forde [address] [place] [address] Day: 3    Assessment and Plan:   Ruby Duncan is a 82 y.o. female who presents with diarrhea      Plan:  Acute on chronic respiratory failure with hypoxia  -wears 2 L nasal cannula at night at home. CT chest shows extensive consolidation in right middle lobe and right lower lobe. On 2 L nasal cannula. Wean oxygen as tolerated. Bronchodilators. Possible pneumonia  -seen on CT chest.  On antibiotics. Check blood cultures, respiratory culture, urine strep and Legionella. SLP evaluation for aspiration.  -Procalcitonin 0.905. . Respiratory PCR: Negative. Diarrhea  -occasionally alternates with constipation. Pending GI PCR and C. difficile. On IVF. -Diarrhea appears to be resolving. GI following. Tolerating full liquids, advance diet to regular. Hypokalemia  -replaced in ED. -Improved. Elevated troponin  -likely due to kidney injury. EKG showed paced rhythm. Patient is stable recommends conservative management. Echo 8/10: EF 60-65%; G1 DD; moderate to severe mitral stenosis. Hyponatremia  -improved sodium with IV fluids. Likely due to diarrhea.  -Sodium 129. Urine sodium 20. KAREEM on CKD 4  -Increased creatinine 2.6.  UA: Leukocyte Esterase, trace blood in urine. Chronic pain  -on tizanidine and tramadol. Hypermagnesemia  -Magnesium 2.8. Monitor. Hypothyroidism    Diet ADULT DIET;  Regular    DVT Prophylaxis Heparin,    Code Status Full Code   Disposition From: Home  Expected Disposition: TBD  Estimated Date of Discharge:   Patient requires continued admission due to diarrhea and KAREEM   Home O2 2 L nasal cannula at night     Personally reviewed Lab Studies and Imaging         Subjective/Interval history:   Chief

## 2023-08-12 NOTE — PLAN OF CARE
Problem: Skin/Tissue Integrity  Goal: Absence of new skin breakdown  Description: 1. Monitor for areas of redness and/or skin breakdown  2. Assess vascular access sites hourly  3. Every 4-6 hours minimum:  Change oxygen saturation probe site  4. Every 4-6 hours:  If on nasal continuous positive airway pressure, respiratory therapy assess nares and determine need for appliance change or resting period.   8/12/2023 0105 by Elana Cervantes RN  Outcome: Progressing  8/11/2023 1545 by Eleazar Rodriguez RN  Outcome: Progressing     Problem: ABCDS Injury Assessment  Goal: Absence of physical injury  8/12/2023 0105 by Elana Cervantes RN  Outcome: Progressing  8/11/2023 1545 by Eleazar Rodriguez RN  Outcome: Progressing     Problem: Safety - Adult  Goal: Free from fall injury  8/12/2023 0105 by Elana Cervantes RN  Outcome: Progressing  8/11/2023 1545 by Eleazar Rodriguez RN  Outcome: Progressing     Problem: Discharge Planning  Goal: Discharge to home or other facility with appropriate resources  8/12/2023 0105 by Elana Cervantes RN  Outcome: Progressing  Flowsheets (Taken 8/11/2023 2130)  Discharge to home or other facility with appropriate resources: Identify barriers to discharge with patient and caregiver  8/11/2023 1545 by Eleazar Rodriguez RN  Outcome: Progressing     Problem: Pain  Goal: Verbalizes/displays adequate comfort level or baseline comfort level  8/12/2023 0105 by Elana Cervantes RN  Outcome: Progressing  Flowsheets (Taken 8/11/2023 2130)  Verbalizes/displays adequate comfort level or baseline comfort level: Encourage patient to monitor pain and request assistance  8/11/2023 1545 by Eleazar Rodriguez RN  Outcome: Progressing     Problem: Chronic Conditions and Co-morbidities  Goal: Patient's chronic conditions and co-morbidity symptoms are monitored and maintained or improved  8/12/2023 0105 by Elana Cervantes RN  Outcome: Progressing  Flowsheets (Taken 8/11/2023 2130)  Care Plan - Patient's Chronic Conditions and Co-Morbidity Symptoms are Monitored and Maintained or Improved: Monitor and assess patient's chronic conditions and comorbid symptoms for stability, deterioration, or improvement  8/11/2023 1545 by Kayla Jones RN  Outcome: Progressing

## 2023-08-12 NOTE — PROGRESS NOTES
Nephrology Progress Note  8/12/2023 10:20 AM  Subjective: Interval History: Sinai Carmona is a 82 y.o. female with  treatment for C. difficile resting in bed        Data:   Scheduled Meds:   fluticasone  1 puff Inhalation BID RT    ampicillin-sulbactam  3,000 mg IntraVENous Q12H    famotidine  20 mg Oral Daily    cilostazol  50 mg Oral BID    ferrous sulfate  325 mg Oral BID WC    latanoprost  1 drop Both Eyes Nightly    levothyroxine  50 mcg Oral Daily    rOPINIRole  0.5 mg Oral TID    sodium chloride flush  5-40 mL IntraVENous 2 times per day    heparin (porcine)  5,000 Units SubCUTAneous 3 times per day     Continuous Infusions:   sodium chloride 50 mL/hr at 08/12/23 0614         CBC   Recent Labs     08/10/23  1129 08/11/23  0631   WBC 11.9* 10.4   HGB 12.3* 11.1*   HCT 37.2 34.6*    208      BMP   Recent Labs     08/10/23  1129 08/11/23  0631 08/12/23  0305   * 133* 129*   K 2.9* 3.9 3.5   CL 87* 99 96*   CO2 20* 17* 20*   PHOS  --  3.8 3.2   BUN 90* 89* 88*   CREATININE 2.6* 2.4* 2.6*     Hepatic:   Recent Labs     08/10/23  1129 08/11/23  0631 08/12/23  0305   AST 43* 39* 36   ALT 23 26 29   BILITOT 0.4 0.3 0.3   ALKPHOS 63 56 54     Troponin: No results for input(s): TROPONINI in the last 72 hours. BNP: No results for input(s): BNP in the last 72 hours. Lipids: No results for input(s): CHOL, HDL in the last 72 hours. Invalid input(s): LDLCALCU  ABGs:   Lab Results   Component Value Date/Time    PO2ART 66 11/17/2020 07:30 AM    CLQ6AOK 29.0 11/17/2020 07:30 AM     INR: No results for input(s): INR in the last 72 hours.   Renal Labs  Albumin:    Lab Results   Component Value Date/Time    LABALBU 2.6 08/12/2023 03:05 AM     Calcium:    Lab Results   Component Value Date/Time    CALCIUM 7.8 08/12/2023 03:05 AM     Phosphorus:    Lab Results   Component Value Date/Time    PHOS 3.2 08/12/2023 03:05 AM     U/A:    Lab Results   Component Value Date/Time    NITRU NEGATIVE 08/11/2023 12:44 PM

## 2023-08-12 NOTE — PROGRESS NOTES
Daily Progress Note  Subjective:  Awake, alert, feeling ok   V- paced on tele   HR and BP stable   Denies CP, SOB stable on 2L NC    Attending Note:  Admitted with diarrhea, now resolving. Cardiac issues:   Mild CAD by cath 2020,   Mod MS/MR,   CHB - has PPM,   PAD  /50, HR 70. Tele - V pacing. Persistent atypical CP with some SOB. Troponin . 051  BUN 88, Cr 2.6  Renal plans noted. Will monitor cardiac status. Impression and Plan:   Diarrhea    Has been ongoing since Sunday per  in the room    Likely leading to intra-vascular depletion and KAREEM    Tx. Per primary team-infectious w/u pending     SOB    CT chest suggestive of PNA    On Abx already per primary team    No sig pulm edema noted on CT chest    Echo done and EF preserved- Mod-severe MS noted but stable overall for now     Elevated Trop    Likely d/t demand ischemia/KAREEM    No ACS noted    Med. Tx. For now    KAREEM     Cr 2.6 today     Nephrology following     PPM check stable- CHB, v-paced  Will follow     Most Recent Echo  8/10/23   Summary   Ejection fraction is normal and visually estimated at 60-65%. Grade I diastolic dysfunction. Sclerotic, but non-stenotic aortic valve. Moderate to severe mitral stenosis with mild regurgitation. mean gradient is   9mm HG MVA is 1.85cm2   Moderate MAC noted   Trace tricuspid regurgitation; normal RVSP. Trace pulmonic regurgitation present. No evidence of any pericardial effusion. Most Recent Heart Cath  11/2020  IMPRESSION:  1. Moderately elevated right heart pressure present. RV pressure was  64/14 with a mean of 29 and PA pressure was 61/29 with a mean of 28.  2.  EDP was around 20 to 25 mmHg present. 3.  Right coronary artery has mild disease noted. 4.  Left main is patent. LAD and circ has mild disease noted. Radiology  CT chest-8/10/23  IMPRESSION:  1. Extensive consolidation in the right middle lobe and right lower lobe  concerning for pneumonia.   Small right pleural

## 2023-08-13 LAB
GLUCOSE BLD-MCNC: 118 MG/DL (ref 70–99)
GLUCOSE BLD-MCNC: 143 MG/DL (ref 70–99)
GLUCOSE BLD-MCNC: 166 MG/DL (ref 70–99)
GLUCOSE BLD-MCNC: 178 MG/DL (ref 70–99)

## 2023-08-13 PROCEDURE — 2580000003 HC RX 258: Performed by: PHYSICIAN ASSISTANT

## 2023-08-13 PROCEDURE — 6360000002 HC RX W HCPCS: Performed by: NURSE PRACTITIONER

## 2023-08-13 PROCEDURE — 6360000002 HC RX W HCPCS: Performed by: PHYSICIAN ASSISTANT

## 2023-08-13 PROCEDURE — 6370000000 HC RX 637 (ALT 250 FOR IP): Performed by: PHYSICIAN ASSISTANT

## 2023-08-13 PROCEDURE — 94640 AIRWAY INHALATION TREATMENT: CPT

## 2023-08-13 PROCEDURE — 2700000000 HC OXYGEN THERAPY PER DAY

## 2023-08-13 PROCEDURE — 97166 OT EVAL MOD COMPLEX 45 MIN: CPT

## 2023-08-13 PROCEDURE — 97530 THERAPEUTIC ACTIVITIES: CPT

## 2023-08-13 PROCEDURE — 1200000000 HC SEMI PRIVATE

## 2023-08-13 PROCEDURE — 82962 GLUCOSE BLOOD TEST: CPT

## 2023-08-13 PROCEDURE — 94761 N-INVAS EAR/PLS OXIMETRY MLT: CPT

## 2023-08-13 PROCEDURE — 2580000003 HC RX 258: Performed by: NURSE PRACTITIONER

## 2023-08-13 RX ADMIN — FERROUS SULFATE TAB 325 MG (65 MG ELEMENTAL FE) 325 MG: 325 (65 FE) TAB at 16:51

## 2023-08-13 RX ADMIN — CILOSTAZOL 50 MG: 50 TABLET ORAL at 08:11

## 2023-08-13 RX ADMIN — HEPARIN SODIUM 5000 UNITS: 5000 INJECTION INTRAVENOUS; SUBCUTANEOUS at 12:21

## 2023-08-13 RX ADMIN — CILOSTAZOL 50 MG: 50 TABLET ORAL at 20:43

## 2023-08-13 RX ADMIN — ROPINIROLE HYDROCHLORIDE 0.5 MG: 0.25 TABLET, FILM COATED ORAL at 08:11

## 2023-08-13 RX ADMIN — SODIUM CHLORIDE 3000 MG: 900 INJECTION INTRAVENOUS at 16:54

## 2023-08-13 RX ADMIN — HEPARIN SODIUM 5000 UNITS: 5000 INJECTION INTRAVENOUS; SUBCUTANEOUS at 20:43

## 2023-08-13 RX ADMIN — LATANOPROST 1 DROP: 50 SOLUTION OPHTHALMIC at 20:43

## 2023-08-13 RX ADMIN — LEVOTHYROXINE SODIUM 50 MCG: 0.05 TABLET ORAL at 08:11

## 2023-08-13 RX ADMIN — ROPINIROLE HYDROCHLORIDE 0.5 MG: 0.25 TABLET, FILM COATED ORAL at 20:42

## 2023-08-13 RX ADMIN — SODIUM CHLORIDE 3000 MG: 900 INJECTION INTRAVENOUS at 07:29

## 2023-08-13 RX ADMIN — FLUTICASONE PROPIONATE 1 PUFF: 110 AEROSOL, METERED RESPIRATORY (INHALATION) at 14:04

## 2023-08-13 RX ADMIN — FAMOTIDINE 20 MG: 20 TABLET ORAL at 08:11

## 2023-08-13 RX ADMIN — FERROUS SULFATE TAB 325 MG (65 MG ELEMENTAL FE) 325 MG: 325 (65 FE) TAB at 08:11

## 2023-08-13 RX ADMIN — ROPINIROLE HYDROCHLORIDE 0.5 MG: 0.25 TABLET, FILM COATED ORAL at 12:21

## 2023-08-13 RX ADMIN — FLUTICASONE PROPIONATE 1 PUFF: 110 AEROSOL, METERED RESPIRATORY (INHALATION) at 19:40

## 2023-08-13 RX ADMIN — HEPARIN SODIUM 5000 UNITS: 5000 INJECTION INTRAVENOUS; SUBCUTANEOUS at 07:29

## 2023-08-13 RX ADMIN — SODIUM CHLORIDE, PRESERVATIVE FREE 10 ML: 5 INJECTION INTRAVENOUS at 08:11

## 2023-08-13 ASSESSMENT — PAIN SCALES - GENERAL
PAINLEVEL_OUTOF10: 0
PAINLEVEL_OUTOF10: 0

## 2023-08-13 NOTE — PROGRESS NOTES
Occupational Therapy    [facility] ACUTE CARE OCCUPATIONAL THERAPY EVALUATION  Dayanna Arellano, 1935, 4115/4[address]-A, 8/13/2023    History  [place]:  The primary encounter diagnosis was Combined systolic and diastolic congestive heart failure, unspecified HF chronicity ([address]). Diagnoses of Diarrhea, unspecified type, Hypokalemia, Hyponatremia, and Hypomagnesemia were also pertinent to this visit. Patient  has a past medical history of Asthma, Chronic kidney disease, Chronic ulcer of left leg with fat layer exposed ([address]), Chronic ulcer of right leg with fat layer exposed ([address]), Diabetes type 2, controlled ([address]), History of asthma, History of blood transfusion, Hypertension, Lymphedema of both lower extremities, Osteoarthritis, Pneumonia, Thyroid disease, Venous stasis of both lower extremities, and WD-Non-pressure chronic ulcer right lower leg, 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 ([address]). Patient  has a past surgical history that includes Urethra surgery; Appendectomy; Hysterectomy; Cholecystectomy; Cystoscopy; eye surgery; joint replacement (Right); and Pacemaker insertion (N/A, 11/17/2020). Subjective:  Patient states:  \"I just feel so SOB\".     Pain:  No.    Communication with other providers:  Handoff to RN  Restrictions: C Diff Contact Precautions, General Precautions, Fall Risk    Home Setup/Prior level of function  Social/Functional History  Lives With: Spouse  Type of Home: House  Home Layout: One level  Home Access: Ramped entrance  Bathroom Shower/Tub: Walk-in shower  Bathroom Toilet: Handicap height  Bathroom Equipment: Grab bars in shower, Shower chair  Bathroom Accessibility: [address] [place] [address]: Maru Signs, rolling, [address]  Has the patient had two or more falls in the past year or any fall with injury in the past year?: No  ADL Assistance: [address] Needs assistance  Ambulation Assistance: Independent  Transfer Assistance: Independent  Active :

## 2023-08-13 NOTE — PROGRESS NOTES
Physician Progress Note      Lois Young  [facility] #:                  [id number]  :                       1935  ADMIT DATE:       8/10/2023 10:56 AM  DISCH DATE:  RESPONDING  PROVIDER #:        Yudi Babb          QUERY TEXT:    Patient admitted with KAREEM. Noted documentation of acute respiratory failure in   H&P. In order to support the diagnosis of acute respiratory failure, please   include additional clinical indicators in your documentation. Or please   document if the diagnosis of acute respiratory failure has been ruled out   after further study. The medical record reflects the following:  Risk Factors: CHF, cor pulmonale. Clinical Indicators: H&P \"Acute on chronic respiratory failure with hypoxia   -Normally wears 2 L nasal cannula at night, currently requiring 2 L nasal   cannula continuously. .. Jean Paul Toshia Jean Paul Toshia Respiratory: Clear to auscultation bilaterally,   respirations even and unlabored on 2 L nasal cannula\", ED Provider note \"Pt   has nonlabored respirations is requiring oxygen via nasal cannula. She is 94%   on 4 L\", SpO2  % on 2L/nc, R 29 - 17. Treatment: O2 2L/NC, CXR. Acute Respiratory Failure Clinical Indicators per  MS-DRG Training Guide and   Quick Reference Guide:  pO2 < 60 mmHg or SpO2 (pulse oximetry) < 91% breathing room air  pCO2 > 50 and pH < 7.35  P/F ratio (pO2 / FIO2) < 300  pO2 decrease or pCO2 increase by 10 mmHg from baseline (if known)  Supplemental oxygen of 40% or more  Presence of respiratory distress, tachypnea, dyspnea, shortness of breath,   wheezing  Unable to speak in complete sentences  Use of accessory muscles to breathe  Extreme anxiety and feeling of impending doom  Tripod position  Confusion/altered mental status/obtunded    Thank you,  Rick MCKENZIEN, RN, [facility]  [phone]  Options provided:  -- Acute Respiratory Failure as evidenced by, Please document evidence.   -- Acute and chronic Respiratory Failure ruled out after study  --

## 2023-08-13 NOTE — PROGRESS NOTES
V2.0  [facility] [place] Progress Note      Name:  Dara Wilde /Age/Sex: 1935  (82 y.o. female)   MRN & CSN:  [id number] & [id number] Encounter Date/Time: 2023 11:28 AM EDT    Location:  [facility] PCP: Jenny Thomson [address] [place] [address]  Day: 4    Assessment and Plan:   Dara Wilde is a 82 y.o. female who presents with diarrhea      Plan:  Acute on chronic respiratory failure with hypoxia  Suspected pneumonia  Baseline 2 L nasal cannula at night at home. Oxygen uptitrated to 4 L/min nasal cannula overnight unknown reason  Imaging: Extensive consolidation in the right middle lobe and right lower lobe concerning for pneumonia. Small right pleural effusion. Wean oxygen as tolerated. Bronchodilators/pulmonary hygiene  Urine antigens negative/respiratory PCR negative  Pending respiratory culture  Trend inflammatory markers  Downward trend of CRP  Antibiotics Unasyn  May customize antibiotics pending finalized urine culture sensitivities    Acute cystitis without hematuria  No current urinary symptoms  Urine culture E. coli and Klebsiella growth    Diarrhea   occasionally alternates with constipation. Pending GI PCR and C. difficile. Diarrhea appears to be resolving and unable to send samples  GI following. Tolerating regular diet. Hypokalemia  replaced in ED. Irina Salk Elevated troponin  likely due to kidney injury. EKG showed paced rhythm. Cardiology following recommends conservative management. Echo 8/10: EF 60-65%; G1 DD; moderate to severe mitral stenosis. Hyponatremia  improved sodium with IV fluids. Likely due to diarrhea  Sodium 129. Urine sodium 20. Clinical decision making delayed d/t am labs not being completed     KAREEM on CKD 4  Increased creatinine 2.6.  UA: Leukocyte Esterase, trace blood in urine. Chronic pain  on tizanidine and tramadol. Hypermagnesemia  Magnesium 2.8. Monitor. Hypothyroidism    Diet ADULT DIET;  Regular    DVT PRN  sodium chloride, , PRN  ondansetron, 4 mg, Q8H PRN   Or  ondansetron, 4 mg, Q6H PRN  polyethylene glycol, 17 g, Daily PRN  acetaminophen, 650 mg, Q6H PRN   Or  acetaminophen, 650 mg, Q6H PRN        Labs      Recent Labs     08/11/23  0631   WBC 10.4   HGB 11.1*   HCT 34.6*           Recent Labs     08/11/23  0631 08/12/23  0305   * 129*   K 3.9 3.5   CL 99 96*   CO2 17* 20*   PHOS 3.8 3.2   BUN 89* 88*   CREATININE 2.4* 2.6*       Recent Labs     08/11/23  0631 08/12/23  0305   AST 39* 36   ALT 26 29   BILITOT 0.3 0.3   ALKPHOS 56 54       No results for input(s): INR in the last 72 hours. No results for input(s): CKTOTAL, CKMB, CKMBINDEX, TROPONINT in the last 72 hours.     Lab Results   Component Value Date/Time    LABA1C 7.8 08/19/2020 09:25 AM     CALCIUM:  7.8/20 (08/12 0305)  Lab Results   Component Value Date/Time    MG 2.8 08/12/2023 03:05 AM           Electronically signed by TREVON Lee CNP on 8/13/2023 at 2:19 PM

## 2023-08-13 NOTE — PROGRESS NOTES
edema trace  Neurologic: Mental status: alertness:  awake        Assessment and Plan:      IMP:   acute renal failure on CKD 3   hypokalemia with metabolic acidosis   diarrhea  + uti   shortness of breath with moderate MS and pulmonary hypertension    Plan     1 await labs today and hydrate  2 fu K   3 c dif pending and + e coli uti on abx  4 o2 stable  5 bp stable    Dw pt daughter in room and agree home with hhc vs SNF based on how affect does with therapy next 1-2 days as pt lives home with            Kimberly Babcock MD, MD

## 2023-08-14 ENCOUNTER — APPOINTMENT (OUTPATIENT)
Dept: GENERAL RADIOLOGY | Age: 88
End: 2023-08-14
Payer: MEDICARE

## 2023-08-14 LAB
ALBUMIN SERPL-MCNC: 2.9 GM/DL (ref 3.4–5)
ALP BLD-CCNC: 55 IU/L (ref 40–128)
ALT SERPL-CCNC: 19 U/L (ref 10–40)
ANION GAP SERPL CALCULATED.3IONS-SCNC: 13 MMOL/L (ref 4–16)
AST SERPL-CCNC: 14 IU/L (ref 15–37)
BILIRUB SERPL-MCNC: 0.4 MG/DL (ref 0–1)
BUN SERPL-MCNC: 88 MG/DL (ref 6–23)
CALCIUM SERPL-MCNC: 8.2 MG/DL (ref 8.3–10.6)
CHLORIDE BLD-SCNC: 97 MMOL/L (ref 99–110)
CO2: 21 MMOL/L (ref 21–32)
CREAT SERPL-MCNC: 2.8 MG/DL (ref 0.6–1.1)
CULTURE: ABNORMAL
GFR SERPL CREATININE-BSD FRML MDRD: 16 ML/MIN/1.73M2
GLUCOSE BLD-MCNC: 116 MG/DL (ref 70–99)
GLUCOSE BLD-MCNC: 153 MG/DL (ref 70–99)
GLUCOSE BLD-MCNC: 179 MG/DL (ref 70–99)
GLUCOSE BLD-MCNC: 253 MG/DL (ref 70–99)
GLUCOSE SERPL-MCNC: 196 MG/DL (ref 70–99)
Lab: ABNORMAL
MAGNESIUM: 2.5 MG/DL (ref 1.8–2.4)
POTASSIUM SERPL-SCNC: 3.4 MMOL/L (ref 3.5–5.1)
PRO-BNP: 3425 PG/ML
SODIUM BLD-SCNC: 131 MMOL/L (ref 135–145)
SPECIMEN: ABNORMAL
TOTAL PROTEIN: 5.1 GM/DL (ref 6.4–8.2)

## 2023-08-14 PROCEDURE — 2580000003 HC RX 258: Performed by: NURSE PRACTITIONER

## 2023-08-14 PROCEDURE — 83735 ASSAY OF MAGNESIUM: CPT

## 2023-08-14 PROCEDURE — 6360000002 HC RX W HCPCS: Performed by: NURSE PRACTITIONER

## 2023-08-14 PROCEDURE — 83880 ASSAY OF NATRIURETIC PEPTIDE: CPT

## 2023-08-14 PROCEDURE — 2580000003 HC RX 258: Performed by: PHYSICIAN ASSISTANT

## 2023-08-14 PROCEDURE — 71045 X-RAY EXAM CHEST 1 VIEW: CPT

## 2023-08-14 PROCEDURE — 6360000002 HC RX W HCPCS: Performed by: PHYSICIAN ASSISTANT

## 2023-08-14 PROCEDURE — 94761 N-INVAS EAR/PLS OXIMETRY MLT: CPT

## 2023-08-14 PROCEDURE — 80053 COMPREHEN METABOLIC PANEL: CPT

## 2023-08-14 PROCEDURE — 82962 GLUCOSE BLOOD TEST: CPT

## 2023-08-14 PROCEDURE — 94640 AIRWAY INHALATION TREATMENT: CPT

## 2023-08-14 PROCEDURE — 2700000000 HC OXYGEN THERAPY PER DAY

## 2023-08-14 PROCEDURE — 1200000000 HC SEMI PRIVATE

## 2023-08-14 PROCEDURE — 6370000000 HC RX 637 (ALT 250 FOR IP): Performed by: NURSE PRACTITIONER

## 2023-08-14 PROCEDURE — 6370000000 HC RX 637 (ALT 250 FOR IP)

## 2023-08-14 PROCEDURE — 36415 COLL VENOUS BLD VENIPUNCTURE: CPT

## 2023-08-14 PROCEDURE — 6370000000 HC RX 637 (ALT 250 FOR IP): Performed by: PHYSICIAN ASSISTANT

## 2023-08-14 RX ORDER — ISOSORBIDE MONONITRATE 30 MG/1
30 TABLET, EXTENDED RELEASE ORAL DAILY
Status: DISCONTINUED | OUTPATIENT
Start: 2023-08-14 | End: 2023-08-18 | Stop reason: HOSPADM

## 2023-08-14 RX ORDER — CEFUROXIME AXETIL 250 MG/1
250 TABLET ORAL EVERY 12 HOURS SCHEDULED
Status: DISCONTINUED | OUTPATIENT
Start: 2023-08-14 | End: 2023-08-14

## 2023-08-14 RX ORDER — PREDNISONE 20 MG/1
40 TABLET ORAL DAILY
Status: DISCONTINUED | OUTPATIENT
Start: 2023-08-14 | End: 2023-08-17

## 2023-08-14 RX ADMIN — ROPINIROLE HYDROCHLORIDE 0.5 MG: 0.25 TABLET, FILM COATED ORAL at 14:45

## 2023-08-14 RX ADMIN — POTASSIUM BICARBONATE 40 MEQ: 782 TABLET, EFFERVESCENT ORAL at 14:46

## 2023-08-14 RX ADMIN — CILOSTAZOL 50 MG: 50 TABLET ORAL at 20:57

## 2023-08-14 RX ADMIN — LATANOPROST 1 DROP: 50 SOLUTION OPHTHALMIC at 20:57

## 2023-08-14 RX ADMIN — FERROUS SULFATE TAB 325 MG (65 MG ELEMENTAL FE) 325 MG: 325 (65 FE) TAB at 18:49

## 2023-08-14 RX ADMIN — SODIUM CHLORIDE, PRESERVATIVE FREE 10 ML: 5 INJECTION INTRAVENOUS at 20:57

## 2023-08-14 RX ADMIN — LEVOTHYROXINE SODIUM 50 MCG: 0.05 TABLET ORAL at 06:17

## 2023-08-14 RX ADMIN — SODIUM CHLORIDE 3000 MG: 900 INJECTION INTRAVENOUS at 06:22

## 2023-08-14 RX ADMIN — ROPINIROLE HYDROCHLORIDE 0.5 MG: 0.25 TABLET, FILM COATED ORAL at 20:57

## 2023-08-14 RX ADMIN — HEPARIN SODIUM 5000 UNITS: 5000 INJECTION INTRAVENOUS; SUBCUTANEOUS at 21:10

## 2023-08-14 RX ADMIN — ALBUTEROL SULFATE 2 PUFF: 90 AEROSOL, METERED RESPIRATORY (INHALATION) at 09:01

## 2023-08-14 RX ADMIN — POLYETHYLENE GLYCOL (3350) 17 G: 17 POWDER, FOR SOLUTION ORAL at 18:56

## 2023-08-14 RX ADMIN — SODIUM CHLORIDE, PRESERVATIVE FREE 10 ML: 5 INJECTION INTRAVENOUS at 10:40

## 2023-08-14 RX ADMIN — ROPINIROLE HYDROCHLORIDE 0.5 MG: 0.25 TABLET, FILM COATED ORAL at 10:40

## 2023-08-14 RX ADMIN — HEPARIN SODIUM 5000 UNITS: 5000 INJECTION INTRAVENOUS; SUBCUTANEOUS at 06:17

## 2023-08-14 RX ADMIN — PREDNISONE 40 MG: 20 TABLET ORAL at 14:46

## 2023-08-14 RX ADMIN — Medication 3 MG: at 22:10

## 2023-08-14 RX ADMIN — FLUTICASONE PROPIONATE 1 PUFF: 110 AEROSOL, METERED RESPIRATORY (INHALATION) at 07:27

## 2023-08-14 RX ADMIN — ISOSORBIDE MONONITRATE 30 MG: 30 TABLET, EXTENDED RELEASE ORAL at 14:45

## 2023-08-14 RX ADMIN — FLUTICASONE PROPIONATE 1 PUFF: 110 AEROSOL, METERED RESPIRATORY (INHALATION) at 22:36

## 2023-08-14 RX ADMIN — FERROUS SULFATE TAB 325 MG (65 MG ELEMENTAL FE) 325 MG: 325 (65 FE) TAB at 10:40

## 2023-08-14 RX ADMIN — HEPARIN SODIUM 5000 UNITS: 5000 INJECTION INTRAVENOUS; SUBCUTANEOUS at 14:45

## 2023-08-14 RX ADMIN — CEFEPIME 2000 MG: 2 INJECTION, POWDER, FOR SOLUTION INTRAVENOUS at 14:55

## 2023-08-14 RX ADMIN — FAMOTIDINE 20 MG: 20 TABLET ORAL at 10:39

## 2023-08-14 RX ADMIN — CILOSTAZOL 50 MG: 50 TABLET ORAL at 10:39

## 2023-08-14 ASSESSMENT — PAIN SCALES - GENERAL: PAINLEVEL_OUTOF10: 0

## 2023-08-14 NOTE — CARE COORDINATION
Reviewed chart, discussed in IDr and spoke with pt/. We discussed possible need for short term rehab. If needed they want [facility] as 1st choice and [facility] at the back up plan. Called referral to [facility] at [address] [place] [address].  His nursing will review and he will call this CM back.

## 2023-08-14 NOTE — PROGRESS NOTES
glycol, acetaminophen **OR** acetaminophen     Physical Exam:  Vitals:    08/14/23 0902   BP:    Pulse:    Resp:    Temp:    SpO2: 98%        General: AAO, NAD  Chest: Nontender  Cardiac:S1 & S2 Heart Sounds are Normal, No Murmurs or Gallops noted  Lungs:Clear to auscultation but diminished bases   Abdomen: Soft, NT, ND, +BS  Extremities: No clubbing, edema lower extremities with some redness noted   Vascular:  Equal 2+ peripheral pulses. Lab Data:  CBC: No results for input(s): WBC, HGB, HCT, MCV, PLT in the last 72 hours. BMP:   Recent Labs     08/12/23  0305   *   K 3.5   CL 96*   CO2 20*   PHOS 3.2   BUN 88*   CREATININE 2.6*     LIVER PROFILE:   Recent Labs     08/12/23  0305   AST 36   ALT 29   BILITOT 0.3   ALKPHOS 54     PT/INR: No results for input(s): PROTIME, INR in the last 72 hours. APTT: No results for input(s): APTT in the last 72 hours. BNP:  No results for input(s): BNP in the last 72 hours.       Assessment:  Patient Active Problem List    Diagnosis Date Noted    Gram positive sepsis ([address]) 09/06/2014    Gram-positive bacteremia 09/06/2014    Sepsis ([address]) 09/05/2014    Neck pain, acute 03/03/2023    Neck pain 02/24/2023    WD-Diabetic ulcer of toe of right foot associated with type 2 diabetes mellitus, with fat layer exposed ([address]) 12/02/2022    WD-Callus of foot 06/10/2022    WD-Diabetic ulcer of toe of left foot associated with type 2 diabetes mellitus, with fat layer exposed ([address]) 06/10/2022    Diarrhea 08/10/2023    Generalized weakness 04/11/2023    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

## 2023-08-14 NOTE — PROGRESS NOTES
RENAL DOSE ADJUSTMENT MADE PER P/T PROTOCOL    PREVIOUS ORDER:  Cefuroxime 500 mg q12h x 5 days    Estimated Creatinine Clearance: 15 mL/min (A) (based on SCr of 2.6 mg/dL (H)).   Recent Labs     08/12/23  0305   BUN 88*   CREATININE 2.6*     NEW RENALLY ADJUSTED ORDER:  Cefuroxime 250 q12h x 5 days    Pito Ambrose [facility], PharmD  8/14/2023 8:03 AM

## 2023-08-14 NOTE — PROGRESS NOTES
Nephrology Progress Note  8/14/2023 11:57 AM  Subjective: Interval History: Azul Alberto is a 82 y.o. female appears doing okay with daughter in the room no distress      Data:   Scheduled Meds:   isosorbide mononitrate  30 mg Oral Daily    cefepime  2,000 mg IntraVENous Q24H    predniSONE  40 mg Oral Daily    fluticasone  1 puff Inhalation BID RT    famotidine  20 mg Oral Daily    cilostazol  50 mg Oral BID    ferrous sulfate  325 mg Oral BID WC    latanoprost  1 drop Both Eyes Nightly    levothyroxine  50 mcg Oral Daily    rOPINIRole  0.5 mg Oral TID    sodium chloride flush  5-40 mL IntraVENous 2 times per day    heparin (porcine)  5,000 Units SubCUTAneous 3 times per day     Continuous Infusions:   sodium chloride 50 mL/hr at 08/12/23 0614         CBC   No results for input(s): WBC, HGB, HCT, PLT in the last 72 hours. BMP   Recent Labs     08/12/23  0305   *   K 3.5   CL 96*   CO2 20*   PHOS 3.2   BUN 88*   CREATININE 2.6*     Hepatic:   Recent Labs     08/12/23  0305   AST 36   ALT 29   BILITOT 0.3   ALKPHOS 54     Troponin: No results for input(s): TROPONINI in the last 72 hours. BNP: No results for input(s): BNP in the last 72 hours. Lipids: No results for input(s): CHOL, HDL in the last 72 hours. Invalid input(s): LDLCALCU  ABGs:   Lab Results   Component Value Date/Time    PO2ART 66 11/17/2020 07:30 AM    ERJ1GVY 29.0 11/17/2020 07:30 AM     INR: No results for input(s): INR in the last 72 hours.   Renal Labs  Albumin:    Lab Results   Component Value Date/Time    LABALBU 2.6 08/12/2023 03:05 AM     Calcium:    Lab Results   Component Value Date/Time    CALCIUM 7.8 08/12/2023 03:05 AM     Phosphorus:    Lab Results   Component Value Date/Time    PHOS 3.2 08/12/2023 03:05 AM     U/A:    Lab Results   Component Value Date/Time    NITRU NEGATIVE 08/11/2023 12:44 PM    COLORU YELLOW 08/11/2023 12:44 PM    WBCUA 5 08/11/2023 12:44 PM    RBCUA 4 08/11/2023 12:44 PM    MUCUS RARE 08/11/2023

## 2023-08-15 LAB
ALBUMIN SERPL-MCNC: 2.8 GM/DL (ref 3.4–5)
ALP BLD-CCNC: 52 IU/L (ref 40–128)
ALT SERPL-CCNC: 17 U/L (ref 10–40)
ANION GAP SERPL CALCULATED.3IONS-SCNC: 8 MMOL/L (ref 4–16)
AST SERPL-CCNC: 13 IU/L (ref 15–37)
BILIRUB SERPL-MCNC: 0.3 MG/DL (ref 0–1)
BUN SERPL-MCNC: 82 MG/DL (ref 6–23)
CALCIUM SERPL-MCNC: 8.3 MG/DL (ref 8.3–10.6)
CHLORIDE BLD-SCNC: 102 MMOL/L (ref 99–110)
CO2: 24 MMOL/L (ref 21–32)
CREAT SERPL-MCNC: 2.5 MG/DL (ref 0.6–1.1)
GFR SERPL CREATININE-BSD FRML MDRD: 18 ML/MIN/1.73M2
GLUCOSE BLD-MCNC: 189 MG/DL (ref 70–99)
GLUCOSE BLD-MCNC: 303 MG/DL (ref 70–99)
GLUCOSE BLD-MCNC: 305 MG/DL (ref 70–99)
GLUCOSE BLD-MCNC: 334 MG/DL (ref 70–99)
GLUCOSE SERPL-MCNC: 218 MG/DL (ref 70–99)
HCT VFR BLD CALC: 32 % (ref 37–47)
HEMOGLOBIN: 10.5 GM/DL (ref 12.5–16)
MCH RBC QN AUTO: 31.4 PG (ref 27–31)
MCHC RBC AUTO-ENTMCNC: 32.8 % (ref 32–36)
MCV RBC AUTO: 95.8 FL (ref 78–100)
PDW BLD-RTO: 12.6 % (ref 11.7–14.9)
PLATELET # BLD: 292 K/CU MM (ref 140–440)
PMV BLD AUTO: 9.2 FL (ref 7.5–11.1)
POTASSIUM SERPL-SCNC: 4.6 MMOL/L (ref 3.5–5.1)
RBC # BLD: 3.34 M/CU MM (ref 4.2–5.4)
SODIUM BLD-SCNC: 134 MMOL/L (ref 135–145)
TOTAL PROTEIN: 4.9 GM/DL (ref 6.4–8.2)
WBC # BLD: 7 K/CU MM (ref 4–10.5)

## 2023-08-15 PROCEDURE — 94640 AIRWAY INHALATION TREATMENT: CPT

## 2023-08-15 PROCEDURE — 6370000000 HC RX 637 (ALT 250 FOR IP): Performed by: NURSE PRACTITIONER

## 2023-08-15 PROCEDURE — 82962 GLUCOSE BLOOD TEST: CPT

## 2023-08-15 PROCEDURE — 36415 COLL VENOUS BLD VENIPUNCTURE: CPT

## 2023-08-15 PROCEDURE — 2580000003 HC RX 258: Performed by: NURSE PRACTITIONER

## 2023-08-15 PROCEDURE — 6370000000 HC RX 637 (ALT 250 FOR IP): Performed by: INTERNAL MEDICINE

## 2023-08-15 PROCEDURE — 6370000000 HC RX 637 (ALT 250 FOR IP): Performed by: PHYSICIAN ASSISTANT

## 2023-08-15 PROCEDURE — 1200000000 HC SEMI PRIVATE

## 2023-08-15 PROCEDURE — 6370000000 HC RX 637 (ALT 250 FOR IP): Performed by: STUDENT IN AN ORGANIZED HEALTH CARE EDUCATION/TRAINING PROGRAM

## 2023-08-15 PROCEDURE — 2580000003 HC RX 258: Performed by: PHYSICIAN ASSISTANT

## 2023-08-15 PROCEDURE — 6360000002 HC RX W HCPCS: Performed by: PHYSICIAN ASSISTANT

## 2023-08-15 PROCEDURE — 80053 COMPREHEN METABOLIC PANEL: CPT

## 2023-08-15 PROCEDURE — 2700000000 HC OXYGEN THERAPY PER DAY

## 2023-08-15 PROCEDURE — 85027 COMPLETE CBC AUTOMATED: CPT

## 2023-08-15 PROCEDURE — 94761 N-INVAS EAR/PLS OXIMETRY MLT: CPT

## 2023-08-15 PROCEDURE — 6360000002 HC RX W HCPCS: Performed by: NURSE PRACTITIONER

## 2023-08-15 PROCEDURE — 6370000000 HC RX 637 (ALT 250 FOR IP)

## 2023-08-15 RX ORDER — POTASSIUM CHLORIDE 20 MEQ/1
20 TABLET, EXTENDED RELEASE ORAL ONCE
Status: COMPLETED | OUTPATIENT
Start: 2023-08-15 | End: 2023-08-15

## 2023-08-15 RX ORDER — TORSEMIDE 20 MG/1
20 TABLET ORAL DAILY
Status: DISCONTINUED | OUTPATIENT
Start: 2023-08-16 | End: 2023-08-17

## 2023-08-15 RX ORDER — LACTOBACILLUS RHAMNOSUS GG 10B CELL
1 CAPSULE ORAL
Status: DISCONTINUED | OUTPATIENT
Start: 2023-08-16 | End: 2023-08-18 | Stop reason: HOSPADM

## 2023-08-15 RX ORDER — DEXTROSE MONOHYDRATE 100 MG/ML
INJECTION, SOLUTION INTRAVENOUS CONTINUOUS PRN
Status: DISCONTINUED | OUTPATIENT
Start: 2023-08-15 | End: 2023-08-18 | Stop reason: HOSPADM

## 2023-08-15 RX ORDER — INSULIN LISPRO 100 [IU]/ML
0-4 INJECTION, SOLUTION INTRAVENOUS; SUBCUTANEOUS NIGHTLY
Status: DISCONTINUED | OUTPATIENT
Start: 2023-08-15 | End: 2023-08-16

## 2023-08-15 RX ORDER — INSULIN LISPRO 100 [IU]/ML
0-4 INJECTION, SOLUTION INTRAVENOUS; SUBCUTANEOUS
Status: DISCONTINUED | OUTPATIENT
Start: 2023-08-16 | End: 2023-08-16

## 2023-08-15 RX ORDER — POLYETHYLENE GLYCOL 3350 17 G/17G
17 POWDER, FOR SOLUTION ORAL DAILY
Status: DISCONTINUED | OUTPATIENT
Start: 2023-08-15 | End: 2023-08-18

## 2023-08-15 RX ORDER — GLUCAGON 1 MG/ML
1 KIT INJECTION PRN
Status: DISCONTINUED | OUTPATIENT
Start: 2023-08-15 | End: 2023-08-18 | Stop reason: HOSPADM

## 2023-08-15 RX ORDER — INSULIN GLARGINE 100 [IU]/ML
5 INJECTION, SOLUTION SUBCUTANEOUS NIGHTLY
Status: DISCONTINUED | OUTPATIENT
Start: 2023-08-15 | End: 2023-08-16

## 2023-08-15 RX ADMIN — FERROUS SULFATE TAB 325 MG (65 MG ELEMENTAL FE) 325 MG: 325 (65 FE) TAB at 17:47

## 2023-08-15 RX ADMIN — FLUTICASONE PROPIONATE 1 PUFF: 110 AEROSOL, METERED RESPIRATORY (INHALATION) at 08:55

## 2023-08-15 RX ADMIN — ROPINIROLE HYDROCHLORIDE 0.5 MG: 0.25 TABLET, FILM COATED ORAL at 12:55

## 2023-08-15 RX ADMIN — CILOSTAZOL 50 MG: 50 TABLET ORAL at 21:42

## 2023-08-15 RX ADMIN — SODIUM CHLORIDE 25 ML/HR: 9 INJECTION, SOLUTION INTRAVENOUS at 13:07

## 2023-08-15 RX ADMIN — FERROUS SULFATE TAB 325 MG (65 MG ELEMENTAL FE) 325 MG: 325 (65 FE) TAB at 09:14

## 2023-08-15 RX ADMIN — HEPARIN SODIUM 5000 UNITS: 5000 INJECTION INTRAVENOUS; SUBCUTANEOUS at 21:42

## 2023-08-15 RX ADMIN — FLUTICASONE PROPIONATE 1 PUFF: 110 AEROSOL, METERED RESPIRATORY (INHALATION) at 20:37

## 2023-08-15 RX ADMIN — PREDNISONE 40 MG: 20 TABLET ORAL at 09:13

## 2023-08-15 RX ADMIN — HEPARIN SODIUM 5000 UNITS: 5000 INJECTION INTRAVENOUS; SUBCUTANEOUS at 05:58

## 2023-08-15 RX ADMIN — HEPARIN SODIUM 5000 UNITS: 5000 INJECTION INTRAVENOUS; SUBCUTANEOUS at 12:55

## 2023-08-15 RX ADMIN — ROPINIROLE HYDROCHLORIDE 0.5 MG: 0.25 TABLET, FILM COATED ORAL at 09:14

## 2023-08-15 RX ADMIN — POTASSIUM CHLORIDE 20 MEQ: 1500 TABLET, EXTENDED RELEASE ORAL at 09:18

## 2023-08-15 RX ADMIN — INSULIN LISPRO 4 UNITS: 100 INJECTION, SOLUTION INTRAVENOUS; SUBCUTANEOUS at 23:22

## 2023-08-15 RX ADMIN — CEFEPIME 2000 MG: 2 INJECTION, POWDER, FOR SOLUTION INTRAVENOUS at 13:08

## 2023-08-15 RX ADMIN — CILOSTAZOL 50 MG: 50 TABLET ORAL at 09:13

## 2023-08-15 RX ADMIN — POLYETHYLENE GLYCOL (3350) 17 G: 17 POWDER, FOR SOLUTION ORAL at 13:02

## 2023-08-15 RX ADMIN — SODIUM CHLORIDE, PRESERVATIVE FREE 10 ML: 5 INJECTION INTRAVENOUS at 09:14

## 2023-08-15 RX ADMIN — INSULIN GLARGINE 5 UNITS: 100 INJECTION, SOLUTION SUBCUTANEOUS at 23:21

## 2023-08-15 RX ADMIN — LEVOTHYROXINE SODIUM 50 MCG: 0.05 TABLET ORAL at 05:58

## 2023-08-15 RX ADMIN — ISOSORBIDE MONONITRATE 30 MG: 30 TABLET, EXTENDED RELEASE ORAL at 12:55

## 2023-08-15 RX ADMIN — ROPINIROLE HYDROCHLORIDE 0.5 MG: 0.25 TABLET, FILM COATED ORAL at 21:42

## 2023-08-15 RX ADMIN — SODIUM CHLORIDE, PRESERVATIVE FREE 10 ML: 5 INJECTION INTRAVENOUS at 21:43

## 2023-08-15 RX ADMIN — TRAMADOL HYDROCHLORIDE 50 MG: 50 TABLET, COATED ORAL at 00:13

## 2023-08-15 RX ADMIN — FAMOTIDINE 20 MG: 20 TABLET ORAL at 09:14

## 2023-08-15 RX ADMIN — Medication 3 MG: at 21:42

## 2023-08-15 RX ADMIN — TRAMADOL HYDROCHLORIDE 50 MG: 50 TABLET, COATED ORAL at 23:21

## 2023-08-15 RX ADMIN — LATANOPROST 1 DROP: 50 SOLUTION OPHTHALMIC at 21:42

## 2023-08-15 RX ADMIN — ALBUTEROL SULFATE 2 PUFF: 90 AEROSOL, METERED RESPIRATORY (INHALATION) at 20:36

## 2023-08-15 ASSESSMENT — PAIN SCALES - GENERAL
PAINLEVEL_OUTOF10: 6
PAINLEVEL_OUTOF10: 6
PAINLEVEL_OUTOF10: 0

## 2023-08-15 ASSESSMENT — PAIN DESCRIPTION - LOCATION
LOCATION: ABDOMEN
LOCATION: ABDOMEN

## 2023-08-15 ASSESSMENT — PAIN DESCRIPTION - DESCRIPTORS: DESCRIPTORS: ACHING

## 2023-08-15 NOTE — CARE COORDINATION
VM left for Junious Shave to check on referral.        1600 VM left for check on referral to [address].

## 2023-08-15 NOTE — PROGRESS NOTES
Nephrology Progress Note  8/15/2023 11:23 AM  Subjective: Interval History: Sinai Carmona is a 82 y.o. female doing okay in general more awake waiting on plan with family about rehab    Data:   Scheduled Meds:   isosorbide mononitrate  30 mg Oral Daily    cefepime  2,000 mg IntraVENous Q24H    predniSONE  40 mg Oral Daily    fluticasone  1 puff Inhalation BID RT    famotidine  20 mg Oral Daily    cilostazol  50 mg Oral BID    ferrous sulfate  325 mg Oral BID WC    latanoprost  1 drop Both Eyes Nightly    levothyroxine  50 mcg Oral Daily    rOPINIRole  0.5 mg Oral TID    sodium chloride flush  5-40 mL IntraVENous 2 times per day    heparin (porcine)  5,000 Units SubCUTAneous 3 times per day     Continuous Infusions:   sodium chloride 50 mL/hr at 08/12/23 0614         CBC   Recent Labs     08/15/23  0738   WBC 7.0   HGB 10.5*   HCT 32.0*           BMP   Recent Labs     08/14/23  1048 08/15/23  0738   * 134*   K 3.4* 4.6   CL 97* 102   CO2 21 24   BUN 88* 82*   CREATININE 2.8* 2.5*     Hepatic:   Recent Labs     08/14/23  1048 08/15/23  0738   AST 14* 13*   ALT 19 17   BILITOT 0.4 0.3   ALKPHOS 55 52     Troponin: No results for input(s): TROPONINI in the last 72 hours. BNP: No results for input(s): BNP in the last 72 hours. Lipids: No results for input(s): CHOL, HDL in the last 72 hours. Invalid input(s): LDLCALCU  ABGs:   Lab Results   Component Value Date/Time    PO2ART 66 11/17/2020 07:30 AM    [id number] 29.0 11/17/2020 07:30 AM     INR: No results for input(s): INR in the last 72 hours.   Renal Labs  Albumin:    Lab Results   Component Value Date/Time    LABALBU 2.8 08/15/2023 07:38 AM     Calcium:    Lab Results   Component Value Date/Time    CALCIUM 8.3 08/15/2023 07:38 AM     Phosphorus:    Lab Results   Component Value Date/Time    PHOS 3.2 08/12/2023 03:05 AM     U/A:    Lab Results   Component Value Date/Time    NITRU NEGATIVE 08/11/2023 12:44 PM    COLORU YELLOW 08/11/2023 12:44 PM failure on CKD 3   hypokalemia with metabolic acidosis   diarrhea  + uti   shortness of breath with moderate MS and pulmonary hypertension    Plan     #1 creatinine down to 2.5 improving will monitor supportive care  #2 monitor electrolytes supportive  3 treating UTI  #4 discussed with cardiology start on torsemide 20 mg daily starting tomorrow was on torsemide outpatient so although his allergy to Lasix apparently tolerated torsemide  #5 blood pressure stable  Work on discharge to home with home care versus skilled nursing discussed with daughter         Kimberly Babcock MD, MD

## 2023-08-15 NOTE — PROGRESS NOTES
Physician Progress Note      Angel Lockett  CSN #:                  [id number]  :                       1935  ADMIT DATE:       8/10/2023 10:56 AM  DISCH DATE:  RESPONDING  PROVIDER #:        Keily Black MD          QUERY TEXT:    Internal Medicine,    Pt admitted with UTI, KAREEM, acute respiratory failure due to PNA and has sepsis   documented by Dr. Elba Jeff. If possible, please document in progress notes   and discharge summary:    The medical record reflects the following:  Risk Factors: PNA, UTI  Clinical Indicators: Per Dr. Elba Jeff documentation: \" ATB for sepsis-concern   for device infection\", WBC 13, temp 95.9,respiratory failure, acidosis, KAREEM,  Treatment: labs, imaging, IV atb, IVF, supportive care    Thank you,  Mich Mensah RN CDS  [phone]  Options provided:  -- Sepsis confirmed present on admission, please document infection source,   was confirmed as present on admission. -- Sepsis confirmed present on admission, please document device causing   infection, was confirmed as present on admission. -- Sepsis confirmed not present on admission, please document infection source   or device causing the sepsis, was confirmed not present on admission. -- Sepsis ruled out  -- Other - I will add my own diagnosis  -- Disagree - Not applicable / Not valid  -- Disagree - Clinically unable to determine / Unknown  -- Refer to Clinical Documentation Reviewer    PROVIDER RESPONSE TEXT:    The diagnosis of sepsis was ruled out. Query created by: Mich Mensah on 2023 5:56 PM      QUERY TEXT:    Internal Medicine,    Pt admitted with respiratory failure, PNA< KAREEM and has CHF/Rt heart failure   documented.  If possible, please document in progress notes and discharge   summary further specificity regarding the type and acuity of CHF:    The medical record reflects the following:  Risk Factors: HTN, CKD  Clinical Indicators: pleural effusions on imaging, Per Cardiology

## 2023-08-15 NOTE — PROGRESS NOTES
08/15/23 1247   Encounter Summary   Encounter Overview/Reason  Initial Encounter   Service Provided For: Patient   Referral/Consult From: [facility] [place]   Support System Spouse; Children   Last Encounter  08/15/23  (Patient needed a warm blanket. This  reported that to RN for appropriate follow up.)   Complexity of Encounter Low   Begin Time 1240   End Time  1248   Total Time Calculated 8 min   Spiritual/Emotional needs   Type Spiritual Support   Grief, Loss, and Adjustments   Type Adjustment to illness   Assessment/Intervention/Outcome   Assessment Coping; Hopeful;Peaceful   Intervention Active listening;Empowerment;Sustaining Presence/Ministry of presence; Explored/Affirmed feelings, thoughts, concerns;Explored Coping Skills/Resources   Outcome Encouraged;Engaged in conversation;Expressed feelings, needs, and concerns;Expressed Gratitude   Plan and Referrals   Plan/Referrals No future visits requested

## 2023-08-15 NOTE — PLAN OF CARE
Problem: Skin/Tissue Integrity  Goal: Absence of new skin breakdown  Description: 1. Monitor for areas of redness and/or skin breakdown  2. Assess vascular access sites hourly  3. Every 4-6 hours minimum:  Change oxygen saturation probe site  4. Every 4-6 hours:  If on nasal continuous positive airway pressure, respiratory therapy assess nares and determine need for appliance change or resting period.   8/15/2023 1120 by Mackenzie Rojas LPN  Outcome: Progressing  8/14/2023 2240 by Monica Villanueva RN  Outcome: Progressing     Problem: ABCDS Injury Assessment  Goal: Absence of physical injury  8/14/2023 2240 by Monica Villanueva RN  Outcome: Progressing     Problem: Safety - Adult  Goal: Free from fall injury  8/14/2023 2240 by Monica Villanueva RN  Outcome: Progressing     Problem: Discharge Planning  Goal: Discharge to home or other facility with appropriate resources  8/14/2023 2240 by Monica Vlilanueva RN  Outcome: Progressing     Problem: Pain  Goal: Verbalizes/displays adequate comfort level or baseline comfort level  8/14/2023 2240 by Monica Villanueva RN  Outcome: Progressing     Problem: Chronic Conditions and Co-morbidities  Goal: Patient's chronic conditions and co-morbidity symptoms are monitored and maintained or improved  8/14/2023 2240 by Monica Villanueva RN  Outcome: Progressing

## 2023-08-16 LAB
25(OH)D3 SERPL-MCNC: 12.86 NG/ML
ANION GAP SERPL CALCULATED.3IONS-SCNC: 10 MMOL/L (ref 4–16)
ANION GAP SERPL CALCULATED.3IONS-SCNC: 11 MMOL/L (ref 4–16)
BASOPHILS ABSOLUTE: 0 K/CU MM
BASOPHILS RELATIVE PERCENT: 0.1 % (ref 0–1)
BUN SERPL-MCNC: 75 MG/DL (ref 6–23)
BUN SERPL-MCNC: 78 MG/DL (ref 6–23)
CALCIUM SERPL-MCNC: 8.4 MG/DL (ref 8.3–10.6)
CALCIUM SERPL-MCNC: 9.1 MG/DL (ref 8.3–10.6)
CHLORIDE BLD-SCNC: 100 MMOL/L (ref 99–110)
CHLORIDE BLD-SCNC: 97 MMOL/L (ref 99–110)
CO2: 22 MMOL/L (ref 21–32)
CO2: 23 MMOL/L (ref 21–32)
CREAT SERPL-MCNC: 2.1 MG/DL (ref 0.6–1.1)
CREAT SERPL-MCNC: 2.1 MG/DL (ref 0.6–1.1)
CULTURE: NORMAL
CULTURE: NORMAL
DIFFERENTIAL TYPE: ABNORMAL
EOSINOPHILS ABSOLUTE: 0 K/CU MM
EOSINOPHILS RELATIVE PERCENT: 0.2 % (ref 0–3)
ESTIMATED AVERAGE GLUCOSE: 140 MG/DL
FOLATE SERPL-MCNC: >20 NG/ML (ref 3.1–17.5)
GFR SERPL CREATININE-BSD FRML MDRD: 23 ML/MIN/1.73M2
GFR SERPL CREATININE-BSD FRML MDRD: 23 ML/MIN/1.73M2
GLUCOSE BLD-MCNC: 218 MG/DL (ref 70–99)
GLUCOSE BLD-MCNC: 253 MG/DL (ref 70–99)
GLUCOSE BLD-MCNC: 289 MG/DL (ref 70–99)
GLUCOSE BLD-MCNC: 366 MG/DL (ref 70–99)
GLUCOSE SERPL-MCNC: 274 MG/DL (ref 70–99)
GLUCOSE SERPL-MCNC: 318 MG/DL (ref 70–99)
HBA1C MFR BLD: 6.5 % (ref 4.2–6.3)
HCT VFR BLD CALC: 30.5 % (ref 37–47)
HEMOGLOBIN: 9.8 GM/DL (ref 12.5–16)
IMMATURE NEUTROPHIL %: 1.7 % (ref 0–0.43)
LYMPHOCYTES ABSOLUTE: 0.7 K/CU MM
LYMPHOCYTES RELATIVE PERCENT: 6.1 % (ref 24–44)
Lab: NORMAL
Lab: NORMAL
MCH RBC QN AUTO: 31.4 PG (ref 27–31)
MCHC RBC AUTO-ENTMCNC: 32.1 % (ref 32–36)
MCV RBC AUTO: 97.8 FL (ref 78–100)
MONOCYTES ABSOLUTE: 0.5 K/CU MM
MONOCYTES RELATIVE PERCENT: 4.5 % (ref 0–4)
NUCLEATED RBC %: 0 %
PDW BLD-RTO: 12.8 % (ref 11.7–14.9)
PLATELET # BLD: 328 K/CU MM (ref 140–440)
PMV BLD AUTO: 8.9 FL (ref 7.5–11.1)
POTASSIUM SERPL-SCNC: 5.1 MMOL/L (ref 3.5–5.1)
POTASSIUM SERPL-SCNC: 5.5 MMOL/L (ref 3.5–5.1)
POTASSIUM SERPL-SCNC: 5.6 MMOL/L (ref 3.5–5.1)
RBC # BLD: 3.12 M/CU MM (ref 4.2–5.4)
SEGMENTED NEUTROPHILS ABSOLUTE COUNT: 9.6 K/CU MM
SEGMENTED NEUTROPHILS RELATIVE PERCENT: 87.4 % (ref 36–66)
SODIUM BLD-SCNC: 131 MMOL/L (ref 135–145)
SODIUM BLD-SCNC: 132 MMOL/L (ref 135–145)
SPECIMEN: NORMAL
SPECIMEN: NORMAL
TOTAL IMMATURE NEUTOROPHIL: 0.19 K/CU MM
TOTAL NUCLEATED RBC: 0 K/CU MM
VITAMIN B-12: >2000 PG/ML (ref 211–911)
WBC # BLD: 11 K/CU MM (ref 4–10.5)

## 2023-08-16 PROCEDURE — 36415 COLL VENOUS BLD VENIPUNCTURE: CPT

## 2023-08-16 PROCEDURE — 2580000003 HC RX 258: Performed by: NURSE PRACTITIONER

## 2023-08-16 PROCEDURE — 82306 VITAMIN D 25 HYDROXY: CPT

## 2023-08-16 PROCEDURE — 94664 DEMO&/EVAL PT USE INHALER: CPT

## 2023-08-16 PROCEDURE — 84132 ASSAY OF SERUM POTASSIUM: CPT

## 2023-08-16 PROCEDURE — 6360000002 HC RX W HCPCS: Performed by: NURSE PRACTITIONER

## 2023-08-16 PROCEDURE — 83036 HEMOGLOBIN GLYCOSYLATED A1C: CPT

## 2023-08-16 PROCEDURE — 6370000000 HC RX 637 (ALT 250 FOR IP): Performed by: NURSE PRACTITIONER

## 2023-08-16 PROCEDURE — 6370000000 HC RX 637 (ALT 250 FOR IP): Performed by: PHYSICIAN ASSISTANT

## 2023-08-16 PROCEDURE — 94761 N-INVAS EAR/PLS OXIMETRY MLT: CPT

## 2023-08-16 PROCEDURE — 1200000000 HC SEMI PRIVATE

## 2023-08-16 PROCEDURE — 2580000003 HC RX 258: Performed by: PHYSICIAN ASSISTANT

## 2023-08-16 PROCEDURE — 80048 BASIC METABOLIC PNL TOTAL CA: CPT

## 2023-08-16 PROCEDURE — 6370000000 HC RX 637 (ALT 250 FOR IP)

## 2023-08-16 PROCEDURE — 94640 AIRWAY INHALATION TREATMENT: CPT

## 2023-08-16 PROCEDURE — 6360000002 HC RX W HCPCS: Performed by: PHYSICIAN ASSISTANT

## 2023-08-16 PROCEDURE — 6370000000 HC RX 637 (ALT 250 FOR IP): Performed by: INTERNAL MEDICINE

## 2023-08-16 PROCEDURE — 85025 COMPLETE CBC W/AUTO DIFF WBC: CPT

## 2023-08-16 PROCEDURE — 6370000000 HC RX 637 (ALT 250 FOR IP): Performed by: STUDENT IN AN ORGANIZED HEALTH CARE EDUCATION/TRAINING PROGRAM

## 2023-08-16 PROCEDURE — 82746 ASSAY OF FOLIC ACID SERUM: CPT

## 2023-08-16 PROCEDURE — 82962 GLUCOSE BLOOD TEST: CPT

## 2023-08-16 PROCEDURE — 2700000000 HC OXYGEN THERAPY PER DAY

## 2023-08-16 PROCEDURE — 82607 VITAMIN B-12: CPT

## 2023-08-16 RX ORDER — INSULIN GLARGINE 100 [IU]/ML
8 INJECTION, SOLUTION SUBCUTANEOUS NIGHTLY
Status: DISCONTINUED | OUTPATIENT
Start: 2023-08-16 | End: 2023-08-17

## 2023-08-16 RX ORDER — INSULIN LISPRO 100 [IU]/ML
0-4 INJECTION, SOLUTION INTRAVENOUS; SUBCUTANEOUS NIGHTLY
Status: DISCONTINUED | OUTPATIENT
Start: 2023-08-16 | End: 2023-08-16

## 2023-08-16 RX ORDER — INSULIN LISPRO 100 [IU]/ML
0-8 INJECTION, SOLUTION INTRAVENOUS; SUBCUTANEOUS
Status: DISCONTINUED | OUTPATIENT
Start: 2023-08-16 | End: 2023-08-16

## 2023-08-16 RX ORDER — INSULIN LISPRO 100 [IU]/ML
0-4 INJECTION, SOLUTION INTRAVENOUS; SUBCUTANEOUS NIGHTLY
Status: DISCONTINUED | OUTPATIENT
Start: 2023-08-16 | End: 2023-08-18 | Stop reason: HOSPADM

## 2023-08-16 RX ORDER — INSULIN LISPRO 100 [IU]/ML
0-16 INJECTION, SOLUTION INTRAVENOUS; SUBCUTANEOUS
Status: DISCONTINUED | OUTPATIENT
Start: 2023-08-17 | End: 2023-08-18 | Stop reason: HOSPADM

## 2023-08-16 RX ORDER — VITAMIN B COMPLEX
1000 TABLET ORAL DAILY
Status: DISCONTINUED | OUTPATIENT
Start: 2023-08-16 | End: 2023-08-18 | Stop reason: HOSPADM

## 2023-08-16 RX ADMIN — ROPINIROLE HYDROCHLORIDE 0.5 MG: 0.25 TABLET, FILM COATED ORAL at 21:01

## 2023-08-16 RX ADMIN — Medication 3 MG: at 21:01

## 2023-08-16 RX ADMIN — FERROUS SULFATE TAB 325 MG (65 MG ELEMENTAL FE) 325 MG: 325 (65 FE) TAB at 07:52

## 2023-08-16 RX ADMIN — POLYETHYLENE GLYCOL (3350) 17 G: 17 POWDER, FOR SOLUTION ORAL at 07:52

## 2023-08-16 RX ADMIN — ALBUTEROL SULFATE 2 PUFF: 90 AEROSOL, METERED RESPIRATORY (INHALATION) at 20:31

## 2023-08-16 RX ADMIN — INSULIN LISPRO 4 UNITS: 100 INJECTION, SOLUTION INTRAVENOUS; SUBCUTANEOUS at 17:36

## 2023-08-16 RX ADMIN — FAMOTIDINE 20 MG: 20 TABLET ORAL at 07:52

## 2023-08-16 RX ADMIN — LEVOTHYROXINE SODIUM 50 MCG: 0.05 TABLET ORAL at 05:41

## 2023-08-16 RX ADMIN — SODIUM CHLORIDE, PRESERVATIVE FREE 10 ML: 5 INJECTION INTRAVENOUS at 07:53

## 2023-08-16 RX ADMIN — INSULIN LISPRO 1 UNITS: 100 INJECTION, SOLUTION INTRAVENOUS; SUBCUTANEOUS at 07:52

## 2023-08-16 RX ADMIN — FLUTICASONE PROPIONATE 1 PUFF: 110 AEROSOL, METERED RESPIRATORY (INHALATION) at 20:32

## 2023-08-16 RX ADMIN — FERROUS SULFATE TAB 325 MG (65 MG ELEMENTAL FE) 325 MG: 325 (65 FE) TAB at 17:37

## 2023-08-16 RX ADMIN — INSULIN GLARGINE 8 UNITS: 100 INJECTION, SOLUTION SUBCUTANEOUS at 21:03

## 2023-08-16 RX ADMIN — FLUTICASONE PROPIONATE 1 PUFF: 110 AEROSOL, METERED RESPIRATORY (INHALATION) at 08:20

## 2023-08-16 RX ADMIN — TORSEMIDE 20 MG: 20 TABLET ORAL at 07:51

## 2023-08-16 RX ADMIN — ROPINIROLE HYDROCHLORIDE 0.5 MG: 0.25 TABLET, FILM COATED ORAL at 07:51

## 2023-08-16 RX ADMIN — SODIUM CHLORIDE, PRESERVATIVE FREE 10 ML: 5 INJECTION INTRAVENOUS at 21:01

## 2023-08-16 RX ADMIN — HEPARIN SODIUM 5000 UNITS: 5000 INJECTION INTRAVENOUS; SUBCUTANEOUS at 15:01

## 2023-08-16 RX ADMIN — ALBUTEROL SULFATE 2 PUFF: 90 AEROSOL, METERED RESPIRATORY (INHALATION) at 08:21

## 2023-08-16 RX ADMIN — SODIUM ZIRCONIUM CYCLOSILICATE 10 G: 10 POWDER, FOR SUSPENSION ORAL at 15:01

## 2023-08-16 RX ADMIN — PREDNISONE 40 MG: 20 TABLET ORAL at 07:51

## 2023-08-16 RX ADMIN — HEPARIN SODIUM 5000 UNITS: 5000 INJECTION INTRAVENOUS; SUBCUTANEOUS at 22:34

## 2023-08-16 RX ADMIN — HEPARIN SODIUM 5000 UNITS: 5000 INJECTION INTRAVENOUS; SUBCUTANEOUS at 05:41

## 2023-08-16 RX ADMIN — ROPINIROLE HYDROCHLORIDE 0.5 MG: 0.25 TABLET, FILM COATED ORAL at 15:01

## 2023-08-16 RX ADMIN — Medication 1 CAPSULE: at 07:51

## 2023-08-16 RX ADMIN — CILOSTAZOL 50 MG: 50 TABLET ORAL at 21:01

## 2023-08-16 RX ADMIN — CEFEPIME 2000 MG: 2 INJECTION, POWDER, FOR SOLUTION INTRAVENOUS at 11:53

## 2023-08-16 RX ADMIN — INSULIN LISPRO 4 UNITS: 100 INJECTION, SOLUTION INTRAVENOUS; SUBCUTANEOUS at 12:45

## 2023-08-16 RX ADMIN — ISOSORBIDE MONONITRATE 30 MG: 30 TABLET, EXTENDED RELEASE ORAL at 07:51

## 2023-08-16 RX ADMIN — CILOSTAZOL 50 MG: 50 TABLET ORAL at 07:52

## 2023-08-16 RX ADMIN — Medication 1000 UNITS: at 11:51

## 2023-08-16 RX ADMIN — LATANOPROST 1 DROP: 50 SOLUTION OPHTHALMIC at 21:04

## 2023-08-16 ASSESSMENT — PAIN DESCRIPTION - DESCRIPTORS: DESCRIPTORS: ACHING

## 2023-08-16 ASSESSMENT — PAIN SCALES - GENERAL
PAINLEVEL_OUTOF10: 3
PAINLEVEL_OUTOF10: 0

## 2023-08-16 ASSESSMENT — PAIN DESCRIPTION - LOCATION: LOCATION: ABDOMEN

## 2023-08-16 NOTE — PLAN OF CARE
Problem: Skin/Tissue Integrity  Goal: Absence of new skin breakdown  Description: 1. Monitor for areas of redness and/or skin breakdown  2. Assess vascular access sites hourly  3. Every 4-6 hours minimum:  Change oxygen saturation probe site  4. Every 4-6 hours:  If on nasal continuous positive airway pressure, respiratory therapy assess nares and determine need for appliance change or resting period.   8/15/2023 2348 by Mariangel Goldberg RN  Outcome: Progressing  8/15/2023 1120 by Adela Cloud LPN  Outcome: Progressing     Problem: ABCDS Injury Assessment  Goal: Absence of physical injury  Outcome: Progressing     Problem: Safety - Adult  Goal: Free from fall injury  Outcome: Progressing     Problem: Discharge Planning  Goal: Discharge to home or other facility with appropriate resources  Outcome: Progressing     Problem: Pain  Goal: Verbalizes/displays adequate comfort level or baseline comfort level  Outcome: Progressing     Problem: Chronic Conditions and Co-morbidities  Goal: Patient's chronic conditions and co-morbidity symptoms are monitored and maintained or improved  Outcome: Progressing

## 2023-08-16 NOTE — PROGRESS NOTES
Patient blood sugar is 334 but patient is not taking any insulin at home. Perfect serve sent and ordered for Lantus and humalog sliding scale.

## 2023-08-16 NOTE — PROGRESS NOTES
Nephrology Progress Note  8/16/2023 1:38 PM  Subjective: Interval History: Pepito Parks is a 82 y.o. female  who appears to be doing a little better today no distress    Data:   Scheduled Meds:   insulin glargine  8 Units SubCUTAneous Nightly    Vitamin D  1,000 Units Oral Daily    sodium zirconium cyclosilicate  10 g Oral BID    insulin lispro  0-8 Units SubCUTAneous TID WC    insulin lispro  0-4 Units SubCUTAneous Nightly    torsemide  20 mg Oral Daily    polyethylene glycol  17 g Oral Daily    lactobacillus  1 capsule Oral Daily with breakfast    isosorbide mononitrate  30 mg Oral Daily    cefepime  2,000 mg IntraVENous Q24H    predniSONE  40 mg Oral Daily    fluticasone  1 puff Inhalation BID RT    famotidine  20 mg Oral Daily    cilostazol  50 mg Oral BID    ferrous sulfate  325 mg Oral BID WC    latanoprost  1 drop Both Eyes Nightly    levothyroxine  50 mcg Oral Daily    rOPINIRole  0.5 mg Oral TID    sodium chloride flush  5-40 mL IntraVENous 2 times per day    heparin (porcine)  5,000 Units SubCUTAneous 3 times per day     Continuous Infusions:   dextrose      sodium chloride 25 mL/hr (08/15/23 1307)         CBC   Recent Labs     08/15/23  0738 08/16/23  1046   WBC 7.0 11.0*   HGB 10.5* 9.8*   HCT 32.0* 30.5*    328        BMP   Recent Labs     08/14/23  1048 08/15/23  0738 08/16/23  1046 08/16/23  1207   * 134* 132*  --    K 3.4* 4.6 5.6* 5.5*   CL 97* 102 100  --    CO2 21 24 22  --    BUN 88* 82* 78*  --    CREATININE 2.8* 2.5* 2.1*  --      Hepatic:   Recent Labs     08/14/23  1048 08/15/23  0738   AST 14* 13*   ALT 19 17   BILITOT 0.4 0.3   ALKPHOS 55 52     Troponin: No results for input(s): TROPONINI in the last 72 hours. BNP: No results for input(s): BNP in the last 72 hours. Lipids: No results for input(s): CHOL, HDL in the last 72 hours.     Invalid input(s): LDLCALCU  ABGs:   Lab Results   Component Value Date/Time    PO2ART 66 11/17/2020 07:30 AM    [id number] 29.0 11/17/2020

## 2023-08-17 ENCOUNTER — APPOINTMENT (OUTPATIENT)
Dept: GENERAL RADIOLOGY | Age: 88
End: 2023-08-17
Payer: MEDICARE

## 2023-08-17 LAB
ALBUMIN SERPL-MCNC: 3.1 GM/DL (ref 3.4–5)
ANION GAP SERPL CALCULATED.3IONS-SCNC: 10 MMOL/L (ref 4–16)
BUN SERPL-MCNC: 74 MG/DL (ref 6–23)
CALCIUM SERPL-MCNC: 8.9 MG/DL (ref 8.3–10.6)
CHLORIDE BLD-SCNC: 101 MMOL/L (ref 99–110)
CO2: 23 MMOL/L (ref 21–32)
CREAT SERPL-MCNC: 2.1 MG/DL (ref 0.6–1.1)
GFR SERPL CREATININE-BSD FRML MDRD: 23 ML/MIN/1.73M2
GLUCOSE BLD-MCNC: 124 MG/DL (ref 70–99)
GLUCOSE BLD-MCNC: 148 MG/DL (ref 70–99)
GLUCOSE BLD-MCNC: 222 MG/DL (ref 70–99)
GLUCOSE BLD-MCNC: 266 MG/DL (ref 70–99)
GLUCOSE SERPL-MCNC: 181 MG/DL (ref 70–99)
PHOSPHORUS: 2.2 MG/DL (ref 2.5–4.9)
POTASSIUM SERPL-SCNC: 4.8 MMOL/L (ref 3.5–5.1)
SODIUM BLD-SCNC: 134 MMOL/L (ref 135–145)

## 2023-08-17 PROCEDURE — 82962 GLUCOSE BLOOD TEST: CPT

## 2023-08-17 PROCEDURE — 71045 X-RAY EXAM CHEST 1 VIEW: CPT

## 2023-08-17 PROCEDURE — 2500000003 HC RX 250 WO HCPCS: Performed by: INTERNAL MEDICINE

## 2023-08-17 PROCEDURE — 36415 COLL VENOUS BLD VENIPUNCTURE: CPT

## 2023-08-17 PROCEDURE — 2580000003 HC RX 258: Performed by: NURSE PRACTITIONER

## 2023-08-17 PROCEDURE — 94761 N-INVAS EAR/PLS OXIMETRY MLT: CPT

## 2023-08-17 PROCEDURE — 6360000002 HC RX W HCPCS: Performed by: PHYSICIAN ASSISTANT

## 2023-08-17 PROCEDURE — 94010 BREATHING CAPACITY TEST: CPT

## 2023-08-17 PROCEDURE — 6370000000 HC RX 637 (ALT 250 FOR IP): Performed by: FAMILY MEDICINE

## 2023-08-17 PROCEDURE — 80069 RENAL FUNCTION PANEL: CPT

## 2023-08-17 PROCEDURE — 6360000002 HC RX W HCPCS: Performed by: NURSE PRACTITIONER

## 2023-08-17 PROCEDURE — 94640 AIRWAY INHALATION TREATMENT: CPT

## 2023-08-17 PROCEDURE — 6370000000 HC RX 637 (ALT 250 FOR IP): Performed by: STUDENT IN AN ORGANIZED HEALTH CARE EDUCATION/TRAINING PROGRAM

## 2023-08-17 PROCEDURE — 1200000000 HC SEMI PRIVATE

## 2023-08-17 PROCEDURE — 6370000000 HC RX 637 (ALT 250 FOR IP): Performed by: PHYSICIAN ASSISTANT

## 2023-08-17 PROCEDURE — 6370000000 HC RX 637 (ALT 250 FOR IP): Performed by: NURSE PRACTITIONER

## 2023-08-17 PROCEDURE — 2700000000 HC OXYGEN THERAPY PER DAY

## 2023-08-17 RX ORDER — FUROSEMIDE 10 MG/ML
40 INJECTION INTRAMUSCULAR; INTRAVENOUS ONCE
Status: DISCONTINUED | OUTPATIENT
Start: 2023-08-17 | End: 2023-08-17

## 2023-08-17 RX ORDER — HYDROXYZINE HYDROCHLORIDE 10 MG/1
10 TABLET, FILM COATED ORAL 3 TIMES DAILY PRN
Status: DISCONTINUED | OUTPATIENT
Start: 2023-08-17 | End: 2023-08-18 | Stop reason: HOSPADM

## 2023-08-17 RX ORDER — ALPRAZOLAM 0.25 MG/1
0.25 TABLET ORAL ONCE
Status: COMPLETED | OUTPATIENT
Start: 2023-08-17 | End: 2023-08-17

## 2023-08-17 RX ORDER — TORSEMIDE 20 MG/1
20 TABLET ORAL 2 TIMES DAILY
Status: DISCONTINUED | OUTPATIENT
Start: 2023-08-18 | End: 2023-08-18 | Stop reason: HOSPADM

## 2023-08-17 RX ORDER — BUMETANIDE 0.25 MG/ML
1 INJECTION INTRAMUSCULAR; INTRAVENOUS ONCE
Status: COMPLETED | OUTPATIENT
Start: 2023-08-17 | End: 2023-08-17

## 2023-08-17 RX ORDER — SENNOSIDES A AND B 8.6 MG/1
1 TABLET, FILM COATED ORAL NIGHTLY
Status: DISCONTINUED | OUTPATIENT
Start: 2023-08-17 | End: 2023-08-18

## 2023-08-17 RX ADMIN — ALBUTEROL SULFATE 2 PUFF: 90 AEROSOL, METERED RESPIRATORY (INHALATION) at 20:12

## 2023-08-17 RX ADMIN — ROPINIROLE HYDROCHLORIDE 0.5 MG: 0.25 TABLET, FILM COATED ORAL at 14:57

## 2023-08-17 RX ADMIN — FERROUS SULFATE TAB 325 MG (65 MG ELEMENTAL FE) 325 MG: 325 (65 FE) TAB at 18:07

## 2023-08-17 RX ADMIN — CEFEPIME 1000 MG: 1 INJECTION, POWDER, FOR SOLUTION INTRAMUSCULAR; INTRAVENOUS at 12:37

## 2023-08-17 RX ADMIN — HEPARIN SODIUM 5000 UNITS: 5000 INJECTION INTRAVENOUS; SUBCUTANEOUS at 14:56

## 2023-08-17 RX ADMIN — POLYETHYLENE GLYCOL (3350) 17 G: 17 POWDER, FOR SOLUTION ORAL at 09:33

## 2023-08-17 RX ADMIN — CILOSTAZOL 50 MG: 50 TABLET ORAL at 21:54

## 2023-08-17 RX ADMIN — Medication 1000 UNITS: at 09:34

## 2023-08-17 RX ADMIN — HYDROXYZINE HYDROCHLORIDE 10 MG: 10 TABLET ORAL at 21:54

## 2023-08-17 RX ADMIN — HEPARIN SODIUM 5000 UNITS: 5000 INJECTION INTRAVENOUS; SUBCUTANEOUS at 05:40

## 2023-08-17 RX ADMIN — SODIUM ZIRCONIUM CYCLOSILICATE 10 G: 10 POWDER, FOR SUSPENSION ORAL at 11:41

## 2023-08-17 RX ADMIN — ALPRAZOLAM 0.25 MG: 0.25 TABLET ORAL at 12:37

## 2023-08-17 RX ADMIN — CILOSTAZOL 50 MG: 50 TABLET ORAL at 09:34

## 2023-08-17 RX ADMIN — PREDNISONE 40 MG: 20 TABLET ORAL at 09:33

## 2023-08-17 RX ADMIN — FAMOTIDINE 20 MG: 20 TABLET ORAL at 09:34

## 2023-08-17 RX ADMIN — Medication 3 MG: at 21:54

## 2023-08-17 RX ADMIN — LATANOPROST 1 DROP: 50 SOLUTION OPHTHALMIC at 21:55

## 2023-08-17 RX ADMIN — SENNOSIDES 8.6 MG: 8.6 TABLET, FILM COATED ORAL at 21:54

## 2023-08-17 RX ADMIN — HEPARIN SODIUM 5000 UNITS: 5000 INJECTION INTRAVENOUS; SUBCUTANEOUS at 21:54

## 2023-08-17 RX ADMIN — BUMETANIDE 1 MG: 0.25 INJECTION INTRAMUSCULAR; INTRAVENOUS at 12:32

## 2023-08-17 RX ADMIN — LEVOTHYROXINE SODIUM 50 MCG: 0.05 TABLET ORAL at 05:40

## 2023-08-17 RX ADMIN — FLUTICASONE PROPIONATE 1 PUFF: 110 AEROSOL, METERED RESPIRATORY (INHALATION) at 20:13

## 2023-08-17 RX ADMIN — FLUTICASONE PROPIONATE 1 PUFF: 110 AEROSOL, METERED RESPIRATORY (INHALATION) at 08:08

## 2023-08-17 RX ADMIN — ROPINIROLE HYDROCHLORIDE 0.5 MG: 0.25 TABLET, FILM COATED ORAL at 09:35

## 2023-08-17 RX ADMIN — FERROUS SULFATE TAB 325 MG (65 MG ELEMENTAL FE) 325 MG: 325 (65 FE) TAB at 09:33

## 2023-08-17 RX ADMIN — Medication 1 CAPSULE: at 09:33

## 2023-08-17 RX ADMIN — ROPINIROLE HYDROCHLORIDE 0.5 MG: 0.25 TABLET, FILM COATED ORAL at 21:54

## 2023-08-17 ASSESSMENT — COPD QUESTIONNAIRES
QUESTION6_LEAVINGHOUSE: 5
QUESTION2_CHESTPHLEGM: 0
CAT_TOTALSCORE: 22
QUESTION4_WALKINCLINE: 2
QUESTION1_COUGHFREQUENCY: 4
GOLD_GROUP: GROUP B
TOTAL_EXACERBATIONS_PASTYEAR: 0
QUESTION8_ENERGYLEVEL: 1
QUESTION3_CHESTTIGHTNESS: 5
QUESTION5_HOMEACTIVITIES: 2
QUESTION7_SLEEPQUALITY: 3

## 2023-08-17 ASSESSMENT — PULMONARY FUNCTION TESTS
PIF_VALUE: 100
FEV1 (%PREDICTED): 40
POST BRONCHODILATOR FEV1/FVC: 95

## 2023-08-17 NOTE — CARE COORDINATION
Reviewed chart, discussed in IDR, plan remains to [address] once  medically ready . CM will continue to follow.

## 2023-08-17 NOTE — PROGRESS NOTES
RENAL DOSE ADJUSTMENT MADE PER P/T PROTOCOL    PREVIOUS ORDER:  Cefepime 2000 mg IV every 24 hours extended infusion  Indication: Upper Respiratory Infection and Urinary Tract Infection    Estimated Creatinine Clearance: 19 mL/min (A) (based on SCr of 2.1 mg/dL (H)).   Recent Labs     08/16/23  1046 08/16/23 2013 08/17/23  0744   BUN 78* 75* 74*   CREATININE 2.1* 2.1* 2.1*     --   --      NEW RENALLY ADJUSTED ORDER:  Cefepime 1000 mg IV every 12 hours extended infusion    Johanna Lee [facility], PharmD, BCPS  8/17/2023 8:56 AM

## 2023-08-17 NOTE — PROGRESS NOTES
Nephrology Progress Note  8/17/2023 9:58 AM  Subjective:   Ms. Junior Hinojosa is an 65-year-old female who is doing fairly well today except for more tired and short of breath since late last night    Data:   Scheduled Meds:   [START ON 8/18/2023] torsemide  20 mg Oral BID    cefepime  1,000 mg IntraVENous Q12H    bumetanide  1 mg IntraVENous Once    insulin glargine  8 Units SubCUTAneous Nightly    Vitamin D  1,000 Units Oral Daily    sodium zirconium cyclosilicate  10 g Oral Daily    insulin lispro  0-16 Units SubCUTAneous TID WC    insulin lispro  0-4 Units SubCUTAneous Nightly    polyethylene glycol  17 g Oral Daily    lactobacillus  1 capsule Oral Daily with breakfast    isosorbide mononitrate  30 mg Oral Daily    predniSONE  40 mg Oral Daily    fluticasone  1 puff Inhalation BID RT    famotidine  20 mg Oral Daily    cilostazol  50 mg Oral BID    ferrous sulfate  325 mg Oral BID WC    latanoprost  1 drop Both Eyes Nightly    levothyroxine  50 mcg Oral Daily    rOPINIRole  0.5 mg Oral TID    sodium chloride flush  5-40 mL IntraVENous 2 times per day    heparin (porcine)  5,000 Units SubCUTAneous 3 times per day     Continuous Infusions:   dextrose      sodium chloride 25 mL/hr (08/15/23 1307)         CBC   Recent Labs     08/15/23  0738 08/16/23  1046   WBC 7.0 11.0*   HGB 10.5* 9.8*   HCT 32.0* 30.5*    328        BMP   Recent Labs     08/16/23  1046 08/16/23  1207 08/16/23 2013 08/17/23  0744   *  --  131* 134*   K 5.6* 5.5* 5.1 4.8     --  97* 101   CO2 22  --  23 23   PHOS  --   --   --  2.2*   BUN 78*  --  75* 74*   CREATININE 2.1*  --  2.1* 2.1*     Hepatic:   Recent Labs     08/14/23  1048 08/15/23  0738   AST 14* 13*   ALT 19 17   BILITOT 0.4 0.3   ALKPHOS 55 52     Troponin: No results for input(s): TROPONINI in the last 72 hours. BNP: No results for input(s): BNP in the last 72 hours. Lipids: No results for input(s): CHOL, HDL in the last 72 hours.     Invalid input(s): LDLCALCU  ABGs:

## 2023-08-17 NOTE — PROGRESS NOTES
Physician Progress Note      PATIENTAilene Blazing  [facility] #:                  [id number]  :                       1935  ADMIT DATE:       8/10/2023 10:56 AM  DISCH DATE:  RESPONDING  PROVIDER #:        Dwayne Strauss MD          QUERY TEXT:    Internal Medicine,    Pt admitted with Acute on chronic respiratory failure with hypoxia 2/2   pneumonia. If possible, please document in the progress notes and discharge   summary if you are evaluating and/or treating any of the following:    Note: CAP and HCAP indicate where the pneumonia was acquired, not a specific   type. The medical record reflects the following:  Risk Factors: pleural effusions  Clinical Indicators: aspiration precautions-no overt aspiration documented in   SLP eval, E.coli & Klebsiella UTI  Treatment: labs, imaging, IV Unasyn/Cefepime, IVF, steroids, O2, supportive   care    Thank you,  Kodak Borrego RN CDS  [phone]  Options provided:  -- Gram negative pneumonia  -- Gram positive pneumonia  -- [Aspiration pneumonia  -- Other - I will add my own diagnosis  -- Disagree - Not applicable / Not valid  -- Disagree - Clinically unable to determine / Unknown  -- Refer to Clinical Documentation Reviewer    PROVIDER RESPONSE TEXT:    This patient has gram negative pneumonia.     Query created by: Kodak Borrego on 2023 4:07 PM      Electronically signed by:  Dwayne Strauss MD 2023 9:59 PM

## 2023-08-17 NOTE — PLAN OF CARE
Problem: Skin/Tissue Integrity  Goal: Absence of new skin breakdown  Description: 1. Monitor for areas of redness and/or skin breakdown  2. Assess vascular access sites hourly  3. Every 4-6 hours minimum:  Change oxygen saturation probe site  4. Every 4-6 hours:  If on nasal continuous positive airway pressure, respiratory therapy assess nares and determine need for appliance change or resting period.   Outcome: Progressing     Problem: ABCDS Injury Assessment  Goal: Absence of physical injury  Outcome: Progressing  Flowsheets (Taken 8/17/2023 1524)  Absence of Physical Injury: Implement safety measures based on patient assessment     Problem: Safety - Adult  Goal: Free from fall injury  Outcome: Progressing  Flowsheets (Taken 8/17/2023 1524)  Free From Fall Injury:   Instruct family/caregiver on patient safety   Based on caregiver fall risk screen, instruct family/caregiver to ask for assistance with transferring infant if caregiver noted to have fall risk factors     Problem: Discharge Planning  Goal: Discharge to home or other facility with appropriate resources  Outcome: Progressing     Problem: Pain  Goal: Verbalizes/displays adequate comfort level or baseline comfort level  Outcome: Progressing     Problem: Chronic Conditions and Co-morbidities  Goal: Patient's chronic conditions and co-morbidity symptoms are monitored and maintained or improved  Outcome: Progressing

## 2023-08-17 NOTE — PROGRESS NOTES
Comprehensive Nutrition Assessment    Type and Reason for Visit:  Initial, RD Nutrition Re-Screen/LOS    Nutrition Recommendations/Plan:   Continue BM regimen   Adjust diet to Carb Control 4, Low Potassium Diet   Offer Low calorie, high protein oral nutrition supplement BID, vanilla only   Endorse adequate PO intakes/ONS Intake and record in I/O      Malnutrition Assessment:  Malnutrition Status:  No malnutrition (08/17/23 1253)    Context:  Acute Illness     Findings of the 6 clinical characteristics of malnutrition:  Energy Intake:  No significant decrease in energy intake (Reduced intake only today)  Weight Loss:  No significant weight loss     Body Fat Loss:  No significant body fat loss Orbital, Triceps, Buccal region   Muscle Mass Loss:  No significant muscle mass loss Clavicles (pectoralis & deltoids), Temples (temporalis), Thigh (quadraceps), Calf (gastrocnemius), Hand (interosseous), Scapula (trapezius)  Fluid Accumulation:  No significant fluid accumulation     Strength:  Not Performed    Nutrition Assessment:    Admitted with Diarrhea. H/O CA, CAD, CHF, COPD, CKD, Hyperkalemia, DMII, OA, Lymphedema of both lower extremities. Currently on carb controlled 5 diet with low potassium, has been eating well within stay except today. Poor appetite due to SOB yesterday, not feeling well, and constipation x 5 days. No longer with diarrhea. No N/V/P today. Recommended no skipped meals due to DMII. Will send supplements. Performed NFPE, no significant wasting. Will monitor as moderate nutrition risk. Nutrition Related Findings:    K WNL, POCT some greater than 200, A1c 6.5%, Meds: glycolax, cefepime, pepcid, orsemide, vitamin D, lokelmia, synthyroid, culturelle, iron, synthyroid Wound Type: None       Current Nutrition Intake & Therapies:    Average Meal Intake: 51-75%, 1-25% (over the last 7 days per dtr)  Average Supplements Intake: None Ordered  ADULT DIET; Regular; 5 carb choices (75 gm/meal);  Low

## 2023-08-17 NOTE — PROGRESS NOTES
Physical Therapy  Attempted to see pt this PM but pt politely requesting therapy return tomorrow due to fatigue. Will continue to attempt as schedule allows.

## 2023-08-17 NOTE — PROGRESS NOTES
08/17/23 1450   Spirometry Assessment   FEV1 (%PRED) 40   Post Bronchodilator FEV/FVC 95   COPD Exacerbations in last year 0    L/min   COPD Assessment (CAT Score)   Cough Assessment 4   Phlegm Assessment 0   Chest tightness 5   Walking on an incline 2   Home Activities 2   Confident Leaving The Home 5   Sleeping Soundly 3   Have Energy 1   Assessment Score 22   $RT COPD Assessment Yes   GOLD Staging   Group Group B     Current GOLD classification for Dayanna Arellano      GOLD Stage:  No data recorded  Group: Group B  Recorded domestic exacerbations past 12 months: 0  Current recorded COPD Assessment Tool (CAT) score of 22  Current eosinophil count: 0     Inhaler Device   Acceptable for Use   Respimat  Not Breath Actuated Yes   MDI  Not Breath Actuated Yes           DPI  Observed PIF   using  In-Check Meter   Optimal PIF   Acceptable for Use   HANDIHALER 100 >30 YES   Pressair 100 >45 YES   NEOHALER 100 >50 YES   Diskus 100 >60 YES   ELLIPTA 100 >60 YES     Records show Dayanna Arellano was not using maintenance therapy prior to admission. Pt is on Flovent per outpatient medicaitons, but no LABA or LAMA in patient record. LABA+LAMA is patient GOLD recommendations.  Results sent to outpatient pharmacy for request of maintenance respiratory medication           LONG-ACTING (LABA)   Arformoterol (Brovana) NEBULIZER   Indacaterol (Arcapta) NEOHALER   Olodaterol (Striverdi) Respimat   Salmeterol (Serevent) MDI, DISKUS   LONG-ACTING (LAMA)   Aclidinium bromide (Tudorza) PRESSAIR   Glycopyrronium bromide Winnie Chess) NEOHALER   Tiotropium (Spiriva) Respimat, HANDIHALER   Umeclidinium (Incruse) ELLIPTA   (LABA/LAMA)   Formoterol/glycopyrronium (Bevespi) MDI   Indacaterol/glycopyrronium (Utibron) NEOHALER   Vilanterol/umeclidinium (Anoro) ELLIPTA   Olodaterol/tiotropium (Stiolto) Respimat   (LABA/ICS)   Formoterol/budesomide (Symbicort) MDI   Formoterol/mometasone (Dulera) MDI   Salmeterol/fluticasone (Advair) MDI,

## 2023-08-18 VITALS
BODY MASS INDEX: 34.56 KG/M2 | SYSTOLIC BLOOD PRESSURE: 117 MMHG | HEART RATE: 68 BPM | HEIGHT: 61 IN | OXYGEN SATURATION: 100 % | DIASTOLIC BLOOD PRESSURE: 70 MMHG | WEIGHT: 183.06 LBS | TEMPERATURE: 97.9 F | RESPIRATION RATE: 18 BRPM

## 2023-08-18 LAB
ANION GAP SERPL CALCULATED.3IONS-SCNC: 10 MMOL/L (ref 4–16)
ANISOCYTOSIS: ABNORMAL
BANDED NEUTROPHILS ABSOLUTE COUNT: 0.21 K/CU MM
BANDED NEUTROPHILS RELATIVE PERCENT: 2 % (ref 5–11)
BUN SERPL-MCNC: 65 MG/DL (ref 6–23)
CALCIUM SERPL-MCNC: 9.1 MG/DL (ref 8.3–10.6)
CHLORIDE BLD-SCNC: 103 MMOL/L (ref 99–110)
CO2: 24 MMOL/L (ref 21–32)
CREAT SERPL-MCNC: 1.7 MG/DL (ref 0.6–1.1)
DIFFERENTIAL TYPE: ABNORMAL
EOSINOPHILS ABSOLUTE: 0.1 K/CU MM
EOSINOPHILS RELATIVE PERCENT: 1 % (ref 0–3)
GFR SERPL CREATININE-BSD FRML MDRD: 29 ML/MIN/1.73M2
GLUCOSE BLD-MCNC: 120 MG/DL (ref 70–99)
GLUCOSE SERPL-MCNC: 182 MG/DL (ref 70–99)
HCT VFR BLD CALC: 34.5 % (ref 37–47)
HEMOGLOBIN: 10.8 GM/DL (ref 12.5–16)
LYMPHOCYTES ABSOLUTE: 1.9 K/CU MM
LYMPHOCYTES RELATIVE PERCENT: 18 % (ref 24–44)
MCH RBC QN AUTO: 30.8 PG (ref 27–31)
MCHC RBC AUTO-ENTMCNC: 31.3 % (ref 32–36)
MCV RBC AUTO: 98.3 FL (ref 78–100)
METAMYELOCYTES ABSOLUTE COUNT: 0.1 K/CU MM
METAMYELOCYTES PERCENT: 1 %
MONOCYTES ABSOLUTE: 0.8 K/CU MM
MONOCYTES RELATIVE PERCENT: 8 % (ref 0–4)
MYELOCYTE PERCENT: 1 %
MYELOCYTES ABSOLUTE COUNT: 0.1 K/CU MM
PDW BLD-RTO: 12.8 % (ref 11.7–14.9)
PLATELET # BLD: 402 K/CU MM (ref 140–440)
PMV BLD AUTO: 8.9 FL (ref 7.5–11.1)
POTASSIUM SERPL-SCNC: 4.5 MMOL/L (ref 3.5–5.1)
RBC # BLD: 3.51 M/CU MM (ref 4.2–5.4)
SARS-COV-2 RDRP RESP QL NAA+PROBE: NOT DETECTED
SEGMENTED NEUTROPHILS ABSOLUTE COUNT: 7.2 K/CU MM
SEGMENTED NEUTROPHILS RELATIVE PERCENT: 69 % (ref 36–66)
SODIUM BLD-SCNC: 137 MMOL/L (ref 135–145)
SOURCE: NORMAL
WBC # BLD: 10.4 K/CU MM (ref 4–10.5)

## 2023-08-18 PROCEDURE — 6370000000 HC RX 637 (ALT 250 FOR IP): Performed by: STUDENT IN AN ORGANIZED HEALTH CARE EDUCATION/TRAINING PROGRAM

## 2023-08-18 PROCEDURE — 6360000002 HC RX W HCPCS: Performed by: PHYSICIAN ASSISTANT

## 2023-08-18 PROCEDURE — 85027 COMPLETE CBC AUTOMATED: CPT

## 2023-08-18 PROCEDURE — 94640 AIRWAY INHALATION TREATMENT: CPT

## 2023-08-18 PROCEDURE — 6370000000 HC RX 637 (ALT 250 FOR IP): Performed by: PHYSICIAN ASSISTANT

## 2023-08-18 PROCEDURE — 87635 SARS-COV-2 COVID-19 AMP PRB: CPT

## 2023-08-18 PROCEDURE — 94761 N-INVAS EAR/PLS OXIMETRY MLT: CPT

## 2023-08-18 PROCEDURE — 89220 SPUTUM SPECIMEN COLLECTION: CPT

## 2023-08-18 PROCEDURE — 94664 DEMO&/EVAL PT USE INHALER: CPT

## 2023-08-18 PROCEDURE — 6370000000 HC RX 637 (ALT 250 FOR IP): Performed by: INTERNAL MEDICINE

## 2023-08-18 PROCEDURE — 2580000003 HC RX 258: Performed by: NURSE PRACTITIONER

## 2023-08-18 PROCEDURE — 6370000000 HC RX 637 (ALT 250 FOR IP)

## 2023-08-18 PROCEDURE — 94150 VITAL CAPACITY TEST: CPT

## 2023-08-18 PROCEDURE — 80048 BASIC METABOLIC PNL TOTAL CA: CPT

## 2023-08-18 PROCEDURE — 6370000000 HC RX 637 (ALT 250 FOR IP): Performed by: FAMILY MEDICINE

## 2023-08-18 PROCEDURE — 2700000000 HC OXYGEN THERAPY PER DAY

## 2023-08-18 PROCEDURE — 6360000002 HC RX W HCPCS: Performed by: NURSE PRACTITIONER

## 2023-08-18 PROCEDURE — 36415 COLL VENOUS BLD VENIPUNCTURE: CPT

## 2023-08-18 PROCEDURE — 82962 GLUCOSE BLOOD TEST: CPT

## 2023-08-18 PROCEDURE — 85007 BL SMEAR W/DIFF WBC COUNT: CPT

## 2023-08-18 PROCEDURE — 2580000003 HC RX 258: Performed by: PHYSICIAN ASSISTANT

## 2023-08-18 RX ORDER — ISOSORBIDE MONONITRATE 30 MG/1
30 TABLET, EXTENDED RELEASE ORAL DAILY
Qty: 30 TABLET | Refills: 0 | Status: SHIPPED | OUTPATIENT
Start: 2023-08-18

## 2023-08-18 RX ORDER — POLYETHYLENE GLYCOL 3350 17 G/17G
17 POWDER, FOR SOLUTION ORAL DAILY
Qty: 30 PACKET | Refills: 0 | Status: SHIPPED | OUTPATIENT
Start: 2023-08-18 | End: 2023-09-17

## 2023-08-18 RX ORDER — LACTOBACILLUS RHAMNOSUS GG 10B CELL
1 CAPSULE ORAL
Qty: 30 CAPSULE | Refills: 0 | Status: SHIPPED | OUTPATIENT
Start: 2023-08-18 | End: 2023-09-17

## 2023-08-18 RX ORDER — POLYETHYLENE GLYCOL 3350 17 G/17G
17 POWDER, FOR SOLUTION ORAL 2 TIMES DAILY
Status: DISCONTINUED | OUTPATIENT
Start: 2023-08-18 | End: 2023-08-18 | Stop reason: HOSPADM

## 2023-08-18 RX ORDER — SENNOSIDES A AND B 8.6 MG/1
1 TABLET, FILM COATED ORAL 2 TIMES DAILY
Status: DISCONTINUED | OUTPATIENT
Start: 2023-08-18 | End: 2023-08-18 | Stop reason: HOSPADM

## 2023-08-18 RX ORDER — ALPRAZOLAM 0.25 MG/1
0.25 TABLET ORAL ONCE
Status: COMPLETED | OUTPATIENT
Start: 2023-08-18 | End: 2023-08-18

## 2023-08-18 RX ORDER — CHOLECALCIFEROL (VITAMIN D3) 25 MCG
1000 TABLET ORAL DAILY
Qty: 60 TABLET | Refills: 0 | Status: SHIPPED | OUTPATIENT
Start: 2023-08-18

## 2023-08-18 RX ADMIN — Medication 1000 UNITS: at 10:34

## 2023-08-18 RX ADMIN — SENNOSIDES 8.6 MG: 8.6 TABLET, FILM COATED ORAL at 10:44

## 2023-08-18 RX ADMIN — Medication 1 CAPSULE: at 10:34

## 2023-08-18 RX ADMIN — CEFEPIME 1000 MG: 1 INJECTION, POWDER, FOR SOLUTION INTRAMUSCULAR; INTRAVENOUS at 00:13

## 2023-08-18 RX ADMIN — TORSEMIDE 20 MG: 20 TABLET ORAL at 10:34

## 2023-08-18 RX ADMIN — FAMOTIDINE 20 MG: 20 TABLET ORAL at 10:34

## 2023-08-18 RX ADMIN — CILOSTAZOL 50 MG: 50 TABLET ORAL at 10:34

## 2023-08-18 RX ADMIN — ALPRAZOLAM 0.25 MG: 0.25 TABLET ORAL at 01:21

## 2023-08-18 RX ADMIN — HEPARIN SODIUM 5000 UNITS: 5000 INJECTION INTRAVENOUS; SUBCUTANEOUS at 06:09

## 2023-08-18 RX ADMIN — SODIUM CHLORIDE, PRESERVATIVE FREE 10 ML: 5 INJECTION INTRAVENOUS at 10:38

## 2023-08-18 RX ADMIN — SODIUM ZIRCONIUM CYCLOSILICATE 10 G: 10 POWDER, FOR SUSPENSION ORAL at 10:33

## 2023-08-18 RX ADMIN — FLUTICASONE PROPIONATE 1 PUFF: 110 AEROSOL, METERED RESPIRATORY (INHALATION) at 08:31

## 2023-08-18 RX ADMIN — ISOSORBIDE MONONITRATE 30 MG: 30 TABLET, EXTENDED RELEASE ORAL at 10:34

## 2023-08-18 RX ADMIN — FERROUS SULFATE TAB 325 MG (65 MG ELEMENTAL FE) 325 MG: 325 (65 FE) TAB at 10:34

## 2023-08-18 RX ADMIN — ROPINIROLE HYDROCHLORIDE 0.5 MG: 0.25 TABLET, FILM COATED ORAL at 10:34

## 2023-08-18 RX ADMIN — ALBUTEROL SULFATE 2 PUFF: 90 AEROSOL, METERED RESPIRATORY (INHALATION) at 01:51

## 2023-08-18 RX ADMIN — LEVOTHYROXINE SODIUM 50 MCG: 0.05 TABLET ORAL at 06:09

## 2023-08-18 NOTE — DISCHARGE INSTR - COC
Continuity of Care Form    Patient Name: Ruby Duncan   :  1935  MRN:  [id number]    Admit date:  8/10/2023  Discharge date:  2023    Code Status Order: Full Code   Advance Directives:     Admitting Physician:  No admitting provider for patient encounter.   PCP: TREVON Saavedra CNP    Discharging Nurse: [facility] Unit/Room#: 5460/0782-N  Discharging Unit Phone Number: [phone]    Emergency Contact:   Extended Emergency Contact Information  Primary Emergency Contact: Yesica Ramos  Address: [address] [place] [address] [place] [address] Lucio Lowry Phone: [phone]  Mobile Phone: [phone]  Relation: Spouse  Secondary Emergency Contact: Ronny Worthington Phone: [phone]  Mobile Phone: [phone]  Relation: Step Child    Past Surgical History:  Past Surgical History:   Procedure Laterality Date    APPENDECTOMY      CHOLECYSTECTOMY      CYSTOSCOPY      EYE SURGERY      bilateral implants    HYSTERECTOMY (CERVIX STATUS UNKNOWN)      JOINT REPLACEMENT Right     knee    PACEMAKER INSERTION N/A 2020    PACEMAKER INSERTION PERMANENT REMOVAL OF TEMPORARY PACEMAKER performed by Blanca Mahajan MD at [address] [place] [address]         Immunization History:   Immunization History   Administered Date(s) Administered    COVID-19, J&J, (age 18y+), IM, 0.5 mL 02/10/2021, 2021    COVID-19, PFIZER Bivalent, DO NOT Dilute, (age 12y+), IM, 30 mcg/0.3 mL 2022    Influenza Vaccine, unspecified formulation 10/15/2016    Influenza Virus Vaccine 10/15/2016, 2021, 2022    Influenza, FLUARIX, FLULAVAL, FLUZONE (age 10 mo+) AND AFLURIA, (age 1 y+), PF, 0.5mL 2020    Influenza, FLUZONE (age 72 y+), High Dose, 0.7mL 10/01/2021, 2022    Influenza, High Dose (Fluzone 65 yrs and older) 2017, 10/25/2018, 10/25/2019, 10/31/2019    Pneumococcal, PCV-13, PREVNAR 13, (age 6w+), IM, 0.5mL 2014, 2015       Active 71

## 2023-08-18 NOTE — CARE COORDINATION
Dr. Miguel Nguyen stated that pt will be discharged today. Per KM CM pt is able to discharge to [address]. 0141 - This CM called Char Perdue with [facility] to make sure they don't need a PT eval placed prior to discharge and acceptance. Char Perdue checked with therapy and confirmed that pt does not need PT eval at this time since there is an OT eval.     Pt to discharge today. 7793 - Discharge time is set up with /Yudy for 11:30am. RN aware. Pt will need completed WEI and Rapid Covid prior to discharge.  is aware. Funmilayo Marina is aware. PASS is completed and packet started.

## 2023-08-18 NOTE — PROGRESS NOTES
Outpatient Pharmacy Progress Note for Meds-to-Beds    Total number of Prescriptions Filled: 0  Medications not filled due to patient discharge to [address]      Thank you for letting us serve your patients.   [address]    [address] [place] [address], [address] [place] [address]    Phone: [phone]    Fax: [phone]

## 2023-09-05 ENCOUNTER — HOSPITAL ENCOUNTER (OUTPATIENT)
Age: 88
Discharge: HOME | End: 2023-09-05
Payer: MEDICARE

## 2023-09-05 LAB
ALBUMIN SERPL-MCNC: 3.9 GM/DL (ref 3.4–5)
ALP BLD-CCNC: 74 IU/L (ref 40–129)
ALT SERPL-CCNC: 8 U/L (ref 10–40)
ANION GAP SERPL CALCULATED.3IONS-SCNC: 15 MMOL/L (ref 4–16)
AST SERPL-CCNC: 10 IU/L (ref 15–37)
BILIRUB SERPL-MCNC: 0.6 MG/DL (ref 0–1)
BUN SERPL-MCNC: 83 MG/DL (ref 6–23)
CALCIUM SERPL-MCNC: 8.2 MG/DL (ref 8.3–10.6)
CHLORIDE BLD-SCNC: 93 MMOL/L (ref 99–110)
CO2: 31 MMOL/L (ref 21–32)
CREAT SERPL-MCNC: 2.1 MG/DL (ref 0.6–1.1)
GFR SERPL CREATININE-BSD FRML MDRD: 23 ML/MIN/1.73M2
GLUCOSE SERPL-MCNC: 115 MG/DL (ref 70–99)
MAGNESIUM: 1.3 MG/DL (ref 1.8–2.4)
PHOSPHORUS: 4.7 MG/DL (ref 2.5–4.9)
POTASSIUM SERPL-SCNC: 2.8 MMOL/L (ref 3.5–5.1)
SODIUM BLD-SCNC: 139 MMOL/L (ref 135–145)
TOTAL PROTEIN: 6.8 GM/DL (ref 6.4–8.2)

## 2023-09-05 PROCEDURE — 80053 COMPREHEN METABOLIC PANEL: CPT

## 2023-09-05 PROCEDURE — 83735 ASSAY OF MAGNESIUM: CPT

## 2023-09-05 PROCEDURE — 84100 ASSAY OF PHOSPHORUS: CPT

## 2023-09-05 PROCEDURE — 36415 COLL VENOUS BLD VENIPUNCTURE: CPT

## 2023-09-08 ENCOUNTER — HOSPITAL ENCOUNTER (OUTPATIENT)
Age: 88
Discharge: HOME | End: 2023-09-08
Payer: MEDICARE

## 2023-09-08 LAB
ALBUMIN SERPL-MCNC: 4.1 GM/DL (ref 3.4–5)
ALP BLD-CCNC: 75 IU/L (ref 40–129)
ALT SERPL-CCNC: 7 U/L (ref 10–40)
ANION GAP SERPL CALCULATED.3IONS-SCNC: 10 MMOL/L (ref 4–16)
AST SERPL-CCNC: 10 IU/L (ref 15–37)
BILIRUB SERPL-MCNC: 0.4 MG/DL (ref 0–1)
BUN SERPL-MCNC: 70 MG/DL (ref 6–23)
CALCIUM SERPL-MCNC: 9.9 MG/DL (ref 8.3–10.6)
CHLORIDE BLD-SCNC: 96 MMOL/L (ref 99–110)
CO2: 32 MMOL/L (ref 21–32)
CREAT SERPL-MCNC: 2.1 MG/DL (ref 0.6–1.1)
GFR SERPL CREATININE-BSD FRML MDRD: 23 ML/MIN/1.73M2
GLUCOSE SERPL-MCNC: 143 MG/DL (ref 70–99)
MAGNESIUM: 2.6 MG/DL (ref 1.8–2.4)
POTASSIUM SERPL-SCNC: 4.9 MMOL/L (ref 3.5–5.1)
SODIUM BLD-SCNC: 138 MMOL/L (ref 135–145)
TOTAL PROTEIN: 6.9 GM/DL (ref 6.4–8.2)

## 2023-09-08 PROCEDURE — 36415 COLL VENOUS BLD VENIPUNCTURE: CPT

## 2023-09-08 PROCEDURE — 83735 ASSAY OF MAGNESIUM: CPT

## 2023-09-08 PROCEDURE — 80053 COMPREHEN METABOLIC PANEL: CPT

## 2023-09-14 ENCOUNTER — HOSPITAL ENCOUNTER (OUTPATIENT)
Age: 88
Discharge: HOME | End: 2023-09-14
Payer: MEDICARE

## 2023-09-14 DIAGNOSIS — E83.42 HYPOMAGNESEMIA: ICD-10-CM

## 2023-09-14 DIAGNOSIS — N18.32 CKD STAGE G3B/A1, GFR 30-44 AND ALBUMIN CREATININE RATIO <30 MG/G (HCC): ICD-10-CM

## 2023-09-14 DIAGNOSIS — E11.22 TYPE 2 DIABETES MELLITUS WITH STAGE 3B CHRONIC KIDNEY DISEASE, WITHOUT LONG-TERM CURRENT USE OF INSULIN (HCC): ICD-10-CM

## 2023-09-14 DIAGNOSIS — R60.9 EDEMA, UNSPECIFIED TYPE: ICD-10-CM

## 2023-09-14 DIAGNOSIS — N18.32 TYPE 2 DIABETES MELLITUS WITH STAGE 3B CHRONIC KIDNEY DISEASE, WITHOUT LONG-TERM CURRENT USE OF INSULIN (HCC): ICD-10-CM

## 2023-09-14 DIAGNOSIS — I10 HYPERTENSION, UNSPECIFIED TYPE: ICD-10-CM

## 2023-09-14 LAB
ANION GAP SERPL CALCULATED.3IONS-SCNC: 11 MMOL/L (ref 4–16)
BUN SERPL-MCNC: 55 MG/DL (ref 6–23)
CALCIUM SERPL-MCNC: 9.9 MG/DL (ref 8.3–10.6)
CHLORIDE BLD-SCNC: 90 MMOL/L (ref 99–110)
CO2: 33 MMOL/L (ref 21–32)
CREAT SERPL-MCNC: 2.6 MG/DL (ref 0.6–1.1)
GFR SERPL CREATININE-BSD FRML MDRD: 17 ML/MIN/1.73M2
GLUCOSE SERPL-MCNC: 141 MG/DL (ref 70–99)
MAGNESIUM: 3.9 MG/DL (ref 1.8–2.4)
POTASSIUM SERPL-SCNC: 3.9 MMOL/L (ref 3.5–5.1)
SODIUM BLD-SCNC: 134 MMOL/L (ref 135–145)

## 2023-09-14 PROCEDURE — 36415 COLL VENOUS BLD VENIPUNCTURE: CPT

## 2023-09-14 PROCEDURE — 80048 BASIC METABOLIC PNL TOTAL CA: CPT

## 2023-09-14 PROCEDURE — 83735 ASSAY OF MAGNESIUM: CPT

## 2023-09-25 ENCOUNTER — HOSPITAL ENCOUNTER (OUTPATIENT)
Age: 88
Discharge: HOME | End: 2023-09-25
Payer: MEDICARE

## 2023-09-25 LAB
ALBUMIN SERPL-MCNC: 3.8 GM/DL (ref 3.4–5)
ALP BLD-CCNC: 74 IU/L (ref 40–129)
ALT SERPL-CCNC: 11 U/L (ref 10–40)
ANION GAP SERPL CALCULATED.3IONS-SCNC: 10 MMOL/L (ref 4–16)
AST SERPL-CCNC: 10 IU/L (ref 15–37)
BILIRUB SERPL-MCNC: 0.5 MG/DL (ref 0–1)
BUN SERPL-MCNC: 69 MG/DL (ref 6–23)
CALCIUM SERPL-MCNC: 9.2 MG/DL (ref 8.3–10.6)
CHLORIDE BLD-SCNC: 96 MMOL/L (ref 99–110)
CO2: 32 MMOL/L (ref 21–32)
CREAT SERPL-MCNC: 2.1 MG/DL (ref 0.6–1.1)
GFR SERPL CREATININE-BSD FRML MDRD: 23 ML/MIN/1.73M2
GLUCOSE P FAST SERPL-MCNC: 123 MG/DL (ref 70–99)
MAGNESIUM: 3.6 MG/DL (ref 1.8–2.4)
POTASSIUM SERPL-SCNC: 3.8 MMOL/L (ref 3.5–5.1)
SODIUM BLD-SCNC: 138 MMOL/L (ref 135–145)
TOTAL PROTEIN: 6.2 GM/DL (ref 6.4–8.2)

## 2023-09-25 PROCEDURE — 80053 COMPREHEN METABOLIC PANEL: CPT

## 2023-09-25 PROCEDURE — 83735 ASSAY OF MAGNESIUM: CPT

## 2023-09-25 PROCEDURE — 36415 COLL VENOUS BLD VENIPUNCTURE: CPT

## 2023-11-10 ENCOUNTER — HOSPITAL ENCOUNTER (OUTPATIENT)
Dept: WOUND CARE | Age: 88
Discharge: HOME | End: 2023-11-10
Payer: MEDICARE

## 2023-11-10 VITALS — SYSTOLIC BLOOD PRESSURE: 135 MMHG | DIASTOLIC BLOOD PRESSURE: 77 MMHG | HEART RATE: 98 BPM | TEMPERATURE: 98.2 F

## 2023-11-10 DIAGNOSIS — E11.9 DIET-CONTROLLED TYPE 2 DIABETES MELLITUS (HCC): ICD-10-CM

## 2023-11-10 DIAGNOSIS — I89.0 LYMPHEDEMA OF BOTH LOWER EXTREMITIES: ICD-10-CM

## 2023-11-10 DIAGNOSIS — I87.2 VENOUS STASIS DERMATITIS OF BOTH LOWER EXTREMITIES: ICD-10-CM

## 2023-11-10 DIAGNOSIS — E66.9 OBESITY (BMI 30-39.9): ICD-10-CM

## 2023-11-10 DIAGNOSIS — L84 CALLUS OF FOOT: Primary | ICD-10-CM

## 2023-11-10 PROBLEM — E11.49 TYPE 2 DIABETES MELLITUS WITH NEUROLOGIC COMPLICATION, WITH LONG-TERM CURRENT USE OF INSULIN (HCC): Status: ACTIVE | Noted: 2023-11-10

## 2023-11-10 PROBLEM — Z79.4 TYPE 2 DIABETES MELLITUS WITH NEUROLOGIC COMPLICATION, WITH LONG-TERM CURRENT USE OF INSULIN (HCC): Status: ACTIVE | Noted: 2023-11-10

## 2023-11-10 PROCEDURE — 97597 DBRDMT OPN WND 1ST 20 CM/<: CPT

## 2023-11-10 PROCEDURE — 11056 PARNG/CUTG B9 HYPRKR LES 2-4: CPT

## 2023-11-10 RX ORDER — IBUPROFEN 200 MG
TABLET ORAL ONCE
OUTPATIENT
Start: 2023-11-10 | End: 2023-11-10

## 2023-11-10 RX ORDER — GINSENG 100 MG
CAPSULE ORAL ONCE
OUTPATIENT
Start: 2023-11-10 | End: 2023-11-10

## 2023-11-10 RX ORDER — LIDOCAINE 40 MG/G
CREAM TOPICAL ONCE
OUTPATIENT
Start: 2023-11-10 | End: 2023-11-10

## 2023-11-10 RX ORDER — CLOBETASOL PROPIONATE 0.5 MG/G
OINTMENT TOPICAL ONCE
OUTPATIENT
Start: 2023-11-10 | End: 2023-11-10

## 2023-11-10 RX ORDER — LIDOCAINE HYDROCHLORIDE 40 MG/ML
SOLUTION TOPICAL ONCE
OUTPATIENT
Start: 2023-11-10 | End: 2023-11-10

## 2023-11-10 RX ORDER — BETAMETHASONE DIPROPIONATE 0.5 MG/G
CREAM TOPICAL ONCE
OUTPATIENT
Start: 2023-11-10 | End: 2023-11-10

## 2023-11-10 RX ORDER — GENTAMICIN SULFATE 1 MG/G
OINTMENT TOPICAL ONCE
OUTPATIENT
Start: 2023-11-10 | End: 2023-11-10

## 2023-11-10 RX ORDER — SODIUM CHLOR/HYPOCHLOROUS ACID 0.033 %
SOLUTION, IRRIGATION IRRIGATION ONCE
OUTPATIENT
Start: 2023-11-10 | End: 2023-11-10

## 2023-11-10 RX ORDER — BACITRACIN ZINC AND POLYMYXIN B SULFATE 500; 1000 [USP'U]/G; [USP'U]/G
OINTMENT TOPICAL ONCE
OUTPATIENT
Start: 2023-11-10 | End: 2023-11-10

## 2023-11-10 RX ORDER — TRIAMCINOLONE ACETONIDE 1 MG/G
OINTMENT TOPICAL ONCE
OUTPATIENT
Start: 2023-11-10 | End: 2023-11-10

## 2023-11-10 RX ORDER — LIDOCAINE 50 MG/G
OINTMENT TOPICAL ONCE
OUTPATIENT
Start: 2023-11-10 | End: 2023-11-10

## 2023-11-10 ASSESSMENT — PAIN DESCRIPTION - LOCATION: LOCATION: TOE (COMMENT WHICH ONE)

## 2023-11-10 ASSESSMENT — PAIN - FUNCTIONAL ASSESSMENT: PAIN_FUNCTIONAL_ASSESSMENT: ACTIVITIES ARE NOT PREVENTED

## 2023-11-10 ASSESSMENT — PAIN SCALES - GENERAL: PAINLEVEL_OUTOF10: 5

## 2023-11-10 ASSESSMENT — PAIN DESCRIPTION - ORIENTATION: ORIENTATION: LEFT;RIGHT

## 2023-11-10 ASSESSMENT — PAIN DESCRIPTION - DESCRIPTORS: DESCRIPTORS: ACHING

## 2023-11-10 ASSESSMENT — PAIN DESCRIPTION - PAIN TYPE: TYPE: CHRONIC PAIN

## 2023-11-10 ASSESSMENT — PAIN DESCRIPTION - FREQUENCY: FREQUENCY: INTERMITTENT

## 2023-11-10 NOTE — PATIENT INSTRUCTIONS
PHYSICIAN ORDERS AND DISCHARGE INSTRUCTIONS  NOTE: Upon discharge from the  [facility], you will receive a patient experience survey via E-mail. We would be grateful if you would take the time to fill this survey out. Wound care order history:   CYNTHIA's   Right 0.44      Left . 67   Date 11/10/23   Vascular studies/Intervention: . Cultures: . Antibiotics: . HbA1c:  .               Grafts:  .   Juxta Lites & Lymph Pumps:  Continuing wound care orders and information:              Residence: . Continue home health care with:. Your wound-care supplies will be provided by: [facility]: . Wound cleansing:      Do not scrub or use excessive force. Wash hands with soap and water before and after dressing changes. Prior to applying a clean dressing, cleanse wound with normal saline,    wound cleanser, or mild soap and water. Ask your physician or nurse before getting the wound(s) wet in the shower. Daily Wound management:    Keep weight off wounds and reposition every 2 hours. Avoid standing for long periods of time. Evaluate legs to the level of the heart or above for 30 minutes 4-5 times a day and/or when sitting. When taking antibiotics take entire prescription as ordered by MD do not stop taking until medicine is all gone. Documentation:  Compression: . Offloading: . Orders for this week (11/10/2023):  A&D to intact skin of lower extremities. Tubi F  Change daily      Please dispense 30 day quantity when sending supplies     Follow up with Laverne Vargas CNP if needed in the wound care center  Call [phone] for any questions or concerns.

## 2023-11-20 NOTE — PLAN OF CARE
11/20/23 1159   Post-Acute Status   Post-Acute Authorization Placement   Coverage Medicaid- [place] [facility]   Discharge Delays None known at this time   Discharge Plan   Discharge Plan A Skilled Nursing Facility     Update on SNF placement:    1. [facility] Phone: [phone]  - Yes, willing to accept patient  Comments: We are in network with [facility] and could accept the patient at [facility] in [place]. Of course, I won't contact the family until you give me the go ahead.  Rebecca [phone]     2.[facility] Phone: [phone]  -Yes, willing to accept patient  Comments: We are in network with [facility] and could accept the patient at [facility] (or [facility]) in [place]. Of course, I won't contact the family until you give me the go ahead.  Rebecca [phone]    3.[facility] Phone: [phone]  -Interested, but need more information  Comments: can you send me an updated MAR?    4.[facility] Phone: [phone]  - No, unable to accept patient  Reason: Care Needs Exceed Current Capacity; Denied- At Medicaid Max Capacity    5.[facility] Phone: [phone]  -No, unable to accept patient  Reason: Other (see comments). At Medicaid Only Max Capacity     6.[facility] Phone: [phone]   No, unable to accept patient  Reason: Care Needs Exceed Current Capacity; Denied- At Medicaid Max Capacity     7.[facility] Phone: [phone]  -No, unable to accept patient  Reason: Out of Network;  We are in network with [facility] and could accept the patient at [facility] in [place]. Rebecca [phone]     8.[facility] Phone: [phone]  - No, unable to accept patient  Reason: Other (see comments) Cost of care exceeds reimbursement rate     9.[facility] Phone: [phone]  -Referral  Problem: Wound:  Goal: Will show signs of wound healing; wound closure and no evidence of infection  Description: Will show signs of wound healing; wound closure and no evidence of infection  Outcome: Ongoing Received  Comments:, We are in network with [facility] and could accept the patient at [facility] in [place].  Rebecca [phone]    10.[facility] Phone: [phone]  - No response.    SW will follow.      12:18 PM   SW sent updated clinicals to interested facilities via [employer] for review. SW will continue to follow patient.        Claribel Torres LMSW  PRN - [facility]  [id number]

## 2023-12-17 PROBLEM — R33.9 URINARY RETENTION: Status: ACTIVE | Noted: 2023-12-17

## 2023-12-26 ENCOUNTER — HOSPITAL ENCOUNTER (OUTPATIENT)
Age: 88
Discharge: HOME | End: 2023-12-26
Payer: MEDICARE

## 2023-12-26 DIAGNOSIS — N18.32 CKD STAGE G3B/A1, GFR 30-44 AND ALBUMIN CREATININE RATIO <30 MG/G (HCC): ICD-10-CM

## 2023-12-26 LAB
ALBUMIN SERPL-MCNC: 4.1 GM/DL (ref 3.4–5)
ALP BLD-CCNC: 68 IU/L (ref 40–129)
ALT SERPL-CCNC: 10 U/L (ref 10–40)
ANION GAP SERPL CALCULATED.3IONS-SCNC: 13 MMOL/L (ref 7–16)
AST SERPL-CCNC: 14 IU/L (ref 15–37)
BACTERIA: NEGATIVE /HPF
BASOPHILS ABSOLUTE: 0 K/CU MM
BASOPHILS RELATIVE PERCENT: 0.4 % (ref 0–1)
BILIRUB SERPL-MCNC: 0.4 MG/DL (ref 0–1)
BILIRUBIN URINE: NEGATIVE MG/DL
BLOOD, URINE: ABNORMAL
BUN SERPL-MCNC: 93 MG/DL (ref 6–23)
CALCIUM SERPL-MCNC: 9.7 MG/DL (ref 8.3–10.6)
CAST TYPE: ABNORMAL /HPF
CHLORIDE BLD-SCNC: 87 MMOL/L (ref 99–110)
CLARITY: CLEAR
CO2: 34 MMOL/L (ref 21–32)
COLOR: YELLOW
CREAT SERPL-MCNC: 2.8 MG/DL (ref 0.6–1.1)
CRYSTAL TYPE: ABNORMAL /HPF
DIFFERENTIAL TYPE: ABNORMAL
EOSINOPHILS ABSOLUTE: 0.3 K/CU MM
EOSINOPHILS RELATIVE PERCENT: 3.4 % (ref 0–3)
EPITHELIAL CELLS, UA: 3 /HPF
GFR SERPL CREATININE-BSD FRML MDRD: 16 ML/MIN/1.73M2
GLUCOSE SERPL-MCNC: 164 MG/DL (ref 70–99)
GLUCOSE, URINE: NEGATIVE MG/DL
HCT VFR BLD CALC: 36.8 % (ref 37–47)
HEMOGLOBIN: 12.2 GM/DL (ref 12.5–16)
IMMATURE NEUTROPHIL %: 0.4 % (ref 0–0.43)
KETONES, URINE: NEGATIVE MG/DL
LEUKOCYTE ESTERASE, URINE: ABNORMAL
LYMPHOCYTES ABSOLUTE: 1.7 K/CU MM
LYMPHOCYTES RELATIVE PERCENT: 21.5 % (ref 24–44)
MAGNESIUM: 4 MG/DL (ref 1.8–2.4)
MCH RBC QN AUTO: 31.9 PG (ref 27–31)
MCHC RBC AUTO-ENTMCNC: 33.2 % (ref 32–36)
MCV RBC AUTO: 96.3 FL (ref 78–100)
MONOCYTES ABSOLUTE: 0.7 K/CU MM
MONOCYTES RELATIVE PERCENT: 8.9 % (ref 0–4)
NITRITE URINE, QUANTITATIVE: NEGATIVE
PDW BLD-RTO: 11.8 % (ref 11.7–14.9)
PH, URINE: 7.5 (ref 5–8)
PHOSPHORUS: 2.7 MG/DL (ref 2.5–4.9)
PLATELET # BLD: 260 K/CU MM (ref 140–440)
PMV BLD AUTO: 8.5 FL (ref 7.5–11.1)
POTASSIUM SERPL-SCNC: 3.2 MMOL/L (ref 3.5–5.1)
PROTEIN UA: NEGATIVE MG/DL
RBC # BLD: 3.82 M/CU MM (ref 4.2–5.4)
RBC URINE: 6 /HPF (ref 0–6)
SEGMENTED NEUTROPHILS ABSOLUTE COUNT: 5 K/CU MM
SEGMENTED NEUTROPHILS RELATIVE PERCENT: 65.4 % (ref 36–66)
SODIUM BLD-SCNC: 134 MMOL/L (ref 135–145)
SPECIFIC GRAVITY UA: 1.01 (ref 1–1.03)
TOTAL IMMATURE NEUTOROPHIL: 0.03 K/CU MM
TOTAL PROTEIN: 6.9 GM/DL (ref 6.4–8.2)
UROBILINOGEN, URINE: 0.2 MG/DL (ref 0.2–1)
WBC # BLD: 7.7 K/CU MM (ref 4–10.5)
WBC UA: 15 /HPF (ref 0–5)

## 2023-12-26 PROCEDURE — 85025 COMPLETE CBC W/AUTO DIFF WBC: CPT

## 2023-12-26 PROCEDURE — 83735 ASSAY OF MAGNESIUM: CPT

## 2023-12-26 PROCEDURE — 81001 URINALYSIS AUTO W/SCOPE: CPT

## 2023-12-26 PROCEDURE — 36415 COLL VENOUS BLD VENIPUNCTURE: CPT

## 2023-12-26 PROCEDURE — 84100 ASSAY OF PHOSPHORUS: CPT

## 2023-12-26 PROCEDURE — 80053 COMPREHEN METABOLIC PANEL: CPT

## 2023-12-29 ENCOUNTER — ANESTHESIA (OUTPATIENT)
Dept: OPERATING ROOM | Age: 88
End: 2023-12-29
Payer: MEDICARE

## 2023-12-29 ENCOUNTER — ANESTHESIA EVENT (OUTPATIENT)
Dept: OPERATING ROOM | Age: 88
End: 2023-12-29
Payer: MEDICARE

## 2023-12-29 ENCOUNTER — HOSPITAL ENCOUNTER (INPATIENT)
Age: 88
LOS: 3 days | Discharge: NURSING, REHAB OR LONG-TERM CARE | DRG: 663 | End: 2024-01-01
Attending: INTERNAL MEDICINE | Admitting: INTERNAL MEDICINE
Payer: MEDICARE

## 2023-12-29 DIAGNOSIS — R31.9 HEMATURIA, UNSPECIFIED TYPE: ICD-10-CM

## 2023-12-29 DIAGNOSIS — R33.8 ACUTE URINARY RETENTION: Primary | ICD-10-CM

## 2023-12-29 DIAGNOSIS — E87.6 HYPOKALEMIA: ICD-10-CM

## 2023-12-29 DIAGNOSIS — E87.1 HYPONATREMIA: ICD-10-CM

## 2023-12-29 DIAGNOSIS — N18.9 CHRONIC KIDNEY DISEASE, UNSPECIFIED CKD STAGE: ICD-10-CM

## 2023-12-29 PROBLEM — R33.9 ACUTE ON CHRONIC URINARY RETENTION: Status: ACTIVE | Noted: 2023-12-29

## 2023-12-29 LAB
ANION GAP SERPL CALCULATED.3IONS-SCNC: 15 MMOL/L (ref 7–16)
BASOPHILS ABSOLUTE: 0 K/CU MM
BASOPHILS RELATIVE PERCENT: 0.2 % (ref 0–1)
BUN SERPL-MCNC: 95 MG/DL (ref 6–23)
CALCIUM SERPL-MCNC: 9.5 MG/DL (ref 8.3–10.6)
CHLORIDE BLD-SCNC: 82 MMOL/L (ref 99–110)
CO2: 30 MMOL/L (ref 21–32)
CREAT SERPL-MCNC: 2.7 MG/DL (ref 0.6–1.1)
CRP SERPL HS-MCNC: 4.1 MG/L
DIFFERENTIAL TYPE: ABNORMAL
EOSINOPHILS ABSOLUTE: 0 K/CU MM
EOSINOPHILS RELATIVE PERCENT: 0.1 % (ref 0–3)
ERYTHROCYTE SEDIMENTATION RATE: 33 MM/HR (ref 0–30)
GFR SERPL CREATININE-BSD FRML MDRD: 17 ML/MIN/1.73M2
GLUCOSE BLD-MCNC: 208 MG/DL (ref 70–99)
GLUCOSE SERPL-MCNC: 217 MG/DL (ref 70–99)
HCT VFR BLD CALC: 34.7 % (ref 37–47)
HEMOGLOBIN: 11.8 GM/DL (ref 12.5–16)
IMMATURE NEUTROPHIL %: 0.7 % (ref 0–0.43)
LYMPHOCYTES ABSOLUTE: 1.1 K/CU MM
LYMPHOCYTES RELATIVE PERCENT: 8.4 % (ref 24–44)
MCH RBC QN AUTO: 32.6 PG (ref 27–31)
MCHC RBC AUTO-ENTMCNC: 34 % (ref 32–36)
MCV RBC AUTO: 95.9 FL (ref 78–100)
MONOCYTES ABSOLUTE: 0.7 K/CU MM
MONOCYTES RELATIVE PERCENT: 5.6 % (ref 0–4)
NUCLEATED RBC %: 0 %
PDW BLD-RTO: 11.6 % (ref 11.7–14.9)
PLATELET # BLD: 326 K/CU MM (ref 140–440)
PMV BLD AUTO: 9.3 FL (ref 7.5–11.1)
POTASSIUM SERPL-SCNC: 3 MMOL/L (ref 3.5–5.1)
PROCALCITONIN SERPL-MCNC: 0.12 NG/ML
RBC # BLD: 3.62 M/CU MM (ref 4.2–5.4)
SEGMENTED NEUTROPHILS ABSOLUTE COUNT: 11.1 K/CU MM
SEGMENTED NEUTROPHILS RELATIVE PERCENT: 85 % (ref 36–66)
SODIUM BLD-SCNC: 127 MMOL/L (ref 135–145)
TOTAL IMMATURE NEUTOROPHIL: 0.09 K/CU MM
TOTAL NUCLEATED RBC: 0 K/CU MM
WBC # BLD: 13 K/CU MM (ref 4–10.5)

## 2023-12-29 PROCEDURE — 3600000014 HC SURGERY LEVEL 4 ADDTL 15MIN: Performed by: SPECIALIST

## 2023-12-29 PROCEDURE — 3600000004 HC SURGERY LEVEL 4 BASE: Performed by: SPECIALIST

## 2023-12-29 PROCEDURE — 85018 HEMOGLOBIN: CPT

## 2023-12-29 PROCEDURE — 82962 GLUCOSE BLOOD TEST: CPT

## 2023-12-29 PROCEDURE — 2580000003 HC RX 258: Performed by: SPECIALIST

## 2023-12-29 PROCEDURE — 80048 BASIC METABOLIC PNL TOTAL CA: CPT

## 2023-12-29 PROCEDURE — 6360000002 HC RX W HCPCS

## 2023-12-29 PROCEDURE — 6360000002 HC RX W HCPCS: Performed by: ANESTHESIOLOGY

## 2023-12-29 PROCEDURE — 87086 URINE CULTURE/COLONY COUNT: CPT

## 2023-12-29 PROCEDURE — 84145 PROCALCITONIN (PCT): CPT

## 2023-12-29 PROCEDURE — 2709999900 HC NON-CHARGEABLE SUPPLY: Performed by: SPECIALIST

## 2023-12-29 PROCEDURE — 6360000002 HC RX W HCPCS: Performed by: NURSE PRACTITIONER

## 2023-12-29 PROCEDURE — 96374 THER/PROPH/DIAG INJ IV PUSH: CPT

## 2023-12-29 PROCEDURE — 87040 BLOOD CULTURE FOR BACTERIA: CPT

## 2023-12-29 PROCEDURE — 85025 COMPLETE CBC W/AUTO DIFF WBC: CPT

## 2023-12-29 PROCEDURE — 2500000003 HC RX 250 WO HCPCS

## 2023-12-29 PROCEDURE — 81003 URINALYSIS AUTO W/O SCOPE: CPT

## 2023-12-29 PROCEDURE — 2580000003 HC RX 258: Performed by: NURSE PRACTITIONER

## 2023-12-29 PROCEDURE — 2580000003 HC RX 258: Performed by: INTERNAL MEDICINE

## 2023-12-29 PROCEDURE — 51798 US URINE CAPACITY MEASURE: CPT

## 2023-12-29 PROCEDURE — 99284 EMERGENCY DEPT VISIT MOD MDM: CPT

## 2023-12-29 PROCEDURE — 0W3R8ZZ CONTROL BLEEDING IN GENITOURINARY TRACT, VIA NATURAL OR ARTIFICIAL OPENING ENDOSCOPIC: ICD-10-PCS | Performed by: SPECIALIST

## 2023-12-29 PROCEDURE — 7100000001 HC PACU RECOVERY - ADDTL 15 MIN: Performed by: SPECIALIST

## 2023-12-29 PROCEDURE — 85014 HEMATOCRIT: CPT

## 2023-12-29 PROCEDURE — 6370000000 HC RX 637 (ALT 250 FOR IP): Performed by: SPECIALIST

## 2023-12-29 PROCEDURE — 3700000000 HC ANESTHESIA ATTENDED CARE: Performed by: SPECIALIST

## 2023-12-29 PROCEDURE — 3700000001 HC ADD 15 MINUTES (ANESTHESIA): Performed by: SPECIALIST

## 2023-12-29 PROCEDURE — 0TCD8ZZ EXTIRPATION OF MATTER FROM URETHRA, VIA NATURAL OR ARTIFICIAL OPENING ENDOSCOPIC: ICD-10-PCS | Performed by: SPECIALIST

## 2023-12-29 PROCEDURE — 1200000000 HC SEMI PRIVATE

## 2023-12-29 PROCEDURE — 85652 RBC SED RATE AUTOMATED: CPT

## 2023-12-29 PROCEDURE — 36415 COLL VENOUS BLD VENIPUNCTURE: CPT

## 2023-12-29 PROCEDURE — 86140 C-REACTIVE PROTEIN: CPT

## 2023-12-29 PROCEDURE — 6370000000 HC RX 637 (ALT 250 FOR IP): Performed by: INTERNAL MEDICINE

## 2023-12-29 PROCEDURE — 6370000000 HC RX 637 (ALT 250 FOR IP): Performed by: NURSE PRACTITIONER

## 2023-12-29 PROCEDURE — 7100000000 HC PACU RECOVERY - FIRST 15 MIN: Performed by: SPECIALIST

## 2023-12-29 RX ORDER — SODIUM CHLORIDE 0.9 % (FLUSH) 0.9 %
5-40 SYRINGE (ML) INJECTION EVERY 12 HOURS SCHEDULED
Status: DISCONTINUED | OUTPATIENT
Start: 2023-12-29 | End: 2023-12-29 | Stop reason: HOSPADM

## 2023-12-29 RX ORDER — SODIUM CHLORIDE 9 MG/ML
INJECTION, SOLUTION INTRAVENOUS PRN
Status: DISCONTINUED | OUTPATIENT
Start: 2023-12-29 | End: 2024-01-01 | Stop reason: HOSPADM

## 2023-12-29 RX ORDER — CILOSTAZOL 50 MG/1
50 TABLET ORAL 2 TIMES DAILY
Status: DISCONTINUED | OUTPATIENT
Start: 2023-12-29 | End: 2023-12-29

## 2023-12-29 RX ORDER — LABETALOL HYDROCHLORIDE 5 MG/ML
10 INJECTION, SOLUTION INTRAVENOUS
Status: DISCONTINUED | OUTPATIENT
Start: 2023-12-29 | End: 2023-12-29 | Stop reason: HOSPADM

## 2023-12-29 RX ORDER — FAMOTIDINE 20 MG/1
10 TABLET, FILM COATED ORAL DAILY
Status: DISCONTINUED | OUTPATIENT
Start: 2023-12-29 | End: 2024-01-01 | Stop reason: HOSPADM

## 2023-12-29 RX ORDER — ACETAMINOPHEN 325 MG/1
650 TABLET ORAL EVERY 6 HOURS PRN
Status: DISCONTINUED | OUTPATIENT
Start: 2023-12-29 | End: 2024-01-01 | Stop reason: HOSPADM

## 2023-12-29 RX ORDER — SODIUM CHLORIDE 9 MG/ML
INJECTION, SOLUTION INTRAVENOUS CONTINUOUS
Status: DISPENSED | OUTPATIENT
Start: 2023-12-29 | End: 2023-12-30

## 2023-12-29 RX ORDER — ONDANSETRON 2 MG/ML
4 INJECTION INTRAMUSCULAR; INTRAVENOUS
Status: DISCONTINUED | OUTPATIENT
Start: 2023-12-29 | End: 2023-12-29 | Stop reason: HOSPADM

## 2023-12-29 RX ORDER — FENTANYL CITRATE 50 UG/ML
25 INJECTION, SOLUTION INTRAMUSCULAR; INTRAVENOUS EVERY 5 MIN PRN
Status: DISCONTINUED | OUTPATIENT
Start: 2023-12-29 | End: 2023-12-29 | Stop reason: HOSPADM

## 2023-12-29 RX ORDER — HYDRALAZINE HYDROCHLORIDE 20 MG/ML
10 INJECTION INTRAMUSCULAR; INTRAVENOUS
Status: DISCONTINUED | OUTPATIENT
Start: 2023-12-29 | End: 2023-12-29 | Stop reason: HOSPADM

## 2023-12-29 RX ORDER — DEXAMETHASONE SODIUM PHOSPHATE 4 MG/ML
INJECTION, SOLUTION INTRA-ARTICULAR; INTRALESIONAL; INTRAMUSCULAR; INTRAVENOUS; SOFT TISSUE PRN
Status: DISCONTINUED | OUTPATIENT
Start: 2023-12-29 | End: 2023-12-29 | Stop reason: SDUPTHER

## 2023-12-29 RX ORDER — ISOSORBIDE MONONITRATE 30 MG/1
30 TABLET, EXTENDED RELEASE ORAL DAILY
Status: DISCONTINUED | OUTPATIENT
Start: 2023-12-29 | End: 2024-01-01 | Stop reason: HOSPADM

## 2023-12-29 RX ORDER — ALBUTEROL SULFATE 90 UG/1
2 AEROSOL, METERED RESPIRATORY (INHALATION) EVERY 4 HOURS PRN
Status: DISCONTINUED | OUTPATIENT
Start: 2023-12-29 | End: 2024-01-01 | Stop reason: HOSPADM

## 2023-12-29 RX ORDER — PROPOFOL 10 MG/ML
INJECTION, EMULSION INTRAVENOUS PRN
Status: DISCONTINUED | OUTPATIENT
Start: 2023-12-29 | End: 2023-12-29 | Stop reason: SDUPTHER

## 2023-12-29 RX ORDER — FEBUXOSTAT 40 MG/1
40 TABLET, FILM COATED ORAL DAILY
Status: DISCONTINUED | OUTPATIENT
Start: 2023-12-29 | End: 2024-01-01 | Stop reason: HOSPADM

## 2023-12-29 RX ORDER — DIPHENHYDRAMINE HYDROCHLORIDE 50 MG/ML
12.5 INJECTION INTRAMUSCULAR; INTRAVENOUS
Status: DISCONTINUED | OUTPATIENT
Start: 2023-12-29 | End: 2023-12-29 | Stop reason: HOSPADM

## 2023-12-29 RX ORDER — ONDANSETRON 2 MG/ML
4 INJECTION INTRAMUSCULAR; INTRAVENOUS EVERY 6 HOURS PRN
Status: DISCONTINUED | OUTPATIENT
Start: 2023-12-29 | End: 2024-01-01 | Stop reason: HOSPADM

## 2023-12-29 RX ORDER — SODIUM CHLORIDE 0.9 % (FLUSH) 0.9 %
5-40 SYRINGE (ML) INJECTION EVERY 12 HOURS SCHEDULED
Status: DISCONTINUED | OUTPATIENT
Start: 2023-12-29 | End: 2024-01-01 | Stop reason: HOSPADM

## 2023-12-29 RX ORDER — TRAMADOL HYDROCHLORIDE 50 MG/1
50 TABLET ORAL ONCE
Status: COMPLETED | OUTPATIENT
Start: 2023-12-29 | End: 2023-12-29

## 2023-12-29 RX ORDER — ONDANSETRON 2 MG/ML
INJECTION INTRAMUSCULAR; INTRAVENOUS PRN
Status: DISCONTINUED | OUTPATIENT
Start: 2023-12-29 | End: 2023-12-29 | Stop reason: SDUPTHER

## 2023-12-29 RX ORDER — SODIUM CHLORIDE, SODIUM LACTATE, POTASSIUM CHLORIDE, CALCIUM CHLORIDE 600; 310; 30; 20 MG/100ML; MG/100ML; MG/100ML; MG/100ML
INJECTION, SOLUTION INTRAVENOUS CONTINUOUS
Status: DISCONTINUED | OUTPATIENT
Start: 2023-12-29 | End: 2023-12-29

## 2023-12-29 RX ORDER — DROPERIDOL 2.5 MG/ML
0.62 INJECTION, SOLUTION INTRAMUSCULAR; INTRAVENOUS EVERY 10 MIN PRN
Status: DISCONTINUED | OUTPATIENT
Start: 2023-12-29 | End: 2023-12-29 | Stop reason: HOSPADM

## 2023-12-29 RX ORDER — FAMOTIDINE 20 MG/1
20 TABLET, FILM COATED ORAL 2 TIMES DAILY
Status: DISCONTINUED | OUTPATIENT
Start: 2023-12-29 | End: 2023-12-29 | Stop reason: DRUGHIGH

## 2023-12-29 RX ORDER — OXYCODONE HYDROCHLORIDE 5 MG/1
5 TABLET ORAL
Status: DISCONTINUED | OUTPATIENT
Start: 2023-12-29 | End: 2023-12-29 | Stop reason: HOSPADM

## 2023-12-29 RX ORDER — FENTANYL CITRATE 50 UG/ML
INJECTION, SOLUTION INTRAMUSCULAR; INTRAVENOUS PRN
Status: DISCONTINUED | OUTPATIENT
Start: 2023-12-29 | End: 2023-12-29 | Stop reason: SDUPTHER

## 2023-12-29 RX ORDER — FENTANYL CITRATE 50 UG/ML
50 INJECTION, SOLUTION INTRAMUSCULAR; INTRAVENOUS
Status: DISCONTINUED | OUTPATIENT
Start: 2023-12-29 | End: 2024-01-01 | Stop reason: HOSPADM

## 2023-12-29 RX ORDER — POLYETHYLENE GLYCOL 3350 17 G/17G
17 POWDER, FOR SOLUTION ORAL DAILY PRN
Status: DISCONTINUED | OUTPATIENT
Start: 2023-12-29 | End: 2024-01-01 | Stop reason: HOSPADM

## 2023-12-29 RX ORDER — LACTULOSE 10 G/15ML
10 SOLUTION ORAL 3 TIMES DAILY PRN
Status: DISCONTINUED | OUTPATIENT
Start: 2023-12-29 | End: 2024-01-01 | Stop reason: HOSPADM

## 2023-12-29 RX ORDER — SODIUM CHLORIDE 0.9 % (FLUSH) 0.9 %
5-40 SYRINGE (ML) INJECTION PRN
Status: DISCONTINUED | OUTPATIENT
Start: 2023-12-29 | End: 2023-12-29 | Stop reason: HOSPADM

## 2023-12-29 RX ORDER — SODIUM CHLORIDE 0.9 % (FLUSH) 0.9 %
5-40 SYRINGE (ML) INJECTION PRN
Status: DISCONTINUED | OUTPATIENT
Start: 2023-12-29 | End: 2024-01-01 | Stop reason: HOSPADM

## 2023-12-29 RX ORDER — LEVOTHYROXINE SODIUM 0.05 MG/1
50 TABLET ORAL DAILY
Status: DISCONTINUED | OUTPATIENT
Start: 2023-12-30 | End: 2024-01-01 | Stop reason: HOSPADM

## 2023-12-29 RX ORDER — LIDOCAINE HYDROCHLORIDE 20 MG/ML
INJECTION, SOLUTION INTRAVENOUS PRN
Status: DISCONTINUED | OUTPATIENT
Start: 2023-12-29 | End: 2023-12-29 | Stop reason: SDUPTHER

## 2023-12-29 RX ORDER — ACETAMINOPHEN 650 MG/1
650 SUPPOSITORY RECTAL EVERY 6 HOURS PRN
Status: DISCONTINUED | OUTPATIENT
Start: 2023-12-29 | End: 2024-01-01 | Stop reason: HOSPADM

## 2023-12-29 RX ORDER — SODIUM CHLORIDE 9 MG/ML
INJECTION, SOLUTION INTRAVENOUS CONTINUOUS
Status: DISPENSED | OUTPATIENT
Start: 2023-12-29 | End: 2023-12-29

## 2023-12-29 RX ORDER — EPHEDRINE SULFATE 50 MG/ML
INJECTION INTRAVENOUS PRN
Status: DISCONTINUED | OUTPATIENT
Start: 2023-12-29 | End: 2023-12-29 | Stop reason: SDUPTHER

## 2023-12-29 RX ORDER — POTASSIUM CHLORIDE 20 MEQ/1
40 TABLET, EXTENDED RELEASE ORAL 2 TIMES DAILY WITH MEALS
Status: DISCONTINUED | OUTPATIENT
Start: 2023-12-29 | End: 2024-01-01

## 2023-12-29 RX ORDER — LANOLIN ALCOHOL/MO/W.PET/CERES
3 CREAM (GRAM) TOPICAL NIGHTLY PRN
Status: DISCONTINUED | OUTPATIENT
Start: 2023-12-29 | End: 2024-01-01 | Stop reason: HOSPADM

## 2023-12-29 RX ORDER — ONDANSETRON 4 MG/1
4 TABLET, ORALLY DISINTEGRATING ORAL EVERY 8 HOURS PRN
Status: DISCONTINUED | OUTPATIENT
Start: 2023-12-29 | End: 2024-01-01 | Stop reason: HOSPADM

## 2023-12-29 RX ORDER — POTASSIUM CHLORIDE 7.45 MG/ML
10 INJECTION INTRAVENOUS
Status: ACTIVE | OUTPATIENT
Start: 2023-12-29 | End: 2023-12-29

## 2023-12-29 RX ORDER — METOLAZONE 2.5 MG/1
2.5 TABLET ORAL DAILY
Status: DISCONTINUED | OUTPATIENT
Start: 2023-12-29 | End: 2024-01-01 | Stop reason: HOSPADM

## 2023-12-29 RX ORDER — SENNOSIDES A AND B 8.6 MG/1
1 TABLET, FILM COATED ORAL NIGHTLY
Status: DISCONTINUED | OUTPATIENT
Start: 2023-12-29 | End: 2024-01-01 | Stop reason: HOSPADM

## 2023-12-29 RX ORDER — ROPINIROLE 0.25 MG/1
0.5 TABLET, FILM COATED ORAL 3 TIMES DAILY
Status: DISCONTINUED | OUTPATIENT
Start: 2023-12-29 | End: 2024-01-01 | Stop reason: HOSPADM

## 2023-12-29 RX ORDER — TRAMADOL HYDROCHLORIDE 50 MG/1
50 TABLET ORAL EVERY 6 HOURS PRN
Status: DISCONTINUED | OUTPATIENT
Start: 2023-12-29 | End: 2024-01-01 | Stop reason: HOSPADM

## 2023-12-29 RX ADMIN — TRAMADOL HYDROCHLORIDE 50 MG: 50 TABLET, COATED ORAL at 14:01

## 2023-12-29 RX ADMIN — EPHEDRINE SULFATE 5 MG: 50 INJECTION INTRAVENOUS at 16:53

## 2023-12-29 RX ADMIN — SODIUM CHLORIDE: 9 INJECTION, SOLUTION INTRAVENOUS at 16:30

## 2023-12-29 RX ADMIN — SODIUM CHLORIDE, PRESERVATIVE FREE 10 ML: 5 INJECTION INTRAVENOUS at 21:22

## 2023-12-29 RX ADMIN — FENTANYL CITRATE 25 MCG: 50 INJECTION, SOLUTION INTRAMUSCULAR; INTRAVENOUS at 17:21

## 2023-12-29 RX ADMIN — FENTANYL CITRATE 25 MCG: 50 INJECTION, SOLUTION INTRAMUSCULAR; INTRAVENOUS at 17:12

## 2023-12-29 RX ADMIN — PROPOFOL 20 MG: 10 INJECTION, EMULSION INTRAVENOUS at 16:54

## 2023-12-29 RX ADMIN — PROPOFOL 20 MG: 10 INJECTION, EMULSION INTRAVENOUS at 17:09

## 2023-12-29 RX ADMIN — FENTANYL CITRATE 25 MCG: 50 INJECTION, SOLUTION INTRAMUSCULAR; INTRAVENOUS at 16:50

## 2023-12-29 RX ADMIN — FENTANYL CITRATE 50 MCG: 50 INJECTION, SOLUTION INTRAMUSCULAR; INTRAVENOUS at 14:51

## 2023-12-29 RX ADMIN — LIDOCAINE HYDROCHLORIDE 40 MG: 20 INJECTION, SOLUTION INTRAVENOUS at 16:45

## 2023-12-29 RX ADMIN — SENNOSIDES 8.6 MG: 8.6 TABLET, FILM COATED ORAL at 21:12

## 2023-12-29 RX ADMIN — ONDANSETRON 4 MG: 2 INJECTION INTRAMUSCULAR; INTRAVENOUS at 16:50

## 2023-12-29 RX ADMIN — CEFTRIAXONE 1000 MG: 1 INJECTION, POWDER, FOR SOLUTION INTRAMUSCULAR; INTRAVENOUS at 16:35

## 2023-12-29 RX ADMIN — EPHEDRINE SULFATE 10 MG: 50 INJECTION INTRAVENOUS at 16:47

## 2023-12-29 RX ADMIN — ROPINIROLE HYDROCHLORIDE 0.5 MG: 0.25 TABLET, FILM COATED ORAL at 21:11

## 2023-12-29 RX ADMIN — SODIUM CHLORIDE: 9 INJECTION, SOLUTION INTRAVENOUS at 20:11

## 2023-12-29 RX ADMIN — Medication 3 MG: at 21:12

## 2023-12-29 RX ADMIN — EPHEDRINE SULFATE 5 MG: 50 INJECTION INTRAVENOUS at 16:50

## 2023-12-29 RX ADMIN — PROPOFOL 30 MG: 10 INJECTION, EMULSION INTRAVENOUS at 16:57

## 2023-12-29 RX ADMIN — DEXAMETHASONE SODIUM PHOSPHATE 4 MG: 4 INJECTION, SOLUTION INTRAMUSCULAR; INTRAVENOUS at 16:50

## 2023-12-29 RX ADMIN — FENTANYL CITRATE 25 MCG: 50 INJECTION INTRAMUSCULAR; INTRAVENOUS at 18:45

## 2023-12-29 RX ADMIN — FAMOTIDINE 10 MG: 20 TABLET ORAL at 21:11

## 2023-12-29 RX ADMIN — FEBUXOSTAT 40 MG: 40 TABLET, FILM COATED ORAL at 21:12

## 2023-12-29 RX ADMIN — FENTANYL CITRATE 25 MCG: 50 INJECTION, SOLUTION INTRAMUSCULAR; INTRAVENOUS at 16:53

## 2023-12-29 RX ADMIN — PROPOFOL 20 MG: 10 INJECTION, EMULSION INTRAVENOUS at 17:13

## 2023-12-29 RX ADMIN — POTASSIUM CHLORIDE 40 MEQ: 1500 TABLET, EXTENDED RELEASE ORAL at 21:11

## 2023-12-29 RX ADMIN — PROPOFOL 120 MG: 10 INJECTION, EMULSION INTRAVENOUS at 16:45

## 2023-12-29 RX ADMIN — FENTANYL CITRATE 25 MCG: 50 INJECTION INTRAMUSCULAR; INTRAVENOUS at 18:54

## 2023-12-29 ASSESSMENT — PAIN SCALES - GENERAL
PAINLEVEL_OUTOF10: 2
PAINLEVEL_OUTOF10: 3
PAINLEVEL_OUTOF10: 10
PAINLEVEL_OUTOF10: 10
PAINLEVEL_OUTOF10: 9
PAINLEVEL_OUTOF10: 6
PAINLEVEL_OUTOF10: 4
PAINLEVEL_OUTOF10: 6
PAINLEVEL_OUTOF10: 2

## 2023-12-29 ASSESSMENT — PAIN DESCRIPTION - DESCRIPTORS
DESCRIPTORS: SORE;ACHING
DESCRIPTORS: ACHING;SORE
DESCRIPTORS: ACHING;SORE

## 2023-12-29 ASSESSMENT — PAIN DESCRIPTION - FREQUENCY
FREQUENCY: CONTINUOUS

## 2023-12-29 ASSESSMENT — PAIN - FUNCTIONAL ASSESSMENT
PAIN_FUNCTIONAL_ASSESSMENT: 0-10
PAIN_FUNCTIONAL_ASSESSMENT: PREVENTS OR INTERFERES SOME ACTIVE ACTIVITIES AND ADLS
PAIN_FUNCTIONAL_ASSESSMENT: 0-10
PAIN_FUNCTIONAL_ASSESSMENT: PREVENTS OR INTERFERES SOME ACTIVE ACTIVITIES AND ADLS
PAIN_FUNCTIONAL_ASSESSMENT: PREVENTS OR INTERFERES SOME ACTIVE ACTIVITIES AND ADLS

## 2023-12-29 ASSESSMENT — PAIN DESCRIPTION - ORIENTATION
ORIENTATION: LOWER

## 2023-12-29 ASSESSMENT — PAIN DESCRIPTION - LOCATION
LOCATION: ABDOMEN

## 2023-12-29 ASSESSMENT — PAIN DESCRIPTION - PAIN TYPE
TYPE: SURGICAL PAIN

## 2023-12-29 ASSESSMENT — PAIN DESCRIPTION - ONSET
ONSET: ON-GOING

## 2023-12-29 ASSESSMENT — LIFESTYLE VARIABLES
HOW OFTEN DO YOU HAVE A DRINK CONTAINING ALCOHOL: NEVER
HOW MANY STANDARD DRINKS CONTAINING ALCOHOL DO YOU HAVE ON A TYPICAL DAY: PATIENT DOES NOT DRINK

## 2023-12-29 NOTE — CONSULTS
[address] [address]   Consult Note  [facility] 1 2 3 4 5    Date: 2023   Patient: Nicci Alberto   : 1935   DOA: 2023   MRN: [id number]   ROOM#: [id number]     Reason for Consult:  hematuria  Requesting Physician:  Dr. OCHOA physician  Collaborating Urologist on Call at time of admission: Jr    CHIEF COMPLAINT:  \"blood in my pee\"    History Obtained From:  patient    HISTORY OF PRESENT ILLNESS:                The patient is a 88 y.o. female with significant past medical history of urethral dilation  who presented to  office due ot hematuria - catheter irrigated and patient had syncopla episode and squad called to take to ED.  Hematuria around catheter.    Past Medical History:        Diagnosis Date    Asthma     Chronic kidney disease     acute kidney failure    Chronic ulcer of left leg with fat layer exposed (HCC) 3/7/2016    Chronic ulcer of right leg with fat layer exposed (HCC) 3/7/2016    Diabetes type 2, controlled (HCC)     History of asthma     History of blood transfusion 2015    Hypertension     Lymphedema of both lower extremities 3/7/2016    Osteoarthritis     Pneumonia     Thyroid disease     Venous stasis of both lower extremities 3/7/2016    WD-Non-pressure chronic ulcer right lower leg, 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 (HCC) 2016     Past Surgical History:        Procedure Laterality Date    APPENDECTOMY      CHOLECYSTECTOMY      CYSTOSCOPY      CYSTOSCOPY N/A 2023    CYSTOSCOPY URETERAL DILATION performed by Rajat Hyde MD at [facility] OR    EYE SURGERY      bilateral implants    HYSTERECTOMY (CERVIX STATUS UNKNOWN)      JOINT REPLACEMENT Right     knee    PACEMAKER INSERTION N/A 2020    PACEMAKER INSERTION PERMANENT REMOVAL OF TEMPORARY PACEMAKER performed by Fahad Ann MD at [facility] OR    URETHRA SURGERY       Current Medications:   Current Facility-Administered Medications: fentaNYL (SUBLIMAZE) injection 50 mcg, 50 mcg,

## 2023-12-29 NOTE — ED NOTES
Paola LESTER-ROMI notified of critical potassium and notified that pt is requesting more pain meds.

## 2023-12-29 NOTE — ED NOTES
ED TO INPATIENT SBAR HANDOFF    Patient Name: Nicci Alberto   :  1935  88 y.o.   Preferred Name  Nicci  Family/Caregiver Present no   Restraints no   C-SSRS: Risk of Suicide: No Risk  Sitter no   Sepsis Risk Score Sepsis Risk Score: 4.5      Situation  Chief Complaint   Patient presents with    Hematuria     In cath, dilation approx. 1wk ago, sent from urologist office after attempting to flush     Brief Description of Patient's Condition: Hematuria, clotting in the catheter  Mental Status: oriented, alert, coherent, logical, thought processes intact, and able to concentrate and follow conversation  Arrived from: home    Imaging:   No orders to display     Abnormal labs:   Abnormal Labs Reviewed   BASIC METABOLIC PANEL - Abnormal; Notable for the following components:       Result Value    Sodium 127 (*)     Potassium 3.0 (*)     Chloride 82 (*)     Glucose 217 (*)     BUN 95 (*)     Creatinine 2.7 (*)     Est, Glom Filt Rate 17 (*)     All other components within normal limits   CBC WITH AUTO DIFFERENTIAL - Abnormal; Notable for the following components:    WBC 13.0 (*)     RBC 3.62 (*)     Hemoglobin 11.8 (*)     Hematocrit 34.7 (*)     MCH 32.6 (*)     RDW 11.6 (*)     Segs Relative 85.0 (*)     Lymphocytes % 8.4 (*)     Monocytes % 5.6 (*)     Immature Neutrophil % 0.7 (*)     All other components within normal limits       Background  History:   Past Medical History:   Diagnosis Date    Asthma     Chronic kidney disease     acute kidney failure    Chronic ulcer of left leg with fat layer exposed (HCC) 3/7/2016    Chronic ulcer of right leg with fat layer exposed (HCC) 3/7/2016    Diabetes type 2, controlled ([facility])     History of asthma     History of blood transfusion 2015    Hypertension     Lymphedema of both lower extremities 3/7/2016    Osteoarthritis     Pneumonia     Thyroid disease     Venous stasis of both lower extremities 3/7/2016    WD-Non-pressure chronic ulcer right lower leg, limited

## 2023-12-29 NOTE — PROGRESS NOTES
Patietn with likely urethral bleeding causing clot retention; abd pain; and vaginal bleeding  Catheter is clogged again and I recommend cysto clot evacuation and fulguration of bleeding now.  Risks explained and patient and  agree to proceed as planned.

## 2023-12-29 NOTE — H&P
V2.0  History and Physical      Name:  Nicci Alberto /Age/Sex: 1935  (88 y.o. female)   MRN & CSN:  [id number] & [id number] Encounter Date/Time: 2023 6:42 PM EST   Location:  OR/NONE PCP: Fern Steven APRN - CNP       Hospital Day: 1    Assessment and Plan:   Nicci Alberto is a 88 y.o. female with a pmh of urethral stricture, urinary retention, CKD, constipation, restless leg syndrome, hypothyroidism, who presents with Acute on chronic urinary retention    Hospital Problems             Last Modified POA    * (Principal) Acute on chronic urinary retention 2023 Yes       Hematuria  Acute urinary retention  S/p cystoscopy evacuation of clots with fulguration of urethral bleeding  -Patient presented to urology office for hematuria, catheter was irrigated but patient ended up having a syncopal episode.  EMS called for patient to be taken to emergency.  -Patient taken to the OR where she underwent cystoscopy evacuation of clots with fulguration of urethral bleeding  -Bladder irrigation per urology  -Recheck hemoglobin  -Transfuse for hemoglobin less than 7 g per DL    Syncope in urology office likely vasovagal  -Continue to monitor    History of urethral stricture  -Patient had underwent cystoscopy with urethral dilatation 11 days prior to presentation    Mild hyponatremia  Hypokalemia  Hypochloremia  -Repeat BMP  -Maintenance IV fluid    KAREEM on CKD  -Likely in the setting of urinary retention  -Monitor renal function    Constipation  -Resume home medication    Restless leg syndrome  -Resume home medication    Hypothyroidism  -Resume levothyroxine    Disposition:     Diet Diet NPO  ADULT DIET; Regular   DVT Prophylaxis [] Lovenox, []  Heparin, [] SCDs, [] Ambulation,  [] Eliquis, [] Xarelto  [] Coumadin   Peptic ulcer prophylaxis -   Code Status Full Code   Disposition From / Current living situation : home  Expected Disposition: home  Estimated Date of Discharge: 2-3 days  Patient requires  Transportation (Medical): No    • Lack of Transportation (Non-Medical): No   Housing Stability: Low Risk  (12/17/2023)    Housing Stability Vital Sign    • Unable to Pay for Housing in the Last Year: No    • Number of Places Lived in the Last Year: 1    • Unstable Housing in the Last Year: No       Medications:   Medications:   • potassium chloride  10 mEq IntraVENous Q1H   • febuxostat  40 mg Oral Daily   • famotidine  20 mg Oral BID   • cilostazol  50 mg Oral BID   • [Held by provider] isosorbide mononitrate  30 mg Oral Daily   • [START ON 12/30/2023] levothyroxine  50 mcg Oral Daily   • [Held by provider] metOLazone  2.5 mg Oral Daily   • potassium chloride  40 mEq Oral BID WC   • rOPINIRole  0.5 mg Oral TID   • senna  1 tablet Oral Nightly   • [Held by provider] torsemide  50 mg Oral BID   • sodium chloride flush  5-40 mL IntraVENous 2 times per day   • sodium chloride flush  5-40 mL IntraVENous 2 times per day      Infusions:   • sodium chloride 50 mL/hr at 12/29/23 1638   • sodium chloride     • lactated ringers IV soln       PRN Meds: fentanNYL, 50 mcg, Q1H PRN  albuterol sulfate HFA, 2 puff, Q4H PRN  lactulose, 10 g, TID PRN  melatonin, 3 mg, Nightly PRN  traMADol, 50 mg, Q6H PRN  sodium chloride flush, 5-40 mL, PRN  sodium chloride, , PRN  ondansetron, 4 mg, Q8H PRN   Or  ondansetron, 4 mg, Q6H PRN  polyethylene glycol, 17 g, Daily PRN  acetaminophen, 650 mg, Q6H PRN   Or  acetaminophen, 650 mg, Q6H PRN  sodium chloride flush, 5-40 mL, PRN  fentanNYL, 25 mcg, Q5 Min PRN  HYDROmorphone, 0.5 mg, Q5 Min PRN  oxyCODONE, 5 mg, Once PRN  ondansetron, 4 mg, Once PRN  droPERidol, 0.625 mg, Q10 Min PRN  diphenhydrAMINE, 12.5 mg, Once PRN  labetalol, 10 mg, Q15 Min PRN   Or  hydrALAZINE, 10 mg, Q15 Min PRN        Labs      CBC:   Recent Labs     12/29/23  1454   WBC 13.0*   HGB 11.8*        BMP:    Recent Labs     12/29/23  1454   *   K 3.0*   CL 82*   CO2 30   BUN 95*   CREATININE 2.7*   GLUCOSE 217*

## 2023-12-29 NOTE — ED PROVIDER NOTES
[facility] EMERGENCY DEPARTMENT  EMERGENCY DEPARTMENT ENCOUNTER        Pt Name: Nicci Alberto  MRN: [id number]  Birthdate 1935  Date of evaluation: 12/29/2023  Provider: TREVON MARX CNP  PCP: Fern Steven, APRN - CNP    MAURICE. I have evaluated this patient.        Triage CHIEF COMPLAINT       Chief Complaint   Patient presents with    Hematuria     In cath, dilation approx. 1wk ago, sent from urologist office after attempting to flush         HISTORY OF PRESENT ILLNESS      Chief Complaint: Hematuria and urinary retention    Nicci Alberto is a 88 y.o. female who presents for evaluation of yamilet hematuria and acute urinary retention.  Patient reports she had a Raphael catheter placed by Dr. MONTGOMERY, urology, earlier this week.  She was doing well until last night she started having yamilet hematuria and was not having any drainage from the Raphael.  Went to the urology office this morning and  attempted to flush but patient had syncopal episode in the office while they were doing this.  He was able to get it draining some but sent her here for additional evaluation.  On arrival the patient is complaining of increasing lower abdominal discomfort and distention.  Denies any fever, chills, nausea, vomiting.  She had not been having any abdominal pain prior to the office visit.    Nursing Notes were all reviewed and agreed with or any disagreements were addressed in the HPI.    REVIEW OF SYSTEMS     Pertinent ROS as noted in HPI.      PAST MEDICAL HISTORY     Past Medical History:   Diagnosis Date    Asthma     Chronic kidney disease     acute kidney failure    Chronic ulcer of left leg with fat layer exposed (HCC) 3/7/2016    Chronic ulcer of right leg with fat layer exposed (HCC) 3/7/2016    Diabetes type 2, controlled (HCC)     History of asthma     History of blood transfusion 07/2015    Hypertension     Lymphedema of both lower extremities 3/7/2016  able to get out some clots but as soon as he was finished, it clotted off again and the patient became increasingly distended.  Pain was difficult to control despite multiple doses of pain medication.  Ultimately he was called back to bedside again and elected to take the patient to surgery for irrigation and potential catheter change out.  Laboratory studies including BMP and CBC were concerning for hyponatremia with a sodium of 127, potassium of 3.0 and chloride of 82.  She has stable chronic renal dysfunction with a BUN of 95 and creatinine of 2.7 and a mild leukocytosis of 13.  H/H is stable over the last 2 weeks.  Potassium replacement was ordered but the patient did not yet receive prior to being taken to surgery.  She will be admitted to  service after surgery for continued evaluation and care.    I am the Primary Clinician of Record.       CLINICAL IMPRESSION      1. Acute urinary retention    2. Hematuria, unspecified type    3. Chronic kidney disease, unspecified CKD stage    4. Hypokalemia    5. Hyponatremia          DISPOSITION/PLAN   DISPOSITION Decision To Admit 12/29/2023 04:06:03 PM      PATIENT REFERREDTO:  No follow-up provider specified.    DISCHARGE MEDICATIONS:  New Prescriptions    No medications on file       DISCONTINUED MEDICATIONS:  Discontinued Medications    No medications on file              (Please note that portions ofthis note were completed with a voice recognition program.  Efforts were made to edit the dictations but occasionally words are mis-transcribed.)    TREVON MARX CNP (electronically signed)             Paola Mazariegos APRN - CNP  12/29/23 2746

## 2023-12-29 NOTE — ANESTHESIA PRE PROCEDURE
Department of Anesthesiology  Preprocedure Note       Name:  Sapphire Cantu   Age:  82 y.o.  :  1935                                          MRN:  [id number]         Date:  2023      Surgeon: Marysol Hartman):  Vito Zuniga MD    Procedure: Procedure(s):  CYSTOSCOPY EVACUATION OF CLOTS WITH FULGURATION OF URETHRAL BLEEDING    Medications prior to admission:   Prior to Admission medications    Medication Sig Start Date End Date Taking?  Authorizing Provider   torsemide (DEMADEX) 10 MG tablet Take 5 tablets by mouth in the morning and at bedtime 23   Glenora Dines, APRN - CNP   senna (SENOKOT) 8.6 MG tablet Take 1 tablet by mouth nightly Stop if having loose stools or diarrhea 23  Glenora Dines, APRN - CNP   potassium chloride (KLOR-CON M) 20 MEQ extended release tablet Take 2 tablets by mouth 2 times daily (with meals) for 7 days 23  Glenora Dines, APRN - CNP   lactulose (CHRONULAC) 10 GM/15ML solution Take 15 mLs by mouth 3 times daily as needed (constipation) 23   Rosanne Prescott MD   Cholecalciferol (VITAMIN D3) 1.25 MG (31733 UT) CAPS Take 1 capsule by mouth once a week 23   Rosanne Prescott MD   metOLazone (ZAROXOLYN) 2.5 MG tablet Take 1 tablet by mouth daily 23   Rosanne Prescott MD   magnesium oxide (MAG-OX) 400 MG tablet Take 2 tablets by mouth 3 times daily 23   Rosanne Prescott MD   isosorbide mononitrate (IMDUR) 30 MG extended release tablet Take 1 tablet by mouth daily 23   Neymar Khan MD   febuxostat (ULORIC) 40 MG TABS tablet Take 1 tablet by mouth daily 23   Rosanne Prescott MD   melatonin 3 MG TABS tablet Take 1 tablet by mouth nightly as needed    Wilbert Salamanca MD   ondansetron (ZOFRAN) 4 MG tablet Take 1 tablet by mouth every 6 hours as needed for Nausea or Vomiting    ProviderWilbert MD   calcium carbonate (TUMS) 500 MG chewable tablet Take 1 tablet by mouth daily

## 2023-12-29 NOTE — ANESTHESIA POSTPROCEDURE EVALUATION
Department of Anesthesiology  Postprocedure Note    Patient: Christina aGines  MRN: [id number]  YOB: 1935  Date of evaluation: 12/29/2023    Procedure Summary       Date: 12/29/23 Room / Location: [address] [place] [address]    Anesthesia Start: 1638 Anesthesia Stop: 1834    Procedure: CYSTOSCOPY EXTENSIVE EVACUATION OF CLOTS WITH FULGURATION OF URETHRAL BLEEDING (Bladder) Diagnosis:       Urethral bleeding      (Urethral bleeding [N36.8])    Surgeons: Crow Dennis MD Responsible Provider: Jaison Lund MD    Anesthesia Type: General ASA Status: 3            Anesthesia Type: General    Gagan Phase I:      Gagan Phase II:      Anesthesia Post Evaluation    Patient location during evaluation: PACU  Patient participation: complete - patient participated  Level of consciousness: awake and alert  Airway patency: patent  Nausea & Vomiting: no nausea and no vomiting  Cardiovascular status: hemodynamically stable  Respiratory status: spontaneous ventilation and room air  Hydration status: stable  Pain management: adequate    No notable events documented.

## 2023-12-29 NOTE — BRIEF OP NOTE
Brief Postoperative Note      Patient: Nicci Alberto  YOB: 1935  MRN: [id number]    Date of Procedure: 12/29/2023    Pre-Op Diagnosis Codes:     * Urethral bleeding [N36.8]    Post-Op Diagnosis: Same       Procedure(s):  CYSTOSCOPY EVACUATION OF CLOTS WITH FULGURATION OF URETHRAL BLEEDING    Surgeon(s):  Neva Solares MD    Assistant:  * No surgical staff found *    Anesthesia: General    Estimated Blood Loss (mL): 500    Complications: None    Specimens:   * No specimens in log *    Implants:  * No implants in log *      Drains:   Urinary Catheter 12/29/23 3 Way (Active)       Findings: 500-700cc well formed clot in bladder      Electronically signed by Neva Solares MD on 12/29/2023 at 6:21 PM

## 2023-12-29 NOTE — PROGRESS NOTES
Pre-op diagnosis: Clot urinary retention    Postop diagnosis: Same    Procedure: Cystoscopy with clot evacuation and fulguration urethral bleeding    Surgeon: Dr. Solares    Anesthesia: General    Estimated blood loss: 500 cc well-formed blood clot in the bladder    Complications: None    Brief history this is a 88-year-old female 11 days ago underwent cystoscopy urethral dilatation 24 hours prior to procedure she started having gross hematuria in her Raphael catheter she presented to the office irrigation time still having bleeding should have a single syncopal episode due to this the process of bleeding we elected to call the squad to bring her straight over to the emergency department.  In the emergency department we could not clear her her her bladder she is having severe pain so at this point I elected to proceed take her to use cystoscopy for clot evacuation.    Description of procedure.  Patient was identified in holding area taken to the procedure room placed in dorsolithotomy position.  Patient generated proximal radial fashion 23 Afghan cystoscopy she was per urethra causation her entire bladder is full of clot using [place] evacuator to evacuate that the tissue this took at least 30 to 45 minutes I also had to use a 24 Afghan resectoscope with a loop to grab the clot at the end once all the clot was out the entire bladder urothelium was inspected no active bleeding no tumors or lesions no no evidence of any type of bladder rupture.  I did using a Bugbee electrocautery to cauterize some areas at the 7:00 portion of the bladder neck and urethra proximal urethra then placed 20 Afghan three-way Raphael catheter 50 cc balloon or mild irrigation patient taken [facility] procedure well.    Patient was monitored overnight I would check serial hemoglobin hematocrits will send blood for type and screen and monitor carefully.

## 2023-12-30 LAB
ALBUMIN SERPL-MCNC: 3.5 GM/DL (ref 3.4–5)
ALP BLD-CCNC: 61 IU/L (ref 40–129)
ALT SERPL-CCNC: 8 U/L (ref 10–40)
ANION GAP SERPL CALCULATED.3IONS-SCNC: 14 MMOL/L (ref 7–16)
AST SERPL-CCNC: 16 IU/L (ref 15–37)
BASOPHILS ABSOLUTE: 0 K/CU MM
BASOPHILS RELATIVE PERCENT: 0.3 % (ref 0–1)
BILIRUB SERPL-MCNC: 0.4 MG/DL (ref 0–1)
BILIRUBIN DIRECT: 0.2 MG/DL (ref 0–0.3)
BILIRUBIN, INDIRECT: 0.2 MG/DL (ref 0–0.7)
BUN SERPL-MCNC: 92 MG/DL (ref 6–23)
CALCIUM SERPL-MCNC: 8.6 MG/DL (ref 8.3–10.6)
CHLORIDE BLD-SCNC: 89 MMOL/L (ref 99–110)
CO2: 27 MMOL/L (ref 21–32)
CREAT SERPL-MCNC: 2.5 MG/DL (ref 0.6–1.1)
DIFFERENTIAL TYPE: ABNORMAL
EOSINOPHILS ABSOLUTE: 0 K/CU MM
EOSINOPHILS RELATIVE PERCENT: 0.1 % (ref 0–3)
GFR SERPL CREATININE-BSD FRML MDRD: 18 ML/MIN/1.73M2
GLUCOSE SERPL-MCNC: 180 MG/DL (ref 70–99)
HCT VFR BLD CALC: 29.2 % (ref 37–47)
HCT VFR BLD CALC: 30 % (ref 37–47)
HEMOGLOBIN: 10 GM/DL (ref 12.5–16)
HEMOGLOBIN: 9.6 GM/DL (ref 12.5–16)
IMMATURE NEUTROPHIL %: 0.5 % (ref 0–0.43)
LYMPHOCYTES ABSOLUTE: 0.8 K/CU MM
LYMPHOCYTES RELATIVE PERCENT: 5.4 % (ref 24–44)
MAGNESIUM: 3.9 MG/DL (ref 1.8–2.4)
MCH RBC QN AUTO: 32.7 PG (ref 27–31)
MCHC RBC AUTO-ENTMCNC: 32.9 % (ref 32–36)
MCV RBC AUTO: 99.3 FL (ref 78–100)
MONOCYTES ABSOLUTE: 0.7 K/CU MM
MONOCYTES RELATIVE PERCENT: 4.4 % (ref 0–4)
NUCLEATED RBC %: 0 %
PDW BLD-RTO: 11.6 % (ref 11.7–14.9)
PHOSPHORUS: 3.5 MG/DL (ref 2.5–4.9)
PLATELET # BLD: 245 K/CU MM (ref 140–440)
PMV BLD AUTO: 9.2 FL (ref 7.5–11.1)
POTASSIUM SERPL-SCNC: 4.1 MMOL/L (ref 3.5–5.1)
RBC # BLD: 2.94 M/CU MM (ref 4.2–5.4)
REASON FOR REJECTION: NORMAL
REJECTED TEST: NORMAL
SEGMENTED NEUTROPHILS ABSOLUTE COUNT: 13.3 K/CU MM
SEGMENTED NEUTROPHILS RELATIVE PERCENT: 89.3 % (ref 36–66)
SODIUM BLD-SCNC: 130 MMOL/L (ref 135–145)
TOTAL IMMATURE NEUTOROPHIL: 0.08 K/CU MM
TOTAL NUCLEATED RBC: 0 K/CU MM
TOTAL PROTEIN: 5.5 GM/DL (ref 6.4–8.2)
TSH SERPL DL<=0.005 MIU/L-ACNC: 0.89 UIU/ML (ref 0.27–4.2)
WBC # BLD: 14.9 K/CU MM (ref 4–10.5)

## 2023-12-30 PROCEDURE — 97166 OT EVAL MOD COMPLEX 45 MIN: CPT

## 2023-12-30 PROCEDURE — 94761 N-INVAS EAR/PLS OXIMETRY MLT: CPT

## 2023-12-30 PROCEDURE — 85025 COMPLETE CBC W/AUTO DIFF WBC: CPT

## 2023-12-30 PROCEDURE — 82248 BILIRUBIN DIRECT: CPT

## 2023-12-30 PROCEDURE — 80053 COMPREHEN METABOLIC PANEL: CPT

## 2023-12-30 PROCEDURE — 2580000003 HC RX 258: Performed by: SPECIALIST

## 2023-12-30 PROCEDURE — 6370000000 HC RX 637 (ALT 250 FOR IP): Performed by: SPECIALIST

## 2023-12-30 PROCEDURE — 6370000000 HC RX 637 (ALT 250 FOR IP): Performed by: INTERNAL MEDICINE

## 2023-12-30 PROCEDURE — 83735 ASSAY OF MAGNESIUM: CPT

## 2023-12-30 PROCEDURE — 1200000000 HC SEMI PRIVATE

## 2023-12-30 PROCEDURE — 36415 COLL VENOUS BLD VENIPUNCTURE: CPT

## 2023-12-30 PROCEDURE — 84100 ASSAY OF PHOSPHORUS: CPT

## 2023-12-30 PROCEDURE — 6370000000 HC RX 637 (ALT 250 FOR IP): Performed by: FAMILY MEDICINE

## 2023-12-30 PROCEDURE — 84443 ASSAY THYROID STIM HORMONE: CPT

## 2023-12-30 PROCEDURE — 6370000000 HC RX 637 (ALT 250 FOR IP): Performed by: STUDENT IN AN ORGANIZED HEALTH CARE EDUCATION/TRAINING PROGRAM

## 2023-12-30 PROCEDURE — 2580000003 HC RX 258: Performed by: INTERNAL MEDICINE

## 2023-12-30 PROCEDURE — 97535 SELF CARE MNGMENT TRAINING: CPT

## 2023-12-30 PROCEDURE — 97530 THERAPEUTIC ACTIVITIES: CPT

## 2023-12-30 RX ORDER — ALPRAZOLAM 0.25 MG/1
0.25 TABLET ORAL 2 TIMES DAILY PRN
Status: DISCONTINUED | OUTPATIENT
Start: 2023-12-30 | End: 2024-01-01 | Stop reason: HOSPADM

## 2023-12-30 RX ORDER — HYDROXYZINE HYDROCHLORIDE 25 MG/1
25 TABLET, FILM COATED ORAL ONCE
Status: COMPLETED | OUTPATIENT
Start: 2023-12-30 | End: 2023-12-30

## 2023-12-30 RX ADMIN — ROPINIROLE HYDROCHLORIDE 0.5 MG: 0.25 TABLET, FILM COATED ORAL at 14:33

## 2023-12-30 RX ADMIN — SENNOSIDES 8.6 MG: 8.6 TABLET, FILM COATED ORAL at 20:50

## 2023-12-30 RX ADMIN — SODIUM CHLORIDE, PRESERVATIVE FREE 10 ML: 5 INJECTION INTRAVENOUS at 20:50

## 2023-12-30 RX ADMIN — ROPINIROLE HYDROCHLORIDE 0.5 MG: 0.25 TABLET, FILM COATED ORAL at 20:50

## 2023-12-30 RX ADMIN — SODIUM CHLORIDE, PRESERVATIVE FREE 10 ML: 5 INJECTION INTRAVENOUS at 08:59

## 2023-12-30 RX ADMIN — FAMOTIDINE 10 MG: 20 TABLET ORAL at 09:02

## 2023-12-30 RX ADMIN — Medication 3 MG: at 21:56

## 2023-12-30 RX ADMIN — POTASSIUM CHLORIDE 40 MEQ: 1500 TABLET, EXTENDED RELEASE ORAL at 17:29

## 2023-12-30 RX ADMIN — ROPINIROLE HYDROCHLORIDE 0.5 MG: 0.25 TABLET, FILM COATED ORAL at 09:01

## 2023-12-30 RX ADMIN — FEBUXOSTAT 40 MG: 40 TABLET, FILM COATED ORAL at 09:03

## 2023-12-30 RX ADMIN — LEVOTHYROXINE SODIUM 50 MCG: 0.05 TABLET ORAL at 05:40

## 2023-12-30 RX ADMIN — HYDROXYZINE HYDROCHLORIDE 25 MG: 25 TABLET, FILM COATED ORAL at 05:40

## 2023-12-30 RX ADMIN — TRAMADOL HYDROCHLORIDE 50 MG: 50 TABLET, COATED ORAL at 21:56

## 2023-12-30 RX ADMIN — TRAMADOL HYDROCHLORIDE 50 MG: 50 TABLET, COATED ORAL at 01:21

## 2023-12-30 RX ADMIN — POTASSIUM CHLORIDE 40 MEQ: 1500 TABLET, EXTENDED RELEASE ORAL at 09:02

## 2023-12-30 RX ADMIN — ALPRAZOLAM 0.25 MG: 0.25 TABLET ORAL at 14:34

## 2023-12-30 RX ADMIN — SODIUM CHLORIDE: 9 INJECTION, SOLUTION INTRAVENOUS at 08:57

## 2023-12-30 ASSESSMENT — PAIN DESCRIPTION - PAIN TYPE
TYPE: ACUTE PAIN

## 2023-12-30 ASSESSMENT — PAIN DESCRIPTION - DESCRIPTORS
DESCRIPTORS: ACHING;DISCOMFORT
DESCRIPTORS: SORE
DESCRIPTORS: ACHING;DISCOMFORT
DESCRIPTORS: ACHING;DISCOMFORT
DESCRIPTORS: DISCOMFORT

## 2023-12-30 ASSESSMENT — PAIN DESCRIPTION - ORIENTATION
ORIENTATION: LOWER
ORIENTATION: MID;LOWER
ORIENTATION: LOWER

## 2023-12-30 ASSESSMENT — PAIN SCALES - GENERAL
PAINLEVEL_OUTOF10: 3
PAINLEVEL_OUTOF10: 3
PAINLEVEL_OUTOF10: 7
PAINLEVEL_OUTOF10: 7
PAINLEVEL_OUTOF10: 2
PAINLEVEL_OUTOF10: 6
PAINLEVEL_OUTOF10: 4
PAINLEVEL_OUTOF10: 2
PAINLEVEL_OUTOF10: 2
PAINLEVEL_OUTOF10: 4

## 2023-12-30 ASSESSMENT — PAIN SCALES - WONG BAKER
WONGBAKER_NUMERICALRESPONSE: 0
WONGBAKER_NUMERICALRESPONSE: 2

## 2023-12-30 ASSESSMENT — PAIN DESCRIPTION - FREQUENCY
FREQUENCY: INTERMITTENT

## 2023-12-30 ASSESSMENT — PAIN DESCRIPTION - LOCATION
LOCATION: ABDOMEN

## 2023-12-30 ASSESSMENT — PAIN DESCRIPTION - ONSET
ONSET: ON-GOING

## 2023-12-30 NOTE — PLAN OF CARE
Problem: Chronic Conditions and Co-morbidities  Goal: Patient's chronic conditions and co-morbidity symptoms are monitored and maintained or improved  Outcome: Progressing     Problem: Pain  Goal: Verbalizes/displays adequate comfort level or baseline comfort level  Outcome: Progressing     Problem: Safety - Adult  Goal: Free from fall injury  Outcome: Progressing     Problem: ABCDS Injury Assessment  Goal: Absence of physical injury  Outcome: Progressing  Flowsheets (Taken 12/29/2023 2003)  Absence of Physical Injury: Implement safety measures based on patient assessment

## 2023-12-30 NOTE — PROGRESS NOTES
Urine clear on low CBI  BP low  Afebrile  Abd soft and nontender with no peritoneal signs  Will need to keep current marrero in on discharge  Repeat labs in morning

## 2023-12-30 NOTE — PROGRESS NOTES
4 Eyes Skin Assessment     NAME:  Nicci Alberto  YOB: 1935  MEDICAL RECORD NUMBER:  [id number]    The patient is being assessed for  Admission    I agree that at least one RN has performed a thorough Head to Toe Skin Assessment on the patient. ALL assessment sites listed below have been assessed.      Areas assessed by both nurses:    Head, Face, Ears, Shoulders, Back, Chest, Arms, Elbows, Hands, Sacrum. Buttock, Coccyx, Ischium, Legs. Feet and Heels, and Under Medical Devices         Does the Patient have a Wound? Yes wound(s) were present on assessment. LDA wound assessment was Initiated and completed by RN       Elier Prevention initiated by RN: Yes  Wound Care Orders initiated by RN: Yes    Pressure Injury (Stage 3,4, Unstageable, DTI, NWPT, and Complex wounds) if present, place Wound referral order by RN under : Yes    New Ostomies, if present place, Ostomy referral order under : No     Nurse 1 eSignature: Electronically signed by Oralia Barrientos RN on 12/30/23 at 12:54 AM EST    **SHARE this note so that the co-signing nurse can place an eSignature**    Nurse 2 eSignature: Electronically signed by Paola Brothers LPN on 12/30/23 at 4:57 AM EST

## 2023-12-30 NOTE — PROGRESS NOTES
RENAL DOSE ADJUSTMENT MADE PER P/T PROTOCOL    PREVIOUS ORDER:  Famotidine 20mg PO BID    Estimated Creatinine Clearance: 14 mL/min (A) (based on SCr of 2.7 mg/dL (H)).  Recent Labs     12/29/23  1454   BUN 95*   CREATININE 2.7*        NEW RENALLY ADJUSTED ORDER:  Famotidine 10mg PO Daily for CrCl < 30    Marlene Ohara [facility]  12/29/2023 8:07 PM

## 2023-12-30 NOTE — PROGRESS NOTES
Occupational Therapy    [facility] ACUTE CARE OCCUPATIONAL THERAPY EVALUATION  Nicci Alberto, 1935, 1103/1103-A, 12/30/2023    History  [place]:  The primary encounter diagnosis was Acute urinary retention. Diagnoses of Hematuria, unspecified type, Chronic kidney disease, unspecified CKD stage, Hypokalemia, and Hyponatremia were also pertinent to this visit.  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  Patient  has a past surgical history that includes Urethra surgery; Appendectomy; Hysterectomy; Cholecystectomy; Cystoscopy; eye surgery; joint replacement (Right); Pacemaker insertion (N/A, 11/17/2020); and Cystoscopy (N/A, 12/18/2023).    Subjective:  Patient states:  \"I was walking before I came in here\".    Pain:  5/10 pain in lower back, constant  Pain Intervention: increased movement, repositioned, RN notified.    Communication with other providers:  Handoff to RN  Restrictions: General Precautions, Fall Risk    Home Setup/Prior level of function  Social/Functional History  Lives With: Spouse  Type of Home: House  Home Layout: One level  Home Access: Ramped entrance  Bathroom Shower/Tub: Walk-in shower  Bathroom Toilet: Handicap height  Bathroom Accessibility: Accessible  Home Equipment: Walker, rolling  Has the patient had two or more falls in the past year or any fall with injury in the past year?: No  ADL Assistance: Independent  Homemaking Assistance: Needs assistance  Ambulation Assistance: Independent  Transfer Assistance: Independent  Active :

## 2023-12-30 NOTE — PROGRESS NOTES
Comprehensive Nutrition Assessment    Type and Reason for Visit:  Initial, Wound    Nutrition Recommendations/Plan:   Continue current oral diet  Offer low calorie/high protein oral nutrition supplement daily  Monitor weights, po intakes, labs, skin, POC     Malnutrition Assessment:  Malnutrition Status:  No malnutrition (12/30/23 0945)    Context:  Acute Illness       Nutrition Assessment:    Admitted w/ acute on chronic urinary retention, pmh of urethral stricture, urinary retention, CKD, constipation, restless leg syndrome, hypothyroidism. Pt resting in bed, reports some days she eats well, other days she eats less, but this is normal for her. Ate a full breakfast today per pt, consumed % of 1 recent meal per flowsheets. She reports UBW around 165# w/ some variation due to fluid retention; no significant changes recently. NFPE performed, no significant wasting noted. Pt w/ increased needs due to wound, willing to trial low calorie/high protein oral supp daily. Follow at moderate nutrition risk.    Nutrition Related Findings:    +NaCl infusion, pepcid, senokot; Mg 3.9, Na 130, GFR 18 Wound Type: Pressure Injury, Stage II       Current Nutrition Intake & Therapies:    Average Meal Intake: %  Average Supplements Intake: None Ordered  ADULT DIET; Regular    Anthropometric Measures:  Height: 154.9 cm (5' 1\")  Ideal Body Weight (IBW): 105 lbs (48 kg)    Admission Body Weight: 77.3 kg (170 lb 6.7 oz)  Current Body Weight: 77.3 kg (170 lb 7.4 oz),   IBW. Weight Source: Bed Scale  Current BMI (kg/m2): 32.2  Usual Body Weight: 73.5 kg (162 lb 0.6 oz) (4/13/23)  % Weight Change (Calculated): 5.2  Weight Adjustment For: No Adjustment                 BMI Categories: Obese Class 1 (BMI 30.0-34.9)    Estimated Daily Nutrient Needs:  Energy Requirements Based On: Formula  Weight Used for Energy Requirements: Current  Energy (kcal/day): 0539-4294 (MSJ)  Weight Used for Protein Requirements: Ideal  Protein (g/day):

## 2023-12-30 NOTE — PROGRESS NOTES
V2.0    [facility] Progress Note      Name:  Nicci Alberto /Age/Sex: 1935  (88 y.o. female)   MRN & CSN:  [id number] & [id number] Encounter Date/Time: 2023 9:07 AM EST   [facility] PCP: Fern Steven APRN - CNP     Attending:Ela Ojeda MD       Hospital Day: 2    Assessment and Recommendations   Nicci Alberto is a 88 y.o. female with a pmh of urethral stricture, urinary retention, CKD, constipation, restless leg syndrome, hypothyroidism, who presents with Acute on chronic urinary retention       Plan:     Hematuria  Acute urinary retention  S/p cystoscopy evacuation of clots with fulguration of urethral bleeding  -Patient presented to urology office for hematuria, catheter was irrigated but patient ended up having a syncopal episode.  EMS called for patient to be taken to emergency.  -Patient taken to the OR where she underwent cystoscopy evacuation of clots with fulguration of urethral bleeding and cauterization  -Bladder irrigation per urology, CBI stopped, monitor the urine output  -Recheck hemoglobin  -Transfuse for hemoglobin less than 7 g per DL     Syncope in urology office likely vasovagal  -Continue to monitor     History of urethral stricture  -Patient had underwent cystoscopy with urethral dilatation 11 days prior to presentation     Mild hyponatremia  Hypokalemia  Hypochloremia  -Repeat BMP  -Maintenance IV fluid     KAREEM on CKD  -Likely in the setting of urinary retention  -Monitor renal function     Constipation  -Resume home medication     Restless leg syndrome  -Resume home medication     Hypothyroidism  -Resume levothyroxine      Diet ADULT DIET; Regular   DVT Prophylaxis [] Lovenox, []  Heparin, [] SCDs, [] Ambulation,  [] Eliquis, [] Xarelto  [] Coumadin   Code Status Full Code   Disposition From: Home  Expected Disposition: Home  Estimated Date of Discharge: TBD  Patient requires continued admission due to monitoring urine outout   Surrogate Decision Maker/ POA

## 2023-12-30 NOTE — PROGRESS NOTES
1830- pt. Arrived to pacu via bed from OR. Pt. Attached to monitor and alarms are on. Received report from OSEAS Bess and EJ, CRNA. Pt. Is drowsy from anesthesia but responds to verbal stimuli and able to follow commands. Pt. States has some pain in lower abdomen. Pt. Denies n/v. Pt. Marrero catheter has clear pink urine. Pt. Has CBI running. 1500cc of emptied from marrero catheter bag.  1850- Dr. Solares at bedside to speak with pt.   1915- Dr. Walsh with hospitalist at bedside to speak with pt.   1917- pt. Is aox4. Pt. States pain is better and tolerable at this time. Pt. Is on RA sating 96%. Paced on the monitor. Pt. Has tolerated ice chips and denies n/v. Pt. IV infusing without any issues. SCDs are turned on. Pt. Catheter draining clear pink urine. CBI continues. Pt. Tolerating well.   1930- called and gave report to OSEAS Barton

## 2023-12-31 LAB
ANION GAP SERPL CALCULATED.3IONS-SCNC: 7 MMOL/L (ref 7–16)
BASOPHILS ABSOLUTE: 0.1 K/CU MM
BASOPHILS RELATIVE PERCENT: 0.5 % (ref 0–1)
BUN SERPL-MCNC: 83 MG/DL (ref 6–23)
CALCIUM SERPL-MCNC: 8.5 MG/DL (ref 8.3–10.6)
CHLORIDE BLD-SCNC: 99 MMOL/L (ref 99–110)
CO2: 28 MMOL/L (ref 21–32)
CREAT SERPL-MCNC: 2.1 MG/DL (ref 0.6–1.1)
DIFFERENTIAL TYPE: ABNORMAL
EOSINOPHILS ABSOLUTE: 0.4 K/CU MM
EOSINOPHILS RELATIVE PERCENT: 3.4 % (ref 0–3)
GFR SERPL CREATININE-BSD FRML MDRD: 23 ML/MIN/1.73M2
GLUCOSE SERPL-MCNC: 116 MG/DL (ref 70–99)
HCT VFR BLD CALC: 26 % (ref 37–47)
HEMOGLOBIN: 8.6 GM/DL (ref 12.5–16)
IMMATURE NEUTROPHIL %: 0.6 % (ref 0–0.43)
LYMPHOCYTES ABSOLUTE: 1.9 K/CU MM
LYMPHOCYTES RELATIVE PERCENT: 14.9 % (ref 24–44)
MAGNESIUM: 3.8 MG/DL (ref 1.8–2.4)
MCH RBC QN AUTO: 32.7 PG (ref 27–31)
MCHC RBC AUTO-ENTMCNC: 33.1 % (ref 32–36)
MCV RBC AUTO: 98.9 FL (ref 78–100)
MONOCYTES ABSOLUTE: 0.7 K/CU MM
MONOCYTES RELATIVE PERCENT: 5.7 % (ref 0–4)
NUCLEATED RBC %: 0 %
PDW BLD-RTO: 12 % (ref 11.7–14.9)
PHOSPHORUS: 2.1 MG/DL (ref 2.5–4.9)
PLATELET # BLD: 212 K/CU MM (ref 140–440)
PMV BLD AUTO: 9.1 FL (ref 7.5–11.1)
POTASSIUM SERPL-SCNC: 4.7 MMOL/L (ref 3.5–5.1)
RBC # BLD: 2.63 M/CU MM (ref 4.2–5.4)
SEGMENTED NEUTROPHILS ABSOLUTE COUNT: 9.6 K/CU MM
SEGMENTED NEUTROPHILS RELATIVE PERCENT: 74.9 % (ref 36–66)
SODIUM BLD-SCNC: 134 MMOL/L (ref 135–145)
TOTAL IMMATURE NEUTOROPHIL: 0.08 K/CU MM
TOTAL NUCLEATED RBC: 0 K/CU MM
WBC # BLD: 12.8 K/CU MM (ref 4–10.5)

## 2023-12-31 PROCEDURE — 97116 GAIT TRAINING THERAPY: CPT

## 2023-12-31 PROCEDURE — 6370000000 HC RX 637 (ALT 250 FOR IP): Performed by: SPECIALIST

## 2023-12-31 PROCEDURE — 1200000000 HC SEMI PRIVATE

## 2023-12-31 PROCEDURE — 97530 THERAPEUTIC ACTIVITIES: CPT

## 2023-12-31 PROCEDURE — 97163 PT EVAL HIGH COMPLEX 45 MIN: CPT

## 2023-12-31 PROCEDURE — 94761 N-INVAS EAR/PLS OXIMETRY MLT: CPT

## 2023-12-31 PROCEDURE — 6370000000 HC RX 637 (ALT 250 FOR IP): Performed by: INTERNAL MEDICINE

## 2023-12-31 PROCEDURE — 80048 BASIC METABOLIC PNL TOTAL CA: CPT

## 2023-12-31 PROCEDURE — 83735 ASSAY OF MAGNESIUM: CPT

## 2023-12-31 PROCEDURE — 36415 COLL VENOUS BLD VENIPUNCTURE: CPT

## 2023-12-31 PROCEDURE — 6370000000 HC RX 637 (ALT 250 FOR IP): Performed by: STUDENT IN AN ORGANIZED HEALTH CARE EDUCATION/TRAINING PROGRAM

## 2023-12-31 PROCEDURE — 2580000003 HC RX 258: Performed by: SPECIALIST

## 2023-12-31 PROCEDURE — 84100 ASSAY OF PHOSPHORUS: CPT

## 2023-12-31 PROCEDURE — 85025 COMPLETE CBC W/AUTO DIFF WBC: CPT

## 2023-12-31 RX ADMIN — LEVOTHYROXINE SODIUM 50 MCG: 0.05 TABLET ORAL at 06:40

## 2023-12-31 RX ADMIN — FEBUXOSTAT 40 MG: 40 TABLET, FILM COATED ORAL at 09:13

## 2023-12-31 RX ADMIN — TRAMADOL HYDROCHLORIDE 50 MG: 50 TABLET, COATED ORAL at 12:13

## 2023-12-31 RX ADMIN — Medication 3 MG: at 20:55

## 2023-12-31 RX ADMIN — POTASSIUM CHLORIDE 40 MEQ: 1500 TABLET, EXTENDED RELEASE ORAL at 17:12

## 2023-12-31 RX ADMIN — TRAMADOL HYDROCHLORIDE 50 MG: 50 TABLET, COATED ORAL at 20:55

## 2023-12-31 RX ADMIN — ALPRAZOLAM 0.25 MG: 0.25 TABLET ORAL at 09:13

## 2023-12-31 RX ADMIN — FAMOTIDINE 10 MG: 20 TABLET ORAL at 09:13

## 2023-12-31 RX ADMIN — ROPINIROLE HYDROCHLORIDE 0.5 MG: 0.25 TABLET, FILM COATED ORAL at 16:02

## 2023-12-31 RX ADMIN — POTASSIUM CHLORIDE 40 MEQ: 1500 TABLET, EXTENDED RELEASE ORAL at 09:13

## 2023-12-31 RX ADMIN — ROPINIROLE HYDROCHLORIDE 0.5 MG: 0.25 TABLET, FILM COATED ORAL at 09:13

## 2023-12-31 RX ADMIN — ROPINIROLE HYDROCHLORIDE 0.5 MG: 0.25 TABLET, FILM COATED ORAL at 20:55

## 2023-12-31 RX ADMIN — SENNOSIDES 8.6 MG: 8.6 TABLET, FILM COATED ORAL at 20:55

## 2023-12-31 RX ADMIN — SODIUM CHLORIDE, PRESERVATIVE FREE 10 ML: 5 INJECTION INTRAVENOUS at 09:14

## 2023-12-31 RX ADMIN — SODIUM CHLORIDE, PRESERVATIVE FREE 10 ML: 5 INJECTION INTRAVENOUS at 20:56

## 2023-12-31 ASSESSMENT — PAIN DESCRIPTION - LOCATION
LOCATION: GROIN;ABDOMEN
LOCATION: ABDOMEN
LOCATION: ABDOMEN

## 2023-12-31 ASSESSMENT — PAIN SCALES - WONG BAKER
WONGBAKER_NUMERICALRESPONSE: 2

## 2023-12-31 ASSESSMENT — PAIN - FUNCTIONAL ASSESSMENT
PAIN_FUNCTIONAL_ASSESSMENT: ACTIVITIES ARE NOT PREVENTED
PAIN_FUNCTIONAL_ASSESSMENT: PREVENTS OR INTERFERES SOME ACTIVE ACTIVITIES AND ADLS
PAIN_FUNCTIONAL_ASSESSMENT: PREVENTS OR INTERFERES SOME ACTIVE ACTIVITIES AND ADLS

## 2023-12-31 ASSESSMENT — PAIN DESCRIPTION - DESCRIPTORS
DESCRIPTORS: ACHING;DISCOMFORT
DESCRIPTORS: ACHING;DISCOMFORT;BURNING
DESCRIPTORS: ACHING

## 2023-12-31 ASSESSMENT — PAIN DESCRIPTION - ORIENTATION
ORIENTATION: LOWER
ORIENTATION: LOWER
ORIENTATION: MID;LOWER

## 2023-12-31 ASSESSMENT — PAIN DESCRIPTION - PAIN TYPE
TYPE: ACUTE PAIN
TYPE: ACUTE PAIN

## 2023-12-31 ASSESSMENT — PAIN SCALES - GENERAL
PAINLEVEL_OUTOF10: 10
PAINLEVEL_OUTOF10: 2
PAINLEVEL_OUTOF10: 6

## 2023-12-31 ASSESSMENT — PAIN DESCRIPTION - FREQUENCY
FREQUENCY: INTERMITTENT
FREQUENCY: CONTINUOUS

## 2023-12-31 ASSESSMENT — PAIN DESCRIPTION - ONSET
ONSET: ON-GOING
ONSET: ON-GOING

## 2023-12-31 NOTE — PROGRESS NOTES
V2.0    [facility] [place]      Name:  Nicci Alberto /Age/Sex: 1935  (88 y.o. female)   MRN & CSN:  [id number] & [id number] Encounter Date/Time: 2023 9:07 AM EST   Location:  [facility]/3-A PCP: Fern Steven APRN - CNP     Attending:Ela Ojeda MD       Hospital Day: 3    Assessment and Recommendations   Nicci Alberto is a 88 y.o. female with a pmh of urethral stricture, urinary retention, CKD, constipation, restless leg syndrome, hypothyroidism, who presents with Acute on chronic urinary retention       Plan:     Hematuria  Acute urinary retention  S/p cystoscopy evacuation of clots with fulguration of urethral bleeding  -Patient presented to urology office for hematuria, catheter was irrigated but patient ended up having a syncopal episode.  EMS called for patient to be taken to emergency.  -Patient taken to the OR where she underwent cystoscopy evacuation of clots with fulguration of urethral bleeding and cauterization  -Bladder irrigation per urology,   -Recheck hemoglobin  -Transfuse for hemoglobin less than 7 g per DL  CBI has been running overnight, CBI stopped, hb is down trending, 8.6 today      Syncope in urology office likely vasovagal  -antihypertensives have been held at this time  -Continue to monitor     History of urethral stricture  -Patient had underwent cystoscopy with urethral dilatation 11 days prior to presentation     Mild hyponatremia  Hypokalemia  Hypochloremia  -Repeat BMP  -Maintenance IV fluid     KAREEM on CKD  -Likely in the setting of urinary retention  -Monitor renal function     Constipation  -Resume home medication     Restless leg syndrome  -Resume home medication     Hypothyroidism  -Resume levothyroxine      Diet ADULT DIET; Regular  ADULT ORAL NUTRITION SUPPLEMENT; Lunch; Low Calorie/High Protein Oral Supplement   DVT Prophylaxis [] Lovenox, []  Heparin, [] SCDs, [] Ambulation,  [] Eliquis, [] Xarelto  [] Coumadin   Code Status Full Code   Disposition From:

## 2023-12-31 NOTE — PROGRESS NOTES
Physical Therapy  Facility/Department: [address]  Physical Therapy Initial Assessment    Name: Nicci Alberto  : 1935  MRN: [id number]  Date of Service: 2023    Discharge Recommendations:  Subacute/Skilled Nursing Facility          Patient Diagnosis(es): The primary encounter diagnosis was Acute urinary retention. Diagnoses of Hematuria, unspecified type, Chronic kidney disease, unspecified CKD stage, Hypokalemia, and Hyponatremia were also pertinent to this visit.  Past Medical History:  has a past medical history of Asthma, Chronic kidney disease, Chronic ulcer of left leg with fat layer exposed (HCC), Chronic ulcer of right leg with fat layer exposed (HCC), Diabetes type 2, controlled (HCC), History of asthma, History of blood transfusion, Hypertension, Lymphedema of both lower extremities, Osteoarthritis, Pneumonia, Thyroid disease, Venous stasis of both lower extremities, and WD-Non-pressure chronic ulcer right lower leg, 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 (HCC).  Past Surgical History:  has a past surgical history that includes Urethra surgery; Appendectomy; Hysterectomy; Cholecystectomy; Cystoscopy; eye surgery; joint replacement (Right); Pacemaker insertion (N/A, 2020); and Cystoscopy (N/A, 2023).    Assessment   Body Structures, Functions, Activity Limitations Requiring Skilled Therapeutic Intervention: Decreased functional mobility ;Decreased safe awareness;Decreased high-level IADLs;Decreased endurance;Decreased ADL status;Decreased sensation;Decreased balance;Increased pain;Decreased strength  Therapy Prognosis: Good  Decision Making: High Complexity  Clinical Presentation: unpredictable characteristics  Requires PT Follow-Up: Yes  Activity Tolerance  Activity Tolerance: Patient tolerated evaluation without incident     Plan   Physical Therapy Plan  General Plan: 3-5 times per week  Current Treatment Recommendations: Strengthening, ROM, Balance training, Functional mobility training,  Solving: Decreased awareness of errors  Insights: Decreased awareness of deficits  Initiation: Requires cues for some  Sequencing: Requires cues for some     Objective   Pulse: 68  Heart Rate Source: Monitor  BP: (!) 116/37  BP Location: Right upper arm  BP Method: Automatic  Patient Position: Semi fowlers  MAP (Calculated): 63  Respirations: 18  SpO2: 98 %  O2 Device: None (Room air)  Temp: 98.1 °F (36.7 °C)        Gross Assessment  Sensation: Intact (BLEs)        Strength RLE  Comment: knee extension: at least 3+/5 observed functionally  Strength LLE  Comment: knee extension: at least 3+/5 observed functionally              Transfers  Sit to Stand: Minimal Assistance (with verbal cues to push through chair and avoid pulling on walker)  Stand to Sit: Minimal Assistance;Moderate Assistance (with verbal cues to feel chair against back of legs, reach back, and sit slowly)  Ambulation  Surface: Level tile  Device: Rolling Walker  Assistance: Minimal assistance  Quality of Gait: decreased gait speed, decreased step length bilaterally, unsteady  Distance: 12 feet  Comments: with verbal and tactile cues for BLE placement, walker placement, and sequence throughout ambulation; with verbal and tactile cues to maintain upright posture in order to avoid COM shifting outside of KRISHNA     Balance  Posture: Fair  Sitting - Static: Good  Sitting - Dynamic: Good  Standing - Static: Poor;+  Standing - Dynamic: Poor;+           Gait Training:  Cues were given for safety, sequence, device management, balance, posture, awareness, path.      Therapeutic Activity Training:   Therapeutic activity training was instructed today.  Cues were given for safety, sequence, UE/LE placement, awareness, and balance.    Activities performed today included bed mobility training, sup-sit, sit-stand, SPT.    AM-PAC - Mobility    AM-PAC Basic Mobility - Inpatient   How much help is needed turning from your back to your side while in a flat bed without

## 2023-12-31 NOTE — PROGRESS NOTES
[address] [address]   Progress Note  [facility] 0 1 2      Date: 2023   Patient: Nicci Alberto   : 1935   DOA: 2023   MRN: [id number]   ROOM#: [id number]/[id number]     Admit Date: 2023     Collaborating Urologist on Call at time of admission: Sujatha    CC:     Subjective:     Pain: mild, no nausea,   Bowel Movement/Flatus:   No  Voiding:  as marrero    Objective:      Vitals:    BP (!) 116/37   Pulse 68   Temp 98.1 °F (36.7 °C) (Oral)   Resp 18   Ht 1.549 m (5' 1\")   Wt 77.3 kg (170 lb 6.7 oz)   SpO2 98%   BMI 32.20 kg/m²    Temp  Av.2 °F (36.8 °C)  Min: 98.1 °F (36.7 °C)  Max: 98.2 °F (36.8 °C)     Intake/Output Summary (Last 24 hours) at 2023 1332  Last data filed at 2023 1225  Gross per 24 hour   Intake 730 ml   Output 4550 ml   Net -3820 ml       Physical Exam:    Abdomen: abnormal findings:  distended and nontender with no peritoneal signs    Labs:   WBC:    Lab Results   Component Value Date/Time    WBC 12.8 2023 06:01 AM      Hemoglobin/Hematocrit:    Lab Results   Component Value Date/Time    HGB 8.6 2023 06:01 AM    HCT 26.0 2023 06:01 AM      BMP:   Lab Results   Component Value Date/Time     2023 06:01 AM    K 4.7 2023 06:01 AM    CL 99 2023 06:01 AM    CO2 28 2023 06:01 AM    BUN 83 2023 06:01 AM    LABALBU 3.5 2023 03:36 AM    CREATININE 2.1 2023 06:01 AM    CALCIUM 8.5 2023 06:01 AM    GFRAA 22 2022 11:30 AM    LABGLOM 23 2023 06:01 AM      PT/INR:    Lab Results   Component Value Date/Time    PROTIME 13.8 2023 04:31 AM    INR 1.0 2023 04:31 AM      PTT:    Lab Results   Component Value Date/Time    APTT 34.9 2020 05:05 PM        Blood Culture:     Urine Culture:      Imaging:   CT ABDOMEN PELVIS WO CONTRAST Additional Contrast? None    Result Date: 2023  EXAMINATION: CT OF THE ABDOMEN AND PELVIS WITHOUT CONTRAST, 2023 6:07 pm

## 2023-12-31 NOTE — PROGRESS NOTES
This nurse was asked to assist tech in moving pt back to bed from chair. While doing so marrero cath was caught in the chair and fell out while moving to bed. This nurse assesse pts micha area and no blood was noted to be seen. Dr. Solares was notified via phone call and gave orders to leave marrero out and he will see pt in the AM. Pt resting in bed, call light in reach

## 2024-01-01 ENCOUNTER — HOSPITAL ENCOUNTER (INPATIENT)
Age: 89
LOS: 5 days | Discharge: HOME | End: 2024-01-06
Attending: INTERNAL MEDICINE | Admitting: INTERNAL MEDICINE
Payer: MEDICARE

## 2024-01-01 VITALS
TEMPERATURE: 98.3 F | RESPIRATION RATE: 16 BRPM | DIASTOLIC BLOOD PRESSURE: 39 MMHG | OXYGEN SATURATION: 97 % | HEIGHT: 61 IN | BODY MASS INDEX: 32.17 KG/M2 | SYSTOLIC BLOOD PRESSURE: 122 MMHG | HEART RATE: 72 BPM | WEIGHT: 170.42 LBS

## 2024-01-01 LAB
ANION GAP SERPL CALCULATED.3IONS-SCNC: 8 MMOL/L (ref 7–16)
ANION GAP SERPL CALCULATED.3IONS-SCNC: 8 MMOL/L (ref 7–16)
BASOPHILS ABSOLUTE: 0.1 K/CU MM
BASOPHILS RELATIVE PERCENT: 0.4 % (ref 0–1)
BUN SERPL-MCNC: 70 MG/DL (ref 6–23)
BUN SERPL-MCNC: 77 MG/DL (ref 6–23)
CALCIUM SERPL-MCNC: 8.4 MG/DL (ref 8.3–10.6)
CALCIUM SERPL-MCNC: 8.6 MG/DL (ref 8.3–10.6)
CHLORIDE BLD-SCNC: 103 MMOL/L (ref 99–110)
CHLORIDE BLD-SCNC: 98 MMOL/L (ref 99–110)
CO2: 25 MMOL/L (ref 21–32)
CO2: 26 MMOL/L (ref 21–32)
CREAT SERPL-MCNC: 1.6 MG/DL (ref 0.6–1.1)
CREAT SERPL-MCNC: 1.8 MG/DL (ref 0.6–1.1)
DIFFERENTIAL TYPE: ABNORMAL
EOSINOPHILS ABSOLUTE: 0.6 K/CU MM
EOSINOPHILS RELATIVE PERCENT: 4.1 % (ref 0–3)
GFR SERPL CREATININE-BSD FRML MDRD: 27 ML/MIN/1.73M2
GFR SERPL CREATININE-BSD FRML MDRD: 31 ML/MIN/1.73M2
GLUCOSE BLD-MCNC: 95 MG/DL (ref 70–99)
GLUCOSE SERPL-MCNC: 106 MG/DL (ref 70–99)
GLUCOSE SERPL-MCNC: 167 MG/DL (ref 70–99)
HCT VFR BLD CALC: 27.9 % (ref 37–47)
HEMOGLOBIN: 8.7 GM/DL (ref 12.5–16)
IMMATURE NEUTROPHIL %: 0.8 % (ref 0–0.43)
LYMPHOCYTES ABSOLUTE: 1.9 K/CU MM
LYMPHOCYTES RELATIVE PERCENT: 13.7 % (ref 24–44)
MAGNESIUM: 3.6 MG/DL (ref 1.8–2.4)
MCH RBC QN AUTO: 32.3 PG (ref 27–31)
MCHC RBC AUTO-ENTMCNC: 31.2 % (ref 32–36)
MCV RBC AUTO: 103.7 FL (ref 78–100)
MONOCYTES ABSOLUTE: 1 K/CU MM
MONOCYTES RELATIVE PERCENT: 7.6 % (ref 0–4)
NUCLEATED RBC %: 0 %
PDW BLD-RTO: 12.4 % (ref 11.7–14.9)
PHOSPHORUS: 2.2 MG/DL (ref 2.5–4.9)
PLATELET # BLD: 233 K/CU MM (ref 140–440)
PMV BLD AUTO: 9 FL (ref 7.5–11.1)
POTASSIUM SERPL-SCNC: 5.7 MMOL/L (ref 3.5–5.1)
POTASSIUM SERPL-SCNC: 5.9 MMOL/L (ref 3.5–5.1)
RBC # BLD: 2.69 M/CU MM (ref 4.2–5.4)
SEGMENTED NEUTROPHILS ABSOLUTE COUNT: 10 K/CU MM
SEGMENTED NEUTROPHILS RELATIVE PERCENT: 73.4 % (ref 36–66)
SODIUM BLD-SCNC: 132 MMOL/L (ref 135–145)
SODIUM BLD-SCNC: 136 MMOL/L (ref 135–145)
TOTAL IMMATURE NEUTOROPHIL: 0.11 K/CU MM
TOTAL NUCLEATED RBC: 0 K/CU MM
WBC # BLD: 13.6 K/CU MM (ref 4–10.5)

## 2024-01-01 PROCEDURE — 6370000000 HC RX 637 (ALT 250 FOR IP): Performed by: STUDENT IN AN ORGANIZED HEALTH CARE EDUCATION/TRAINING PROGRAM

## 2024-01-01 PROCEDURE — 36415 COLL VENOUS BLD VENIPUNCTURE: CPT

## 2024-01-01 PROCEDURE — 84100 ASSAY OF PHOSPHORUS: CPT

## 2024-01-01 PROCEDURE — 94640 AIRWAY INHALATION TREATMENT: CPT

## 2024-01-01 PROCEDURE — 1200000002 HC SEMI PRIVATE SWING BED

## 2024-01-01 PROCEDURE — 6370000000 HC RX 637 (ALT 250 FOR IP)

## 2024-01-01 PROCEDURE — 6370000000 HC RX 637 (ALT 250 FOR IP): Performed by: SPECIALIST

## 2024-01-01 PROCEDURE — 94761 N-INVAS EAR/PLS OXIMETRY MLT: CPT

## 2024-01-01 PROCEDURE — 84132 ASSAY OF SERUM POTASSIUM: CPT

## 2024-01-01 PROCEDURE — 83735 ASSAY OF MAGNESIUM: CPT

## 2024-01-01 PROCEDURE — 6370000000 HC RX 637 (ALT 250 FOR IP): Performed by: INTERNAL MEDICINE

## 2024-01-01 PROCEDURE — 6370000000 HC RX 637 (ALT 250 FOR IP): Performed by: FAMILY MEDICINE

## 2024-01-01 PROCEDURE — 82962 GLUCOSE BLOOD TEST: CPT

## 2024-01-01 PROCEDURE — 80048 BASIC METABOLIC PNL TOTAL CA: CPT

## 2024-01-01 PROCEDURE — 85025 COMPLETE CBC W/AUTO DIFF WBC: CPT

## 2024-01-01 PROCEDURE — 2580000003 HC RX 258: Performed by: SPECIALIST

## 2024-01-01 PROCEDURE — 2580000003 HC RX 258: Performed by: FAMILY MEDICINE

## 2024-01-01 RX ORDER — FLUTICASONE PROPIONATE 50 MCG
1 SPRAY, SUSPENSION (ML) NASAL DAILY
COMMUNITY

## 2024-01-01 RX ORDER — LANOLIN ALCOHOL/MO/W.PET/CERES
3 CREAM (GRAM) TOPICAL NIGHTLY PRN
Status: DISCONTINUED | OUTPATIENT
Start: 2024-01-01 | End: 2024-01-06 | Stop reason: HOSPADM

## 2024-01-01 RX ORDER — ALBUTEROL SULFATE 90 UG/1
2 AEROSOL, METERED RESPIRATORY (INHALATION) EVERY 4 HOURS PRN
Status: CANCELLED | OUTPATIENT
Start: 2024-01-01

## 2024-01-01 RX ORDER — ACETAMINOPHEN 325 MG/1
650 TABLET ORAL EVERY 6 HOURS PRN
Status: CANCELLED | OUTPATIENT
Start: 2024-01-01

## 2024-01-01 RX ORDER — TRAMADOL HYDROCHLORIDE 50 MG/1
50 TABLET ORAL EVERY 6 HOURS PRN
Status: DISCONTINUED | OUTPATIENT
Start: 2024-01-01 | End: 2024-01-03

## 2024-01-01 RX ORDER — LANOLIN ALCOHOL/MO/W.PET/CERES
3 CREAM (GRAM) TOPICAL NIGHTLY PRN
Status: CANCELLED | OUTPATIENT
Start: 2024-01-01

## 2024-01-01 RX ORDER — FLUTICASONE PROPIONATE 50 MCG
1 SPRAY, SUSPENSION (ML) NASAL DAILY
Status: CANCELLED | OUTPATIENT
Start: 2024-01-02

## 2024-01-01 RX ORDER — ONDANSETRON 2 MG/ML
4 INJECTION INTRAMUSCULAR; INTRAVENOUS EVERY 6 HOURS PRN
Status: CANCELLED | OUTPATIENT
Start: 2024-01-01

## 2024-01-01 RX ORDER — TRAMADOL HYDROCHLORIDE 50 MG/1
50 TABLET ORAL EVERY 6 HOURS PRN
Status: CANCELLED | OUTPATIENT
Start: 2024-01-01

## 2024-01-01 RX ORDER — ALBUTEROL SULFATE 90 UG/1
2 AEROSOL, METERED RESPIRATORY (INHALATION) EVERY 4 HOURS PRN
Status: DISCONTINUED | OUTPATIENT
Start: 2024-01-01 | End: 2024-01-03

## 2024-01-01 RX ORDER — SENNOSIDES A AND B 8.6 MG/1
1 TABLET, FILM COATED ORAL NIGHTLY
Status: CANCELLED | OUTPATIENT
Start: 2024-01-01

## 2024-01-01 RX ORDER — LACTULOSE 10 G/15ML
10 SOLUTION ORAL 3 TIMES DAILY PRN
Status: CANCELLED | OUTPATIENT
Start: 2024-01-01

## 2024-01-01 RX ORDER — ACETAMINOPHEN 650 MG/1
650 SUPPOSITORY RECTAL EVERY 6 HOURS PRN
Status: DISCONTINUED | OUTPATIENT
Start: 2024-01-01 | End: 2024-01-06 | Stop reason: HOSPADM

## 2024-01-01 RX ORDER — FERROUS SULFATE 325(65) MG
325 TABLET ORAL 2 TIMES DAILY
COMMUNITY

## 2024-01-01 RX ORDER — ALBUTEROL SULFATE 90 UG/1
2 AEROSOL, METERED RESPIRATORY (INHALATION) EVERY 6 HOURS PRN
Status: DISCONTINUED | OUTPATIENT
Start: 2024-01-01 | End: 2024-01-06 | Stop reason: HOSPADM

## 2024-01-01 RX ORDER — ACETAMINOPHEN 325 MG/1
650 TABLET ORAL EVERY 6 HOURS PRN
Status: DISCONTINUED | OUTPATIENT
Start: 2024-01-01 | End: 2024-01-06 | Stop reason: HOSPADM

## 2024-01-01 RX ORDER — ACETAMINOPHEN 500 MG
500 TABLET ORAL EVERY 6 HOURS PRN
COMMUNITY

## 2024-01-01 RX ORDER — SODIUM CHLORIDE 0.9 % (FLUSH) 0.9 %
5-40 SYRINGE (ML) INJECTION PRN
Status: DISCONTINUED | OUTPATIENT
Start: 2024-01-01 | End: 2024-01-03

## 2024-01-01 RX ORDER — GLUCAGON 1 MG/ML
1 KIT INJECTION PRN
Status: DISCONTINUED | OUTPATIENT
Start: 2024-01-01 | End: 2024-01-01 | Stop reason: HOSPADM

## 2024-01-01 RX ORDER — ALPRAZOLAM 0.25 MG/1
0.25 TABLET ORAL 2 TIMES DAILY PRN
Status: DISCONTINUED | OUTPATIENT
Start: 2024-01-01 | End: 2024-01-06 | Stop reason: HOSPADM

## 2024-01-01 RX ORDER — HYDROXYZINE HYDROCHLORIDE 25 MG/1
25 TABLET, FILM COATED ORAL DAILY
COMMUNITY

## 2024-01-01 RX ORDER — ENOXAPARIN SODIUM 100 MG/ML
30 INJECTION SUBCUTANEOUS DAILY
Status: DISCONTINUED | OUTPATIENT
Start: 2024-01-02 | End: 2024-01-06 | Stop reason: HOSPADM

## 2024-01-01 RX ORDER — ACETAMINOPHEN 500 MG
500 TABLET ORAL EVERY 6 HOURS PRN
Status: DISCONTINUED | OUTPATIENT
Start: 2024-01-01 | End: 2024-01-02

## 2024-01-01 RX ORDER — LATANOPROST 50 UG/ML
1 SOLUTION/ DROPS OPHTHALMIC NIGHTLY
COMMUNITY

## 2024-01-01 RX ORDER — ISOSORBIDE MONONITRATE 30 MG/1
30 TABLET, EXTENDED RELEASE ORAL DAILY
Status: DISCONTINUED | OUTPATIENT
Start: 2024-01-02 | End: 2024-01-06 | Stop reason: HOSPADM

## 2024-01-01 RX ORDER — POLYETHYLENE GLYCOL 3350 17 G/17G
17 POWDER, FOR SOLUTION ORAL ONCE
Status: COMPLETED | OUTPATIENT
Start: 2024-01-01 | End: 2024-01-01

## 2024-01-01 RX ORDER — POLYETHYLENE GLYCOL 3350 17 G/17G
17 POWDER, FOR SOLUTION ORAL DAILY PRN
Status: CANCELLED | OUTPATIENT
Start: 2024-01-01

## 2024-01-01 RX ORDER — POLYETHYLENE GLYCOL 3350 17 G/17G
17 POWDER, FOR SOLUTION ORAL DAILY PRN
Status: DISCONTINUED | OUTPATIENT
Start: 2024-01-01 | End: 2024-01-06 | Stop reason: HOSPADM

## 2024-01-01 RX ORDER — ACETAMINOPHEN 650 MG/1
650 SUPPOSITORY RECTAL EVERY 6 HOURS PRN
Status: DISCONTINUED | OUTPATIENT
Start: 2024-01-01 | End: 2024-01-03

## 2024-01-01 RX ORDER — FEBUXOSTAT 40 MG/1
40 TABLET, FILM COATED ORAL DAILY
Status: DISCONTINUED | OUTPATIENT
Start: 2024-01-02 | End: 2024-01-06 | Stop reason: HOSPADM

## 2024-01-01 RX ORDER — ONDANSETRON 4 MG/1
4 TABLET, ORALLY DISINTEGRATING ORAL EVERY 8 HOURS PRN
Status: CANCELLED | OUTPATIENT
Start: 2024-01-01

## 2024-01-01 RX ORDER — ROPINIROLE 0.25 MG/1
0.5 TABLET, FILM COATED ORAL 3 TIMES DAILY
Status: CANCELLED | OUTPATIENT
Start: 2024-01-01

## 2024-01-01 RX ORDER — FLUTICASONE PROPIONATE 50 MCG
1 SPRAY, SUSPENSION (ML) NASAL DAILY
Status: DISCONTINUED | OUTPATIENT
Start: 2024-01-02 | End: 2024-01-04

## 2024-01-01 RX ORDER — FEBUXOSTAT 40 MG/1
40 TABLET, FILM COATED ORAL DAILY
Status: CANCELLED | OUTPATIENT
Start: 2024-01-02

## 2024-01-01 RX ORDER — CALCIUM CARBONATE 500(1250)
500 TABLET ORAL DAILY
Status: DISCONTINUED | OUTPATIENT
Start: 2024-01-02 | End: 2024-01-06 | Stop reason: HOSPADM

## 2024-01-01 RX ORDER — SODIUM CHLORIDE 9 MG/ML
INJECTION, SOLUTION INTRAVENOUS CONTINUOUS
Status: DISCONTINUED | OUTPATIENT
Start: 2024-01-01 | End: 2024-01-01

## 2024-01-01 RX ORDER — FLUTICASONE PROPIONATE 50 MCG
1 SPRAY, SUSPENSION (ML) NASAL DAILY
Status: DISCONTINUED | OUTPATIENT
Start: 2024-01-01 | End: 2024-01-01 | Stop reason: HOSPADM

## 2024-01-01 RX ORDER — SODIUM CHLORIDE 0.9 % (FLUSH) 0.9 %
5-40 SYRINGE (ML) INJECTION EVERY 12 HOURS SCHEDULED
Status: DISCONTINUED | OUTPATIENT
Start: 2024-01-01 | End: 2024-01-03

## 2024-01-01 RX ORDER — SENNOSIDES A AND B 8.6 MG/1
1 TABLET, FILM COATED ORAL NIGHTLY
Status: DISCONTINUED | OUTPATIENT
Start: 2024-01-01 | End: 2024-01-03

## 2024-01-01 RX ORDER — LEVOTHYROXINE SODIUM 0.05 MG/1
50 TABLET ORAL DAILY
Status: CANCELLED | OUTPATIENT
Start: 2024-01-02

## 2024-01-01 RX ORDER — FAMOTIDINE 20 MG/1
10 TABLET, FILM COATED ORAL DAILY
Status: CANCELLED | OUTPATIENT
Start: 2024-01-02

## 2024-01-01 RX ORDER — LATANOPROST 50 UG/ML
1 SOLUTION/ DROPS OPHTHALMIC NIGHTLY
Status: DISCONTINUED | OUTPATIENT
Start: 2024-01-01 | End: 2024-01-06 | Stop reason: HOSPADM

## 2024-01-01 RX ORDER — ONDANSETRON 4 MG/1
4 TABLET, ORALLY DISINTEGRATING ORAL EVERY 8 HOURS PRN
Status: DISCONTINUED | OUTPATIENT
Start: 2024-01-01 | End: 2024-01-06 | Stop reason: HOSPADM

## 2024-01-01 RX ORDER — ACETAMINOPHEN 325 MG/1
650 TABLET ORAL EVERY 6 HOURS PRN
Status: DISCONTINUED | OUTPATIENT
Start: 2024-01-01 | End: 2024-01-03

## 2024-01-01 RX ORDER — FLUTICASONE PROPIONATE 110 UG/1
1 AEROSOL, METERED RESPIRATORY (INHALATION) 2 TIMES DAILY
Status: DISCONTINUED | OUTPATIENT
Start: 2024-01-01 | End: 2024-01-06 | Stop reason: HOSPADM

## 2024-01-01 RX ORDER — SODIUM CHLORIDE 9 MG/ML
INJECTION, SOLUTION INTRAVENOUS PRN
Status: DISCONTINUED | OUTPATIENT
Start: 2024-01-01 | End: 2024-01-03

## 2024-01-01 RX ORDER — ALPRAZOLAM 0.25 MG/1
0.25 TABLET ORAL 2 TIMES DAILY PRN
Status: CANCELLED | OUTPATIENT
Start: 2024-01-01

## 2024-01-01 RX ORDER — ONDANSETRON 2 MG/ML
4 INJECTION INTRAMUSCULAR; INTRAVENOUS EVERY 6 HOURS PRN
Status: DISCONTINUED | OUTPATIENT
Start: 2024-01-01 | End: 2024-01-03

## 2024-01-01 RX ORDER — ACETAMINOPHEN 650 MG/1
650 SUPPOSITORY RECTAL EVERY 6 HOURS PRN
Status: CANCELLED | OUTPATIENT
Start: 2024-01-01

## 2024-01-01 RX ORDER — DEXTROSE MONOHYDRATE 100 MG/ML
INJECTION, SOLUTION INTRAVENOUS CONTINUOUS PRN
Status: DISCONTINUED | OUTPATIENT
Start: 2024-01-01 | End: 2024-01-01 | Stop reason: HOSPADM

## 2024-01-01 RX ORDER — FAMOTIDINE 10 MG
10 TABLET ORAL DAILY
Status: DISCONTINUED | OUTPATIENT
Start: 2024-01-02 | End: 2024-01-06 | Stop reason: HOSPADM

## 2024-01-01 RX ORDER — ROPINIROLE 0.5 MG/1
0.5 TABLET, FILM COATED ORAL 3 TIMES DAILY
Status: DISCONTINUED | OUTPATIENT
Start: 2024-01-01 | End: 2024-01-06 | Stop reason: HOSPADM

## 2024-01-01 RX ORDER — CILOSTAZOL 50 MG/1
50 TABLET ORAL 2 TIMES DAILY
Status: DISCONTINUED | OUTPATIENT
Start: 2024-01-01 | End: 2024-01-03

## 2024-01-01 RX ORDER — CALCIUM CARBONATE 500(1250)
500 TABLET ORAL DAILY
COMMUNITY

## 2024-01-01 RX ORDER — ISOSORBIDE MONONITRATE 30 MG/1
30 TABLET, EXTENDED RELEASE ORAL DAILY
COMMUNITY

## 2024-01-01 RX ORDER — LACTULOSE 10 G/15ML
10 SOLUTION ORAL 3 TIMES DAILY PRN
Status: DISCONTINUED | OUTPATIENT
Start: 2024-01-01 | End: 2024-01-06 | Stop reason: HOSPADM

## 2024-01-01 RX ORDER — CILOSTAZOL 50 MG/1
50 TABLET ORAL 2 TIMES DAILY
COMMUNITY

## 2024-01-01 RX ORDER — LEVOTHYROXINE SODIUM 0.05 MG/1
50 TABLET ORAL DAILY
Status: DISCONTINUED | OUTPATIENT
Start: 2024-01-02 | End: 2024-01-06 | Stop reason: HOSPADM

## 2024-01-01 RX ORDER — FLUTICASONE PROPIONATE 110 UG/1
1 AEROSOL, METERED RESPIRATORY (INHALATION) 2 TIMES DAILY
COMMUNITY

## 2024-01-01 RX ADMIN — MELATONIN TAB 3 MG 3 MG: 3 TAB at 21:10

## 2024-01-01 RX ADMIN — FLUTICASONE PROPIONATE 1 SPRAY: 50 SPRAY, METERED NASAL at 14:28

## 2024-01-01 RX ADMIN — TRAMADOL HYDROCHLORIDE 50 MG: 50 TABLET ORAL at 21:07

## 2024-01-01 RX ADMIN — ROPINIROLE HYDROCHLORIDE 0.5 MG: 0.5 TABLET, FILM COATED ORAL at 21:07

## 2024-01-01 RX ADMIN — LATANOPROST 1 DROP: 50 SOLUTION/ DROPS OPHTHALMIC at 21:09

## 2024-01-01 RX ADMIN — SODIUM ZIRCONIUM CYCLOSILICATE 10 G: 10 POWDER, FOR SUSPENSION ORAL at 08:48

## 2024-01-01 RX ADMIN — ROPINIROLE HYDROCHLORIDE 0.5 MG: 0.25 TABLET, FILM COATED ORAL at 14:00

## 2024-01-01 RX ADMIN — POLYETHYLENE GLYCOL (3350) 17 G: 17 POWDER, FOR SOLUTION ORAL at 08:48

## 2024-01-01 RX ADMIN — INSULIN HUMAN 10 UNITS: 100 INJECTION, SOLUTION PARENTERAL at 09:46

## 2024-01-01 RX ADMIN — ACETAMINOPHEN 650 MG: 325 TABLET ORAL at 08:48

## 2024-01-01 RX ADMIN — FAMOTIDINE 10 MG: 20 TABLET ORAL at 08:47

## 2024-01-01 RX ADMIN — LEVOTHYROXINE SODIUM 50 MCG: 0.05 TABLET ORAL at 06:59

## 2024-01-01 RX ADMIN — SODIUM ZIRCONIUM CYCLOSILICATE 10 G: 10 POWDER, FOR SUSPENSION ORAL at 14:29

## 2024-01-01 RX ADMIN — ALPRAZOLAM 0.25 MG: 0.25 TABLET ORAL at 17:55

## 2024-01-01 RX ADMIN — FLUTICASONE PROPIONATE 1 PUFF: 110 AEROSOL, METERED RESPIRATORY (INHALATION) at 21:42

## 2024-01-01 RX ADMIN — SODIUM CHLORIDE, PRESERVATIVE FREE 10 ML: 5 INJECTION INTRAVENOUS at 08:48

## 2024-01-01 RX ADMIN — CILOSTAZOL 50 MG: 50 TABLET ORAL at 21:07

## 2024-01-01 RX ADMIN — FEBUXOSTAT 40 MG: 40 TABLET, FILM COATED ORAL at 08:47

## 2024-01-01 RX ADMIN — DEXTROSE MONOHYDRATE 250 ML: 100 INJECTION, SOLUTION INTRAVENOUS at 09:49

## 2024-01-01 RX ADMIN — ROPINIROLE HYDROCHLORIDE 0.5 MG: 0.25 TABLET, FILM COATED ORAL at 08:50

## 2024-01-01 ASSESSMENT — PAIN SCALES - GENERAL
PAINLEVEL_OUTOF10: 5
PAINLEVEL_OUTOF10: 0

## 2024-01-01 ASSESSMENT — PAIN DESCRIPTION - ONSET: ONSET: GRADUAL

## 2024-01-01 ASSESSMENT — PAIN SCALES - WONG BAKER
WONGBAKER_NUMERICALRESPONSE: 2

## 2024-01-01 ASSESSMENT — PAIN DESCRIPTION - LOCATION: LOCATION: BUTTOCKS

## 2024-01-01 ASSESSMENT — PAIN DESCRIPTION - ORIENTATION: ORIENTATION: LOWER

## 2024-01-01 ASSESSMENT — PAIN - FUNCTIONAL ASSESSMENT
PAIN_FUNCTIONAL_ASSESSMENT: ACTIVITIES ARE NOT PREVENTED
PAIN_FUNCTIONAL_ASSESSMENT: NONE - DENIES PAIN

## 2024-01-01 ASSESSMENT — PAIN DESCRIPTION - FREQUENCY: FREQUENCY: INTERMITTENT

## 2024-01-01 ASSESSMENT — PAIN DESCRIPTION - DESCRIPTORS: DESCRIPTORS: ACHING

## 2024-01-01 ASSESSMENT — PAIN DESCRIPTION - PAIN TYPE: TYPE: ACUTE PAIN

## 2024-01-01 NOTE — CARE COORDINATION
CM received phone call from upstairs from Karen FAROOQ. Patient is up for discharge. Karen FAROOQ stated that it appears that patient is to go to [facility] from hospital. CM looked at last CM notes and it appears that last notes state that only a referral had just been made to [facility]. WILBER placed a call to Isi Wooten at [facility] [phone] and left message on Isi' personal confidential VM. Isi to return call. CM will call Karen FAROOQ with update @ [phone]    [facility] Supervisor returned call to this CM from [phone]. CM returned call and as long as patient has discharge order, sign and held orders and Nurse completes a Nurse to Nurse report to [phone]. Patient can be transferred to [facility] this evening.     CM called Karen FAROOQ upstairs @ [id number] and shared information. Karen FAROOQ to let physician know.     CM offered to assist arranging transportation if needed to [facility]. Karen FAROOQ to let this CM know if needed.

## 2024-01-01 NOTE — PLAN OF CARE

## 2024-01-01 NOTE — DISCHARGE SUMMARY
V2.0  Discharge Summary    Name:  Nicci Alberto /Age/Sex: 1935 (88 y.o. female)   Admit Date: 2023  Discharge Date: 24    MRN & CSN:  [id number] & [id number] Encounter Date and Time 24 4:34 PM EST    Attending:  Ela Ojeda MD Discharging Provider: Ela Ojeda MD       Hospital Course:     Brief HPI: Nicci Alberto is a 88 y.o. female with a pmh of urethral stricture, urinary retention, CKD, constipation, restless leg syndrome, hypothyroidism, who presents with Acute on chronic urinary retention     Brief Problem Based Course:     Hematuria  Acute urinary retention  S/p cystoscopy evacuation of clots with fulguration of urethral bleeding  -Patient presented to urology office for hematuria, catheter was irrigated but patient ended up having a syncopal episode.  EMS called for patient to be taken to emergency.  -Patient taken to the OR where she underwent cystoscopy evacuation of clots with fulguration of urethral bleeding and cauterization  -Transfuse for hemoglobin less than 7 g per DL  -marrero removed  -voiding herself   -follow up with urology outpatient     Syncope in urology office likely vasovagal  -antihypertensives have been held at this time  -Continue to monitor     History of urethral stricture  -Patient had underwent cystoscopy with urethral dilatation 11 days prior to presentation     Mild hyponatremia  Hypochloremia  -Repeat BMP  -Maintenance IV fluid     KAREEM on CKD  -Likely in the setting of urinary retention  -Monitor renal function     Constipation  -Resume home medication     Restless leg syndrome  -Resume home medication     Hypothyroidism  -Resume levothyroxine    13-Hyperkalemia  Lokelma given twice  Potassium supplementation stopped  -repeat potassium in swing bed      The patient expressed appropriate understanding of, and agreement with the discharge recommendations, medications, and plan.     Consults this admission:  IP CONSULT TO UROLOGY    Discharge Diagnosis:  CLARITIN     magnesium oxide 400 MG tablet  Commonly known as: MAG-OX     metOLazone 2.5 MG tablet  Commonly known as: ZAROXOLYN     ondansetron 4 MG tablet  Commonly known as: ZOFRAN     OXYGEN     potassium chloride 20 MEQ extended release tablet  Commonly known as: KLOR-CON M     torsemide 10 MG tablet  Commonly known as: DEMADEX     vitamin B-12 1000 MCG tablet  Commonly known as: CYANOCOBALAMIN     Vitamin D3 1.25 MG (74676 UT) Caps             Objective Findings at Discharge:   BP (!) 122/39   Pulse 72   Temp 98.3 °F (36.8 °C) (Oral)   Resp 16   Ht 1.549 m (5' 1\")   Wt 77.3 kg (170 lb 6.7 oz)   SpO2 97%   BMI 32.20 kg/m²       Physical Exam:     General: NAD  Eyes: EOMI  ENT: neck supple  Cardiovascular: Regular rate.  Respiratory: Clear to auscultation  Gastrointestinal: Soft, non tender  Genitourinary: no suprapubic tenderness  Musculoskeletal: No edema  Skin: warm, dry  Neuro: Alert.  Psych: Mood appropriate.         Labs and Imaging   No results found.    CBC:   Recent Labs     12/30/23  0336 12/31/23  0601 01/01/24  0446   WBC 14.9* 12.8* 13.6*   HGB 9.6* 8.6* 8.7*    212 233     BMP:    Recent Labs     12/31/23  0601 01/01/24  0446 01/01/24  0743 01/01/24  1303   * 136  --  132*   K 4.7 5.9* 5.7* 5.7*  5.7*   CL 99 103  --  98*   CO2 28 25  --  26   BUN 83* 77*  --  70*   CREATININE 2.1* 1.8*  --  1.6*   GLUCOSE 116* 106*  --  167*     Hepatic:   Recent Labs     12/30/23  0336   AST 16   ALT 8*   BILITOT 0.4   ALKPHOS 61     Lipids:   Lab Results   Component Value Date/Time    CHOL 174 10/15/2016 08:25 AM    HDL 55 06/02/2021 09:20 AM    TRIG 129 10/15/2016 08:25 AM     Hemoglobin A1C:   Lab Results   Component Value Date/Time    LABA1C 6.5 08/16/2023 09:14 AM     TSH: No results found for: \"TSH\"  Troponin:   Lab Results   Component Value Date/Time    TROPONINT 0.051 08/10/2023 11:29 AM    TROPONINT 0.089 04/12/2023 07:28 AM    TROPONINT 0.064 04/12/2023 01:52 AM     Lactic Acid: No

## 2024-01-01 NOTE — PROGRESS NOTES
V2.0    [facility] [place]      Name:  Nicci Alberto /Age/Sex: 1935  (88 y.o. female)   MRN & CSN:  [id number] & [id number] Encounter Date/Time: 2024 9:07 AM EST   Location:  [facility] PCP: Fern Steven APRN - CNP     Attending:Ela Ojeda MD       Hospital Day: 4    Assessment and Recommendations   Nicci Alberto is a 88 y.o. female with a pmh of urethral stricture, urinary retention, CKD, constipation, restless leg syndrome, hypothyroidism, who presents with Acute on chronic urinary retention       Plan:     Hematuria  Acute urinary retention  S/p cystoscopy evacuation of clots with fulguration of urethral bleeding  -Patient presented to urology office for hematuria, catheter was irrigated but patient ended up having a syncopal episode.  EMS called for patient to be taken to emergency.  -Patient taken to the OR where she underwent cystoscopy evacuation of clots with fulguration of urethral bleeding and cauterization  -Bladder irrigation per urology,   -Recheck hemoglobin  -Transfuse for hemoglobin less than 7 g per DL  CBI has been running overnight, CBI stopped, hb is down trending, 8.6 today      Syncope in urology office likely vasovagal  -antihypertensives have been held at this time  -Continue to monitor     History of urethral stricture  -Patient had underwent cystoscopy with urethral dilatation 11 days prior to presentation     Mild hyponatremia  Hypochloremia  -Repeat BMP  -Maintenance IV fluid     KAREEM on CKD  -Likely in the setting of urinary retention  -Monitor renal function     Constipation  -Resume home medication     Restless leg syndrome  -Resume home medication     Hypothyroidism  -Resume levothyroxine      Diet ADULT DIET; Regular  ADULT ORAL NUTRITION SUPPLEMENT; Lunch; Low Calorie/High Protein Oral Supplement   DVT Prophylaxis [] Lovenox, []  Heparin, [] SCDs, [] Ambulation,  [] Eliquis, [] Xarelto  [] Coumadin   Code Status Full Code   Disposition From: Home  Expected  08/10/2023 11:29 AM    TROPONINT 0.089 04/12/2023 07:28 AM    TROPONINT 0.064 04/12/2023 01:52 AM     Lactic Acid: No results for input(s): \"LACTA\" in the last 72 hours.  BNP: No results for input(s): \"PROBNP\" in the last 72 hours.  UA:  Lab Results   Component Value Date/Time    NITRU NEGATIVE 12/26/2023 11:52 AM    COLORU YELLOW 12/26/2023 11:52 AM    WBCUA 15 12/26/2023 11:52 AM    RBCUA 6 12/26/2023 11:52 AM    MUCUS RARE 08/11/2023 12:44 PM    TRICHOMONAS NONE SEEN 12/17/2023 04:50 PM    YEAST FEW 08/07/2016 12:26 PM    BACTERIA NEGATIVE 12/26/2023 11:52 AM    CLARITYU CLEAR 12/26/2023 11:52 AM    SPECGRAV 1.010 12/26/2023 11:52 AM    LEUKOCYTESUR SMALL NUMBER OR AMOUNT OBSERVED 12/26/2023 11:52 AM    UROBILINOGEN 0.2 12/26/2023 11:52 AM    BILIRUBINUR NEGATIVE 12/26/2023 11:52 AM    BLOODU LARGE NUMBER OR AMOUNT OBSERVED 12/26/2023 11:52 AM    KETUA NEGATIVE 12/26/2023 11:52 AM     Urine Cultures: No results found for: \"LABURIN\"  Blood Cultures: No results found for: \"BC\"  No results found for: \"BLOODCULT2\"  Organism:   Lab Results   Component Value Date/Time    ORG ENC 03/08/2016 12:29 PM         Electronically signed by Ela Ojeda MD on 1/1/2024 at 1:43 PM

## 2024-01-01 NOTE — PROGRESS NOTES
Marrero came out yesterday inadvertently when transferring to bed  Voiding fine now  Would leave marrero out   F/U with Dr MONTGOMERY in next couple of weeks

## 2024-01-02 PROBLEM — R53.81 PHYSICAL DEBILITY: Status: ACTIVE | Noted: 2024-01-02

## 2024-01-02 PROCEDURE — 85025 COMPLETE CBC W/AUTO DIFF WBC: CPT

## 2024-01-02 PROCEDURE — 80048 BASIC METABOLIC PNL TOTAL CA: CPT

## 2024-01-02 PROCEDURE — 97116 GAIT TRAINING THERAPY: CPT

## 2024-01-02 PROCEDURE — 6370000000 HC RX 637 (ALT 250 FOR IP): Performed by: STUDENT IN AN ORGANIZED HEALTH CARE EDUCATION/TRAINING PROGRAM

## 2024-01-02 PROCEDURE — 6370000000 HC RX 637 (ALT 250 FOR IP)

## 2024-01-02 PROCEDURE — 94761 N-INVAS EAR/PLS OXIMETRY MLT: CPT

## 2024-01-02 PROCEDURE — 94640 AIRWAY INHALATION TREATMENT: CPT

## 2024-01-02 PROCEDURE — 97530 THERAPEUTIC ACTIVITIES: CPT

## 2024-01-02 PROCEDURE — 1200000002 HC SEMI PRIVATE SWING BED

## 2024-01-02 PROCEDURE — 97162 PT EVAL MOD COMPLEX 30 MIN: CPT

## 2024-01-02 PROCEDURE — 2700000000 HC OXYGEN THERAPY PER DAY

## 2024-01-02 PROCEDURE — 97166 OT EVAL MOD COMPLEX 45 MIN: CPT

## 2024-01-02 PROCEDURE — 6360000002 HC RX W HCPCS

## 2024-01-02 RX ADMIN — FLUTICASONE PROPIONATE 1 SPRAY: 50 SPRAY, METERED NASAL at 10:13

## 2024-01-02 RX ADMIN — LEVOTHYROXINE SODIUM 50 MCG: 0.05 TABLET ORAL at 04:10

## 2024-01-02 RX ADMIN — SENNOSIDES 8.6 MG: 8.6 TABLET, FILM COATED ORAL at 20:24

## 2024-01-02 RX ADMIN — ROPINIROLE HYDROCHLORIDE 0.5 MG: 0.5 TABLET, FILM COATED ORAL at 20:24

## 2024-01-02 RX ADMIN — ISOSORBIDE MONONITRATE 30 MG: 30 TABLET, EXTENDED RELEASE ORAL at 10:09

## 2024-01-02 RX ADMIN — CILOSTAZOL 50 MG: 50 TABLET ORAL at 20:24

## 2024-01-02 RX ADMIN — ROPINIROLE HYDROCHLORIDE 0.5 MG: 0.5 TABLET, FILM COATED ORAL at 15:44

## 2024-01-02 RX ADMIN — FAMOTIDINE 10 MG: 10 TABLET ORAL at 10:09

## 2024-01-02 RX ADMIN — FLUTICASONE PROPIONATE 1 PUFF: 110 AEROSOL, METERED RESPIRATORY (INHALATION) at 20:01

## 2024-01-02 RX ADMIN — CILOSTAZOL 50 MG: 50 TABLET ORAL at 10:08

## 2024-01-02 RX ADMIN — LATANOPROST 1 DROP: 50 SOLUTION/ DROPS OPHTHALMIC at 20:35

## 2024-01-02 RX ADMIN — TRAMADOL HYDROCHLORIDE 50 MG: 50 TABLET ORAL at 10:08

## 2024-01-02 RX ADMIN — CALCIUM 500 MG: 500 TABLET ORAL at 10:10

## 2024-01-02 RX ADMIN — ALPRAZOLAM 0.25 MG: 0.25 TABLET ORAL at 22:06

## 2024-01-02 RX ADMIN — TRAMADOL HYDROCHLORIDE 50 MG: 50 TABLET ORAL at 20:23

## 2024-01-02 RX ADMIN — ROPINIROLE HYDROCHLORIDE 0.5 MG: 0.5 TABLET, FILM COATED ORAL at 10:10

## 2024-01-02 RX ADMIN — FLUTICASONE PROPIONATE 1 PUFF: 110 AEROSOL, METERED RESPIRATORY (INHALATION) at 07:18

## 2024-01-02 RX ADMIN — ENOXAPARIN SODIUM 30 MG: 100 INJECTION SUBCUTANEOUS at 10:08

## 2024-01-02 RX ADMIN — MELATONIN TAB 3 MG 3 MG: 3 TAB at 22:07

## 2024-01-02 RX ADMIN — FEBUXOSTAT 40 MG: 40 TABLET, FILM COATED ORAL at 10:09

## 2024-01-02 ASSESSMENT — PAIN DESCRIPTION - DESCRIPTORS
DESCRIPTORS: ACHING;DISCOMFORT
DESCRIPTORS: ACHING

## 2024-01-02 ASSESSMENT — PAIN DESCRIPTION - PAIN TYPE
TYPE: ACUTE PAIN
TYPE: CHRONIC PAIN

## 2024-01-02 ASSESSMENT — PAIN DESCRIPTION - FREQUENCY
FREQUENCY: INTERMITTENT
FREQUENCY: INTERMITTENT

## 2024-01-02 ASSESSMENT — PAIN SCALES - GENERAL
PAINLEVEL_OUTOF10: 7
PAINLEVEL_OUTOF10: 7
PAINLEVEL_OUTOF10: 1
PAINLEVEL_OUTOF10: 0

## 2024-01-02 ASSESSMENT — PAIN DESCRIPTION - ORIENTATION
ORIENTATION: MID
ORIENTATION: LOWER

## 2024-01-02 ASSESSMENT — PAIN DESCRIPTION - ONSET
ONSET: PROGRESSIVE
ONSET: GRADUAL

## 2024-01-02 ASSESSMENT — PAIN DESCRIPTION - LOCATION
LOCATION: BUTTOCKS
LOCATION: BUTTOCKS

## 2024-01-02 ASSESSMENT — PAIN - FUNCTIONAL ASSESSMENT
PAIN_FUNCTIONAL_ASSESSMENT: PREVENTS OR INTERFERES SOME ACTIVE ACTIVITIES AND ADLS
PAIN_FUNCTIONAL_ASSESSMENT: ACTIVITIES ARE NOT PREVENTED

## 2024-01-02 NOTE — PROGRESS NOTES
[facility] PROGRAM PRE-ADMISSION ASSESSMENT  (TRADITIONAL MEDICARE PATIENTS)  Patient Name: Nicci Alberto      : 1935  (88 y.o.) Gender: female   Inpatient Admission Dater:   2023  Room:  MRN: [id number]    Home Phone #:  [phone]  Emergency Contact and Phone Number:  Spouse   Merlin Alberto  cell [phone]    Inpatient Admission Date: 2024 Date & Time of Referral: 2023    12:44 p.m.      Referred By: Andrey Ascencio MD Referred from:  [] [facility]   [x] Other:     # of Skilled Care Days Used in Last 60 days: 0 Insurance: [x]  Medicare                      []  Secondary - Type:     Present Condition/Diagnosis:    Physical debility [R53.81]  Generalized weakness [R53.1]      Previous Medical History:   Past Medical History:   Diagnosis Date    Asthma     Chronic kidney disease     acute kidney failure    Chronic ulcer of left leg with fat layer exposed (HCC) 3/7/2016    Chronic ulcer of right leg with fat layer exposed (HCC) 3/7/2016    Diabetes type 2, controlled (HCC)     History of asthma     History of blood transfusion 2015    Hypertension     Lymphedema of both lower extremities 3/7/2016    Osteoarthritis     Pneumonia     Thyroid disease     Venous stasis of both lower extremities 3/7/2016    WD-Non-pressure chronic ulcer right lower leg, 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 ([facility]) 2016        COGNITIVE/BEHAVIORAL  Change in cognitive status in last 90 days:  ( )no change  ( ) improved  ( ) deteriorated  Behavioral changes in last seven days:  ( ) wandering  ( ) verbally abusive  ( )phys. Abusive                                                                         ( ) socially inappropriate  ( ) resists care  ADL Performance Last Seven (7) Days (Please Score)   Patient’s performance over all shifts during last seven (7) days   0 = Independent - No help or oversight  1 = Supervision - Oversight, encouragement or cueing provided  2 = Limited Assist - Receives physical help in  guided maneuvering of limbs or other non-weight bearing assistance  3 = Extensive Assist - Patient performs part of the activity, weight bearing support was provided  4 = Dependence = Full staff performance of activity *NOTE: USE THE FOLLOWING SCORING FOR EATING ONLY:  1 = Supervision or Independent  2 = Limited Assist  3 = Dependent or Extensive Assist     SCORE   BED MOBILITY - How client moves to and from lying position, turns side to side, and positions body while in bed 3   TRANSFER - How resident moves between surfaces - to/from: bed, chair, wheelchair, standing position (EXCLUDE to/from bath/toilet) 3   TOILET USE - How client uses the toilet room (or commode, bedpan, urinal);transfers on/off toilet, cleanses, changes pad, manages ostomy or catheter, adjusts clothes 3   EATING (SCORE 1-3 ONLY*) - How client eats and drinks (regardless of skill). Includes intake of nourishment by other means (e.g., tube feeding, total parenteral nutrition) 1   Estimated Pre-Admission ADL Value: 10     PATIENT WILL RECEIVE THE FOLLOWING SKILLED SERVICES AS A SWING BED PATIENT:       REHABILITATION (PT/OT/SP)    [] ULTRA HIGH 720 or more minutes minimum per week of at least two (2) disciplines - 1st for at least five (5) days and 2nd for at least three (3) days   [] VERY HIGH 500 or more minutes minimum per week of at least one (1) discipline for at least five (5) days   [x] HIGH 325 or more minutes minimum per week of at least one (1) discipline for at least five (5) days   [] MEDIUM 150 or more minutes minimum per week at least five (5) days of any combination with three (3) therapies   [] LOW Restorative nursing at least six (6) days, two (2) activities, or therapies for at least three (3) days at least forty-five (45) minute per week minimum services.        EXTENSIVE SERVICES   [] Tracheostomy Care   [] Ventilator/Respirator   [] Infection Isolation   SPECIAL CARE HIGH   [] MS with ADL greater than or equal to 10  []

## 2024-01-02 NOTE — H&P
V2.0  History and Physical      Name:  Nicci Alberto /Age/Sex: 1935  (88 y.o. female)   MRN & CSN:  [id number] & [id number] Encounter Date/Time: 2024 8:25 AM EST   Location:   PCP: Fern Steven APRN - CNP       Hospital Day: 2    Assessment and Plan:   Nicci Alberto is a 88 y.o. female who presents with Generalized weakness    Hospital Problems             Last Modified POA    * (Principal) Generalized weakness 2024 Yes    Physical debility 2024 Yes       Plan:  Physical Debility: Continue PT/OT daily  Hematuria: Resolved s/p cysto with clot evacuation and cauterization at [facility], f/u Urology as OP  Hyperkalmia: Present on discharge, repeat BMP today; unclear if was treated at [facility]  Hx Ureteral Stricture: F/u Urology as an OP  Hx Restless Leg Syndrome: Continue home Requip  Hx Hypothyroidism: Continue Levothyroxine  Hx Constipation: Continue bowel regimen PRN  Hx CKD Stage 3b: Cr stable at d/c yesterday; previously on Torsemide and Metolazone however no longer on med list, f/u with Nephrology as an OP as appears to be prescribed as needed  Hx VHD: Moderate MS, f/u Cardiology as OP  Hx HFpEF: Stable, f/u PCP/Cardiology as an OP  Hx HTN: Continue Imdur  Hx Type II DM: Not currently on anti-diabetic meds per chart review, monitor and f/u PCP as an OP  Hx PAD: Continue Pletal  Hx Complete Heart Block: s/p PPM in past    Disposition:   Current Living situation: Home  Expected Disposition: Home  Estimated D/C: Pending PT progress    Diet ADULT ORAL NUTRITION SUPPLEMENT; Lunch; Low Calorie/High Protein Oral Supplement  ADULT DIET; Regular   DVT Prophylaxis [x] Lovenox, []  Heparin, [] SCDs, [] Ambulation,  [] Eliquis, [] Xarelto, [] Coumadin   Code Status Full Code   Surrogate Decision Maker/ POA          History from:     patient    History of Present Illness:     Chief Complaint: Physical Debility  Nicci Alberto is a 88 y.o. female with pmh of Urethral stricture, CKD, Constipation,

## 2024-01-02 NOTE — PROGRESS NOTES
Facility/Department: [facility]  Physical Therapy Initial Assessment    Name Nicci ELIAS 1935   MRN [id number]   Date of Service 2024   Patient Room  010/010-01     Patient Diagnoses Physical Debility   Past Medical History  has a past medical history of Asthma, Chronic kidney disease, Chronic ulcer of left leg with fat layer exposed (HCC), Chronic ulcer of right leg with fat layer exposed (HCC), Diabetes type 2, controlled (HCC), History of asthma, History of blood transfusion, Hypertension, Lymphedema of both lower extremities, Osteoarthritis, Pneumonia, Thyroid disease, Venous stasis of both lower extremities, and WD-Non-pressure chronic ulcer right lower leg, 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 (HCC).   Past Surgical History  has a past surgical history that includes Urethra surgery; Appendectomy; Hysterectomy; Cholecystectomy; Cystoscopy; eye surgery; joint replacement (Right); Pacemaker insertion (N/A, 2020); Cystoscopy (N/A, 2023); and Cystoscopy (N/A, 2023).       Discharge Recommendations  Home with Home Health PT  Equipment: None    Assessment  Conditions Requiring Skilled Therapeutic Intervention: Decreased functional mobility, Decreased strength, Decreased endurance, Decreased balance, Decreased ADL status, and Decreased high-level ADLs/IADLs  Assessment of Evaluation: Patient is a 88 y.o. female who presents to [facility] for the swingbed program post hospitalization for hematuria, acute urinary retention, and weakness. Pt underwent cystoscopy evacuation of clots with fulguration of urethral bleeding and cauterization and was given blood transfusion for hemoglobin less than 7g/dL. Patient presents below baseline functional level and would benefit from continued skilled physical therapy services to address decreased strength, endurance, impaired balance, and decline in functional mobility.  Treatment Diagnosis: Muscle weakness (generalized)  Therapy Prognosis: Good  Decision  R/L: 3+/5 observed through functional mobility   Neuro:  WFL      Mobility  Bed mobility: Not tested pt in chair  Transfers: Contact Guard Assistance from recliner and commode  Sitting balance:  good   Standing balance:  fair+  with use of RW   Gait: Pt amb 2x20ft with Contact Guard Assistance and RW, standing break between. Small shuffling steps noted.  Stairs: Not tested      AM-PAC Score  Basic Mobility Six Clicks Form   How much difficulty does the patient currently have… Unable   (pt is unable to do activity) A Lot   (activity is a struggle, requires great effort/time) A Little   (pt can manage, but takes more effort/time than should) None   (pt has no difficulty)   Turning over in bed   []1 [x]2  []3  []4    STS from a chair with arms  []1 []2 [x]3   []4     Supine to/from sitting EOB []1 []2  [x]3   []4     Assistance required Total   (Total/Dependent Assist) A Lot   (Max/Mod Assist) A Little   (Min/CGA/Supervision) None   (No human assistance)   Moving to and from a bed to a chair  []1  []2   [x]3  []4     To walk in a hospital room? []1 []2   [x]3    []4    Climbing 3-5 steps with a railing? []1  [x]2   []3    []4       Raw Score 16   Standardized Score 40.78   CMS 0-100% Score 54.16%   CMS Modifier CK   CK = 40-60% impaired      Goals  Patient Goals:  Time frame: 1 week or until discharge  - Patient will complete STS transfers from EOB, commode, and bedside chair given SUP  - Patient will ambulate 150ft with RW given SUP  - Patient will perform dynamic standing balance activities for 3 minutes without LOB.   - Patient will complete 5 time STS in 30 sec or less in order to improve function and decrease risk of falls    Education  Given to: Patient and Family  Education provided: Role of therapy, Plan of care, HEP, Transfer training, and Energy conservation   Education method: Verbal and Demonstration      Therapy Time   Individual Co-Eval   Time In  0930   Time Out  0955   Total Time  25       Signed:  Lily

## 2024-01-02 NOTE — PROGRESS NOTES
Occupational Therapy  Facility/Department: Replaced by [facility]  Occupational Therapy Initial Assessment    Name: Nicci Alberto  : 1935  MRN: [id number]  Date of Service: 2024    Discharge Recommendations:  Home with assist PRN  OT Equipment Recommendations  Equipment Needed:  (will further assess)       Patient Diagnosis(es): There were no encounter diagnoses.  Past Medical History:  has a past medical history of Asthma, Chronic kidney disease, Chronic ulcer of left leg with fat layer exposed (HCC), Chronic ulcer of right leg with fat layer exposed (HCC), Diabetes type 2, controlled (HCC), History of asthma, History of blood transfusion, Hypertension, Lymphedema of both lower extremities, Osteoarthritis, Pneumonia, Thyroid disease, Venous stasis of both lower extremities, and WD-Non-pressure chronic ulcer right lower leg, 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 (HCC).  Past Surgical History:  has a past surgical history that includes Urethra surgery; Appendectomy; Hysterectomy; Cholecystectomy; Cystoscopy; eye surgery; joint replacement (Right); Pacemaker insertion (N/A, 2020); Cystoscopy (N/A, 2023); and Cystoscopy (N/A, 2023).    Treatment Diagnosis: Weakness      Assessment   Performance deficits / Impairments: Decreased functional mobility ;Decreased ADL status;Decreased endurance;Decreased strength;Decreased balance;Decreased high-level IADLs  Assessment: Pt is an 89 yo female admitted to [facility] unit for physical debility s/p hospitalization for Acute on chronic urinary retention.  Pt presents before baseline function and abhi benefit from Occupational Therapy to improve strength, endurance, functional mobility and indep with ADLs.  Treatment Diagnosis: Weakness  Prognosis: Good  Decision Making: Medium Complexity  REQUIRES OT FOLLOW-UP: Yes  Activity Tolerance  Activity Tolerance: Patient Tolerated treatment well;Patient limited by fatigue        Plan   Occupational Therapy Plan  Times  chair  Safety Devices  Type of Devices: All fall risk precautions in place;Patient at risk for falls;Call light within reach;Left in chair;Chair alarm in place;Gait belt;Nurse notified  Restraints  Restraints Initially in Place: No     Toilet Transfers  Toilet - Technique: Ambulating  Equipment Used: Standard toilet (1 grab bar)  Toilet Transfer: Minimal assistance  AROM: Within functional limits  Strength: Generally decreased, functional  Tone: Normal  Sensation: Intact  ADL  Feeding: Modified independent   Grooming: Contact guard assistance  UE Bathing: Stand by assistance  LE Bathing: Moderate assistance  UE Dressing: Stand by assistance  LE Dressing: Maximum assistance  Toileting: Minimal assistance (MIn A to manage brief and CGA as Pt stood to perform micha care)  Functional Mobility: Contact guard assistance  Additional Comments: Based on observation of Pt performance or current functional mobility, strength, endurance status        Bed mobility  Bed Mobility Comments: Not assessed, Pt seen out of bed  Transfers  Stand Step Transfers: Contact guard assistance  Sit to stand: Contact guard assistance  Stand to sit: Contact guard assistance  Transfer Comments: Pt amb across room and across hallway then back in room to chair using RW, CGA  Vision  Vision: Impaired  Vision Exceptions: Wears glasses at all times  Hearing  Hearing: Within functional limits  Cognition  Overall Cognitive Status: WFL  Orientation  Overall Orientation Status: Within Functional Limits                  Education Given To: Patient;Family  Education Provided: Role of Therapy;Transfer Training;Plan of Care;Energy Conservation  Education Method: Demonstration  Barriers to Learning: None  Education Outcome: Verbalized understanding;Demonstrated understanding                        G-Code     OutComes Score                                                  AM-PAC - ADL   AM-PAC 6 click short form for inpatient daily activity:   How much help from

## 2024-01-02 NOTE — PROGRESS NOTES
4 Eyes Skin Assessment     NAME:  Nicci Alberto  YOB: 1935  MEDICAL RECORD NUMBER:  [id number]    The patient is being assessed for  Admission    I agree that at least one RN has performed a thorough Head to Toe Skin Assessment on the patient. ALL assessment sites listed below have been assessed.      Areas assessed by both nurses:    Head, Face, Ears, Shoulders, Back, Chest, Arms, Elbows, Hands, Sacrum. Buttock, Coccyx, Ischium, and Legs. Feet and Heels        Does the Patient have a Wound? Yes wound(s) were present on assessment. LDA wound assessment was Initiated and completed by RN       Elier Prevention initiated by RN: Yes  Wound Care Orders initiated by RN: Yes    Pressure Injury (Stage 3,4, Unstageable, DTI, NWPT, and Complex wounds) if present, place Wound referral order by RN under : No    New Ostomies, if present place, Ostomy referral order under : No     Nurse 1 eSignature: Electronically signed by Jessica Veloz RN on 1/1/24 at 9:51 PM EST    **SHARE this note so that the co-signing nurse can place an eSignature**    Nurse 2 eSignature: Electronically signed by Clara Stock RN on 1/2/24 at 1:30 AM EST

## 2024-01-02 NOTE — CARE COORDINATION
01/02/24 1230   Service Assessment   Patient Orientation Alert and Oriented   Cognition Alert   History Provided By Patient   Primary Caregiver Spouse   Support Systems Spouse/Significant Other;Children   Patient's Healthcare Decision Maker is: Legal Next of Kin   PCP Verified by CM Yes   Prior Functional Level Assistance with the following:;Housework;Shopping   Current Functional Level Assistance with the following:;Bathing;Dressing;Toileting;Shopping;Housework;Mobility   Can patient return to prior living arrangement Yes   Ability to make needs known: Good   Family able to assist with home care needs: Yes   Would you like for me to discuss the discharge plan with any other family members/significant others, and if so, who? Yes  (Spouse)   Financial Resources Medicare   Community Resources None   Social/Functional History   Lives With Spouse   Type of Home House   Home Layout One level   Home Access Ramped entrance   Bathroom Shower/Tub Walk-in shower   Bathroom Equipment Grab bars around toilet;Grab bars in shower   Bathroom Accessibility Accessible   Home Equipment Walker, rolling;Oxygen;Lift chair   Receives Help From Family   ADL Assistance Independent   Toileting Independent   Homemaking Assistance Needs assistance   Homemaking Responsibilities Yes   Ambulation Assistance Independent   Transfer Assistance Independent   Active  No   Patient's  Info    Discharge Planning   Type of Residence House   Living Arrangements Spouse/Significant Other   Current Services Prior To Admission Oxygen Therapy   Potential Assistance Needed Home Care   DME Ordered? No   Potential Assistance Purchasing Medications No   Type of Home Care Services None   Patient expects to be discharged to: House   One/Two Story Residence One story   History of falls? 0   Services At/After Discharge   Transition of Care Consult (CM Consult) N/A   Services At/After Discharge PT    Resource Information Provided? No   Mode

## 2024-01-02 NOTE — CONSULTS
[facility]ound Ostomy Continence Nurse  Consult Note       Nicci Alberto  AGE: 88 y.o.   GENDER: female  : 1935  TODAY'S DATE:  2024    Subjective:     Reason for [facility] Evaluation and Assessment: coccyx wound      Nicci Alberto is a 88 y.o. female referred by:   [] Physician  [] Nursing  [] Other:     Wound Identification:  Wound Type: pressure  Contributing Factors: chronic pressure        PAST MEDICAL HISTORY        Diagnosis Date    Asthma     Chronic kidney disease     acute kidney failure    Chronic ulcer of left leg with fat layer exposed (HCC) 3/7/2016    Chronic ulcer of right leg with fat layer exposed (HCC) 3/7/2016    Diabetes type 2, controlled ([facility])     History of asthma     History of blood transfusion 2015    Hypertension     Lymphedema of both lower extremities 3/7/2016    Osteoarthritis     Pneumonia     Thyroid disease     Venous stasis of both lower extremities 3/7/2016    WD-Non-pressure chronic ulcer right lower leg, 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 ([facility]) 2016       PAST SURGICAL HISTORY    Past Surgical History:   Procedure Laterality Date    APPENDECTOMY      CHOLECYSTECTOMY      CYSTOSCOPY      CYSTOSCOPY N/A 2023    CYSTOSCOPY URETERAL DILATION performed by Rajat Hyde MD at [facility] OR    CYSTOSCOPY N/A 2023    CYSTOSCOPY EXTENSIVE EVACUATION OF CLOTS WITH FULGURATION OF URETHRAL BLEEDING performed by Neva Solares MD at [facility] OR    EYE SURGERY      bilateral implants    HYSTERECTOMY (CERVIX STATUS UNKNOWN)      JOINT REPLACEMENT Right     knee    PACEMAKER INSERTION N/A 2020    PACEMAKER INSERTION PERMANENT REMOVAL OF TEMPORARY PACEMAKER performed by Fahad Ann MD at [facility] OR    URETHRA SURGERY         FAMILY HISTORY    Family History   Problem Relation Age of Onset    Heart Disease Father        SOCIAL HISTORY    Social History     Tobacco Use    Smoking status: Never    Smokeless tobacco: Never   Vaping Use    Vaping Use: Never used  Venous stasis dermatitis of both lower extremities    Lymphedema of both lower extremities    Type 2 diabetes mellitus with neurologic complication, with long-term current use of insulin (HCC)    Diet-controlled type 2 diabetes mellitus (HCC)    Obesity (BMI 30-39.9)    Urinary retention    Acute on chronic urinary retention    Physical debility       Measurements:  Wound 12/18/23 Coccyx (Active)   Wound Image   01/02/24 1609   Wound Etiology Pressure Stage 2 01/02/24 1609   Dressing Status New dressing applied 01/02/24 1609   Wound Cleansed Wound cleanser 01/02/24 1609   Dressing/Treatment Collagen;Silicone border 01/02/24 1609   Wound Length (cm) 0.7 cm 01/02/24 1609   Wound Width (cm) 0.5 cm 01/02/24 1609   Wound Depth (cm) 0.1 cm 01/02/24 1609   Wound Surface Area (cm^2) 0.35 cm^2 01/02/24 1609   Change in Wound Size % (l*w) 78.79 01/02/24 1609   Wound Volume (cm^3) 0.035 cm^3 01/02/24 1609   Wound Healing % 79 01/02/24 1609   Distance Tunneling (cm) 0 cm 01/02/24 1609   Tunneling Position ___ O'Clock 0 01/02/24 1609   Undermining Starts ___ O'Clock 0 01/02/24 1609   Undermining Ends___ O'Clock 0 01/02/24 1609   Undermining Maxium Distance (cm) 0 01/02/24 1609   Wound Assessment Pale granulation tissue 01/02/24 1609   Drainage Amount Scant (moist but unmeasurable) 01/02/24 1609   Drainage Description Serosanguinous 01/02/24 1609   Odor None 01/02/24 1609   Leatha-wound Assessment Blanchable erythema 01/02/24 1609   Margins Defined edges 01/02/24 1609   Wound Thickness Description not for Pressure Injury Partial thickness 01/02/24 1609   Number of days: 15       Response to treatment:  Well tolerated by patient.     Pain Assessment:  Severity:  none  Quality of pain: n/a  Wound Pain Timing/Severity: n/a  Premedicated: no    Plan:     Plan of Care: Wound 12/18/23 Coccyx-Dressing/Treatment: Collagen, Silicone border    Patient resting in chair, agreeable to wound care care this time. Patient known to this RN through

## 2024-01-02 NOTE — PLAN OF CARE
Problem: Safety - Adult  Goal: Free from fall injury  Outcome: Progressing     Problem: ABCDS Injury Assessment  Goal: Absence of physical injury  Outcome: Progressing     Problem: Discharge Planning  Goal: Discharge to home or other facility with appropriate resources  Outcome: Progressing     Problem: Pain  Goal: Verbalizes/displays adequate comfort level or baseline comfort level  Outcome: Progressing     Problem: Skin/Tissue Integrity  Goal: Absence of new skin breakdown  Description: 1.  Monitor for areas of redness and/or skin breakdown  2.  Assess vascular access sites hourly  3.  Every 4-6 hours minimum:  Change oxygen saturation probe site  4.  Every 4-6 hours:  If on nasal continuous positive airway pressure, respiratory therapy assess nares and determine need for appliance change or resting period.  Outcome: Progressing

## 2024-01-02 NOTE — PROGRESS NOTES
[facility]  Swing Bed Evaluation for Certification for Skilled Care                     Nicci Alberto meets skilled criteria due to the need for   [x ] Therapy; physical and occupational; for decreased strength, balance and self     care activities.  [ ] Tpn   [ ] Trach care  [ ] IV therapy  [ ] Wound care     Nicci Alberto lives [ ] Alone                             [x ] With Spouse                             [ ] Other:                             And plans on returning there at discharge.    Nicci Alberto prefers this facility for skilled care.     Nicci Alberto will require skilled care on a daily basis beginning 01/01/2023 , if medically stable.    Estimated length of stay [ x] 1-2 weeks                                         [ ] 2-3 weeks                                         [ ] 3-4 weeks    Isi Wooten

## 2024-01-03 LAB
ANION GAP SERPL CALCULATED.3IONS-SCNC: 9 MMOL/L (ref 7–16)
BASOPHILS ABSOLUTE: 0 K/CU MM
BASOPHILS RELATIVE PERCENT: 0.4 % (ref 0–1)
BUN SERPL-MCNC: 58 MG/DL (ref 6–23)
CALCIUM SERPL-MCNC: 9.3 MG/DL (ref 8.3–10.6)
CHLORIDE BLD-SCNC: 99 MMOL/L (ref 99–110)
CO2: 27 MMOL/L (ref 21–32)
CREAT SERPL-MCNC: 1.6 MG/DL (ref 0.6–1.1)
DIFFERENTIAL TYPE: ABNORMAL
EOSINOPHILS ABSOLUTE: 0.5 K/CU MM
EOSINOPHILS RELATIVE PERCENT: 5.3 % (ref 0–3)
GFR SERPL CREATININE-BSD FRML MDRD: 31 ML/MIN/1.73M2
GLUCOSE SERPL-MCNC: 144 MG/DL (ref 70–99)
HCT VFR BLD CALC: 26.2 % (ref 37–47)
HEMOGLOBIN: 8.2 GM/DL (ref 12.5–16)
IMMATURE NEUTROPHIL %: 1.3 % (ref 0–0.43)
LYMPHOCYTES ABSOLUTE: 1.8 K/CU MM
LYMPHOCYTES RELATIVE PERCENT: 20.7 % (ref 24–44)
MCH RBC QN AUTO: 32.4 PG (ref 27–31)
MCHC RBC AUTO-ENTMCNC: 31.3 % (ref 32–36)
MCV RBC AUTO: 103.6 FL (ref 78–100)
MONOCYTES ABSOLUTE: 0.8 K/CU MM
MONOCYTES RELATIVE PERCENT: 9.6 % (ref 0–4)
PDW BLD-RTO: 12.5 % (ref 11.7–14.9)
PLATELET # BLD: 230 K/CU MM (ref 140–440)
PMV BLD AUTO: 8.7 FL (ref 7.5–11.1)
POTASSIUM SERPL-SCNC: 3.7 MMOL/L (ref 3.5–5.1)
RBC # BLD: 2.53 M/CU MM (ref 4.2–5.4)
SEGMENTED NEUTROPHILS ABSOLUTE COUNT: 5.3 K/CU MM
SEGMENTED NEUTROPHILS RELATIVE PERCENT: 62.7 % (ref 36–66)
SODIUM BLD-SCNC: 135 MMOL/L (ref 135–145)
TOTAL IMMATURE NEUTOROPHIL: 0.11 K/CU MM
WBC # BLD: 8.5 K/CU MM (ref 4–10.5)

## 2024-01-03 PROCEDURE — 1200000002 HC SEMI PRIVATE SWING BED

## 2024-01-03 PROCEDURE — 6360000002 HC RX W HCPCS

## 2024-01-03 PROCEDURE — 6370000000 HC RX 637 (ALT 250 FOR IP): Performed by: NURSE PRACTITIONER

## 2024-01-03 PROCEDURE — 97110 THERAPEUTIC EXERCISES: CPT

## 2024-01-03 PROCEDURE — 97116 GAIT TRAINING THERAPY: CPT

## 2024-01-03 PROCEDURE — 94640 AIRWAY INHALATION TREATMENT: CPT

## 2024-01-03 PROCEDURE — 97535 SELF CARE MNGMENT TRAINING: CPT

## 2024-01-03 PROCEDURE — 85025 COMPLETE CBC W/AUTO DIFF WBC: CPT

## 2024-01-03 PROCEDURE — 6370000000 HC RX 637 (ALT 250 FOR IP): Performed by: STUDENT IN AN ORGANIZED HEALTH CARE EDUCATION/TRAINING PROGRAM

## 2024-01-03 PROCEDURE — 36415 COLL VENOUS BLD VENIPUNCTURE: CPT

## 2024-01-03 PROCEDURE — 94761 N-INVAS EAR/PLS OXIMETRY MLT: CPT

## 2024-01-03 PROCEDURE — 80048 BASIC METABOLIC PNL TOTAL CA: CPT

## 2024-01-03 PROCEDURE — 6370000000 HC RX 637 (ALT 250 FOR IP)

## 2024-01-03 RX ORDER — SENNOSIDES A AND B 8.6 MG/1
1 TABLET, FILM COATED ORAL NIGHTLY PRN
Status: DISCONTINUED | OUTPATIENT
Start: 2024-01-03 | End: 2024-01-06 | Stop reason: HOSPADM

## 2024-01-03 RX ORDER — CILOSTAZOL 50 MG/1
50 TABLET ORAL
Status: DISCONTINUED | OUTPATIENT
Start: 2024-01-03 | End: 2024-01-06 | Stop reason: HOSPADM

## 2024-01-03 RX ORDER — TRAMADOL HYDROCHLORIDE 50 MG/1
50 TABLET ORAL 2 TIMES DAILY PRN
Status: DISCONTINUED | OUTPATIENT
Start: 2024-01-03 | End: 2024-01-06 | Stop reason: HOSPADM

## 2024-01-03 RX ADMIN — LEVOTHYROXINE SODIUM 50 MCG: 0.05 TABLET ORAL at 06:22

## 2024-01-03 RX ADMIN — ISOSORBIDE MONONITRATE 30 MG: 30 TABLET, EXTENDED RELEASE ORAL at 09:57

## 2024-01-03 RX ADMIN — MELATONIN TAB 3 MG 3 MG: 3 TAB at 23:06

## 2024-01-03 RX ADMIN — ALPRAZOLAM 0.25 MG: 0.25 TABLET ORAL at 16:18

## 2024-01-03 RX ADMIN — TRAMADOL HYDROCHLORIDE 50 MG: 50 TABLET ORAL at 23:05

## 2024-01-03 RX ADMIN — ROPINIROLE HYDROCHLORIDE 0.5 MG: 0.5 TABLET, FILM COATED ORAL at 16:14

## 2024-01-03 RX ADMIN — CILOSTAZOL 50 MG: 50 TABLET ORAL at 16:14

## 2024-01-03 RX ADMIN — FLUTICASONE PROPIONATE 1 PUFF: 110 AEROSOL, METERED RESPIRATORY (INHALATION) at 07:55

## 2024-01-03 RX ADMIN — FLUTICASONE PROPIONATE 1 SPRAY: 50 SPRAY, METERED NASAL at 10:01

## 2024-01-03 RX ADMIN — ROPINIROLE HYDROCHLORIDE 0.5 MG: 0.5 TABLET, FILM COATED ORAL at 20:26

## 2024-01-03 RX ADMIN — FAMOTIDINE 10 MG: 10 TABLET ORAL at 09:57

## 2024-01-03 RX ADMIN — CALCIUM 500 MG: 500 TABLET ORAL at 09:57

## 2024-01-03 RX ADMIN — SENNOSIDES 8.6 MG: 8.6 TABLET, FILM COATED ORAL at 20:36

## 2024-01-03 RX ADMIN — ROPINIROLE HYDROCHLORIDE 0.5 MG: 0.5 TABLET, FILM COATED ORAL at 09:57

## 2024-01-03 RX ADMIN — FLUTICASONE PROPIONATE 1 PUFF: 110 AEROSOL, METERED RESPIRATORY (INHALATION) at 19:20

## 2024-01-03 RX ADMIN — POLYETHYLENE GLYCOL 3350 17 G: 17 POWDER, FOR SOLUTION ORAL at 12:03

## 2024-01-03 RX ADMIN — ENOXAPARIN SODIUM 30 MG: 100 INJECTION SUBCUTANEOUS at 10:06

## 2024-01-03 RX ADMIN — FEBUXOSTAT 40 MG: 40 TABLET, FILM COATED ORAL at 09:57

## 2024-01-03 RX ADMIN — TRAMADOL HYDROCHLORIDE 50 MG: 50 TABLET ORAL at 10:05

## 2024-01-03 RX ADMIN — LATANOPROST 1 DROP: 50 SOLUTION/ DROPS OPHTHALMIC at 20:26

## 2024-01-03 RX ADMIN — CILOSTAZOL 50 MG: 50 TABLET ORAL at 12:03

## 2024-01-03 ASSESSMENT — PAIN DESCRIPTION - ORIENTATION
ORIENTATION: MID
ORIENTATION: MID;RIGHT;LEFT

## 2024-01-03 ASSESSMENT — PAIN SCALES - GENERAL
PAINLEVEL_OUTOF10: 4
PAINLEVEL_OUTOF10: 7
PAINLEVEL_OUTOF10: 7
PAINLEVEL_OUTOF10: 0

## 2024-01-03 ASSESSMENT — PAIN DESCRIPTION - PAIN TYPE
TYPE: ACUTE PAIN
TYPE: CHRONIC PAIN

## 2024-01-03 ASSESSMENT — PAIN DESCRIPTION - LOCATION
LOCATION: BUTTOCKS
LOCATION: BUTTOCKS

## 2024-01-03 ASSESSMENT — PAIN DESCRIPTION - FREQUENCY: FREQUENCY: INTERMITTENT

## 2024-01-03 ASSESSMENT — PAIN DESCRIPTION - DESCRIPTORS
DESCRIPTORS: ACHING
DESCRIPTORS: ACHING

## 2024-01-03 ASSESSMENT — PAIN DESCRIPTION - ONSET: ONSET: GRADUAL

## 2024-01-03 NOTE — PROGRESS NOTES
Met with Patient today for our Activity. Patient has no other activity needs at this time.  Gilda Andrew,  Activities Asst.

## 2024-01-03 NOTE — PROGRESS NOTES
Occupational Therapy    Occupational Therapy Treatment Note  Name: Nicci Alberto MRN: [id number] :   1935   Date:  1/3/2024   Admission Date: 2024 Room:  [address] [place] [address]     Primary Problem:   physical debility s/p hospitalization for Acute on chronic urinary retention     Restrictions/Precautions:  Restrictions/Precautions  Restrictions/Precautions: General Precautions, Fall Risk     Communication with other providers:   Cleared for treatment by  RN.    Subjective:  Patient states:  \"I need to get stronger before I go home\"  Pain:   Location, Type, Intensity (0/10 to 10/10):  no pain    Objective:    Observation:  pt alert and oriented    Treatment, including education:  Transfers    Sit to stand :Stand By Assistance  Stand to sit :Stand By Assistance  SPT:Stand By Assistance  Toilet:Stand By Assistance    Self Care Training:   Activities performed today included the following:    Toileting  Minimal Assistance thoroughness of toilet hygiene of BM     Grooming  Modified Independent to wash face and hair care.    Bathing   Minimal Assistance to wash B feet and was SBA at RW    UB Dress  Minimal Assistance for fastening bra and pt able to don bra and shirt.    LB Dress  Moderate Assistance pt require assistance to thread B feet and brief and pt able to pull up both.      Safety Measures: Gait belt used, Left in bed/up in the recliner, Pull/Bed Alarm activated and call light left in reach        Assessment / Impression:        Patient's tolerance of treatment: Good   Adverse Reaction: None  Significant change in status and impact:  None  Barriers to improvement:  Dec strength and endurance    Plan for Next Session:    Continue with POC.    Time in:  845  Time out:  0938  Total treatment time:  53  Billed Units: 4 ADL  Electronically signed by:    ROCHELLE Davis 1990    1/3/2024, 12:57 PM    Previously filed values:  Patient Goals   Patient goals : To get stronger  Short Term Goals  Time Frame for

## 2024-01-03 NOTE — PLAN OF CARE
Problem: Chronic Conditions and Co-morbidities  Goal: Patient's chronic conditions and co-morbidity symptoms are monitored and maintained or improved  Outcome: Progressing     Problem: Safety - Adult  Goal: Free from fall injury  Outcome: Progressing     Problem: ABCDS Injury Assessment  Goal: Absence of physical injury  Outcome: Progressing     Problem: Discharge Planning  Goal: Discharge to home or other facility with appropriate resources  Outcome: Progressing     Problem: Pain  Goal: Verbalizes/displays adequate comfort level or baseline comfort level  Outcome: Progressing     Problem: Skin/Tissue Integrity  Goal: Absence of new skin breakdown  Description: 1.  Monitor for areas of redness and/or skin breakdown  2.  Assess vascular access sites hourly  3.  Every 4-6 hours minimum:  Change oxygen saturation probe site  4.  Every 4-6 hours:  If on nasal continuous positive airway pressure, respiratory therapy assess nares and determine need for appliance change or resting period.  Outcome: Progressing

## 2024-01-03 NOTE — FLOWSHEET NOTE
Physical Therapy Daily Treatment Note   Date: 1/3/2024 Room: [address] [place] [address]    Name: Nicci Alberto : 1935   MRN: [id number] Admission Date:2024      Restrictions/Precautions:   General precautions, fall risk    Initial Pain level: 0/10    Subjective/Observation: Pt resting in chair upon arrival. States she had some dizziness when getting up from the commode earlier. Requests to have BP checked. Sitting in chair 133/44, standing 127/51. Pt asymptomatic during BP readings and throughout session.     Communication with other providers: nursing notified of pt's dizziness      Therapeutic Activities/Neuro Re-Education/Gait Training   Date: 1/3/24   Date:  Date:  Date:    Bed   Mobility   x          STS   Transfer SBA from recliner and w/c with proper hand placement      Commode Transfer   x        Standing Balance   SBA for amb with RW        Ambulation Pt amb 43ft + 64ft + 86ft with RW and SBA. Seated rest required between due to fatigue. Pt demos step-to gait pattern leading with LLE and slight trendelenburg pattern noted with fatigue.        Stairs   x          Therapeutic Exercises   Date: 1/3/24   Date:  Date:  Date:   Seated  2# ankle wt   10x ea      LAQ       HR/TR 20x      Hip abd YTB 15x focus on eccentric control      Repeated STS 2x5          Objective:   5 Time STS: 43 seconds (1/3/24)    Education provided: continue HEP      Treatment/Activity Tolerance:   [] Patient limited by fatigue   [] Patient limited by pain   [] Patient limited by other medical complications   [x] Patient tolerated therapy well  [] Other:     Safety Precautions:     [] Left in bed    [x] Left in chair   [x] Call light within reach    [] Bed alarm on    [x] Personal alarm on    [] Other staff present:  [] Family/Caregiver present:      Post Tx Pain Ratin/10     Social/Functional History:  Lives With: Spouse  Type of Home: House  Home Layout: One level  Home Access: Ramped entrance          PT Equipment:  Home

## 2024-01-03 NOTE — CARE COORDINATION
SWING BED WEEKLY TEAM SHEET     Nicci Alberto   1/3/2024 WEEK # 1    Care Management    Issues to be resolved  before discharge: Increased strength/balance/mobility     Family Education: Patient/staff to update family     Discharge Plan: Home   Patient/Family Adjustment: N/A     Goals of previous week:   [] 1st team   [] met   [x] partially met    [] not met             Why goals were not met: Continued weakness     Skilled Level of Care Remains   [x] yes   [] no    Estimated length of stay: 1 week   Patient/Family Concerns/input: None at this time     Patient/Family  Signature _________________  1/3/2024       Care Management Signature:  Graeme Higuera, RN

## 2024-01-03 NOTE — PROGRESS NOTES
Comprehensive Nutrition Assessment    Type and Reason for Visit:  Initial (Swing Bed and nutrition screen for wound present on admission)    Nutrition Recommendations/Plan:   Continue to provide Regular diet (adequate glycemic control at this time)  Continue to provide low calorie high protein oral supplement once daily for wound healing  Continue to document all weights and po intakes  Will monitor weights, labs, po intakes and POC     Malnutrition Assessment:  Malnutrition Status:  No malnutrition (01/03/24 5753)    Context:  Acute Illness     Findings of the 6 clinical characteristics of malnutrition:  Energy Intake:  No significant decrease in energy intake  Weight Loss:  No significant weight loss     Body Fat Loss:  No significant body fat loss     Muscle Mass Loss:  No significant muscle mass loss    Fluid Accumulation:  Unable to assess     Strength:  Not Performed    Nutrition Assessment:    Admitted to swing bed for physical debility after acute IP admission for acute on chronic urinary retention, s/p cysto with clot evacuation and cauterization, hyperkalemia. Hx includes urethral stricture, urinary retention, CKD, constipation, RLS, hypothyroidism, DM, lymphedema to BLE. Diet order regular with noted adequate blood sugar control and continues to receive low calorie, high protein oral supplement once daily which she reports she does enjoy and has been drinking. Recommend to continue with oral supplements d/t increased nutrient needs from stage 2 to coccyx. Noted intakes of % consistently and pt reports a good appetite. Pt also denies significant wt changes. No s/s of malnutrition at this time. Will monitor and follow at low nutrition risk d/t adequate oral intake and interventions in place.    Nutrition Related Findings:    Chronulac, Synthroid, Senokot. hgb 8.2 L, K+ 5.7 H, Na 132 L, Cl 98 L, BUN 70 H, Creat 1.6 H, GFR 31 L. POCT Gluc: 95. Wound Type: Stage II, Pressure Injury (to coccyx)

## 2024-01-04 PROCEDURE — 1200000002 HC SEMI PRIVATE SWING BED

## 2024-01-04 PROCEDURE — 6360000002 HC RX W HCPCS

## 2024-01-04 PROCEDURE — 6370000000 HC RX 637 (ALT 250 FOR IP)

## 2024-01-04 PROCEDURE — 97110 THERAPEUTIC EXERCISES: CPT

## 2024-01-04 PROCEDURE — 97535 SELF CARE MNGMENT TRAINING: CPT

## 2024-01-04 PROCEDURE — 2700000000 HC OXYGEN THERAPY PER DAY

## 2024-01-04 PROCEDURE — 6370000000 HC RX 637 (ALT 250 FOR IP): Performed by: NURSE PRACTITIONER

## 2024-01-04 PROCEDURE — 94761 N-INVAS EAR/PLS OXIMETRY MLT: CPT

## 2024-01-04 PROCEDURE — 94640 AIRWAY INHALATION TREATMENT: CPT

## 2024-01-04 PROCEDURE — 6370000000 HC RX 637 (ALT 250 FOR IP): Performed by: STUDENT IN AN ORGANIZED HEALTH CARE EDUCATION/TRAINING PROGRAM

## 2024-01-04 PROCEDURE — 97116 GAIT TRAINING THERAPY: CPT

## 2024-01-04 RX ORDER — FLUTICASONE PROPIONATE 50 MCG
1 SPRAY, SUSPENSION (ML) NASAL 2 TIMES DAILY
Status: DISCONTINUED | OUTPATIENT
Start: 2024-01-04 | End: 2024-01-06 | Stop reason: HOSPADM

## 2024-01-04 RX ADMIN — ALBUTEROL SULFATE 2 PUFF: 108 AEROSOL, METERED RESPIRATORY (INHALATION) at 02:11

## 2024-01-04 RX ADMIN — LEVOTHYROXINE SODIUM 50 MCG: 0.05 TABLET ORAL at 05:30

## 2024-01-04 RX ADMIN — ROPINIROLE HYDROCHLORIDE 0.5 MG: 0.5 TABLET, FILM COATED ORAL at 09:57

## 2024-01-04 RX ADMIN — CALCIUM 500 MG: 500 TABLET ORAL at 09:57

## 2024-01-04 RX ADMIN — FLUTICASONE PROPIONATE 1 PUFF: 110 AEROSOL, METERED RESPIRATORY (INHALATION) at 08:10

## 2024-01-04 RX ADMIN — Medication 1 SPRAY: at 02:15

## 2024-01-04 RX ADMIN — CILOSTAZOL 50 MG: 50 TABLET ORAL at 05:30

## 2024-01-04 RX ADMIN — FAMOTIDINE 10 MG: 10 TABLET ORAL at 09:57

## 2024-01-04 RX ADMIN — TRAMADOL HYDROCHLORIDE 50 MG: 50 TABLET ORAL at 14:25

## 2024-01-04 RX ADMIN — ROPINIROLE HYDROCHLORIDE 0.5 MG: 0.5 TABLET, FILM COATED ORAL at 16:05

## 2024-01-04 RX ADMIN — FEBUXOSTAT 40 MG: 40 TABLET, FILM COATED ORAL at 09:57

## 2024-01-04 RX ADMIN — Medication 1 SPRAY: at 10:00

## 2024-01-04 RX ADMIN — CILOSTAZOL 50 MG: 50 TABLET ORAL at 16:05

## 2024-01-04 RX ADMIN — LACTULOSE 10 G: 10 SOLUTION ORAL at 09:57

## 2024-01-04 RX ADMIN — ENOXAPARIN SODIUM 30 MG: 100 INJECTION SUBCUTANEOUS at 09:57

## 2024-01-04 RX ADMIN — ROPINIROLE HYDROCHLORIDE 0.5 MG: 0.5 TABLET, FILM COATED ORAL at 20:48

## 2024-01-04 RX ADMIN — FLUTICASONE PROPIONATE 1 PUFF: 110 AEROSOL, METERED RESPIRATORY (INHALATION) at 19:17

## 2024-01-04 RX ADMIN — Medication 1 SPRAY: at 20:48

## 2024-01-04 RX ADMIN — LATANOPROST 1 DROP: 50 SOLUTION/ DROPS OPHTHALMIC at 20:48

## 2024-01-04 RX ADMIN — SENNOSIDES 8.6 MG: 8.6 TABLET, FILM COATED ORAL at 20:51

## 2024-01-04 RX ADMIN — ISOSORBIDE MONONITRATE 30 MG: 30 TABLET, EXTENDED RELEASE ORAL at 09:57

## 2024-01-04 ASSESSMENT — PAIN SCALES - GENERAL
PAINLEVEL_OUTOF10: 0
PAINLEVEL_OUTOF10: 7
PAINLEVEL_OUTOF10: 0

## 2024-01-04 ASSESSMENT — PAIN - FUNCTIONAL ASSESSMENT: PAIN_FUNCTIONAL_ASSESSMENT: ACTIVITIES ARE NOT PREVENTED

## 2024-01-04 ASSESSMENT — PAIN DESCRIPTION - DESCRIPTORS: DESCRIPTORS: ACHING;DISCOMFORT

## 2024-01-04 ASSESSMENT — PAIN DESCRIPTION - ORIENTATION: ORIENTATION: MID

## 2024-01-04 ASSESSMENT — PAIN DESCRIPTION - FREQUENCY: FREQUENCY: INTERMITTENT

## 2024-01-04 ASSESSMENT — PAIN DESCRIPTION - LOCATION: LOCATION: BUTTOCKS

## 2024-01-04 ASSESSMENT — PAIN DESCRIPTION - PAIN TYPE: TYPE: CHRONIC PAIN

## 2024-01-04 ASSESSMENT — PAIN DESCRIPTION - ONSET: ONSET: GRADUAL

## 2024-01-04 NOTE — PROGRESS NOTES
Met with Patient today for our Activity. Patient has no other activity needs a this time.  Gilda Andrew,  Activities Asst.

## 2024-01-04 NOTE — PROGRESS NOTES
SWINGBED PATIENT ACTIVITY PROGRAM ASSESSMENT    Patient Name: Nicci Alberto  : 1935   Gender: ffemale   Diagnosis:  Physical debility [R53.81]  Generalized weakness [R53.1] MRN: [id number]     Ability to read or write? [x] Yes   [] No  Ability to hear?   [x] Yes   [] No  Is patient confused?  [] Yes   [x] No    Enter Codes in Boxes Coding:  Very Important  Somewhat important  Not very important  Not important at all  Important but can’t do or no choice  No response or non-responsive   2 A. How important is it to you to have books, newspapers, and magazines to read?   2 B.  How important is it to you to listen to music you like?   1 C.  How important is it to you to be around animals such as pets?   2 D.  How important is it to you to keep up with the news?   2 E.  How important is it to you to do things with groups of people?   1 F.  How important is it to you to do your favorite activities?   1 G.  How important is it to you to go outside to get fresh air when the weather is good?   2 H.  How important is it to you to participate in Zoroastrianism services or practices?     Activity Care Plan:  Will participate in activity programs of choice daily with no decline throughout SBU stay. Patient likes [employer], has 4 dogs that are outside dogs, Patient likes cooking, recepies,, gardening and flowers.          EVALUATOR’S SIGNATURE:  Gilda Andrew  Date: 2024

## 2024-01-04 NOTE — PROGRESS NOTES
[place]2.[place]    [facility] [place]      Name:  Nicci Alberto /Age/Sex: 1935  (88 y.o. female)   MRN & CSN:  [id number] & [id number] Encounter Date/Time: 2024 2:49 PM EST   Location:   PCP: Fern Steven, TREVON - CNP     Attending:Andrey Ascencio MD       Hospital Day: 4    Assessment and Recommendations   Nicci Alberto is a 88 y.o. female  who presents with Generalized weakness      Plan:   Physical Debility: Continue PT/OT daily  Hematuria: Resolved s/p cysto with clot evacuation and cauterization at [facility], f/u Urology as OP  Hyperkalmia: Present on discharge, repeat BMP today; unclear if was treated at [facility]  Hx Ureteral Stricture: F/u Urology as an OP  Hx Restless Leg Syndrome: Continue home Requip  Hx Hypothyroidism: Continue Levothyroxine  Hx Constipation: Continue bowel regimen PRN  Hx CKD Stage 3b: Cr stable at d/c yesterday; previously on Torsemide and Metolazone however no longer on med list, f/u with Nephrology as an OP as appears to be prescribed as needed  Hx VHD: Moderate MS, f/u Cardiology as OP  Hx HFpEF: Stable, f/u PCP/Cardiology as an OP  Hx HTN: Continue Imdur  Hx Type II DM: Not currently on anti-diabetic meds per chart review, monitor and f/u PCP as an OP  Hx PAD: Continue Pletal  Hx Complete Heart Block: s/p PPM in past    Die ADULT ORAL NUTRITION SUPPLEMENT; Lunch; Low Calorie/High Protein Oral Supplement  ADULT DIET; Regular   DVT Prophylaxis [x] Lovenox, []  Heparin, [] SCDs, [] Ambulation,  [] Eliquis, [] Xarelto  [] Coumadin   Code Status Full Code   Disposition From: [facility]  Expected Disposition: HOME  Estimated Date of Discharge: TBD  Patient requires continued admission due to DEBILITY   Surrogate Decision Maker/ POPHYLLIS Buckley     Personally reviewed Lab Studies and Imaging     Discussed management of the case with Dr. Ascencio my supervising physician who is in agreement with the above-noted plan of care.          Drugs that require monitoring for toxicity      01/03/24  0925      K 3.7   CL 99   CO2 27   BUN 58*   CREATININE 1.6*   GLUCOSE 144*     Hepatic: No results for input(s): \"AST\", \"ALT\", \"ALB\", \"BILITOT\", \"ALKPHOS\" in the last 72 hours.  Lipids:   Lab Results   Component Value Date/Time    CHOL 174 10/15/2016 08:25 AM    HDL 55 06/02/2021 09:20 AM    TRIG 129 10/15/2016 08:25 AM     Hemoglobin A1C:   Lab Results   Component Value Date/Time    LABA1C 6.5 08/16/2023 09:14 AM     TSH: No results found for: \"TSH\"  Troponin:   Lab Results   Component Value Date/Time    TROPONINT 0.051 08/10/2023 11:29 AM    TROPONINT 0.089 04/12/2023 07:28 AM    TROPONINT 0.064 04/12/2023 01:52 AM     Lactic Acid: No results for input(s): \"LACTA\" in the last 72 hours.  BNP: No results for input(s): \"PROBNP\" in the last 72 hours.  UA:  Lab Results   Component Value Date/Time    NITRU NEGATIVE 12/26/2023 11:52 AM    COLORU YELLOW 12/26/2023 11:52 AM    WBCUA 15 12/26/2023 11:52 AM    RBCUA 6 12/26/2023 11:52 AM    MUCUS RARE 08/11/2023 12:44 PM    TRICHOMONAS NONE SEEN 12/17/2023 04:50 PM    YEAST FEW 08/07/2016 12:26 PM    BACTERIA NEGATIVE 12/26/2023 11:52 AM    CLARITYU CLEAR 12/26/2023 11:52 AM    SPECGRAV 1.010 12/26/2023 11:52 AM    LEUKOCYTESUR SMALL NUMBER OR AMOUNT OBSERVED 12/26/2023 11:52 AM    UROBILINOGEN 0.2 12/26/2023 11:52 AM    BILIRUBINUR NEGATIVE 12/26/2023 11:52 AM    BLOODU LARGE NUMBER OR AMOUNT OBSERVED 12/26/2023 11:52 AM    KETUA NEGATIVE 12/26/2023 11:52 AM     Urine Cultures: No results found for: \"LABURIN\"  Blood Cultures: No results found for: \"BC\"  No results found for: \"BLOODCULT2\"  Organism:   Lab Results   Component Value Date/Time    ORG ENC 03/08/2016 12:29 PM         Electronically signed by TREVON JOHNSON NP on 1/4/2024 at 2:49 PM

## 2024-01-04 NOTE — CARE COORDINATION
CM met with the patient and her daughter for a follow-up discussion regarding discharge planning.  Patient and daughter both in agreement that HHC services will not be necessary following discharge.  CM advised both the patient and her daughter that should they feel that HHC services are needed after returning home the patient's PCP can also make arrangements for HHC.  Patient and daughter are unable to identify any needs at this time.  CM available if needs arise.

## 2024-01-04 NOTE — PLAN OF CARE
Problem: Chronic Conditions and Co-morbidities  Goal: Patient's chronic conditions and co-morbidity symptoms are monitored and maintained or improved  1/3/2024 2133 by Jessica Veloz RN  Outcome: Progressing  1/3/2024 1053 by Faby Charles RN  Outcome: Progressing     Problem: Safety - Adult  Goal: Free from fall injury  1/3/2024 2133 by Jessica Veloz RN  Outcome: Progressing  1/3/2024 1053 by Faby Charles RN  Outcome: Progressing     Problem: ABCDS Injury Assessment  Goal: Absence of physical injury  1/3/2024 2133 by Jessica Veloz RN  Outcome: Progressing  1/3/2024 1053 by Faby Charles RN  Outcome: Progressing     Problem: Discharge Planning  Goal: Discharge to home or other facility with appropriate resources  1/3/2024 2133 by Jessica Veloz RN  Outcome: Progressing  1/3/2024 1053 by Faby Charles RN  Outcome: Progressing     Problem: Pain  Goal: Verbalizes/displays adequate comfort level or baseline comfort level  1/3/2024 2133 by Jessica Veloz RN  Outcome: Progressing  1/3/2024 1053 by Faby Charles RN  Outcome: Progressing     Problem: Skin/Tissue Integrity  Goal: Absence of new skin breakdown  Description: 1.  Monitor for areas of redness and/or skin breakdown  2.  Assess vascular access sites hourly  3.  Every 4-6 hours minimum:  Change oxygen saturation probe site  4.  Every 4-6 hours:  If on nasal continuous positive airway pressure, respiratory therapy assess nares and determine need for appliance change or resting period.  1/3/2024 2133 by Jessica Veloz RN  Outcome: Progressing  1/3/2024 1053 by Faby Charles RN  Outcome: Progressing

## 2024-01-04 NOTE — PROGRESS NOTES
Short Term Goals: until DC or goals met  Short Term Goal 1: Pt will complete functional trfs mod I  Short Term Goal 2: Pt will complete UE bathing/dressing mod I  Short Term Goal 3: Pt will complete LE bathing/dressing mod I using AE PRN  Short Term Goal 4: Pt will demo >10 min activity tolerance for ADLs/therax  Short Term Goal 5: Pt will complete BUE therex 10+ min to increase strength/endurance for ADLs/functiona mobility

## 2024-01-04 NOTE — FLOWSHEET NOTE
Family/Caregiver present:      Post Tx Pain Ratin/10     Social/Functional History:  Lives With: Spouse  Type of Home: House  Home Layout: One level  Home Access: Ramped entrance          PT Equipment:  Home Equipment: Walker, rolling, Oxygen, Lift chair    Evaluation Assessment  23  Assessment: Patient is a 88 y.o. female who presents to [facility] for the swingbed program post hospitalization for hematuria, acute urinary retention, and weakness. Pt underwent cystoscopy evacuation of clots with fulguration of urethral bleeding and cauterization and was given blood transfusion for hemoglobin less than 7g/dL.    Goals:         Short Term Goals  Time Frame for Short Term Goals: 1 week or until discharge  Short Term Goal 1: Patient will complete STS transfers from EOB, commode, and bedside chair given SUP  Short Term Goal 2: Patient will ambulate 150ft with RW given SUP  Short Term Goal 3: Patient will perform dynamic standing balance activities for 3 minutes without LOB.  Short Term Goal 4: Patient will complete 5 time STS in 30 sec or less in order to improve function and decrease risk of falls         Time In 1020   Time Out 1058   Total Minutes 38   Billed Units 2 TE, 1 Gait       Signed: Madelin Nicole PTA   2024, 10:59 AM

## 2024-01-05 PROCEDURE — 97535 SELF CARE MNGMENT TRAINING: CPT

## 2024-01-05 PROCEDURE — 2700000000 HC OXYGEN THERAPY PER DAY

## 2024-01-05 PROCEDURE — 97116 GAIT TRAINING THERAPY: CPT

## 2024-01-05 PROCEDURE — 6370000000 HC RX 637 (ALT 250 FOR IP): Performed by: STUDENT IN AN ORGANIZED HEALTH CARE EDUCATION/TRAINING PROGRAM

## 2024-01-05 PROCEDURE — 94761 N-INVAS EAR/PLS OXIMETRY MLT: CPT

## 2024-01-05 PROCEDURE — 94640 AIRWAY INHALATION TREATMENT: CPT

## 2024-01-05 PROCEDURE — 1200000002 HC SEMI PRIVATE SWING BED

## 2024-01-05 PROCEDURE — 6370000000 HC RX 637 (ALT 250 FOR IP)

## 2024-01-05 PROCEDURE — 6360000002 HC RX W HCPCS

## 2024-01-05 PROCEDURE — 6370000000 HC RX 637 (ALT 250 FOR IP): Performed by: NURSE PRACTITIONER

## 2024-01-05 RX ORDER — CETIRIZINE HYDROCHLORIDE 5 MG/1
5 TABLET ORAL DAILY
Status: DISCONTINUED | OUTPATIENT
Start: 2024-01-05 | End: 2024-01-06 | Stop reason: HOSPADM

## 2024-01-05 RX ORDER — LORATADINE 10 MG/1
10 TABLET ORAL DAILY
Status: DISCONTINUED | OUTPATIENT
Start: 2024-01-05 | End: 2024-01-05

## 2024-01-05 RX ORDER — LORATADINE 10 MG/1
10 TABLET ORAL DAILY
COMMUNITY

## 2024-01-05 RX ADMIN — Medication 1 SPRAY: at 20:35

## 2024-01-05 RX ADMIN — FEBUXOSTAT 40 MG: 40 TABLET, FILM COATED ORAL at 08:34

## 2024-01-05 RX ADMIN — CILOSTAZOL 50 MG: 50 TABLET ORAL at 06:18

## 2024-01-05 RX ADMIN — CILOSTAZOL 50 MG: 50 TABLET ORAL at 15:43

## 2024-01-05 RX ADMIN — ISOSORBIDE MONONITRATE 30 MG: 30 TABLET, EXTENDED RELEASE ORAL at 08:34

## 2024-01-05 RX ADMIN — ROPINIROLE HYDROCHLORIDE 0.5 MG: 0.5 TABLET, FILM COATED ORAL at 08:34

## 2024-01-05 RX ADMIN — CALCIUM 500 MG: 500 TABLET ORAL at 08:34

## 2024-01-05 RX ADMIN — POLYETHYLENE GLYCOL 3350 17 G: 17 POWDER, FOR SOLUTION ORAL at 08:39

## 2024-01-05 RX ADMIN — LEVOTHYROXINE SODIUM 50 MCG: 0.05 TABLET ORAL at 06:18

## 2024-01-05 RX ADMIN — ROPINIROLE HYDROCHLORIDE 0.5 MG: 0.5 TABLET, FILM COATED ORAL at 15:43

## 2024-01-05 RX ADMIN — MELATONIN TAB 3 MG 3 MG: 3 TAB at 02:01

## 2024-01-05 RX ADMIN — FAMOTIDINE 10 MG: 10 TABLET ORAL at 08:34

## 2024-01-05 RX ADMIN — FLUTICASONE PROPIONATE 1 PUFF: 110 AEROSOL, METERED RESPIRATORY (INHALATION) at 08:40

## 2024-01-05 RX ADMIN — LACTULOSE 10 G: 10 SOLUTION ORAL at 11:06

## 2024-01-05 RX ADMIN — CETIRIZINE HYDROCHLORIDE 5 MG: 5 TABLET ORAL at 12:15

## 2024-01-05 RX ADMIN — Medication 1 SPRAY: at 08:39

## 2024-01-05 RX ADMIN — ALPRAZOLAM 0.25 MG: 0.25 TABLET ORAL at 11:06

## 2024-01-05 RX ADMIN — FLUTICASONE PROPIONATE 1 PUFF: 110 AEROSOL, METERED RESPIRATORY (INHALATION) at 21:37

## 2024-01-05 RX ADMIN — ROPINIROLE HYDROCHLORIDE 0.5 MG: 0.5 TABLET, FILM COATED ORAL at 20:35

## 2024-01-05 RX ADMIN — LATANOPROST 1 DROP: 50 SOLUTION/ DROPS OPHTHALMIC at 20:35

## 2024-01-05 RX ADMIN — TRAMADOL HYDROCHLORIDE 50 MG: 50 TABLET ORAL at 02:00

## 2024-01-05 RX ADMIN — ENOXAPARIN SODIUM 30 MG: 100 INJECTION SUBCUTANEOUS at 08:34

## 2024-01-05 ASSESSMENT — PAIN DESCRIPTION - LOCATION: LOCATION: BUTTOCKS;COCCYX

## 2024-01-05 ASSESSMENT — PAIN SCALES - GENERAL
PAINLEVEL_OUTOF10: 7
PAINLEVEL_OUTOF10: 0

## 2024-01-05 ASSESSMENT — PAIN DESCRIPTION - FREQUENCY: FREQUENCY: INTERMITTENT

## 2024-01-05 ASSESSMENT — PAIN - FUNCTIONAL ASSESSMENT: PAIN_FUNCTIONAL_ASSESSMENT: ACTIVITIES ARE NOT PREVENTED

## 2024-01-05 ASSESSMENT — PAIN DESCRIPTION - ONSET: ONSET: AWAKENED FROM SLEEP

## 2024-01-05 ASSESSMENT — PAIN DESCRIPTION - DESCRIPTORS: DESCRIPTORS: ACHING;DISCOMFORT

## 2024-01-05 ASSESSMENT — PAIN DESCRIPTION - ORIENTATION: ORIENTATION: MID

## 2024-01-05 ASSESSMENT — PAIN DESCRIPTION - PAIN TYPE: TYPE: CHRONIC PAIN

## 2024-01-05 NOTE — FLOWSHEET NOTE
Physical Therapy Daily Treatment Note   Date: 2024 Room: [address] [place] [address]    Name: Nicci Alberto : 1935   MRN: [id number] Admission Date:2024      Restrictions/Precautions:   General precautions, fall risk    Initial Pain level: 0/10    Subjective/Observation: Patient sitting up in chair. Upset because she has not been able to have a bowel movement, is feeling anxious, has been feeling short of breath, and has not had her allergy medication. However, she is agreeable to therapy session.    Communication with other providers: x      Therapeutic Activities/Neuro Re-Education/Gait Training   Date: 1/3/24   Date: 2024 Date: 24 Date:    Bed   Mobility   x   x   x    STS   Transfer SBA from recliner and w/c with proper hand placement SBA from recliner and wheelchair Mod I from recliner and w/c to RW with proper hand placement    Commode Transfer   x   x Mod I to RW    Standing Balance   SBA for amb with RW SBA for ambulation with RW Able to reach for brief and perform pericare with SBA      Ambulation Pt amb 43ft + 64ft + 86ft with RW and SBA. Seated rest required between due to fatigue. Pt demos step-to gait pattern leading with LLE and slight trendelenburg pattern noted with fatigue. Patient ambulated 89'+64' with RW and SBA of 1 and wheelchair follow.  Demonstrates steady step to gait pattern. 115ft + 93ft + 85ft with RW and SBA. Pt demonstrates steady step to gait pattern.       Therapeutic Exercises   Date: 1/3/24   Date: 2024 Date:  Date:   Seated  2# ankle wt   10x ea 2# ankle weight 2x10     LAQ  2# ankle weight   2x10     HR/TR 20x 2# ankle weight   20x     Hip abd YTB 15x focus on eccentric control YTB 20x     Repeated STS 2x5        HS Curls  YTB 20x     Pillow squeezes  10x5\"       Objective:   5 Time STS: 43 seconds (1/3/24)      Education provided: continue HEP      Treatment/Activity Tolerance:   [] Patient limited by fatigue   [] Patient limited by pain   [] Patient limited

## 2024-01-05 NOTE — PLAN OF CARE
Problem: Chronic Conditions and Co-morbidities  Goal: Patient's chronic conditions and co-morbidity symptoms are monitored and maintained or improved  1/5/2024 1033 by Israel Clay RN  Outcome: Progressing  Flowsheets (Taken 1/5/2024 1033)  Care Plan - Patient's Chronic Conditions and Co-Morbidity Symptoms are Monitored and Maintained or Improved:   Monitor and assess patient's chronic conditions and comorbid symptoms for stability, deterioration, or improvement   Collaborate with multidisciplinary team to address chronic and comorbid conditions and prevent exacerbation or deterioration   Update acute care plan with appropriate goals if chronic or comorbid symptoms are exacerbated and prevent overall improvement and discharge  1/5/2024 0007 by Elin Bernstein RN  Outcome: Progressing     Problem: Safety - Adult  Goal: Free from fall injury  1/5/2024 1033 by Israel Clay RN  Outcome: Progressing  Flowsheets (Taken 1/5/2024 1033)  Free From Fall Injury: Instruct family/caregiver on patient safety  1/5/2024 0007 by Elin Bernstein RN  Outcome: Progressing     Problem: ABCDS Injury Assessment  Goal: Absence of physical injury  1/5/2024 1033 by Israel Clay RN  Outcome: Progressing  Flowsheets (Taken 1/5/2024 1033)  Absence of Physical Injury: Implement safety measures based on patient assessment  1/5/2024 0007 by Elin Bernstein RN  Outcome: Progressing     Problem: Discharge Planning  Goal: Discharge to home or other facility with appropriate resources  1/5/2024 1033 by Israel Clay RN  Outcome: Progressing  Flowsheets (Taken 1/5/2024 1033)  Discharge to home or other facility with appropriate resources:   Identify barriers to discharge with patient and caregiver   Arrange for needed discharge resources and transportation as appropriate   Identify discharge learning needs (meds, wound care, etc)   Arrange for interpreters to assist at discharge as needed   Refer to discharge planning if

## 2024-01-05 NOTE — PROGRESS NOTES
Occupational Therapy    Occupational Therapy Treatment Note  Name: Nicci Alberto MRN: [id number] :   1935   Date:  2024   Admission Date: 2024 Room:  [address] [place] [address]     Primary Problem:   physical debility s/p hospitalization for Acute on chronic urinary retention     Restrictions/Precautions:  Restrictions/Precautions  Restrictions/Precautions: General Precautions, Fall Risk     Communication with other providers:   Cleared for treatment by  RN.    Subjective:  Patient states:  \"I need to get stronger before I go home\"  Pain:   Location, Type, Intensity (0/10 to 10/10):  no pain    Objective:    Observation:  pt alert and oriented    Treatment, including education:  Transfers    Sit to stand :Stand By Assistance  Stand to sit :Stand By Assistance  SPT:Stand By Assistance      Self Care Training:   Activities performed today included the following:      Grooming  Modified Independent to wash face and hair care.    Bathing   Supervision able to use long handled sponge for feet.    UB Dress  Supervision to don personal gown    LB Dress  Supervision pt require assistance to thread B feet and brief and pt able to pull up both using reacher.      Safety Measures: Gait belt used, Left in bed/up in the recliner, Pull/Bed Alarm activated and call light left in reach        Assessment / Impression:        Patient's tolerance of treatment: Good   Adverse Reaction: None  Significant change in status and impact:  None  Barriers to improvement:  Dec strength and endurance    Plan for Next Session:    Continue with POC.    Time in:  900  Time out:  0945  Total treatment time: 45  Billed Units 3 ADL  Electronically signed by:    ALEN Davis/L SHANNAN 2024, 11:57 AM    Previously filed values:  Patient Goals   Patient goals : To get stronger  Short Term Goals  Time Frame for Short Term Goals: until DC or goals met  Short Term Goal 1: Pt will complete functional trfs mod I  Short Term Goal 2: Pt will

## 2024-01-06 VITALS
HEIGHT: 61 IN | SYSTOLIC BLOOD PRESSURE: 135 MMHG | OXYGEN SATURATION: 97 % | HEART RATE: 80 BPM | BODY MASS INDEX: 31.96 KG/M2 | WEIGHT: 169.3 LBS | RESPIRATION RATE: 16 BRPM | DIASTOLIC BLOOD PRESSURE: 49 MMHG | TEMPERATURE: 97.9 F

## 2024-01-06 PROCEDURE — 6370000000 HC RX 637 (ALT 250 FOR IP): Performed by: STUDENT IN AN ORGANIZED HEALTH CARE EDUCATION/TRAINING PROGRAM

## 2024-01-06 PROCEDURE — 94640 AIRWAY INHALATION TREATMENT: CPT

## 2024-01-06 PROCEDURE — 6360000002 HC RX W HCPCS

## 2024-01-06 PROCEDURE — 94761 N-INVAS EAR/PLS OXIMETRY MLT: CPT

## 2024-01-06 PROCEDURE — 97530 THERAPEUTIC ACTIVITIES: CPT

## 2024-01-06 PROCEDURE — 6370000000 HC RX 637 (ALT 250 FOR IP): Performed by: NURSE PRACTITIONER

## 2024-01-06 PROCEDURE — 6370000000 HC RX 637 (ALT 250 FOR IP)

## 2024-01-06 RX ORDER — TORSEMIDE 100 MG/1
100 TABLET ORAL ONCE
Status: COMPLETED | OUTPATIENT
Start: 2024-01-06 | End: 2024-01-06

## 2024-01-06 RX ORDER — METOLAZONE 2.5 MG/1
2.5 TABLET ORAL ONCE
Status: COMPLETED | OUTPATIENT
Start: 2024-01-06 | End: 2024-01-06

## 2024-01-06 RX ORDER — SPIRONOLACTONE 50 MG/1
50 TABLET, FILM COATED ORAL ONCE
Status: COMPLETED | OUTPATIENT
Start: 2024-01-06 | End: 2024-01-06

## 2024-01-06 RX ORDER — TORSEMIDE 100 MG/1
100 TABLET ORAL ONCE
Status: DISCONTINUED | OUTPATIENT
Start: 2024-01-06 | End: 2024-01-06

## 2024-01-06 RX ADMIN — FAMOTIDINE 10 MG: 10 TABLET ORAL at 10:10

## 2024-01-06 RX ADMIN — ALPRAZOLAM 0.25 MG: 0.25 TABLET ORAL at 13:06

## 2024-01-06 RX ADMIN — CALCIUM 500 MG: 500 TABLET ORAL at 10:10

## 2024-01-06 RX ADMIN — TRAMADOL HYDROCHLORIDE 50 MG: 50 TABLET ORAL at 02:05

## 2024-01-06 RX ADMIN — ENOXAPARIN SODIUM 30 MG: 100 INJECTION SUBCUTANEOUS at 10:12

## 2024-01-06 RX ADMIN — SPIRONOLACTONE 50 MG: 50 TABLET ORAL at 13:01

## 2024-01-06 RX ADMIN — ROPINIROLE HYDROCHLORIDE 0.5 MG: 0.5 TABLET, FILM COATED ORAL at 10:10

## 2024-01-06 RX ADMIN — ISOSORBIDE MONONITRATE 30 MG: 30 TABLET, EXTENDED RELEASE ORAL at 10:10

## 2024-01-06 RX ADMIN — FLUTICASONE PROPIONATE 1 PUFF: 110 AEROSOL, METERED RESPIRATORY (INHALATION) at 07:52

## 2024-01-06 RX ADMIN — FEBUXOSTAT 40 MG: 40 TABLET, FILM COATED ORAL at 10:10

## 2024-01-06 RX ADMIN — CETIRIZINE HYDROCHLORIDE 5 MG: 5 TABLET ORAL at 10:10

## 2024-01-06 RX ADMIN — CILOSTAZOL 50 MG: 50 TABLET ORAL at 05:29

## 2024-01-06 RX ADMIN — MELATONIN TAB 3 MG 3 MG: 3 TAB at 02:05

## 2024-01-06 RX ADMIN — Medication 1 SPRAY: at 10:13

## 2024-01-06 RX ADMIN — METOLAZONE 2.5 MG: 2.5 TABLET ORAL at 13:00

## 2024-01-06 RX ADMIN — TORSEMIDE 100 MG: 100 TABLET ORAL at 11:51

## 2024-01-06 RX ADMIN — POLYETHYLENE GLYCOL 3350 17 G: 17 POWDER, FOR SOLUTION ORAL at 10:17

## 2024-01-06 RX ADMIN — LEVOTHYROXINE SODIUM 50 MCG: 0.05 TABLET ORAL at 05:29

## 2024-01-06 ASSESSMENT — PAIN SCALES - GENERAL
PAINLEVEL_OUTOF10: 0
PAINLEVEL_OUTOF10: 0
PAINLEVEL_OUTOF10: 8

## 2024-01-06 ASSESSMENT — PAIN DESCRIPTION - PAIN TYPE: TYPE: CHRONIC PAIN

## 2024-01-06 ASSESSMENT — PAIN DESCRIPTION - FREQUENCY: FREQUENCY: INTERMITTENT

## 2024-01-06 ASSESSMENT — PAIN DESCRIPTION - ONSET: ONSET: GRADUAL

## 2024-01-06 ASSESSMENT — PAIN - FUNCTIONAL ASSESSMENT: PAIN_FUNCTIONAL_ASSESSMENT: ACTIVITIES ARE NOT PREVENTED

## 2024-01-06 ASSESSMENT — PAIN DESCRIPTION - LOCATION: LOCATION: BUTTOCKS

## 2024-01-06 ASSESSMENT — PAIN DESCRIPTION - DESCRIPTORS: DESCRIPTORS: ACHING

## 2024-01-06 ASSESSMENT — PAIN DESCRIPTION - ORIENTATION: ORIENTATION: MID

## 2024-01-06 NOTE — DISCHARGE INSTRUCTIONS
Wound care:  Coccyx- Cleanse with saline/wound cleanser, Apply collagen/puracol and secure with sacral border. Change Every other day

## 2024-01-06 NOTE — PROGRESS NOTES
Discharge instructions given to patient and daughter, all questions answered, verbalized understanding, waiting on  to arrive to go home.

## 2024-01-06 NOTE — FLOWSHEET NOTE
Physical Therapy Daily Treatment Note   Date: 2024 Room: [address] [place] [address]    Name: Nicci Alberto : 1935   MRN: [id number] Admission Date:2024      Restrictions/Precautions:   General precautions, fall risk    Initial Pain level: 0/10    Subjective/Observation: Patient sitting up in chair. Pt. With increased Sob this date. Pt. States she thinks it is due to not havning water pill. Pt. States she is discharging following lunch.     Communication with other providers: x      Therapeutic Activities/Neuro Re-Education/Gait Training   Date: 1/3/24   Date: 2024 Date: 24 Date:    Bed   Mobility   x   x   x    STS   Transfer SBA from recliner and w/c with proper hand placement SBA from recliner and wheelchair Mod I from recliner and w/c to RW with proper hand placement    Commode Transfer   x   x Mod I to RW    Standing Balance   SBA for amb with RW SBA for ambulation with RW Able to reach for brief and perform pericare with SBA      Ambulation Pt amb 43ft + 64ft + 86ft with RW and SBA. Seated rest required between due to fatigue. Pt demos step-to gait pattern leading with LLE and slight trendelenburg pattern noted with fatigue. Patient ambulated 89'+64' with RW and SBA of 1 and wheelchair follow.  Demonstrates steady step to gait pattern. 115ft + 93ft + 85ft with RW and SBA. Pt demonstrates steady step to gait pattern.       Therapeutic Exercises   Date: 1/3/24   Date: 2024 Date:  Date:   Seated  2# ankle wt   10x ea 2# ankle weight 2x10     LAQ  2# ankle weight   2x10     HR/TR 20x 2# ankle weight   20x     Hip abd YTB 15x focus on eccentric control YTB 20x     Repeated STS 2x5        HS Curls  YTB 20x     Pillow squeezes  10x5\"       Objective:   5 Time STS: 43 seconds (1/3/24)      Education provided: Pt. Denies any concerns with home going. Educated on importance of continuing to work on HEP and endurance as able. Pt. And daughter report understanding.       Treatment/Activity Tolerance:    [] Patient limited by fatigue   [] Patient limited by pain   [] Patient limited by other medical complications   [x] Patient tolerated therapy well  [] Other:     Safety Precautions:     [] Left in bed    [x] Left in chair   [x] Call light within reach    [] Bed alarm on    [] Personal alarm on    [] Other staff present:  [] Family/Caregiver present:      Post Tx Pain Ratin/10     Social/Functional History:  Lives With: Spouse  Type of Home: House  Home Layout: One level  Home Access: Ramped entrance          PT Equipment:  Home Equipment: Walker, rolling, Oxygen, Lift chair    Evaluation Assessment  23  Assessment: Patient is a 88 y.o. female who presents to [facility] for the swingbed program post hospitalization for hematuria, acute urinary retention, and weakness. Pt underwent cystoscopy evacuation of clots with fulguration of urethral bleeding and cauterization and was given blood transfusion for hemoglobin less than 7g/dL.    Goals:         Short Term Goals  Time Frame for Short Term Goals: 1 week or until discharge  Short Term Goal 1: Patient will complete STS transfers from EOB, commode, and bedside chair given SUP  Short Term Goal 2: Patient will ambulate 150ft with RW given SUP  Short Term Goal 3: Patient will perform dynamic standing balance activities for 3 minutes without LOB.  Short Term Goal 4: Patient will complete 5 time STS in 30 sec or less in order to improve function and decrease risk of falls         Time In 1048   Time Out 1056   Total Minutes 8   Billed Units 1 TA       Signed: Brenden Jama PTA,    2024, 10:57 AM

## 2024-01-06 NOTE — DISCHARGE SUMMARY
V2.0  Discharge Summary    Name:  Nicci Alberto /Age/Sex: 1935 (88 y.o. female)   Admit Date: 2024  Discharge Date: 24    MRN & CSN:  [id number] & [id number] Encounter Date and Time 24 10:51 AM EST    Attending:  Andrey Ascencio MD Discharging Provider: TREVON JOHNSON NP       Hospital Course:     Brief HPI:   Nicci Alberto is a 88 y.o. female with pmh of Urethral stricture, CKD, Constipation, Restless leg syndrome, Hypothyroidism that is presenting to the swing bed program after a hospitalization for hematuria and acute urinary retention.  Patient underwent a cystoscopy with Urology with evacuation of clots and cauterization.  Patient progressed well with an uncomplicated hospital course and was referred to the swing bed program.  Patient was seen and examined this morning and is doing well with no complaints.  Eager to work with PT/OT and get home soon.     Patient seen and examined today she is doing well she has no complaints.  She would like to discharge home today.  She has worked well with physical and Occupational Therapy and will discharge home today 2024 and stable condition.      Brief Problem Based Course:   Physical Debility: Patient has worked with physical and Occupational Therapy, she would like to discharge home today.  She will be discharging in stable condition.  Hematuria: Resolved s/p cysto with clot evacuation and cauterization at [facility], f/u Urology as OP  Hyperkalmia: Resolved   Hx Ureteral Stricture: F/u Urology as an OP  Restless Leg Syndrome: Continue home Requip  Hypothyroidism: Continue Levothyroxine  Constipation: Continue bowel regimen PRN  CKD Stage 3b: Cr stable at d/c yesterday; previously on Torsemide, Zaroxolyn and spironolactone, patient is requesting dose of this medication before discharge she feels she is swelling, however no longer on med list.will give one-time dose of above.  She has a scheduled appointment with Dr. Hazel on 1/10/2024 and  12:44 PM    TRICHOMONAS NONE SEEN 12/17/2023 04:50 PM    YEAST FEW 08/07/2016 12:26 PM    BACTERIA NEGATIVE 12/26/2023 11:52 AM    CLARITYU CLEAR 12/26/2023 11:52 AM    SPECGRAV 1.010 12/26/2023 11:52 AM    LEUKOCYTESUR SMALL NUMBER OR AMOUNT OBSERVED 12/26/2023 11:52 AM    UROBILINOGEN 0.2 12/26/2023 11:52 AM    BILIRUBINUR NEGATIVE 12/26/2023 11:52 AM    BLOODU LARGE NUMBER OR AMOUNT OBSERVED 12/26/2023 11:52 AM    KETUA NEGATIVE 12/26/2023 11:52 AM       Organism:   Lab Results   Component Value Date/Time    ORG ENC 03/08/2016 12:29 PM       Time Spent Discharging patient 35 minutes.  Discharge plan discussed with my supervising physician Dr. Ascencio.    Electronically signed by TREVON JOHNSON NP on 1/6/2024 at 10:51 AM

## 2024-01-09 NOTE — PROGRESS NOTES
Physician Progress Note      PATIENT:               ILANA DOBBS  CSN #:                  [id number]  :                       1935  ADMIT DATE:       2023 12:50 PM  DISCH DATE:        2024 6:41 PM  RESPONDING  PROVIDER #:        Ela Ojeda MD          QUERY TEXT:    Internal Medicine,    Pt admitted with hematuria, acute urinary retention and underwent underwent   cystoscopy with urethral dilatation 11 days prior to presentation.? If   possible, please document in progress notes and discharge summary the   relationship if any between the hematuria   and the previous cystoscopy:    The medical record reflects the following:  Risk Factors: post op status  Clinical Indicators: PMH ureteral stricture s/ previous cystoscopy with   ureteral dilation 11 days prior to admission  Treatment: labs, imaging, Urology consult, cystoscopy evacuation of clots with   fulguration    Thank you,  Miya Victor RN [facility]  [phone]  Options provided:  -- Hematuria due is due to the previous cystoscopy procedure  -- Hematuria is not due to the procedure, but is due to other incidental risk   factor, Please specify other incidental risk factor.  -- Other - I will add my own diagnosis  -- Disagree - Not applicable / Not valid  -- Disagree - Clinically unable to determine / Unknown  -- Refer to Clinical Documentation Reviewer    PROVIDER RESPONSE TEXT:    Patient has hematuria due is due to the previous cystoscopy procedure.    Query created by: Miya Victor on 2024 11:47 AM      Electronically signed by:  Ela Ojeda MD 2024 7:56 AM

## 2024-02-05 ENCOUNTER — HOSPITAL ENCOUNTER (OUTPATIENT)
Age: 89
Discharge: HOME | End: 2024-02-05
Payer: MEDICARE

## 2024-02-05 DIAGNOSIS — N18.32 CKD STAGE G3B/A1, GFR 30-44 AND ALBUMIN CREATININE RATIO <30 MG/G (HCC): ICD-10-CM

## 2024-02-05 LAB
ALBUMIN SERPL-MCNC: 4.3 GM/DL (ref 3.4–5)
ALP BLD-CCNC: 59 IU/L (ref 40–129)
ALT SERPL-CCNC: 10 U/L (ref 10–40)
ANION GAP SERPL CALCULATED.3IONS-SCNC: 17 MMOL/L (ref 7–16)
AST SERPL-CCNC: 17 IU/L (ref 15–37)
BILIRUB SERPL-MCNC: 0.3 MG/DL (ref 0–1)
BUN SERPL-MCNC: 160 MG/DL (ref 6–23)
CALCIUM SERPL-MCNC: 10.2 MG/DL (ref 8.3–10.6)
CHLORIDE BLD-SCNC: 78 MMOL/L (ref 99–110)
CO2: 32 MMOL/L (ref 21–32)
CREAT SERPL-MCNC: 2.7 MG/DL (ref 0.6–1.1)
FERRITIN: 950 NG/ML (ref 15–150)
GFR SERPL CREATININE-BSD FRML MDRD: 17 ML/MIN/1.73M2
GLUCOSE SERPL-MCNC: 223 MG/DL (ref 70–99)
IRON: 61 UG/DL (ref 37–145)
PCT TRANSFERRIN: 17 % (ref 10–44)
POTASSIUM SERPL-SCNC: 2.8 MMOL/L (ref 3.5–5.1)
SODIUM BLD-SCNC: 127 MMOL/L (ref 135–145)
TOTAL IRON BINDING CAPACITY: 356 UG/DL (ref 250–450)
TOTAL PROTEIN: 7.4 GM/DL (ref 6.4–8.2)
UNSATURATED IRON BINDING CAPACITY: 295 UG/DL (ref 110–370)

## 2024-02-05 PROCEDURE — 80053 COMPREHEN METABOLIC PANEL: CPT

## 2024-02-05 PROCEDURE — 83550 IRON BINDING TEST: CPT

## 2024-02-05 PROCEDURE — 83540 ASSAY OF IRON: CPT

## 2024-02-05 PROCEDURE — 36415 COLL VENOUS BLD VENIPUNCTURE: CPT

## 2024-02-05 PROCEDURE — 82728 ASSAY OF FERRITIN: CPT

## 2024-02-13 ENCOUNTER — HOSPITAL ENCOUNTER (OUTPATIENT)
Age: 89
Discharge: HOME | End: 2024-02-13
Payer: MEDICARE

## 2024-02-13 DIAGNOSIS — N18.31 CHRONIC KIDNEY DISEASE (CKD) STAGE G3A/A2, MODERATELY DECREASED GLOMERULAR FILTRATION RATE (GFR) BETWEEN 45-59 ML/MIN/1.73 SQUARE METER AND ALBUMINURIA CREATININE RATIO BETWEEN 30-299 MG/G (HCC): ICD-10-CM

## 2024-02-13 LAB
ALBUMIN SERPL-MCNC: 4.2 GM/DL (ref 3.4–5)
ALP BLD-CCNC: 56 IU/L (ref 40–129)
ALT SERPL-CCNC: 10 U/L (ref 10–40)
ANION GAP SERPL CALCULATED.3IONS-SCNC: 17 MMOL/L (ref 7–16)
AST SERPL-CCNC: 16 IU/L (ref 15–37)
BILIRUB SERPL-MCNC: 0.4 MG/DL (ref 0–1)
BUN SERPL-MCNC: 148 MG/DL (ref 6–23)
CALCIUM SERPL-MCNC: 9.5 MG/DL (ref 8.3–10.6)
CHLORIDE BLD-SCNC: 84 MMOL/L (ref 99–110)
CO2: 30 MMOL/L (ref 21–32)
CREAT SERPL-MCNC: 2.8 MG/DL (ref 0.6–1.1)
GFR SERPL CREATININE-BSD FRML MDRD: 16 ML/MIN/1.73M2
GLUCOSE SERPL-MCNC: 203 MG/DL (ref 70–99)
POTASSIUM SERPL-SCNC: 2.7 MMOL/L (ref 3.5–5.1)
SODIUM BLD-SCNC: 131 MMOL/L (ref 135–145)
TOTAL PROTEIN: 7.1 GM/DL (ref 6.4–8.2)

## 2024-02-13 PROCEDURE — 80053 COMPREHEN METABOLIC PANEL: CPT

## 2024-02-13 PROCEDURE — 36415 COLL VENOUS BLD VENIPUNCTURE: CPT

## 2024-02-16 ENCOUNTER — HOSPITAL ENCOUNTER (OUTPATIENT)
Dept: WOUND CARE | Age: 89
Discharge: HOME | End: 2024-02-16
Payer: MEDICARE

## 2024-02-16 VITALS
DIASTOLIC BLOOD PRESSURE: 62 MMHG | HEART RATE: 78 BPM | RESPIRATION RATE: 16 BRPM | TEMPERATURE: 97.8 F | SYSTOLIC BLOOD PRESSURE: 158 MMHG

## 2024-02-16 DIAGNOSIS — I89.0 LYMPHEDEMA OF BOTH LOWER EXTREMITIES: ICD-10-CM

## 2024-02-16 DIAGNOSIS — L89.322 PRESSURE INJURY OF LEFT BUTTOCK, STAGE 2 (HCC): ICD-10-CM

## 2024-02-16 DIAGNOSIS — L84 CALLUS OF FOOT: Primary | ICD-10-CM

## 2024-02-16 DIAGNOSIS — L89.312 PRESSURE INJURY OF RIGHT BUTTOCK, STAGE 2 (HCC): ICD-10-CM

## 2024-02-16 DIAGNOSIS — E11.9 DIET-CONTROLLED TYPE 2 DIABETES MELLITUS (HCC): ICD-10-CM

## 2024-02-16 DIAGNOSIS — E66.9 OBESITY (BMI 30-39.9): ICD-10-CM

## 2024-02-16 DIAGNOSIS — I87.2 VENOUS STASIS DERMATITIS OF BOTH LOWER EXTREMITIES: ICD-10-CM

## 2024-02-16 PROCEDURE — 11042 DBRDMT SUBQ TIS 1ST 20SQCM/<: CPT | Performed by: NURSE PRACTITIONER

## 2024-02-16 PROCEDURE — 99213 OFFICE O/P EST LOW 20 MIN: CPT

## 2024-02-16 PROCEDURE — 11056 PARNG/CUTG B9 HYPRKR LES 2-4: CPT | Performed by: NURSE PRACTITIONER

## 2024-02-16 PROCEDURE — 11042 DBRDMT SUBQ TIS 1ST 20SQCM/<: CPT

## 2024-02-16 PROCEDURE — 11056 PARNG/CUTG B9 HYPRKR LES 2-4: CPT

## 2024-02-16 RX ORDER — POTASSIUM CHLORIDE 1.5 G/1.58G
20 POWDER, FOR SOLUTION ORAL 2 TIMES DAILY
COMMUNITY

## 2024-02-16 NOTE — PATIENT INSTRUCTIONS
PHYSICIAN ORDERS AND DISCHARGE INSTRUCTIONS  NOTE: Upon discharge from the Wound Center, you will receive a patient experience survey via E-mail. We would be grateful if you would take the time to fill this survey out.  Wound care order history:   CYNTHIA's   Right       Left    Date    Vascular studies/Intervention: .     Cultures: .               Antibiotics: .               HbA1c:  .               Grafts:  .   Juxta Lites & Lymph Pumps:  Continuing wound care orders and information:              Residence: .              Continue home health care with:.    Your wound-care supplies will be provided by: [facility]               Pharmacy: [facility] [place]   Wound cleansing:      Do not scrub or use excessive force.    Wash hands with soap and water before and after dressing changes.    Prior to applying a clean dressing, cleanse wound with normal saline,    wound cleanser, or mild soap and water.     Ask your physician or nurse before getting the wound(s) wet in the shower.  Daily Wound management:    Keep weight off wounds and reposition every 2 hours.    Avoid standing for long periods of time.    Evaluate legs to the level of the heart or above for 30 minutes 4-5 times a day and/or when sitting.       When taking antibiotics take entire prescription as ordered by MD do not stop taking until medicine is all gone.     Documentation:  Compression: .   Offloading: .        Orders for this week (2/16/2024):  Left Buttock  - Wash with mild soap and water, rinse with saline, pat dry with 4x4  Apply Gentamicin and Purachol  Cover with Optfoam Gentle SA 3 X 3   Change daily     Left and Right second toes-  A&D with Stimulen   Cover with a band aid   Change daily      Please dispense 30 day quantity when sending supplies     Follow up with Julia Sanderson NP   In 1 weeks in the wound care center  Call [phone] for any questions or concerns.

## 2024-02-16 NOTE — PROGRESS NOTES
Wound Care Center Initial Visit      Nicci Alberto  AGE: 88 y.o.   GENDER: female  : 1935  EPISODE DATE:  2024   Referred by: Fern Steven APRN - CNP     Subjective:     CHIEF COMPLAINT  WOUND   Problem List Items Addressed This Visit          Endocrine    Diet-controlled type 2 diabetes mellitus (HCC)       Other    WD-Callus of foot - Primary    WD-Pressure injury of right buttock, stage 2 (HCC)    WD-Pressure injury of left buttock, stage 2 (HCC)    Venous stasis dermatitis of both lower extremities    Lymphedema of both lower extremities    Obesity (BMI 30-39.9)     Chief Complaint   Patient presents with    Wound Check        HISTORY of PRESENT ILLNESS      Nicci Alberto is a 88 y.o. female who presents to the Wound Clinic for an initial visit for evaluation and treatment of Chronic  callus formation on toes of both feet.  She also has chronic venous dermatitis and lymphedema bilateral lower extremities. Recurrent intermittent pressure ulcers on bilateral buttocks. The condition is of moderate severity. The patient is well known to the wound clinic and and has been treatment for venous wounds, diabetic wounds and pressure wounds over the past several year.The underlying cause is thought to be diabetic, venous, lymphedema, pressure and callus. The patient has significant underlying medical conditions as below. Fern Steven APRN - CNP is PCP.    Wound Pain Timing/Severity: intermittent, mild  Quality of pain: tender  Severity of pain:  2 / 10   Modifying Factors: venous stasis, lymphedema, diabetes, pressure, decreased mobility, shear force and obesity  Associated Signs/Symptoms: edema, drainage and pain    CYNTHIA: as indicated base on wound location and assessment    Wound infection: wound culture will be obtained as needed for symptoms of infection     Arterial evaluation: if indicated based on wound, location, symptoms and healing    Venous Evaluation: if indicated based on wound,

## 2024-02-20 ENCOUNTER — HOSPITAL ENCOUNTER (OUTPATIENT)
Age: 89
Discharge: HOME | End: 2024-02-20
Payer: MEDICARE

## 2024-02-20 DIAGNOSIS — E87.6 HYPOKALEMIA: ICD-10-CM

## 2024-02-20 DIAGNOSIS — N18.30 CHRONIC RENAL IMPAIRMENT, STAGE 3 (MODERATE), UNSPECIFIED WHETHER STAGE 3A OR 3B CKD (HCC): Chronic | ICD-10-CM

## 2024-02-20 LAB
ALBUMIN SERPL-MCNC: 4.3 GM/DL (ref 3.4–5)
ALP BLD-CCNC: 58 IU/L (ref 40–129)
ALT SERPL-CCNC: 11 U/L (ref 10–40)
ANION GAP SERPL CALCULATED.3IONS-SCNC: 17 MMOL/L (ref 7–16)
AST SERPL-CCNC: 15 IU/L (ref 15–37)
BILIRUB SERPL-MCNC: 0.4 MG/DL (ref 0–1)
BUN SERPL-MCNC: 125 MG/DL (ref 6–23)
CALCIUM SERPL-MCNC: 9.8 MG/DL (ref 8.3–10.6)
CHLORIDE BLD-SCNC: 86 MMOL/L (ref 99–110)
CO2: 31 MMOL/L (ref 21–32)
CREAT SERPL-MCNC: 2.5 MG/DL (ref 0.6–1.1)
GFR SERPL CREATININE-BSD FRML MDRD: 18 ML/MIN/1.73M2
GLUCOSE SERPL-MCNC: 144 MG/DL (ref 70–99)
POTASSIUM SERPL-SCNC: 3.2 MMOL/L (ref 3.5–5.1)
SODIUM BLD-SCNC: 134 MMOL/L (ref 135–145)
TOTAL PROTEIN: 7 GM/DL (ref 6.4–8.2)

## 2024-02-20 PROCEDURE — 36415 COLL VENOUS BLD VENIPUNCTURE: CPT

## 2024-02-20 PROCEDURE — 80053 COMPREHEN METABOLIC PANEL: CPT

## 2024-02-23 ENCOUNTER — HOSPITAL ENCOUNTER (OUTPATIENT)
Dept: WOUND CARE | Age: 89
Discharge: HOME | End: 2024-02-23
Attending: NURSE PRACTITIONER
Payer: MEDICARE

## 2024-02-23 VITALS
TEMPERATURE: 97.4 F | RESPIRATION RATE: 16 BRPM | SYSTOLIC BLOOD PRESSURE: 123 MMHG | HEART RATE: 73 BPM | DIASTOLIC BLOOD PRESSURE: 62 MMHG

## 2024-02-23 DIAGNOSIS — I89.0 LYMPHEDEMA OF BOTH LOWER EXTREMITIES: ICD-10-CM

## 2024-02-23 DIAGNOSIS — L89.322 PRESSURE INJURY OF LEFT BUTTOCK, STAGE 2 (HCC): ICD-10-CM

## 2024-02-23 DIAGNOSIS — E66.9 OBESITY (BMI 30-39.9): ICD-10-CM

## 2024-02-23 DIAGNOSIS — I87.2 VENOUS STASIS DERMATITIS OF BOTH LOWER EXTREMITIES: ICD-10-CM

## 2024-02-23 DIAGNOSIS — E11.9 DIET-CONTROLLED TYPE 2 DIABETES MELLITUS (HCC): Primary | ICD-10-CM

## 2024-02-23 PROCEDURE — 11042 DBRDMT SUBQ TIS 1ST 20SQCM/<: CPT

## 2024-02-23 NOTE — PATIENT INSTRUCTIONS
PHYSICIAN ORDERS AND DISCHARGE INSTRUCTIONS  NOTE: Upon discharge from the Wound Center, you will receive a patient experience survey via E-mail. We would be grateful if you would take the time to fill this survey out.  Wound care order history:   CYNTHIA's   Right       Left    Date    Vascular studies/Intervention: .     Cultures: .               Antibiotics: .               HbA1c:  .               Grafts:  .   Juxta Lites & Lymph Pumps:  Continuing wound care orders and information:              Residence: .              Continue home health care with:.    Your wound-care supplies will be provided by: [facility]               Pharmacy: [facility] [place]   Wound cleansing:      Do not scrub or use excessive force.    Wash hands with soap and water before and after dressing changes.    Prior to applying a clean dressing, cleanse wound with normal saline,    wound cleanser, or mild soap and water.     Ask your physician or nurse before getting the wound(s) wet in the shower.  Daily Wound management:    Keep weight off wounds and reposition every 2 hours.    Avoid standing for long periods of time.    Evaluate legs to the level of the heart or above for 30 minutes 4-5 times a day and/or when sitting.       When taking antibiotics take entire prescription as ordered by MD do not stop taking until medicine is all gone.     Documentation:  Compression: .   Offloading: .        Orders for this week (2/23/2024):  Left Buttock  - Wash with mild soap and water, rinse with saline, pat dry with 4x4  Apply Gentamicin and Purachol  Cover with Optfoam Gentle SA 3 X 3   Change daily          Please dispense 30 day quantity when sending supplies     Follow up with Julia Sanderson NP   In 1 weeks in the wound care center  Call [phone] for any questions or concerns.

## 2024-02-23 NOTE — PLAN OF CARE
Problem: Wound:  Goal: Will show signs of wound healing; wound closure and no evidence of infection  Description: Will show signs of wound healing; wound closure and no evidence of infection  2/23/2024 0955 by Vinita Wagner, RN  Outcome: Progressing  2/23/2024 0954 by Vinita Wagner, RN  Outcome: Progressing  Note: See flowsheet     Problem: Wound:  Intervention: Assess pain status  Note: See flowsheet  Intervention: Assess wound size, appearance and drainage  Note: See flowsheet

## 2024-02-27 ENCOUNTER — HOSPITAL ENCOUNTER (OUTPATIENT)
Age: 89
Discharge: HOME | End: 2024-02-27
Payer: MEDICARE

## 2024-02-27 DIAGNOSIS — E87.5 HYPERKALEMIA: ICD-10-CM

## 2024-02-27 DIAGNOSIS — N18.30 CHRONIC RENAL IMPAIRMENT, STAGE 3 (MODERATE), UNSPECIFIED WHETHER STAGE 3A OR 3B CKD (HCC): Chronic | ICD-10-CM

## 2024-02-27 LAB
ALBUMIN SERPL-MCNC: 3.9 GM/DL (ref 3.4–5)
ALP BLD-CCNC: 50 IU/L (ref 40–129)
ALT SERPL-CCNC: 11 U/L (ref 10–40)
ANION GAP SERPL CALCULATED.3IONS-SCNC: 15 MMOL/L (ref 7–16)
AST SERPL-CCNC: 14 IU/L (ref 15–37)
BILIRUB SERPL-MCNC: 0.3 MG/DL (ref 0–1)
BUN SERPL-MCNC: 110 MG/DL (ref 6–23)
CALCIUM SERPL-MCNC: 10.2 MG/DL (ref 8.3–10.6)
CHLORIDE BLD-SCNC: 89 MMOL/L (ref 99–110)
CO2: 28 MMOL/L (ref 21–32)
CREAT SERPL-MCNC: 2.3 MG/DL (ref 0.6–1.1)
GFR SERPL CREATININE-BSD FRML MDRD: 20 ML/MIN/1.73M2
GLUCOSE SERPL-MCNC: 152 MG/DL (ref 70–99)
POTASSIUM SERPL-SCNC: 3.4 MMOL/L (ref 3.5–5.1)
SODIUM BLD-SCNC: 132 MMOL/L (ref 135–145)
TOTAL PROTEIN: 6.8 GM/DL (ref 6.4–8.2)

## 2024-02-27 PROCEDURE — 80053 COMPREHEN METABOLIC PANEL: CPT

## 2024-02-27 PROCEDURE — 36415 COLL VENOUS BLD VENIPUNCTURE: CPT

## 2024-03-05 ENCOUNTER — HOSPITAL ENCOUNTER (OUTPATIENT)
Age: 89
Discharge: HOME | End: 2024-03-05
Payer: MEDICARE

## 2024-03-05 DIAGNOSIS — E87.6 HYPOKALEMIA: ICD-10-CM

## 2024-03-05 LAB
ALBUMIN SERPL-MCNC: 3.8 GM/DL (ref 3.4–5)
ALP BLD-CCNC: 51 IU/L (ref 40–129)
ALT SERPL-CCNC: 8 U/L (ref 10–40)
ANION GAP SERPL CALCULATED.3IONS-SCNC: 17 MMOL/L (ref 7–16)
AST SERPL-CCNC: 13 IU/L (ref 15–37)
BILIRUB SERPL-MCNC: 0.3 MG/DL (ref 0–1)
BUN SERPL-MCNC: 95 MG/DL (ref 6–23)
CALCIUM SERPL-MCNC: 9.2 MG/DL (ref 8.3–10.6)
CHLORIDE BLD-SCNC: 93 MMOL/L (ref 99–110)
CO2: 25 MMOL/L (ref 21–32)
CREAT SERPL-MCNC: 2.3 MG/DL (ref 0.6–1.1)
GFR SERPL CREATININE-BSD FRML MDRD: 20 ML/MIN/1.73M2
GLUCOSE SERPL-MCNC: 123 MG/DL (ref 70–99)
POTASSIUM SERPL-SCNC: 3.5 MMOL/L (ref 3.5–5.1)
SODIUM BLD-SCNC: 135 MMOL/L (ref 135–145)
TOTAL PROTEIN: 6.7 GM/DL (ref 6.4–8.2)

## 2024-03-05 PROCEDURE — 80053 COMPREHEN METABOLIC PANEL: CPT

## 2024-03-05 PROCEDURE — 36415 COLL VENOUS BLD VENIPUNCTURE: CPT

## 2024-03-08 ENCOUNTER — HOSPITAL ENCOUNTER (OUTPATIENT)
Dept: WOUND CARE | Age: 89
Discharge: HOME | End: 2024-03-08
Attending: NURSE PRACTITIONER
Payer: MEDICARE

## 2024-03-08 VITALS
HEART RATE: 73 BPM | SYSTOLIC BLOOD PRESSURE: 149 MMHG | RESPIRATION RATE: 18 BRPM | TEMPERATURE: 97.6 F | DIASTOLIC BLOOD PRESSURE: 71 MMHG

## 2024-03-08 DIAGNOSIS — E11.9 DIET-CONTROLLED TYPE 2 DIABETES MELLITUS (HCC): Primary | ICD-10-CM

## 2024-03-08 DIAGNOSIS — I89.0 LYMPHEDEMA OF BOTH LOWER EXTREMITIES: ICD-10-CM

## 2024-03-08 DIAGNOSIS — I87.2 VENOUS STASIS DERMATITIS OF BOTH LOWER EXTREMITIES: ICD-10-CM

## 2024-03-08 DIAGNOSIS — E66.9 OBESITY (BMI 30-39.9): ICD-10-CM

## 2024-03-08 DIAGNOSIS — L84 CALLUS OF FOOT: ICD-10-CM

## 2024-03-08 DIAGNOSIS — L89.312 PRESSURE INJURY OF RIGHT BUTTOCK, STAGE 2 (HCC): ICD-10-CM

## 2024-03-08 PROCEDURE — 99213 OFFICE O/P EST LOW 20 MIN: CPT | Performed by: NURSE PRACTITIONER

## 2024-03-08 PROCEDURE — 99212 OFFICE O/P EST SF 10 MIN: CPT

## 2024-03-08 RX ORDER — SODIUM CHLOR/HYPOCHLOROUS ACID 0.033 %
SOLUTION, IRRIGATION IRRIGATION ONCE
Status: CANCELLED | OUTPATIENT
Start: 2024-03-08 | End: 2024-03-08

## 2024-03-08 RX ORDER — TRIAMCINOLONE ACETONIDE 1 MG/G
OINTMENT TOPICAL ONCE
Status: CANCELLED | OUTPATIENT
Start: 2024-03-08 | End: 2024-03-08

## 2024-03-08 RX ORDER — LIDOCAINE HYDROCHLORIDE 40 MG/ML
SOLUTION TOPICAL ONCE
Status: CANCELLED | OUTPATIENT
Start: 2024-03-08 | End: 2024-03-08

## 2024-03-08 RX ORDER — CLOBETASOL PROPIONATE 0.5 MG/G
OINTMENT TOPICAL ONCE
Status: CANCELLED | OUTPATIENT
Start: 2024-03-08 | End: 2024-03-08

## 2024-03-08 RX ORDER — LIDOCAINE 50 MG/G
OINTMENT TOPICAL ONCE
Status: CANCELLED | OUTPATIENT
Start: 2024-03-08 | End: 2024-03-08

## 2024-03-08 RX ORDER — LIDOCAINE 40 MG/G
CREAM TOPICAL ONCE
Status: CANCELLED | OUTPATIENT
Start: 2024-03-08 | End: 2024-03-08

## 2024-03-08 RX ORDER — GENTAMICIN SULFATE 1 MG/G
OINTMENT TOPICAL ONCE
Status: CANCELLED | OUTPATIENT
Start: 2024-03-08 | End: 2024-03-08

## 2024-03-08 RX ORDER — BACITRACIN ZINC AND POLYMYXIN B SULFATE 500; 1000 [USP'U]/G; [USP'U]/G
OINTMENT TOPICAL ONCE
Status: CANCELLED | OUTPATIENT
Start: 2024-03-08 | End: 2024-03-08

## 2024-03-08 RX ORDER — BETAMETHASONE DIPROPIONATE 0.5 MG/G
CREAM TOPICAL ONCE
Status: CANCELLED | OUTPATIENT
Start: 2024-03-08 | End: 2024-03-08

## 2024-03-08 RX ORDER — GINSENG 100 MG
CAPSULE ORAL ONCE
Status: CANCELLED | OUTPATIENT
Start: 2024-03-08 | End: 2024-03-08

## 2024-03-08 RX ORDER — IBUPROFEN 200 MG
TABLET ORAL ONCE
Status: CANCELLED | OUTPATIENT
Start: 2024-03-08 | End: 2024-03-08

## 2024-03-08 NOTE — PROGRESS NOTES
and pain    CYNTHIA: as indicated base on wound location and assessment    Wound infection: wound culture will be obtained as needed for symptoms of infection     Arterial evaluation: if indicated based on wound, location, symptoms and healing    Venous Evaluation: if indicated based on wound, location, symptoms and healing    Radiology:    CT ABDOMEN PELVIS WO CONTRAST Additional Contrast? None  Narrative: EXAMINATION:  CT OF THE ABDOMEN AND PELVIS WITHOUT CONTRAST, 12/17/2023 6:07 pm    TECHNIQUE:  CT of the abdomen and pelvis was performed without the administration of  intravenous contrast. Multiplanar reformatted images are provided for review.  Automated exposure control, iterative reconstruction, and/or weight based  adjustment of the mA/kV was utilized to reduce the radiation dose to as low  as reasonably achievable.    COMPARISON:  08/10/2023    HISTORY:  ORDERING SYSTEM PROVIDED HISTORY:  Urinary retention  TECHNOLOGIST PROVIDED HISTORY:  Reason for Exam:  Urinary retention  Additional Contrast?  None  Decision Support Exception - unselect if not a suspected or confirmed  emergency medical condition->Emergency Medical Condition (MA)  Reason for Exam:  Urinary retention    FINDINGS:  Lower Chest: No acute abnormality.    Organs: The liver appears unremarkable.  Status post cholecystectomy.  Pancreas and spleen, adrenals, kidneys, aorta and IVC appear stable.    GI/Bowel: No evidence of bowel obstruction or perforation.  Constipation and  stool impaction in the rectum.    Pelvis: Urinary bladder and UV junctions appear stable.  No evidence of  diffuse or focal thickening.  Status post hysterectomy.    Peritoneum/Retroperitoneum: No evidence of retroperitoneal lymphadenopathy or  acute mesenteric findings.    Bones/Soft Tissues: Thinning of the anterior abdominal wall and ventral  bulge.  A focal hernia containing fat is noted without complications.  Moderate multilevel degenerative disc disease.  No focal

## 2024-03-08 NOTE — PATIENT INSTRUCTIONS
PHYSICIAN ORDERS AND DISCHARGE INSTRUCTIONS  NOTE: Upon discharge from the Wound Center, you will receive a patient experience survey via E-mail. We would be grateful if you would take the time to fill this survey out.  Wound care order history:   CYNTHIA's   Right       Left    Date    Vascular studies/Intervention: .     Cultures: .               Antibiotics: .               HbA1c:  .               Grafts:  .   Juxta Lites & Lymph Pumps:  Continuing wound care orders and information:              Residence: .              Continue home health care with:.    Your wound-care supplies will be provided by: [facility]               Pharmacy: [facility] [place]   Wound cleansing:      Do not scrub or use excessive force.    Wash hands with soap and water before and after dressing changes.    Prior to applying a clean dressing, cleanse wound with normal saline,    wound cleanser, or mild soap and water.     Ask your physician or nurse before getting the wound(s) wet in the shower.  Daily Wound management:    Keep weight off wounds and reposition every 2 hours.    Avoid standing for long periods of time.    Evaluate legs to the level of the heart or above for 30 minutes 4-5 times a day and/or when sitting.       When taking antibiotics take entire prescription as ordered by MD do not stop taking until medicine is all gone.     Documentation:  Compression: .   Offloading: .        Orders for this week (3/8/2024):  Left Buttock  HEALED      Please dispense 30 day quantity when sending supplies     Follow up with Julia Sanderson NP   In 1 weeks in the wound care center  Call [phone] for any questions or concerns.

## 2024-03-12 ENCOUNTER — HOSPITAL ENCOUNTER (OUTPATIENT)
Age: 89
Discharge: HOME | End: 2024-03-12
Payer: MEDICARE

## 2024-03-12 DIAGNOSIS — N18.30 CHRONIC RENAL IMPAIRMENT, STAGE 3 (MODERATE), UNSPECIFIED WHETHER STAGE 3A OR 3B CKD (HCC): Chronic | ICD-10-CM

## 2024-03-12 LAB
ANION GAP SERPL CALCULATED.3IONS-SCNC: 14 MMOL/L (ref 7–16)
BACTERIA: NEGATIVE /HPF
BILIRUBIN URINE: NEGATIVE MG/DL
BLOOD, URINE: NEGATIVE
BUN SERPL-MCNC: 89 MG/DL (ref 6–23)
CALCIUM SERPL-MCNC: 9.8 MG/DL (ref 8.3–10.6)
CAST TYPE: ABNORMAL /HPF
CHLORIDE BLD-SCNC: 89 MMOL/L (ref 99–110)
CLARITY: CLEAR
CO2: 31 MMOL/L (ref 21–32)
COLOR: YELLOW
CREAT SERPL-MCNC: 2.3 MG/DL (ref 0.6–1.1)
CRYSTAL TYPE: ABNORMAL /HPF
EPITHELIAL CELLS, UA: 2 /HPF
GFR SERPL CREATININE-BSD FRML MDRD: 20 ML/MIN/1.73M2
GLUCOSE SERPL-MCNC: 105 MG/DL (ref 70–99)
GLUCOSE, URINE: NEGATIVE MG/DL
KETONES, URINE: NEGATIVE MG/DL
LEUKOCYTE ESTERASE, URINE: ABNORMAL
NITRITE URINE, QUANTITATIVE: NEGATIVE
PH, URINE: 7 (ref 5–8)
POTASSIUM SERPL-SCNC: 4.1 MMOL/L (ref 3.5–5.1)
PROTEIN UA: NEGATIVE MG/DL
RBC URINE: 0 /HPF (ref 0–6)
SODIUM BLD-SCNC: 134 MMOL/L (ref 135–145)
SPECIFIC GRAVITY UA: 1.01 (ref 1–1.03)
UROBILINOGEN, URINE: 0.2 MG/DL (ref 0.2–1)
WBC UA: 5 /HPF (ref 0–5)

## 2024-03-12 PROCEDURE — 36415 COLL VENOUS BLD VENIPUNCTURE: CPT

## 2024-03-12 PROCEDURE — 81001 URINALYSIS AUTO W/SCOPE: CPT

## 2024-03-12 PROCEDURE — 80048 BASIC METABOLIC PNL TOTAL CA: CPT

## 2024-04-24 ENCOUNTER — HOSPITAL ENCOUNTER (OUTPATIENT)
Age: 89
Discharge: HOME | End: 2024-04-24
Payer: MEDICARE

## 2024-04-24 DIAGNOSIS — N18.30 CHRONIC RENAL IMPAIRMENT, STAGE 3 (MODERATE), UNSPECIFIED WHETHER STAGE 3A OR 3B CKD (HCC): Chronic | ICD-10-CM

## 2024-04-24 DIAGNOSIS — N39.0 URINARY TRACT INFECTION WITHOUT HEMATURIA, SITE UNSPECIFIED: ICD-10-CM

## 2024-04-24 LAB
ALBUMIN SERPL-MCNC: 3.9 GM/DL (ref 3.4–5)
ALP BLD-CCNC: 54 IU/L (ref 40–129)
ALT SERPL-CCNC: 6 U/L (ref 10–40)
ANION GAP SERPL CALCULATED.3IONS-SCNC: 17 MMOL/L (ref 7–16)
AST SERPL-CCNC: 13 IU/L (ref 15–37)
BACTERIA: ABNORMAL /HPF
BILIRUB SERPL-MCNC: 0.4 MG/DL (ref 0–1)
BILIRUBIN URINE: NEGATIVE MG/DL
BLOOD, URINE: NEGATIVE
BUN SERPL-MCNC: 90 MG/DL (ref 6–23)
CALCIUM SERPL-MCNC: 10.4 MG/DL (ref 8.3–10.6)
CAST TYPE: ABNORMAL /HPF
CHLORIDE BLD-SCNC: 93 MMOL/L (ref 99–110)
CLARITY: CLEAR
CO2: 28 MMOL/L (ref 21–32)
COLOR: YELLOW
CREAT SERPL-MCNC: 2.7 MG/DL (ref 0.6–1.1)
CRYSTAL TYPE: NEGATIVE /HPF
EPITHELIAL CELLS, UA: 5 /HPF
GFR SERPL CREATININE-BSD FRML MDRD: 17 ML/MIN/1.73M2
GLUCOSE SERPL-MCNC: 142 MG/DL (ref 70–99)
GLUCOSE, URINE: NEGATIVE MG/DL
KETONES, URINE: NEGATIVE MG/DL
LEUKOCYTE ESTERASE, URINE: ABNORMAL
MAGNESIUM: 1.8 MG/DL (ref 1.8–2.4)
NITRITE URINE, QUANTITATIVE: NEGATIVE
PH, URINE: 7 (ref 5–8)
PHOSPHORUS: 4.3 MG/DL (ref 2.5–4.9)
POTASSIUM SERPL-SCNC: 3.6 MMOL/L (ref 3.5–5.1)
PROTEIN UA: NEGATIVE MG/DL
RBC URINE: NEGATIVE /HPF (ref 0–6)
SODIUM BLD-SCNC: 138 MMOL/L (ref 135–145)
SPECIFIC GRAVITY UA: 1.01 (ref 1–1.03)
TOTAL PROTEIN: 6.8 GM/DL (ref 6.4–8.2)
UROBILINOGEN, URINE: 0.2 MG/DL (ref 0.2–1)
WBC UA: 5 /HPF (ref 0–5)

## 2024-04-24 PROCEDURE — 80053 COMPREHEN METABOLIC PANEL: CPT

## 2024-04-24 PROCEDURE — 84100 ASSAY OF PHOSPHORUS: CPT

## 2024-04-24 PROCEDURE — 81001 URINALYSIS AUTO W/SCOPE: CPT

## 2024-04-24 PROCEDURE — 36415 COLL VENOUS BLD VENIPUNCTURE: CPT

## 2024-04-24 PROCEDURE — 83735 ASSAY OF MAGNESIUM: CPT

## 2024-04-24 PROCEDURE — 87086 URINE CULTURE/COLONY COUNT: CPT

## 2024-04-25 LAB
CULTURE: NORMAL
Lab: NORMAL
SPECIMEN: NORMAL

## 2024-05-10 ENCOUNTER — HOSPITAL ENCOUNTER (OUTPATIENT)
Dept: WOUND CARE | Age: 89
Discharge: HOME | End: 2024-05-10
Attending: NURSE PRACTITIONER
Payer: MEDICARE

## 2024-05-10 VITALS
RESPIRATION RATE: 16 BRPM | HEART RATE: 65 BPM | DIASTOLIC BLOOD PRESSURE: 72 MMHG | TEMPERATURE: 96.8 F | SYSTOLIC BLOOD PRESSURE: 164 MMHG

## 2024-05-10 DIAGNOSIS — E66.9 OBESITY (BMI 30-39.9): ICD-10-CM

## 2024-05-10 DIAGNOSIS — L84 CALLUS OF FOOT: Primary | ICD-10-CM

## 2024-05-10 DIAGNOSIS — I89.0 LYMPHEDEMA OF BOTH LOWER EXTREMITIES: ICD-10-CM

## 2024-05-10 DIAGNOSIS — I87.2 VENOUS STASIS DERMATITIS OF BOTH LOWER EXTREMITIES: ICD-10-CM

## 2024-05-10 DIAGNOSIS — E11.9 DIET-CONTROLLED TYPE 2 DIABETES MELLITUS (HCC): ICD-10-CM

## 2024-05-10 PROCEDURE — 11056 PARNG/CUTG B9 HYPRKR LES 2-4: CPT

## 2024-05-10 PROCEDURE — 11719 TRIM NAIL(S) ANY NUMBER: CPT

## 2024-05-10 ASSESSMENT — PAIN DESCRIPTION - FREQUENCY: FREQUENCY: CONTINUOUS

## 2024-05-10 ASSESSMENT — PAIN DESCRIPTION - PAIN TYPE: TYPE: ACUTE PAIN

## 2024-05-10 ASSESSMENT — PAIN - FUNCTIONAL ASSESSMENT: PAIN_FUNCTIONAL_ASSESSMENT: ACTIVITIES ARE NOT PREVENTED

## 2024-05-10 ASSESSMENT — PAIN DESCRIPTION - ONSET: ONSET: ON-GOING

## 2024-05-10 ASSESSMENT — PAIN SCALES - GENERAL: PAINLEVEL_OUTOF10: 7

## 2024-05-10 ASSESSMENT — PAIN DESCRIPTION - LOCATION: LOCATION: TOE (COMMENT WHICH ONE)

## 2024-05-10 ASSESSMENT — PAIN DESCRIPTION - DESCRIPTORS: DESCRIPTORS: SORE

## 2024-05-10 ASSESSMENT — PAIN DESCRIPTION - ORIENTATION: ORIENTATION: RIGHT;LEFT

## 2024-05-10 NOTE — PROGRESS NOTES
Wound Care Center Progress Visit      Nicci Alberto  AGE: 89 y.o.   GENDER: female  : 1935  EPISODE DATE:  5/10/2024   Referred by: Fern Steven APRN - CNP     Subjective:     CHIEF COMPLAINT  WOUND   Problem List Items Addressed This Visit          Circulatory    Venous stasis dermatitis of both lower extremities       Endocrine    Diet-controlled type 2 diabetes mellitus (HCC)       Other    WD-Callus of foot - Primary    Lymphedema of both lower extremities    Obesity (BMI 30-39.9)     Chief Complaint   Patient presents with    Wound Check        HISTORY of PRESENT ILLNESS      Nicci Alberto is a 89 y.o. female who presents to the Wound Clinic for evaluation and treatment of Chronic  callus formation on toes of both feet.  She also has chronic venous dermatitis and lymphedema bilateral lower extremities. Recurrent intermittent pressure ulcers on bilateral buttocks. The condition is of moderate severity. The patient is well known to the wound clinic and and has been treatment for venous wounds, diabetic wounds and pressure wounds over the past several year.The underlying cause is thought to be diabetic, venous, lymphedema, pressure and callus. The patient has significant underlying medical conditions as below. Fern Steven APRN - CNP is PCP.    Wound Pain Timing/Severity: intermittent, mild  Quality of pain: tender  Severity of pain:  2 / 10   Modifying Factors: venous stasis, lymphedema, diabetes, pressure, decreased mobility, shear force and obesity  Associated Signs/Symptoms: edema, drainage and pain    CYNTHIA: as indicated base on wound location and assessment    Wound infection: wound culture will be obtained as needed for symptoms of infection     Arterial evaluation: if indicated based on wound, location, symptoms and healing    Venous Evaluation: if indicated based on wound, location, symptoms and healing    Radiology:    CT ABDOMEN PELVIS WO CONTRAST Additional Contrast? None  Narrative:

## 2024-05-10 NOTE — PROGRESS NOTES
Applied A & D ointment to legs and feet, Betadine to toenails, wrapped second toes with conform- pt offers no complaints or concerns at this time.

## 2024-05-10 NOTE — PATIENT INSTRUCTIONS
PHYSICIAN ORDERS AND DISCHARGE INSTRUCTIONS  NOTE: Upon discharge from the Wound Center, you will receive a patient experience survey via E-mail. We would be grateful if you would take the time to fill this survey out.  Wound care order history:   CYNTHIA's   Right       Left    Date    Vascular studies/Intervention: .     Cultures: .               Antibiotics: .               HbA1c:  .               Grafts:  .   Juxta Lites & Lymph Pumps:  Continuing wound care orders and information:              Residence: .              Continue home health care with:.    Your wound-care supplies will be provided by: [facility]               Pharmacy: [facility] [place]   Wound cleansing:      Do not scrub or use excessive force.    Wash hands with soap and water before and after dressing changes.    Prior to applying a clean dressing, cleanse wound with normal saline,    wound cleanser, or mild soap and water.     Ask your physician or nurse before getting the wound(s) wet in the shower.  Daily Wound management:    Keep weight off wounds and reposition every 2 hours.    Avoid standing for long periods of time.    Evaluate legs to the level of the heart or above for 30 minutes 4-5 times a day and/or when sitting.       When taking antibiotics take entire prescription as ordered by MD do not stop taking until medicine is all gone.     Documentation:  Compression: .   Offloading: .        Orders for this week (5/10/2024):  Bilateral Legs and Feet  Paint toe nails with betadine on 05/10/24  Apply A&D on legs and feet        Please dispense 30 day quantity when sending supplies     Follow up in the wound care center as needed   Call [phone] for any questions or concerns.

## 2024-06-07 ENCOUNTER — HOSPITAL ENCOUNTER (OUTPATIENT)
Dept: WOUND CARE | Age: 89
Discharge: HOME | End: 2024-06-07
Attending: NURSE PRACTITIONER
Payer: MEDICARE

## 2024-06-07 VITALS
DIASTOLIC BLOOD PRESSURE: 72 MMHG | TEMPERATURE: 97.6 F | RESPIRATION RATE: 16 BRPM | SYSTOLIC BLOOD PRESSURE: 125 MMHG | HEART RATE: 71 BPM

## 2024-06-07 DIAGNOSIS — L97.511 DIABETIC ULCER OF TOE OF RIGHT FOOT ASSOCIATED WITH TYPE 2 DIABETES MELLITUS, LIMITED TO BREAKDOWN OF SKIN (HCC): ICD-10-CM

## 2024-06-07 DIAGNOSIS — E11.621 DIABETIC ULCER OF TOE OF RIGHT FOOT ASSOCIATED WITH TYPE 2 DIABETES MELLITUS, LIMITED TO BREAKDOWN OF SKIN (HCC): ICD-10-CM

## 2024-06-07 DIAGNOSIS — I87.2 VENOUS STASIS DERMATITIS OF BOTH LOWER EXTREMITIES: ICD-10-CM

## 2024-06-07 DIAGNOSIS — E66.9 OBESITY (BMI 30-39.9): Primary | ICD-10-CM

## 2024-06-07 DIAGNOSIS — L84 CALLUS OF FOOT: ICD-10-CM

## 2024-06-07 DIAGNOSIS — E11.621 DIABETIC ULCER OF TOE OF LEFT FOOT ASSOCIATED WITH TYPE 2 DIABETES MELLITUS, LIMITED TO BREAKDOWN OF SKIN (HCC): ICD-10-CM

## 2024-06-07 DIAGNOSIS — I89.0 LYMPHEDEMA OF BOTH LOWER EXTREMITIES: ICD-10-CM

## 2024-06-07 DIAGNOSIS — E11.9 DIET-CONTROLLED TYPE 2 DIABETES MELLITUS (HCC): ICD-10-CM

## 2024-06-07 DIAGNOSIS — L97.521 DIABETIC ULCER OF TOE OF LEFT FOOT ASSOCIATED WITH TYPE 2 DIABETES MELLITUS, LIMITED TO BREAKDOWN OF SKIN (HCC): ICD-10-CM

## 2024-06-07 PROBLEM — Z79.4 TYPE 2 DIABETES MELLITUS WITH NEUROLOGIC COMPLICATION, WITH LONG-TERM CURRENT USE OF INSULIN (HCC): Status: RESOLVED | Noted: 2023-11-10 | Resolved: 2024-06-07

## 2024-06-07 PROBLEM — E11.49 TYPE 2 DIABETES MELLITUS WITH NEUROLOGIC COMPLICATION, WITH LONG-TERM CURRENT USE OF INSULIN (HCC): Status: RESOLVED | Noted: 2023-11-10 | Resolved: 2024-06-07

## 2024-06-07 PROCEDURE — 11042 DBRDMT SUBQ TIS 1ST 20SQCM/<: CPT

## 2024-06-07 PROCEDURE — 11719 TRIM NAIL(S) ANY NUMBER: CPT

## 2024-06-07 PROCEDURE — 97597 DBRDMT OPN WND 1ST 20 CM/<: CPT | Performed by: NURSE PRACTITIONER

## 2024-06-07 PROCEDURE — 11056 PARNG/CUTG B9 HYPRKR LES 2-4: CPT

## 2024-06-07 ASSESSMENT — PAIN DESCRIPTION - ORIENTATION: ORIENTATION: RIGHT;LEFT

## 2024-06-07 ASSESSMENT — PAIN DESCRIPTION - LOCATION: LOCATION: TOE (COMMENT WHICH ONE)

## 2024-06-07 ASSESSMENT — PAIN DESCRIPTION - DESCRIPTORS: DESCRIPTORS: SORE

## 2024-06-07 ASSESSMENT — PAIN SCALES - GENERAL: PAINLEVEL_OUTOF10: 8

## 2024-06-07 NOTE — PROGRESS NOTES
questions were answered to the patients satisfaction. Face to face counseling and coordination of care provided.    Plan:     Patient Instructions   PHYSICIAN ORDERS AND DISCHARGE INSTRUCTIONS  NOTE: Upon discharge from the Wound Center, you will receive a patient experience survey via E-mail. We would be grateful if you would take the time to fill this survey out.  Wound care order history:   CYNTHIA's   Right       Left    Date    Vascular studies/Intervention: .     Cultures: .               Antibiotics: .               HbA1c:  .               Grafts:  .   Juxta Lites & Lymph Pumps:  Continuing wound care orders and information:              Residence: .              Continue home health care with:.    Your wound-care supplies will be provided by: [facility]               Pharmacy: [facility] [place]   Wound cleansing:      Do not scrub or use excessive force.    Wash hands with soap and water before and after dressing changes.    Prior to applying a clean dressing, cleanse wound with normal saline,    wound cleanser, or mild soap and water.     Ask your physician or nurse before getting the wound(s) wet in the shower.  Daily Wound management:    Keep weight off wounds and reposition every 2 hours.    Avoid standing for long periods of time.    Evaluate legs to the level of the heart or above for 30 minutes 4-5 times a day and/or when sitting.       When taking antibiotics take entire prescription as ordered by MD do not stop taking until medicine is all gone.     Documentation:  Compression: .   Offloading: .        Orders for this week (6/7/2024):  Bilateral Legs and Feet  Paint toe nails with betadine on 06/07/2024.  Small Gentac on second toe for padding and protection   Apply A&D on legs and feet        Please dispense 30 day quantity when sending supplies     Follow up in the wound care center as needed for maintenance.   Call [phone] for any questions or concerns.

## 2024-06-07 NOTE — PATIENT INSTRUCTIONS
PHYSICIAN ORDERS AND DISCHARGE INSTRUCTIONS  NOTE: Upon discharge from the Wound Center, you will receive a patient experience survey via E-mail. We would be grateful if you would take the time to fill this survey out.  Wound care order history:   CYNTHIA's   Right       Left    Date    Vascular studies/Intervention: .     Cultures: .               Antibiotics: .               HbA1c:  .               Grafts:  .   Juxta Lites & Lymph Pumps:  Continuing wound care orders and information:              Residence: .              Continue home health care with:.    Your wound-care supplies will be provided by: [facility]               Pharmacy: [facility] [place]   Wound cleansing:      Do not scrub or use excessive force.    Wash hands with soap and water before and after dressing changes.    Prior to applying a clean dressing, cleanse wound with normal saline,    wound cleanser, or mild soap and water.     Ask your physician or nurse before getting the wound(s) wet in the shower.  Daily Wound management:    Keep weight off wounds and reposition every 2 hours.    Avoid standing for long periods of time.    Evaluate legs to the level of the heart or above for 30 minutes 4-5 times a day and/or when sitting.       When taking antibiotics take entire prescription as ordered by MD do not stop taking until medicine is all gone.     Documentation:  Compression: .   Offloading: .        Orders for this week (6/7/2024):  Bilateral Legs and Feet  Paint toe nails with betadine on 06/07/2024.  Small Gentac on second toe for padding and protection   Apply A&D on legs and feet        Please dispense 30 day quantity when sending supplies     Follow up in the wound care center as needed for maintenance.   Call [phone] for any questions or concerns.

## 2024-07-02 ENCOUNTER — HOSPITAL ENCOUNTER (OUTPATIENT)
Age: 89
Discharge: HOME | End: 2024-07-02
Payer: MEDICARE

## 2024-07-02 DIAGNOSIS — N18.31 CHRONIC KIDNEY DISEASE (CKD) STAGE G3A/A2, MODERATELY DECREASED GLOMERULAR FILTRATION RATE (GFR) BETWEEN 45-59 ML/MIN/1.73 SQUARE METER AND ALBUMINURIA CREATININE RATIO BETWEEN 30-299 MG/G (HCC): ICD-10-CM

## 2024-07-02 LAB
ALBUMIN SERPL-MCNC: 3.9 GM/DL (ref 3.4–5)
ALP BLD-CCNC: 61 IU/L (ref 40–129)
ALT SERPL-CCNC: 8 U/L (ref 10–40)
ANION GAP SERPL CALCULATED.3IONS-SCNC: 18 MMOL/L (ref 7–16)
AST SERPL-CCNC: 10 IU/L (ref 15–37)
BACTERIA: ABNORMAL /HPF
BILIRUB SERPL-MCNC: 0.4 MG/DL (ref 0–1)
BILIRUBIN, URINE: NEGATIVE MG/DL
BLOOD, URINE: NEGATIVE
BUN SERPL-MCNC: 105 MG/DL (ref 6–23)
CALCIUM SERPL-MCNC: 10.4 MG/DL (ref 8.3–10.6)
CAST TYPE: ABNORMAL /HPF
CHLORIDE BLD-SCNC: 93 MMOL/L (ref 99–110)
CLARITY, UA: CLEAR
CO2: 28 MMOL/L (ref 21–32)
COLOR, UA: YELLOW
CREAT SERPL-MCNC: 2.5 MG/DL (ref 0.6–1.1)
CREATININE URINE: 18.8 MG/DL (ref 28–217)
CRYSTAL TYPE: NEGATIVE /HPF
EPITHELIAL CELLS, UA: 1 /HPF
GFR, ESTIMATED: 18 ML/MIN/1.73M2
GLUCOSE SERPL-MCNC: 148 MG/DL (ref 70–99)
GLUCOSE URINE: NEGATIVE MG/DL
KETONES, URINE: NEGATIVE MG/DL
LEUKOCYTE ESTERASE, URINE: ABNORMAL
MAGNESIUM: 2.1 MG/DL (ref 1.8–2.4)
NITRITE URINE, QUANTITATIVE: NEGATIVE
PH, URINE: 7 (ref 5–8)
PHOSPHORUS: 6.9 MG/DL (ref 2.5–4.9)
POTASSIUM SERPL-SCNC: 3.7 MMOL/L (ref 3.5–5.1)
PROT/CREAT RATIO, UR: 0.2
PROTEIN UA: NEGATIVE MG/DL
RBC URINE: NEGATIVE /HPF (ref 0–6)
SODIUM BLD-SCNC: 139 MMOL/L (ref 135–145)
SPECIFIC GRAVITY UA: 1.01 (ref 1–1.03)
TOTAL PROTEIN: 6.8 GM/DL (ref 6.4–8.2)
URINE TOTAL PROTEIN: 4 MG/DL
UROBILINOGEN, URINE: 0.2 MG/DL (ref 0.2–1)
WBC UA: 1 /HPF (ref 0–5)

## 2024-07-02 PROCEDURE — 84100 ASSAY OF PHOSPHORUS: CPT

## 2024-07-02 PROCEDURE — 83735 ASSAY OF MAGNESIUM: CPT

## 2024-07-02 PROCEDURE — 82570 ASSAY OF URINE CREATININE: CPT

## 2024-07-02 PROCEDURE — 87086 URINE CULTURE/COLONY COUNT: CPT

## 2024-07-02 PROCEDURE — 81001 URINALYSIS AUTO W/SCOPE: CPT

## 2024-07-02 PROCEDURE — 36415 COLL VENOUS BLD VENIPUNCTURE: CPT

## 2024-07-02 PROCEDURE — 80053 COMPREHEN METABOLIC PANEL: CPT

## 2024-07-02 PROCEDURE — 87077 CULTURE AEROBIC IDENTIFY: CPT

## 2024-07-02 PROCEDURE — 84156 ASSAY OF PROTEIN URINE: CPT

## 2024-07-03 LAB
CULTURE: ABNORMAL
CULTURE: ABNORMAL
Lab: ABNORMAL
SPECIMEN: ABNORMAL

## 2024-07-05 ENCOUNTER — HOSPITAL ENCOUNTER (OUTPATIENT)
Dept: WOUND CARE | Age: 89
Discharge: HOME | End: 2024-07-05
Attending: NURSE PRACTITIONER
Payer: MEDICARE

## 2024-07-05 VITALS
RESPIRATION RATE: 16 BRPM | TEMPERATURE: 97.2 F | DIASTOLIC BLOOD PRESSURE: 72 MMHG | HEART RATE: 67 BPM | SYSTOLIC BLOOD PRESSURE: 147 MMHG

## 2024-07-05 DIAGNOSIS — E11.9 DIET-CONTROLLED TYPE 2 DIABETES MELLITUS (HCC): ICD-10-CM

## 2024-07-05 DIAGNOSIS — L97.212 VENOUS STASIS ULCER OF RIGHT CALF WITH FAT LAYER EXPOSED WITH VARICOSE VEINS (HCC): ICD-10-CM

## 2024-07-05 DIAGNOSIS — L97.511 DIABETIC ULCER OF TOE OF RIGHT FOOT ASSOCIATED WITH TYPE 2 DIABETES MELLITUS, LIMITED TO BREAKDOWN OF SKIN (HCC): Primary | ICD-10-CM

## 2024-07-05 DIAGNOSIS — L97.222 VENOUS STASIS ULCER OF LEFT CALF WITH FAT LAYER EXPOSED WITH VARICOSE VEINS (HCC): ICD-10-CM

## 2024-07-05 DIAGNOSIS — L97.521 DIABETIC ULCER OF TOE OF LEFT FOOT ASSOCIATED WITH TYPE 2 DIABETES MELLITUS, LIMITED TO BREAKDOWN OF SKIN (HCC): ICD-10-CM

## 2024-07-05 DIAGNOSIS — E66.9 OBESITY (BMI 30-39.9): ICD-10-CM

## 2024-07-05 DIAGNOSIS — I83.012 VENOUS STASIS ULCER OF RIGHT CALF WITH FAT LAYER EXPOSED WITH VARICOSE VEINS (HCC): ICD-10-CM

## 2024-07-05 DIAGNOSIS — E11.621 DIABETIC ULCER OF TOE OF LEFT FOOT ASSOCIATED WITH TYPE 2 DIABETES MELLITUS, LIMITED TO BREAKDOWN OF SKIN (HCC): ICD-10-CM

## 2024-07-05 DIAGNOSIS — I89.0 LYMPHEDEMA OF BOTH LOWER EXTREMITIES: ICD-10-CM

## 2024-07-05 DIAGNOSIS — E11.621 DIABETIC ULCER OF TOE OF RIGHT FOOT ASSOCIATED WITH TYPE 2 DIABETES MELLITUS, LIMITED TO BREAKDOWN OF SKIN (HCC): Primary | ICD-10-CM

## 2024-07-05 DIAGNOSIS — L84 CALLUS OF FOOT: ICD-10-CM

## 2024-07-05 DIAGNOSIS — I83.022 VENOUS STASIS ULCER OF LEFT CALF WITH FAT LAYER EXPOSED WITH VARICOSE VEINS (HCC): ICD-10-CM

## 2024-07-05 DIAGNOSIS — I87.2 VENOUS STASIS DERMATITIS OF BOTH LOWER EXTREMITIES: ICD-10-CM

## 2024-07-05 PROCEDURE — 6370000000 HC RX 637 (ALT 250 FOR IP): Performed by: NURSE PRACTITIONER

## 2024-07-05 PROCEDURE — 11042 DBRDMT SUBQ TIS 1ST 20SQCM/<: CPT | Performed by: NURSE PRACTITIONER

## 2024-07-05 PROCEDURE — 11042 DBRDMT SUBQ TIS 1ST 20SQCM/<: CPT

## 2024-07-05 RX ORDER — LIDOCAINE 40 MG/G
CREAM TOPICAL ONCE
OUTPATIENT
Start: 2024-07-05 | End: 2024-07-05

## 2024-07-05 RX ORDER — GINSENG 100 MG
CAPSULE ORAL ONCE
OUTPATIENT
Start: 2024-07-05 | End: 2024-07-05

## 2024-07-05 RX ORDER — SODIUM CHLOR/HYPOCHLOROUS ACID 0.033 %
SOLUTION, IRRIGATION IRRIGATION ONCE
OUTPATIENT
Start: 2024-07-05 | End: 2024-07-05

## 2024-07-05 RX ORDER — IBUPROFEN 200 MG
TABLET ORAL ONCE
OUTPATIENT
Start: 2024-07-05 | End: 2024-07-05

## 2024-07-05 RX ORDER — LIDOCAINE 50 MG/G
OINTMENT TOPICAL ONCE
OUTPATIENT
Start: 2024-07-05 | End: 2024-07-05

## 2024-07-05 RX ORDER — BACITRACIN ZINC AND POLYMYXIN B SULFATE 500; 1000 [USP'U]/G; [USP'U]/G
OINTMENT TOPICAL ONCE
OUTPATIENT
Start: 2024-07-05 | End: 2024-07-05

## 2024-07-05 RX ORDER — GENTAMICIN SULFATE 1 MG/G
OINTMENT TOPICAL ONCE
Status: COMPLETED | OUTPATIENT
Start: 2024-07-05 | End: 2024-07-05

## 2024-07-05 RX ORDER — LIDOCAINE HYDROCHLORIDE 40 MG/ML
SOLUTION TOPICAL ONCE
OUTPATIENT
Start: 2024-07-05 | End: 2024-07-05

## 2024-07-05 RX ORDER — BETAMETHASONE DIPROPIONATE 0.5 MG/G
CREAM TOPICAL ONCE
OUTPATIENT
Start: 2024-07-05 | End: 2024-07-05

## 2024-07-05 RX ORDER — TRIAMCINOLONE ACETONIDE 1 MG/G
OINTMENT TOPICAL ONCE
OUTPATIENT
Start: 2024-07-05 | End: 2024-07-05

## 2024-07-05 RX ORDER — GENTAMICIN SULFATE 1 MG/G
OINTMENT TOPICAL ONCE
OUTPATIENT
Start: 2024-07-05 | End: 2024-07-05

## 2024-07-05 RX ORDER — CLOBETASOL PROPIONATE 0.5 MG/G
OINTMENT TOPICAL ONCE
OUTPATIENT
Start: 2024-07-05 | End: 2024-07-05

## 2024-07-05 RX ADMIN — GENTAMICIN SULFATE: 1 OINTMENT TOPICAL at 10:38

## 2024-07-05 NOTE — PROGRESS NOTES
type 2 diabetes mellitus, limited to breakdown of skin (HCC)    Diabetic ulcer of toe of right foot associated with type 2 diabetes mellitus, limited to breakdown of skin (HCC) - Primary     Procedure Note    Indications:  Based on my examination of this patient's wound(s) today, sharp excision into subcutaneous tissue is required to promote healing and evaluate the extent of previous healing.    Performed by: TREVON Wilson CNP    Consent obtained: Yes    Time out taken:  Yes    Pain Control: not needed    Debridement:Excisional Debridement    Using curette the wound(s) was/were sharply debrided down through and including the removal of subcutaneous tissue.        Devitalized Tissue Debrided:  slough and exudate    All active wounds listed below with today's date are evaluated    Wound(s)    debrided this date include # : right and left second toe    Wound 07/05/24 #1 Right Second toe (Active)   Wound Length (cm) 0.1 cm 07/05/24 0927   Wound Width (cm) 0.1 cm 07/05/24 0927   Wound Depth (cm) 0.1 cm 07/05/24 0927   Wound Surface Area (cm^2) 0.01 cm^2 07/05/24 0927   Wound Volume (cm^3) 0.001 cm^3 07/05/24 0927   Distance Tunneling (cm) 0 cm 07/05/24 0927   Tunneling Position ___ O'Clock 0 07/05/24 0927   Undermining Starts ___ O'Clock 0 07/05/24 0927   Undermining Ends___ O'Clock 0 07/05/24 0927   Undermining Maxium Distance (cm) 0 07/05/24 0927   Wound Assessment Devitalized tissue 07/05/24 0927   Drainage Amount Moderate (25-50%) 07/05/24 0927   Drainage Description Serosanguinous 07/05/24 0927   Odor None 07/05/24 0927   Leatha-wound Assessment Intact 07/05/24 0927   Margins Defined edges 07/05/24 0927   Wound Thickness Description not for Pressure Injury Full thickness 07/05/24 0927   Number of days: 0       Wound 07/05/24 #2 Left Second Toe (Active)   Wound Length (cm) 0.1 cm 07/05/24 0927   Wound Width (cm) 0.1 cm 07/05/24 0927   Wound Depth (cm) 0.1 cm 07/05/24 0927   Wound Surface Area (cm^2) 0.01

## 2024-07-05 NOTE — PATIENT INSTRUCTIONS
PHYSICIAN ORDERS AND DISCHARGE INSTRUCTIONS  NOTE: Upon discharge from the Wound Center, you will receive a patient experience survey via E-mail. We would be grateful if you would take the time to fill this survey out.  Wound care order history:   CYNTHIA's   Right       Left    Date    Vascular studies/Intervention: .     Cultures: .               Antibiotics: .               HbA1c:  .               Grafts:  .   Juxta Lites & Lymph Pumps:  Continuing wound care orders and information:              Residence: .              Continue home health care with:.    Your wound-care supplies will be provided by: [facility]               Pharmacy: [facility] [place]   Wound cleansing:      Do not scrub or use excessive force.    Wash hands with soap and water before and after dressing changes.    Prior to applying a clean dressing, cleanse wound with normal saline,    wound cleanser, or mild soap and water.     Ask your physician or nurse before getting the wound(s) wet in the shower.  Daily Wound management:    Keep weight off wounds and reposition every 2 hours.    Avoid standing for long periods of time.    Evaluate legs to the level of the heart or above for 30 minutes 4-5 times a day and/or when sitting.       When taking antibiotics take entire prescription as ordered by MD do not stop taking until medicine is all gone.     Documentation:  Compression: .   Offloading: .        Orders for this week (7/5/2024):  Bilateral Legs and Feet- Wash with mild soap and water  A&D on legs and feet   Paint toe nails with betadine   Apply Gentamicin  to wound bed  Cover with Puracol AG+  Secure with Gentac   Leave till Monday then begin moisturizing daily    Please dispense 30 day quantity when sending supplies     Follow up with ROMI Dumont in 1 week in wound care center  Call [phone] for any questions or concerns.

## 2024-07-12 ENCOUNTER — HOSPITAL ENCOUNTER (OUTPATIENT)
Dept: WOUND CARE | Age: 89
Discharge: HOME | End: 2024-07-12
Attending: NURSE PRACTITIONER
Payer: MEDICARE

## 2024-07-12 VITALS
HEART RATE: 73 BPM | RESPIRATION RATE: 16 BRPM | SYSTOLIC BLOOD PRESSURE: 154 MMHG | TEMPERATURE: 97.3 F | DIASTOLIC BLOOD PRESSURE: 71 MMHG

## 2024-07-12 DIAGNOSIS — E66.9 OBESITY (BMI 30-39.9): ICD-10-CM

## 2024-07-12 DIAGNOSIS — E11.9 DIET-CONTROLLED TYPE 2 DIABETES MELLITUS (HCC): Primary | ICD-10-CM

## 2024-07-12 DIAGNOSIS — L97.511 DIABETIC ULCER OF TOE OF RIGHT FOOT ASSOCIATED WITH TYPE 2 DIABETES MELLITUS, LIMITED TO BREAKDOWN OF SKIN (HCC): ICD-10-CM

## 2024-07-12 DIAGNOSIS — E11.621 DIABETIC ULCER OF TOE OF LEFT FOOT ASSOCIATED WITH TYPE 2 DIABETES MELLITUS, LIMITED TO BREAKDOWN OF SKIN (HCC): ICD-10-CM

## 2024-07-12 DIAGNOSIS — L97.212 VENOUS STASIS ULCER OF RIGHT CALF WITH FAT LAYER EXPOSED WITH VARICOSE VEINS (HCC): ICD-10-CM

## 2024-07-12 DIAGNOSIS — L84 CALLUS OF FOOT: ICD-10-CM

## 2024-07-12 DIAGNOSIS — L97.521 DIABETIC ULCER OF TOE OF LEFT FOOT ASSOCIATED WITH TYPE 2 DIABETES MELLITUS, LIMITED TO BREAKDOWN OF SKIN (HCC): ICD-10-CM

## 2024-07-12 DIAGNOSIS — I83.012 VENOUS STASIS ULCER OF RIGHT CALF WITH FAT LAYER EXPOSED WITH VARICOSE VEINS (HCC): ICD-10-CM

## 2024-07-12 DIAGNOSIS — E11.621 DIABETIC ULCER OF TOE OF RIGHT FOOT ASSOCIATED WITH TYPE 2 DIABETES MELLITUS, LIMITED TO BREAKDOWN OF SKIN (HCC): ICD-10-CM

## 2024-07-12 DIAGNOSIS — I83.022 VENOUS STASIS ULCER OF LEFT CALF WITH FAT LAYER EXPOSED WITH VARICOSE VEINS (HCC): ICD-10-CM

## 2024-07-12 DIAGNOSIS — I89.0 LYMPHEDEMA OF BOTH LOWER EXTREMITIES: ICD-10-CM

## 2024-07-12 DIAGNOSIS — L60.2 LONG TOENAIL: ICD-10-CM

## 2024-07-12 DIAGNOSIS — I87.2 VENOUS STASIS DERMATITIS OF BOTH LOWER EXTREMITIES: ICD-10-CM

## 2024-07-12 DIAGNOSIS — L97.222 VENOUS STASIS ULCER OF LEFT CALF WITH FAT LAYER EXPOSED WITH VARICOSE VEINS (HCC): ICD-10-CM

## 2024-07-12 PROCEDURE — 6370000000 HC RX 637 (ALT 250 FOR IP): Performed by: NURSE PRACTITIONER

## 2024-07-12 PROCEDURE — 11042 DBRDMT SUBQ TIS 1ST 20SQCM/<: CPT

## 2024-07-12 PROCEDURE — 11719 TRIM NAIL(S) ANY NUMBER: CPT

## 2024-07-12 RX ORDER — BACITRACIN ZINC AND POLYMYXIN B SULFATE 500; 1000 [USP'U]/G; [USP'U]/G
OINTMENT TOPICAL ONCE
OUTPATIENT
Start: 2024-07-12 | End: 2024-07-12

## 2024-07-12 RX ORDER — LIDOCAINE HYDROCHLORIDE 40 MG/ML
SOLUTION TOPICAL ONCE
OUTPATIENT
Start: 2024-07-12 | End: 2024-07-12

## 2024-07-12 RX ORDER — BETAMETHASONE DIPROPIONATE 0.5 MG/G
CREAM TOPICAL ONCE
OUTPATIENT
Start: 2024-07-12 | End: 2024-07-12

## 2024-07-12 RX ORDER — TRIAMCINOLONE ACETONIDE 1 MG/G
OINTMENT TOPICAL ONCE
OUTPATIENT
Start: 2024-07-12 | End: 2024-07-12

## 2024-07-12 RX ORDER — GINSENG 100 MG
CAPSULE ORAL ONCE
OUTPATIENT
Start: 2024-07-12 | End: 2024-07-12

## 2024-07-12 RX ORDER — LIDOCAINE 50 MG/G
OINTMENT TOPICAL ONCE
OUTPATIENT
Start: 2024-07-12 | End: 2024-07-12

## 2024-07-12 RX ORDER — GENTAMICIN SULFATE 1 MG/G
OINTMENT TOPICAL ONCE
OUTPATIENT
Start: 2024-07-12 | End: 2024-07-12

## 2024-07-12 RX ORDER — IBUPROFEN 200 MG
TABLET ORAL ONCE
OUTPATIENT
Start: 2024-07-12 | End: 2024-07-12

## 2024-07-12 RX ORDER — CLOBETASOL PROPIONATE 0.5 MG/G
OINTMENT TOPICAL ONCE
OUTPATIENT
Start: 2024-07-12 | End: 2024-07-12

## 2024-07-12 RX ORDER — GENTAMICIN SULFATE 1 MG/G
OINTMENT TOPICAL ONCE
Status: COMPLETED | OUTPATIENT
Start: 2024-07-12 | End: 2024-07-12

## 2024-07-12 RX ORDER — SODIUM CHLOR/HYPOCHLOROUS ACID 0.033 %
SOLUTION, IRRIGATION IRRIGATION ONCE
OUTPATIENT
Start: 2024-07-12 | End: 2024-07-12

## 2024-07-12 RX ORDER — LIDOCAINE 40 MG/G
CREAM TOPICAL ONCE
OUTPATIENT
Start: 2024-07-12 | End: 2024-07-12

## 2024-07-12 RX ADMIN — GENTAMICIN SULFATE: 1 OINTMENT TOPICAL at 11:36

## 2024-07-12 ASSESSMENT — PAIN DESCRIPTION - LOCATION: LOCATION: TOE (COMMENT WHICH ONE)

## 2024-07-12 ASSESSMENT — PAIN DESCRIPTION - DESCRIPTORS: DESCRIPTORS: SORE

## 2024-07-12 ASSESSMENT — PAIN SCALES - GENERAL: PAINLEVEL_OUTOF10: 6

## 2024-07-12 ASSESSMENT — PAIN DESCRIPTION - ORIENTATION: ORIENTATION: RIGHT;LEFT

## 2024-07-12 NOTE — PLAN OF CARE
Problem: Pain  Goal: Verbalizes/displays adequate comfort level or baseline comfort level  7/12/2024 1135 by Belkys Cheatham, RN  Outcome: Adequate for Discharge  7/12/2024 1134 by Belkys Cheatham, RN  Outcome: Progressing     Problem: Wound:  Goal: Will show signs of wound healing; wound closure and no evidence of infection  Description: Will show signs of wound healing; wound closure and no evidence of infection  7/12/2024 1135 by Belkys Cheatham, RN  Outcome: Adequate for Discharge  7/12/2024 1134 by Belkys Cheatham, RN  Outcome: Progressing

## 2024-07-12 NOTE — PROGRESS NOTES
Wound Care Center Progress Visit      Nicci Alberto  AGE: 89 y.o.   GENDER: female  : 1935  EPISODE DATE:  2024   Referred by: Fern Steven APRN - CNP     Subjective:     CHIEF COMPLAINT  WOUND   Problem List Items Addressed This Visit          Circulatory    WD-Venous stasis ulcer of right calf with fat layer exposed with varicose veins (HCC)    Relevant Orders    Initiate Outpatient Wound Care Protocol    WD-Venous stasis ulcer of left calf with fat layer exposed with varicose veins (HCC)    Relevant Orders    Initiate Outpatient Wound Care Protocol    Venous stasis dermatitis of both lower extremities    Relevant Orders    Initiate Outpatient Wound Care Protocol       Endocrine    Diet-controlled type 2 diabetes mellitus (HCC) - Primary    Relevant Orders    Initiate Outpatient Wound Care Protocol    Diabetic ulcer of toe of left foot associated with type 2 diabetes mellitus, limited to breakdown of skin (HCC)    Diabetic ulcer of toe of right foot associated with type 2 diabetes mellitus, limited to breakdown of skin (HCC)       Other    WD-Callus of foot    Relevant Orders    Initiate Outpatient Wound Care Protocol    Lymphedema of both lower extremities    Relevant Orders    Initiate Outpatient Wound Care Protocol    Obesity (BMI 30-39.9)    Relevant Orders    Initiate Outpatient Wound Care Protocol    Long toenail     Chief Complaint   Patient presents with    Wound Check        HISTORY of PRESENT ILLNESS      Nicci Alberto is a 89 y.o. female who presents to the Wound Clinic for evaluation and treatment of Chronic diabetic toe wounds of both feet right second toe and left second toe). She also has chronic venous dermatitis and lymphedema bilateral lower extremities. Recurrent intermittent pressure ulcers on bilateral buttocks. The condition is of moderate severity. The patient is well known to the wound clinic and and has been treatment for venous wounds, diabetic wounds and pressure

## 2024-07-12 NOTE — PATIENT INSTRUCTIONS
PHYSICIAN ORDERS AND DISCHARGE INSTRUCTIONS  NOTE: Upon discharge from the Wound Center, you will receive a patient experience survey via E-mail. We would be grateful if you would take the time to fill this survey out.  Wound care order history:   CYNTHIA's   Right       Left    Date    Vascular studies/Intervention: .     Cultures: .               Antibiotics: .               HbA1c:  .               Grafts:  .   Juxta Lites & Lymph Pumps:  Continuing wound care orders and information:              Residence: .              Continue home health care with:.    Your wound-care supplies will be provided by: [facility]               Pharmacy: [facility] [place]   Wound cleansing:      Do not scrub or use excessive force.    Wash hands with soap and water before and after dressing changes.    Prior to applying a clean dressing, cleanse wound with normal saline,    wound cleanser, or mild soap and water.     Ask your physician or nurse before getting the wound(s) wet in the shower.  Daily Wound management:    Keep weight off wounds and reposition every 2 hours.    Avoid standing for long periods of time.    Evaluate legs to the level of the heart or above for 30 minutes 4-5 times a day and/or when sitting.       When taking antibiotics take entire prescription as ordered by MD do not stop taking until medicine is all gone.     Documentation:  Compression: .   Offloading: .        Orders for this week (7/12/2024):  Bilateral Legs and Feet- Wash with mild soap and water  A&D on legs and feet   Paint toe nails Betadine on 07/12/24   Apply Gentamicin  to wound bed  Cover with Puracol AG+  Secure with Gentac   Leave till Monday then begin moisturizing daily    Please dispense 30 day quantity when sending supplies     Follow up with ROMI Dumont at the [facility] as needed   Call [phone] for any questions or concerns.

## 2024-09-09 ENCOUNTER — HOSPITAL ENCOUNTER (OUTPATIENT)
Age: 89
Discharge: HOME | End: 2024-09-09
Payer: MEDICARE

## 2024-09-09 DIAGNOSIS — N39.0 URINARY TRACT INFECTION WITHOUT HEMATURIA, SITE UNSPECIFIED: ICD-10-CM

## 2024-09-09 DIAGNOSIS — N18.4 CKD STAGE G4/A1, GFR 15-29 AND ALBUMIN CREATININE RATIO <30 MG/G (HCC): ICD-10-CM

## 2024-09-09 LAB
ALBUMIN SERPL-MCNC: 3.9 G/DL (ref 3.4–5)
ALBUMIN/GLOB SERPL: 1.3 {RATIO} (ref 1.1–2.2)
ALP SERPL-CCNC: 62 U/L (ref 40–129)
ALT SERPL-CCNC: 8 U/L (ref 10–40)
ANION GAP SERPL CALCULATED.3IONS-SCNC: 16 MMOL/L (ref 4–16)
AST SERPL-CCNC: 12 U/L (ref 15–37)
BASOPHILS # BLD: 0.05 K/UL
BASOPHILS NFR BLD: 1 % (ref 0–1)
BILIRUB SERPL-MCNC: 0.3 MG/DL (ref 0–1)
BILIRUB UR QL STRIP: NEGATIVE
BUN SERPL-MCNC: 94 MG/DL (ref 6–23)
CALCIUM SERPL-MCNC: 10.3 MG/DL (ref 8.3–10.6)
CHLORIDE SERPL-SCNC: 92 MMOL/L (ref 99–110)
CLARITY UR: CLEAR
CO2 SERPL-SCNC: 29 MMOL/L (ref 21–32)
COLOR UR: YELLOW
COMMENT: NORMAL
CREAT SERPL-MCNC: 2.3 MG/DL (ref 0.6–1.1)
EOSINOPHIL # BLD: 0.34 K/UL
EOSINOPHILS RELATIVE PERCENT: 5 % (ref 0–3)
ERYTHROCYTE [DISTWIDTH] IN BLOOD BY AUTOMATED COUNT: 12.2 % (ref 11.7–14.9)
GFR, ESTIMATED: 20 ML/MIN/1.73M2
GLUCOSE SERPL-MCNC: 213 MG/DL (ref 70–99)
GLUCOSE UR STRIP-MCNC: NEGATIVE MG/DL
HCT VFR BLD AUTO: 36.5 % (ref 37–47)
HGB BLD-MCNC: 12 G/DL (ref 12.5–16)
HGB UR QL STRIP.AUTO: NEGATIVE
IMM GRANULOCYTES # BLD AUTO: 0.02 K/UL
IMM GRANULOCYTES NFR BLD: 0 %
KETONES UR STRIP-MCNC: NEGATIVE MG/DL
LEUKOCYTE ESTERASE UR QL STRIP: NEGATIVE
LYMPHOCYTES NFR BLD: 1.37 K/UL
LYMPHOCYTES RELATIVE PERCENT: 18 % (ref 24–44)
MCH RBC QN AUTO: 31.8 PG (ref 27–31)
MCHC RBC AUTO-ENTMCNC: 32.9 G/DL (ref 32–36)
MCV RBC AUTO: 96.8 FL (ref 78–100)
MONOCYTES NFR BLD: 0.56 K/UL
MONOCYTES NFR BLD: 8 % (ref 0–4)
NEUTROPHILS NFR BLD: 69 % (ref 36–66)
NEUTS SEG NFR BLD: 5.1 K/UL
NITRITE UR QL STRIP: NEGATIVE
PH UR STRIP: 7 [PH] (ref 5–8)
PLATELET # BLD AUTO: 245 K/UL (ref 140–440)
PMV BLD AUTO: 9.4 FL (ref 7.5–11.1)
POTASSIUM SERPL-SCNC: 3.5 MMOL/L (ref 3.5–5.1)
PROT SERPL-MCNC: 6.9 G/DL (ref 6.4–8.2)
PROT UR STRIP-MCNC: NEGATIVE MG/DL
RBC # BLD AUTO: 3.77 M/UL (ref 4.2–5.4)
SODIUM SERPL-SCNC: 137 MMOL/L (ref 135–145)
SP GR UR STRIP: 1.01 (ref 1–1.03)
UROBILINOGEN UR STRIP-ACNC: 0.2 EU/DL (ref 0–1)
WBC OTHER # BLD: 7.4 K/UL (ref 4–10.5)

## 2024-09-09 PROCEDURE — 80053 COMPREHEN METABOLIC PANEL: CPT

## 2024-09-09 PROCEDURE — 85025 COMPLETE CBC W/AUTO DIFF WBC: CPT

## 2024-09-09 PROCEDURE — 81003 URINALYSIS AUTO W/O SCOPE: CPT

## 2024-09-09 PROCEDURE — 36415 COLL VENOUS BLD VENIPUNCTURE: CPT

## 2024-09-18 ENCOUNTER — HOSPITAL ENCOUNTER (OUTPATIENT)
Dept: WOUND CARE | Age: 89
Discharge: HOME | End: 2024-09-18
Attending: NURSE PRACTITIONER
Payer: MEDICARE

## 2024-09-18 VITALS
SYSTOLIC BLOOD PRESSURE: 130 MMHG | TEMPERATURE: 97.5 F | HEART RATE: 71 BPM | RESPIRATION RATE: 16 BRPM | DIASTOLIC BLOOD PRESSURE: 48 MMHG

## 2024-09-18 DIAGNOSIS — L60.2 LONG TOENAIL: ICD-10-CM

## 2024-09-18 DIAGNOSIS — I87.2 VENOUS STASIS DERMATITIS OF BOTH LOWER EXTREMITIES: ICD-10-CM

## 2024-09-18 DIAGNOSIS — E11.621 DIABETIC ULCER OF TOE OF LEFT FOOT ASSOCIATED WITH TYPE 2 DIABETES MELLITUS, LIMITED TO BREAKDOWN OF SKIN (HCC): Primary | ICD-10-CM

## 2024-09-18 DIAGNOSIS — E11.9 DIET-CONTROLLED TYPE 2 DIABETES MELLITUS (HCC): ICD-10-CM

## 2024-09-18 DIAGNOSIS — E66.9 OBESITY (BMI 30-39.9): ICD-10-CM

## 2024-09-18 DIAGNOSIS — L85.9 HYPERKERATOSIS: ICD-10-CM

## 2024-09-18 DIAGNOSIS — L84 CALLUS OF FOOT: ICD-10-CM

## 2024-09-18 DIAGNOSIS — I89.0 LYMPHEDEMA OF BOTH LOWER EXTREMITIES: ICD-10-CM

## 2024-09-18 DIAGNOSIS — L97.521 DIABETIC ULCER OF TOE OF LEFT FOOT ASSOCIATED WITH TYPE 2 DIABETES MELLITUS, LIMITED TO BREAKDOWN OF SKIN (HCC): Primary | ICD-10-CM

## 2024-09-18 PROCEDURE — 11056 PARNG/CUTG B9 HYPRKR LES 2-4: CPT

## 2024-09-18 PROCEDURE — 11719 TRIM NAIL(S) ANY NUMBER: CPT

## 2024-09-18 ASSESSMENT — PAIN DESCRIPTION - DESCRIPTORS: DESCRIPTORS: SORE

## 2024-09-18 ASSESSMENT — PAIN DESCRIPTION - PAIN TYPE: TYPE: ACUTE PAIN

## 2024-09-18 ASSESSMENT — PAIN DESCRIPTION - FREQUENCY: FREQUENCY: CONTINUOUS

## 2024-09-18 ASSESSMENT — PAIN DESCRIPTION - ORIENTATION: ORIENTATION: RIGHT;LEFT

## 2024-09-18 ASSESSMENT — PAIN SCALES - GENERAL: PAINLEVEL_OUTOF10: 10

## 2024-09-18 ASSESSMENT — PAIN DESCRIPTION - LOCATION: LOCATION: TOE (COMMENT WHICH ONE)

## 2024-09-18 ASSESSMENT — PAIN - FUNCTIONAL ASSESSMENT: PAIN_FUNCTIONAL_ASSESSMENT: PREVENTS OR INTERFERES SOME ACTIVE ACTIVITIES AND ADLS

## 2024-09-18 ASSESSMENT — PAIN DESCRIPTION - ONSET: ONSET: ON-GOING

## 2024-09-20 PROBLEM — L85.9 HYPERKERATOSIS: Status: ACTIVE | Noted: 2024-09-20

## 2024-09-24 ENCOUNTER — APPOINTMENT (OUTPATIENT)
Dept: GENERAL RADIOLOGY | Age: 89
DRG: 640 | End: 2024-09-24
Payer: MEDICARE

## 2024-09-24 ENCOUNTER — APPOINTMENT (OUTPATIENT)
Dept: CT IMAGING | Age: 89
DRG: 640 | End: 2024-09-24
Payer: MEDICARE

## 2024-09-24 ENCOUNTER — HOSPITAL ENCOUNTER (INPATIENT)
Age: 89
LOS: 12 days | Discharge: NURSING, REHAB OR LONG-TERM CARE | DRG: 640 | End: 2024-10-06
Attending: EMERGENCY MEDICINE | Admitting: INTERNAL MEDICINE
Payer: MEDICARE

## 2024-09-24 DIAGNOSIS — I50.20 SYSTOLIC CONGESTIVE HEART FAILURE, UNSPECIFIED HF CHRONICITY (HCC): ICD-10-CM

## 2024-09-24 DIAGNOSIS — S01.01XA LACERATION OF SCALP, INITIAL ENCOUNTER: ICD-10-CM

## 2024-09-24 DIAGNOSIS — R42 DIZZINESS: Primary | ICD-10-CM

## 2024-09-24 DIAGNOSIS — S09.90XA INJURY OF HEAD, INITIAL ENCOUNTER: ICD-10-CM

## 2024-09-24 DIAGNOSIS — I34.2 NONRHEUMATIC MITRAL VALVE STENOSIS: ICD-10-CM

## 2024-09-24 DIAGNOSIS — J81.0 ACUTE PULMONARY EDEMA: ICD-10-CM
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
## 2024-09-24 DIAGNOSIS — I35.0 NONRHEUMATIC AORTIC VALVE STENOSIS: ICD-10-CM

## 2024-09-24 DIAGNOSIS — I50.9 CONGESTIVE HEART FAILURE, UNSPECIFIED HF CHRONICITY, UNSPECIFIED HEART FAILURE TYPE (HCC): ICD-10-CM

## 2024-09-24 LAB
ALBUMIN SERPL-MCNC: 4.2 G/DL (ref 3.4–5)
ALBUMIN/GLOB SERPL: 1.8 {RATIO} (ref 1.1–2.2)
ALP SERPL-CCNC: 61 U/L (ref 40–129)
ALT SERPL-CCNC: 12 U/L (ref 10–40)
ANION GAP SERPL CALCULATED.3IONS-SCNC: 15 MMOL/L (ref 9–17)
AST SERPL-CCNC: 17 U/L (ref 15–37)
BASOPHILS # BLD: 0.02 K/UL
BASOPHILS NFR BLD: 0 % (ref 0–1)
BILIRUB SERPL-MCNC: 0.4 MG/DL (ref 0–1)
BILIRUB UR QL STRIP: NEGATIVE
BNP SERPL-MCNC: 1765 PG/ML (ref 0–450)
BUN SERPL-MCNC: 95 MG/DL (ref 7–20)
CALCIUM SERPL-MCNC: 10.8 MG/DL (ref 8.3–10.6)
CHARACTER UR: ABNORMAL
CHLORIDE SERPL-SCNC: 93 MMOL/L (ref 99–110)
CLARITY UR: CLEAR
CO2 SERPL-SCNC: 31 MMOL/L (ref 21–32)
COLOR UR: YELLOW
CREAT SERPL-MCNC: 2.5 MG/DL (ref 0.6–1.2)
EKG ATRIAL RATE: 79 BPM
EKG DIAGNOSIS: NORMAL
EKG P AXIS: 56 DEGREES
EKG Q-T INTERVAL: 488 MS
EKG QRS DURATION: 168 MS
EKG QTC CALCULATION (BAZETT): 559 MS
EKG R AXIS: 54 DEGREES
EKG T AXIS: 87 DEGREES
EKG VENTRICULAR RATE: 79 BPM
EOSINOPHIL # BLD: 0.27 K/UL
EOSINOPHILS RELATIVE PERCENT: 4 % (ref 0–3)
EPI CELLS #/AREA URNS HPF: 1 /HPF
ERYTHROCYTE [DISTWIDTH] IN BLOOD BY AUTOMATED COUNT: 12.1 % (ref 11.7–14.9)
GFR, ESTIMATED: 18 ML/MIN/1.73M2
GLUCOSE SERPL-MCNC: 123 MG/DL (ref 74–99)
GLUCOSE UR STRIP-MCNC: NEGATIVE MG/DL
HCT VFR BLD AUTO: 39 % (ref 37–47)
HGB BLD-MCNC: 12.9 G/DL (ref 12.5–16)
HGB UR QL STRIP.AUTO: NEGATIVE
IMM GRANULOCYTES # BLD AUTO: 0.03 K/UL
IMM GRANULOCYTES NFR BLD: 1 %
KETONES UR STRIP-MCNC: NEGATIVE MG/DL
LEUKOCYTE ESTERASE UR QL STRIP: ABNORMAL
LYMPHOCYTES NFR BLD: 1.36 K/UL
LYMPHOCYTES RELATIVE PERCENT: 21 % (ref 24–44)
MAGNESIUM SERPL-MCNC: 1.7 MG/DL (ref 1.8–2.4)
MCH RBC QN AUTO: 32.3 PG (ref 27–31)
MCHC RBC AUTO-ENTMCNC: 33.1 G/DL (ref 32–36)
MCV RBC AUTO: 97.5 FL (ref 78–100)
MONOCYTES NFR BLD: 0.57 K/UL
MONOCYTES NFR BLD: 9 % (ref 0–4)
NEUTROPHILS NFR BLD: 66 % (ref 36–66)
NEUTS SEG NFR BLD: 4.28 K/UL
NITRITE UR QL STRIP: NEGATIVE
PH UR STRIP: 7.5 [PH] (ref 5–8)
PLATELET # BLD AUTO: 266 K/UL (ref 140–440)
PMV BLD AUTO: 9.2 FL (ref 7.5–11.1)
POTASSIUM SERPL-SCNC: 3.7 MMOL/L (ref 3.5–5.1)
PROT SERPL-MCNC: 6.5 G/DL (ref 6.4–8.2)
PROT UR STRIP-MCNC: NEGATIVE MG/DL
RBC # BLD AUTO: 4 M/UL (ref 4.2–5.4)
RBC #/AREA URNS HPF: 1 /HPF (ref 0–2)
SODIUM SERPL-SCNC: 140 MMOL/L (ref 136–145)
SP GR UR STRIP: 1.01 (ref 1–1.03)
TRICHOMONAS #/AREA URNS HPF: ABNORMAL /[HPF]
TROPONIN I SERPL HS-MCNC: 62 NG/L (ref 0–14)
TROPONIN I SERPL HS-MCNC: 63 NG/L (ref 0–14)
UROBILINOGEN UR STRIP-ACNC: 0.2 EU/DL (ref 0–1)
WBC #/AREA URNS HPF: 1 /HPF (ref 0–5)
WBC OTHER # BLD: 6.5 K/UL (ref 4–10.5)

## 2024-09-24 PROCEDURE — 6370000000 HC RX 637 (ALT 250 FOR IP): Performed by: PHYSICIAN ASSISTANT

## 2024-09-24 PROCEDURE — 81001 URINALYSIS AUTO W/SCOPE: CPT

## 2024-09-24 PROCEDURE — 83550 IRON BINDING TEST: CPT

## 2024-09-24 PROCEDURE — 93010 ELECTROCARDIOGRAM REPORT: CPT | Performed by: INTERNAL MEDICINE

## 2024-09-24 PROCEDURE — 80053 COMPREHEN METABOLIC PANEL: CPT

## 2024-09-24 PROCEDURE — 12015 RPR F/E/E/N/L/M 7.6-12.5 CM: CPT

## 2024-09-24 PROCEDURE — 36415 COLL VENOUS BLD VENIPUNCTURE: CPT

## 2024-09-24 PROCEDURE — 2500000003 HC RX 250 WO HCPCS: Performed by: PHYSICIAN ASSISTANT

## 2024-09-24 PROCEDURE — 83540 ASSAY OF IRON: CPT

## 2024-09-24 PROCEDURE — 84439 ASSAY OF FREE THYROXINE: CPT

## 2024-09-24 PROCEDURE — 83735 ASSAY OF MAGNESIUM: CPT

## 2024-09-24 PROCEDURE — 6370000000 HC RX 637 (ALT 250 FOR IP): Performed by: INTERNAL MEDICINE

## 2024-09-24 PROCEDURE — 99223 1ST HOSP IP/OBS HIGH 75: CPT | Performed by: INTERNAL MEDICINE

## 2024-09-24 PROCEDURE — 71045 X-RAY EXAM CHEST 1 VIEW: CPT

## 2024-09-24 PROCEDURE — 99285 EMERGENCY DEPT VISIT HI MDM: CPT

## 2024-09-24 PROCEDURE — 1200000000 HC SEMI PRIVATE

## 2024-09-24 PROCEDURE — 6370000000 HC RX 637 (ALT 250 FOR IP): Performed by: EMERGENCY MEDICINE

## 2024-09-24 PROCEDURE — 83880 ASSAY OF NATRIURETIC PEPTIDE: CPT

## 2024-09-24 PROCEDURE — 84484 ASSAY OF TROPONIN QUANT: CPT

## 2024-09-24 PROCEDURE — 85025 COMPLETE CBC W/AUTO DIFF WBC: CPT

## 2024-09-24 PROCEDURE — 84443 ASSAY THYROID STIM HORMONE: CPT

## 2024-09-24 PROCEDURE — 2580000003 HC RX 258: Performed by: INTERNAL MEDICINE

## 2024-09-24 PROCEDURE — 94640 AIRWAY INHALATION TREATMENT: CPT

## 2024-09-24 PROCEDURE — 0HQ1XZZ REPAIR FACE SKIN, EXTERNAL APPROACH: ICD-10-PCS | Performed by: STUDENT IN AN ORGANIZED HEALTH CARE EDUCATION/TRAINING PROGRAM

## 2024-09-24 PROCEDURE — 70450 CT HEAD/BRAIN W/O DYE: CPT

## 2024-09-24 PROCEDURE — 93005 ELECTROCARDIOGRAM TRACING: CPT | Performed by: EMERGENCY MEDICINE

## 2024-09-24 PROCEDURE — 6360000002 HC RX W HCPCS: Performed by: INTERNAL MEDICINE

## 2024-09-24 PROCEDURE — 72125 CT NECK SPINE W/O DYE: CPT

## 2024-09-24 RX ORDER — CALCIUM CARBONATE 500 MG/1
1 TABLET, CHEWABLE ORAL DAILY
Status: DISCONTINUED | OUTPATIENT
Start: 2024-09-25 | End: 2024-09-25

## 2024-09-24 RX ORDER — GLUCOSAMINE/D3/BOSWELLIA SERRA 1500MG-400
10000 TABLET ORAL DAILY
Status: DISCONTINUED | OUTPATIENT
Start: 2024-09-24 | End: 2024-09-24 | Stop reason: CLARIF

## 2024-09-24 RX ORDER — ACETAMINOPHEN 325 MG/1
650 TABLET ORAL ONCE
Status: COMPLETED | OUTPATIENT
Start: 2024-09-24 | End: 2024-09-24

## 2024-09-24 RX ORDER — HEPARIN SODIUM 5000 [USP'U]/ML
5000 INJECTION, SOLUTION INTRAVENOUS; SUBCUTANEOUS EVERY 8 HOURS SCHEDULED
Status: DISCONTINUED | OUTPATIENT
Start: 2024-09-24 | End: 2024-10-06 | Stop reason: HOSPADM

## 2024-09-24 RX ORDER — FAMOTIDINE 20 MG/1
20 TABLET, FILM COATED ORAL DAILY
Status: DISCONTINUED | OUTPATIENT
Start: 2024-09-25 | End: 2024-10-06 | Stop reason: HOSPADM

## 2024-09-24 RX ORDER — ERGOCALCIFEROL 1.25 MG/1
50000 CAPSULE, LIQUID FILLED ORAL WEEKLY
Status: DISCONTINUED | OUTPATIENT
Start: 2024-09-27 | End: 2024-10-06 | Stop reason: HOSPADM

## 2024-09-24 RX ORDER — ACETAMINOPHEN 325 MG/1
650 TABLET ORAL EVERY 6 HOURS PRN
Status: DISCONTINUED | OUTPATIENT
Start: 2024-09-24 | End: 2024-10-06 | Stop reason: HOSPADM

## 2024-09-24 RX ORDER — ONDANSETRON 4 MG/1
4 TABLET, ORALLY DISINTEGRATING ORAL EVERY 8 HOURS PRN
Status: DISCONTINUED | OUTPATIENT
Start: 2024-09-24 | End: 2024-10-06 | Stop reason: HOSPADM

## 2024-09-24 RX ORDER — CETIRIZINE HYDROCHLORIDE 10 MG/1
5 TABLET ORAL DAILY
Status: DISCONTINUED | OUTPATIENT
Start: 2024-09-24 | End: 2024-10-06 | Stop reason: HOSPADM

## 2024-09-24 RX ORDER — ONDANSETRON 2 MG/ML
4 INJECTION INTRAMUSCULAR; INTRAVENOUS EVERY 6 HOURS PRN
Status: DISCONTINUED | OUTPATIENT
Start: 2024-09-24 | End: 2024-10-06 | Stop reason: HOSPADM

## 2024-09-24 RX ORDER — ACETAMINOPHEN 650 MG/1
650 SUPPOSITORY RECTAL EVERY 6 HOURS PRN
Status: DISCONTINUED | OUTPATIENT
Start: 2024-09-24 | End: 2024-10-06 | Stop reason: HOSPADM

## 2024-09-24 RX ORDER — FLUTICASONE PROPIONATE 110 UG/1
1 AEROSOL, METERED RESPIRATORY (INHALATION) 2 TIMES DAILY
Status: DISCONTINUED | OUTPATIENT
Start: 2024-09-24 | End: 2024-10-06 | Stop reason: HOSPADM

## 2024-09-24 RX ORDER — LATANOPROST 50 UG/ML
1 SOLUTION/ DROPS OPHTHALMIC NIGHTLY
Status: DISCONTINUED | OUTPATIENT
Start: 2024-09-24 | End: 2024-10-06 | Stop reason: HOSPADM

## 2024-09-24 RX ORDER — SODIUM CHLORIDE 0.9 % (FLUSH) 0.9 %
5-40 SYRINGE (ML) INJECTION EVERY 12 HOURS SCHEDULED
Status: DISCONTINUED | OUTPATIENT
Start: 2024-09-24 | End: 2024-10-06 | Stop reason: HOSPADM

## 2024-09-24 RX ORDER — SODIUM CHLORIDE 0.9 % (FLUSH) 0.9 %
5-40 SYRINGE (ML) INJECTION PRN
Status: DISCONTINUED | OUTPATIENT
Start: 2024-09-24 | End: 2024-10-06 | Stop reason: HOSPADM

## 2024-09-24 RX ORDER — POLYETHYLENE GLYCOL 3350 17 G/17G
17 POWDER, FOR SOLUTION ORAL DAILY PRN
Status: DISCONTINUED | OUTPATIENT
Start: 2024-09-24 | End: 2024-10-06 | Stop reason: HOSPADM

## 2024-09-24 RX ORDER — SPIRONOLACTONE 50 MG/1
50 TABLET, FILM COATED ORAL 2 TIMES DAILY
Status: DISCONTINUED | OUTPATIENT
Start: 2024-09-24 | End: 2024-09-25

## 2024-09-24 RX ORDER — FLUTICASONE PROPIONATE 50 MCG
1 SPRAY, SUSPENSION (ML) NASAL DAILY PRN
Status: DISCONTINUED | OUTPATIENT
Start: 2024-09-24 | End: 2024-10-06 | Stop reason: HOSPADM

## 2024-09-24 RX ORDER — FEBUXOSTAT 40 MG/1
40 TABLET, FILM COATED ORAL DAILY
Status: DISCONTINUED | OUTPATIENT
Start: 2024-09-24 | End: 2024-10-06 | Stop reason: HOSPADM

## 2024-09-24 RX ORDER — ALBUTEROL SULFATE 0.83 MG/ML
2.5 SOLUTION RESPIRATORY (INHALATION) 4 TIMES DAILY PRN
Status: DISCONTINUED | OUTPATIENT
Start: 2024-09-24 | End: 2024-10-06 | Stop reason: HOSPADM

## 2024-09-24 RX ORDER — CILOSTAZOL 100 MG/1
50 TABLET ORAL 2 TIMES DAILY
Status: DISCONTINUED | OUTPATIENT
Start: 2024-09-24 | End: 2024-09-25

## 2024-09-24 RX ORDER — CALCIUM CARBONATE 500 MG/1
1 TABLET, CHEWABLE ORAL DAILY
COMMUNITY

## 2024-09-24 RX ORDER — BUMETANIDE 0.25 MG/ML
0.5 INJECTION INTRAMUSCULAR; INTRAVENOUS DAILY
Status: DISCONTINUED | OUTPATIENT
Start: 2024-09-24 | End: 2024-09-25

## 2024-09-24 RX ORDER — SODIUM CHLORIDE 9 MG/ML
INJECTION, SOLUTION INTRAVENOUS PRN
Status: DISCONTINUED | OUTPATIENT
Start: 2024-09-24 | End: 2024-10-06 | Stop reason: HOSPADM

## 2024-09-24 RX ORDER — LEVOTHYROXINE SODIUM 50 UG/1
50 TABLET ORAL DAILY
Status: DISCONTINUED | OUTPATIENT
Start: 2024-09-25 | End: 2024-10-06 | Stop reason: HOSPADM

## 2024-09-24 RX ORDER — FERROUS SULFATE 325(65) MG
325 TABLET ORAL 2 TIMES DAILY
Status: DISCONTINUED | OUTPATIENT
Start: 2024-09-24 | End: 2024-10-06 | Stop reason: HOSPADM

## 2024-09-24 RX ORDER — TRAMADOL HYDROCHLORIDE 50 MG/1
50 TABLET ORAL EVERY 6 HOURS PRN
Status: DISCONTINUED | OUTPATIENT
Start: 2024-09-24 | End: 2024-09-28

## 2024-09-24 RX ORDER — ALBUTEROL SULFATE 90 UG/1
2 INHALANT RESPIRATORY (INHALATION) EVERY 6 HOURS PRN
Status: DISCONTINUED | OUTPATIENT
Start: 2024-09-24 | End: 2024-10-06 | Stop reason: HOSPADM

## 2024-09-24 RX ORDER — METOLAZONE 2.5 MG/1
2.5 TABLET ORAL DAILY
Status: DISCONTINUED | OUTPATIENT
Start: 2024-09-24 | End: 2024-09-26

## 2024-09-24 RX ORDER — ROPINIROLE 0.25 MG/1
0.5 TABLET, FILM COATED ORAL 3 TIMES DAILY
Status: DISCONTINUED | OUTPATIENT
Start: 2024-09-24 | End: 2024-10-06 | Stop reason: HOSPADM

## 2024-09-24 RX ORDER — LIDOCAINE HYDROCHLORIDE AND EPINEPHRINE 10; 10 MG/ML; UG/ML
10 INJECTION, SOLUTION INFILTRATION; PERINEURAL ONCE
Status: COMPLETED | OUTPATIENT
Start: 2024-09-24 | End: 2024-09-24

## 2024-09-24 RX ORDER — CARBOXYMETHYLCELLULOSE SODIUM 10 MG/ML
1 GEL OPHTHALMIC 2 TIMES DAILY
Status: DISCONTINUED | OUTPATIENT
Start: 2024-09-24 | End: 2024-09-26

## 2024-09-24 RX ORDER — GLUCOSAMINE/D3/BOSWELLIA SERRA 1500MG-400
10000 TABLET ORAL DAILY
COMMUNITY

## 2024-09-24 RX ADMIN — CILOSTAZOL 50 MG: 100 TABLET ORAL at 21:15

## 2024-09-24 RX ADMIN — ACETAMINOPHEN 650 MG: 325 TABLET ORAL at 10:32

## 2024-09-24 RX ADMIN — ROPINIROLE HYDROCHLORIDE 0.5 MG: 0.25 TABLET, FILM COATED ORAL at 18:57

## 2024-09-24 RX ADMIN — SPIRONOLACTONE 50 MG: 50 TABLET ORAL at 21:15

## 2024-09-24 RX ADMIN — FERROUS SULFATE TAB 325 MG (65 MG ELEMENTAL FE) 325 MG: 325 (65 FE) TAB at 21:15

## 2024-09-24 RX ADMIN — CETIRIZINE HYDROCHLORIDE 5 MG: 10 TABLET, FILM COATED ORAL at 18:58

## 2024-09-24 RX ADMIN — ROPINIROLE HYDROCHLORIDE 0.5 MG: 0.25 TABLET, FILM COATED ORAL at 21:15

## 2024-09-24 RX ADMIN — SODIUM CHLORIDE, PRESERVATIVE FREE 10 ML: 5 INJECTION INTRAVENOUS at 21:16

## 2024-09-24 RX ADMIN — TRAMADOL HYDROCHLORIDE 50 MG: 50 TABLET ORAL at 18:58

## 2024-09-24 RX ADMIN — LIDOCAINE HYDROCHLORIDE,EPINEPHRINE BITARTRATE 10 ML: 10; .01 INJECTION, SOLUTION INFILTRATION; PERINEURAL at 10:33

## 2024-09-24 RX ADMIN — Medication 3 ML: at 10:50

## 2024-09-24 RX ADMIN — HEPARIN SODIUM 5000 UNITS: 5000 INJECTION INTRAVENOUS; SUBCUTANEOUS at 21:15

## 2024-09-24 RX ADMIN — FLUTICASONE PROPIONATE 1 PUFF: 110 AEROSOL, METERED RESPIRATORY (INHALATION) at 21:05

## 2024-09-24 ASSESSMENT — PAIN DESCRIPTION - LOCATION
LOCATION: HEAD
LOCATION: HEAD

## 2024-09-24 ASSESSMENT — PAIN SCALES - GENERAL
PAINLEVEL_OUTOF10: 6
PAINLEVEL_OUTOF10: 0
PAINLEVEL_OUTOF10: 0
PAINLEVEL_OUTOF10: 5
PAINLEVEL_OUTOF10: 0

## 2024-09-24 ASSESSMENT — PAIN DESCRIPTION - PAIN TYPE: TYPE: ACUTE PAIN

## 2024-09-24 ASSESSMENT — PAIN DESCRIPTION - DESCRIPTORS: DESCRIPTORS: ACHING

## 2024-09-24 ASSESSMENT — PAIN DESCRIPTION - ORIENTATION
ORIENTATION: LEFT
ORIENTATION: LEFT;POSTERIOR

## 2024-09-24 NOTE — ED PROVIDER NOTES
sterile fashion  Exploration:     Hemostasis achieved with:  LET and epinephrine    Wound exploration: wound explored through full range of motion      Wound extent: no fascia violation noted, no foreign bodies/material noted, no muscle damage noted, no nerve damage noted, no tendon damage noted and no vascular damage noted      Contaminated: no    Treatment:     Area cleansed with:  Saline    Amount of cleaning:  Extensive    Irrigation solution:  Sterile saline    Irrigation method:  Syringe and pressure wash    Visualized foreign bodies/material removed: no      Debridement:  Minimal    Undermining:  None  Skin repair:     Repair method:  Sutures    Suture technique:  Simple interrupted and running    Number of sutures:  8 (2 simple at medial apex, 6 running)  Approximation:     Approximation:  Close  Repair type:     Repair type:  Intermediate (required extensive cleaning with copious irrigation due to concern for foreign body, contamination)  Post-procedure details:     Dressing:  Antibiotic ointment    Procedure completion:  Tolerated well, no immediate complications        Anesthesia administered, wound cleansed and irrigated.  I was able to visualize the wound in a bloodless field with adequate lighting, through full range of motion.  Wound irrigated copiously.        Low suspicion for any clinically significant retained foreign body/debris, I discussed potential for increased risk infection with any history of diabetes, immunocompromise conditions, lower extremity lacerations, contaminated lacerations, possible foreign body debris, larger lacerations.    Wound closed with sutures.     Patient tolerated procedure  well without complications.    After care instructions and signs of potential infections discussed with patient/family.  Recommendations for suture removals 5 to 7 days follow up plan and return precautions provided and discussed patient in agreement.           (Please note that portions of this 
injection 10 mL (10 mLs IntraDERmal Given 9/24/24 1033)   lidocaine-EPINEPHrine-tetracaine (LET) topical gel 3 mL syringe (3 mLs Topical Given 9/24/24 1050)       Independent Imaging Interpretation by me: Chest x-ray with no evidence of pneumothorax    EKG (if obtained): (All EKG's are interpreted by myself in the absence of a cardiologist) ventricular paced rhythm with rate of 79 bpm.  No ST elevation.  Otherwise limited given paced.  Compared to previous from 8/10/2023 there are no acute changes    Chronic conditions affecting care: CKD    Social Determinants : None    Records Reviewed : Outpatient Notes patient follows with nephrology, last seen by Dr. Hernandez on 7/10/2024.  Appears that she has chronic kidney disease, mainly peripheral edema.  Most recent creatinine was 2.5 in July 2024 per their documentation and electrolytes were acceptable, minimal proteinuria.  Her diabetes type 2 had been diet controlled, does have a history of hypertension but blood pressure had been well controlled rate lately.  Had sick sinus syndrome requiring a pacemaker in November 2020.  They noted her phosphorus was up slightly but was making good urine, she had been doing well, no adjustments were made to meds and plan for 6-month follow-up    Disposition Considerations (tests considered but not done, Shared Decision Making, Pt Expectation of Test or Tx.):       Patient presented with concern for dizziness and fall.  Has been having episodes over the last couple of weeks, so I would not be less concerned for stroke and her NIHSS is normal as above.  She has head CT with no evidence of intracranial abnormalities.  Her chest x-ray actually shows pulmonary edema with mildly elevated BNP which is new for her.  No significant metabolic derangements, normal sodium potassium.  Her high sensitive troponin is elevated but we do not have any previous to compare and she is not having any chest pain.  Plan will be for admission at this time for

## 2024-09-24 NOTE — H&P
V2.0  History and Physical      Name:  Nicci Alberto /Age/Sex: 1935  (89 y.o. female)   MRN & CSN:  [id number] & [id number] Encounter Date/Time: 2024 1:19 PM EDT   Location:  ED14/ED-14 PCP: Fern Steven APRN - CNP       Hospital Day: 1    Assessment and Plan:   Nicci Alberto is a 89 y.o. female who presents with Heart failure (HCC)    Hospital Problems             Last Modified POA    * (Principal) Heart failure (HCC) 2024 Yes     # Pulmonary edema likely secondary to heart failure exacerbation  -Patient presents with lightheadedness and fall, also has orthopnea without PND  -Chest x-ray showing pulmonary edema  -EKG showing ventricularly paced rhythm  -proBNP of 1765  IV Bumex daily  Strict I's and O's  Daily weights  Echo ordered  Telemetry monitoring  Cardiology consulted, appreciate recs  Pacemaker interrogation  Continue metolazone and spironolactone    # Elevated troponin likely in the setting of demand ischemia  -Troponin: 62-->63  -EKG with no ischemic changes  Continue to monitor for signs of ACS    # Laceration on forehead  Sutured in the ED  Will need to follow-up for suture removal    # PAD  Continue cilostazol    # Asthma, not in exacerbation  Continue Flovent and as needed albuterol    # Restless leg syndrome  Continue ropinirole    # Hypothyroidism  Continue levothyroxine    Discussed code status with patient, would like to be full code.     Disposition:   Current Living situation: Home  Expected Disposition: Home versus SNF  Estimated D/C: 3 days    Diet No diet orders on file   DVT Prophylaxis [] Lovenox, []  Heparin, [] SCDs, [] Ambulation,  [] Eliquis, [] Xarelto, [] Coumadin   Code Status Prior   Surrogate Decision Maker/ POA Spouse     Personally reviewed Lab Studies and Imaging     Discussed management of the case with ED provider who recommended admission    EKG interpreted personally and results ventricularly paced rhythm    Imaging that was interpreted personally

## 2024-09-24 NOTE — ED TRIAGE NOTES
Patient arrived via EMS d/t a fall at home. Patient states that she woke up around an hour ago and felt dizzy. She went back to bed and when she woke up again she felt dizzy again when she stood up and fell and hit her head. She has a laceration to her forehead. Patient is not on any blood thinners.

## 2024-09-24 NOTE — ED NOTES
Admit; [facility]  
Called and ordered pt lunch tray per pt request.  
Dr. Paz at bedside for sutures.  
Medication History  [facility]    Patient Name: Nicci Alberto 1935     Medication history has been completed by: Vonnie Leon [place]    Source(s) of information: patient/family and insurance claims     Primary Care Physician: Fern Steven APRN - CNP     Pharmacy: [facility]    Allergies as of 09/24/2024 - Fully Reviewed 09/24/2024   Allergen Reaction Noted    Latex Rash 09/08/2014    Dye [iodides] Anaphylaxis 01/02/2014    Iodine Anaphylaxis 01/02/2014    Dyazide [hydrochlorothiazide w-triamterene] Rash 03/08/2017    Lasix [furosemide] Rash 03/08/2017    Procardia [nifedipine] Other (See Comments) 03/08/2017    Doxycycline Dermatitis 09/05/2014    Edecrin [ethacrynic acid]  01/02/2014    Farxiga [dapagliflozin]  05/23/2023    Levaquin [levofloxacin] Other (See Comments) 10/10/2015    Mobic [meloxicam]  01/02/2014    Vioxx [rofecoxib]  01/02/2014    Celebrex [celecoxib] Rash 01/02/2014    Lexapro [escitalopram oxalate] Rash 09/05/2014    Sulfa antibiotics Hives 01/02/2014        Prior to Admission medications    Medication Sig Start Date End Date Taking? Authorizing Provider   calcium carbonate (TUMS) 500 MG chewable tablet Take 1 tablet by mouth daily   Yes Wilbert Salamanca MD   polyethyl glycol-propyl glycol 0.4-0.3 % (SYSTANE) 0.4-0.3 % ophthalmic solution Place 1 drop into both eyes 2 times daily OTC   Yes Wilbert Salamanca MD   Biotin 81472 MCG TABS Take 10,000 mcg by mouth daily   Yes Wilbert Salamanca MD   febuxostat (ULORIC) 40 MG TABS tablet Take 1 tablet by mouth once daily 8/27/24  Yes Celsa Hazel MD   torsemide (DEMADEX) 100 MG tablet Take 1 tablet by mouth 2 times daily 8/15/24  Yes Celsa Hazel MD   spironolactone (ALDACTONE) 50 MG tablet Take 1 tablet by mouth 2 times daily 7/30/24  Yes Celsa Hazel MD   Cholecalciferol (VITAMIN D3) 1.25 MG (77773 UT) TABS Take 50,000 Units by mouth once a week 1/15/24  Yes Celsa Hazel 
Pacemaker interrogated at this time.   
Patient refuses to be cath'd, purewick in place.   
Troponin sent  
Xray at bedside.  
    Pertinent or High Risk Medications/Drips: no   If Yes, please provide details:   Blood Product Administration: no  If Yes, please provide details:     Recommendation    Incomplete orders   Additional Comments:    If any further questions, please call Sending RN at [phone]      Electronically signed by: Electronically signed by Yudy Jordan RN on 9/24/2024 at 2:35 PM

## 2024-09-25 ENCOUNTER — APPOINTMENT (OUTPATIENT)
Dept: NON INVASIVE DIAGNOSTICS | Age: 89
DRG: 640 | End: 2024-09-25
Attending: INTERNAL MEDICINE
Payer: MEDICARE

## 2024-09-25 LAB
ANION GAP SERPL CALCULATED.3IONS-SCNC: 15 MMOL/L (ref 9–17)
BASOPHILS # BLD: 0.04 K/UL
BASOPHILS NFR BLD: 1 % (ref 0–1)
BUN SERPL-MCNC: 94 MG/DL (ref 7–20)
CALCIUM SERPL-MCNC: 10.3 MG/DL (ref 8.3–10.6)
CHLORIDE SERPL-SCNC: 95 MMOL/L (ref 99–110)
CHOLEST SERPL-MCNC: 148 MG/DL (ref 125–199)
CO2 SERPL-SCNC: 30 MMOL/L (ref 21–32)
CREAT SERPL-MCNC: 2.4 MG/DL (ref 0.6–1.2)
ECHO AO ROOT DIAM: 3 CM
ECHO AO ROOT INDEX: 1.79 CM/M2
ECHO AV AREA PEAK VELOCITY: 1.7 CM2
ECHO AV AREA VTI: 1.7 CM2
ECHO AV AREA/BSA PEAK VELOCITY: 1 CM2/M2
ECHO AV AREA/BSA VTI: 1 CM2/M2
ECHO AV MEAN GRADIENT: 9 MMHG
ECHO AV MEAN VELOCITY: 1.4 M/S
ECHO AV PEAK GRADIENT: 15 MMHG
ECHO AV PEAK VELOCITY: 1.9 M/S
ECHO AV VELOCITY RATIO: 0.53
ECHO AV VTI: 46.8 CM
ECHO BSA: 1.72 M2
ECHO IVC PROX: 1.5 CM
ECHO LA AREA 4C: 19.2 CM2
ECHO LA DIAMETER INDEX: 2.26 CM/M2
ECHO LA DIAMETER: 3.8 CM
ECHO LA MAJOR AXIS: 6.6 CM
ECHO LA TO AORTIC ROOT RATIO: 1.27
ECHO LA VOL MOD A4C: 48 ML (ref 22–52)
ECHO LA VOLUME INDEX MOD A4C: 29 ML/M2 (ref 16–34)
ECHO LV E' LATERAL VELOCITY: 6.7 CM/S
ECHO LV E' SEPTAL VELOCITY: 4.1 CM/S
ECHO LV EDV A4C: 99 ML
ECHO LV EDV INDEX A4C: 59 ML/M2
ECHO LV EF PHYSICIAN: 55 %
ECHO LV EJECTION FRACTION A4C: 54 %
ECHO LV ESV A4C: 45 ML
ECHO LV ESV INDEX A4C: 27 ML/M2
ECHO LV FRACTIONAL SHORTENING: 40 % (ref 28–44)
ECHO LV INTERNAL DIMENSION DIASTOLE INDEX: 2.8 CM/M2
ECHO LV INTERNAL DIMENSION DIASTOLIC: 4.7 CM (ref 3.9–5.3)
ECHO LV INTERNAL DIMENSION SYSTOLIC INDEX: 1.67 CM/M2
ECHO LV INTERNAL DIMENSION SYSTOLIC: 2.8 CM
ECHO LV IVSD: 1.3 CM (ref 0.6–0.9)
ECHO LV MASS 2D: 175.8 G (ref 67–162)
ECHO LV MASS INDEX 2D: 104.7 G/M2 (ref 43–95)
ECHO LV POSTERIOR WALL DIASTOLIC: 0.8 CM (ref 0.6–0.9)
ECHO LV RELATIVE WALL THICKNESS RATIO: 0.34
ECHO LVOT AREA: 3.1 CM2
ECHO LVOT AV VTI INDEX: 0.54
ECHO LVOT DIAM: 2 CM
ECHO LVOT MEAN GRADIENT: 3 MMHG
ECHO LVOT PEAK GRADIENT: 4 MMHG
ECHO LVOT PEAK VELOCITY: 1 M/S
ECHO LVOT STROKE VOLUME INDEX: 47.7 ML/M2
ECHO LVOT SV: 80.1 ML
ECHO LVOT VTI: 25.5 CM
ECHO MV A VELOCITY: 2.06 M/S
ECHO MV AREA VTI: 1.3 CM2
ECHO MV E VELOCITY: 1.34 M/S
ECHO MV E/A RATIO: 0.65
ECHO MV E/E' LATERAL: 20
ECHO MV E/E' RATIO (AVERAGED): 26.34
ECHO MV E/E' SEPTAL: 32.68
ECHO MV LVOT VTI INDEX: 2.44
ECHO MV MAX VELOCITY: 2.2 M/S
ECHO MV MEAN GRADIENT: 8 MMHG
ECHO MV MEAN VELOCITY: 1.3 M/S
ECHO MV PEAK GRADIENT: 20 MMHG
ECHO MV VTI: 62.1 CM
EOSINOPHIL # BLD: 0.25 K/UL
EOSINOPHILS RELATIVE PERCENT: 3 % (ref 0–3)
ERYTHROCYTE [DISTWIDTH] IN BLOOD BY AUTOMATED COUNT: 12.1 % (ref 11.7–14.9)
GFR, ESTIMATED: 19 ML/MIN/1.73M2
GLUCOSE SERPL-MCNC: 150 MG/DL (ref 74–99)
HCT VFR BLD AUTO: 38.4 % (ref 37–47)
HDLC SERPL-MCNC: 44 MG/DL
HGB BLD-MCNC: 12.5 G/DL (ref 12.5–16)
IMM GRANULOCYTES # BLD AUTO: 0.04 K/UL
IMM GRANULOCYTES NFR BLD: 1 %
IRON SATN MFR SERPL: 22 % (ref 15–50)
IRON SERPL-MCNC: 60 UG/DL (ref 37–145)
LDLC SERPL CALC-MCNC: 71 MG/DL
LYMPHOCYTES NFR BLD: 1.49 K/UL
LYMPHOCYTES RELATIVE PERCENT: 19 % (ref 24–44)
MAGNESIUM SERPL-MCNC: 1.6 MG/DL (ref 1.8–2.4)
MCH RBC QN AUTO: 31.6 PG (ref 27–31)
MCHC RBC AUTO-ENTMCNC: 32.6 G/DL (ref 32–36)
MCV RBC AUTO: 97.2 FL (ref 78–100)
MONOCYTES NFR BLD: 0.84 K/UL
MONOCYTES NFR BLD: 11 % (ref 0–4)
NEUTROPHILS NFR BLD: 66 % (ref 36–66)
NEUTS SEG NFR BLD: 5.25 K/UL
PLATELET # BLD AUTO: 251 K/UL (ref 140–440)
PMV BLD AUTO: 9 FL (ref 7.5–11.1)
POTASSIUM SERPL-SCNC: 3.2 MMOL/L (ref 3.5–5.1)
RBC # BLD AUTO: 3.95 M/UL (ref 4.2–5.4)
SODIUM SERPL-SCNC: 140 MMOL/L (ref 136–145)
T4 FREE SERPL-MCNC: 1.7 NG/DL (ref 0.9–1.8)
TIBC SERPL-MCNC: 271 UG/DL (ref 260–445)
TRIGL SERPL-MCNC: 167 MG/DL
TROPONIN I SERPL HS-MCNC: 88 NG/L (ref 0–14)
TSH SERPL DL<=0.05 MIU/L-ACNC: 1.87 UIU/ML (ref 0.27–4.2)
UNSATURATED IRON BINDING CAPACITY: 211 UG/DL (ref 110–370)
WBC OTHER # BLD: 7.9 K/UL (ref 4–10.5)

## 2024-09-25 PROCEDURE — 97530 THERAPEUTIC ACTIVITIES: CPT

## 2024-09-25 PROCEDURE — 94640 AIRWAY INHALATION TREATMENT: CPT

## 2024-09-25 PROCEDURE — 97112 NEUROMUSCULAR REEDUCATION: CPT

## 2024-09-25 PROCEDURE — 6370000000 HC RX 637 (ALT 250 FOR IP): Performed by: INTERNAL MEDICINE

## 2024-09-25 PROCEDURE — 1200000000 HC SEMI PRIVATE

## 2024-09-25 PROCEDURE — 83735 ASSAY OF MAGNESIUM: CPT

## 2024-09-25 PROCEDURE — APPNB15 APP NON BILLABLE TIME 0-15 MINS

## 2024-09-25 PROCEDURE — 2580000003 HC RX 258: Performed by: INTERNAL MEDICINE

## 2024-09-25 PROCEDURE — 84484 ASSAY OF TROPONIN QUANT: CPT

## 2024-09-25 PROCEDURE — 93306 TTE W/DOPPLER COMPLETE: CPT | Performed by: INTERNAL MEDICINE

## 2024-09-25 PROCEDURE — 80061 LIPID PANEL: CPT

## 2024-09-25 PROCEDURE — 80048 BASIC METABOLIC PNL TOTAL CA: CPT

## 2024-09-25 PROCEDURE — 36415 COLL VENOUS BLD VENIPUNCTURE: CPT

## 2024-09-25 PROCEDURE — 93306 TTE W/DOPPLER COMPLETE: CPT

## 2024-09-25 PROCEDURE — 97162 PT EVAL MOD COMPLEX 30 MIN: CPT

## 2024-09-25 PROCEDURE — 85025 COMPLETE CBC W/AUTO DIFF WBC: CPT

## 2024-09-25 PROCEDURE — 97166 OT EVAL MOD COMPLEX 45 MIN: CPT

## 2024-09-25 PROCEDURE — 6360000002 HC RX W HCPCS: Performed by: INTERNAL MEDICINE

## 2024-09-25 RX ORDER — SPIRONOLACTONE 50 MG/1
25 TABLET, FILM COATED ORAL DAILY
Status: DISCONTINUED | OUTPATIENT
Start: 2024-09-26 | End: 2024-10-06 | Stop reason: HOSPADM

## 2024-09-25 RX ADMIN — HEPARIN SODIUM 5000 UNITS: 5000 INJECTION INTRAVENOUS; SUBCUTANEOUS at 22:28

## 2024-09-25 RX ADMIN — ROPINIROLE HYDROCHLORIDE 0.5 MG: 0.25 TABLET, FILM COATED ORAL at 15:08

## 2024-09-25 RX ADMIN — CARBOXYMETHYLCELLULOSE SODIUM 1 DROP: 10 GEL OPHTHALMIC at 12:10

## 2024-09-25 RX ADMIN — ROPINIROLE HYDROCHLORIDE 0.5 MG: 0.25 TABLET, FILM COATED ORAL at 10:26

## 2024-09-25 RX ADMIN — SODIUM CHLORIDE, PRESERVATIVE FREE 10 ML: 5 INJECTION INTRAVENOUS at 10:23

## 2024-09-25 RX ADMIN — FERROUS SULFATE TAB 325 MG (65 MG ELEMENTAL FE) 325 MG: 325 (65 FE) TAB at 22:29

## 2024-09-25 RX ADMIN — FAMOTIDINE 20 MG: 20 TABLET ORAL at 10:29

## 2024-09-25 RX ADMIN — CALCIUM CARBONATE 500 MG: 500 TABLET, CHEWABLE ORAL at 10:26

## 2024-09-25 RX ADMIN — SODIUM CHLORIDE, PRESERVATIVE FREE 10 ML: 5 INJECTION INTRAVENOUS at 22:30

## 2024-09-25 RX ADMIN — TRAMADOL HYDROCHLORIDE 50 MG: 50 TABLET ORAL at 10:24

## 2024-09-25 RX ADMIN — FLUTICASONE PROPIONATE 1 PUFF: 110 AEROSOL, METERED RESPIRATORY (INHALATION) at 20:25

## 2024-09-25 RX ADMIN — POTASSIUM BICARBONATE 40 MEQ: 782 TABLET, EFFERVESCENT ORAL at 12:09

## 2024-09-25 RX ADMIN — FERROUS SULFATE TAB 325 MG (65 MG ELEMENTAL FE) 325 MG: 325 (65 FE) TAB at 10:26

## 2024-09-25 RX ADMIN — LEVOTHYROXINE SODIUM 50 MCG: 0.05 TABLET ORAL at 10:23

## 2024-09-25 RX ADMIN — CETIRIZINE HYDROCHLORIDE 5 MG: 10 TABLET, FILM COATED ORAL at 10:29

## 2024-09-25 RX ADMIN — HEPARIN SODIUM 5000 UNITS: 5000 INJECTION INTRAVENOUS; SUBCUTANEOUS at 15:08

## 2024-09-25 RX ADMIN — LATANOPROST 1 DROP: 50 SOLUTION OPHTHALMIC at 22:29

## 2024-09-25 RX ADMIN — ROPINIROLE HYDROCHLORIDE 0.5 MG: 0.25 TABLET, FILM COATED ORAL at 22:29

## 2024-09-25 RX ADMIN — FEBUXOSTAT 40 MG: 40 TABLET, FILM COATED ORAL at 10:25

## 2024-09-25 RX ADMIN — POTASSIUM BICARBONATE 40 MEQ: 782 TABLET, EFFERVESCENT ORAL at 22:29

## 2024-09-25 ASSESSMENT — PAIN DESCRIPTION - LOCATION: LOCATION: HEAD

## 2024-09-25 ASSESSMENT — PAIN DESCRIPTION - ORIENTATION: ORIENTATION: ANTERIOR

## 2024-09-25 ASSESSMENT — PAIN DESCRIPTION - PAIN TYPE: TYPE: ACUTE PAIN

## 2024-09-25 ASSESSMENT — PAIN SCALES - GENERAL
PAINLEVEL_OUTOF10: 5
PAINLEVEL_OUTOF10: 2

## 2024-09-25 ASSESSMENT — PAIN DESCRIPTION - FREQUENCY: FREQUENCY: CONTINUOUS

## 2024-09-25 ASSESSMENT — PAIN DESCRIPTION - DESCRIPTORS: DESCRIPTORS: ACHING

## 2024-09-25 ASSESSMENT — PAIN - FUNCTIONAL ASSESSMENT: PAIN_FUNCTIONAL_ASSESSMENT: ACTIVITIES ARE NOT PREVENTED

## 2024-09-25 ASSESSMENT — PAIN DESCRIPTION - ONSET: ONSET: ON-GOING

## 2024-09-25 NOTE — CARE COORDINATION
09/25/24 1147   Service Assessment   Patient Orientation Alert and Oriented;Person;Place;Situation   Cognition Alert   History Provided By Patient;Child/Family;Spouse   Primary Caregiver Self   Support Systems Spouse/Significant Other;Children;Family Members   Patient's Healthcare Decision Maker is: Legal Next of Kin   PCP Verified by CM Yes   Last Visit to PCP Within last 6 months   Prior Functional Level Assistance with the following:;Bathing;Housework;Shopping;Mobility   Current Functional Level Assistance with the following:;Bathing;Dressing;Cooking;Toileting;Housework;Shopping;Mobility   Can patient return to prior living arrangement Unknown at present   Ability to make needs known: Good   Family able to assist with home care needs: Yes   Would you like for me to discuss the discharge plan with any other family members/significant others, and if so, who? Yes  (Pts spouse and dtr in room. permission to speak in their presence.)   Social/Functional History   Lives With Spouse   Type of Home House   Home Layout One level   Bathroom Shower/Tub Shower chair without back   ADL Assistance   ( and dtr assist and support pt)   Transfer Assistance Needs assistance   Active  No   Patient's  Info    Mode of Transportation Car   Condition of Participation: Discharge Planning   The Patient and/or Patient Representative was provided with a Choice of Provider? Patient   The Patient and/Or Patient Representative agree with the Discharge Plan? Yes     CM into see pt to initiate a safe discharge plan. Cm  introduced self and explained role of CM. Pt is kind, alert and oriented. Pt lives with her spouse in a one floor home with ramped entrance. Pts  and dtr both in room and permission obtained to discuss in their presence. Pt is very well supported.  primary caregiver, Dtr is local and very supportive. Pt has walker, w/c , shower chr and a lift chr.pt wears O2 at 2 lmin at HS, supplied

## 2024-09-25 NOTE — CARE COORDINATION
CM reviewed notes. Pt is from home with spouse. Pt has had near syncopal episodes, dizziness and fall prior to coming to the ED. Per Cardio plan for Echo and pacer interrogated. Per chart pt lives in one floor home with ramped entrance. Pt has PCP and insurance.

## 2024-09-26 ENCOUNTER — APPOINTMENT (OUTPATIENT)
Dept: NON INVASIVE DIAGNOSTICS | Age: 89
DRG: 640 | End: 2024-09-26
Payer: MEDICARE

## 2024-09-26 PROBLEM — I74.10 AORTIC THROMBUS (HCC): Status: ACTIVE | Noted: 2024-09-26

## 2024-09-26 LAB
ALBUMIN SERPL-MCNC: 3.7 G/DL (ref 3.4–5)
ALBUMIN/GLOB SERPL: 1.9 {RATIO} (ref 1.1–2.2)
ALP SERPL-CCNC: 61 U/L (ref 40–129)
ALT SERPL-CCNC: 11 U/L (ref 10–40)
ANION GAP SERPL CALCULATED.3IONS-SCNC: 14 MMOL/L (ref 9–17)
AST SERPL-CCNC: 17 U/L (ref 15–37)
BILIRUB SERPL-MCNC: 0.4 MG/DL (ref 0–1)
BUN SERPL-MCNC: 86 MG/DL (ref 7–20)
CALCIUM SERPL-MCNC: 10.1 MG/DL (ref 8.3–10.6)
CHLORIDE SERPL-SCNC: 93 MMOL/L (ref 99–110)
CO2 SERPL-SCNC: 30 MMOL/L (ref 21–32)
CREAT SERPL-MCNC: 2.1 MG/DL (ref 0.6–1.2)
ECHO BSA: 1.73 M2
ECHO MV E VELOCITY: 1.24 M/S
ECHO MV MAX VELOCITY: 1.9 M/S
ECHO MV MEAN GRADIENT: 6 MMHG
ECHO MV MEAN VELOCITY: 1.2 M/S
ECHO MV PEAK GRADIENT: 14 MMHG
ECHO MV VTI: 47.4 CM
GFR, ESTIMATED: 22 ML/MIN/1.73M2
GLUCOSE SERPL-MCNC: 97 MG/DL (ref 74–99)
MAGNESIUM SERPL-MCNC: 1.9 MG/DL (ref 1.8–2.4)
PHOSPHATE SERPL-MCNC: 2.8 MG/DL (ref 2.5–4.9)
POTASSIUM SERPL-SCNC: 4.1 MMOL/L (ref 3.5–5.1)
PROT SERPL-MCNC: 5.7 G/DL (ref 6.4–8.2)
SODIUM SERPL-SCNC: 137 MMOL/L (ref 136–145)

## 2024-09-26 PROCEDURE — 99152 MOD SED SAME PHYS/QHP 5/>YRS: CPT | Performed by: INTERNAL MEDICINE

## 2024-09-26 PROCEDURE — 94640 AIRWAY INHALATION TREATMENT: CPT

## 2024-09-26 PROCEDURE — 80053 COMPREHEN METABOLIC PANEL: CPT

## 2024-09-26 PROCEDURE — 97530 THERAPEUTIC ACTIVITIES: CPT

## 2024-09-26 PROCEDURE — 1200000000 HC SEMI PRIVATE

## 2024-09-26 PROCEDURE — 6360000002 HC RX W HCPCS: Performed by: INTERNAL MEDICINE

## 2024-09-26 PROCEDURE — 93312 ECHO TRANSESOPHAGEAL: CPT

## 2024-09-26 PROCEDURE — 2580000003 HC RX 258: Performed by: INTERNAL MEDICINE

## 2024-09-26 PROCEDURE — 2700000000 HC OXYGEN THERAPY PER DAY

## 2024-09-26 PROCEDURE — 99233 SBSQ HOSP IP/OBS HIGH 50: CPT | Performed by: INTERNAL MEDICINE

## 2024-09-26 PROCEDURE — 7100000010 HC PHASE II RECOVERY - FIRST 15 MIN: Performed by: INTERNAL MEDICINE

## 2024-09-26 PROCEDURE — 6370000000 HC RX 637 (ALT 250 FOR IP): Performed by: INTERNAL MEDICINE

## 2024-09-26 PROCEDURE — 83735 ASSAY OF MAGNESIUM: CPT

## 2024-09-26 PROCEDURE — 84100 ASSAY OF PHOSPHORUS: CPT

## 2024-09-26 PROCEDURE — 93325 DOPPLER ECHO COLOR FLOW MAPG: CPT | Performed by: INTERNAL MEDICINE

## 2024-09-26 PROCEDURE — 36415 COLL VENOUS BLD VENIPUNCTURE: CPT

## 2024-09-26 PROCEDURE — 94761 N-INVAS EAR/PLS OXIMETRY MLT: CPT

## 2024-09-26 PROCEDURE — 93312 ECHO TRANSESOPHAGEAL: CPT | Performed by: INTERNAL MEDICINE

## 2024-09-26 PROCEDURE — 99222 1ST HOSP IP/OBS MODERATE 55: CPT | Performed by: THORACIC SURGERY (CARDIOTHORACIC VASCULAR SURGERY)

## 2024-09-26 PROCEDURE — 93320 DOPPLER ECHO COMPLETE: CPT | Performed by: INTERNAL MEDICINE

## 2024-09-26 PROCEDURE — B24BZZ4 ULTRASONOGRAPHY OF HEART WITH AORTA, TRANSESOPHAGEAL: ICD-10-PCS | Performed by: STUDENT IN AN ORGANIZED HEALTH CARE EDUCATION/TRAINING PROGRAM

## 2024-09-26 PROCEDURE — 7100000011 HC PHASE II RECOVERY - ADDTL 15 MIN: Performed by: INTERNAL MEDICINE

## 2024-09-26 RX ORDER — LIDOCAINE HYDROCHLORIDE 20 MG/ML
SOLUTION OROPHARYNGEAL PRN
Status: COMPLETED | OUTPATIENT
Start: 2024-09-26 | End: 2024-09-26

## 2024-09-26 RX ORDER — MIDAZOLAM HYDROCHLORIDE 1 MG/ML
INJECTION INTRAMUSCULAR; INTRAVENOUS PRN
Status: COMPLETED | OUTPATIENT
Start: 2024-09-26 | End: 2024-09-26

## 2024-09-26 RX ORDER — FENTANYL CITRATE 50 UG/ML
INJECTION, SOLUTION INTRAMUSCULAR; INTRAVENOUS PRN
Status: COMPLETED | OUTPATIENT
Start: 2024-09-26 | End: 2024-09-26

## 2024-09-26 RX ORDER — CLOPIDOGREL BISULFATE 75 MG/1
75 TABLET ORAL DAILY
Status: DISCONTINUED | OUTPATIENT
Start: 2024-09-26 | End: 2024-10-06 | Stop reason: HOSPADM

## 2024-09-26 RX ORDER — CARBOXYMETHYLCELLULOSE SODIUM 10 MG/ML
1 GEL OPHTHALMIC 2 TIMES DAILY PRN
Status: DISCONTINUED | OUTPATIENT
Start: 2024-09-26 | End: 2024-10-06 | Stop reason: HOSPADM

## 2024-09-26 RX ADMIN — ROPINIROLE HYDROCHLORIDE 0.5 MG: 0.25 TABLET, FILM COATED ORAL at 16:18

## 2024-09-26 RX ADMIN — FENTANYL CITRATE 25 MCG: 50 INJECTION, SOLUTION INTRAMUSCULAR; INTRAVENOUS at 08:11

## 2024-09-26 RX ADMIN — MIDAZOLAM 1 MG: 1 INJECTION INTRAMUSCULAR; INTRAVENOUS at 08:11

## 2024-09-26 RX ADMIN — LIDOCAINE HYDROCHLORIDE 15 ML: 20 SOLUTION ORAL at 08:08

## 2024-09-26 RX ADMIN — HEPARIN SODIUM 5000 UNITS: 5000 INJECTION INTRAVENOUS; SUBCUTANEOUS at 05:44

## 2024-09-26 RX ADMIN — SODIUM CHLORIDE, PRESERVATIVE FREE 10 ML: 5 INJECTION INTRAVENOUS at 10:59

## 2024-09-26 RX ADMIN — HEPARIN SODIUM 5000 UNITS: 5000 INJECTION INTRAVENOUS; SUBCUTANEOUS at 16:17

## 2024-09-26 RX ADMIN — ROPINIROLE HYDROCHLORIDE 0.5 MG: 0.25 TABLET, FILM COATED ORAL at 10:57

## 2024-09-26 RX ADMIN — CARBOXYMETHYLCELLULOSE SODIUM 1 DROP: 10 GEL OPHTHALMIC at 00:00

## 2024-09-26 RX ADMIN — FAMOTIDINE 20 MG: 20 TABLET ORAL at 10:58

## 2024-09-26 RX ADMIN — CARBOXYMETHYLCELLULOSE SODIUM 1 DROP: 10 GEL OPHTHALMIC at 10:58

## 2024-09-26 RX ADMIN — TRAMADOL HYDROCHLORIDE 50 MG: 50 TABLET ORAL at 10:58

## 2024-09-26 RX ADMIN — CLOPIDOGREL 75 MG: 75 TABLET, FILM COATED ORAL at 11:04

## 2024-09-26 RX ADMIN — FERROUS SULFATE TAB 325 MG (65 MG ELEMENTAL FE) 325 MG: 325 (65 FE) TAB at 21:35

## 2024-09-26 RX ADMIN — CETIRIZINE HYDROCHLORIDE 5 MG: 10 TABLET, FILM COATED ORAL at 10:58

## 2024-09-26 RX ADMIN — HEPARIN SODIUM 5000 UNITS: 5000 INJECTION INTRAVENOUS; SUBCUTANEOUS at 21:34

## 2024-09-26 RX ADMIN — SODIUM CHLORIDE, PRESERVATIVE FREE 10 ML: 5 INJECTION INTRAVENOUS at 21:35

## 2024-09-26 RX ADMIN — TRAMADOL HYDROCHLORIDE 50 MG: 50 TABLET ORAL at 22:26

## 2024-09-26 RX ADMIN — LEVOTHYROXINE SODIUM 50 MCG: 0.05 TABLET ORAL at 05:44

## 2024-09-26 RX ADMIN — FLUTICASONE PROPIONATE 1 PUFF: 110 AEROSOL, METERED RESPIRATORY (INHALATION) at 20:25

## 2024-09-26 RX ADMIN — LATANOPROST 1 DROP: 50 SOLUTION OPHTHALMIC at 21:35

## 2024-09-26 RX ADMIN — FERROUS SULFATE TAB 325 MG (65 MG ELEMENTAL FE) 325 MG: 325 (65 FE) TAB at 10:58

## 2024-09-26 RX ADMIN — MIDAZOLAM 1 MG: 1 INJECTION INTRAMUSCULAR; INTRAVENOUS at 08:23

## 2024-09-26 RX ADMIN — ROPINIROLE HYDROCHLORIDE 0.5 MG: 0.25 TABLET, FILM COATED ORAL at 21:34

## 2024-09-26 ASSESSMENT — PAIN DESCRIPTION - ORIENTATION
ORIENTATION: ANTERIOR
ORIENTATION: ANTERIOR

## 2024-09-26 ASSESSMENT — PAIN DESCRIPTION - ONSET: ONSET: ON-GOING

## 2024-09-26 ASSESSMENT — PAIN DESCRIPTION - PAIN TYPE
TYPE: ACUTE PAIN
TYPE: ACUTE PAIN

## 2024-09-26 ASSESSMENT — PAIN DESCRIPTION - DESCRIPTORS
DESCRIPTORS: ACHING
DESCRIPTORS: ACHING

## 2024-09-26 ASSESSMENT — PAIN DESCRIPTION - LOCATION
LOCATION: HEAD
LOCATION: HEAD

## 2024-09-26 ASSESSMENT — PAIN SCALES - GENERAL
PAINLEVEL_OUTOF10: 6
PAINLEVEL_OUTOF10: 8

## 2024-09-26 ASSESSMENT — PAIN DESCRIPTION - FREQUENCY: FREQUENCY: CONTINUOUS

## 2024-09-26 NOTE — CARE COORDINATION
CM into see pt. Pt out of room.  and dtr in room. CM discussed the recs from PT/OT for Swing. Both in agreement for Swing if needed.   1110 CM requested that Dr Ascencio review for Swing bed. LH

## 2024-09-27 ENCOUNTER — HOSPITAL ENCOUNTER (OUTPATIENT)
Dept: WOUND CARE | Age: 89
Discharge: HOME | End: 2024-09-27
Attending: NURSE PRACTITIONER

## 2024-09-27 LAB
BNP SERPL-MCNC: 1226 PG/ML (ref 0–450)
MAGNESIUM SERPL-MCNC: 1.8 MG/DL (ref 1.8–2.4)

## 2024-09-27 PROCEDURE — 94640 AIRWAY INHALATION TREATMENT: CPT

## 2024-09-27 PROCEDURE — 2580000003 HC RX 258: Performed by: INTERNAL MEDICINE

## 2024-09-27 PROCEDURE — 6360000002 HC RX W HCPCS: Performed by: INTERNAL MEDICINE

## 2024-09-27 PROCEDURE — 2700000000 HC OXYGEN THERAPY PER DAY

## 2024-09-27 PROCEDURE — 6370000000 HC RX 637 (ALT 250 FOR IP): Performed by: INTERNAL MEDICINE

## 2024-09-27 PROCEDURE — 97530 THERAPEUTIC ACTIVITIES: CPT

## 2024-09-27 PROCEDURE — 1200000000 HC SEMI PRIVATE

## 2024-09-27 PROCEDURE — 83735 ASSAY OF MAGNESIUM: CPT

## 2024-09-27 PROCEDURE — 83880 ASSAY OF NATRIURETIC PEPTIDE: CPT

## 2024-09-27 PROCEDURE — 99233 SBSQ HOSP IP/OBS HIGH 50: CPT | Performed by: INTERNAL MEDICINE

## 2024-09-27 PROCEDURE — 6370000000 HC RX 637 (ALT 250 FOR IP): Performed by: STUDENT IN AN ORGANIZED HEALTH CARE EDUCATION/TRAINING PROGRAM

## 2024-09-27 PROCEDURE — 94761 N-INVAS EAR/PLS OXIMETRY MLT: CPT

## 2024-09-27 PROCEDURE — 36415 COLL VENOUS BLD VENIPUNCTURE: CPT

## 2024-09-27 RX ORDER — BUTALBITAL, ACETAMINOPHEN AND CAFFEINE 50; 325; 40 MG/1; MG/1; MG/1
1 TABLET ORAL ONCE
Status: COMPLETED | OUTPATIENT
Start: 2024-09-27 | End: 2024-09-27

## 2024-09-27 RX ORDER — MECLIZINE HYDROCHLORIDE 25 MG/1
25 TABLET ORAL 3 TIMES DAILY PRN
Status: ACTIVE | OUTPATIENT
Start: 2024-09-27 | End: 2024-09-29

## 2024-09-27 RX ORDER — LIDOCAINE 4 G/G
1 PATCH TOPICAL DAILY
Status: DISCONTINUED | OUTPATIENT
Start: 2024-09-27 | End: 2024-10-06 | Stop reason: HOSPADM

## 2024-09-27 RX ADMIN — BUTALBITAL, ACETAMINOPHEN, AND CAFFEINE 1 TABLET: 325; 50; 40 TABLET ORAL at 11:28

## 2024-09-27 RX ADMIN — FAMOTIDINE 20 MG: 20 TABLET ORAL at 09:38

## 2024-09-27 RX ADMIN — FEBUXOSTAT 40 MG: 40 TABLET, FILM COATED ORAL at 09:36

## 2024-09-27 RX ADMIN — LEVOTHYROXINE SODIUM 50 MCG: 0.05 TABLET ORAL at 07:08

## 2024-09-27 RX ADMIN — ROPINIROLE HYDROCHLORIDE 0.5 MG: 0.25 TABLET, FILM COATED ORAL at 22:38

## 2024-09-27 RX ADMIN — SODIUM CHLORIDE, PRESERVATIVE FREE 10 ML: 5 INJECTION INTRAVENOUS at 11:46

## 2024-09-27 RX ADMIN — HEPARIN SODIUM 5000 UNITS: 5000 INJECTION INTRAVENOUS; SUBCUTANEOUS at 15:47

## 2024-09-27 RX ADMIN — FERROUS SULFATE TAB 325 MG (65 MG ELEMENTAL FE) 325 MG: 325 (65 FE) TAB at 22:38

## 2024-09-27 RX ADMIN — CETIRIZINE HYDROCHLORIDE 5 MG: 10 TABLET, FILM COATED ORAL at 09:37

## 2024-09-27 RX ADMIN — TRAMADOL HYDROCHLORIDE 50 MG: 50 TABLET ORAL at 07:18

## 2024-09-27 RX ADMIN — FLUTICASONE PROPIONATE 1 PUFF: 110 AEROSOL, METERED RESPIRATORY (INHALATION) at 07:30

## 2024-09-27 RX ADMIN — ROPINIROLE HYDROCHLORIDE 0.5 MG: 0.25 TABLET, FILM COATED ORAL at 09:36

## 2024-09-27 RX ADMIN — FERROUS SULFATE TAB 325 MG (65 MG ELEMENTAL FE) 325 MG: 325 (65 FE) TAB at 09:37

## 2024-09-27 RX ADMIN — CLOPIDOGREL 75 MG: 75 TABLET, FILM COATED ORAL at 09:37

## 2024-09-27 RX ADMIN — HEPARIN SODIUM 5000 UNITS: 5000 INJECTION INTRAVENOUS; SUBCUTANEOUS at 22:39

## 2024-09-27 RX ADMIN — HEPARIN SODIUM 5000 UNITS: 5000 INJECTION INTRAVENOUS; SUBCUTANEOUS at 07:07

## 2024-09-27 RX ADMIN — ROPINIROLE HYDROCHLORIDE 0.5 MG: 0.25 TABLET, FILM COATED ORAL at 15:47

## 2024-09-27 RX ADMIN — SPIRONOLACTONE 25 MG: 50 TABLET ORAL at 09:37

## 2024-09-27 ASSESSMENT — PAIN DESCRIPTION - DESCRIPTORS
DESCRIPTORS: ACHING

## 2024-09-27 ASSESSMENT — PAIN DESCRIPTION - PAIN TYPE: TYPE: ACUTE PAIN

## 2024-09-27 ASSESSMENT — PAIN SCALES - GENERAL
PAINLEVEL_OUTOF10: 5
PAINLEVEL_OUTOF10: 3
PAINLEVEL_OUTOF10: 8
PAINLEVEL_OUTOF10: 7

## 2024-09-27 ASSESSMENT — PAIN DESCRIPTION - LOCATION
LOCATION: HEAD
LOCATION: HEAD;SHOULDER
LOCATION: HEAD;SHOULDER
LOCATION: HEAD

## 2024-09-27 ASSESSMENT — PAIN DESCRIPTION - ONSET: ONSET: ON-GOING

## 2024-09-27 ASSESSMENT — PAIN DESCRIPTION - ORIENTATION
ORIENTATION: ANTERIOR
ORIENTATION: LEFT
ORIENTATION: ANTERIOR
ORIENTATION: LEFT

## 2024-09-27 ASSESSMENT — PAIN DESCRIPTION - FREQUENCY: FREQUENCY: CONTINUOUS

## 2024-09-27 NOTE — CARE COORDINATION
CM into room and informed pt and her dtr that pt has been denied Swing bed. CM discussed options for SNF. CM provided pt with Med ratings and options. LH

## 2024-09-27 NOTE — CARE COORDINATION
CM reviewed notes and will discuss with Dr Ascencio regarding Swing bed appropval.      1313 CM informed per Dr Ascencio that pt is denied Swing bed.

## 2024-09-27 NOTE — DISCHARGE INSTR - COC
WD-Venous stasis ulcer of right calf with fat layer exposed with varicose veins ([facility]) I83.012, L97.212    WD-Venous stasis ulcer of left calf with fat layer exposed with varicose veins (HCC) I83.022, L97.222    WD-Callus of foot L84    WD-Diabetic ulcer of toe of left foot associated with type 2 diabetes mellitus, with fat layer exposed (HCC) E11.621, L97.522    WD-Diabetic ulcer of toe of right foot associated with type 2 diabetes mellitus, with fat layer exposed ([facility]) E11.621, L97.512    Neck pain M54.2    Neck pain, acute M54.2    Generalized weakness R53.1    Diarrhea R19.7    Venous stasis dermatitis of both lower extremities I87.2    Lymphedema of both lower extremities I89.0    Diet-controlled type 2 diabetes mellitus ([facility]) E11.9    Obesity (BMI 30-39.9) E66.9    Urinary retention R33.9    Acute on chronic urinary retention R33.9    Physical debility R53.81    Diabetic ulcer of toe of left foot associated with type 2 diabetes mellitus, limited to breakdown of skin (HCC) E11.621, L97.521    Diabetic ulcer of toe of right foot associated with type 2 diabetes mellitus, limited to breakdown of skin ([facility]) E11.621, L97.511    Long toenail L60.2    Hyperkeratosis L85.9    Heart failure ([facility]) I50.9    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       Isolation/Infection:   Isolation            No Isolation          Patient Infection Status       Infection Onset Added Last Indicated Last Indicated By Review Planned Expiration Resolved Resolved By    None active    Resolved    C-diff Rule Out 08/10/23 08/10/23 08/11/23 Clostridium Difficile Toxin/Antigen   08/15/23 Rule-Out Test Canceled    MRSA 12/22/20 12/26/20 12/22/20 Culture, Wound   08/10/23 Corona Lam, OSEAS                       Nurse Assessment:  Last Vital Signs: BP (!) 143/56   Pulse 61   Temp 98.1 °F (36.7 °C) (Oral)   Resp 12   Ht 1.549 m (5' 1\")   Wt 69.4 kg (153 lb)   SpO2 99%   BMI 28.91 kg/m²     Last documented pain score (0-10 scale): Pain Level:

## 2024-09-28 ENCOUNTER — APPOINTMENT (OUTPATIENT)
Dept: GENERAL RADIOLOGY | Age: 89
DRG: 640 | End: 2024-09-28
Payer: MEDICARE

## 2024-09-28 LAB — MAGNESIUM SERPL-MCNC: 1.8 MG/DL (ref 1.8–2.4)

## 2024-09-28 PROCEDURE — 6360000002 HC RX W HCPCS: Performed by: INTERNAL MEDICINE

## 2024-09-28 PROCEDURE — 83735 ASSAY OF MAGNESIUM: CPT

## 2024-09-28 PROCEDURE — 36415 COLL VENOUS BLD VENIPUNCTURE: CPT

## 2024-09-28 PROCEDURE — 6370000000 HC RX 637 (ALT 250 FOR IP): Performed by: INTERNAL MEDICINE

## 2024-09-28 PROCEDURE — 2580000003 HC RX 258: Performed by: INTERNAL MEDICINE

## 2024-09-28 PROCEDURE — 6370000000 HC RX 637 (ALT 250 FOR IP): Performed by: STUDENT IN AN ORGANIZED HEALTH CARE EDUCATION/TRAINING PROGRAM

## 2024-09-28 PROCEDURE — 94640 AIRWAY INHALATION TREATMENT: CPT

## 2024-09-28 PROCEDURE — 73030 X-RAY EXAM OF SHOULDER: CPT

## 2024-09-28 PROCEDURE — 97530 THERAPEUTIC ACTIVITIES: CPT

## 2024-09-28 PROCEDURE — 94761 N-INVAS EAR/PLS OXIMETRY MLT: CPT

## 2024-09-28 PROCEDURE — 1200000000 HC SEMI PRIVATE

## 2024-09-28 PROCEDURE — 2700000000 HC OXYGEN THERAPY PER DAY

## 2024-09-28 RX ORDER — HYDROCODONE BITARTRATE AND ACETAMINOPHEN 5; 325 MG/1; MG/1
1 TABLET ORAL EVERY 6 HOURS PRN
Status: DISCONTINUED | OUTPATIENT
Start: 2024-09-28 | End: 2024-09-29

## 2024-09-28 RX ORDER — TORSEMIDE 20 MG/1
20 TABLET ORAL 2 TIMES DAILY
Status: DISCONTINUED | OUTPATIENT
Start: 2024-09-28 | End: 2024-10-06

## 2024-09-28 RX ADMIN — HEPARIN SODIUM 5000 UNITS: 5000 INJECTION INTRAVENOUS; SUBCUTANEOUS at 06:45

## 2024-09-28 RX ADMIN — HEPARIN SODIUM 5000 UNITS: 5000 INJECTION INTRAVENOUS; SUBCUTANEOUS at 21:06

## 2024-09-28 RX ADMIN — HYDROCODONE BITARTRATE AND ACETAMINOPHEN 1 TABLET: 5; 325 TABLET ORAL at 21:06

## 2024-09-28 RX ADMIN — FERROUS SULFATE TAB 325 MG (65 MG ELEMENTAL FE) 325 MG: 325 (65 FE) TAB at 10:11

## 2024-09-28 RX ADMIN — TORSEMIDE 20 MG: 20 TABLET ORAL at 18:30

## 2024-09-28 RX ADMIN — FEBUXOSTAT 40 MG: 40 TABLET, FILM COATED ORAL at 10:11

## 2024-09-28 RX ADMIN — TORSEMIDE 20 MG: 20 TABLET ORAL at 10:11

## 2024-09-28 RX ADMIN — ROPINIROLE HYDROCHLORIDE 0.5 MG: 0.25 TABLET, FILM COATED ORAL at 21:05

## 2024-09-28 RX ADMIN — FLUTICASONE PROPIONATE 1 PUFF: 110 AEROSOL, METERED RESPIRATORY (INHALATION) at 18:55

## 2024-09-28 RX ADMIN — CETIRIZINE HYDROCHLORIDE 5 MG: 10 TABLET, FILM COATED ORAL at 10:11

## 2024-09-28 RX ADMIN — FLUTICASONE PROPIONATE 1 PUFF: 110 AEROSOL, METERED RESPIRATORY (INHALATION) at 07:57

## 2024-09-28 RX ADMIN — CLOPIDOGREL 75 MG: 75 TABLET, FILM COATED ORAL at 10:10

## 2024-09-28 RX ADMIN — HYDROCODONE BITARTRATE AND ACETAMINOPHEN 1 TABLET: 5; 325 TABLET ORAL at 13:47

## 2024-09-28 RX ADMIN — FAMOTIDINE 20 MG: 20 TABLET ORAL at 10:11

## 2024-09-28 RX ADMIN — FERROUS SULFATE TAB 325 MG (65 MG ELEMENTAL FE) 325 MG: 325 (65 FE) TAB at 21:06

## 2024-09-28 RX ADMIN — LEVOTHYROXINE SODIUM 50 MCG: 0.05 TABLET ORAL at 06:45

## 2024-09-28 RX ADMIN — SODIUM CHLORIDE, PRESERVATIVE FREE 10 ML: 5 INJECTION INTRAVENOUS at 10:26

## 2024-09-28 RX ADMIN — SPIRONOLACTONE 25 MG: 50 TABLET ORAL at 10:10

## 2024-09-28 RX ADMIN — ROPINIROLE HYDROCHLORIDE 0.5 MG: 0.25 TABLET, FILM COATED ORAL at 10:10

## 2024-09-28 RX ADMIN — TRAMADOL HYDROCHLORIDE 50 MG: 50 TABLET ORAL at 04:37

## 2024-09-28 RX ADMIN — HEPARIN SODIUM 5000 UNITS: 5000 INJECTION INTRAVENOUS; SUBCUTANEOUS at 13:47

## 2024-09-28 RX ADMIN — ROPINIROLE HYDROCHLORIDE 0.5 MG: 0.25 TABLET, FILM COATED ORAL at 18:29

## 2024-09-28 ASSESSMENT — PAIN - FUNCTIONAL ASSESSMENT
PAIN_FUNCTIONAL_ASSESSMENT: PREVENTS OR INTERFERES SOME ACTIVE ACTIVITIES AND ADLS
PAIN_FUNCTIONAL_ASSESSMENT: PREVENTS OR INTERFERES SOME ACTIVE ACTIVITIES AND ADLS

## 2024-09-28 ASSESSMENT — PAIN DESCRIPTION - DESCRIPTORS
DESCRIPTORS: ACHING;THROBBING

## 2024-09-28 ASSESSMENT — PAIN SCALES - GENERAL
PAINLEVEL_OUTOF10: 6
PAINLEVEL_OUTOF10: 8
PAINLEVEL_OUTOF10: 0
PAINLEVEL_OUTOF10: 8

## 2024-09-28 ASSESSMENT — PAIN DESCRIPTION - LOCATION
LOCATION: SHOULDER
LOCATION: BACK;SHOULDER
LOCATION: BACK;SHOULDER

## 2024-09-28 ASSESSMENT — PAIN DESCRIPTION - ORIENTATION
ORIENTATION: LEFT
ORIENTATION: LEFT;LOWER
ORIENTATION: LEFT

## 2024-09-28 NOTE — PLAN OF CARE
Problem: Safety - Adult  Goal: Free from fall injury  9/28/2024 0211 by Alix Brar, RN  Outcome: Progressing  9/27/2024 1237 by Margaret Ibarra RN  Outcome: Progressing     Problem: Discharge Planning  Goal: Discharge to home or other facility with appropriate resources  9/28/2024 0211 by Alix Brar, RN  Outcome: Progressing  9/27/2024 1237 by Margaret Ibarra RN  Outcome: Progressing     Problem: Pain  Goal: Verbalizes/displays adequate comfort level or baseline comfort level  9/28/2024 0211 by Alix Brar, RN  Outcome: Progressing  9/27/2024 1237 by Margaret Ibarra RN  Outcome: Progressing     Problem: ABCDS Injury Assessment  Goal: Absence of physical injury  9/28/2024 0211 by Alix Brar, RN  Outcome: Progressing  9/27/2024 1237 by Margaret Ibarra RN  Outcome: Progressing     Problem: Chronic Conditions and Co-morbidities  Goal: Patient's chronic conditions and co-morbidity symptoms are monitored and maintained or improved  9/28/2024 0211 by Alix Brar, RN  Outcome: Progressing  9/27/2024 1237 by Margaret Ibarra RN  Outcome: Progressing

## 2024-09-28 NOTE — CARE COORDINATION
CM reviewed chart.  Saw pt and pt's spouse at bedside.  Notified of SWING denial.  [facility].  CM called and left voice msg and faxed referral to [phone]

## 2024-09-29 LAB
ALBUMIN SERPL-MCNC: 3.5 G/DL (ref 3.4–5)
ALBUMIN/GLOB SERPL: 1.7 {RATIO} (ref 1.1–2.2)
ALP SERPL-CCNC: 67 U/L (ref 40–129)
ALT SERPL-CCNC: 17 U/L (ref 10–40)
ANION GAP SERPL CALCULATED.3IONS-SCNC: 16 MMOL/L (ref 9–17)
AST SERPL-CCNC: 22 U/L (ref 15–37)
BILIRUB SERPL-MCNC: 0.5 MG/DL (ref 0–1)
BUN SERPL-MCNC: 59 MG/DL (ref 7–20)
CALCIUM SERPL-MCNC: 9.8 MG/DL (ref 8.3–10.6)
CHLORIDE SERPL-SCNC: 96 MMOL/L (ref 99–110)
CO2 SERPL-SCNC: 26 MMOL/L (ref 21–32)
CREAT SERPL-MCNC: 1.8 MG/DL (ref 0.6–1.2)
GFR, ESTIMATED: 27 ML/MIN/1.73M2
GLUCOSE SERPL-MCNC: 171 MG/DL (ref 74–99)
MAGNESIUM SERPL-MCNC: 1.8 MG/DL (ref 1.8–2.4)
PHOSPHATE SERPL-MCNC: 2.7 MG/DL (ref 2.5–4.9)
POTASSIUM SERPL-SCNC: 3.7 MMOL/L (ref 3.5–5.1)
PROT SERPL-MCNC: 5.5 G/DL (ref 6.4–8.2)
SODIUM SERPL-SCNC: 137 MMOL/L (ref 136–145)

## 2024-09-29 PROCEDURE — 80053 COMPREHEN METABOLIC PANEL: CPT

## 2024-09-29 PROCEDURE — 6370000000 HC RX 637 (ALT 250 FOR IP): Performed by: STUDENT IN AN ORGANIZED HEALTH CARE EDUCATION/TRAINING PROGRAM

## 2024-09-29 PROCEDURE — 94640 AIRWAY INHALATION TREATMENT: CPT

## 2024-09-29 PROCEDURE — 94761 N-INVAS EAR/PLS OXIMETRY MLT: CPT

## 2024-09-29 PROCEDURE — 6360000002 HC RX W HCPCS: Performed by: INTERNAL MEDICINE

## 2024-09-29 PROCEDURE — 6370000000 HC RX 637 (ALT 250 FOR IP): Performed by: INTERNAL MEDICINE

## 2024-09-29 PROCEDURE — 1200000000 HC SEMI PRIVATE

## 2024-09-29 PROCEDURE — 2700000000 HC OXYGEN THERAPY PER DAY

## 2024-09-29 PROCEDURE — 2580000003 HC RX 258: Performed by: INTERNAL MEDICINE

## 2024-09-29 PROCEDURE — 97110 THERAPEUTIC EXERCISES: CPT

## 2024-09-29 PROCEDURE — 97530 THERAPEUTIC ACTIVITIES: CPT

## 2024-09-29 PROCEDURE — 83735 ASSAY OF MAGNESIUM: CPT

## 2024-09-29 PROCEDURE — 84100 ASSAY OF PHOSPHORUS: CPT

## 2024-09-29 PROCEDURE — 36415 COLL VENOUS BLD VENIPUNCTURE: CPT

## 2024-09-29 RX ORDER — TRAMADOL HYDROCHLORIDE 50 MG/1
50 TABLET ORAL EVERY 6 HOURS PRN
Status: DISCONTINUED | OUTPATIENT
Start: 2024-09-29 | End: 2024-10-06 | Stop reason: HOSPADM

## 2024-09-29 RX ADMIN — ROPINIROLE HYDROCHLORIDE 0.5 MG: 0.25 TABLET, FILM COATED ORAL at 22:14

## 2024-09-29 RX ADMIN — LATANOPROST 1 DROP: 50 SOLUTION OPHTHALMIC at 22:24

## 2024-09-29 RX ADMIN — FLUTICASONE PROPIONATE 1 PUFF: 110 AEROSOL, METERED RESPIRATORY (INHALATION) at 07:48

## 2024-09-29 RX ADMIN — FLUTICASONE PROPIONATE 1 PUFF: 110 AEROSOL, METERED RESPIRATORY (INHALATION) at 21:21

## 2024-09-29 RX ADMIN — TRAMADOL HYDROCHLORIDE 50 MG: 50 TABLET ORAL at 22:29

## 2024-09-29 RX ADMIN — SPIRONOLACTONE 25 MG: 50 TABLET ORAL at 10:33

## 2024-09-29 RX ADMIN — TORSEMIDE 20 MG: 20 TABLET ORAL at 10:33

## 2024-09-29 RX ADMIN — HEPARIN SODIUM 5000 UNITS: 5000 INJECTION INTRAVENOUS; SUBCUTANEOUS at 14:19

## 2024-09-29 RX ADMIN — SODIUM CHLORIDE, PRESERVATIVE FREE 10 ML: 5 INJECTION INTRAVENOUS at 10:24

## 2024-09-29 RX ADMIN — CLOPIDOGREL 75 MG: 75 TABLET, FILM COATED ORAL at 10:24

## 2024-09-29 RX ADMIN — HEPARIN SODIUM 5000 UNITS: 5000 INJECTION INTRAVENOUS; SUBCUTANEOUS at 22:14

## 2024-09-29 RX ADMIN — LEVOTHYROXINE SODIUM 50 MCG: 0.05 TABLET ORAL at 06:15

## 2024-09-29 RX ADMIN — TRAMADOL HYDROCHLORIDE 50 MG: 50 TABLET ORAL at 10:44

## 2024-09-29 RX ADMIN — FERROUS SULFATE TAB 325 MG (65 MG ELEMENTAL FE) 325 MG: 325 (65 FE) TAB at 22:14

## 2024-09-29 RX ADMIN — ROPINIROLE HYDROCHLORIDE 0.5 MG: 0.25 TABLET, FILM COATED ORAL at 15:43

## 2024-09-29 RX ADMIN — HEPARIN SODIUM 5000 UNITS: 5000 INJECTION INTRAVENOUS; SUBCUTANEOUS at 06:15

## 2024-09-29 RX ADMIN — FERROUS SULFATE TAB 325 MG (65 MG ELEMENTAL FE) 325 MG: 325 (65 FE) TAB at 10:23

## 2024-09-29 RX ADMIN — FAMOTIDINE 20 MG: 20 TABLET ORAL at 10:23

## 2024-09-29 RX ADMIN — CETIRIZINE HYDROCHLORIDE 5 MG: 10 TABLET, FILM COATED ORAL at 10:23

## 2024-09-29 RX ADMIN — SODIUM CHLORIDE, PRESERVATIVE FREE 10 ML: 5 INJECTION INTRAVENOUS at 22:15

## 2024-09-29 RX ADMIN — FEBUXOSTAT 40 MG: 40 TABLET, FILM COATED ORAL at 10:24

## 2024-09-29 RX ADMIN — ROPINIROLE HYDROCHLORIDE 0.5 MG: 0.25 TABLET, FILM COATED ORAL at 10:23

## 2024-09-29 ASSESSMENT — PAIN DESCRIPTION - PAIN TYPE: TYPE: ACUTE PAIN

## 2024-09-29 ASSESSMENT — PAIN DESCRIPTION - LOCATION
LOCATION: HEAD;SHOULDER
LOCATION: SHOULDER
LOCATION: HEAD;SHOULDER

## 2024-09-29 ASSESSMENT — PAIN DESCRIPTION - DESCRIPTORS
DESCRIPTORS: THROBBING
DESCRIPTORS: ACHING;DISCOMFORT
DESCRIPTORS: STABBING

## 2024-09-29 ASSESSMENT — PAIN SCALES - GENERAL
PAINLEVEL_OUTOF10: 8

## 2024-09-29 ASSESSMENT — PAIN DESCRIPTION - ONSET: ONSET: ON-GOING

## 2024-09-29 ASSESSMENT — PAIN DESCRIPTION - ORIENTATION
ORIENTATION: LEFT;ANTERIOR
ORIENTATION: LEFT

## 2024-09-29 ASSESSMENT — PAIN DESCRIPTION - FREQUENCY: FREQUENCY: CONTINUOUS

## 2024-09-29 NOTE — CARE COORDINATION
LSW called admissions with [facility] and left a message regarding referral given to them yesterday by WILBER Wynn.  Waiting for a return call.

## 2024-09-29 NOTE — PLAN OF CARE
Problem: Safety - Adult  Goal: Free from fall injury  9/29/2024 0023 by Alix Brar RN  Outcome: Progressing  9/28/2024 1852 by Nitza Watts RN  Outcome: Progressing     Problem: Discharge Planning  Goal: Discharge to home or other facility with appropriate resources  9/29/2024 0023 by Alix Brar RN  Outcome: Progressing  9/28/2024 1852 by Nitza Watts RN  Outcome: Progressing     Problem: Pain  Goal: Verbalizes/displays adequate comfort level or baseline comfort level  9/29/2024 0023 by Alix Brar RN  Outcome: Progressing  9/28/2024 1852 by Nitza Watts RN  Outcome: Progressing     Problem: ABCDS Injury Assessment  Goal: Absence of physical injury  9/29/2024 0023 by Alix Brar RN  Outcome: Progressing  9/28/2024 1852 by Nitza Watts RN  Outcome: Progressing     Problem: Chronic Conditions and Co-morbidities  Goal: Patient's chronic conditions and co-morbidity symptoms are monitored and maintained or improved  9/29/2024 0023 by Alix Brar RN  Outcome: Progressing  9/28/2024 1852 by Nitza Watts RN  Outcome: Progressing     Problem: Skin/Tissue Integrity  Goal: Absence of new skin breakdown  Description: 1.  Monitor for areas of redness and/or skin breakdown  2.  Assess vascular access sites hourly  3.  Every 4-6 hours minimum:  Change oxygen saturation probe site  4.  Every 4-6 hours:  If on nasal continuous positive airway pressure, respiratory therapy assess nares and determine need for appliance change or resting period.  9/29/2024 0023 by Alix Brar RN  Outcome: Progressing  9/28/2024 1852 by Nitza Watts RN  Outcome: Progressing

## 2024-09-30 LAB
ANION GAP SERPL CALCULATED.3IONS-SCNC: 11 MMOL/L (ref 9–17)
BNP SERPL-MCNC: 1942 PG/ML (ref 0–450)
BUN SERPL-MCNC: 55 MG/DL (ref 7–20)
CALCIUM SERPL-MCNC: 10.2 MG/DL (ref 8.3–10.6)
CHLORIDE SERPL-SCNC: 96 MMOL/L (ref 99–110)
CO2 SERPL-SCNC: 28 MMOL/L (ref 21–32)
CREAT SERPL-MCNC: 1.6 MG/DL (ref 0.6–1.2)
GFR, ESTIMATED: 30 ML/MIN/1.73M2
GLUCOSE SERPL-MCNC: 147 MG/DL (ref 74–99)
MAGNESIUM SERPL-MCNC: 1.8 MG/DL (ref 1.8–2.4)
PHOSPHATE SERPL-MCNC: 2.1 MG/DL (ref 2.5–4.9)
POTASSIUM SERPL-SCNC: 3.8 MMOL/L (ref 3.5–5.1)
SODIUM SERPL-SCNC: 135 MMOL/L (ref 136–145)

## 2024-09-30 PROCEDURE — 6370000000 HC RX 637 (ALT 250 FOR IP): Performed by: INTERNAL MEDICINE

## 2024-09-30 PROCEDURE — 6370000000 HC RX 637 (ALT 250 FOR IP): Performed by: STUDENT IN AN ORGANIZED HEALTH CARE EDUCATION/TRAINING PROGRAM

## 2024-09-30 PROCEDURE — 97116 GAIT TRAINING THERAPY: CPT

## 2024-09-30 PROCEDURE — 97530 THERAPEUTIC ACTIVITIES: CPT

## 2024-09-30 PROCEDURE — 1200000000 HC SEMI PRIVATE

## 2024-09-30 PROCEDURE — 6360000002 HC RX W HCPCS: Performed by: INTERNAL MEDICINE

## 2024-09-30 PROCEDURE — 2580000003 HC RX 258: Performed by: INTERNAL MEDICINE

## 2024-09-30 PROCEDURE — 80048 BASIC METABOLIC PNL TOTAL CA: CPT

## 2024-09-30 PROCEDURE — 94761 N-INVAS EAR/PLS OXIMETRY MLT: CPT

## 2024-09-30 PROCEDURE — 36415 COLL VENOUS BLD VENIPUNCTURE: CPT

## 2024-09-30 PROCEDURE — 83735 ASSAY OF MAGNESIUM: CPT

## 2024-09-30 PROCEDURE — 83880 ASSAY OF NATRIURETIC PEPTIDE: CPT

## 2024-09-30 PROCEDURE — 84100 ASSAY OF PHOSPHORUS: CPT

## 2024-09-30 PROCEDURE — 94640 AIRWAY INHALATION TREATMENT: CPT

## 2024-09-30 RX ORDER — MIDODRINE HYDROCHLORIDE 5 MG/1
10 TABLET ORAL
Status: DISCONTINUED | OUTPATIENT
Start: 2024-09-30 | End: 2024-10-01

## 2024-09-30 RX ORDER — MIDODRINE HYDROCHLORIDE 5 MG/1
5 TABLET ORAL
Status: DISCONTINUED | OUTPATIENT
Start: 2024-09-30 | End: 2024-09-30

## 2024-09-30 RX ADMIN — HEPARIN SODIUM 5000 UNITS: 5000 INJECTION INTRAVENOUS; SUBCUTANEOUS at 06:21

## 2024-09-30 RX ADMIN — LEVOTHYROXINE SODIUM 50 MCG: 0.05 TABLET ORAL at 06:22

## 2024-09-30 RX ADMIN — LATANOPROST 1 DROP: 50 SOLUTION OPHTHALMIC at 21:11

## 2024-09-30 RX ADMIN — POLYETHYLENE GLYCOL (3350) 17 G: 17 POWDER, FOR SOLUTION ORAL at 12:12

## 2024-09-30 RX ADMIN — POTASSIUM & SODIUM PHOSPHATES POWDER PACK 280-160-250 MG 500 MG: 280-160-250 PACK at 17:38

## 2024-09-30 RX ADMIN — SODIUM CHLORIDE, PRESERVATIVE FREE 10 ML: 5 INJECTION INTRAVENOUS at 21:01

## 2024-09-30 RX ADMIN — CLOPIDOGREL 75 MG: 75 TABLET, FILM COATED ORAL at 10:13

## 2024-09-30 RX ADMIN — FLUTICASONE PROPIONATE 1 PUFF: 110 AEROSOL, METERED RESPIRATORY (INHALATION) at 19:50

## 2024-09-30 RX ADMIN — ROPINIROLE HYDROCHLORIDE 0.5 MG: 0.25 TABLET, FILM COATED ORAL at 17:39

## 2024-09-30 RX ADMIN — FERROUS SULFATE TAB 325 MG (65 MG ELEMENTAL FE) 325 MG: 325 (65 FE) TAB at 20:48

## 2024-09-30 RX ADMIN — SODIUM CHLORIDE, PRESERVATIVE FREE 10 ML: 5 INJECTION INTRAVENOUS at 10:15

## 2024-09-30 RX ADMIN — TORSEMIDE 20 MG: 20 TABLET ORAL at 10:13

## 2024-09-30 RX ADMIN — TORSEMIDE 20 MG: 20 TABLET ORAL at 17:39

## 2024-09-30 RX ADMIN — MIDODRINE HYDROCHLORIDE 5 MG: 5 TABLET ORAL at 13:58

## 2024-09-30 RX ADMIN — ROPINIROLE HYDROCHLORIDE 0.5 MG: 0.25 TABLET, FILM COATED ORAL at 10:14

## 2024-09-30 RX ADMIN — HEPARIN SODIUM 5000 UNITS: 5000 INJECTION INTRAVENOUS; SUBCUTANEOUS at 13:59

## 2024-09-30 RX ADMIN — TRAMADOL HYDROCHLORIDE 50 MG: 50 TABLET ORAL at 20:48

## 2024-09-30 RX ADMIN — ROPINIROLE HYDROCHLORIDE 0.5 MG: 0.25 TABLET, FILM COATED ORAL at 20:48

## 2024-09-30 RX ADMIN — CETIRIZINE HYDROCHLORIDE 5 MG: 10 TABLET, FILM COATED ORAL at 10:14

## 2024-09-30 RX ADMIN — SPIRONOLACTONE 25 MG: 50 TABLET ORAL at 10:14

## 2024-09-30 RX ADMIN — FAMOTIDINE 20 MG: 20 TABLET ORAL at 10:14

## 2024-09-30 RX ADMIN — HEPARIN SODIUM 5000 UNITS: 5000 INJECTION INTRAVENOUS; SUBCUTANEOUS at 20:47

## 2024-09-30 RX ADMIN — MIDODRINE HYDROCHLORIDE 5 MG: 5 TABLET ORAL at 10:14

## 2024-09-30 RX ADMIN — FEBUXOSTAT 40 MG: 40 TABLET, FILM COATED ORAL at 10:15

## 2024-09-30 RX ADMIN — FERROUS SULFATE TAB 325 MG (65 MG ELEMENTAL FE) 325 MG: 325 (65 FE) TAB at 10:14

## 2024-09-30 ASSESSMENT — PAIN SCALES - GENERAL: PAINLEVEL_OUTOF10: 8

## 2024-09-30 ASSESSMENT — PAIN DESCRIPTION - ORIENTATION: ORIENTATION: LEFT

## 2024-09-30 ASSESSMENT — PAIN DESCRIPTION - DESCRIPTORS: DESCRIPTORS: ACHING

## 2024-09-30 ASSESSMENT — PAIN DESCRIPTION - LOCATION: LOCATION: SHOULDER

## 2024-09-30 ASSESSMENT — PAIN - FUNCTIONAL ASSESSMENT: PAIN_FUNCTIONAL_ASSESSMENT: ACTIVITIES ARE NOT PREVENTED

## 2024-09-30 NOTE — CARE COORDINATION
Chart reviewed and noted [facility] Call to [facility] left with request for return call with update on referral.     12:05 PM Received voicemail from patients daughter stating that referral was not supposed to be with [facility] but to [facility]. Call to [facility] and updated him to disregard previous referral.    Call to [facility] to give referral information. No answer at facility at this time.    1:18 PM Call to [facility], spoke with Komal, and shared referral information at this time.     3:59 PM Received update from Komal that patient has been accepted to [facility] once medically ready. Perfect serve sent to Dr. Estes with update and call to patients daughter Lyly with update also.

## 2024-09-30 NOTE — PLAN OF CARE
Problem: Safety - Adult  Goal: Free from fall injury  9/30/2024 0339 by Joy Seay LPN  Outcome: Progressing  9/29/2024 1437 by Nitza Lopez LPN  Outcome: Progressing     Problem: Discharge Planning  Goal: Discharge to home or other facility with appropriate resources  9/30/2024 0339 by Joy Seay LPN  Outcome: Progressing  9/29/2024 1437 by Nitza Lopez LPN  Outcome: Progressing     Problem: Pain  Goal: Verbalizes/displays adequate comfort level or baseline comfort level  9/30/2024 0339 by Joy Seay LPN  Outcome: Progressing  9/29/2024 1437 by Nitza Lopez LPN  Outcome: Progressing     Problem: ABCDS Injury Assessment  Goal: Absence of physical injury  9/30/2024 0339 by Joy Seay LPN  Outcome: Progressing  9/29/2024 1437 by Nitza Lopez LPN  Outcome: Progressing     Problem: Chronic Conditions and Co-morbidities  Goal: Patient's chronic conditions and co-morbidity symptoms are monitored and maintained or improved  9/30/2024 0339 by Joy Seay LPN  Outcome: Progressing  9/29/2024 1437 by Nitza Lopez LPN  Outcome: Progressing     Problem: Skin/Tissue Integrity  Goal: Absence of new skin breakdown  Description: 1.  Monitor for areas of redness and/or skin breakdown  2.  Assess vascular access sites hourly  3.  Every 4-6 hours minimum:  Change oxygen saturation probe site  4.  Every 4-6 hours:  If on nasal continuous positive airway pressure, respiratory therapy assess nares and determine need for appliance change or resting period.  9/30/2024 0339 by Joy Seay LPN  Outcome: Progressing  9/29/2024 1437 by Nitza Lopez LPN  Outcome: Progressing

## 2024-10-01 PROBLEM — R42 DIZZINESS: Status: ACTIVE | Noted: 2024-10-01

## 2024-10-01 LAB
ANION GAP SERPL CALCULATED.3IONS-SCNC: 11 MMOL/L (ref 9–17)
BUN SERPL-MCNC: 58 MG/DL (ref 7–20)
CALCIUM SERPL-MCNC: 9.9 MG/DL (ref 8.3–10.6)
CHLORIDE SERPL-SCNC: 98 MMOL/L (ref 99–110)
CO2 SERPL-SCNC: 27 MMOL/L (ref 21–32)
CREAT SERPL-MCNC: 1.8 MG/DL (ref 0.6–1.2)
GFR, ESTIMATED: 27 ML/MIN/1.73M2
GLUCOSE SERPL-MCNC: 96 MG/DL (ref 74–99)
MAGNESIUM SERPL-MCNC: 1.7 MG/DL (ref 1.8–2.4)
PHOSPHATE SERPL-MCNC: 3.4 MG/DL (ref 2.5–4.9)
POTASSIUM SERPL-SCNC: 3.9 MMOL/L (ref 3.5–5.1)
SODIUM SERPL-SCNC: 136 MMOL/L (ref 136–145)

## 2024-10-01 PROCEDURE — 6370000000 HC RX 637 (ALT 250 FOR IP): Performed by: INTERNAL MEDICINE

## 2024-10-01 PROCEDURE — 6370000000 HC RX 637 (ALT 250 FOR IP): Performed by: STUDENT IN AN ORGANIZED HEALTH CARE EDUCATION/TRAINING PROGRAM

## 2024-10-01 PROCEDURE — 94640 AIRWAY INHALATION TREATMENT: CPT

## 2024-10-01 PROCEDURE — 1200000000 HC SEMI PRIVATE

## 2024-10-01 PROCEDURE — 2580000003 HC RX 258: Performed by: INTERNAL MEDICINE

## 2024-10-01 PROCEDURE — 83735 ASSAY OF MAGNESIUM: CPT

## 2024-10-01 PROCEDURE — 36415 COLL VENOUS BLD VENIPUNCTURE: CPT

## 2024-10-01 PROCEDURE — 99223 1ST HOSP IP/OBS HIGH 75: CPT | Performed by: INTERNAL MEDICINE

## 2024-10-01 PROCEDURE — 94761 N-INVAS EAR/PLS OXIMETRY MLT: CPT

## 2024-10-01 PROCEDURE — 6360000002 HC RX W HCPCS: Performed by: INTERNAL MEDICINE

## 2024-10-01 PROCEDURE — 84100 ASSAY OF PHOSPHORUS: CPT

## 2024-10-01 PROCEDURE — 80048 BASIC METABOLIC PNL TOTAL CA: CPT

## 2024-10-01 RX ORDER — 0.9 % SODIUM CHLORIDE 0.9 %
500 INTRAVENOUS SOLUTION INTRAVENOUS ONCE
Status: COMPLETED | OUTPATIENT
Start: 2024-10-01 | End: 2024-10-01

## 2024-10-01 RX ORDER — MIDODRINE HYDROCHLORIDE 5 MG/1
15 TABLET ORAL
Status: DISCONTINUED | OUTPATIENT
Start: 2024-10-01 | End: 2024-10-04

## 2024-10-01 RX ORDER — PYRIDOSTIGMINE BROMIDE 60 MG/1
30 TABLET ORAL EVERY 8 HOURS SCHEDULED
Status: DISCONTINUED | OUTPATIENT
Start: 2024-10-01 | End: 2024-10-04

## 2024-10-01 RX ADMIN — SPIRONOLACTONE 25 MG: 50 TABLET ORAL at 09:20

## 2024-10-01 RX ADMIN — FEBUXOSTAT 40 MG: 40 TABLET, FILM COATED ORAL at 09:19

## 2024-10-01 RX ADMIN — CETIRIZINE HYDROCHLORIDE 5 MG: 10 TABLET, FILM COATED ORAL at 09:19

## 2024-10-01 RX ADMIN — FAMOTIDINE 20 MG: 20 TABLET ORAL at 09:20

## 2024-10-01 RX ADMIN — TORSEMIDE 20 MG: 20 TABLET ORAL at 09:21

## 2024-10-01 RX ADMIN — FLUTICASONE PROPIONATE 1 PUFF: 110 AEROSOL, METERED RESPIRATORY (INHALATION) at 20:18

## 2024-10-01 RX ADMIN — ROPINIROLE HYDROCHLORIDE 0.5 MG: 0.25 TABLET, FILM COATED ORAL at 23:37

## 2024-10-01 RX ADMIN — HEPARIN SODIUM 5000 UNITS: 5000 INJECTION INTRAVENOUS; SUBCUTANEOUS at 23:37

## 2024-10-01 RX ADMIN — SODIUM CHLORIDE, PRESERVATIVE FREE 10 ML: 5 INJECTION INTRAVENOUS at 23:39

## 2024-10-01 RX ADMIN — CLOPIDOGREL 75 MG: 75 TABLET, FILM COATED ORAL at 09:20

## 2024-10-01 RX ADMIN — FERROUS SULFATE TAB 325 MG (65 MG ELEMENTAL FE) 325 MG: 325 (65 FE) TAB at 23:46

## 2024-10-01 RX ADMIN — PYRIDOSTIGMINE BROMIDE 30 MG: 60 TABLET ORAL at 15:51

## 2024-10-01 RX ADMIN — LEVOTHYROXINE SODIUM 50 MCG: 0.05 TABLET ORAL at 06:37

## 2024-10-01 RX ADMIN — POLYETHYLENE GLYCOL (3350) 17 G: 17 POWDER, FOR SOLUTION ORAL at 09:19

## 2024-10-01 RX ADMIN — HEPARIN SODIUM 5000 UNITS: 5000 INJECTION INTRAVENOUS; SUBCUTANEOUS at 06:37

## 2024-10-01 RX ADMIN — TRAMADOL HYDROCHLORIDE 50 MG: 50 TABLET ORAL at 11:56

## 2024-10-01 RX ADMIN — FERROUS SULFATE TAB 325 MG (65 MG ELEMENTAL FE) 325 MG: 325 (65 FE) TAB at 09:20

## 2024-10-01 RX ADMIN — PYRIDOSTIGMINE BROMIDE 30 MG: 60 TABLET ORAL at 23:38

## 2024-10-01 RX ADMIN — SODIUM CHLORIDE 500 ML: 9 INJECTION, SOLUTION INTRAVENOUS at 16:01

## 2024-10-01 RX ADMIN — MIDODRINE HYDROCHLORIDE 15 MG: 5 TABLET ORAL at 17:18

## 2024-10-01 RX ADMIN — ROPINIROLE HYDROCHLORIDE 0.5 MG: 0.25 TABLET, FILM COATED ORAL at 15:52

## 2024-10-01 RX ADMIN — FLUTICASONE PROPIONATE 1 PUFF: 110 AEROSOL, METERED RESPIRATORY (INHALATION) at 07:30

## 2024-10-01 RX ADMIN — SODIUM CHLORIDE, PRESERVATIVE FREE 10 ML: 5 INJECTION INTRAVENOUS at 09:21

## 2024-10-01 RX ADMIN — LATANOPROST 1 DROP: 50 SOLUTION OPHTHALMIC at 23:39

## 2024-10-01 RX ADMIN — MIDODRINE HYDROCHLORIDE 10 MG: 5 TABLET ORAL at 12:43

## 2024-10-01 RX ADMIN — MIDODRINE HYDROCHLORIDE 10 MG: 5 TABLET ORAL at 09:20

## 2024-10-01 RX ADMIN — HEPARIN SODIUM 5000 UNITS: 5000 INJECTION INTRAVENOUS; SUBCUTANEOUS at 14:23

## 2024-10-01 RX ADMIN — TORSEMIDE 20 MG: 20 TABLET ORAL at 17:31

## 2024-10-01 RX ADMIN — TRAMADOL HYDROCHLORIDE 50 MG: 50 TABLET ORAL at 23:38

## 2024-10-01 RX ADMIN — ROPINIROLE HYDROCHLORIDE 0.5 MG: 0.25 TABLET, FILM COATED ORAL at 09:20

## 2024-10-01 ASSESSMENT — PAIN DESCRIPTION - LOCATION
LOCATION: HEAD;SHOULDER
LOCATION: HEAD

## 2024-10-01 ASSESSMENT — PAIN - FUNCTIONAL ASSESSMENT
PAIN_FUNCTIONAL_ASSESSMENT: PREVENTS OR INTERFERES SOME ACTIVE ACTIVITIES AND ADLS

## 2024-10-01 ASSESSMENT — PAIN SCALES - GENERAL
PAINLEVEL_OUTOF10: 9
PAINLEVEL_OUTOF10: 5
PAINLEVEL_OUTOF10: 7

## 2024-10-01 ASSESSMENT — PAIN DESCRIPTION - PAIN TYPE
TYPE: ACUTE PAIN
TYPE: ACUTE PAIN

## 2024-10-01 ASSESSMENT — PAIN DESCRIPTION - ONSET: ONSET: ON-GOING

## 2024-10-01 ASSESSMENT — PAIN DESCRIPTION - DESCRIPTORS
DESCRIPTORS: ACHING

## 2024-10-01 ASSESSMENT — PAIN DESCRIPTION - FREQUENCY: FREQUENCY: CONTINUOUS

## 2024-10-01 ASSESSMENT — PAIN DESCRIPTION - ORIENTATION: ORIENTATION: LEFT;OTHER (COMMENT)

## 2024-10-01 NOTE — PLAN OF CARE
Problem: Safety - Adult  Goal: Free from fall injury  Outcome: Progressing     Problem: Discharge Planning  Goal: Discharge to home or other facility with appropriate resources  Outcome: Progressing     Problem: Pain  Goal: Verbalizes/displays adequate comfort level or baseline comfort level  Outcome: Progressing     Problem: ABCDS Injury Assessment  Goal: Absence of physical injury  Outcome: Progressing     Problem: Chronic Conditions and Co-morbidities  Goal: Patient's chronic conditions and co-morbidity symptoms are monitored and maintained or improved  Outcome: Progressing     Problem: Skin/Tissue Integrity  Goal: Absence of new skin breakdown  Outcome: Progressing     Problem: Nutrition Deficit:  Goal: Optimize nutritional status  Outcome: Progressing

## 2024-10-01 NOTE — CARE COORDINATION
Chart reviewed and met w/ patient and daughter for continued discharge planning. [facility]charge [facility] once medically ready.

## 2024-10-02 LAB
ANION GAP SERPL CALCULATED.3IONS-SCNC: 12 MMOL/L (ref 9–17)
BUN SERPL-MCNC: 59 MG/DL (ref 7–20)
CALCIUM SERPL-MCNC: 9.5 MG/DL (ref 8.3–10.6)
CHLORIDE SERPL-SCNC: 99 MMOL/L (ref 99–110)
CO2 SERPL-SCNC: 26 MMOL/L (ref 21–32)
CREAT SERPL-MCNC: 1.8 MG/DL (ref 0.6–1.2)
GFR, ESTIMATED: 26 ML/MIN/1.73M2
GLUCOSE SERPL-MCNC: 91 MG/DL (ref 74–99)
MAGNESIUM SERPL-MCNC: 1.6 MG/DL (ref 1.8–2.4)
PHOSPHATE SERPL-MCNC: 3.6 MG/DL (ref 2.5–4.9)
POTASSIUM SERPL-SCNC: 4.1 MMOL/L (ref 3.5–5.1)
SODIUM SERPL-SCNC: 137 MMOL/L (ref 136–145)

## 2024-10-02 PROCEDURE — 97530 THERAPEUTIC ACTIVITIES: CPT

## 2024-10-02 PROCEDURE — 6370000000 HC RX 637 (ALT 250 FOR IP): Performed by: INTERNAL MEDICINE

## 2024-10-02 PROCEDURE — 6360000002 HC RX W HCPCS: Performed by: INTERNAL MEDICINE

## 2024-10-02 PROCEDURE — 97110 THERAPEUTIC EXERCISES: CPT

## 2024-10-02 PROCEDURE — 99223 1ST HOSP IP/OBS HIGH 75: CPT | Performed by: PSYCHIATRY & NEUROLOGY

## 2024-10-02 PROCEDURE — 83735 ASSAY OF MAGNESIUM: CPT

## 2024-10-02 PROCEDURE — 1200000000 HC SEMI PRIVATE

## 2024-10-02 PROCEDURE — 94640 AIRWAY INHALATION TREATMENT: CPT

## 2024-10-02 PROCEDURE — APPNB15 APP NON BILLABLE TIME 0-15 MINS

## 2024-10-02 PROCEDURE — 6370000000 HC RX 637 (ALT 250 FOR IP): Performed by: STUDENT IN AN ORGANIZED HEALTH CARE EDUCATION/TRAINING PROGRAM

## 2024-10-02 PROCEDURE — 99233 SBSQ HOSP IP/OBS HIGH 50: CPT | Performed by: INTERNAL MEDICINE

## 2024-10-02 PROCEDURE — 80048 BASIC METABOLIC PNL TOTAL CA: CPT

## 2024-10-02 PROCEDURE — 84100 ASSAY OF PHOSPHORUS: CPT

## 2024-10-02 PROCEDURE — 2580000003 HC RX 258: Performed by: INTERNAL MEDICINE

## 2024-10-02 PROCEDURE — 36415 COLL VENOUS BLD VENIPUNCTURE: CPT

## 2024-10-02 RX ORDER — LANOLIN ALCOHOL/MO/W.PET/CERES
400 CREAM (GRAM) TOPICAL 2 TIMES DAILY
Status: DISCONTINUED | OUTPATIENT
Start: 2024-10-02 | End: 2024-10-06 | Stop reason: HOSPADM

## 2024-10-02 RX ORDER — MAGNESIUM SULFATE IN WATER 40 MG/ML
2000 INJECTION, SOLUTION INTRAVENOUS ONCE
Status: COMPLETED | OUTPATIENT
Start: 2024-10-02 | End: 2024-10-02

## 2024-10-02 RX ADMIN — PYRIDOSTIGMINE BROMIDE 30 MG: 60 TABLET ORAL at 06:26

## 2024-10-02 RX ADMIN — SODIUM CHLORIDE: 9 INJECTION, SOLUTION INTRAVENOUS at 09:20

## 2024-10-02 RX ADMIN — ROPINIROLE HYDROCHLORIDE 0.5 MG: 0.25 TABLET, FILM COATED ORAL at 17:25

## 2024-10-02 RX ADMIN — LEVOTHYROXINE SODIUM 50 MCG: 0.05 TABLET ORAL at 06:26

## 2024-10-02 RX ADMIN — CLOPIDOGREL 75 MG: 75 TABLET, FILM COATED ORAL at 09:00

## 2024-10-02 RX ADMIN — MAGNESIUM SULFATE HEPTAHYDRATE 2000 MG: 40 INJECTION, SOLUTION INTRAVENOUS at 09:21

## 2024-10-02 RX ADMIN — ROPINIROLE HYDROCHLORIDE 0.5 MG: 0.25 TABLET, FILM COATED ORAL at 09:03

## 2024-10-02 RX ADMIN — HEPARIN SODIUM 5000 UNITS: 5000 INJECTION INTRAVENOUS; SUBCUTANEOUS at 06:28

## 2024-10-02 RX ADMIN — POLYETHYLENE GLYCOL (3350) 17 G: 17 POWDER, FOR SOLUTION ORAL at 08:59

## 2024-10-02 RX ADMIN — FAMOTIDINE 20 MG: 20 TABLET ORAL at 09:02

## 2024-10-02 RX ADMIN — FEBUXOSTAT 40 MG: 40 TABLET, FILM COATED ORAL at 09:04

## 2024-10-02 RX ADMIN — SODIUM CHLORIDE, PRESERVATIVE FREE 10 ML: 5 INJECTION INTRAVENOUS at 09:03

## 2024-10-02 RX ADMIN — CETIRIZINE HYDROCHLORIDE 5 MG: 10 TABLET, FILM COATED ORAL at 09:01

## 2024-10-02 RX ADMIN — FLUTICASONE PROPIONATE 1 PUFF: 110 AEROSOL, METERED RESPIRATORY (INHALATION) at 20:20

## 2024-10-02 RX ADMIN — PYRIDOSTIGMINE BROMIDE 30 MG: 60 TABLET ORAL at 13:19

## 2024-10-02 RX ADMIN — MIDODRINE HYDROCHLORIDE 15 MG: 5 TABLET ORAL at 13:20

## 2024-10-02 RX ADMIN — TORSEMIDE 20 MG: 20 TABLET ORAL at 09:01

## 2024-10-02 RX ADMIN — FERROUS SULFATE TAB 325 MG (65 MG ELEMENTAL FE) 325 MG: 325 (65 FE) TAB at 09:02

## 2024-10-02 RX ADMIN — TORSEMIDE 20 MG: 20 TABLET ORAL at 17:25

## 2024-10-02 RX ADMIN — Medication 400 MG: at 09:02

## 2024-10-02 RX ADMIN — HEPARIN SODIUM 5000 UNITS: 5000 INJECTION INTRAVENOUS; SUBCUTANEOUS at 13:19

## 2024-10-02 RX ADMIN — SPIRONOLACTONE 25 MG: 50 TABLET ORAL at 09:00

## 2024-10-02 ASSESSMENT — PAIN SCALES - GENERAL: PAINLEVEL_OUTOF10: 0

## 2024-10-02 NOTE — PLAN OF CARE
Problem: Safety - Adult  Goal: Free from fall injury  10/2/2024 0012 by Joy Seay LPN  Outcome: Progressing  10/1/2024 1652 by Brittaney Watts RN  Outcome: Progressing     Problem: Discharge Planning  Goal: Discharge to home or other facility with appropriate resources  10/2/2024 0012 by Joy Seay LPN  Outcome: Progressing  10/1/2024 1652 by Brittaney Watts RN  Outcome: Progressing     Problem: Pain  Goal: Verbalizes/displays adequate comfort level or baseline comfort level  10/2/2024 0012 by Joy Seay LPN  Outcome: Progressing  10/1/2024 1652 by Brittaney Watts RN  Outcome: Progressing     Problem: ABCDS Injury Assessment  Goal: Absence of physical injury  10/2/2024 0012 by Joy Seay LPN  Outcome: Progressing  10/1/2024 1652 by Brittaney Watts RN  Outcome: Progressing     Problem: Chronic Conditions and Co-morbidities  Goal: Patient's chronic conditions and co-morbidity symptoms are monitored and maintained or improved  10/2/2024 0012 by Joy Seay LPN  Outcome: Progressing  10/1/2024 1652 by Brittaney Watts RN  Outcome: Progressing     Problem: Skin/Tissue Integrity  Goal: Absence of new skin breakdown  Description: 1.  Monitor for areas of redness and/or skin breakdown  2.  Assess vascular access sites hourly  3.  Every 4-6 hours minimum:  Change oxygen saturation probe site  4.  Every 4-6 hours:  If on nasal continuous positive airway pressure, respiratory therapy assess nares and determine need for appliance change or resting period.  10/2/2024 0012 by Joy Seay LPN  Outcome: Progressing  10/1/2024 1652 by Brittaney Watts RN  Outcome: Progressing     Problem: Nutrition Deficit:  Goal: Optimize nutritional status  10/2/2024 0012 by Joy Seay LPN  Outcome: Progressing  10/1/2024 1652 by Brittaney Watts RN  Outcome: Progressing

## 2024-10-02 NOTE — CONSULTS
[facility] Nurse  Consult Note       Nicci Alberto  AGE: 89 y.o.   GENDER: female  : 1935  TODAY'S DATE:  10/1/2024    Subjective:     Reason for  Evaluation and Assessment: wound care reassessment      Nicci Alberto is a 89 y.o. female referred by:   [x] Physician  [] Nursing  [] Other:     Wound Identification:  Wound Type: pressure and traumatic  Contributing Factors: chronic pressure and decreased mobility, fall        PAST MEDICAL HISTORY        Diagnosis Date    Asthma     Chronic kidney disease     acute kidney failure    Chronic ulcer of left leg with fat layer exposed (HCC) 3/7/2016    Chronic ulcer of right leg with fat layer exposed (HCC) 3/7/2016    Diabetes type 2, controlled ([facility])     Heart failure ([facility]) 2024    History of asthma     History of blood transfusion 2015    Hypertension     Lymphedema of both lower extremities 3/7/2016    Osteoarthritis     Pneumonia     Thyroid disease     Venous stasis of both lower extremities 3/7/2016    WD-Non-pressure chronic ulcer right lower leg, 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 ([facility]) 2016       PAST SURGICAL HISTORY    Past Surgical History:   Procedure Laterality Date    APPENDECTOMY      CHOLECYSTECTOMY      CYSTOSCOPY      CYSTOSCOPY N/A 2023    CYSTOSCOPY URETERAL DILATION performed by Rajat Hyde MD at [facility] OR    CYSTOSCOPY N/A 2023    CYSTOSCOPY EXTENSIVE EVACUATION OF CLOTS WITH FULGURATION OF URETHRAL BLEEDING performed by Neva Solares MD at [facility] OR    EYE SURGERY      bilateral implants    HYSTERECTOMY (CERVIX STATUS UNKNOWN)      JOINT REPLACEMENT Right     knee    PACEMAKER INSERTION N/A 2020    PACEMAKER INSERTION PERMANENT REMOVAL OF TEMPORARY PACEMAKER performed by Fahda Ann MD at [facility] OR    URETHRA SURGERY         FAMILY HISTORY    Family History   Problem Relation Age of Onset    Heart Disease Father        SOCIAL HISTORY    Social History     Tobacco Use    
CARDIOLOGY CONSULT NOTE   Reason for consultation:  chest pain and SOB    Referring physician:  Brooklyn Estes MD     Primary care physician: Fern Steven, APRN - CNP      Dear  Dr. Brooklyn Estes MD   Thanks for the consult.    Chief Complaints :  Chief Complaint   Patient presents with    Fall    Dizziness        History of present illness:Nicci is a 89 y.o.year old who presents with complains of feeling dizzy lightheaded when she got up this morning she says she got dizzy when she stood up with a tunnel vision.  She did not really pass out she has had several falls in the last few weeks.  She has history of heart failure with moderate to severe mitral stenosis with a mean gradient of 9 mmHg last cardiac cath in 2020 revealed pulmonary hypertension and mild coronary artery disease.  She has s/p pacemaker  Normally follows up with [facility] her creatinine is elevated at 2.5 BNP is also elevated  Blood pressure was quite elevated when she came to the hospital she has a laceration on her forehead    Past medical history:    has a past medical history of Asthma, Chronic kidney disease, 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  Past surgical history:   has a past surgical history that includes Urethra surgery; Appendectomy; Hysterectomy; Cholecystectomy; Cystoscopy; eye surgery; joint replacement (Right); Pacemaker insertion (N/A, 11/17/2020); Cystoscopy (N/A, 12/18/2023); and Cystoscopy (N/A, 12/29/2023).  Social History:   reports that she has never smoked. She has never used smokeless tobacco. She reports 
Chart reviewed full note to follow  Patient seen earlier for orthostatic hypotension                        Name:  Nicci Alberto /Age/Sex: 1935  (89 y.o. female)   MRN & CSN:  [id number] & [id number] Admission Date/Time: 2024  9:01 AM   Location:  [facility]/1123-A PCP: Fern Steven APRN - CNP       Hospital Day: 8          Referring physician:  No admitting provider for patient encounter.         Reason for consultation: Orthostatic hypotension            Thanks for referral.    Information source: Patient    CC; dizziness lightheadedness      HPI:   Thank you for involving me in taking  care of Nicci Alberto who  is a 89 y.o.year  Old female  Presents with history of asthma, hypothyroidism, peripheral arterial disease admitted with lightheadedness and fall was noted to be markedly orthostatic has been seen by cardiology however continues to be orthostatic her creatinine is elevated troponin was mildly elevated on admission as well today also she was found to be markedly orthostatic with a systolic blood pressure dropping to more than 30 mmHg  Patient was also found to have filamentous structure attached to the aortic valve near the left coronary sinus and it was evaluated by cardiothoracic surgery and it was decided to treat her medically    However she continues to be orthostatic and dizzy hence cardiology has been reconsulted                    Past medical history:    has a past medical history of Asthma, Chronic kidney disease, 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  Past surgical history:   has a past 
Neurology Service Consult Note  [facility]   Patient Name: Nicci Alberto  : 1935        Subjective:   Reason for consult: orthostatic hypotension  89 y.o. female with history  as listed below presenting to [facility] with dizziness, lightheadedness and falls. Patient tells me she has been having this for the last week or two. She has been found to have severe orthostatic hypotension during her admission. She does well while sitting but becomes lightheaded and feels as if she is going to pass out when she stands. She has been started on midodrine with little improvement. She has also been started on pyrostigmine by cardiology.       Past Medical History:   Diagnosis Date    Asthma     Chronic kidney disease     acute kidney failure    Chronic ulcer of left leg with fat layer exposed ([facility]) 3/7/2016    Chronic ulcer of right leg with fat layer exposed ([facility]) 3/7/2016    Diabetes type 2, controlled ([facility])     Heart failure ([facility]) 2024    History of asthma     History of blood transfusion 2015    Hypertension     Lymphedema of both lower extremities 3/7/2016    Osteoarthritis     Pneumonia     Thyroid disease     Venous stasis of both lower extremities 3/7/2016    WD-Non-pressure chronic ulcer right lower leg, 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 ([facility]) 2016    :   Past Surgical History:   Procedure Laterality Date    APPENDECTOMY      CHOLECYSTECTOMY      CYSTOSCOPY      CYSTOSCOPY N/A 2023    CYSTOSCOPY URETERAL DILATION performed by Rajat Hyde MD at [facility] OR    CYSTOSCOPY N/A 2023    CYSTOSCOPY EXTENSIVE EVACUATION OF CLOTS WITH FULGURATION OF URETHRAL BLEEDING performed by Neva Solares MD at [facility] OR    EYE SURGERY      bilateral implants    HYSTERECTOMY (CERVIX STATUS UNKNOWN)      JOINT REPLACEMENT Right     knee    PACEMAKER INSERTION N/A 2020    PACEMAKER INSERTION PERMANENT REMOVAL OF TEMPORARY PACEMAKER performed by Fahad LUCERO 
pulmonale  pattern.  Because of significant diuretics and increasing edema, her  Creatinine FLUCTUATING mainly from cardiorenal  syndrome type 2 sometime transforming to type 1 along with underlying  age, diabetes, etc. her creatinine is slowly increasing stays in the mid 2 range. My last encounter with her was in May 23, 2023, at that time her creatinine was 2.3 but she had significant bilateral edema-she was getting torsemide 20 mg twice a day, along with spironolactone 50 mg daily and metolazone 5 a day.             Creatinine trend/ Kidney data :     Her creatinine was 0.8 to 1.0 mg/dL in December 2010, it remained so up until July 2016 when she had an acute kidney injury with peak creatinine of 3.6.  Although her creatinine fluctuated since then but remained in the vicinity of mid 2 to low 3 range.          Heart/ cardiac data :            Transthoracic two-dimensional echocardiogram in November 2020      Summary   Technically difficult examination.   Left ventricular systolic function is normal.   Ejection fraction is visually estimated at 50-55%.   Sclerotic, but non-stenotic aortic valve.   Moderate mitral regurgitation.   Mild tricuspid regurgitation. RVSP is 52 mmHg.   No evidence of any pericardial effusion.   Temporary pacing wire is visualized within the IVC and extends to the apex   of the right heart.     Kidney imaging :      Kidney ultrasound in November 16, 2020        Reason for Exam: ARF; abnormal labs   Type of Exam: Initial       FINDINGS:   The right kidney measures 9.9 cm in length and the left kidney measures 10.3   cm in length.       Kidneys demonstrate normal cortical echogenicity.  No hydronephrosis or   intrarenal stones.  No focal lesions.         PAST MEDICAL HISTORY:  1.  CKD stage IIIB/IV ,   She did not have much  proteinuria.  2.  Diabetes mellitus type 2,  diet controlled.  3.  Probable cor pulmonale.  4.  Hypertension, although her blood pressure was not very high lately.  5.  
performed by Rajat Hyde MD at [facility] OR    CYSTOSCOPY N/A 12/29/2023    CYSTOSCOPY EXTENSIVE EVACUATION OF CLOTS WITH FULGURATION OF URETHRAL BLEEDING performed by Neva Solares MD at [facility] OR    EYE SURGERY      bilateral implants    HYSTERECTOMY (CERVIX STATUS UNKNOWN)      JOINT REPLACEMENT Right     knee    PACEMAKER INSERTION N/A 11/17/2020    PACEMAKER INSERTION PERMANENT REMOVAL OF TEMPORARY PACEMAKER performed by Fahad Ann MD at [facility] OR    URETHRA SURGERY        SOCIAL HISTORY  Social History     Socioeconomic History    Marital status:      Spouse name: Not on file    Number of children: Not on file    Years of education: Not on file    Highest education level: Not on file   Occupational History    Not on file   Tobacco Use    Smoking status: Never    Smokeless tobacco: Never    Tobacco comments:     Reviewed 2-16-24   Vaping Use    Vaping status: Never Used   Substance and Sexual Activity    Alcohol use: No    Drug use: No    Sexual activity: Yes     Partners: Male   Other Topics Concern    Not on file   Social History Narrative    Not on file     Social Determinants of Health     Financial Resource Strain: Not on file   Food Insecurity: No Food Insecurity (9/24/2024)    Hunger Vital Sign     Worried About Running Out of Food in the Last Year: Never true     Ran Out of Food in the Last Year: Never true   Transportation Needs: No Transportation Needs (9/24/2024)    PRAPARE - Transportation     Lack of Transportation (Medical): No     Lack of Transportation (Non-Medical): No   Physical Activity: Not on file   Stress: Not on file   Social Connections: Not on file   Intimate Partner Violence: Not on file   Housing Stability: Low Risk  (9/24/2024)    Housing Stability Vital Sign     Unable to Pay for Housing in the Last Year: No     Number of Times Moved in the Last Year: 1     Homeless in the Last Year: No      FAMILY HISTORY  Family History   Problem Relation Age of Onset    
with UE support, CGA progressing to SBA.    Standing balance:  Fair+ static with BUE support on RW, CGA for safety.  Pt with poor tolerance to standing position with OH and inc dizziness   Gait: NT d/t safety concerns  Educated pt and family on PT POC, role of PT, DME use, importance of OOB mobility, OH    [facility] 6 Clicks Inpatient Mobility:  AM-PAC Inpatient Mobility Raw Score : 16    Safety: patient left in chair with alarm, daughter present, call light within reach, gait belt used.    Assessment:  Patient is an 89 year old female who presents with heart failure. Upon discharge, Encourage facility for moderate post-acute rehabilitation, anticipate 1-2 hours per day and 5 days per week. At baseline, patient receives assistance for ADLs and IADLs, and is Gale w/ RW for gross mobility. They performed below their baseline with impaired gait, strength, balance, and activity tolerance. They would benefit from continued therapy to address their deficits, reduce fall risk, decrease burden of care, and restore PLOF.    Complexity: Moderate  Prognosis: Good, no significant barriers to participation at this time.    General Plan: 3-5 times per week  Equipment: continue to assess    Goals:  Short Term Goals  Time Frame for Short Term Goals: 2 weeks  Short Term Goal 1: Pt will complete supine <> sit Elmer  Short Term Goal 2: Pt will complete sit <> stand SBA  Short Term Goal 3: Pt will complete SPT between level surfaces CGA  Short Term Goal 4: Pt will ambulate 25ft with LRAD CGA       Treatment plan:  Bed mobility, transfers, balance, gait, TA, TX    Recommendations for NURSING mobility: Elmer SPT, gait belt    Time:   Time in: 1351  Time out: 1416  Timed treatment minutes: 10  Total time: 25    Electronically signed by:    Petra Miranda, PT  9/25/2024, 4:25 PM    
pain: dull  Wound Pain Timing/Severity: with cleaning toe  Premedicated: none    Plan:     Plan of Care: Wound 09/24/24 Head Upper;Mid;Other (Comment) laceration from fall at home-Dressing/Treatment: Open to air, Other (comment)  Wound 09/18/24 #1 Left Medial Second Toe-Dressing/Treatment: Collagen, Silicone border    Alert and agreeable to wound assessment. Stated that she has an appointment on Friday at the wound clinic. Forehead  with 9 sutures in place, dry eschar noted. Left 2nd toe with callous area noted, very small open area noted. Cleaned with NS and covered with puracol and silicone border.Betadine to toe nails as ordered by clinic visit. No other concerns at this time.    Specialty Bed Required : none  [] Low Air Loss   [] Pressure Redistribution  [] Fluid Immersion  [] Bariatric  [] Total Pressure Relief  [] Other:     Discharge Plan:  Placement for patient upon discharge: to be determined   Hospice Care: not at this time  Patient appropriate for Outpatient Wound Care Center: currently a pt in [facility] wound [facility]     Patient/Caregiver Teaching:  Level of patient/caregiver understanding able to:   Voiced understanding       Electronically signed by Ilda Brar RN,  on 9/25/2024 at 1:57 PM

## 2024-10-03 LAB
ALBUMIN SERPL-MCNC: 3.3 G/DL (ref 3.4–5)
ALBUMIN/GLOB SERPL: 1.1 {RATIO} (ref 1.1–2.2)
ALP SERPL-CCNC: 72 U/L (ref 40–129)
ALT SERPL-CCNC: 24 U/L (ref 10–40)
ANION GAP SERPL CALCULATED.3IONS-SCNC: 12 MMOL/L (ref 9–17)
AST SERPL-CCNC: 25 U/L (ref 15–37)
BILIRUB SERPL-MCNC: 0.3 MG/DL (ref 0–1)
BNP SERPL-MCNC: 5405 PG/ML (ref 0–450)
BUN SERPL-MCNC: 56 MG/DL (ref 7–20)
CALCIUM SERPL-MCNC: 10.3 MG/DL (ref 8.3–10.6)
CHLORIDE SERPL-SCNC: 97 MMOL/L (ref 99–110)
CO2 SERPL-SCNC: 28 MMOL/L (ref 21–32)
CREAT SERPL-MCNC: 1.8 MG/DL (ref 0.6–1.2)
GFR, ESTIMATED: 27 ML/MIN/1.73M2
GLUCOSE SERPL-MCNC: 125 MG/DL (ref 74–99)
MAGNESIUM SERPL-MCNC: 2.2 MG/DL (ref 1.8–2.4)
POTASSIUM SERPL-SCNC: 4.3 MMOL/L (ref 3.5–5.1)
PROT SERPL-MCNC: 6.3 G/DL (ref 6.4–8.2)
SODIUM SERPL-SCNC: 136 MMOL/L (ref 136–145)

## 2024-10-03 PROCEDURE — 94761 N-INVAS EAR/PLS OXIMETRY MLT: CPT

## 2024-10-03 PROCEDURE — 99232 SBSQ HOSP IP/OBS MODERATE 35: CPT | Performed by: INTERNAL MEDICINE

## 2024-10-03 PROCEDURE — 94640 AIRWAY INHALATION TREATMENT: CPT

## 2024-10-03 PROCEDURE — 2700000000 HC OXYGEN THERAPY PER DAY

## 2024-10-03 PROCEDURE — 2580000003 HC RX 258: Performed by: INTERNAL MEDICINE

## 2024-10-03 PROCEDURE — 6370000000 HC RX 637 (ALT 250 FOR IP): Performed by: STUDENT IN AN ORGANIZED HEALTH CARE EDUCATION/TRAINING PROGRAM

## 2024-10-03 PROCEDURE — 1200000000 HC SEMI PRIVATE

## 2024-10-03 PROCEDURE — 6370000000 HC RX 637 (ALT 250 FOR IP): Performed by: INTERNAL MEDICINE

## 2024-10-03 PROCEDURE — 6360000002 HC RX W HCPCS: Performed by: INTERNAL MEDICINE

## 2024-10-03 PROCEDURE — 83735 ASSAY OF MAGNESIUM: CPT

## 2024-10-03 PROCEDURE — 80053 COMPREHEN METABOLIC PANEL: CPT

## 2024-10-03 PROCEDURE — APPNB15 APP NON BILLABLE TIME 0-15 MINS

## 2024-10-03 PROCEDURE — 83880 ASSAY OF NATRIURETIC PEPTIDE: CPT

## 2024-10-03 PROCEDURE — 36415 COLL VENOUS BLD VENIPUNCTURE: CPT

## 2024-10-03 RX ORDER — DROXIDOPA 100 MG/1
100 CAPSULE ORAL 3 TIMES DAILY
Status: DISCONTINUED | OUTPATIENT
Start: 2024-10-04 | End: 2024-10-06 | Stop reason: HOSPADM

## 2024-10-03 RX ORDER — NEOMYCIN/BACITRACIN/POLYMYXINB 3.5-400-5K
OINTMENT (GRAM) TOPICAL 2 TIMES DAILY
Status: DISCONTINUED | OUTPATIENT
Start: 2024-10-03 | End: 2024-10-06 | Stop reason: HOSPADM

## 2024-10-03 RX ADMIN — SODIUM CHLORIDE, PRESERVATIVE FREE 10 ML: 5 INJECTION INTRAVENOUS at 00:01

## 2024-10-03 RX ADMIN — PYRIDOSTIGMINE BROMIDE 30 MG: 60 TABLET ORAL at 13:43

## 2024-10-03 RX ADMIN — FERROUS SULFATE TAB 325 MG (65 MG ELEMENTAL FE) 325 MG: 325 (65 FE) TAB at 08:50

## 2024-10-03 RX ADMIN — ROPINIROLE HYDROCHLORIDE 0.5 MG: 0.25 TABLET, FILM COATED ORAL at 16:40

## 2024-10-03 RX ADMIN — LATANOPROST 1 DROP: 50 SOLUTION OPHTHALMIC at 00:07

## 2024-10-03 RX ADMIN — ROPINIROLE HYDROCHLORIDE 0.5 MG: 0.25 TABLET, FILM COATED ORAL at 21:55

## 2024-10-03 RX ADMIN — Medication 5 DROP: at 12:33

## 2024-10-03 RX ADMIN — HEPARIN SODIUM 5000 UNITS: 5000 INJECTION INTRAVENOUS; SUBCUTANEOUS at 00:01

## 2024-10-03 RX ADMIN — CETIRIZINE HYDROCHLORIDE 5 MG: 10 TABLET, FILM COATED ORAL at 08:50

## 2024-10-03 RX ADMIN — HEPARIN SODIUM 5000 UNITS: 5000 INJECTION INTRAVENOUS; SUBCUTANEOUS at 21:56

## 2024-10-03 RX ADMIN — SODIUM CHLORIDE, PRESERVATIVE FREE 10 ML: 5 INJECTION INTRAVENOUS at 21:57

## 2024-10-03 RX ADMIN — FLUTICASONE PROPIONATE 1 PUFF: 110 AEROSOL, METERED RESPIRATORY (INHALATION) at 09:23

## 2024-10-03 RX ADMIN — PYRIDOSTIGMINE BROMIDE 30 MG: 60 TABLET ORAL at 06:50

## 2024-10-03 RX ADMIN — LEVOTHYROXINE SODIUM 50 MCG: 0.05 TABLET ORAL at 06:50

## 2024-10-03 RX ADMIN — BACITRACIN ZINC, NEOMYCIN SULFATE, POLYMYXIN B SULFATE 1 G: 3.5; 5000; 4 OINTMENT TOPICAL at 21:56

## 2024-10-03 RX ADMIN — MIDODRINE HYDROCHLORIDE 15 MG: 5 TABLET ORAL at 12:32

## 2024-10-03 RX ADMIN — TORSEMIDE 20 MG: 20 TABLET ORAL at 08:49

## 2024-10-03 RX ADMIN — FERROUS SULFATE TAB 325 MG (65 MG ELEMENTAL FE) 325 MG: 325 (65 FE) TAB at 00:00

## 2024-10-03 RX ADMIN — Medication 400 MG: at 00:01

## 2024-10-03 RX ADMIN — CLOPIDOGREL 75 MG: 75 TABLET, FILM COATED ORAL at 08:49

## 2024-10-03 RX ADMIN — FERROUS SULFATE TAB 325 MG (65 MG ELEMENTAL FE) 325 MG: 325 (65 FE) TAB at 21:55

## 2024-10-03 RX ADMIN — HEPARIN SODIUM 5000 UNITS: 5000 INJECTION INTRAVENOUS; SUBCUTANEOUS at 06:51

## 2024-10-03 RX ADMIN — Medication 400 MG: at 08:50

## 2024-10-03 RX ADMIN — ROPINIROLE HYDROCHLORIDE 0.5 MG: 0.25 TABLET, FILM COATED ORAL at 00:00

## 2024-10-03 RX ADMIN — PYRIDOSTIGMINE BROMIDE 30 MG: 60 TABLET ORAL at 00:00

## 2024-10-03 RX ADMIN — SODIUM CHLORIDE, PRESERVATIVE FREE 10 ML: 5 INJECTION INTRAVENOUS at 08:53

## 2024-10-03 RX ADMIN — FAMOTIDINE 20 MG: 20 TABLET ORAL at 08:50

## 2024-10-03 RX ADMIN — FEBUXOSTAT 40 MG: 40 TABLET, FILM COATED ORAL at 08:52

## 2024-10-03 RX ADMIN — Medication 400 MG: at 21:56

## 2024-10-03 RX ADMIN — ROPINIROLE HYDROCHLORIDE 0.5 MG: 0.25 TABLET, FILM COATED ORAL at 08:50

## 2024-10-03 RX ADMIN — PYRIDOSTIGMINE BROMIDE 30 MG: 60 TABLET ORAL at 21:55

## 2024-10-03 RX ADMIN — TRAMADOL HYDROCHLORIDE 50 MG: 50 TABLET ORAL at 06:49

## 2024-10-03 RX ADMIN — SPIRONOLACTONE 25 MG: 50 TABLET ORAL at 08:51

## 2024-10-03 RX ADMIN — HEPARIN SODIUM 5000 UNITS: 5000 INJECTION INTRAVENOUS; SUBCUTANEOUS at 13:43

## 2024-10-03 RX ADMIN — TORSEMIDE 20 MG: 20 TABLET ORAL at 17:31

## 2024-10-03 RX ADMIN — FLUTICASONE PROPIONATE 1 PUFF: 110 AEROSOL, METERED RESPIRATORY (INHALATION) at 20:50

## 2024-10-03 ASSESSMENT — PAIN DESCRIPTION - FREQUENCY: FREQUENCY: CONTINUOUS

## 2024-10-03 ASSESSMENT — PAIN SCALES - GENERAL
PAINLEVEL_OUTOF10: 0
PAINLEVEL_OUTOF10: 8
PAINLEVEL_OUTOF10: 0
PAINLEVEL_OUTOF10: 3
PAINLEVEL_OUTOF10: 4
PAINLEVEL_OUTOF10: 0
PAINLEVEL_OUTOF10: 3
PAINLEVEL_OUTOF10: 0

## 2024-10-03 ASSESSMENT — PAIN DESCRIPTION - PAIN TYPE: TYPE: ACUTE PAIN

## 2024-10-03 ASSESSMENT — PAIN DESCRIPTION - DESCRIPTORS: DESCRIPTORS: ACHING

## 2024-10-03 ASSESSMENT — PAIN DESCRIPTION - ORIENTATION
ORIENTATION: LEFT
ORIENTATION: LEFT

## 2024-10-03 ASSESSMENT — PAIN DESCRIPTION - LOCATION
LOCATION: BACK;SHOULDER
LOCATION: BACK;SHOULDER

## 2024-10-03 ASSESSMENT — PAIN DESCRIPTION - ONSET: ONSET: ON-GOING

## 2024-10-03 NOTE — CARE COORDINATION
Chart reviewed and case discussed during IDR. Per Dr. Hernandez patient not yet medically ready for discharge. Discharge plan remains for patient to go to [facility] once medically ready. CM will continue to follow.

## 2024-10-04 LAB
ALBUMIN SERPL-MCNC: 3.8 G/DL (ref 3.4–5)
ALBUMIN/GLOB SERPL: 1.5 {RATIO} (ref 1.1–2.2)
ALP SERPL-CCNC: 84 U/L (ref 40–129)
ALT SERPL-CCNC: 38 U/L (ref 10–40)
ANION GAP SERPL CALCULATED.3IONS-SCNC: 12 MMOL/L (ref 9–17)
AST SERPL-CCNC: 32 U/L (ref 15–37)
BASOPHILS # BLD: 0.06 K/UL
BASOPHILS NFR BLD: 1 % (ref 0–1)
BILIRUB SERPL-MCNC: 0.4 MG/DL (ref 0–1)
BUN SERPL-MCNC: 58 MG/DL (ref 7–20)
CALCIUM SERPL-MCNC: 10.8 MG/DL (ref 8.3–10.6)
CHLORIDE SERPL-SCNC: 95 MMOL/L (ref 99–110)
CO2 SERPL-SCNC: 29 MMOL/L (ref 21–32)
CREAT SERPL-MCNC: 1.8 MG/DL (ref 0.6–1.2)
EOSINOPHIL # BLD: 0.74 K/UL
EOSINOPHILS RELATIVE PERCENT: 9 % (ref 0–3)
ERYTHROCYTE [DISTWIDTH] IN BLOOD BY AUTOMATED COUNT: 12.6 % (ref 11.7–14.9)
GFR, ESTIMATED: 26 ML/MIN/1.73M2
GLUCOSE SERPL-MCNC: 106 MG/DL (ref 74–99)
HCT VFR BLD AUTO: 41.8 % (ref 37–47)
HGB BLD-MCNC: 13.2 G/DL (ref 12.5–16)
IMM GRANULOCYTES # BLD AUTO: 0.07 K/UL
IMM GRANULOCYTES NFR BLD: 1 %
LYMPHOCYTES NFR BLD: 2.04 K/UL
LYMPHOCYTES RELATIVE PERCENT: 25 % (ref 24–44)
MAGNESIUM SERPL-MCNC: 2.2 MG/DL (ref 1.8–2.4)
MCH RBC QN AUTO: 31.7 PG (ref 27–31)
MCHC RBC AUTO-ENTMCNC: 31.6 G/DL (ref 32–36)
MCV RBC AUTO: 100.5 FL (ref 78–100)
MONOCYTES NFR BLD: 0.73 K/UL
MONOCYTES NFR BLD: 9 % (ref 0–4)
NEUTROPHILS NFR BLD: 55 % (ref 36–66)
NEUTS SEG NFR BLD: 4.4 K/UL
PHOSPHATE SERPL-MCNC: 3.1 MG/DL (ref 2.5–4.9)
PLATELET # BLD AUTO: 302 K/UL (ref 140–440)
PMV BLD AUTO: 9.6 FL (ref 7.5–11.1)
POTASSIUM SERPL-SCNC: 4.2 MMOL/L (ref 3.5–5.1)
PROT SERPL-MCNC: 6.2 G/DL (ref 6.4–8.2)
RBC # BLD AUTO: 4.16 M/UL (ref 4.2–5.4)
SODIUM SERPL-SCNC: 136 MMOL/L (ref 136–145)
WBC OTHER # BLD: 8 K/UL (ref 4–10.5)

## 2024-10-04 PROCEDURE — 1200000000 HC SEMI PRIVATE

## 2024-10-04 PROCEDURE — 36415 COLL VENOUS BLD VENIPUNCTURE: CPT

## 2024-10-04 PROCEDURE — 6370000000 HC RX 637 (ALT 250 FOR IP): Performed by: INTERNAL MEDICINE

## 2024-10-04 PROCEDURE — 97110 THERAPEUTIC EXERCISES: CPT

## 2024-10-04 PROCEDURE — 6370000000 HC RX 637 (ALT 250 FOR IP): Performed by: STUDENT IN AN ORGANIZED HEALTH CARE EDUCATION/TRAINING PROGRAM

## 2024-10-04 PROCEDURE — APPNB15 APP NON BILLABLE TIME 0-15 MINS

## 2024-10-04 PROCEDURE — 6360000002 HC RX W HCPCS: Performed by: INTERNAL MEDICINE

## 2024-10-04 PROCEDURE — 99232 SBSQ HOSP IP/OBS MODERATE 35: CPT | Performed by: INTERNAL MEDICINE

## 2024-10-04 PROCEDURE — 94640 AIRWAY INHALATION TREATMENT: CPT

## 2024-10-04 PROCEDURE — 2580000003 HC RX 258: Performed by: INTERNAL MEDICINE

## 2024-10-04 PROCEDURE — 97530 THERAPEUTIC ACTIVITIES: CPT

## 2024-10-04 PROCEDURE — 80053 COMPREHEN METABOLIC PANEL: CPT

## 2024-10-04 PROCEDURE — 83735 ASSAY OF MAGNESIUM: CPT

## 2024-10-04 PROCEDURE — 84100 ASSAY OF PHOSPHORUS: CPT

## 2024-10-04 PROCEDURE — 94761 N-INVAS EAR/PLS OXIMETRY MLT: CPT

## 2024-10-04 PROCEDURE — 85025 COMPLETE CBC W/AUTO DIFF WBC: CPT

## 2024-10-04 PROCEDURE — 97535 SELF CARE MNGMENT TRAINING: CPT

## 2024-10-04 RX ADMIN — PYRIDOSTIGMINE BROMIDE 30 MG: 60 TABLET ORAL at 06:35

## 2024-10-04 RX ADMIN — LATANOPROST 1 DROP: 50 SOLUTION OPHTHALMIC at 20:45

## 2024-10-04 RX ADMIN — FAMOTIDINE 20 MG: 20 TABLET ORAL at 09:41

## 2024-10-04 RX ADMIN — FLUTICASONE PROPIONATE 1 PUFF: 110 AEROSOL, METERED RESPIRATORY (INHALATION) at 09:10

## 2024-10-04 RX ADMIN — TRAMADOL HYDROCHLORIDE 50 MG: 50 TABLET ORAL at 06:43

## 2024-10-04 RX ADMIN — CLOPIDOGREL 75 MG: 75 TABLET, FILM COATED ORAL at 09:41

## 2024-10-04 RX ADMIN — SPIRONOLACTONE 25 MG: 50 TABLET ORAL at 09:41

## 2024-10-04 RX ADMIN — TORSEMIDE 20 MG: 20 TABLET ORAL at 17:43

## 2024-10-04 RX ADMIN — BACITRACIN ZINC, NEOMYCIN SULFATE, POLYMYXIN B SULFATE 1 G: 3.5; 5000; 4 OINTMENT TOPICAL at 09:54

## 2024-10-04 RX ADMIN — Medication 400 MG: at 20:43

## 2024-10-04 RX ADMIN — SODIUM CHLORIDE, PRESERVATIVE FREE 10 ML: 5 INJECTION INTRAVENOUS at 09:48

## 2024-10-04 RX ADMIN — LATANOPROST 1 DROP: 50 SOLUTION OPHTHALMIC at 01:21

## 2024-10-04 RX ADMIN — HEPARIN SODIUM 5000 UNITS: 5000 INJECTION INTRAVENOUS; SUBCUTANEOUS at 06:34

## 2024-10-04 RX ADMIN — FERROUS SULFATE TAB 325 MG (65 MG ELEMENTAL FE) 325 MG: 325 (65 FE) TAB at 20:43

## 2024-10-04 RX ADMIN — LEVOTHYROXINE SODIUM 50 MCG: 0.05 TABLET ORAL at 06:35

## 2024-10-04 RX ADMIN — BACITRACIN ZINC, NEOMYCIN SULFATE, POLYMYXIN B SULFATE 1 G: 3.5; 5000; 4 OINTMENT TOPICAL at 22:32

## 2024-10-04 RX ADMIN — FERROUS SULFATE TAB 325 MG (65 MG ELEMENTAL FE) 325 MG: 325 (65 FE) TAB at 09:41

## 2024-10-04 RX ADMIN — ROPINIROLE HYDROCHLORIDE 0.5 MG: 0.25 TABLET, FILM COATED ORAL at 09:40

## 2024-10-04 RX ADMIN — CETIRIZINE HYDROCHLORIDE 5 MG: 10 TABLET, FILM COATED ORAL at 09:41

## 2024-10-04 RX ADMIN — FLUTICASONE PROPIONATE 1 PUFF: 110 AEROSOL, METERED RESPIRATORY (INHALATION) at 21:33

## 2024-10-04 RX ADMIN — Medication 5 DROP: at 09:41

## 2024-10-04 RX ADMIN — ERGOCALCIFEROL 50000 UNITS: 1.25 CAPSULE ORAL at 09:42

## 2024-10-04 RX ADMIN — Medication 400 MG: at 09:41

## 2024-10-04 RX ADMIN — ROPINIROLE HYDROCHLORIDE 0.5 MG: 0.25 TABLET, FILM COATED ORAL at 22:30

## 2024-10-04 RX ADMIN — HEPARIN SODIUM 5000 UNITS: 5000 INJECTION INTRAVENOUS; SUBCUTANEOUS at 22:34

## 2024-10-04 RX ADMIN — SODIUM CHLORIDE, PRESERVATIVE FREE 10 ML: 5 INJECTION INTRAVENOUS at 23:19

## 2024-10-04 RX ADMIN — HEPARIN SODIUM 5000 UNITS: 5000 INJECTION INTRAVENOUS; SUBCUTANEOUS at 15:41

## 2024-10-04 RX ADMIN — Medication 5 DROP: at 20:47

## 2024-10-04 RX ADMIN — FEBUXOSTAT 40 MG: 40 TABLET, FILM COATED ORAL at 09:42

## 2024-10-04 RX ADMIN — ROPINIROLE HYDROCHLORIDE 0.5 MG: 0.25 TABLET, FILM COATED ORAL at 15:41

## 2024-10-04 ASSESSMENT — PAIN DESCRIPTION - DESCRIPTORS: DESCRIPTORS: ACHING

## 2024-10-04 ASSESSMENT — PAIN - FUNCTIONAL ASSESSMENT: PAIN_FUNCTIONAL_ASSESSMENT: ACTIVITIES ARE NOT PREVENTED

## 2024-10-04 ASSESSMENT — PAIN DESCRIPTION - RADICULAR PAIN: RADICULAR_PAIN: ABSENT

## 2024-10-04 ASSESSMENT — PAIN DESCRIPTION - LOCATION: LOCATION: SHOULDER;BACK

## 2024-10-04 ASSESSMENT — PAIN DESCRIPTION - ORIENTATION: ORIENTATION: LEFT;LOWER;POSTERIOR

## 2024-10-04 ASSESSMENT — PAIN SCALES - GENERAL: PAINLEVEL_OUTOF10: 7

## 2024-10-04 NOTE — CARE COORDINATION
Per notes pt is goint to [facility] when med cleared.    When discharged pt will be going to SNF at [facility]  Notify family.  Please Scripts, AVS, and Completed WEI  in envelope   Please call report to [phone]  [facility] [phone]

## 2024-10-05 PROCEDURE — 97110 THERAPEUTIC EXERCISES: CPT

## 2024-10-05 PROCEDURE — 94761 N-INVAS EAR/PLS OXIMETRY MLT: CPT

## 2024-10-05 PROCEDURE — 6370000000 HC RX 637 (ALT 250 FOR IP): Performed by: INTERNAL MEDICINE

## 2024-10-05 PROCEDURE — 6360000002 HC RX W HCPCS: Performed by: INTERNAL MEDICINE

## 2024-10-05 PROCEDURE — 6370000000 HC RX 637 (ALT 250 FOR IP): Performed by: STUDENT IN AN ORGANIZED HEALTH CARE EDUCATION/TRAINING PROGRAM

## 2024-10-05 PROCEDURE — 1200000000 HC SEMI PRIVATE

## 2024-10-05 PROCEDURE — APPSS60 APP SPLIT SHARED TIME 46-60 MINUTES: Performed by: NURSE PRACTITIONER

## 2024-10-05 PROCEDURE — 99233 SBSQ HOSP IP/OBS HIGH 50: CPT | Performed by: INTERNAL MEDICINE

## 2024-10-05 PROCEDURE — 94640 AIRWAY INHALATION TREATMENT: CPT

## 2024-10-05 PROCEDURE — 2580000003 HC RX 258: Performed by: INTERNAL MEDICINE

## 2024-10-05 PROCEDURE — 97530 THERAPEUTIC ACTIVITIES: CPT

## 2024-10-05 RX ADMIN — DROXIDOPA 100 MG: 100 CAPSULE ORAL at 13:22

## 2024-10-05 RX ADMIN — BACITRACIN ZINC, NEOMYCIN SULFATE, POLYMYXIN B SULFATE: 3.5; 5000; 4 OINTMENT TOPICAL at 20:20

## 2024-10-05 RX ADMIN — FAMOTIDINE 20 MG: 20 TABLET ORAL at 09:05

## 2024-10-05 RX ADMIN — FLUTICASONE PROPIONATE 1 PUFF: 110 AEROSOL, METERED RESPIRATORY (INHALATION) at 20:56

## 2024-10-05 RX ADMIN — SPIRONOLACTONE 25 MG: 50 TABLET ORAL at 09:06

## 2024-10-05 RX ADMIN — BACITRACIN ZINC, NEOMYCIN SULFATE, POLYMYXIN B SULFATE 1 G: 3.5; 5000; 4 OINTMENT TOPICAL at 09:05

## 2024-10-05 RX ADMIN — DROXIDOPA 100 MG: 100 CAPSULE ORAL at 09:07

## 2024-10-05 RX ADMIN — DROXIDOPA 100 MG: 100 CAPSULE ORAL at 18:04

## 2024-10-05 RX ADMIN — SODIUM CHLORIDE, PRESERVATIVE FREE 10 ML: 5 INJECTION INTRAVENOUS at 20:42

## 2024-10-05 RX ADMIN — Medication 5 DROP: at 20:20

## 2024-10-05 RX ADMIN — POLYETHYLENE GLYCOL (3350) 17 G: 17 POWDER, FOR SOLUTION ORAL at 09:02

## 2024-10-05 RX ADMIN — ROPINIROLE HYDROCHLORIDE 0.5 MG: 0.25 TABLET, FILM COATED ORAL at 20:41

## 2024-10-05 RX ADMIN — TORSEMIDE 20 MG: 20 TABLET ORAL at 09:06

## 2024-10-05 RX ADMIN — LATANOPROST 1 DROP: 50 SOLUTION OPHTHALMIC at 20:20

## 2024-10-05 RX ADMIN — SODIUM CHLORIDE, PRESERVATIVE FREE 10 ML: 5 INJECTION INTRAVENOUS at 09:08

## 2024-10-05 RX ADMIN — LEVOTHYROXINE SODIUM 50 MCG: 0.05 TABLET ORAL at 06:05

## 2024-10-05 RX ADMIN — HEPARIN SODIUM 5000 UNITS: 5000 INJECTION INTRAVENOUS; SUBCUTANEOUS at 13:22

## 2024-10-05 RX ADMIN — FLUTICASONE PROPIONATE 1 PUFF: 110 AEROSOL, METERED RESPIRATORY (INHALATION) at 08:32

## 2024-10-05 RX ADMIN — Medication 5 DROP: at 09:07

## 2024-10-05 RX ADMIN — Medication 400 MG: at 20:19

## 2024-10-05 RX ADMIN — HEPARIN SODIUM 5000 UNITS: 5000 INJECTION INTRAVENOUS; SUBCUTANEOUS at 06:06

## 2024-10-05 RX ADMIN — Medication 400 MG: at 09:05

## 2024-10-05 RX ADMIN — FERROUS SULFATE TAB 325 MG (65 MG ELEMENTAL FE) 325 MG: 325 (65 FE) TAB at 09:05

## 2024-10-05 RX ADMIN — ROPINIROLE HYDROCHLORIDE 0.5 MG: 0.25 TABLET, FILM COATED ORAL at 16:15

## 2024-10-05 RX ADMIN — FERROUS SULFATE TAB 325 MG (65 MG ELEMENTAL FE) 325 MG: 325 (65 FE) TAB at 20:19

## 2024-10-05 RX ADMIN — CETIRIZINE HYDROCHLORIDE 5 MG: 10 TABLET, FILM COATED ORAL at 09:06

## 2024-10-05 RX ADMIN — CLOPIDOGREL 75 MG: 75 TABLET, FILM COATED ORAL at 09:06

## 2024-10-05 RX ADMIN — ROPINIROLE HYDROCHLORIDE 0.5 MG: 0.25 TABLET, FILM COATED ORAL at 09:05

## 2024-10-05 RX ADMIN — TORSEMIDE 20 MG: 20 TABLET ORAL at 18:04

## 2024-10-05 RX ADMIN — FEBUXOSTAT 40 MG: 40 TABLET, FILM COATED ORAL at 09:07

## 2024-10-05 ASSESSMENT — PAIN DESCRIPTION - RADICULAR PAIN: RADICULAR_PAIN: ABSENT

## 2024-10-05 NOTE — PLAN OF CARE
Problem: Safety - Adult  Goal: Free from fall injury  Outcome: Progressing     Problem: Discharge Planning  Goal: Discharge to home or other facility with appropriate resources  Outcome: Progressing     Problem: Pain  Goal: Verbalizes/displays adequate comfort level or baseline comfort level  Outcome: Progressing     Problem: ABCDS Injury Assessment  Goal: Absence of physical injury  Outcome: Progressing     Problem: Chronic Conditions and Co-morbidities  Goal: Patient's chronic conditions and co-morbidity symptoms are monitored and maintained or improved  Outcome: Progressing     Problem: Skin/Tissue Integrity  Goal: Absence of new skin breakdown  Description: 1.  Monitor for areas of redness and/or skin breakdown  2.  Assess vascular access sites hourly  3.  Every 4-6 hours minimum:  Change oxygen saturation probe site  4.  Every 4-6 hours:  If on nasal continuous positive airway pressure, respiratory therapy assess nares and determine need for appliance change or resting period.  Outcome: Progressing     Problem: Nutrition Deficit:  Goal: Optimize nutritional status  10/4/2024 2303 by Sergio Penaloza, RN  Outcome: Progressing  10/4/2024 1007 by Saskia Lincoln RD  Outcome: Progressing  Flowsheets (Taken 10/4/2024 1001)  Nutrient intake appropriate for improving, restoring, or maintaining nutritional needs:   Assess nutritional status and recommend course of action   Monitor oral intake, labs, and treatment plans   Recommend appropriate diets, oral nutritional supplements, and vitamin/mineral supplements

## 2024-10-06 VITALS
DIASTOLIC BLOOD PRESSURE: 44 MMHG | HEIGHT: 61 IN | OXYGEN SATURATION: 97 % | WEIGHT: 157 LBS | SYSTOLIC BLOOD PRESSURE: 132 MMHG | TEMPERATURE: 97.6 F | RESPIRATION RATE: 15 BRPM | BODY MASS INDEX: 29.64 KG/M2 | HEART RATE: 61 BPM

## 2024-10-06 PROCEDURE — 2700000000 HC OXYGEN THERAPY PER DAY

## 2024-10-06 PROCEDURE — 94640 AIRWAY INHALATION TREATMENT: CPT

## 2024-10-06 PROCEDURE — 6370000000 HC RX 637 (ALT 250 FOR IP): Performed by: STUDENT IN AN ORGANIZED HEALTH CARE EDUCATION/TRAINING PROGRAM

## 2024-10-06 PROCEDURE — 6370000000 HC RX 637 (ALT 250 FOR IP): Performed by: INTERNAL MEDICINE

## 2024-10-06 PROCEDURE — APPSS60 APP SPLIT SHARED TIME 46-60 MINUTES: Performed by: NURSE PRACTITIONER

## 2024-10-06 PROCEDURE — 99232 SBSQ HOSP IP/OBS MODERATE 35: CPT | Performed by: INTERNAL MEDICINE

## 2024-10-06 PROCEDURE — 2580000003 HC RX 258: Performed by: INTERNAL MEDICINE

## 2024-10-06 PROCEDURE — 94761 N-INVAS EAR/PLS OXIMETRY MLT: CPT

## 2024-10-06 RX ORDER — POLYETHYLENE GLYCOL 3350 17 G/17G
17 POWDER, FOR SOLUTION ORAL DAILY PRN
DISCHARGE
Start: 2024-10-06 | End: 2024-11-05

## 2024-10-06 RX ORDER — TORSEMIDE 20 MG/1
20 TABLET ORAL DAILY
DISCHARGE
Start: 2024-10-06

## 2024-10-06 RX ORDER — CLOPIDOGREL BISULFATE 75 MG/1
75 TABLET ORAL DAILY
DISCHARGE
Start: 2024-10-06

## 2024-10-06 RX ORDER — DROXIDOPA 100 MG/1
100 CAPSULE ORAL 3 TIMES DAILY
DISCHARGE
Start: 2024-10-06 | End: 2024-10-20

## 2024-10-06 RX ORDER — TORSEMIDE 20 MG/1
20 TABLET ORAL DAILY
Status: DISCONTINUED | OUTPATIENT
Start: 2024-10-07 | End: 2024-10-06 | Stop reason: HOSPADM

## 2024-10-06 RX ORDER — NEOMYCIN/BACITRACIN/POLYMYXINB 3.5-400-5K
OINTMENT (GRAM) TOPICAL
DISCHARGE
Start: 2024-10-06

## 2024-10-06 RX ORDER — TORSEMIDE 20 MG/1
20 TABLET ORAL 2 TIMES DAILY
DISCHARGE
Start: 2024-10-06 | End: 2024-10-06

## 2024-10-06 RX ORDER — SPIRONOLACTONE 50 MG/1
25 TABLET, FILM COATED ORAL DAILY
DISCHARGE
Start: 2024-10-06

## 2024-10-06 RX ORDER — FAMOTIDINE 20 MG/1
20 TABLET, FILM COATED ORAL DAILY PRN
DISCHARGE
Start: 2024-10-06

## 2024-10-06 RX ADMIN — FERROUS SULFATE TAB 325 MG (65 MG ELEMENTAL FE) 325 MG: 325 (65 FE) TAB at 09:39

## 2024-10-06 RX ADMIN — Medication 5 DROP: at 09:40

## 2024-10-06 RX ADMIN — LEVOTHYROXINE SODIUM 50 MCG: 0.05 TABLET ORAL at 05:48

## 2024-10-06 RX ADMIN — CLOPIDOGREL 75 MG: 75 TABLET, FILM COATED ORAL at 09:39

## 2024-10-06 RX ADMIN — BACITRACIN ZINC, NEOMYCIN SULFATE, POLYMYXIN B SULFATE 1 G: 3.5; 5000; 4 OINTMENT TOPICAL at 09:39

## 2024-10-06 RX ADMIN — FEBUXOSTAT 40 MG: 40 TABLET, FILM COATED ORAL at 09:38

## 2024-10-06 RX ADMIN — DROXIDOPA 100 MG: 100 CAPSULE ORAL at 09:38

## 2024-10-06 RX ADMIN — FLUTICASONE PROPIONATE 1 PUFF: 110 AEROSOL, METERED RESPIRATORY (INHALATION) at 08:44

## 2024-10-06 RX ADMIN — CETIRIZINE HYDROCHLORIDE 5 MG: 10 TABLET, FILM COATED ORAL at 09:39

## 2024-10-06 RX ADMIN — ROPINIROLE HYDROCHLORIDE 0.5 MG: 0.25 TABLET, FILM COATED ORAL at 09:38

## 2024-10-06 RX ADMIN — TRAMADOL HYDROCHLORIDE 50 MG: 50 TABLET ORAL at 02:43

## 2024-10-06 RX ADMIN — Medication 400 MG: at 09:38

## 2024-10-06 RX ADMIN — ALBUTEROL SULFATE 2 PUFF: 90 AEROSOL, METERED RESPIRATORY (INHALATION) at 08:41

## 2024-10-06 RX ADMIN — FAMOTIDINE 20 MG: 20 TABLET ORAL at 09:39

## 2024-10-06 RX ADMIN — SPIRONOLACTONE 25 MG: 50 TABLET ORAL at 09:39

## 2024-10-06 RX ADMIN — SODIUM CHLORIDE, PRESERVATIVE FREE 10 ML: 5 INJECTION INTRAVENOUS at 09:47

## 2024-10-06 ASSESSMENT — PAIN DESCRIPTION - ORIENTATION
ORIENTATION: LEFT
ORIENTATION: LEFT

## 2024-10-06 ASSESSMENT — PAIN DESCRIPTION - LOCATION
LOCATION: SHOULDER
LOCATION: SHOULDER

## 2024-10-06 ASSESSMENT — PAIN DESCRIPTION - PAIN TYPE: TYPE: ACUTE PAIN

## 2024-10-06 ASSESSMENT — PAIN SCALES - GENERAL: PAINLEVEL_OUTOF10: 6

## 2024-10-06 ASSESSMENT — PAIN DESCRIPTION - FREQUENCY: FREQUENCY: CONTINUOUS

## 2024-10-06 ASSESSMENT — PAIN DESCRIPTION - DESCRIPTORS
DESCRIPTORS: DULL
DESCRIPTORS: DULL

## 2024-10-06 ASSESSMENT — PAIN DESCRIPTION - ONSET: ONSET: ON-GOING

## 2024-10-06 NOTE — DISCHARGE INSTR - DIET

## 2024-10-06 NOTE — DISCHARGE SUMMARY
PROVENTIL  Take 3 mLs by nebulization 4 times daily as needed for Wheezing     Biotin 41551 MCG Tabs     calcium carbonate 500 MG chewable tablet  Commonly known as: TUMS     febuxostat 40 MG Tabs tablet  Commonly known as: ULORIC  Take 1 tablet by mouth once daily     ferrous sulfate 325 (65 Fe) MG tablet  Commonly known as: IRON 325     fluticasone 110 MCG/ACT inhaler  Commonly known as: FLOVENT HFA     fluticasone 50 MCG/ACT nasal spray  Commonly known as: FLONASE     hydrOXYzine HCl 25 MG tablet  Commonly known as: ATARAX     latanoprost 0.005 % ophthalmic solution  Commonly known as: XALATAN     levothyroxine 50 MCG tablet  Commonly known as: SYNTHROID     loratadine 10 MG tablet  Commonly known as: CLARITIN     polyethyl glycol-propyl glycol 0.4-0.3 % 0.4-0.3 % ophthalmic solution  Commonly known as: SYSTANE     rOPINIRole 0.5 MG tablet  Commonly known as: REQUIP  Take 1 tablet by mouth 3 times daily     traMADol 50 MG tablet  Commonly known as: ULTRAM     Vitamin D3 1.25 MG (78806 UT) Tabs  Take 50,000 Units by mouth once a week           * This list has 2 medication(s) that are the same as other medications prescribed for you. Read the directions carefully, and ask your doctor or other care provider to review them with you.                STOP taking these medications      cilostazol 50 MG tablet  Commonly known as: PLETAL     ferrous gluconate 324 (37.5 Fe) MG Tabs     metOLazone 2.5 MG tablet  Commonly known as: ZAROXOLYN     potassium chloride 20 MEQ Tbcr extended release tablet  Commonly known as: K-TAB            ASK your doctor about these medications      Constulose 10 GM/15ML solution  Generic drug: lactulose  TAKE 15 ML BY MOUTH  THREE TIMES DAILY AS NEEDED CONSTIPATION     melatonin 3 MG Tabs tablet     sertraline 50 MG tablet  Commonly known as: ZOLOFT  Take 1 tablet by mouth daily               Where to Get Your Medications        Information about where to get these medications is not yet

## 2024-10-06 NOTE — PROGRESS NOTES
Cardiology Progress Note     Admit Date:  9/24/2024    Consult reason/ Seen today for :       Subjective and  Overnight Events : She had IVA  showing aorta thrombus attached there into the aortic wall mitral and aortic valve and mild to moderate stenosis but no significant disease thickened leaflet      Chief complain on admission : 89 y.o.year old who is admitted for  Chief Complaint   Patient presents with    Fall    Dizziness      Assessment / Plan:  Fall probably secondary to orthostasis, possibly due to to concussion still feeling of dizziness  Aortic thrombus versus plaque with fibrinous? puts her at risk of stroke but I am concerned about anticoagulation given her frailty and fall risk we will add Plavix continue Pletal continue high-dose statins findings discussed with family in detail explained risk of stroke is very high will request surgery to evaluate her as  Pacemaker interrogation did not show any significant abnormality  Mitral stenosis continue to monitor IVA did not show any high gradient  Elevated BNP in the setting of renal failure on diuretics as per nephrology  Syncope /Dizziness : check orthostatics, compression stockings, fall precautions discussed with family  DVT prophylaxis if no contraindication  6.   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 
                                                                               Cardiology Progress Note     Admit Date:  9/24/2024    Consult reason/ Seen today for :       Subjective and  Overnight Events : She had IVA today showing aorta thrombus attached there into the aortic wall mitral and aortic valve and mild to moderate stenosis but no significant disease thickened leaflet      Chief complain on admission : 89 y.o.year old who is admitted for  Chief Complaint   Patient presents with    Fall    Dizziness      Assessment / Plan:  Fall probably secondary to orthostasis  Aortic thrombus puts her at risk of stroke but I am concerned about anticoagulation given her frailty and fall risk we will add Plavix continue Pletal continue high-dose statins findings discussed with family in detail explained risk of stroke is very high will request surgery to evaluate her as  Pacemaker interrogation did not show any significant abnormality  Mitral stenosis continue to monitor IVA did not show any high gradient  Elevated BNP in the setting of renal failure on diuretics as per nephrology  Syncope /Dizziness : check orthostatics, compression stockings, fall precautions discussed with family  DVT prophylaxis if no contraindication  6.   Dyslipidemia: continue statins   Discussed with primary team, hospitalist service, bedside nursing staff and family  Past medical history:    has a past medical history of Asthma, Chronic kidney disease, 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  Past surgical history:   has a past surgical history that 
                                             [place] [address]  Phone: [phone]    Fax [phone]                  Carin Alonso MD, [facility]       Jose Roberto Steen MD, [facility]  Celsa Hodge MD, [facility]    MD Bear Hudson MD Tariq Rizvi, MD Bilal Alam, MD Dr. Waseem Sajjad MD Melissa Kellis, TREVON Rosenbaum, TREVON David, APRMARTHA Funez, APRMARTHA Lma PA-C    CARDIOLOGY  NOTE      Name:  Nicci Alberto /Age/Sex: 1935  (89 y.o. female)   MRN & CSN:  [id number] & [id number] Admission Date/Time: 2024  9:01 AM   Location:  [address] [place] [address] PCP: Fern Steven APRN - CNP       Hospital Day: 10    - Cardiology consult is for: Orthostasis      ASSESSMENT/ PLAN:  Orthostatic hypotension  -Orthostatics today profoundly positive.  Systolic pressure dropped from 128 to 68.  Very symptomatic  -Advised adequate hydration as well as compression stockings.  Patient states that she likes Ensure  -On midodrine 15 mg 3 times daily., on Mestinon 3 times daily  -Started on Northera 100 mg 3 times daily  Aortic thrombus  -Noted on IVA.  -Evaluated by CT surgery.  No intervention planned  -Due to falls, patient very poor anticoagulant candidate.  Mild to moderate mitral and aortic stenosis  -Does not appear to be in acute CHF  Peripheral arterial disease  -Discontinued Pletal  -On Plavix  Chronic kidney disease stage IV  -Nephrology following.  Managing diuresis.  -Currently on Aldactone and torsemide.  Diuretics could be contributing to orthostasis      IVA 2024    Left Atrium: No left atrial appendage thrombus noted.    Aortic Valve: Mobile echodesnity noted in ascending aorta (measurin.81 cmsq). Appears to be attached to the anterior aspect of the aorta (image: 57 and 67). Differential diagnosis: thrombus,  mobile atherosclerosis. Trace regurgitation. Mild to moderate stenosis of the aortic valve; 
    Pharmacy Note - Renal dose adjustment made per P/T protocol    Original order:  Famotidine 20mg PO BID    Estimated Creatinine Clearance: 14 mL/min (A) (based on SCr of 2.5 mg/dL (H)).    Recent Labs     09/24/24  0913   BUN 95*   CREATININE 2.5*       Renally adjusted order:  Famotidine 20mg PO daily for CrCl less than 50mL/min    Please call pharmacy with any questions.    Thank you,  Neeru Spear [facility]  9/24/2024 4:53 PM    
    V2.0  [facility] Hospitalist Progress Note      Name:  Nicci Alberto /Age/Sex: 1935  (89 y.o. female)   MRN & CSN:  [id number] & [id number] Encounter Date/Time: 2024 12:19 PM EDT    [facility] PCP: Fern Steven APRN - CNP       Hospital Day: 3      Subjective:     Chief Complaint:  Fall and Dizziness     Pt's orthostats positive. Still feeling dizzy on sitting up or standing. Home BP meds held today.   No n/v.  No SOB.          Assessment and Plan:   Dizziness, lightheadedness, falls.   Patient presents with lightheadedness and fall  Suspect from volume depletion and orthostatic hypotension as pt is on high dose diuretics   Could also BPPV although the lightheadedness pt is having not typical for vertigo  Less likely CVA.   EKG showing ventricularly paced rhythm  ECHO showing moderate AS, severe MS  - home diuretics held; to be resumed per nephro recs  - nephro consulted as below  - cards following  - pacemaker interrogated no abnormalities  - PT/OT      CKD. Cr around baseline.   On home torsemide 50 mg BID, metolazone and spironolactone  No sign of volume overload. No SOB, no edema. Mild rales on exam though.   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 
    V2.0  [facility] [place]      Name:  Nicci Alberto /Age/Sex: 1935  (89 y.o. female)   MRN & CSN:  [id number] & [id number] Encounter Date/Time: 10/2/2024 12:19 PM EDT    [facility]:  [facility] PCP: Fern Steven APRN - CNP       Hospital Day: 9    Assessment and Plan:   Dizziness, lightheadedness, falls.   Orthostatic hypotension  Patient presents with lightheadedness and fall  Suspect from volume depletion, orthostatic hypotension as pt is on high dose diuretics  in addition to concussion from fall  Could also BPPV although the lightheadedness pt is having not typical for vertigo  Less likely CVA.   EKG showing ventricularly paced rhythm  ECHO showing moderate AS, severe MS  , orthostats were not positive however there was a drop in BP when standing, patient still having dizziness  - pacemaker interrogated no abnormalities  - home diuretics held; now resumed  per nephro recs  - nephro consulted as below  - will try meclizine for 2 days only; hasn't needed it until now  - cards following  - PT/OT recommended swing bed; but denied; now being evaluated for SNF, accepted but pending clinical improvement  Start patient on midodrine 5 mg TID--> increased to 10 3 times daily due to persistent dizziness  Patient endorses dizziness on ambulation, will order AVNI stockings  Cardiology re-consulted for persistent dizziness, started on pyridostigmine.  Recommend neurology consult  Neurology consulted    CKD. Cr around baseline.   On home torsemide 50 mg BID, metolazone and spironolactone  No sign of volume overload. No SOB, no edema. Mild rales on exam though.   - nephro consulted  - plan to hold diuretics for now; now resumed lower dose 20 mg BID  - on Aldactone only per nephro  - monitor volume status, I/o     Elevated troponin likely in the setting of demand ischemia  Troponin: 62-->63  EKG with no ischemic changes  No chest pain  Continue to monitor for signs of ACS     Wall adherent thrombus 
    V2.0  [facility] Hospital[place] Note      Name:  Nicci Alberto /Age/Sex: 1935  (89 y.o. female)   MRN & CSN:  [id number] & [id number] Encounter Date/Time: 10/4/2024 12:19 PM EDT    Location:  [address] [place] [address]-A PCP: Fern Steven APRN - CNP       Hospital Day: 11    Assessment and Plan:   Dizziness, lightheadedness, falls.   Orthostatic hypotension  Patient presents with lightheadedness and fall  Suspect from volume depletion, orthostatic hypotension as pt is on high dose diuretics  in addition to concussion from fall  Could also BPPV although the lightheadedness pt is having not typical for vertigo  Less likely CVA.   EKG showing ventricularly paced rhythm  ECHO showing moderate AS, severe MS  orthostats positive, patient still having dizziness  - pacemaker interrogated no abnormalities  - home diuretics held; now resumed  per nephro recs  - nephro consulted as below  - will try meclizine for 2 days only; hasn't needed it until now  - cards following  - PT/OT recommended swing bed; but denied; now being evaluated for SNF, accepted but pending clinical improvement  Start patient on midodrine 5 mg TID--> increased to 10mg 3 times daily due to persistent dizziness  Patient endorses dizziness on ambulation, orthostatics profoundly positive, order AVNI stockings  Cardiology re-consulted for persistent dizziness, started on pyridostigmine.  Recommend neurology consult  Neurology consulted, appreciate recs, no further recs, signed off.   Discussed with cardiology on 10/3, midodrine and pyridostigmine discontinued and patient started on droxidopa  Based on clinical exam and progression symptoms possibly secondary to post concussion syndrome, I anticipate it to resolve over time (Around 1-2 weeks)    Symptoms improving    CKD. Cr around baseline.   On home torsemide 50 mg BID, metolazone and spironolactone  No sign of volume overload. No SOB, no edema. Mild rales on exam though.   - nephro consulted  - plan to hold 
    V2.0  [facility] [place]      Name:  Nicci Alberto /Age/Sex: 1935  (89 y.o. female)   MRN & CSN:  [id number] & [id number] Encounter Date/Time: 2024 12:19 PM EDT    [facility]:  [facility] PCP: Fern Steven APRN - CNP       Hospital Day: 4      Subjective:     Chief Complaint:  Fall and Dizziness     Still feeling dizzy on sitting up or standing. Also complains of headache since the fall. No n/v.   No SOB.   Pt was denied swing bed given her dizziness unable to work w extensive therapy.      Assessment and Plan:   Dizziness, lightheadedness, falls.   Patient presents with lightheadedness and fall  Suspect from volume depletion, orthostatic hypotension as pt is on high dose diuretics  in addition to concussion from fall  Could also BPPV although the lightheadedness pt is having not typical for vertigo  Less likely CVA.   EKG showing ventricularly paced rhythm  ECHO showing moderate AS, severe MS  - home diuretics held; to be resumed  per nephro recs  - nephro consulted as below  - will try meclizine for 2 days only  - cards following  - pacemaker interrogated no abnormalities  - PT/OT      CKD. Cr around baseline.   On home torsemide 50 mg BID, metolazone and spironolactone  No sign of volume overload. No SOB, no edema. Mild rales on exam though.   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 
    V2.0  [facility] Hospitalist Progress Note      Name:  Nicci Alberto /Age/Sex: 1935  (89 y.o. female)   MRN & CSN:  [id number] & [id number] Encounter Date/Time: 2024 12:19 PM EDT    [facility]:  [facility] PCP: Fern Steven APRN - CNP       Hospital Day: 2      Subjective:     Chief Complaint:  Fall and Dizziness     Pt feels better today but still feels occasional dizziness. No SOB. No nausea or vomiting. Leg swelling has improved. BP has been stable.          Assessment and Plan:   Dizziness, lightheadedness, falls.   Patient presents with lightheadedness and fall  Suspect from volume depletion as pt is on high dose diuretics   Could also BPPV although the lightheadedness pt is having not typical for vertigo  EKG showing ventricularly paced rhythm  ECHO showing moderate AS, severe MS  - home diuretics held  - nephro consulted as below  - cards following  - pacemaker interrogated no abnormalities  - PT/OT    CKD. Cr around baseline.   On home torsemide 50 mg BID, metolazone and spironolactone  No sign of volume overload. No SOB, no edema. Mild rales on exam though.   - nephro consulted  - plan to hold diuretics for now  - on Aldactone only per nephro  - monitor volume status, I/o     Elevated troponin likely in the setting of demand ischemia  Troponin: 62-->63  EKG with no ischemic changes  No chest pain  Continue to monitor for signs of ACS     Laceration on forehead  Sutured in the ED  Will need to follow-up for suture removal     PAD  Continue cilostazol     Asthma, not in exacerbation  Continue Flovent and as needed albuterol     Restless leg syndrome  Continue ropinirole     Hypothyroidism  Continue levothyroxine         Personally reviewed Lab Studies and Imaging     Discussed management of the case with nephro who recommended stopping home diuretics     EKG interpreted personally and results as above    Imaging that was interpreted personally includes CXR and results mild pulm edema    Drugs 
    V2.0  [facility] Hospitalist Progress Note      Name:  Nicci Alberto /Age/Sex: 1935  (89 y.o. female)   MRN & CSN:  [id number] & [id number] Encounter Date/Time: 10/1/2024 12:19 PM EDT    Location:  [facility] PCP: Fern Steven APRN - CNP       Hospital Day: 8    Assessment and Plan:   Dizziness, lightheadedness, falls.   Orthostatic hypotension  Patient presents with lightheadedness and fall  Suspect from volume depletion, orthostatic hypotension as pt is on high dose diuretics  in addition to concussion from fall  Could also BPPV although the lightheadedness pt is having not typical for vertigo  Less likely CVA.   EKG showing ventricularly paced rhythm  ECHO showing moderate AS, severe MS  , orthostats were not positive however there was a drop in BP when standing, patient still having dizziness  - pacemaker interrogated no abnormalities  - home diuretics held; now resumed  per nephro recs  - nephro consulted as below  - will try meclizine for 2 days only; hasn't needed it until now  - cards following  - PT/OT recommended swing bed; but denied; now being evaluated for SNF  Start patient on midodrine 5 mg TID--> increased to 10 3 times daily due to persistent dizziness  Patient endorses dizziness on ambulation, will order AVNI stockings  Cardiology re-consulted for persistent dizziness    CKD. Cr around baseline.   On home torsemide 50 mg BID, metolazone and spironolactone  No sign of volume overload. No SOB, no edema. Mild rales on exam though.   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 
    V2.0  [facility] Hospitalist Progress Note      Name:  Nicci Alberto /Age/Sex: 1935  (89 y.o. female)   MRN & CSN:  [id number] & [id number] Encounter Date/Time: 10/3/2024 12:19 PM EDT    Location:  [address] [place] [address]-A PCP: Fern Steven APRN - CNP       Hospital Day: 10    Assessment and Plan:   Dizziness, lightheadedness, falls.   Orthostatic hypotension  Patient presents with lightheadedness and fall  Suspect from volume depletion, orthostatic hypotension as pt is on high dose diuretics  in addition to concussion from fall  Could also BPPV although the lightheadedness pt is having not typical for vertigo  Less likely CVA.   EKG showing ventricularly paced rhythm  ECHO showing moderate AS, severe MS  orthostats positive, patient still having dizziness  - pacemaker interrogated no abnormalities  - home diuretics held; now resumed  per nephro recs  - nephro consulted as below  - will try meclizine for 2 days only; hasn't needed it until now  - cards following  - PT/OT recommended swing bed; but denied; now being evaluated for SNF, accepted but pending clinical improvement  Start patient on midodrine 5 mg TID--> increased to 10 3 times daily due to persistent dizziness  Patient endorses dizziness on ambulation, orthostatics profoundly positive, will order AVNI stockings  Cardiology re-consulted for persistent dizziness, started on pyridostigmine.  Recommend neurology consult  Neurology consulted, appreciate recs, no further recs, signed off.   Based on clinical exam and progression symptoms possibly secondary to post concussion syndrome, I anticipate it to resolve over time (Around 1-2 weeks)      CKD. Cr around baseline.   On home torsemide 50 mg BID, metolazone and spironolactone  No sign of volume overload. No SOB, no edema. Mild rales on exam though.   - nephro consulted  - plan to hold diuretics for now; now resumed lower dose 20 mg BID  - on Aldactone only per nephro  - monitor volume status, I/o     Elevated 
    V2.0  [facility] Hospital[place] Progress Note      Name:  Nicci Alberto /Age/Sex: 1935  (89 y.o. female)   MRN & CSN:  [id number] & [id number] Encounter Date/Time: 2024 12:19 PM EDT    Location:  [address] [place] [address]-A PCP: Fern Steven APRN - CNP       Hospital Day: 7    Assessment and Plan:   Dizziness, lightheadedness, falls.   Orthostatic hypotension  Patient presents with lightheadedness and fall  Suspect from volume depletion, orthostatic hypotension as pt is on high dose diuretics  in addition to concussion from fall  Could also BPPV although the lightheadedness pt is having not typical for vertigo  Less likely CVA.   EKG showing ventricularly paced rhythm  ECHO showing moderate AS, severe MS  , orthostats were not positive however there was a drop in BP when standing, patient still having dizziness  - pacemaker interrogated no abnormalities  - home diuretics held; now resumed  per nephro recs  - nephro consulted as below  - will try meclizine for 2 days only; hasn't needed it until now  - cards following  - PT/OT recommended swing bed; but denied; now being evaluated for SNF  Start patient on midodrine 5 mg TID--> will reassess dizziness in the afternoon     CKD. Cr around baseline.   On home torsemide 50 mg BID, metolazone and spironolactone  No sign of volume overload. No SOB, no edema. Mild rales on exam though.   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 
  Herbal and Nutritional Product Restrictions      The following herbal, alternative, and/or nutritional/dietary supplement product(s) has been discontinued per [employer] approved policy:      Biotin 10,000mcg PO daily    Please reorder upon discharge if appropriate.    Thank you,  Neeru Spear [facility]  9/24/2024 4:48 PM    
  Nephrology Progress Note  10/5/2024 9:51 AM  Subjective:     Interval History: Nicci Alberto is a 89 y.o. female appears to be doing okay in general no acute distress resting in bed        Data:   Scheduled Meds:   droxidopa  100 mg Oral TID    carbamide peroxide  5 drop Both Ears BID    neomycin-bacitracin-polymyxin   Topical BID    magnesium oxide  400 mg Oral BID    torsemide  20 mg Oral BID    lidocaine  1 patch TransDERmal Daily    clopidogrel  75 mg Oral Daily    spironolactone  25 mg Oral Daily    vitamin D  50,000 Units Oral Weekly    famotidine  20 mg Oral Daily    febuxostat  40 mg Oral Daily    ferrous sulfate  325 mg Oral BID    fluticasone  1 puff Inhalation BID    latanoprost  1 drop Both Eyes Nightly    levothyroxine  50 mcg Oral Daily    cetirizine  5 mg Oral Daily    rOPINIRole  0.5 mg Oral TID    sodium chloride flush  5-40 mL IntraVENous 2 times per day    heparin (porcine)  5,000 Units SubCUTAneous 3 times per day     Continuous Infusions:   sodium chloride 25 mL/hr at 10/02/24 0920         CBC   Recent Labs     10/04/24  0843   WBC 8.0   HGB 13.2   HCT 41.8         BMP   Recent Labs     10/03/24  0417 10/04/24  0843    136   K 4.3 4.2   CL 97* 95*   CO2 28 29   PHOS  --  3.1   BUN 56* 58*   CREATININE 1.8* 1.8*     Hepatic:   Recent Labs     10/03/24  0417 10/04/24  0843   AST 25 32   ALT 24 38   BILITOT 0.3 0.4   ALKPHOS 72 84     Troponin: No results for input(s): \"TROPONINI\" in the last 72 hours.  BNP: No results for input(s): \"BNP\" in the last 72 hours.  Lipids: No results for input(s): \"CHOL\", \"HDL\" in the last 72 hours.    Invalid input(s): \"LDLCALCU\"  ABGs:   Lab Results   Component Value Date/Time    PO2ART 66 11/17/2020 07:30 AM    [id number] 29.0 11/17/2020 07:30 AM     INR: No results for input(s): \"INR\" in the last 72 hours.  Renal Labs  Albumin:  No results found for: \"LABALBU\"  Calcium:    Lab Results   Component Value Date/Time    CALCIUM 10.8 10/04/2024 08:43 AM 
  Nephrology Progress Note  10/6/2024 9:34 AM  Subjective:     Interval History: Nicci Alberto is a 89 y.o. female who appears to be doing better still gets little bit dizzy and lightheaded but no other complaints        Data:   Scheduled Meds:   droxidopa  100 mg Oral TID    carbamide peroxide  5 drop Both Ears BID    neomycin-bacitracin-polymyxin   Topical BID    magnesium oxide  400 mg Oral BID    torsemide  20 mg Oral BID    lidocaine  1 patch TransDERmal Daily    clopidogrel  75 mg Oral Daily    spironolactone  25 mg Oral Daily    vitamin D  50,000 Units Oral Weekly    famotidine  20 mg Oral Daily    febuxostat  40 mg Oral Daily    ferrous sulfate  325 mg Oral BID    fluticasone  1 puff Inhalation BID    latanoprost  1 drop Both Eyes Nightly    levothyroxine  50 mcg Oral Daily    cetirizine  5 mg Oral Daily    rOPINIRole  0.5 mg Oral TID    sodium chloride flush  5-40 mL IntraVENous 2 times per day    heparin (porcine)  5,000 Units SubCUTAneous 3 times per day     Continuous Infusions:   sodium chloride 25 mL/hr at 10/02/24 0920         CBC   Recent Labs     10/04/24  0843   WBC 8.0   HGB 13.2   HCT 41.8         BMP   Recent Labs     10/04/24  0843      K 4.2   CL 95*   CO2 29   PHOS 3.1   BUN 58*   CREATININE 1.8*     Hepatic:   Recent Labs     10/04/24  0843   AST 32   ALT 38   BILITOT 0.4   ALKPHOS 84     Troponin: No results for input(s): \"TROPONINI\" in the last 72 hours.  BNP: No results for input(s): \"BNP\" in the last 72 hours.  Lipids: No results for input(s): \"CHOL\", \"HDL\" in the last 72 hours.    Invalid input(s): \"LDLCALCU\"  ABGs:   Lab Results   Component Value Date/Time    PO2ART 66 11/17/2020 07:30 AM    [id number] 29.0 11/17/2020 07:30 AM     INR: No results for input(s): \"INR\" in the last 72 hours.  Renal Labs  Albumin:  No results found for: \"LABALBU\"  Calcium:    Lab Results   Component Value Date/Time    CALCIUM 10.8 10/04/2024 08:43 AM     Phosphorus:    Lab Results   Component 
  Occupational Therapy Treatment Note    Name: Nicci Alberto MRN: [id number] :   1935   Date:  10/4/2024   Admission Date: 2024 Room:  [address] [place] [address]     Primary Problem:  General Precautions, Fall Risk, Telemetry, Pulse Ox, BP cuff, Bed/chair alarm     Restrictions/Precautions:  Restrictions/Precautions  Restrictions/Precautions: Fall Risk, General Precautions           Communication with other providers: Per chart review and Nurse patient is appropriate for therapeutic intervention.     Subjective:  Patient states:  Agreeable to OT. \"I get really dizzy when I sit up or stand.\" Daughter stated you are going to take her BP?  Pain:   Location, Type, Intensity (0/10 to 10/10):  Deny    Objective:    Observation: In semi hawk's position with daughter present   Objective Measures:  Alert and oriented; BP supine 123/54; sitting EOB 95/53 and at end of tx142/50    Treatment, including education:  Therapeutic Activity Training:   Therapeutic activity training was instructed today.  Cues were given for safety, sequence, UE/LE placement, awareness, and balance.    Bed mobility:Mod A with trunk control and scooting hips fwd to edge of bed  Sitting balance: F dyn and F+ static on EOB, demo slight retropulsion intermittently  Sit/stand transfers:Min A with min verbal/tactile cues for hand placement for safety awareness carryover.   Functional Mobility: Min A ~4' feet bed to chair; slight retropulsion; instructed on body mechanics and RW positioning to improve midline and upright posture.     Activities performed today included bed mobility training, transfers, functional mobility  to increase strength, activity tolerance to facilitate IND c ADL tasks, func transfers / mobility with G safety awareness carryover    Self Care Training:   Cues were given for safety, sequence, UE/LE placement, visual cues, and balance.    Activities performed today included UB bathing, dressing, toileting, personal hygiene, and 
  Physical Therapy Treatment Note  Name: Nicci Alberto MRN: [id number] :   1935   Date:  10/4/2024   Admission Date: 2024 Room:  [address] [place] [address]   Restrictions/Precautions:  Restrictions/Precautions  Restrictions/Precautions: Fall Risk, General Precautions   Communication with other providers:  Pt okay to see for therapy per RN   Subjective:  Patient states:  Agreeable to session.   Pain:   Location, Type, Intensity (0/10 to 10/10):  Denies   Objective:    Observation:  Pt sitting up in recliner upon PTA arrival.   Objective Measures:  Tele, stable  Treatment, including education/measures:    Exercises:  Pt performed seated Aps, glut sets, and hip add with pillow 1 x 15 ea. LE  Pt performed seated marching, hip abd, LAQ, HS curl 1 x 15 ea. With RTB.     Pt requires increased time for performance of LE exercises and rest breaks in between ea. Exercise for recovery.     Safety:   Pt returned safely to recliner with chair alarm activated, call light in reach, all needs met.   Assessment / Impression:    Pt tolerated LE strengthening well this date with no c/o pn.   Patient's tolerance of treatment:  Good   Adverse Reaction: none  Significant change in status and impact:  none  Barriers to improvement:  Ambulation and upright mobility remains limited by hypotension and dizziness.   Plan for Next Session:    Plan to continue OOB activities and overall strengthening.   Time in:  1506  Time out:  1537  Timed treatment minutes:  23  Total treatment time:  31    Previously filed items:  Social/Functional History  Lives With: Spouse  Type of Home: House  Home Layout: One level  Bathroom Shower/Tub: Shower chair without back  ADL Assistance:  ( and dtr assist and support pt)  Transfer Assistance: Needs assistance  Active : No  Patient's  Info:   Mode of Transportation: Car        Short Term Goals  Time Frame for Short Term Goals: 2 weeks  Short Term Goal 1: Pt will complete supine <> sit 
  Physical Therapy Treatment Note  Name: Nicci Alberto MRN: [id number] :   1935   Date:  2024   Admission Date: 2024 Room:  [address] [place] [address]   Restrictions/Precautions:  general precautions, falls   Communication with other providers:  Pt okay to see for therapy per RN, coordination with nursing for orthostatic BPs.   Subjective:  Patient states:  Agreeable to session.   Pain:   Location, Type, Intensity (0/10 to 10/10):  Endorses continued pn at L shoulder.   Objective:    Observation:  Pt supine in bed upon therapy arrival.   Objective Measures:  Supine /52, sitting /52, standing /41  Treatment, including education/measures:    Therapeutic Activity Training:   Therapeutic activity training was instructed today.  Cues were given for safety, sequence, UE/LE placement, awareness, and balance. Activities performed today included bed mobility training, sup-sit, sit-stand, SPT.    Mobility:  Sup > sit: Mod A at trunk dt inability to use L arm dt shoulder pain.   Scooting: Min A at hips to scoot.   STS: CGA from EOB to RW.   SPT: Pt performed step-pivot transfer with RW and x5 steps to recliner with CGA.   Boost: In chair with Mod A x 2.     Increased time in ea. Position for assessment of BP.     Exercises:  Pt performed all exercises in recliner with LEs elevated. Pt performed APs, heel slides, leg press with manual resistance, hip abd/add, glut sets, 1 x 15 ea. With visual demo for form.     Safety:   Pt returned safely to recliner with chair alarm activated, call light in reach, all needs met.   Assessment / Impression:    Pt tolerated OOB activities well this date but continues with significant symptoms of dizziness, limiting gait distance.   Patient's tolerance of treatment:  Good   Adverse Reaction: Dizziness   Significant change in status and impact:  none  Barriers to improvement:  Dizziness  Plan for Next Session:    Plan to continue OOB activities.   Time in:  1344  Time out:  
  Physician Progress Note      PATIENT:               ILANA DOBBS  CSN #:                  [id number]  :                       1935  ADMIT DATE:       2024 9:01 AM  DISCH DATE:  RESPONDING  PROVIDER #:        Oamr Stearns MD          QUERY TEXT:    Patient admitted with Dizziness, lightheadedness, falls. Noted to have Suspect   from volume depletion and orthostatic hypotension as pt is on high dose   diuretics.  If possible, please document in progress notes and discharge summary after   study the etiology of the syncope:    The medical record reflects the following:  Risk Factors: CKD, CHF, Diabetes  Clinical Indicators: Fall probably secondary to orthostasis per CARDS PN,  In   the Hospitalist PN: Dizziness, lightheadedness, falls.Patient presents with   lightheadedness and fall Suspect from volume depletion and orthostatic   hypotension as pt is on high dose diuretics, Could also BPPV although the   lightheadedness pt is having not typical for vertigo  Treatment: CARDS consult, CTS consult, check orthostatics, compression   stockings, fall precautions, IVA, Pacemaker interrogation, Nephro consult,   home diuretics held; to be resumed per nephro recs  Options provided:  -- Dizziness, lightheadedness, falls due to BPPV  -- Dizziness, lightheadedness, falls due to Volume depletion  -- Dizziness, lightheadedness, falls due to orthostatic hypotension secondary   to diabetic autonomic neuropathy  -- Syncope due to other, Please specify the cause of the Dizziness,   lightheadedness, falls  -- Other - I will add my own diagnosis  -- Disagree - Not applicable / Not valid  -- Disagree - Clinically unable to determine / Unknown  -- Refer to Clinical Documentation Reviewer    PROVIDER RESPONSE TEXT:    The Dizziness, lightheadedness, falls is due to volume depletion.    Query created by: Sandi Childress on 2024 3:20 PM      Electronically signed by:  Omar Stearns MD 2024 3:36 PM          
4 Eyes Skin Assessment     NAME:  Nicci Alberto  YOB: 1935  MEDICAL RECORD NUMBER:  [id number]    The patient is being assessed for  Admission    I agree that at least one RN has performed a thorough Head to Toe Skin Assessment on the patient. ALL assessment sites listed below have been assessed.      Areas assessed by both nurses:    Head, Face, Ears, Shoulders, Back, Chest, Arms, Elbows, Hands, Sacrum. Buttock, Coccyx, Ischium, Legs. Feet and Heels, and Under Medical Devices         Does the Patient have a Wound? No noted wound(s)       Elier Prevention initiated by RN: No  Wound Care Orders initiated by RN: No    Pressure Injury (Stage 3,4, Unstageable, DTI, NWPT, and Complex wounds) if present, place Wound referral order by RN under : No    New Ostomies, if present place, Ostomy referral order under : No     Nurse 1 eSignature: Electronically signed by Haily Wang RN on 9/24/24 at 6:40 PM EDT    **SHARE this note so that the co-signing nurse can place an eSignature**    Nurse 2 eSignature: Electronically signed by Suni Fisher RN on 9/24/24 at 6:47 PM EDT   
4 Eyes Skin Assessment     NAME:  Nicci Alberto  YOB: 1935  MEDICAL RECORD NUMBER:  [id number]    The patient is being assessed for  Other      I agree that at least one RN has performed a thorough Head to Toe Skin Assessment on the patient. ALL assessment sites listed below have been assessed.      Areas assessed by both nurses:    Head, Face, Ears, Shoulders, Back, Chest, Arms, Elbows, Hands, Sacrum. Buttock, Coccyx, Ischium, Legs. Feet and Heels, and Under Medical Devices         Does the Patient have a Wound? Yes wound(s) were present on assessment. LDA wound assessment was Initiated and completed by RN       Elier Prevention initiated by RN: Yes  Wound Care Orders initiated by RN: No    Pressure Injury (Stage 3,4, Unstageable, DTI, NWPT, and Complex wounds) if present, place Wound referral order by RN under : No    New Ostomies, if present place, Ostomy referral order under : No     Nurse 1 eSignature: Electronically signed by Joy Seay LPN on 10/2/24 at 12:09 AM EDT    **SHARE this note so that the co-signing nurse can place an eSignature**    Nurse 2 eSignature: Electronically signed by Nichol Bennett RN on 10/2/24 at 12:11 AM EDT   
Cardiology Progress Note          Admit Date:  9/24/2024    Consult reason/ Seen today for: Orthostatic hypotension    Subjective and  Overnight Events: Patient in chair, reports mild dizziness on position change which has improved.    Assessment / Plan / Recommendation:     Orthostatic hypotension: Improved, now only on Northera 100 mg 3 times daily. Recommend compression stockings. Previously D/C mestinon and midodrine. Check orthostatics today.   VHD: Moderate AS and MS seen on echo 9/25/24 followed up with IVA 9/26/24 which showed mild-moderate stenosis of aortic valve 1.24 cm².  Mobile echodensity in ascending aorta measuring 1.81 cmsq. Mild-moderate stenosis with mild mitral regurgitation, mean gradient 6 mmHg.   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    Cardiovascular:  Negative for chest pain, palpitations and leg swelling.   Musculoskeletal: Negative.    Skin: Negative.    Neurological:  Positive for dizziness and weakness.   All other systems reviewed and are negative.       BP (!) 116/50   Pulse 63   Temp 97.9 °F (36.6 °C) (Oral)   Resp 19   Ht 1.549 m (5' 0.98\")   Wt 71.1 kg (156 lb 12.8 oz)   SpO2 98%   BMI 29.64 kg/m²     Intake/Output Summary (Last 24 hours) at 10/5/2024 1234  Last data filed at 10/5/2024 0938  Gross per 24 hour   Intake 180 ml   Output 1525 ml   Net -1345 ml       Physical Exam:  Physical Exam  Constitutional:       Appearance: She is well-developed.   Cardiovascular:      Rate and Rhythm: Normal rate and regular rhythm.      Pulses: Intact distal pulses.           Dorsalis pedis pulses are 2+ on the right side and 2+ on the left side.        Posterior tibial pulses are 2+ on the right side and 2+ on the left side.      Heart sounds: Normal heart sounds, S1 normal and S2 normal.   Pulmonary:      Effort: Pulmonary effort is normal.      
Cardiology Progress Note    Cardiac consulted for: Chest pain + SOB    Subjective/Overnight Events:  Patient continues to complain of dizziness.  Discussed care with patient's daughter as well as .    Patient states that if she does need further valvular heart disease workup she is agreeable to proceed.    Assessment/Plan:  Chest pain  -Troponin non-ACS trend, peak 88.  Moderate to severe mitral stenosis  Moderate aortic stenosis  -Strict I's and O's and daily weights  -Echocardiogram per below.  -Consider IVA to further evaluate valvular heart disease.  -Patient does agree to further workup  Dizziness with fall  -No loss of consciousness  -Orthostatic vital signs incomplete due to dizziness  -Advised adequate hydration as well as compression stockings.  -Valvular heart disease could contribute  Peripheral arterial disease  -Discontinue Pletal due to CHF  Chronic kidney disease stage IV  Hypokalemia  Hypomagnesemia  -Nephrology consulted  -Goal potassium 4.0-4.4, magnesium 2.0-2.2  -On Aldactone 25 mg daily.      Echo 2024    Image quality is adequate.    Left Ventricle: Normal left ventricular systolic function with a visually estimated EF of 55 - 60%. Left ventricle size is normal. Mildly increased wall thickness. Normal wall motion.    Aortic Valve: Aortic valve appears heavily calcified. AIDE VTI (1.29 cmsq) is suggestive of moderate aortic stenosis. Mean PmmHg, Vmax: 2.27 m/s.    Mitral Valve: Annular calcification. Calcified and thickened leaflets. Moderate to severe stenosis noted. MV mean gradient is 9 mmHg.    Left Atrium: Left atrium appears moderately dilated.    Pericardium: No pericardial effusion.    Gildardo Lam PA-C 24 3:47 PM        
Comprehensive Nutrition Assessment    Type and Reason for Visit:  Initial, Wound    Nutrition Recommendations/Plan:   Continue current MARLY, low Chol, high fiber, low fat diet as ordered   Continue to monitor GI status, wt, skin, po intakes, labs, POC while in hospital.   Will follow pt as a moderate nutrition risk while in hospital.      Malnutrition Assessment:  Malnutrition Status:  No malnutrition (09/30/24 1439)    Context:  Chronic Illness     Findings of the 6 clinical characteristics of malnutrition:  Energy Intake:  No significant decrease in energy intake (Enjoys protein and eats well.)  Weight Loss:  Mild weight loss (specify amount and time period) (~20.5 % wt. loss in 8 months. 1/10/24 (8 months ago) body wt from nephrology visit note)     Body Fat Loss:  No significant body fat loss Fat Overlying Ribs, Triceps, Orbital, Buccal region   Muscle Mass Loss:  No significant muscle mass loss Clavicles (pectoralis & deltoids), Temples (temporalis), Thigh (quadriceps), Calf (gastrocnemius), Hand (interosseous), Scapula (trapezius)  Fluid Accumulation:  No significant fluid accumulation     Strength:  Not Performed    Nutrition Assessment:    Pt reassessed amitted after fall/dizzy. Pt has heart failure. Pt is eating well on current no added salt diet. % of meals. Pt enjoys protein rich food sources in hospital and prior to admission. RD briefly reviewed how this helps with healing and maintaining muscle mass. Pt does prefer western omlets in the morning and dislikes scrambled eggs. Otherwise, pt has enjoyed the food here. Pt looking into being discharged to a rehab hospital at a few facilities per pt daughter. Will follow pt as a moderate risk while in hospital.    Nutrition Related Findings:    plavix, ferrous sulfate, heparin, levothyroxine, NaCl, spironolactone, torsemide, ergocalciferol, famotidine, Na 135, BUN 55, Crt 1.6, GFR 30, Phos 2. Wound Type:  (Traumatic wound to head.)       Current 
Fermin haider applied per Dr. David grijalva  
I went back to reexamine the patient.  She just finished her meal  Also  was there  On examination of the ER canal she had significant wax in the right canal, and minimally in the left  That alone should not explain the symptoms but I will still use some eardrops to dissolve the wax.    Her symptoms mainly vertigo type-I attempted to do orthostatics-her semisupine blood pressure was 120/75-heart rate of 62-unfortunately she could not sit down for more than 1 minute-it did drop but not significant enough only 110/65-I do not think she has orthostatic hypotension-I am not unable to have her wait 2 to 3 minutes to do that-I suspect her symptoms mainly from concussion-and only time will heal it-although ideally we could do an magnetic regimen imaging because of aortic fibrous tissue-she has pacemaker and she is severely claustrophobic-she will not be able to do that.  So we have to go by the clinical signs and symptoms.  I will use the eardrop and we will talk to the therapist if they have any specific concussion based therapy.  If so we should focus on that.    Additionally she has orthopnea-she was unable to lay flat-which suggest that she had fluid overload if anything-not under load by any means.  
Met with patient and daughter. Introduced myself as the Heart failure education R.N.   Daughter at bedside. States she assists patient with diet, medications and ADL's when at home. Voices understanding of heart failure measures.    Admitting diagnosis- Heart failure: Lolita; Danica  Cardiologist- Dr. Gtz  Heart Failure Education Nurse consulted- yes   Ejection fraction 55-60 % as of 9/24/24  Pro BNP- 1,765 on 9/24  Hospital follow up appt-  pending D/C to SNF  Pneumonia vaccine- overdue  PCP-Steven   Patient has a digital scale? Yes   Transportation- has transportation    Able to obtain medications without difficulty?- Yes- Patient denies difficulty in obtaining or taking medications.     Reviewed Heart failure patient education  with patient and daughter. Heart failure education included: medications, diet, daily weights,Questions answered.   Patient's heart failure medications reviewed and information given on each.     Reviewed the Stop Light Handout with patient and instructed when to call her provider.   
Nephrology Progress Note  10/1/2024 7:21 AM        Subjective:   Admit Date: 9/24/2024  PCP: Fern Steven, APRN - CNP    Interval History: Slowly improving  Still waiting for nursing home placement    Diet: Better    ROS: Dizziness little better  Looks like she has been accepted to [facility] department  Urine output recorded 1.35 L for the last 24 hours  No fever and acceptable blood pressure      Data:     Current meds:    midodrine  10 mg Oral TID WC    torsemide  20 mg Oral BID    lidocaine  1 patch TransDERmal Daily    clopidogrel  75 mg Oral Daily    spironolactone  25 mg Oral Daily    vitamin D  50,000 Units Oral Weekly    famotidine  20 mg Oral Daily    febuxostat  40 mg Oral Daily    ferrous sulfate  325 mg Oral BID    fluticasone  1 puff Inhalation BID    latanoprost  1 drop Both Eyes Nightly    levothyroxine  50 mcg Oral Daily    cetirizine  5 mg Oral Daily    rOPINIRole  0.5 mg Oral TID    sodium chloride flush  5-40 mL IntraVENous 2 times per day    heparin (porcine)  5,000 Units SubCUTAneous 3 times per day      sodium chloride           I/O last 3 completed shifts:  In: 300 [P.O.:300]  Out: 2050 [Urine:2050]    CBC: No results for input(s): \"WBC\", \"HGB\", \"PLT\" in the last 72 hours.       Recent Labs     09/29/24  0943 09/30/24  1112    135*   K 3.7 3.8   CL 96* 96*   CO2 26 28   BUN 59* 55*   CREATININE 1.8* 1.6*   GLUCOSE 171* 147*       Lab Results   Component Value Date    CALCIUM 10.2 09/30/2024    PHOS 2.1 (L) 09/30/2024       Objective:     Vitals: BP (!) 112/50   Pulse 60   Temp 98.1 °F (36.7 °C) (Oral)   Resp 15   Ht 1.549 m (5' 0.98\")   Wt 71.1 kg (156 lb 12.8 oz)   SpO2 100%   BMI 29.64 kg/m² ,    General appearance: Alert, awake and oriented without any acute distress  HEENT: No gross conjunctival pallor or scleral icterus  Neck: Supple  Lungs: Coarse breath sound few expiratory rhonchi  Heart: Regular rate and rhythm with systolic ejection murmur  Abdomen: Soft  Extremities: 
Nephrology Progress Note  10/2/2024 9:36 AM        Subjective:   Admit Date: 9/24/2024  PCP: Fern Steven, APRN - CNP    Interval History: still has dizziness -2    Diet: some     ROS:  dizziness, no shortness of breath , BP acceptable ,   Acceptable UOP       Data:     Current meds:    magnesium sulfate  2,000 mg IntraVENous Once    magnesium oxide  400 mg Oral BID    midodrine  15 mg Oral TID WC    pyRIDostigmine  30 mg Oral 3 times per day    torsemide  20 mg Oral BID    lidocaine  1 patch TransDERmal Daily    clopidogrel  75 mg Oral Daily    spironolactone  25 mg Oral Daily    vitamin D  50,000 Units Oral Weekly    famotidine  20 mg Oral Daily    febuxostat  40 mg Oral Daily    ferrous sulfate  325 mg Oral BID    fluticasone  1 puff Inhalation BID    latanoprost  1 drop Both Eyes Nightly    levothyroxine  50 mcg Oral Daily    cetirizine  5 mg Oral Daily    rOPINIRole  0.5 mg Oral TID    sodium chloride flush  5-40 mL IntraVENous 2 times per day    heparin (porcine)  5,000 Units SubCUTAneous 3 times per day      sodium chloride 25 mL/hr at 10/02/24 0920         I/O last 3 completed shifts:  In: 360 [P.O.:360]  Out: 1100 [Urine:1100]    CBC: No results for input(s): \"WBC\", \"HGB\", \"PLT\" in the last 72 hours.       Recent Labs     09/30/24  1112 10/01/24  0746 10/02/24  0305   * 136 137   K 3.8 3.9 4.1   CL 96* 98* 99   CO2 28 27 26   BUN 55* 58* 59*   CREATININE 1.6* 1.8* 1.8*   GLUCOSE 147* 96 91       Lab Results   Component Value Date    CALCIUM 9.5 10/02/2024    PHOS 3.6 10/02/2024       Objective:     Vitals: BP (!) 177/58   Pulse 59   Temp 97.6 °F (36.4 °C) (Oral)   Resp 16   Ht 1.549 m (5' 0.98\")   Wt 71.1 kg (156 lb 12.8 oz)   SpO2 97%   BMI 29.64 kg/m² ,    General appearance:  no conj pallor ,   HEENT:  no gross conj  icterus   Neck:  supple   Lungs:  coarse BS  Heart:  RRR with VICK   Abdomen: soft  Extremities:  mild edema ulba ankle       Problem List :         Impression :     CKD stage 4 
Nephrology Progress Note  10/3/2024 7:28 AM        Subjective:   Admit Date: 9/24/2024  PCP: Fern Steven, APRN - CNP    Interval History: Continued to have what appeared to me is vertigo    Diet: Reasonable    ROS: Looks like mainly vertigo, no orthopnea or shortness of breath  But recorded 2.4 L for the last 24 hours  No fever and acceptable blood pressure        Data:     Current meds:    droxidopa  100 mg Oral TID    magnesium oxide  400 mg Oral BID    midodrine  15 mg Oral TID WC    pyRIDostigmine  30 mg Oral 3 times per day    torsemide  20 mg Oral BID    lidocaine  1 patch TransDERmal Daily    clopidogrel  75 mg Oral Daily    spironolactone  25 mg Oral Daily    vitamin D  50,000 Units Oral Weekly    famotidine  20 mg Oral Daily    febuxostat  40 mg Oral Daily    ferrous sulfate  325 mg Oral BID    fluticasone  1 puff Inhalation BID    latanoprost  1 drop Both Eyes Nightly    levothyroxine  50 mcg Oral Daily    cetirizine  5 mg Oral Daily    rOPINIRole  0.5 mg Oral TID    sodium chloride flush  5-40 mL IntraVENous 2 times per day    heparin (porcine)  5,000 Units SubCUTAneous 3 times per day      sodium chloride 25 mL/hr at 10/02/24 0920         I/O last 3 completed shifts:  In: -   Out: 3000 [Urine:3000]    CBC: No results for input(s): \"WBC\", \"HGB\", \"PLT\" in the last 72 hours.       Recent Labs     10/01/24  0746 10/02/24  0305 10/03/24  0417    137 136   K 3.9 4.1 4.3   CL 98* 99 97*   CO2 27 26 28   BUN 58* 59* 56*   CREATININE 1.8* 1.8* 1.8*   GLUCOSE 96 91 125*       Lab Results   Component Value Date    CALCIUM 10.3 10/03/2024    PHOS 3.6 10/02/2024       Objective:     Vitals: BP (!) 129/51   Pulse 60   Temp 98.1 °F (36.7 °C) (Oral)   Resp 18   Ht 1.549 m (5' 0.98\")   Wt 71.1 kg (156 lb 12.8 oz)   SpO2 98%   BMI 29.64 kg/m² ,    General appearance: She was alert, awake and oriented this morning,  HEENT: Mild conjunctival pallor no scleral icterus  Neck: Supple with head of the bed 
Nephrology Progress Note  10/4/2024 11:04 AM        Subjective:   Admit Date: 9/24/2024  PCP: Fern Steven, APRN - CNP    Interval History:  patient seen early morning, feels little bit better, looks like slowly improving     I told at that time is her best friend    Diet:  reasonable    ROS:   still has dizziness/vertigo especially with sitting up or standing up   still has some orthopnea which is rather chronic but no overt shortness of breath   urine output recorded 4 and 50 cc I suspect she made more than that   no fever and acceptable blood pressure    Data:     Current meds:    droxidopa  100 mg Oral TID    carbamide peroxide  5 drop Both Ears BID    neomycin-bacitracin-polymyxin   Topical BID    magnesium oxide  400 mg Oral BID    midodrine  15 mg Oral TID WC    pyRIDostigmine  30 mg Oral 3 times per day    torsemide  20 mg Oral BID    lidocaine  1 patch TransDERmal Daily    clopidogrel  75 mg Oral Daily    spironolactone  25 mg Oral Daily    vitamin D  50,000 Units Oral Weekly    famotidine  20 mg Oral Daily    febuxostat  40 mg Oral Daily    ferrous sulfate  325 mg Oral BID    fluticasone  1 puff Inhalation BID    latanoprost  1 drop Both Eyes Nightly    levothyroxine  50 mcg Oral Daily    cetirizine  5 mg Oral Daily    rOPINIRole  0.5 mg Oral TID    sodium chloride flush  5-40 mL IntraVENous 2 times per day    heparin (porcine)  5,000 Units SubCUTAneous 3 times per day      sodium chloride 25 mL/hr at 10/02/24 0920         I/O last 3 completed shifts:  In: 210 [P.O.:200; I.V.:10]  Out: 1650 [Urine:1650]    CBC:   Recent Labs     10/04/24  0843   WBC 8.0   HGB 13.2             Recent Labs     10/02/24  0305 10/03/24  0417 10/04/24  0843    136 136   K 4.1 4.3 4.2   CL 99 97* 95*   CO2 26 28 29   BUN 59* 56* 58*   CREATININE 1.8* 1.8* 1.8*   GLUCOSE 91 125* 106*       Lab Results   Component Value Date    CALCIUM 10.8 (H) 10/04/2024    PHOS 3.1 10/04/2024       Objective:     Vitals: BP (!) 
Nephrology Progress Note  9/26/2024 9:04 AM        Subjective:   Admit Date: 9/24/2024  PCP: Fern Steven, APRN - CNP    Interval History: Patient seen on morning, doing well    Diet: Reasonable    ROS: No overt shortness of breath but she was wearing supplemental oxygen via nasal cannula  Also reported low urine output-urine output recorded about 1.2 L for the last 24 hours.  No fever and acceptable blood pressure    Data:     Current meds:    spironolactone  25 mg Oral Daily    potassium alternative oral replacement  40 mEq Oral BID    [Held by provider] vitamin D  50,000 Units Oral Weekly    famotidine  20 mg Oral Daily    febuxostat  40 mg Oral Daily    ferrous sulfate  325 mg Oral BID    fluticasone  1 puff Inhalation BID    latanoprost  1 drop Both Eyes Nightly    levothyroxine  50 mcg Oral Daily    cetirizine  5 mg Oral Daily    [Held by provider] metOLazone  2.5 mg Oral Daily    carboxymethylcellulose  1 drop Both Eyes BID    rOPINIRole  0.5 mg Oral TID    sodium chloride flush  5-40 mL IntraVENous 2 times per day    heparin (porcine)  5,000 Units SubCUTAneous 3 times per day      sodium chloride           I/O last 3 completed shifts:  In: 10 [I.V.:10]  Out: 1200 [Urine:1200]    CBC:   Recent Labs     09/24/24  0913 09/25/24  0345   WBC 6.5 7.9   HGB 12.9 12.5    251          Recent Labs     09/24/24  0913 09/25/24  0345 09/26/24  0531    140 137   K 3.7 3.2* 4.1   CL 93* 95* 93*   CO2 31 30 30   BUN 95* 94* 86*   CREATININE 2.5* 2.4* 2.1*   GLUCOSE 123* 150* 97       Lab Results   Component Value Date    CALCIUM 10.1 09/26/2024    PHOS 2.8 09/26/2024       Objective:     Vitals: BP (!) 111/45   Pulse 61   Temp 97.8 °F (36.6 °C) (Oral)   Resp 11   Ht 1.549 m (5' 1\")   Wt 69.4 kg (153 lb)   SpO2 99%   BMI 28.91 kg/m² ,    General appearance: She was alert, awake and oriented  HEENT: Positive conjunctival pallor no scleral icterus  Neck: Supple with supplemental oxygen  Lungs: Bibasilar 
Nephrology Progress Note  9/27/2024 7:32 AM        Subjective:   Admit Date: 9/24/2024  PCP: Fern Steven, APRN - CNP    Interval History: Still has some dizziness I suspect she had mild concussion from fall    Diet: Reasonable    ROS: Some dizziness but alert awake and oriented, urine output still recorded 1.1 L for the last 24 hours and this is without diuretics,  No fever and acceptable blood pressure    Data:     Current meds:    clopidogrel  75 mg Oral Daily    spironolactone  25 mg Oral Daily    [Held by provider] vitamin D  50,000 Units Oral Weekly    famotidine  20 mg Oral Daily    febuxostat  40 mg Oral Daily    ferrous sulfate  325 mg Oral BID    fluticasone  1 puff Inhalation BID    latanoprost  1 drop Both Eyes Nightly    levothyroxine  50 mcg Oral Daily    cetirizine  5 mg Oral Daily    rOPINIRole  0.5 mg Oral TID    sodium chloride flush  5-40 mL IntraVENous 2 times per day    heparin (porcine)  5,000 Units SubCUTAneous 3 times per day      sodium chloride           I/O last 3 completed shifts:  In: 380 [P.O.:360; I.V.:20]  Out: 1500 [Urine:1500]    CBC:   Recent Labs     09/24/24  0913 09/25/24  0345   WBC 6.5 7.9   HGB 12.9 12.5    251          Recent Labs     09/24/24  0913 09/25/24  0345 09/26/24  0531    140 137   K 3.7 3.2* 4.1   CL 93* 95* 93*   CO2 31 30 30   BUN 95* 94* 86*   CREATININE 2.5* 2.4* 2.1*   GLUCOSE 123* 150* 97       Lab Results   Component Value Date    CALCIUM 10.1 09/26/2024    PHOS 2.8 09/26/2024       Objective:     Vitals: BP (!) 129/52   Pulse 61   Temp 97.7 °F (36.5 °C) (Oral)   Resp 12   Ht 1.549 m (5' 1\")   Wt 69.4 kg (153 lb)   SpO2 96%   BMI 28.91 kg/m² ,    General appearance: Alert, awake and oriented  HEENT: Minimal conjunctival pallor no scleral icterus  Neck: Supple  Lungs: No gross crackles now has coarse bronchial breath sound  Heart: Regular rate and rhythm with early systolic ejection murmur best heard at right upper sternal 
Nephrology Progress Note  9/28/2024 9:29 AM        Subjective:   Admit Date: 9/24/2024  PCP: Fern Steven, APRN - CNP    Interval History: Her insurance denied swing bed transfer  Waiting for extended-care facility placement    Diet: Reasonable    ROS: Left shoulder pain, still has dizziness  No overt shortness of breath or orthopnea  Urine output still 1.2 L for the last 24 hours  No fever and acceptable blood pressure    Data:     Current meds:    lidocaine  1 patch TransDERmal Daily    clopidogrel  75 mg Oral Daily    spironolactone  25 mg Oral Daily    vitamin D  50,000 Units Oral Weekly    famotidine  20 mg Oral Daily    febuxostat  40 mg Oral Daily    ferrous sulfate  325 mg Oral BID    fluticasone  1 puff Inhalation BID    latanoprost  1 drop Both Eyes Nightly    levothyroxine  50 mcg Oral Daily    cetirizine  5 mg Oral Daily    rOPINIRole  0.5 mg Oral TID    sodium chloride flush  5-40 mL IntraVENous 2 times per day    heparin (porcine)  5,000 Units SubCUTAneous 3 times per day      sodium chloride           I/O last 3 completed shifts:  In: 140 [P.O.:120; I.V.:20]  Out: 2100 [Urine:2100]    CBC: No results for input(s): \"WBC\", \"HGB\", \"PLT\" in the last 72 hours.       Recent Labs     09/26/24  0531      K 4.1   CL 93*   CO2 30   BUN 86*   CREATININE 2.1*   GLUCOSE 97       Lab Results   Component Value Date    CALCIUM 10.1 09/26/2024    PHOS 2.8 09/26/2024       Objective:     Vitals: BP (!) 132/57   Pulse 62   Temp 98.6 °F (37 °C) (Oral)   Resp 16   Ht 1.549 m (5' 1\")   Wt 72.2 kg (159 lb 3.2 oz)   SpO2 95%   BMI 30.08 kg/m² ,    General appearance: Alert, awake and oriented, daughter was in the room  HEENT: Positive conjunctival pallor perhaps 2+ no apparent conjunctival icterus  Neck: Supple-chest wall implanted cardiac device  Lungs: Bibasilar crackles  Heart: Regular rate and rhythm  Abdomen: Soft, nontender  Extremities: No edema yet      Problem List :         Impression :     Chronic 
Nephrology Progress Note  9/30/2024 7:09 AM        Subjective:   Admit Date: 9/24/2024  PCP: Fern Steven, APRN - CNP    Interval History: Confusion better but still feels dizzy especially standing up and ambulating    Diet: Reasonable    ROS: Dizziness but no overt shortness of breath or overt confusion  Urine output recorded 700 cc but likely made more than that  No fever and acceptable blood pressure  According to epic she gained some weight      Data:     Current meds:    torsemide  20 mg Oral BID    lidocaine  1 patch TransDERmal Daily    clopidogrel  75 mg Oral Daily    spironolactone  25 mg Oral Daily    vitamin D  50,000 Units Oral Weekly    famotidine  20 mg Oral Daily    febuxostat  40 mg Oral Daily    ferrous sulfate  325 mg Oral BID    fluticasone  1 puff Inhalation BID    latanoprost  1 drop Both Eyes Nightly    levothyroxine  50 mcg Oral Daily    cetirizine  5 mg Oral Daily    rOPINIRole  0.5 mg Oral TID    sodium chloride flush  5-40 mL IntraVENous 2 times per day    heparin (porcine)  5,000 Units SubCUTAneous 3 times per day      sodium chloride           I/O last 3 completed shifts:  In: 60 [P.O.:60]  Out: 700 [Urine:700]    CBC: No results for input(s): \"WBC\", \"HGB\", \"PLT\" in the last 72 hours.       Recent Labs     09/29/24  0943      K 3.7   CL 96*   CO2 26   BUN 59*   CREATININE 1.8*   GLUCOSE 171*       Lab Results   Component Value Date    CALCIUM 9.8 09/29/2024    PHOS 2.7 09/29/2024       Objective:     Vitals: BP (!) 106/44   Pulse 62   Temp 98.1 °F (36.7 °C) (Oral)   Resp 15   Ht 1.549 m (5' 1\")   Wt 71.1 kg (156 lb 12.8 oz)   SpO2 96%   BMI 29.63 kg/m² ,    General appearance: Alert, awake and oriented  HEENT: Mild conjunctival pallor no scleral icterus  Neck: Supple  Lungs: Coarse bronchial breath sound no gross crackles this morning  Heart: Regular rate and rhythm with systolic ejection murmur best heard at right upper sternal border  Abdomen: Soft  Extremities: Edema 
Nutrition Education    Admit after fall, dizzy. Consult on admit for heart failure diet ed. Currently on no added salt diet. Patient reports usually good appetite and eats 3 meals each day. Does not add salt to food and rarely dines out. Agrreable to continue no added salt. Will continue to follow up during stay.     Educated on no added salt   Learners: Patient and Family  Readiness: Acceptance  Method: Explanation and Handout -Nutrition Care Manual heart healthy meal plan  Response: Verbalizes Understanding  Contact name and number provided.    Fozia White RD, LD  Contact Number: [phone]    
Occupational Therapy    Occupational Therapy Treatment Note    Name: Nicci Alberto MRN: [id number] :   1935   Date:  2024   Admission Date: 2024 Room:  [address] [place] [address]     Restrictions/Precautions: General Precautions, Fall Risk, Telemetry, Pulse Ox, BP cuff, Bed/chair alarm     Communication with other providers: RN approved session, co tx with PTA for tolerance and safety    Subjective:  Patient states:  Pt agreeable to therapy session.   Pain:   Location, Type, Intensity (0/10 to 10/10):  noted pain LUE    Objective:    Observation: Pt sitting in chair upon arrival   Objective Measures:  Pt alert and oriented.  BP sitting in recliner upon arrival 141/56 (78), sitting unsupported in recliner with symptoms of dizziness 132/78 (95), standing 70/47 (56), and sitting after ~2-3 min 131/68 (87)     Treatment, including education:  Therapeutic Activity Training:   Therapeutic activity training was instructed today.  Cues were given for safety, sequence, UE/LE placement, awareness, and balance.    Activities performed today included bed mobility training, sup-sit, sit-stand, SPT.    Pt seated in chair upon arrival and completed leaning forward sitting in chair unsupported and see BP above. Pt required increased time to complete tasks and educated on different methods for dizziness.  Pt completed sit to stand from armed chair with WW and gait belt donned.  Pt sit to stand from chair with WW and CGA to MIN A.  Pt completed static stand approx 1-2 minutes and good balance and BP assessed due to pt reported dizziness 70/47. Pt seated in chair with all needs met and call light in reach.     Assessment / Impression:    Patient's tolerance of treatment: Well  Adverse Reaction: None  Significant change in status and impact: Improved from initial evaluation  Barriers to improvement: BP      Plan for Next Session:    Continue OT POC    Time in:  0956  Time out:  1027  Timed treatment minutes:  23  Total treatment 
Occupational Therapy  Attempted to see pt on this date for OT session. Pt seating in chair and eating lunch. Will attempt as able and appropriate.    Eliana LOWRY/L  
Occupational Therapy  Occupational Therapy     Occupational Therapy Treatment Note     Name: Nicci Alberto MRN: [id number] :             1935   Date:  2024   Admission Date: 2024 Room:  [address] [place] [address]      Restrictions/Precautions: General Precautions, Fall Risk, Telemetry, Pulse Ox, BP cuff, Bed/chair alarm      Communication with other providers: RN approved session, co tx with PTA for tolerance and safety     Subjective:  Patient states:  Pt agreeable to therapy session.   Pain:    Location, Type, Intensity (0/10 to 10/10):  noted pain LUE     Objective:    Observation: Pt supine in bed upon arrival  Objective Measures:  Pt alert and oriented.     Treatment, including education:  Therapeutic Activity Training:   Therapeutic activity training was instructed today.  Cues were given for safety, sequence, UE/LE placement, awareness, and balance.    Activities performed today included bed mobility training, sup-sit, sit-stand, SPT.     Pt supine in bed upon arrival and completed supine to sit with MAX A x2. Pt noted to have what appears to be increased anxiety this date.  Pt noted scooting edge of bed to require MOD A x2 with noted anxiety this date scooting to edge. Pt sated edge of bed with MIN A and progressed to CGA. Pt demonstrated ability to sit edge of bed approx 15 minutes and completed hair grooming with MIN A.  Pt completed sit to stand from edge of bed with WW and CGA and completed functional mobility to chair with CGA. Pt seated in chair with all needs met and call light.      Assessment / Impression:    Patient's tolerance of treatment: Well  Adverse Reaction: None  Significant change in status and impact: Improved from initial evaluation  Barriers to improvement: BP        Plan for Next Session:    Continue OT POC     Time in:  1041  Time out:  1111  Timed treatment minutes:  30  Total treatment time: 30        Electronically signed by:       SHANNAN Terrazas  
Patient discharged to [facility] via [facility]. WEI given and reviewed. All questions answered. This nurse tried to call report to [facility], left voicemail for return call. IV removed without complications.   
Physical Therapy    Physical Therapy Treatment Note  Name: Nicci Alberto MRN: [id number] :   1935   Date:  10/2/2024   Admission Date: 2024 Room:  [address] [place], [place] [address]   Restrictions/Precautions:  Restrictions/Precautions  Restrictions/Precautions: Fall Risk, General Precautions         Communication with other providers:  Hand off with Nurse    Subjective:  Patient states:  \"Make sure you put that thing back on\" (In regards to personal alarm)  Pain:   Location, Type, Intensity (0/10 to 10/10):  Denies  Objective:    Observation:  Pt. Up in recliner upon arrival with legs elevated  Objective Measures:  HR 60 bpm throughout   Sitting in recliner with legs elevated: 129/71 (89)   Sitting with legs down: 116/51 (71)   Sitting with legs up: 123/48 (70)   Sitting with legs up after exercise: 112/47 (67)    Treatment, including education/measures:  Pt. Completed sitting unassisted in recliner for ~30 seconds prior to needing to lean back d/t dizziness. Please see BP readings above. Reclined pt. Back in chair d/t drop in BP. Completed ankle pumps to improve BP with no change. Pt. Reports continued dizziness. Pt. Completed LE exercises with tactile cues for completion. BP drop again after exercise.     Therapeutic Exercise:  Cues were given for technique, safety, and rationale.  Cues were verbal and/or tactile.  Ankle pumps, SLR, SAQ, hip abd/add  1x15 with Pamella on occasion for proper form.     Assessment / Impression:    Pt. Left in recliner with LE elevated at end of session, all needs met, phone and call light in reach, bed/personal alarm active, and nursing updated.     Patient's tolerance of treatment:  Fair   Adverse Reaction: none  Significant change in status and impact:  none  Barriers to improvement:  BP dropping  Plan for Next Session:    Cont per POC.    Timed Code Treatment Minutes: 23 Minutes  PT Individual Minutes  Time In: 1119  Time Out: 1142  Minutes: 23              Previously filed 
Physical Therapy    Physical Therapy Treatment Note  Name: Nicci Alberto MRN: [id number] :   1935   Date:  2024   Admission Date: 2024 Room:  [address] [place] [address]   Restrictions/Precautions:          fall risk  Communication with other providers:  cotx /c LOWRY  Subjective:  Patient states:  agreeable  Pain:   Location, Type, Intensity (0/10 to 10/10):  does not rate    Objective:    Observation:  in bed  Objective Measures:  124/61 sitting EOB  Treatment, including education/measures:  Bed mob maxAx2 sup>sit  Sitting balance F-/P+, pt posterior lean and anxious /c fear of falling but improves /c vc and assist x~15'.  STS Elmer and vc for LE/UE placement  SPT /c FWW Elmer. X~2' amb /c FWW within pivot. Vc for safe techs  Increased time to complete interventions  Safety  Patient left safely in the chair, with call light/phone in reach with alarm applied. Gait belt was used for transfers and gait.      Assessment / Impression:    Patient's tolerance of treatment:  good   Adverse Reaction: na  Significant change in status and impact:  na  Barriers to improvement:  weakness and endurance  Plan for Next Session:    Cont transfer training       Time in:  1041  Time out:  1111  Timed treatment minutes:  30  Total treatment time:  30    Previously filed items:  Social/Functional History  Lives With: Spouse  Type of Home: House  Home Layout: One level  Bathroom Shower/Tub: Shower chair without back  ADL Assistance:  ( and dtr assist and support pt)  Transfer Assistance: Needs assistance  Active : No  Patient's  Info:   Mode of Transportation: Car        Short Term Goals  Time Frame for Short Term Goals: 2 weeks  Short Term Goal 1: Pt will complete supine <> sit Elmer  Short Term Goal 2: Pt will complete sit <> stand SBA  Short Term Goal 3: Pt will complete SPT between level surfaces CGA  Short Term Goal 4: Pt will ambulate 25ft with LRAD CGA    Electronically signed by:    Kendall Yancey, 
Physical Therapy    Physical Therapy Treatment Note  Name: Nicci Alberto MRN: [id number] :   1935   Date:  2024   Admission Date: 2024 Room:  [address] [place] [address]   Restrictions/Precautions:          general precautions, falls   Communication with other providers:  per chart pt is appropriate for tx  Subjective:  Patient states:  pt agreeable to tx. Dghter requesting pt get up ti chair today.  Pain:   Location, Type, Intensity (0/10 to 10/10):  no c/o pain during tx session  Objective:    Observation:  alert and oriented    Treatment, including education/measures:  Sup to sit with mod assist and mod assist and increased time and effort to get feet on floor  Sit<=>stand min assist and pt was able to take 3 steps to chair. Pt c/o feeling dizzy but clears once sitting in chair.  Ex in sitting:  Trunk stretches with deep breathing followed with 10 reps aps.  Safety  Patient left safely in the chair, with call light/phone in reach with alarm applied. Gait belt was used for transfers and gait.   Assessment / Impression:      Patient's tolerance of treatment:  good   Adverse Reaction: na  Significant change in status and impact:  na  Barriers to improvement:  fear of falling and c/o dizzy with standing  Plan for Next Session:    Cont. POC       Time in:  1100  Time out:  1115  Timed treatment minutes:  15  Total treatment time:  15    Previously filed items:  Social/Functional History  Lives With: Spouse  Type of Home: House  Home Layout: One level  Bathroom Shower/Tub: Shower chair without back  ADL Assistance:  ( and dtr assist and support pt)  Transfer Assistance: Needs assistance  Active : No  Patient's  Info:   Mode of Transportation: Car        Short Term Goals  Time Frame for Short Term Goals: 2 weeks  Short Term Goal 1: Pt will complete supine <> sit Elmer  Short Term Goal 2: Pt will complete sit <> stand SBA  Short Term Goal 3: Pt will complete SPT between level surfaces 
Physical Therapy    Physical Therapy Treatment Note  Name: Nicci Alberto MRN: [id number] :   1935   Date:  10/5/2024   Admission Date: 2024 Room:  [address] [place] [address]   Restrictions/Precautions:  Restrictions/Precautions  Restrictions/Precautions: Fall Risk, General Precautions         Communication with other providers:  per chart review pt is appropriate for tx and ortho statics given to nurse.  Subjective:  Patient states:  pt motivated and agreeable to tx. Pt stated during tx \" I'm so glad you came in\". Pt did c/o dizziness with standing but reports\" it is better then it was\".  Pain:   Location, Type, Intensity (0/10 to 10/10):  no c/o pain during tx session  Objective:    Observation:  alert and oriented.  present and very supportive.  Objective Measures:    BP taken :  Sitting in chair at start of tx 134/54  Pt did stand once but unable to get BP  With second  stand 118/40  After sitting in chair x 5 minutes 1126/65    Treatment, including education/measures:  Applied compression stocking at start of tx session  Pt reports her depends is wet and gown needing changed.   Turned chair to face bed rail and pt transferred sit<=>stand x 2 reps. Pt min assist for transfer and then able to stand  4 minutes x 2 for depends change and while trying to take BP.  Ex in sitting:  Trunk stretches with deep breathing  10 reps aps  10 reps laqs  Safety  Patient left safely in the chair, with call light/phone in reach with alarm applied. Gait belt was used for transfers and gait.     Assessment / Impression:      Patient's tolerance of treatment:  good   Adverse Reaction: na  Significant change in status and impact:  na  Barriers to improvement:  dizziness with standing  Plan for Next Session:    Cont. POC      Time in:  1515  Time out:  1555  Timed treatment minutes:  40  Total treatment time:  40    Previously filed items:  Social/Functional History  Lives With: Spouse  Type of Home: House  Home Layout: One 
Pt requesting Dilaudid for abdominal pain of (10)stating \"The doctor told me I could have it one more time.\" This nurse explained to pt the importance of weaning her off of the IV pain medication to prepare for discharge and that her prn Percocet was available if she'd like that for her pain. Pt states \"Well the percocet do nothing for me so I guess I'll have to get it in the streets.\" This nurse again asked pt if she'd like the prn percocet. Pt states \"I guess if that is all I can get.\" This nurse administered the prn percocet and pt satisfied. Call light left in reach.  
Pt unable to tolerate completion of ortho BPs. Pt felt dizzy and held a near syncopal episode when she stood with assistance. Will inform hospitalist.     Verbal order to hold bumex per Dr. Hodge.   
Received referral for swing bed.  This case has been discussed with my supervising physician Dr. Ascencio.  Patient has been unable to participate much with therapy she is unable to walk due to symptomatic orthostatic hypotension, most likely needs a longer term placement plan, given she is unable to walk due to symptomatic orthostatic hypotension she is denied swing bed program.   
Spiritual Health History and Assessment/Progress Note  [facility]    Initial Encounter,  ,  ,      Name: Nicci Alberto MRN: [id number]    Age: 89 y.o.     Sex: female   Language: English   Lutheran: Jehovah's witness   Heart failure (HCC)     Date: 9/25/2024            Total Time Calculated: 15 min              Spiritual Assessment began in [facility]        Referral/Consult From: Rounding   Encounter Overview/Reason: Initial Encounter  Service Provided For: Patient and family together    Jessie, Belief, Meaning:   Patient identifies as spiritual, is connected with a jessie tradition or spiritual practice, and has beliefs or practices that help with coping during difficult times  Family/Friends are connected with a jessie tradition or spiritual practice      Importance and Influence:  Patient has spiritual/personal beliefs that influence decisions regarding their health  Family/Friends have spiritual/personal beliefs that influence decisions regarding the patient's health    Community:  Patient is connected with a spiritual community and feels well-supported. Support system includes: Spouse/Partner, Children, and Extended family  Family/Friends feel well-supported. Support system includes: Extended family    Assessment and Plan of Care:     Patient Interventions include: Facilitated expression of thoughts and feelings  Family/Friends Interventions include: Other: just gratitude    Patient Plan of Care: Spiritual Care available upon further referral  Family/Friends Plan of Care: Spiritual Care available upon further referral    Electronically signed by Chaplain Amelia on 9/25/2024 at 4:05 PM   
Stitches removed per order.  
(MSJ)  Weight Used for Protein Requirements: Ideal  Protein (g/day): 52-62 (1.1-1.3 IBW)  Method Used for Fluid Requirements: 1 ml/kcal  Fluid (ml/day): 1200    Nutrition Diagnosis:   Increased nutrient needs related to acute injury/trauma as evidenced by wounds    Nutrition Interventions:   Food and/or Nutrient Delivery: Modify Current Diet, Continue Oral Nutrition Supplement  Nutrition Education/Counseling: Survival skills/brief education completed  Coordination of Nutrition Care: Continue to monitor while inpatient  Plan of Care discussed with: pt, daughter    Goals:  Previous Goal Met: Goal(s) Achieved  Goals: PO intake 75% or greater, prior to discharge       Nutrition Monitoring and Evaluation:      Food/Nutrient Intake Outcomes: Food and Nutrient Intake, Supplement Intake  Physical Signs/Symptoms Outcomes: Biochemical Data, Skin, Nutrition Focused Physical Findings, Weight, GI Status, Hemodynamic Status, Fluid Status or Edema, Meal Time Behavior    Discharge Planning:    Continue current diet, Continue Oral Nutrition Supplement, Coordination of community care     Saskia Lincoln RD  Contact: [phone]    
1505  Minutes: 31              Previously filed items:  Social/Functional History  Lives With: Spouse  Type of Home: House  Home Layout: One level  Bathroom Shower/Tub: Shower chair without back  ADL Assistance:  ( and dtr assist and support pt)  Transfer Assistance: Needs assistance  Active : No  Patient's  Info:   Mode of Transportation: Car        Short Term Goals  Time Frame for Short Term Goals: 2 weeks  Short Term Goal 1: Pt will complete supine <> sit Elmer  Short Term Goal 2: Pt will complete sit <> stand SBA  Short Term Goal 3: Pt will complete SPT between level surfaces CGA  Short Term Goal 4: Pt will ambulate 25ft with LRAD CGA    Electronically signed by:    Brenden Rodríguez PTA  9/26/2024, 5:07 PM      
Annular calcification. Thickened leaflets. Mild regurgitation. Mild to moderate stenosis noted; mean PmmHg, MVA: 1.41 cmsq, MVA (PISA): 1.30cmsq.    Right Atrium: Right atrium is dilated.    Right Ventricle: Lead present in the right heart.    Pericardium: No pericardial effusion.    Subjective:  Nicci is a 89 y.o.year old     Discussed care with patient as well as her  at bedside.  Still continues to have profound dizziness upon standing.    Informed her of medication changes.    Discussed care with primary team    Objective: Temperature:  Current - Temp: 97.6 °F (36.4 °C); Max - Temp  Av.5 °F (36.4 °C)  Min: 96.7 °F (35.9 °C)  Max: 98 °F (36.7 °C)    Respiratory Rate : Resp  Av.7  Min: 16  Max: 24    Pulse Range: Pulse  Av.5  Min: 59  Max: 60    Blood Presuure Range:  Systolic (24hrs), Av , Min:114 , Max:177   ; Diastolic (24hrs), Av, Min:45, Max:68      Pulse ox Range: SpO2  Av.3 %  Min: 97 %  Max: 100 %    24hr I & O:    Intake/Output Summary (Last 24 hours) at 10/2/2024 1537  Last data filed at 10/1/2024 2330  Gross per 24 hour   Intake --   Output 600 ml   Net -600 ml         BP (!) 137/46   Pulse 59   Temp 97.6 °F (36.4 °C) (Oral)   Resp 24   Ht 1.549 m (5' 0.98\")   Wt 71.1 kg (156 lb 12.8 oz)   SpO2 99%   BMI 29.64 kg/m²         TELEMETRY: Madhu      has a past medical history of Asthma, Chronic kidney disease, 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   has a past surgical history that includes Urethra surgery; Appendectomy; Hysterectomy; Cholecystectomy; Cystoscopy; 
Shower/Tub: Shower chair without back  ADL Assistance:  ( and dtr assist and support pt)  Transfer Assistance: Needs assistance  Active : No  Patient's  Info:   Mode of Transportation: Car        Short Term Goals  Time Frame for Short Term Goals: 2 weeks  Short Term Goal 1: Pt will complete supine <> sit Elmer  Short Term Goal 2: Pt will complete sit <> stand SBA  Short Term Goal 3: Pt will complete SPT between level surfaces CGA  Short Term Goal 4: Pt will ambulate 25ft with LRAD CGA    Electronically signed by:    Yasmeen Gandara, PTA  9/27/2024, 12:01 PM      
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  Follow clinically      Celsa Hazel MD MD    
end of session    Body Systems and functions:  ROM: Lt UE WFL all joints, Rt UE active shoulder flexion grossly 0-40', Rt UE WFL distally   Strength: Lt UE 4/5 MMT all major muscle groups, Rt shoulder flexors 2+/5 MMT, Rt UE 4/5 MMT distally  Sensation: WFL (denies numbness/tingling)  Tone: Normal  Coordination: WFL for ADLs  Perception: WNL    Activities of Daily Living (ADLs):  Feeding: Independent   Grooming: SBA (seated facial hygiene task; unable to tolerate in standing this date due to orthostatic hypotension)  UB bathing: SBA   LB bathing: Max A (reaching distal LEs, bottom, and posterior thighs)  UB dressing: SBA (donning clean robe seated EOB)  LB dressing: Dependent (donning BL socks)  Toileting: Dependent     Cognitive and Psychosocial Functioning:  Overall cognitive status: WFL  Affect: Normal     Balance:   Sitting: CGA progressing to SBA in unsupported sitting EOB  Standing: CGA with RW    Functional Mobility:  Bed Mobility: Mod A supine to sitting EOB (HOB elevated to 30', assist for fully scooting LEs to edge and mod trunk boost for uprighting)  Transfers: CGA sit to stand from bed, Min A stand pivot transfer bed to chair (min cues for technique/safe hand placement with each transfer)  Ambulation: Unsafe/unable this date due to orthostatic hypotension      AM-PAC 6 click short form for inpatient daily activity:   How much help from another person does the patient currently need... Unable  Dep A Lot  Max A A Lot   Mod A A Little  Min A A Little   CGA  SBA None   Mod I  Indep  Sup   1.  Putting on and taking off regular lower body clothing? [x] 1    [] 2   [] 2   [] 3   [] 3   [] 4      2. Bathing (including washing, rinsing, drying)? [] 1   [] 2   [x] 2 [] 3 [] 3 [] 4   3. Toileting, which includes using toilet, bedpan, or urinal? [x] 1    [] 2   [] 2   [] 3   [] 3   [] 4     4. Putting on and taking off regular upper body clothing? [] 1   [] 2   [] 2   [] 3   [x] 3    [] 4      5. Taking care of 
(N/A, 11/17/2020); Cystoscopy (N/A, 12/18/2023); and Cystoscopy (N/A, 12/29/2023).    Physical Exam  Constitutional:       Appearance: She is not ill-appearing.   HENT:      Mouth/Throat:      Comments: Pupils equal and round  Cardiovascular:      Rate and Rhythm: Normal rate and regular rhythm.   Pulmonary:      Effort: Pulmonary effort is normal.      Breath sounds: Normal breath sounds.   Abdominal:      Palpations: Abdomen is soft.   Musculoskeletal:      Right lower leg: No edema.      Left lower leg: No edema.   Skin:     General: Skin is warm.      Capillary Refill: Capillary refill takes less than 2 seconds.   Neurological:      Mental Status: She is alert and oriented to person, place, and time.          Medications:    droxidopa  100 mg Oral TID    carbamide peroxide  5 drop Both Ears BID    neomycin-bacitracin-polymyxin   Topical BID    magnesium oxide  400 mg Oral BID    torsemide  20 mg Oral BID    lidocaine  1 patch TransDERmal Daily    clopidogrel  75 mg Oral Daily    spironolactone  25 mg Oral Daily    vitamin D  50,000 Units Oral Weekly    famotidine  20 mg Oral Daily    febuxostat  40 mg Oral Daily    ferrous sulfate  325 mg Oral BID    fluticasone  1 puff Inhalation BID    latanoprost  1 drop Both Eyes Nightly    levothyroxine  50 mcg Oral Daily    cetirizine  5 mg Oral Daily    rOPINIRole  0.5 mg Oral TID    sodium chloride flush  5-40 mL IntraVENous 2 times per day    heparin (porcine)  5,000 Units SubCUTAneous 3 times per day      sodium chloride 25 mL/hr at 10/02/24 0920     traMADol, carboxymethylcellulose, albuterol, albuterol sulfate HFA, fluticasone, sodium chloride flush, sodium chloride, ondansetron **OR** ondansetron, polyethylene glycol, acetaminophen **OR** acetaminophen    Lab Data:  CBC:   Recent Labs     10/04/24  0843   WBC 8.0   HGB 13.2   HCT 41.8   .5*        BMP:   Recent Labs     10/02/24  0305 10/03/24  0417 10/04/24  0843    136 136   K 4.1 4.3 4.2   CL 
72 hours.    TROPONIN: No results for input(s): \"TROPONINT\" in the last 72 hours.     -Full consult note to follow per covering Cardiologist: Electronically signed by TREVON Reyes CNP on 10/6/2024 at 8:58 AM           CARDIOLOGY ATTENDING ADDENDUM    I have seen, spoken to and examined this patient personally, independent of the NP/PAC. I have reviewed the hospital care given to date and reviewed all pertinent labs and imaging. I have spoken with patient, nursing staff and provided written and verbal instructions .The above note has been reviewed. I have spent substantive (>50%) amount of time in formulating patient care.              Physical Exam:       Head: normal  Eye: normal  Chest:  Clear,  0 Basilar crackles   Cardiovascular:  S1S2 Abdomen: soft          MEDICAL DECISION MAKING :       Continue medical therapy as outlined above  Please call us with any questions            Dr. GERI Sauer MD      +++++++++++++++++++++++++++++++++++++++    
spironolactone  25 mg Oral Daily    vitamin D  50,000 Units Oral Weekly    famotidine  20 mg Oral Daily    febuxostat  40 mg Oral Daily    ferrous sulfate  325 mg Oral BID    fluticasone  1 puff Inhalation BID    latanoprost  1 drop Both Eyes Nightly    levothyroxine  50 mcg Oral Daily    cetirizine  5 mg Oral Daily    rOPINIRole  0.5 mg Oral TID    sodium chloride flush  5-40 mL IntraVENous 2 times per day    heparin (porcine)  5,000 Units SubCUTAneous 3 times per day      Infusions:    sodium chloride       PRN Meds: HYDROcodone-acetaminophen, 1 tablet, Q6H PRN  meclizine, 25 mg, TID PRN  carboxymethylcellulose, 1 drop, BID PRN  albuterol, 2.5 mg, 4x Daily PRN  albuterol sulfate HFA, 2 puff, Q6H PRN  fluticasone, 1 spray, Daily PRN  sodium chloride flush, 5-40 mL, PRN  sodium chloride, , PRN  ondansetron, 4 mg, Q8H PRN   Or  ondansetron, 4 mg, Q6H PRN  polyethylene glycol, 17 g, Daily PRN  acetaminophen, 650 mg, Q6H PRN   Or  acetaminophen, 650 mg, Q6H PRN        Labs      No results found for this or any previous visit (from the past 24 hour(s)).       Imaging/Diagnostics Last 24 Hours   CT CERVICAL SPINE WO CONTRAST    Result Date: 9/24/2024  CT of the Cervical Spine HISTORY: fall, head injury TECHNIQUE: [id number]  axial images were obtained through the cervical spine without intravenous contrast. Coronal and sagittal reformatted images were also reviewed.  Radiation dose reduction techniques were used for this study:  All CT scans performed at this facility use one or all of the following: Automated exposure control, adjustment of the mA and/or kVp according to patient's size, iterative reconstruction. COMPARISON: None FINDINGS: No acute cervical fracture is seen. No prevertebral soft tissue swelling. Multilevel degenerative disc disease and facet arthropathy with grade 1 anterolisthesis C3-C4, C4-C5, C5-C6, and C6-C7. Spinal canal is poorly visualized due to patient positioning.     Multilevel degenerative 
fall, head injury, dizziness Comparison: November 18, 2020 Procedure CT head without contrast. Individualized dose optimization was performed with automated exposure control. Findings: There is no CT evidence of intracranial hemorrhage, mass effect, or midline shift. Mild to moderate generalized atrophy. Mild to moderate suspected chronic small vessel ischemic change. Chronic vascular calcifications present. Bone window images demonstrate no evidence of fracture. Small bubbles of gas in the anterior left frontal scalp, likely laceration.     1. Suspected left frontal scalp laceration. 2. Mild to moderate generalized atrophy and suspected chronic small vessel ischemic change. 3. No acute intracranial findings. Electronically signed by Pedro Navarrete MD    XR CHEST PORTABLE    Result Date: 9/24/2024  Chest X-ray HISTORY: fall, head injury COMPARISON: 8/17/2023 TECHNIQUE: AP/PA view of the chest was obtained. FINDINGS: The cardiac silhouette is enlarged with pulmonary edema. Stable left-sided pacemaker. No consolidation, pleural effusion, or pneumothorax is seen. The bony structures are intact.     Pulmonary edema. Electronically signed by Constantino Christie      Electronically signed by Omar Stearns MD on 9/28/2024 at 1:26 PM    
Coronal and sagittal reformatted images were also reviewed.  Radiation dose reduction techniques were used for this study:  All CT scans performed at this facility use one or all of the following: Automated exposure control, adjustment of the mA and/or kVp according to patient's size, iterative reconstruction. COMPARISON: None FINDINGS: No acute cervical fracture is seen. No prevertebral soft tissue swelling. Multilevel degenerative disc disease and facet arthropathy with grade 1 anterolisthesis C3-C4, C4-C5, C5-C6, and C6-C7. Spinal canal is poorly visualized due to patient positioning.     Multilevel degenerative changes without acute fracture seen. Electronically signed by Constantino Christie    CT HEAD WO CONTRAST    Result Date: 9/24/2024  Indication: fall, head injury, dizziness Comparison: November 18, 2020 Procedure CT head without contrast. Individualized dose optimization was performed with automated exposure control. Findings: There is no CT evidence of intracranial hemorrhage, mass effect, or midline shift. Mild to moderate generalized atrophy. Mild to moderate suspected chronic small vessel ischemic change. Chronic vascular calcifications present. Bone window images demonstrate no evidence of fracture. Small bubbles of gas in the anterior left frontal scalp, likely laceration.     1. Suspected left frontal scalp laceration. 2. Mild to moderate generalized atrophy and suspected chronic small vessel ischemic change. 3. No acute intracranial findings. Electronically signed by Pedro Navarrete MD    XR CHEST PORTABLE    Result Date: 9/24/2024  Chest X-ray HISTORY: fall, head injury COMPARISON: 8/17/2023 TECHNIQUE: AP/PA view of the chest was obtained. FINDINGS: The cardiac silhouette is enlarged with pulmonary edema. Stable left-sided pacemaker. No consolidation, pleural effusion, or pneumothorax is seen. The bony structures are intact.     Pulmonary edema. Electronically signed by Constantino Christie      Electronically signed by

## 2024-11-07 ENCOUNTER — HOSPITAL ENCOUNTER (OUTPATIENT)
Age: 89
Discharge: HOME | End: 2024-11-07
Payer: MEDICARE

## 2024-11-07 DIAGNOSIS — N18.4 CKD STAGE G4/A1, GFR 15-29 AND ALBUMIN CREATININE RATIO <30 MG/G (HCC): ICD-10-CM

## 2024-11-07 LAB
ALBUMIN SERPL-MCNC: 3.4 G/DL (ref 3.4–5)
ALBUMIN/GLOB SERPL: 1.2 {RATIO} (ref 1.1–2.2)
ALP SERPL-CCNC: 53 U/L (ref 40–129)
ALT SERPL-CCNC: 7 U/L (ref 10–40)
ANION GAP SERPL CALCULATED.3IONS-SCNC: 17 MMOL/L (ref 4–16)
AST SERPL-CCNC: 11 U/L (ref 15–37)
BILIRUB SERPL-MCNC: 0.4 MG/DL (ref 0–1)
BUN SERPL-MCNC: 83 MG/DL (ref 6–23)
CALCIUM SERPL-MCNC: 9 MG/DL (ref 8.3–10.6)
CHLORIDE SERPL-SCNC: 94 MMOL/L (ref 99–110)
CO2 SERPL-SCNC: 25 MMOL/L (ref 21–32)
CREAT SERPL-MCNC: 2.4 MG/DL (ref 0.6–1.1)
GFR, ESTIMATED: 19 ML/MIN/1.73M2
GLUCOSE SERPL-MCNC: 158 MG/DL (ref 70–99)
MAGNESIUM SERPL-MCNC: 1.4 MG/DL (ref 1.8–2.4)
PHOSPHATE SERPL-MCNC: 4.2 MG/DL (ref 2.5–4.9)
POTASSIUM SERPL-SCNC: 3 MMOL/L (ref 3.5–5.1)
PROT SERPL-MCNC: 6.3 G/DL (ref 6.4–8.2)
SODIUM SERPL-SCNC: 136 MMOL/L (ref 135–145)

## 2024-11-07 PROCEDURE — 36415 COLL VENOUS BLD VENIPUNCTURE: CPT

## 2024-11-07 PROCEDURE — 80053 COMPREHEN METABOLIC PANEL: CPT

## 2024-11-07 PROCEDURE — 83735 ASSAY OF MAGNESIUM: CPT

## 2024-11-07 PROCEDURE — 84100 ASSAY OF PHOSPHORUS: CPT

## 2024-11-14 ENCOUNTER — HOSPITAL ENCOUNTER (OUTPATIENT)
Age: 89
Discharge: HOME | End: 2024-11-14
Payer: MEDICARE

## 2024-11-14 DIAGNOSIS — N18.2 CHRONIC KIDNEY DISEASE (CKD) STAGE G2/A2, MILDLY DECREASED GLOMERULAR FILTRATION RATE (GFR) BETWEEN 60-89 ML/MIN/1.73 SQUARE METER AND ALBUMINURIA CREATININE RATIO BETWEEN 30-299 MG/G: ICD-10-CM

## 2024-11-14 LAB
ANION GAP SERPL CALCULATED.3IONS-SCNC: 11 MMOL/L (ref 4–16)
BUN SERPL-MCNC: 66 MG/DL (ref 6–23)
CALCIUM SERPL-MCNC: 10.2 MG/DL (ref 8.3–10.6)
CHLORIDE SERPL-SCNC: 99 MMOL/L (ref 99–110)
CO2 SERPL-SCNC: 29 MMOL/L (ref 21–32)
CREAT SERPL-MCNC: 2.1 MG/DL (ref 0.6–1.1)
GFR, ESTIMATED: 22 ML/MIN/1.73M2
GLUCOSE SERPL-MCNC: 122 MG/DL (ref 70–99)
POTASSIUM SERPL-SCNC: 4.4 MMOL/L (ref 3.5–5.1)
SODIUM SERPL-SCNC: 139 MMOL/L (ref 135–145)

## 2024-11-14 PROCEDURE — 36415 COLL VENOUS BLD VENIPUNCTURE: CPT

## 2024-11-14 PROCEDURE — 80048 BASIC METABOLIC PNL TOTAL CA: CPT

## 2024-11-20 ENCOUNTER — TELEPHONE (OUTPATIENT)
Dept: CARDIOLOGY CLINIC | Age: 89
End: 2024-11-20

## 2024-11-27 ENCOUNTER — HOSPITAL ENCOUNTER (OUTPATIENT)
Age: 89
Discharge: HOME | End: 2024-11-27
Payer: MEDICARE

## 2024-11-27 DIAGNOSIS — N18.2 CHRONIC KIDNEY DISEASE (CKD) STAGE G2/A2, MILDLY DECREASED GLOMERULAR FILTRATION RATE (GFR) BETWEEN 60-89 ML/MIN/1.73 SQUARE METER AND ALBUMINURIA CREATININE RATIO BETWEEN 30-299 MG/G: ICD-10-CM

## 2024-11-27 LAB
ANION GAP SERPL CALCULATED.3IONS-SCNC: 11 MMOL/L (ref 4–16)
BUN SERPL-MCNC: 60 MG/DL (ref 6–23)
CALCIUM SERPL-MCNC: 10 MG/DL (ref 8.3–10.6)
CHLORIDE SERPL-SCNC: 98 MMOL/L (ref 99–110)
CO2 SERPL-SCNC: 30 MMOL/L (ref 21–32)
CREAT SERPL-MCNC: 2 MG/DL (ref 0.6–1.1)
GFR, ESTIMATED: 24 ML/MIN/1.73M2
GLUCOSE SERPL-MCNC: 87 MG/DL (ref 70–99)
POTASSIUM SERPL-SCNC: 3.9 MMOL/L (ref 3.5–5.1)
SODIUM SERPL-SCNC: 139 MMOL/L (ref 135–145)

## 2024-11-27 PROCEDURE — 36415 COLL VENOUS BLD VENIPUNCTURE: CPT

## 2024-11-27 PROCEDURE — 80048 BASIC METABOLIC PNL TOTAL CA: CPT

## 2025-01-03 ENCOUNTER — HOSPITAL ENCOUNTER (OUTPATIENT)
Age: 89
Discharge: HOME | End: 2025-01-03
Payer: MEDICARE

## 2025-01-03 DIAGNOSIS — N18.4 CKD STAGE G4/A1, GFR 15-29 AND ALBUMIN CREATININE RATIO <30 MG/G (HCC): ICD-10-CM

## 2025-01-03 LAB
ALBUMIN SERPL-MCNC: 3.9 G/DL (ref 3.4–5)
ALBUMIN/GLOB SERPL: 1.6 {RATIO} (ref 1.1–2.2)
ALP SERPL-CCNC: 75 U/L (ref 40–129)
ALT SERPL-CCNC: 10 U/L (ref 10–40)
ANION GAP SERPL CALCULATED.3IONS-SCNC: 14 MMOL/L (ref 4–16)
AST SERPL-CCNC: 13 U/L (ref 15–37)
BILIRUB SERPL-MCNC: 0.5 MG/DL (ref 0–1)
BUN SERPL-MCNC: 72 MG/DL (ref 6–23)
CALCIUM SERPL-MCNC: 9.9 MG/DL (ref 8.3–10.6)
CHLORIDE SERPL-SCNC: 97 MMOL/L (ref 99–110)
CO2 SERPL-SCNC: 29 MMOL/L (ref 21–32)
CREAT SERPL-MCNC: 2.7 MG/DL (ref 0.6–1.1)
GFR, ESTIMATED: 17 ML/MIN/1.73M2
GLUCOSE SERPL-MCNC: 113 MG/DL (ref 70–99)
PHOSPHATE SERPL-MCNC: 3.2 MG/DL (ref 2.5–4.9)
POTASSIUM SERPL-SCNC: 4.1 MMOL/L (ref 3.5–5.1)
PROT SERPL-MCNC: 6.4 G/DL (ref 6.4–8.2)
SODIUM SERPL-SCNC: 140 MMOL/L (ref 135–145)

## 2025-01-03 PROCEDURE — 36415 COLL VENOUS BLD VENIPUNCTURE: CPT

## 2025-01-03 PROCEDURE — 84100 ASSAY OF PHOSPHORUS: CPT

## 2025-01-03 PROCEDURE — 80053 COMPREHEN METABOLIC PANEL: CPT

## 2025-03-28 ENCOUNTER — HOSPITAL ENCOUNTER (OUTPATIENT)
Dept: WOUND CARE | Age: 89
Discharge: HOME | End: 2025-03-28
Attending: NURSE PRACTITIONER
Payer: MEDICARE

## 2025-03-28 VITALS
DIASTOLIC BLOOD PRESSURE: 48 MMHG | TEMPERATURE: 97.7 F | SYSTOLIC BLOOD PRESSURE: 142 MMHG | RESPIRATION RATE: 18 BRPM | HEART RATE: 60 BPM

## 2025-03-28 DIAGNOSIS — I87.2 VENOUS STASIS DERMATITIS OF BOTH LOWER EXTREMITIES: ICD-10-CM

## 2025-03-28 DIAGNOSIS — E11.9 DIET-CONTROLLED TYPE 2 DIABETES MELLITUS (HCC): ICD-10-CM

## 2025-03-28 DIAGNOSIS — L60.2 LONG TOENAIL: ICD-10-CM

## 2025-03-28 DIAGNOSIS — E66.9 OBESITY (BMI 30-39.9): ICD-10-CM

## 2025-03-28 DIAGNOSIS — L84 PRE-ULCERATIVE CALLUSES: Primary | ICD-10-CM

## 2025-03-28 DIAGNOSIS — Z86.31 HISTORY OF DIABETIC ULCER OF FOOT: ICD-10-CM

## 2025-03-28 DIAGNOSIS — L85.9 HYPERKERATOSIS: ICD-10-CM

## 2025-03-28 DIAGNOSIS — I89.0 LYMPHEDEMA OF BOTH LOWER EXTREMITIES: ICD-10-CM

## 2025-03-28 PROBLEM — L97.222 VENOUS STASIS ULCER OF LEFT CALF WITH FAT LAYER EXPOSED WITH VARICOSE VEINS: Status: RESOLVED | Noted: 2022-03-18 | Resolved: 2025-03-28

## 2025-03-28 PROBLEM — E11.621 DIABETIC ULCER OF TOE OF RIGHT FOOT ASSOCIATED WITH TYPE 2 DIABETES MELLITUS, WITH FAT LAYER EXPOSED: Status: RESOLVED | Noted: 2022-12-02 | Resolved: 2025-03-28

## 2025-03-28 PROBLEM — L89.312 PRESSURE INJURY OF RIGHT BUTTOCK, STAGE 2 (HCC): Status: RESOLVED | Noted: 2021-07-30 | Resolved: 2025-03-28

## 2025-03-28 PROBLEM — I83.012 VENOUS STASIS ULCER OF RIGHT CALF WITH FAT LAYER EXPOSED WITH VARICOSE VEINS: Status: RESOLVED | Noted: 2022-03-18 | Resolved: 2025-03-28

## 2025-03-28 PROBLEM — L97.512 DIABETIC ULCER OF TOE OF RIGHT FOOT ASSOCIATED WITH TYPE 2 DIABETES MELLITUS, WITH FAT LAYER EXPOSED: Status: RESOLVED | Noted: 2022-12-02 | Resolved: 2025-03-28

## 2025-03-28 PROBLEM — L89.322 PRESSURE INJURY OF LEFT BUTTOCK, STAGE 2 (HCC): Status: RESOLVED | Noted: 2021-07-30 | Resolved: 2025-03-28

## 2025-03-28 PROBLEM — L97.522 DIABETIC ULCER OF TOE OF LEFT FOOT ASSOCIATED WITH TYPE 2 DIABETES MELLITUS, WITH FAT LAYER EXPOSED: Status: RESOLVED | Noted: 2022-06-10 | Resolved: 2025-03-28

## 2025-03-28 PROBLEM — E11.621 DIABETIC ULCER OF TOE OF LEFT FOOT ASSOCIATED WITH TYPE 2 DIABETES MELLITUS, WITH FAT LAYER EXPOSED: Status: RESOLVED | Noted: 2022-06-10 | Resolved: 2025-03-28

## 2025-03-28 PROBLEM — L97.212 VENOUS STASIS ULCER OF RIGHT CALF WITH FAT LAYER EXPOSED WITH VARICOSE VEINS: Status: RESOLVED | Noted: 2022-03-18 | Resolved: 2025-03-28

## 2025-03-28 PROBLEM — I83.022 VENOUS STASIS ULCER OF LEFT CALF WITH FAT LAYER EXPOSED WITH VARICOSE VEINS: Status: RESOLVED | Noted: 2022-03-18 | Resolved: 2025-03-28

## 2025-03-28 PROCEDURE — 11719 TRIM NAIL(S) ANY NUMBER: CPT

## 2025-03-28 PROCEDURE — 11056 PARNG/CUTG B9 HYPRKR LES 2-4: CPT

## 2025-03-28 RX ORDER — TRIAMCINOLONE ACETONIDE 1 MG/G
OINTMENT TOPICAL PRN
OUTPATIENT
Start: 2025-03-28

## 2025-03-28 RX ORDER — BETAMETHASONE DIPROPIONATE 0.5 MG/G
CREAM TOPICAL PRN
OUTPATIENT
Start: 2025-03-28

## 2025-03-28 RX ORDER — GENTAMICIN SULFATE 1 MG/G
OINTMENT TOPICAL PRN
OUTPATIENT
Start: 2025-03-28

## 2025-03-28 RX ORDER — NEOMYCIN/BACITRACIN/POLYMYXINB 3.5-400-5K
OINTMENT (GRAM) TOPICAL PRN
OUTPATIENT
Start: 2025-03-28

## 2025-03-28 RX ORDER — LIDOCAINE HYDROCHLORIDE 20 MG/ML
JELLY TOPICAL PRN
OUTPATIENT
Start: 2025-03-28

## 2025-03-28 RX ORDER — BACITRACIN ZINC AND POLYMYXIN B SULFATE 500; 1000 [USP'U]/G; [USP'U]/G
OINTMENT TOPICAL PRN
OUTPATIENT
Start: 2025-03-28

## 2025-03-28 RX ORDER — SODIUM CHLOR/HYPOCHLOROUS ACID 0.033 %
SOLUTION, IRRIGATION IRRIGATION PRN
OUTPATIENT
Start: 2025-03-28

## 2025-03-28 RX ORDER — LIDOCAINE 50 MG/G
OINTMENT TOPICAL PRN
OUTPATIENT
Start: 2025-03-28

## 2025-03-28 RX ORDER — CLOBETASOL PROPIONATE 0.5 MG/G
OINTMENT TOPICAL PRN
OUTPATIENT
Start: 2025-03-28

## 2025-03-28 RX ORDER — LIDOCAINE 40 MG/G
CREAM TOPICAL PRN
OUTPATIENT
Start: 2025-03-28

## 2025-03-28 RX ORDER — LIDOCAINE HYDROCHLORIDE 40 MG/ML
SOLUTION TOPICAL PRN
OUTPATIENT
Start: 2025-03-28

## 2025-03-28 RX ORDER — LIDOCAINE HYDROCHLORIDE 10 MG/ML
5 INJECTION, SOLUTION INFILTRATION; PERINEURAL PRN
OUTPATIENT
Start: 2025-03-28

## 2025-03-28 RX ORDER — GINSENG 100 MG
CAPSULE ORAL PRN
OUTPATIENT
Start: 2025-03-28

## 2025-03-28 RX ORDER — MUPIROCIN 20 MG/G
OINTMENT TOPICAL PRN
OUTPATIENT
Start: 2025-03-28

## 2025-03-28 ASSESSMENT — PAIN DESCRIPTION - PAIN TYPE: TYPE: ACUTE PAIN

## 2025-03-28 ASSESSMENT — PAIN DESCRIPTION - FREQUENCY: FREQUENCY: CONTINUOUS

## 2025-03-28 ASSESSMENT — PAIN DESCRIPTION - ORIENTATION: ORIENTATION: RIGHT;LEFT

## 2025-03-28 ASSESSMENT — PAIN - FUNCTIONAL ASSESSMENT: PAIN_FUNCTIONAL_ASSESSMENT: PREVENTS OR INTERFERES SOME ACTIVE ACTIVITIES AND ADLS

## 2025-03-28 ASSESSMENT — PAIN DESCRIPTION - DESCRIPTORS: DESCRIPTORS: ACHING;SHARP;SHOOTING

## 2025-03-28 ASSESSMENT — PAIN SCALES - GENERAL: PAINLEVEL_OUTOF10: 7

## 2025-03-28 ASSESSMENT — PAIN DESCRIPTION - ONSET: ONSET: ON-GOING

## 2025-03-28 ASSESSMENT — PAIN DESCRIPTION - LOCATION: LOCATION: TOE (COMMENT WHICH ONE)

## 2025-03-28 NOTE — PATIENT INSTRUCTIONS
PHYSICIAN ORDERS AND DISCHARGE INSTRUCTIONS  NOTE: Upon discharge from the Wound Center, you will receive a patient experience survey via E-mail. We would be grateful if you would take the time to fill this survey out.  Wound care order history:   CYNTHIA's   Right       Left    Date    Vascular studies/Intervention: .     Cultures: .               Antibiotics: .               HbA1c:  .               Grafts:  .   Juxta Lites & Lymph Pumps:  Continuing wound care orders and information:              Residence: .              Continue home health care with:.    Your wound-care supplies will be provided by: [facility]               Pharmacy: [facility] [place]   Wound cleansing:      Do not scrub or use excessive force.    Wash hands with soap and water before and after dressing changes.    Prior to applying a clean dressing, cleanse wound with normal saline,    wound cleanser, or mild soap and water.     Ask your physician or nurse before getting the wound(s) wet in the shower.  Daily Wound management:    Keep weight off wounds and reposition every 2 hours.    Avoid standing for long periods of time.    Evaluate legs to the level of the heart or above for 30 minutes 4-5 times a day and/or when sitting.       When taking antibiotics take entire prescription as ordered by MD do not stop taking until medicine is all gone.     Documentation:  Compression: .   Offloading: .   Toenails: Done one 3/28/25, due 5/25/25        Orders for this week (3/28/2025):  Bilateral Legs and Feet- Wash with mild soap and water  A&D on legs and feet   Paint toe nails and entire second toe wound with Betadine  Cover with Puracol AG+  Secure with Gentac   Leave till Monday then begin moisturizing daily    Please dispense 30 day quantity when sending supplies     Follow up with ROMI Dumont in 1 week in wound care center  Call [phone] for any questions or concerns.

## 2025-03-28 NOTE — PROGRESS NOTES
Bilateral legs cleansed with soap and water   Applied A&D on bilateral legs and feet   Painted toe nails and entire  second toe with Betadine  Covered with Puracol AG+ on left second toe   Secured with Gentac

## 2025-03-28 NOTE — PROGRESS NOTES
Wound Care Center Progress Visit      Nicci Albetro  AGE: 89 y.o.   GENDER: female  : 1935  EPISODE DATE:  3/28/2025   Referred by: Fern Steven APRN - CNP     Subjective:     CHIEF COMPLAINT  WOUND   Problem List Items Addressed This Visit          Circulatory    Venous stasis dermatitis of both lower extremities       Endocrine    Diet-controlled type 2 diabetes mellitus (HCC)       Musculoskeletal and Integument    Pre-ulcerative calluses - Primary       Other    Lymphedema of both lower extremities    Obesity (BMI 30-39.9)    Long toenail    Hyperkeratosis    History of diabetic ulcer of foot     Chief Complaint   Patient presents with    Wound Check        HISTORY of PRESENT ILLNESS      Nicci Alberto is a 89 y.o. female who presents to the Wound Clinic for evaluation and treatment of diabetic foot assessment and routine care with diabetes and history of diabetic wounds with pre-ulcerative callus.  The condition is of moderate severity. The patient is well known to the wound clinic and has been treated for diabetic, pressure, venous and lymphedema wounds in the past at varying degrees of severity with recurrent cellulitis and difficulty healing.  Advanced wound care modalities established with initiation of care at the wound clinic.The patient has significant underlying medical conditions as below. Fern Steven APRN - CNP .    Living Situation  [x] Home [] SNF []  Assisted living  [] Other (ie rehab, etc.) [] Home Health  []  Transportation    Wound Pain Timing/Severity: intermittent, mild  Quality of pain: aching, tender  Severity of pain:  2 / 10   Modifying Factors: venous stasis, lymphedema, diabetes, chronic pressure, decreased mobility, shear force, and obesity  Associated Signs/Symptoms: edema and pain    Wound status:  3/28/2025       The patient was examined for peripheral arterial disease, neuropathy, and any structural abnormalities that would place the patient at risk for

## 2025-05-09 NOTE — PROGRESS NOTES
Case Management Discharge Note      Final Note: home no needs, [facility] van actually transported instead of  [facility] VAN         Selected Continued Care - Discharged on 5/9/2025 Admission date: 5/5/2025 - Discharge disposition: Home or Self Care      Destination    No services have been selected for the patient.                Durable Medical Equipment    No services have been selected for the patient.                Dialysis/Infusion    No services have been selected for the patient.                Home Medical Care    No services have been selected for the patient.                Therapy    No services have been selected for the patient.                Community Resources    No services have been selected for the patient.                Community & DME    No services have been selected for the patient.                    Transportation Services  W/C Van: Other ([facility])    Final Discharge Disposition Code: 01 - home or self-care   Wound Care Center Progress Note With Procedure    Gaviota Stager  AGE: 82 y.o. GENDER: female  : 1935  EPISODE DATE:  2021     Subjective:     Chief Complaint   Patient presents with    Wound Check     BLE         HISTORY of PRESENT ILLNESS     Fanny varela 82 y. o. female who presents today for wound evaluation of Chronic lymphedema ulcer(s) of bilateral lower legs.  The ulcer is of mild severity.  The underlying cause of the wound is lymphedema.  Arterial and venous U/S were negative for PAD and CVI.  Ulcerations have decreased in size.   Wound Pain Timing/Severity: intermittent, moderate  Quality of pain: tender, pressure  Severity of pain:  3 / 10   Modifying Factors: lymphedema and obesity  Associated Signs/Symptoms: edema, erythema, drainage and pain        PAST MEDICAL HISTORY        Diagnosis Date    Asthma     Chronic kidney disease     acute kidney failure    Chronic ulcer of left leg with fat layer exposed ([address] 3/7/2016    Chronic ulcer of right leg with fat layer exposed ([address] 3/7/2016    Diabetes type 2, controlled ([address]     History of asthma     History of blood transfusion 2015    Hypertension     Lymphedema of both lower extremities 3/7/2016    Osteoarthritis     Pneumonia     Thyroid disease     Venous stasis of both lower extremities 3/7/2016    WD-Non-pressure chronic ulcer right lower leg, 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 ([address] 2016       PAST SURGICAL HISTORY    Past Surgical History:   Procedure Laterality Date    APPENDECTOMY      CHOLECYSTECTOMY      CYSTOSCOPY      EYE SURGERY      bilateral implants    HYSTERECTOMY      JOINT REPLACEMENT Right     knee    PACEMAKER INSERTION N/A 2020    PACEMAKER INSERTION PERMANENT REMOVAL OF TEMPORARY PACEMAKER performed by Elizabeth Sanz MD at [address]    Family History   Problem Relation Age of Onset    Heart Disease Father        SOCIAL HISTORY Social History     Tobacco Use    Smoking status: Never Smoker    Smokeless tobacco: Never Used   Substance Use Topics    Alcohol use: No    Drug use: No       ALLERGIES    Allergies   Allergen Reactions    Latex Rash    Dye [Iodides] Anaphylaxis    Iodine Anaphylaxis    Dyazide [Hydrochlorothiazide W-Triamterene] Rash    Lasix [Furosemide] Rash    Procardia [Nifedipine] Other (See Comments)     Not for sure if rash occurred or rash    Doxycycline Dermatitis    Edecrin [Ethacrynic Acid]     Levaquin [Levofloxacin] Other (See Comments)     unknown    Mobic [Meloxicam]     Vioxx [Rofecoxib]     Celebrex [Celecoxib] Rash    Lexapro [Escitalopram Oxalate] Rash    Sulfa Antibiotics Hives       MEDICATIONS    Current Outpatient Medications on File Prior to Encounter   Medication Sig Dispense Refill    clotrimazole-betamethasone (LOTRISONE) 1-0.05 % cream Apply to legs daily for redness and itching 45 g 2    clotrimazole-betamethasone (LOTRISONE) 1-0.05 % cream Apply to legs and wound beds with dressing changes and as needed.  45 g 1    febuxostat (ULORIC) 40 MG TABS tablet Take 1 tablet by mouth daily 30 tablet 5    torsemide (DEMADEX) 20 MG tablet Take 1 tablet by mouth 3 times daily Pt to take 40 mg in am and 20 mg in the afternoon 90 tablet 3    docusate sodium (COLACE) 100 MG capsule Take 1 capsule by mouth daily 30 capsule 3    bumetanide (BUMEX) 1 MG tablet Take 1 tablet by mouth 2 times daily 30 tablet 3    cilostazol (PLETAL) 50 MG tablet Take 1 tablet by mouth 2 times daily 60 tablet 3    rOPINIRole (REQUIP) 0.5 MG tablet Take 1 tablet by mouth 3 times daily 90 tablet 3    potassium chloride (KLOR-CON M) 20 MEQ extended release tablet Take 1 tablet by mouth 2 times daily 180 tablet 0    metoprolol succinate (TOPROL XL) 25 MG extended release tablet Take 1 tablet by mouth daily 30 tablet 3    silver sulfADIAZINE (SSD) 1 % cream APPLY  CREAM TOPICALLY DAILY 240 g 0  famotidine (PEPCID) 20 MG tablet Take 1 tablet by mouth 2 times daily 60 tablet 5    levothyroxine (SYNTHROID) 50 MCG tablet Take 50 mcg by mouth Daily      Polyethylene Glycol 3350 (MIRALAX PO) Take by mouth      triamcinolone (NASACORT ALLERGY 24HR) 55 MCG/ACT nasal inhaler 2 sprays by Nasal route 2 times daily      fluticasone (FLONASE) 50 MCG/ACT nasal spray       clotrimazole-betamethasone (LOTRISONE) 1-0.05 % cream Apply topically 2 times daily. 1 Tube 0    Loratadine (CLARITIN) 10 MG CAPS Take 10 mg by mouth daily      ferrous sulfate 325 (65 FE) MG tablet Take 1 tablet by mouth 2 times daily (with meals) 30 tablet 3    latanoprost (XALATAN) 0.005 % ophthalmic solution Place 1 drop into both eyes nightly      OXYGEN Inhale 2 L/min into the lungs nightly      Multiple Vitamins-Minerals (ICAPS) CAPS Take  by mouth.  acetaminophen (TYLENOL) 500 MG tablet Take 500 mg by mouth every 6 hours as needed for Pain.  calcium carbonate 600 MG TABS tablet Take 1 tablet by mouth daily. No current facility-administered medications on file prior to encounter. REVIEW OF SYSTEMS    Pertinent items are noted in HPI. Constitutional: Negative for systemic symptoms including fever, chills and malaise. Objective:      BP (!) 156/67   Pulse 76   Temp 95.9 °F (35.5 °C)   Resp 21     PHYSICAL EXAM      General: The patient is in no acute distress. Mental status:  Patient is appropriate, is  oriented to place and plan of care.   Dermatologic exam: Visual inspection of the periwound reveals the skin to be clammy and edematous  Wound exam: see wound description below in procedure note      Assessment:     Problem List Items Addressed This Visit     WD-Lymphedema of both lower extremities with cellulitis    Relevant Medications    lidocaine (XYLOCAINE) 4 % external solution (Start on 2/23/2021  1:30 PM)    betamethasone dipropionate (DIPROLENE) 0.05 % ointment (Start on 2/23/2021  1:45 PM) clobetasol (TEMOVATE) 0.05 % ointment (Start on 2/23/2021  1:45 PM)    Other Relevant Orders    Supply: Wound Cleanser    Supply: Leatha Wound    Supply: Wound Dressings    Supply: Edema Control    WD-Pressure injury of left buttock, stage 3 (HCC) - Primary    Relevant Medications    lidocaine (XYLOCAINE) 4 % external solution (Start on 2/23/2021  1:30 PM)    betamethasone dipropionate (DIPROLENE) 0.05 % ointment (Start on 2/23/2021  1:45 PM)    clobetasol (TEMOVATE) 0.05 % ointment (Start on 2/23/2021  1:45 PM)    Other Relevant Orders    Supply: Wound Cleanser    Supply: Leatha Wound    Supply: Wound Dressings    Supply: Edema Control        Procedure Note    Indications:  Based on my examination of this patient's wound(s) today, sharp excision into necrotic subcutaneous tissue is required to promote healing and evaluate the extent of previous healing. Performed by: TREVON Garland - CNP    Consent obtained: Yes    Time out taken:  Yes    Pain Control: Anesthetic  Anesthetic: 4% Lidocaine Liquid Topical     Debridement:Excisional Debridement    Using curette the wound(s) was/were sharply debrided down through and including the removal of subcutaneous tissue. Devitalized Tissue Debrided:  fibrin, biofilm, slough and exudate    Pre Debridement Measurements:  Are located in the Wound Documentation Flow Sheet    All active wounds listed below with today's date are evaluated  Wound(s)    debrided this date include # : 2 and 3     Post  Debridement Measurements:  Wound 12/22/20 Leg Right; Lower #2 (onset 6 weeks) Right Lower Leg (Active)   Wound Image   02/23/21 1303   Wound Etiology Other 02/02/21 1321   Dressing Status New dressing applied 02/02/21 1345   Wound Cleansed Soap and water 02/23/21 1303   Dressing/Treatment Alginate;Moisturizing cream 01/26/21 1146   Offloading for Diabetic Foot Ulcers No offloading required 02/23/21 1303   Wound Length (cm) 0.7 cm 02/23/21 1303 Wound Width (cm) 0.7 cm 02/23/21 1303   Wound Depth (cm) 0.1 cm 02/23/21 1303   Wound Surface Area (cm^2) 0.49 cm^2 02/23/21 1303   Change in Wound Size % (l*w) -444.44 02/23/21 1303   Wound Volume (cm^3) 0.05 cm^3 02/23/21 1303   Wound Healing % -400 02/23/21 1303   Post-Procedure Length (cm) 2.7 cm 01/19/21 1439   Post-Procedure Width (cm) 1.2 cm 01/19/21 1439   Post-Procedure Depth (cm) 0.1 cm 01/19/21 1439   Post-Procedure Surface Area (cm^2) 3.24 cm^2 01/19/21 1439   Post-Procedure Volume (cm^3) 0.32 cm^3 01/19/21 1439   Distance Tunneling (cm) 0 cm 02/23/21 1303   Tunneling Position ___ O'Clock 0 02/23/21 1303   Undermining Starts ___ O'Clock 0 02/23/21 1303   Undermining Ends___ O'Clock 0 02/23/21 1303   Undermining Maxium Distance (cm) 0 02/23/21 1303   Wound Assessment Pale granulation tissue;Pink/red 02/23/21 1303   Drainage Amount Moderate 02/23/21 1303   Drainage Description Yellow;Serosanguinous 02/23/21 1303   Odor None 02/23/21 1303   Leatha-wound Assessment Edematous 02/23/21 1303   Margins Defined edges 02/23/21 1303   Wound Thickness Description not for Pressure Injury Full thickness 02/23/21 1303   Number of days: 62       Wound 12/22/20 Leg Left; Lower #3 (onset 6 weeks) Left Lower Leg (Active)   Wound Image   02/23/21 1303   Wound Etiology Other 02/02/21 1321   Dressing Status New dressing applied 02/02/21 1345   Wound Cleansed Soap and water 02/23/21 1303   Dressing/Treatment Alginate;Moisturizing cream 01/26/21 1146   Offloading for Diabetic Foot Ulcers No offloading required 02/23/21 1303   Wound Length (cm) 0.5 cm 02/23/21 1303   Wound Width (cm) 0.5 cm 02/23/21 1303   Wound Depth (cm) 0.1 cm 02/23/21 1303   Wound Surface Area (cm^2) 0.25 cm^2 02/23/21 1303   Change in Wound Size % (l*w) 99.41 02/23/21 1303   Wound Volume (cm^3) 0.02 cm^3 02/23/21 1303   Wound Healing % 100 02/23/21 1303   Post-Procedure Length (cm) 0.5 cm 02/23/21 1326   Post-Procedure Width (cm) 0.5 cm 02/23/21 1326 Post-Procedure Depth (cm) 0.1 cm 02/23/21 1326   Post-Procedure Surface Area (cm^2) 0.25 cm^2 02/23/21 1326   Post-Procedure Volume (cm^3) 0.02 cm^3 02/23/21 1326   Distance Tunneling (cm) 0 cm 02/23/21 1303   Tunneling Position ___ O'Clock 0 02/23/21 1303   Undermining Starts ___ O'Clock 0 02/23/21 1303   Undermining Ends___ O'Clock 0 02/23/21 1303   Undermining Maxium Distance (cm) 0 02/23/21 1303   Wound Assessment Fluid filled blister 02/23/21 1303   Drainage Amount Moderate 02/23/21 1303   Drainage Description Serosanguinous 02/23/21 1303   Odor None 02/23/21 1303   Leatha-wound Assessment Fragile 02/23/21 1303   Margins Defined edges 02/23/21 1303   Wound Thickness Description not for Pressure Injury Full thickness 02/23/21 1303   Number of days: 62       Wound 02/09/21 Buttocks Right #3 Right Buttock (Active)   Wound Image   02/09/21 1324   Wound Etiology Pressure Stage  3 02/09/21 1400   Wound Cleansed Cleansed with saline 02/09/21 1324   Wound Length (cm) 0.3 cm 02/09/21 1324   Wound Width (cm) 0.7 cm 02/09/21 1324   Wound Depth (cm) 0.1 cm 02/09/21 1324   Wound Surface Area (cm^2) 0.21 cm^2 02/09/21 1324   Wound Volume (cm^3) 0.02 cm^3 02/09/21 1324   Distance Tunneling (cm) 0 cm 02/09/21 1324   Tunneling Position ___ O'Clock 0 02/09/21 1324   Undermining Starts ___ O'Clock 0 02/09/21 1324   Undermining Ends___ O'Clock 0 02/09/21 1324   Undermining Maxium Distance (cm) 0 02/09/21 1324   Wound Assessment Slough 02/09/21 1324   Drainage Amount Moderate 02/09/21 1324   Drainage Description Serosanguinous 02/09/21 1324   Odor None 02/09/21 1324   Leatha-wound Assessment Blanchable erythema 02/09/21 1324   Margins Defined edges 02/09/21 1324   Wound Thickness Description not for Pressure Injury Full thickness 02/09/21 1324   Number of days: 14       Percent of Wound(s) Debrided: approximately 100%    Total  Area  Debrided:  0.74 sq cm     Bleeding:  Minimal    Hemostasis Achieved:  by pressure Procedural Pain:  1  / 10     Post Procedural Pain:  0 / 10     Response to treatment:  Well tolerated by patient. Status of wound progress and description from last visit:   Much improved. After not being seen for 2 weeks, patient reports that her wounds had, at one point, been completely healed. But in the last couple of days she had blisters open on bilat anterior lower legs. She was also able to tolerate double tubi  more than before. We believe this increased compression was the key for healing. We would like her to try to wear a tubi and an ACE wrap this week, and tolerate this as much as possible. She is seeing her renal doctor next Wednesday, and he plans to address her fluid status. Patient coccyx wound has small open lesions, but patient did not use Lotrisone to this area as instructed. Cleared this up, continue to monitor. Plan:       Discharge Instructions       PHYSICIAN ORDERS AND DISCHARGE INSTRUCTIONS     NOTE: Upon discharge from the [address], you will receive a patient experience survey. We would be grateful if you would take the time to fill this survey out.     Wound care order history:                 CYNTHIA's   Right       Left    Date               Vascular studies:  1/13/21 Arterial were okay, Venous- possibly needs intervention (reffered to [facility])              Cultures:  12/22/20              Antibiotics:                  Compression/Lymph Pumps: referral made to Lymphedema clinic. Pt unable to tolerate compression wraps. Tubi  applied               Grafts:                  Continuing wound care orders and information:              Residence: Private              Continue home health care with: [id number] wound-care supplies will be provided by: . [facility]                            TVGXR cleansing:                           XM not scrub or use excessive force.                          Wash hands with soap and water before and after dressing changes.                         Prior to applying a clean dressing, cleanse wound with normal saline,                          wound cleanser, or mild soap and water.                           Ask your physician or nurse before getting the wound(s) wet in the shower.              Daily Wound management:                          Keep weight off wounds and reposition every 2 hours.                          RSQCN standing for long periods of time.                          Apply wraps/stockings in AM and remove at bedtime.                          Elevate legs to the level of the heart or above for 30 minutes                          4-5 times a day and/or when sitting.                                               When taking antibiotics take entire prescription as ordered by                           MD do not stop taking until medicine is all gone.                              Orders for this week (2/23/21)  Bilateral lower legs- Wash with mild soap and water. Wash with alpha bath   Lotrisone and A&D to intact skin. Cover draining areas with calcium alginate, and ABD    Wrap with conform secure with tape  Apply tubi  G    Wrap with Ace wrap  Change every other day     Buttocks: Apply lotrisone and mepilex border      Rx: [facility]  lotrisone and atarax ordered 02/09/21. Please  and take as directed.    Refferals: Lympedema clinic in [place]     -Nurse Visit: No  -Follow up with Collin Mohamud CNP in 1 weeks in the wound care center  Call [phone] for any questions or concerns        Treatment Note      Written Patient Dismissal Instructions Given            Electronically signed by TREVON Finnegan CNP on 2/23/2021 at 1:29 PM

## 2025-05-13 ENCOUNTER — HOSPITAL ENCOUNTER (OUTPATIENT)
Dept: WOUND CARE | Age: 89
Discharge: HOME | End: 2025-05-13
Attending: NURSE PRACTITIONER
Payer: MEDICARE

## 2025-05-13 VITALS
TEMPERATURE: 98 F | SYSTOLIC BLOOD PRESSURE: 145 MMHG | RESPIRATION RATE: 16 BRPM | DIASTOLIC BLOOD PRESSURE: 57 MMHG | HEART RATE: 67 BPM

## 2025-05-13 DIAGNOSIS — I89.0 LYMPHEDEMA OF BOTH LOWER EXTREMITIES: ICD-10-CM

## 2025-05-13 DIAGNOSIS — E11.9 DIET-CONTROLLED TYPE 2 DIABETES MELLITUS (HCC): Primary | ICD-10-CM

## 2025-05-13 DIAGNOSIS — L84 PRE-ULCERATIVE CALLUSES: ICD-10-CM

## 2025-05-13 DIAGNOSIS — I87.2 VENOUS STASIS DERMATITIS OF BOTH LOWER EXTREMITIES: ICD-10-CM

## 2025-05-13 PROCEDURE — 11056 PARNG/CUTG B9 HYPRKR LES 2-4: CPT | Performed by: NURSE PRACTITIONER

## 2025-05-13 PROCEDURE — 11056 PARNG/CUTG B9 HYPRKR LES 2-4: CPT

## 2025-05-13 NOTE — PROGRESS NOTES
Wound Care Center Progress Visit      Nicci Alberto  AGE: 89 y.o.   GENDER: female  : 1935  EPISODE DATE:  2025   Referred by: Fern Steven APRN - CNP     Subjective:     CHIEF COMPLAINT  WOUND   Problem List Items Addressed This Visit          Circulatory    Venous stasis dermatitis of both lower extremities       Endocrine    Diet-controlled type 2 diabetes mellitus (HCC) - Primary       Musculoskeletal and Integument    Pre-ulcerative calluses       Other    Lymphedema of both lower extremities     Chief Complaint   Patient presents with    Wound Check        HISTORY of PRESENT ILLNESS      Nicci Alberto is a 89 y.o. female who presents to the Wound Clinic for evaluation and treatment of diabetic foot assessment and routine care with diabetes and history of diabetic wounds with pre-ulcerative callus.  The condition is of moderate severity. The patient is well known to the wound clinic and has been treated for diabetic, pressure, venous and lymphedema wounds in the past at varying degrees of severity with recurrent cellulitis and difficulty healing.  Advanced wound care modalities established with initiation of care at the wound clinic.The patient has significant underlying medical conditions as below. Fern Steven APRN - CNP .    Living Situation  [x] Home [] SNF []  Assisted living  [] Other (ie rehab, etc.) [] Home Health  []  Transportation    Wound Pain Timing/Severity: intermittent, mild  Quality of pain: aching, tender  Severity of pain:  2 / 10   Modifying Factors: venous stasis, lymphedema, diabetes, chronic pressure, decreased mobility, shear force, and obesity  Associated Signs/Symptoms: edema and pain    Wound status:  2025       The patient was examined for peripheral arterial disease, neuropathy, and any structural abnormalities that would place the patient at risk for ulceration. Discussed the importance of tight blood sugar control and the effects on their lower

## 2025-05-13 NOTE — PATIENT INSTRUCTIONS
PHYSICIAN ORDERS AND DISCHARGE INSTRUCTIONS  NOTE: Upon discharge from the Wound Center, you will receive a patient experience survey via E-mail. We would be grateful if you would take the time to fill this survey out.  Wound care order history:   CYNTHIA's   Right       Left    Date    Vascular studies/Intervention: .     Cultures: .               Antibiotics: .               HbA1c:  .               Grafts:  .   Ju[employer] L[employer] & Lymph Pumps:  Continuing wound care orders and information:              Residence: .              Continue home health care with:.    Your wound-care supplies will be provided by: [facility]               Pharmacy: [facility]   Wound cleansing:      Do not scrub or use excessive force.    Wash hands with soap and water before and after dressing changes.    Prior to applying a clean dressing, cleanse wound with normal saline,    wound cleanser, or mild soap and water.     Ask your physician or nurse before getting the wound(s) wet in the shower.  Daily Wound management:    Keep weight off wounds and reposition every 2 hours.    Avoid standing for long periods of time.    Evaluate legs to the level of the heart or above for 30 minutes 4-5 times a day and/or when sitting.       When taking antibiotics take entire prescription as ordered by MD do not stop taking until medicine is all gone.     Documentation:  Compression: .   Offloading: .   Toenails: Done one 3/28/25, due 5/25/25        Orders for this week (5/13/2025):  Bilateral Legs and Feet- Wash with mild soap and water  A&D on legs and feet   Paint toe nails and entire second toe wound with Betadine  Secure with Gentac   Leave till Monday then begin moisturizing daily    Follow up with ROMI Dumont when needed   Call [phone] for any questions or concerns.

## 2025-06-13 ENCOUNTER — HOSPITAL ENCOUNTER (OUTPATIENT)
Dept: WOUND CARE | Age: 89
Discharge: HOME | End: 2025-06-13
Attending: NURSE PRACTITIONER
Payer: MEDICARE

## 2025-06-13 VITALS
RESPIRATION RATE: 18 BRPM | SYSTOLIC BLOOD PRESSURE: 163 MMHG | HEART RATE: 68 BPM | TEMPERATURE: 97.9 F | DIASTOLIC BLOOD PRESSURE: 59 MMHG

## 2025-06-13 DIAGNOSIS — L97.521 DIABETIC ULCER OF TOE OF LEFT FOOT ASSOCIATED WITH TYPE 2 DIABETES MELLITUS, LIMITED TO BREAKDOWN OF SKIN (HCC): ICD-10-CM

## 2025-06-13 DIAGNOSIS — L89.323 DECUBITUS ULCER OF LEFT BUTTOCK, STAGE 3 (HCC): Primary | ICD-10-CM

## 2025-06-13 DIAGNOSIS — E11.621 DIABETIC ULCER OF TOE OF LEFT FOOT ASSOCIATED WITH TYPE 2 DIABETES MELLITUS, LIMITED TO BREAKDOWN OF SKIN (HCC): ICD-10-CM

## 2025-06-13 DIAGNOSIS — E11.9 DIET-CONTROLLED TYPE 2 DIABETES MELLITUS (HCC): ICD-10-CM

## 2025-06-13 DIAGNOSIS — L84 PRE-ULCERATIVE CALLUSES: ICD-10-CM

## 2025-06-13 PROCEDURE — 11042 DBRDMT SUBQ TIS 1ST 20SQCM/<: CPT | Performed by: NURSE PRACTITIONER

## 2025-06-13 PROCEDURE — 99213 OFFICE O/P EST LOW 20 MIN: CPT | Performed by: NURSE PRACTITIONER

## 2025-06-13 PROCEDURE — 11042 DBRDMT SUBQ TIS 1ST 20SQCM/<: CPT

## 2025-06-13 ASSESSMENT — PAIN DESCRIPTION - FREQUENCY: FREQUENCY: CONTINUOUS

## 2025-06-13 ASSESSMENT — PAIN SCALES - GENERAL: PAINLEVEL_OUTOF10: 5

## 2025-06-13 ASSESSMENT — PAIN DESCRIPTION - LOCATION: LOCATION: BUTTOCKS

## 2025-06-13 ASSESSMENT — PAIN DESCRIPTION - DESCRIPTORS: DESCRIPTORS: SORE

## 2025-06-13 NOTE — PROGRESS NOTES
CYSTOSCOPY N/A 12/29/2023    CYSTOSCOPY EXTENSIVE EVACUATION OF CLOTS WITH FULGURATION OF URETHRAL BLEEDING performed by Neva Solares MD at [facility] OR    EYE SURGERY      bilateral implants    HYSTERECTOMY (CERVIX STATUS UNKNOWN)      JOINT REPLACEMENT Right     knee    PACEMAKER INSERTION N/A 11/17/2020    PACEMAKER INSERTION PERMANENT REMOVAL OF TEMPORARY PACEMAKER performed by Fahad Ann MD at [facility] OR    URETHRA SURGERY         FAMILY HISTORY    Family History   Problem Relation Age of Onset    Heart Disease Father        SOCIAL HISTORY    Social History     Tobacco Use    Smoking status: Never    Smokeless tobacco: Never    Tobacco comments:     Reviewed 2-16-24   Vaping Use    Vaping status: Never Used   Substance Use Topics    Alcohol use: No    Drug use: No       ALLERGIES    Allergies   Allergen Reactions    Latex Rash    Dye [Iodides] Anaphylaxis    Iodine Anaphylaxis    Dyazide [Hydrochlorothiazide-Triamterene] Rash    Lasix [Furosemide] Rash    Procardia [Nifedipine] Other (See Comments)     Not for sure if rash occurred or rash    Doxycycline Dermatitis    Edecrin [Ethacrynic Acid]     Farxiga [Dapagliflozin]     Levaquin [Levofloxacin] Other (See Comments)     unknown    Mobic [Meloxicam]     Vioxx [Rofecoxib]     Celebrex [Celecoxib] Rash    Lexapro [Escitalopram Oxalate] Rash    Sulfa Antibiotics Hives       MEDICATIONS    Current Outpatient Medications on File Prior to Encounter   Medication Sig Dispense Refill    MAGnesium-Oxide 400 (240 Mg) MG tablet Take 2 tablets by mouth 3 times daily 540 tablet 3    spironolactone (ALDACTONE) 50 MG tablet Take 1 tablet by mouth once daily 90 tablet 0    vitamin D (SV VITAMIN D3) 50 MCG (2000 UT) CAPS capsule Take 1 capsule by mouth daily 90 capsule 3    potassium chloride (KLOR-CON M) 20 MEQ extended release tablet Take 1 tablet by mouth 2 times daily 180 tablet 1    febuxostat (ULORIC) 40 MG TABS tablet Take 1 tablet by mouth once daily 90  Never

## 2025-07-01 ENCOUNTER — HOSPITAL ENCOUNTER (OUTPATIENT)
Dept: WOUND CARE | Age: 89
Discharge: HOME | End: 2025-07-01
Attending: NURSE PRACTITIONER
Payer: MEDICARE

## 2025-07-01 VITALS — HEART RATE: 67 BPM | SYSTOLIC BLOOD PRESSURE: 128 MMHG | TEMPERATURE: 97.8 F | DIASTOLIC BLOOD PRESSURE: 55 MMHG

## 2025-07-01 DIAGNOSIS — Z86.31 HISTORY OF DIABETIC ULCER OF FOOT: ICD-10-CM

## 2025-07-01 DIAGNOSIS — E11.621 DIABETIC ULCER OF TOE OF LEFT FOOT ASSOCIATED WITH TYPE 2 DIABETES MELLITUS, LIMITED TO BREAKDOWN OF SKIN (HCC): ICD-10-CM

## 2025-07-01 DIAGNOSIS — L97.521 DIABETIC ULCER OF TOE OF LEFT FOOT ASSOCIATED WITH TYPE 2 DIABETES MELLITUS, LIMITED TO BREAKDOWN OF SKIN (HCC): ICD-10-CM

## 2025-07-01 DIAGNOSIS — I89.0 LYMPHEDEMA OF BOTH LOWER EXTREMITIES: ICD-10-CM

## 2025-07-01 DIAGNOSIS — E11.9 DIET-CONTROLLED TYPE 2 DIABETES MELLITUS (HCC): ICD-10-CM

## 2025-07-01 DIAGNOSIS — L84 PRE-ULCERATIVE CALLUSES: ICD-10-CM

## 2025-07-01 DIAGNOSIS — L97.509 NON-PRESSURE ULCER OF TOE (HCC): ICD-10-CM

## 2025-07-01 DIAGNOSIS — L97.512 TRAUMATIC ULCER OF RIGHT FOOT WITH FAT LAYER EXPOSED (HCC): Primary | ICD-10-CM

## 2025-07-01 PROCEDURE — 99213 OFFICE O/P EST LOW 20 MIN: CPT | Performed by: NURSE PRACTITIONER

## 2025-07-01 PROCEDURE — 99213 OFFICE O/P EST LOW 20 MIN: CPT

## 2025-07-01 PROCEDURE — 10060 I&D ABSCESS SIMPLE/SINGLE: CPT

## 2025-07-01 PROCEDURE — 11042 DBRDMT SUBQ TIS 1ST 20SQCM/<: CPT

## 2025-07-01 PROCEDURE — 11042 DBRDMT SUBQ TIS 1ST 20SQCM/<: CPT | Performed by: NURSE PRACTITIONER

## 2025-07-01 RX ORDER — LIDOCAINE 50 MG/G
OINTMENT TOPICAL PRN
OUTPATIENT
Start: 2025-07-01

## 2025-07-01 RX ORDER — TRIAMCINOLONE ACETONIDE 1 MG/G
OINTMENT TOPICAL PRN
OUTPATIENT
Start: 2025-07-01

## 2025-07-01 RX ORDER — SODIUM CHLOR/HYPOCHLOROUS ACID 0.033 %
SOLUTION, IRRIGATION IRRIGATION PRN
OUTPATIENT
Start: 2025-07-01

## 2025-07-01 RX ORDER — LIDOCAINE 40 MG/G
CREAM TOPICAL PRN
OUTPATIENT
Start: 2025-07-01

## 2025-07-01 RX ORDER — LIDOCAINE 50 MG/G
OINTMENT TOPICAL PRN
Status: DISCONTINUED | OUTPATIENT
Start: 2025-07-01 | End: 2025-07-02 | Stop reason: HOSPADM

## 2025-07-01 RX ORDER — LIDOCAINE HYDROCHLORIDE 20 MG/ML
JELLY TOPICAL PRN
OUTPATIENT
Start: 2025-07-01

## 2025-07-01 RX ORDER — BACITRACIN ZINC AND POLYMYXIN B SULFATE 500; 1000 [USP'U]/G; [USP'U]/G
OINTMENT TOPICAL PRN
OUTPATIENT
Start: 2025-07-01

## 2025-07-01 RX ORDER — LIDOCAINE HYDROCHLORIDE 40 MG/ML
SOLUTION TOPICAL PRN
OUTPATIENT
Start: 2025-07-01

## 2025-07-01 RX ORDER — GENTAMICIN SULFATE 1 MG/G
OINTMENT TOPICAL PRN
Status: DISCONTINUED | OUTPATIENT
Start: 2025-07-01 | End: 2025-07-02 | Stop reason: HOSPADM

## 2025-07-01 RX ORDER — GENTAMICIN SULFATE 1 MG/G
OINTMENT TOPICAL PRN
OUTPATIENT
Start: 2025-07-01

## 2025-07-01 RX ORDER — CLOBETASOL PROPIONATE 0.5 MG/G
OINTMENT TOPICAL PRN
OUTPATIENT
Start: 2025-07-01

## 2025-07-01 RX ORDER — NEOMYCIN/BACITRACIN/POLYMYXINB 3.5-400-5K
OINTMENT (GRAM) TOPICAL PRN
OUTPATIENT
Start: 2025-07-01

## 2025-07-01 RX ORDER — BETAMETHASONE DIPROPIONATE 0.5 MG/G
CREAM TOPICAL PRN
OUTPATIENT
Start: 2025-07-01

## 2025-07-01 RX ORDER — MUPIROCIN 2 %
OINTMENT (GRAM) TOPICAL PRN
OUTPATIENT
Start: 2025-07-01

## 2025-07-01 RX ORDER — GINSENG 100 MG
CAPSULE ORAL PRN
OUTPATIENT
Start: 2025-07-01

## 2025-07-01 RX ADMIN — GENTAMICIN SULFATE: 1 OINTMENT TOPICAL at 15:14

## 2025-07-01 RX ADMIN — LIDOCAINE: 50 OINTMENT TOPICAL at 15:15

## 2025-07-01 ASSESSMENT — PAIN DESCRIPTION - ORIENTATION: ORIENTATION: RIGHT

## 2025-07-01 ASSESSMENT — PAIN DESCRIPTION - DESCRIPTORS: DESCRIPTORS: DISCOMFORT;CRAMPING;ACHING

## 2025-07-01 ASSESSMENT — PAIN DESCRIPTION - LOCATION: LOCATION: TOE (COMMENT WHICH ONE)

## 2025-07-01 ASSESSMENT — PAIN SCALES - GENERAL: PAINLEVEL_OUTOF10: 10

## 2025-07-01 NOTE — PROGRESS NOTES
Wound Care Center Progress Visit      Nicci Alberto  AGE: 89 y.o.   GENDER: female  : 1935  EPISODE DATE:  2025   Referred by: Fern Steven APRN - CNP     Subjective:     CHIEF COMPLAINT  WOUND   Problem List Items Addressed This Visit          Endocrine    Diet-controlled type 2 diabetes mellitus (HCC)    Relevant Medications    lidocaine (XYLOCAINE) 5 % ointment    gentamicin (GARAMYCIN) 0.1 % ointment    Other Relevant Orders    Initiate Outpatient Wound Care Protocol    Diabetic ulcer of toe of left foot associated with type 2 diabetes mellitus, limited to breakdown of skin (HCC)    Relevant Medications    lidocaine (XYLOCAINE) 5 % ointment    gentamicin (GARAMYCIN) 0.1 % ointment    Other Relevant Orders    Initiate Outpatient Wound Care Protocol       Musculoskeletal and Integument    Pre-ulcerative calluses    Relevant Medications    lidocaine (XYLOCAINE) 5 % ointment    gentamicin (GARAMYCIN) 0.1 % ointment    Other Relevant Orders    Initiate Outpatient Wound Care Protocol       Other    Lymphedema of both lower extremities    Relevant Medications    lidocaine (XYLOCAINE) 5 % ointment    gentamicin (GARAMYCIN) 0.1 % ointment    Other Relevant Orders    Initiate Outpatient Wound Care Protocol    History of diabetic ulcer of foot    Relevant Medications    lidocaine (XYLOCAINE) 5 % ointment    gentamicin (GARAMYCIN) 0.1 % ointment    Other Relevant Orders    Initiate Outpatient Wound Care Protocol    Traumatic ulcer of right foot with fat layer exposed (HCC) - Primary    Non-pressure ulcer of toe (HCC) -right great     Chief Complaint   Patient presents with    Wound Check        HISTORY of PRESENT ILLNESS      Nicci Alberto is a 89 y.o. female who presents to the Wound Clinic for evaluation and treatment of diabetic foot assessment and routine care with diabetes and history of diabetic wounds with pre-ulcerative callus.  The condition is of moderate severity. The patient is well known to

## 2025-07-01 NOTE — PATIENT INSTRUCTIONS
PHYSICIAN ORDERS AND DISCHARGE INSTRUCTIONS  NOTE: Upon discharge from the Wound Center, you will receive a patient experience survey via E-mail. We would be grateful if you would take the time to fill this survey out.  Wound care order history:   CYNTHIA's   Right       Left    Date    Vascular studies/Intervention: .     Cultures: .               Antibiotics:               HbA1c:  .               Grafts:  .   Juxta Lites & Lymph Pumps:  Continuing wound care orders and information:              Residence: .              Continue home health care with:.    Your wound-care supplies will be provided by: [facility]               Pharmacy: [facility]   Wound cleansing:      Do not scrub or use excessive force.    Wash hands with soap and water before and after dressing changes.    Prior to applying a clean dressing, cleanse wound with normal saline,    wound cleanser, or mild soap and water.     Ask your physician or nurse before getting the wound(s) wet in the shower.  Daily Wound management:    Keep [id number] off wounds and reposition every 2 hours.    Avoid standing for long periods of time.    Evaluate legs to the level of the heart or above for 30 minutes 4-5 times a day and/or when sitting.       When taking antibiotics take entire prescription as ordered by MD do not stop taking until medicine is all gone.     Documentation:  Compression: .   Offloading: .   Toenails: Done one 3/28/25, due 5/25/25        Orders for this week (7/1/2025):  Right Great Toe wound   Apply Gentamicin, Sitmulen and Lidocaine to wound bed (give extra to pt)   Cover with Versatel and Ca Alginate   Wrap with Coban   Change every other day     Left 2nd Toe wound  Apply Gentamicin, Sitmulen and Lidocaine to wound bed (give extra to pt)   Cover with Ca Alginate   Wrap with Coban   Change every other day     Follow up with ROMI Dumont in 1 week at the wound care center   Call [phone] for any questions or concerns.

## 2025-07-02 ENCOUNTER — HOSPITAL ENCOUNTER (OUTPATIENT)
Age: 89
Discharge: HOME | End: 2025-07-02
Payer: MEDICARE

## 2025-07-02 DIAGNOSIS — N17.9 AKI (ACUTE KIDNEY INJURY): ICD-10-CM

## 2025-07-02 LAB
ALBUMIN SERPL-MCNC: 4.1 G/DL (ref 3.4–5)
ALBUMIN/GLOB SERPL: 1.5 {RATIO}
ALP SERPL-CCNC: 82 U/L (ref 40–129)
ALT SERPL-CCNC: 12 U/L (ref 10–40)
ANION GAP SERPL CALCULATED.3IONS-SCNC: 13 MMOL/L (ref 9–17)
AST SERPL-CCNC: 18 U/L (ref 15–37)
BACTERIA URNS QL MICRO: ABNORMAL
BILIRUB SERPL-MCNC: 0.6 MG/DL (ref 0–1)
BILIRUB UR QL STRIP: NEGATIVE
BUN SERPL-MCNC: 121 MG/DL (ref 7–20)
CALCIUM SERPL-MCNC: 10 MG/DL (ref 8.3–10.6)
CASTS #/AREA URNS LPF: ABNORMAL /LPF
CHLORIDE SERPL-SCNC: 90 MMOL/L (ref 99–110)
CLARITY UR: CLEAR
CO2 SERPL-SCNC: 32 MMOL/L (ref 21–32)
COLOR UR: YELLOW
CREAT SERPL-MCNC: 2.4 MG/DL (ref 0.6–1.2)
EPI CELLS #/AREA URNS HPF: ABNORMAL /HPF
GFR, ESTIMATED: 19 ML/MIN/1.73M2
GLUCOSE SERPL-MCNC: 141 MG/DL (ref 74–99)
GLUCOSE UR STRIP-MCNC: NEGATIVE MG/DL
HGB UR QL STRIP.AUTO: NEGATIVE
KETONES UR STRIP-MCNC: NEGATIVE MG/DL
LEUKOCYTE ESTERASE UR QL STRIP: ABNORMAL
NITRITE UR QL STRIP: NEGATIVE
PH UR STRIP: 7.5 [PH] (ref 5–8)
POTASSIUM SERPL-SCNC: 4 MMOL/L (ref 3.5–5.1)
PROT SERPL-MCNC: 6.8 G/DL (ref 6.4–8.2)
PROT UR STRIP-MCNC: NEGATIVE MG/DL
RBC #/AREA URNS HPF: ABNORMAL /HPF
SODIUM SERPL-SCNC: 135 MMOL/L (ref 136–145)
SP GR UR STRIP: 1.01 (ref 1–1.03)
UROBILINOGEN UR STRIP-ACNC: 0.2 EU/DL (ref 0–1)
WBC #/AREA URNS HPF: ABNORMAL /HPF

## 2025-07-02 PROCEDURE — 81001 URINALYSIS AUTO W/SCOPE: CPT

## 2025-07-02 PROCEDURE — 36415 COLL VENOUS BLD VENIPUNCTURE: CPT

## 2025-07-02 PROCEDURE — 80053 COMPREHEN METABOLIC PANEL: CPT

## 2025-07-15 ENCOUNTER — HOSPITAL ENCOUNTER (OUTPATIENT)
Dept: WOUND CARE | Age: 89
Discharge: HOME | End: 2025-07-15
Attending: NURSE PRACTITIONER
Payer: MEDICARE

## 2025-07-15 DIAGNOSIS — Z86.31 HISTORY OF DIABETIC ULCER OF FOOT: ICD-10-CM

## 2025-07-15 DIAGNOSIS — E11.9 DIET-CONTROLLED TYPE 2 DIABETES MELLITUS (HCC): ICD-10-CM

## 2025-07-15 DIAGNOSIS — L97.512 TRAUMATIC ULCER OF RIGHT FOOT WITH FAT LAYER EXPOSED (HCC): ICD-10-CM

## 2025-07-15 DIAGNOSIS — L89.323 DECUBITUS ULCER OF LEFT BUTTOCK, STAGE 3 (HCC): ICD-10-CM

## 2025-07-15 DIAGNOSIS — I87.2 VENOUS STASIS DERMATITIS OF BOTH LOWER EXTREMITIES: ICD-10-CM

## 2025-07-15 DIAGNOSIS — I89.0 LYMPHEDEMA OF BOTH LOWER EXTREMITIES: ICD-10-CM

## 2025-07-15 DIAGNOSIS — L97.521 DIABETIC ULCER OF TOE OF LEFT FOOT ASSOCIATED WITH TYPE 2 DIABETES MELLITUS, LIMITED TO BREAKDOWN OF SKIN (HCC): Primary | ICD-10-CM

## 2025-07-15 DIAGNOSIS — M79.3 LIPODERMATOSCLEROSIS OF BOTH LOWER EXTREMITIES: ICD-10-CM

## 2025-07-15 DIAGNOSIS — E11.621 DIABETIC ULCER OF TOE OF LEFT FOOT ASSOCIATED WITH TYPE 2 DIABETES MELLITUS, LIMITED TO BREAKDOWN OF SKIN (HCC): Primary | ICD-10-CM

## 2025-07-15 DIAGNOSIS — L84 PRE-ULCERATIVE CALLUSES: ICD-10-CM

## 2025-07-15 DIAGNOSIS — L60.2 LONG TOENAIL: ICD-10-CM

## 2025-07-15 PROCEDURE — 11042 DBRDMT SUBQ TIS 1ST 20SQCM/<: CPT

## 2025-07-15 PROCEDURE — 11042 DBRDMT SUBQ TIS 1ST 20SQCM/<: CPT | Performed by: NURSE PRACTITIONER

## 2025-07-15 PROCEDURE — 11719 TRIM NAIL(S) ANY NUMBER: CPT

## 2025-07-15 PROCEDURE — 11719 TRIM NAIL(S) ANY NUMBER: CPT | Performed by: NURSE PRACTITIONER

## 2025-07-15 RX ORDER — BETAMETHASONE DIPROPIONATE 0.5 MG/G
CREAM TOPICAL PRN
OUTPATIENT
Start: 2025-07-15

## 2025-07-15 RX ORDER — GENTAMICIN SULFATE 1 MG/G
OINTMENT TOPICAL PRN
Status: DISCONTINUED | OUTPATIENT
Start: 2025-07-15 | End: 2025-07-16 | Stop reason: HOSPADM

## 2025-07-15 RX ORDER — TRIAMCINOLONE ACETONIDE 1 MG/G
OINTMENT TOPICAL PRN
OUTPATIENT
Start: 2025-07-15

## 2025-07-15 RX ORDER — LIDOCAINE HYDROCHLORIDE 20 MG/ML
JELLY TOPICAL PRN
OUTPATIENT
Start: 2025-07-15

## 2025-07-15 RX ORDER — LIDOCAINE HYDROCHLORIDE 40 MG/ML
SOLUTION TOPICAL PRN
OUTPATIENT
Start: 2025-07-15

## 2025-07-15 RX ORDER — CLOBETASOL PROPIONATE 0.5 MG/G
OINTMENT TOPICAL PRN
OUTPATIENT
Start: 2025-07-15

## 2025-07-15 RX ORDER — GINSENG 100 MG
CAPSULE ORAL PRN
OUTPATIENT
Start: 2025-07-15

## 2025-07-15 RX ORDER — LIDOCAINE 50 MG/G
OINTMENT TOPICAL PRN
OUTPATIENT
Start: 2025-07-15

## 2025-07-15 RX ORDER — SODIUM CHLOR/HYPOCHLOROUS ACID 0.033 %
SOLUTION, IRRIGATION IRRIGATION PRN
OUTPATIENT
Start: 2025-07-15

## 2025-07-15 RX ORDER — GENTAMICIN SULFATE 1 MG/G
OINTMENT TOPICAL PRN
OUTPATIENT
Start: 2025-07-15

## 2025-07-15 RX ORDER — MUPIROCIN 2 %
OINTMENT (GRAM) TOPICAL PRN
OUTPATIENT
Start: 2025-07-15

## 2025-07-15 RX ORDER — BACITRACIN ZINC AND POLYMYXIN B SULFATE 500; 1000 [USP'U]/G; [USP'U]/G
OINTMENT TOPICAL PRN
OUTPATIENT
Start: 2025-07-15

## 2025-07-15 RX ORDER — LIDOCAINE 40 MG/G
CREAM TOPICAL PRN
OUTPATIENT
Start: 2025-07-15

## 2025-07-15 RX ORDER — TRIAMCINOLONE ACETONIDE 1 MG/G
CREAM TOPICAL
Qty: 453 G | Refills: 0 | Status: SHIPPED | OUTPATIENT
Start: 2025-07-15

## 2025-07-15 RX ORDER — NEOMYCIN/BACITRACIN/POLYMYXINB 3.5-400-5K
OINTMENT (GRAM) TOPICAL PRN
OUTPATIENT
Start: 2025-07-15

## 2025-07-15 RX ADMIN — GENTAMICIN SULFATE: 1 OINTMENT TOPICAL at 10:45

## 2025-07-15 NOTE — PATIENT INSTRUCTIONS
PHYSICIAN ORDERS AND DISCHARGE INSTRUCTIONS  NOTE: Upon discharge from the Wound Center, you will receive a patient experience survey via E-mail. We would be grateful if you would take the time to fill this survey out.  Wound care order history:   CYNTHIA's   Right       Left    Date    Vascular studies/Intervention: .     Cultures: .               Antibiotics:               HbA1c:  .               Grafts:  .   Juxta Lites & Lymph Pumps:  Continuing wound care orders and information:              Residence: .              Continue home health care with:.    Your wound-care supplies will be provided by: [facility]               Pharmacy: [facility]   Wound cleansing:      Do not scrub or use excessive force.    Wash hands with soap and water before and after dressing changes.    Prior to applying a clean dressing, cleanse wound with normal saline,    wound cleanser, or mild soap and water.     Ask your physician or nurse before getting the wound(s) wet in the shower.  Daily Wound management:    Keep [id number] off wounds and reposition every 2 hours.    Avoid standing for long periods of time.    Evaluate legs to the level of the heart or above for 30 minutes 4-5 times a day and/or when sitting.       When taking antibiotics take entire prescription as ordered by MD do not stop taking until medicine is all gone.     Documentation:  Compression: .   Offloading: .   Toenails: Done 7/15/25 Due 9/16/25        Orders for this week (7/15/2025):  Right Great Toe wound   Apply Gentamicin, Sitmulen and Lidocaine to wound bed (give extra to pt)   Cover with Versatel and Calcium Alginate   Wrap with Coban   Change every other day     Left 2nd Toe wound  Apply Gentamicin, Sitmulen and Lidocaine to wound bed (give extra to pt)   Cover with Calcium Alginate   Wrap with Coban   Change every other day     Follow up with ROMI Dumont in 1 week at the wound care center   Call [phone] for any questions or concerns.

## 2025-07-15 NOTE — PROGRESS NOTES
Wound Care Center Progress Visit      Nicci Alberto  AGE: 89 y.o.   GENDER: female  : 1935  EPISODE DATE:  7/15/2025   Referred by: Fern Steven APRN - CNP     Subjective:     CHIEF COMPLAINT  WOUND   Problem List Items Addressed This Visit          Circulatory    Venous stasis dermatitis of both lower extremities       Endocrine    Diet-controlled type 2 diabetes mellitus (HCC)    Relevant Medications    gentamicin (GARAMYCIN) 0.1 % ointment    Other Relevant Orders    Initiate Outpatient Wound Care Protocol    Diabetic ulcer of toe of left foot associated with type 2 diabetes mellitus, limited to breakdown of skin ([facility]) - Primary    Relevant Medications    gentamicin (GARAMYCIN) 0.1 % ointment    Other Relevant Orders    Initiate Outpatient Wound Care Protocol       Musculoskeletal and Integument    Pre-ulcerative calluses    Relevant Medications    gentamicin (GARAMYCIN) 0.1 % ointment    Other Relevant Orders    Initiate Outpatient Wound Care Protocol    Lipodermatosclerosis of both lower extremities       Other    Lymphedema of both lower extremities    Relevant Medications    gentamicin (GARAMYCIN) 0.1 % ointment    Other Relevant Orders    Initiate Outpatient Wound Care Protocol    Long toenail    History of diabetic ulcer of foot    Relevant Medications    gentamicin (GARAMYCIN) 0.1 % ointment    Other Relevant Orders    Initiate Outpatient Wound Care Protocol    Decubitus ulcer of left buttock, stage 3 ([facility])    Relevant Medications    gentamicin (GARAMYCIN) 0.1 % ointment    Other Relevant Orders    Initiate Outpatient Wound Care Protocol    Traumatic ulcer of right foot with fat layer exposed ([facility])     Chief Complaint   Patient presents with    Wound Check        HISTORY of PRESENT ILLNESS      Nicci Alberto is a 89 y.o. female who presents to the Wound Clinic for evaluation and treatment of diabetic foot assessment and routine care with diabetes and history of diabetic wounds with

## 2025-08-26 ENCOUNTER — HOSPITAL ENCOUNTER (OUTPATIENT)
Dept: WOUND CARE | Age: 89
Discharge: HOME | End: 2025-08-26
Attending: NURSE PRACTITIONER
Payer: MEDICARE

## 2025-08-26 VITALS — HEART RATE: 61 BPM | TEMPERATURE: 97.6 F | SYSTOLIC BLOOD PRESSURE: 139 MMHG | DIASTOLIC BLOOD PRESSURE: 43 MMHG

## 2025-08-26 DIAGNOSIS — E11.9 DIET-CONTROLLED TYPE 2 DIABETES MELLITUS (HCC): ICD-10-CM

## 2025-08-26 DIAGNOSIS — L84 PRE-ULCERATIVE CALLUSES: ICD-10-CM

## 2025-08-26 DIAGNOSIS — E11.621 DIABETIC ULCER OF TOE OF RIGHT FOOT ASSOCIATED WITH TYPE 2 DIABETES MELLITUS, LIMITED TO BREAKDOWN OF SKIN (HCC): ICD-10-CM

## 2025-08-26 DIAGNOSIS — Z86.31 HISTORY OF DIABETIC ULCER OF FOOT: ICD-10-CM

## 2025-08-26 DIAGNOSIS — L60.2 LONG TOENAIL: ICD-10-CM

## 2025-08-26 DIAGNOSIS — L97.511 DIABETIC ULCER OF TOE OF RIGHT FOOT ASSOCIATED WITH TYPE 2 DIABETES MELLITUS, LIMITED TO BREAKDOWN OF SKIN (HCC): ICD-10-CM

## 2025-08-26 DIAGNOSIS — E11.621 DIABETIC ULCER OF TOE OF LEFT FOOT ASSOCIATED WITH TYPE 2 DIABETES MELLITUS, LIMITED TO BREAKDOWN OF SKIN (HCC): Primary | ICD-10-CM

## 2025-08-26 DIAGNOSIS — L97.521 DIABETIC ULCER OF TOE OF LEFT FOOT ASSOCIATED WITH TYPE 2 DIABETES MELLITUS, LIMITED TO BREAKDOWN OF SKIN (HCC): Primary | ICD-10-CM

## 2025-08-26 DIAGNOSIS — I89.0 LYMPHEDEMA OF BOTH LOWER EXTREMITIES: ICD-10-CM

## 2025-08-26 PROCEDURE — 11719 TRIM NAIL(S) ANY NUMBER: CPT | Performed by: NURSE PRACTITIONER

## 2025-08-26 PROCEDURE — 11719 TRIM NAIL(S) ANY NUMBER: CPT

## 2025-08-26 PROCEDURE — 11042 DBRDMT SUBQ TIS 1ST 20SQCM/<: CPT

## 2025-08-26 PROCEDURE — 11056 PARNG/CUTG B9 HYPRKR LES 2-4: CPT

## 2025-08-26 PROCEDURE — 11042 DBRDMT SUBQ TIS 1ST 20SQCM/<: CPT | Performed by: NURSE PRACTITIONER

## 2025-08-26 PROCEDURE — 11056 PARNG/CUTG B9 HYPRKR LES 2-4: CPT | Performed by: NURSE PRACTITIONER

## 2025-08-26 RX ORDER — TRIAMCINOLONE ACETONIDE 1 MG/G
OINTMENT TOPICAL PRN
OUTPATIENT
Start: 2025-08-26

## 2025-08-26 RX ORDER — BETAMETHASONE DIPROPIONATE 0.5 MG/G
CREAM TOPICAL PRN
OUTPATIENT
Start: 2025-08-26

## 2025-08-26 RX ORDER — LIDOCAINE 50 MG/G
OINTMENT TOPICAL PRN
Status: DISCONTINUED | OUTPATIENT
Start: 2025-08-26 | End: 2025-08-27 | Stop reason: HOSPADM

## 2025-08-26 RX ORDER — LIDOCAINE 40 MG/G
CREAM TOPICAL PRN
OUTPATIENT
Start: 2025-08-26

## 2025-08-26 RX ORDER — MUPIROCIN 2 %
OINTMENT (GRAM) TOPICAL PRN
OUTPATIENT
Start: 2025-08-26

## 2025-08-26 RX ORDER — GENTAMICIN SULFATE 1 MG/G
OINTMENT TOPICAL PRN
OUTPATIENT
Start: 2025-08-26

## 2025-08-26 RX ORDER — LIDOCAINE 50 MG/G
OINTMENT TOPICAL PRN
OUTPATIENT
Start: 2025-08-26

## 2025-08-26 RX ORDER — LIDOCAINE HYDROCHLORIDE 40 MG/ML
SOLUTION TOPICAL PRN
OUTPATIENT
Start: 2025-08-26

## 2025-08-26 RX ORDER — LIDOCAINE HYDROCHLORIDE 20 MG/ML
JELLY TOPICAL PRN
OUTPATIENT
Start: 2025-08-26

## 2025-08-26 RX ORDER — SODIUM CHLOR/HYPOCHLOROUS ACID 0.033 %
SOLUTION, IRRIGATION IRRIGATION PRN
OUTPATIENT
Start: 2025-08-26

## 2025-08-26 RX ORDER — GINSENG 100 MG
CAPSULE ORAL PRN
OUTPATIENT
Start: 2025-08-26

## 2025-08-26 RX ORDER — BACITRACIN ZINC AND POLYMYXIN B SULFATE 500; 1000 [USP'U]/G; [USP'U]/G
OINTMENT TOPICAL PRN
OUTPATIENT
Start: 2025-08-26

## 2025-08-26 RX ORDER — GENTAMICIN SULFATE 1 MG/G
OINTMENT TOPICAL PRN
Status: DISCONTINUED | OUTPATIENT
Start: 2025-08-26 | End: 2025-08-27 | Stop reason: HOSPADM

## 2025-08-26 RX ORDER — CLOBETASOL PROPIONATE 0.5 MG/G
OINTMENT TOPICAL PRN
OUTPATIENT
Start: 2025-08-26

## 2025-08-26 RX ORDER — NEOMYCIN/BACITRACIN/POLYMYXINB 3.5-400-5K
OINTMENT (GRAM) TOPICAL PRN
OUTPATIENT
Start: 2025-08-26

## 2025-08-26 RX ADMIN — GENTAMICIN SULFATE: 1 OINTMENT TOPICAL at 10:18

## 2025-08-26 RX ADMIN — LIDOCAINE: 50 OINTMENT TOPICAL at 10:18

## 2025-08-26 ASSESSMENT — PAIN SCALES - GENERAL: PAINLEVEL_OUTOF10: 0

## 2025-08-26 ASSESSMENT — PAIN - FUNCTIONAL ASSESSMENT: PAIN_FUNCTIONAL_ASSESSMENT: 0-10

## (undated) DEVICE — INTENDED FOR TISSUE SEPARATION, AND OTHER PROCEDURES THAT REQUIRE A SHARP SURGICAL BLADE TO PUNCTURE OR CUT.: Brand: BARD-PARKER ® STAINLESS STEEL BLADES

## (undated) DEVICE — GLOVE SURG SZ 6 THK91MIL LTX FREE SYN POLYISOPRENE ANTI

## (undated) DEVICE — PENCIL ES CRD L10FT HND SWCHING ROCK SWCH W/ EDGE COAT BLDE

## (undated) DEVICE — ZINACTIVE USE 2539609 APPLICATOR MEDICATED 10.5 CC SOLUTION HI LT ORNG CHLORAPREP

## (undated) DEVICE — SYRINGE IRRIG 60ML SFT PLIABLE BLB EZ TO GRP 1 HND USE W/

## (undated) DEVICE — ADHESIVE SKIN CLSR 0.7ML TOP DERMBND ADV

## (undated) DEVICE — COVER,MAYO STAND,STERILE: Brand: MEDLINE

## (undated) DEVICE — COVER,C-ARM,41X74: Brand: MEDLINE

## (undated) DEVICE — YANKAUER,BULB TIP,W/O VENT,RIGID,STERILE: Brand: MEDLINE

## (undated) DEVICE — JELLY,LUBE,STERILE,FLIP TOP,TUBE,2-OZ: Brand: MEDLINE

## (undated) DEVICE — SOLUTION IV IRRIG WATER 1000ML POUR BRL 2F7114

## (undated) DEVICE — GOWN,SIRUS,POLYRNF,BRTHSLV,XLN/XL,20/CS: Brand: MEDLINE

## (undated) DEVICE — APPLICATOR MEDICATED 26 CC SOLUTION HI LT ORNG CHLORAPREP

## (undated) DEVICE — ELECTRODE LOOP 24FR R ANG MPLR CUT

## (undated) DEVICE — ELECTRODE ES AD CRDLSS PT RET REM POLYHESIVE

## (undated) DEVICE — COUNTER NDL 30 COUNT FOAM STRP SGL MAG

## (undated) DEVICE — MARKER SURG SKIN UTIL REGULAR/FINE 2 TIP W/ RUL AND 9 LBL

## (undated) DEVICE — AMD ANTIMICROBIAL NON-ADHERENT PAD,0.2% POLYHEXAMETHYLENE BIGUANIDE HCI (PHMB): Brand: TELFA

## (undated) DEVICE — TOWEL,OR,DSP,ST,BLUE,STD,6/PK,12PK/CS: Brand: MEDLINE

## (undated) DEVICE — INTRO OPTISEAL GLBL7FR13CM

## (undated) DEVICE — TUBING, SUCTION, 9/32" X 10', STRAIGHT: Brand: MEDLINE

## (undated) DEVICE — TRAY PREP DRY W/ PREM GLV 2 APPL 6 SPNG 2 UNDPD 1 OVERWRAP

## (undated) DEVICE — SYRINGE CATH TIP 50ML

## (undated) DEVICE — DRESSING TRNSPAR W5XL4.5IN FLM SHT SEMIPERMEABLE WIND

## (undated) DEVICE — YANKAUER,FLEXIBLE HANDLE,REGLR CAPACITY: Brand: MEDLINE INDUSTRIES, INC.

## (undated) DEVICE — GAUZE,SPONGE,4"X4",16PLY,XRAY,STRL,LF: Brand: MEDLINE

## (undated) DEVICE — MAT ABSRB W28XL48IN C6L FLR ULT W POLY BK

## (undated) DEVICE — 3M™ MICROFOAM™ TAPE 1528-4: Brand: 3M™ MICROFOAM™

## (undated) DEVICE — GLOVE ORANGE PI 7   MSG9070

## (undated) DEVICE — GOWN,ECLIPSE,POLYRNF,BRTHSLV,XL,30/CS: Brand: MEDLINE

## (undated) DEVICE — CATHETER URETH 22FR BLLN 30CC SIL HYDRGEL 3 W F SPEC SHT

## (undated) DEVICE — DBD-PACK,CYSTOSCOPY,PK VI,AURORA: Brand: MEDLINE

## (undated) DEVICE — DECANTER FLD 9IN ST BG FOR ASEP TRNSF OF FLD

## (undated) DEVICE — 3M™ IOBAN™ 2 ANTIMICROBIAL INCISE DRAPE 6650EZ: Brand: IOBAN™ 2

## (undated) DEVICE — Z INACTIVE USE 2635503 SOLUTION IRRIG 3000ML ST H2O USP UROMATIC PLAS CONT

## (undated) DEVICE — Device

## (undated) DEVICE — GLOVE ORANGE PI 8   MSG9080

## (undated) DEVICE — DRAPE,UTILITY,XL,4/PK,STERILE: Brand: MEDLINE

## (undated) DEVICE — GOWN,ECLIPSE,POLYRNF,BRTHSLV,L,30/CS: Brand: MEDLINE

## (undated) DEVICE — TELFA NON-ADHERENT ABSORBENT DRESSING: Brand: TELFA

## (undated) DEVICE — MEDI-TRACE CADENCE ADULT, DEFIBRILLATION ELECTRODE -RTS  (10 PR/PK) - ZOLL: Brand: MEDI-TRACE CADENCE